# Patient Record
Sex: FEMALE | Race: BLACK OR AFRICAN AMERICAN | NOT HISPANIC OR LATINO | Employment: FULL TIME | URBAN - METROPOLITAN AREA
[De-identification: names, ages, dates, MRNs, and addresses within clinical notes are randomized per-mention and may not be internally consistent; named-entity substitution may affect disease eponyms.]

---

## 2019-07-11 ENCOUNTER — OFFICE VISIT (OUTPATIENT)
Dept: FAMILY MEDICINE | Facility: CLINIC | Age: 34
End: 2019-07-11
Payer: COMMERCIAL

## 2019-07-11 VITALS
DIASTOLIC BLOOD PRESSURE: 82 MMHG | HEIGHT: 64 IN | TEMPERATURE: 99 F | BODY MASS INDEX: 50.02 KG/M2 | WEIGHT: 293 LBS | HEART RATE: 84 BPM | SYSTOLIC BLOOD PRESSURE: 119 MMHG

## 2019-07-11 DIAGNOSIS — E55.9 VITAMIN D DEFICIENCY: ICD-10-CM

## 2019-07-11 DIAGNOSIS — G56.03 BILATERAL CARPAL TUNNEL SYNDROME: Primary | ICD-10-CM

## 2019-07-11 DIAGNOSIS — H65.03 BILATERAL ACUTE SEROUS OTITIS MEDIA, RECURRENCE NOT SPECIFIED: ICD-10-CM

## 2019-07-11 DIAGNOSIS — R68.82 DECREASED LIBIDO: ICD-10-CM

## 2019-07-11 DIAGNOSIS — Z79.899 ENCOUNTER FOR LONG-TERM (CURRENT) USE OF MEDICATIONS: ICD-10-CM

## 2019-07-11 DIAGNOSIS — R53.83 FATIGUE, UNSPECIFIED TYPE: ICD-10-CM

## 2019-07-11 DIAGNOSIS — R73.03 PREDIABETES: ICD-10-CM

## 2019-07-11 DIAGNOSIS — E28.2 PCOS (POLYCYSTIC OVARIAN SYNDROME): ICD-10-CM

## 2019-07-11 PROBLEM — K21.00 GASTRO-ESOPHAGEAL REFLUX DISEASE WITH ESOPHAGITIS: Status: ACTIVE | Noted: 2018-03-27

## 2019-07-11 PROBLEM — Z3A.39 39 WEEKS GESTATION OF PREGNANCY: Status: ACTIVE | Noted: 2017-06-12

## 2019-07-11 PROBLEM — E66.01 MORBID OBESITY: Status: ACTIVE | Noted: 2017-06-14

## 2019-07-11 PROBLEM — Z98.891 S/P CESAREAN SECTION: Status: ACTIVE | Noted: 2017-06-13

## 2019-07-11 PROCEDURE — 3008F BODY MASS INDEX DOCD: CPT | Mod: CPTII,S$GLB,, | Performed by: FAMILY MEDICINE

## 2019-07-11 PROCEDURE — 99999 PR PBB SHADOW E&M-NEW PATIENT-LVL III: ICD-10-PCS | Mod: PBBFAC,,, | Performed by: FAMILY MEDICINE

## 2019-07-11 PROCEDURE — 99204 PR OFFICE/OUTPT VISIT, NEW, LEVL IV, 45-59 MIN: ICD-10-PCS | Mod: S$GLB,,, | Performed by: FAMILY MEDICINE

## 2019-07-11 PROCEDURE — 99204 OFFICE O/P NEW MOD 45 MIN: CPT | Mod: S$GLB,,, | Performed by: FAMILY MEDICINE

## 2019-07-11 PROCEDURE — 99999 PR PBB SHADOW E&M-NEW PATIENT-LVL III: CPT | Mod: PBBFAC,,, | Performed by: FAMILY MEDICINE

## 2019-07-11 PROCEDURE — 3008F PR BODY MASS INDEX (BMI) DOCUMENTED: ICD-10-PCS | Mod: CPTII,S$GLB,, | Performed by: FAMILY MEDICINE

## 2019-07-11 RX ORDER — FLUTICASONE PROPIONATE 50 MCG
1 SPRAY, SUSPENSION (ML) NASAL
COMMUNITY
Start: 2014-12-19 | End: 2019-10-15 | Stop reason: SDUPTHER

## 2019-07-11 RX ORDER — EPINEPHRINE 0.3 MG/.3ML
INJECTION SUBCUTANEOUS
COMMUNITY
Start: 2016-06-21 | End: 2019-08-15 | Stop reason: SDUPTHER

## 2019-07-11 RX ORDER — IBUPROFEN 800 MG/1
TABLET ORAL
Refills: 0 | COMMUNITY
Start: 2019-06-19 | End: 2020-09-14

## 2019-07-11 RX ORDER — ALBUTEROL SULFATE 90 UG/1
2 AEROSOL, METERED RESPIRATORY (INHALATION)
COMMUNITY
Start: 2013-08-28 | End: 2019-08-15 | Stop reason: SDUPTHER

## 2019-07-11 RX ORDER — NORETHINDRONE ACETATE AND ETHINYL ESTRADIOL .02; 1 MG/1; MG/1
1 TABLET ORAL
COMMUNITY
Start: 2018-08-09 | End: 2019-10-22 | Stop reason: SDUPTHER

## 2019-07-12 PROBLEM — R68.82 DECREASED LIBIDO: Status: ACTIVE | Noted: 2019-07-12

## 2019-07-12 PROBLEM — R73.03 PREDIABETES: Status: ACTIVE | Noted: 2019-07-12

## 2019-07-12 PROBLEM — R53.83 FATIGUE: Status: ACTIVE | Noted: 2019-07-12

## 2019-07-12 PROBLEM — Z79.899 ENCOUNTER FOR LONG-TERM (CURRENT) USE OF MEDICATIONS: Status: ACTIVE | Noted: 2019-07-12

## 2019-07-12 PROBLEM — E55.9 VITAMIN D DEFICIENCY: Status: ACTIVE | Noted: 2019-07-12

## 2019-07-12 RX ORDER — CETIRIZINE HYDROCHLORIDE 10 MG/1
10 TABLET ORAL DAILY
Qty: 90 TABLET | Refills: 3 | Status: SHIPPED | OUTPATIENT
Start: 2019-07-12 | End: 2019-08-15 | Stop reason: SDUPTHER

## 2019-07-12 RX ORDER — METFORMIN HYDROCHLORIDE 500 MG/1
500 TABLET ORAL 2 TIMES DAILY WITH MEALS
Qty: 180 TABLET | Refills: 3 | Status: SHIPPED | OUTPATIENT
Start: 2019-07-12 | End: 2019-12-27 | Stop reason: SINTOL

## 2019-07-12 RX ORDER — MONTELUKAST SODIUM 10 MG/1
10 TABLET ORAL NIGHTLY
Qty: 90 TABLET | Refills: 3 | Status: SHIPPED | OUTPATIENT
Start: 2019-07-12 | End: 2020-07-11

## 2019-07-12 NOTE — PATIENT INSTRUCTIONS
Healthy Heart Diet  GENERAL INFORMATION:  What is a heart healthy diet? The goal of a heart healthy diet is to decrease your risk for heart disease. There are several health conditions that can increase your risk for heart disease. Some of these conditions include unhealthy blood cholesterol levels, high blood pressure, and obesity (weighing more than your caregiver recommends). If you have any of these conditions, you should make diet and lifestyle changes as part of your treatment plan. People who have had a heart attack or stroke also should follow the heart healthy diet. The heart healthy diet may help to decrease the risk of having another heart attack or stroke.  What should I know about the different types of fat in my diet?   Unhealthy fats: A diet that is high in cholesterol, saturated fat, and trans fat may cause unhealthy cholesterol levels.    Cholesterol: Limit intake of cholesterol to less than 200 mg per day. Cholesterol is found in meat, eggs, and dairy (milk, cheese).    Saturated fat: Limit saturated fat to less than seven percent of total daily calories. Ask your caregiver how many calories you need each day. Saturated fats are found in meat and dairy.    Trans fat: Limit trans fat to less than one percent of total calories. Ask your caregiver how many calories you need each day. Foods that say trans fat free on the label may still have up to 0.5 grams of fat per serving. Trans fats are used in fried and baked foods.  Healthy fats: Unsaturated fats can be healthy for you and can help to improve your cholesterol levels. Increase your intake of healthy fats by replacing saturated and trans fats in your diet with unsaturated fat.     Monounsaturated fats: Monounsaturated fats are found in nuts and vegetable oils, such as olive, canola, safflower, and sunflower.    Polyunsaturated fats: Unsaturated fat can be found in vegetable oils, such as soybean or corn. Omega-3 fats are a type of polyunsaturated  fat that can help to decrease the risk of heart disease. Omega-3 fats are found in fish, such as salmon, herring, trout, and tuna. Omega-3 fats can also be found in plant foods, such as walnuts, flaxseed, soybeans, and canola oil.  What are other diet guidelines I should follow?   Maintain a healthy weight: Your risk of heart disease is higher if you are overweight. Your caregiver may suggest that you lose weight if you are overweight. The following are some diet changes you can make to lose weight.     Eat fewer calories: A healthy way of decreasing calories is to eat fewer foods that have added sugars and fats. Foods that are have added sugars are also high in calories, which can cause you to gain weight. Some foods that have added sugars are sweet drinks (soda and fruit drinks), candy, cakes, cookies, and pies.    Eat smaller portions: You can also decrease calories in your diet by eating smaller portions at each meal and eating fewer snacks. Ask your caregiver for more information about how to lose weight.  Decrease sodium in your diet to less than 2300 mg each day: Sodium is found in table salt and foods that have added salt. A diet that is lower in sodium may decrease blood pressure or prevent high blood pressure. Keep your blood pressure within a normal range to decrease your risk of stroke, heart disease, and heart failure.    Include omega-3 fats in your diet: Eat two servings of fish per week. One serving is about four ounces. Fish is a good source of healthy omega-3 fats. Most fish contain some mercury, but many fish contain levels that are not harmful to most people. Higher amounts of mercury can be harmful to pregnant women and children. Children and pregnant women should avoid eating fish high in mercury, such as shark or swordfish. Fish that have lower amounts of mercury include salmon, canned light tuna, and catfish.    Include high fiber foods in your diet each day: You can decrease your risk of  heart disease by following a diet that is high in fiber. Include fruit and vegetables, legumes (beans), and whole-grain foods in your diet each day to get enough fiber.    Limit alcohol: Limit the amount of alcohol you drink. Drinking too much can damage your brain, heart, and liver. The risk of getting high blood pressure and certain types of cancer are greater for people who drink too much alcohol. Drinking too much alcohol also increases the risk of having a stroke. Women should limit alcohol to one drink a day. Men should limit alcohol to two drinks a day. A drink of alcohol is 12 ounces of beer, or five ounces of wine. One and one-half ounces of liquor, such as whiskey, is one drink of alcohol. If you drink alcohol, talk to your caregiver.   What should I avoid eating and drinking while on a heart healthy diet? Learn to read labels on packaged foods before buying them. Ask your caregiver for more information about how to read food labels. The following foods are high in fat, saturated fat, cholesterol, and sodium.   Bread and other carbohydrates:     High-fat baked goods, such as doughnuts, pastries, cookies, and biscuits.    Chips, snack mixes, regular crackers, and flavored popcorn.    Pretzels, salted nuts (high in sodium).  Fruit and vegetables:     Regular, canned vegetables (high in sodium).    Fried vegetables or vegetables in butter or high-fat sauces.    Fried fruit, or fruit served with cream.  Dairy:     Whole milk, two percent milk, half-and-half creamer.    Cheese, cream cheese, sour cream.  Meats and meat substitutes:     High-fat cuts of meat (T-bone steak, regular hamburger, ribs, figueredo, sausage).    Cold cuts and hot dogs.    Whole eggs and egg yolks (limit to three servings or less per week).  Fats:     Butter, hard margarine, shortening, partially hydrogenated or tropical (coconut, palm) oils.    Other:     Salt or seasonings made with salt (high in sodium).    Soy sauce, miso soup, canned or  dried soups (high in sodium).    Ketchup, barbecue sauce, and other high-sodium sauces.    High-fat gravy and sauces, such as Saleem or cheese sauces.  What can I eat and drink while on a heart healthy diet? Ask your dietitian or caregiver how many servings to eat each day from each of the following groups of foods. The amount of servings you should eat from each food group will depend on your daily calorie needs.   Breads and other carbohydrates:     Whole grain breads, cereals (oatmeal), and pasta.    Brown rice.    Low fat, low-sodium crackers and pretzels.  Fruits and vegetables:     Fresh, frozen, or canned vegetables (no salt or low-sodium).    Fresh, frozen, dried, or canned fruit (canned in light syrup or fruit juice).  Dairy:     Nonfat (skim), one-half percent, or one percent milk.    Nonfat or low fat yogurt or cottage cheese.    Fat free or low fat cheese.  Meats and meat products:     Fish and poultry (chicken, turkey) with no skin.    Lean beef and pork (loin, round, extra lean hamburger).    Dried beans and peas, unsalted nuts, soy products.    Egg whites and substitutes.  Fats:     Unsaturated oils (olive, soy, peanut, canola, safflower, sunflower).    Vegetable oil spreads or soft margarine.    Avocado.  Other:     Herbs and spices in place of salt.    Low fat snacks (unsalted pretzels, plain popcorn).  What are some other lifestyle changes I should make?   Do not smoke: Smoking causes lung cancer and other long-term lung diseases. It increases your risk of many cancer types. Smoking also increases your risk of blood vessel disease, heart attack, and vision disorders. Not smoking may help prevent such symptoms as headaches and dizziness for yourself and those around you. Smokers have shorter lifespans than nonsmokers.     Exercise regularly: Regular exercise can help improve your cholesterol levels and decrease your risk for coronary artery disease. Regular exercise can also help you reach or  maintain a healthy weight. Get 30 minutes or more of moderate exercise or 20 minutes of intense exercise on most days of the week. To lose weight, get at least 60 minutes of exercise on most days of the week. Children should exercise for at least 60 minutes each day. Talk to your caregiver about the best exercise program for you.  What are the risks of not following a heart healthy diet? You may develop heart disease if you do not follow a heart healthy diet. High blood cholesterol puts you at a higher risk for heart disease. Untreated high blood pressure may lead to a stroke. It can also lead to a heart attack or heart or kidney failure. Obesity is linked to medical problems, such as heart disease, high blood pressure, stroke, and diabetes. You may need to follow this diet if you already had a heart attack or stroke. You may be more likely to have another stroke or heart attack if you do not follow this diet.  When should I call my caregiver? You have questions or concerns about your illness, medicine, or this diet.  CARE AGREEMENT:  You have the right to help plan your care. Discuss treatment options with your caregivers to decide what care you want to receive. You always have the right to refuse treatment.

## 2019-07-12 NOTE — ASSESSMENT & PLAN NOTE
No antibiotics indicated at this time.  Patient not taking any antihistamine.  She does take Flonase.  Taking 1 spray daily.  Increase to 2 sprays daily

## 2019-07-12 NOTE — ASSESSMENT & PLAN NOTE
Monitor hemoglobin A1c.  Discussed diabetic diet and exercise protocol.  Continue medications.  Monitor for side effects.  Discussed checking blood glucose.  Discussed symptoms to monitor for and to notify me immediately if persistent or worsening.  Follow up with Ophthalmology/Optometry and Podiatry.

## 2019-07-12 NOTE — PROGRESS NOTES
Subjective:      Patient ID: Lucía Coreas is a 33 y.o. female.    Chief Complaint: Establish Care      Problem List Items Addressed This Visit     PCOS (polycystic ovarian syndrome)    Overview     Chronic.  Patient was on metformin previously.  And got pregnant.  She would like to start back on his medication.  Onset was years ago per         Current Assessment & Plan     Follow-up with OBGYN.  Starting metformin.         Bilateral carpal tunnel syndrome - Primary    Overview     Acute.  New problem.  Worsening over the last few days to weeks.  Has not had any evaluation or treatment for this.  Complains of numbness and tingling in the 1st 3 digits of each hand.  Patient works at the computer all day.         Current Assessment & Plan     Night splint.  Anti-inflammatories.  Referral hand surgeon.         Bilateral acute serous otitis media    Overview     Acute.    New problem.  Worsening.  Feels like her ears are under water.  Denies any fever chills sore throat.  Patient is not taking any treatment for this.         Current Assessment & Plan     No antibiotics indicated at this time.  Patient not taking any antihistamine.  She does take Flonase.  Taking 1 spray daily.  Increase to 2 sprays daily         Encounter for long-term (current) use of medications    Overview     07/12/2019   Patient is on CHRONIC long-term drug therapy for managed conditions. See medication list. Reports compliance.  No side effects reported.  Routine lab work is being monitored.  Patient does not need refills today.     Lab Results   Component Value Date    WBC 8.31 01/22/2009    HGB 12.4 01/22/2009    HCT 38.5 01/22/2009    MCV 74.6 (L) 01/22/2009     01/22/2009      CMP  Sodium   Date Value Ref Range Status   01/22/2009 136 136 - 145 mMol/l Final     Potassium   Date Value Ref Range Status   01/22/2009 3.8 3.5 - 5.1 mMol/l Final     Chloride   Date Value Ref Range Status   01/22/2009 102 95 - 110 mMol/l Final     CO2    Date Value Ref Range Status   01/22/2009 22 (L) 23.0 - 29.0 mEq/L Final     Glucose   Date Value Ref Range Status   01/22/2009 86 70 - 110 mg/dl Final     BUN, Bld   Date Value Ref Range Status   01/22/2009 8 6 - 20 mg/dl Final     Creatinine   Date Value Ref Range Status   01/22/2009 0.7 0.5 - 1.4 mg/dl Final     Calcium   Date Value Ref Range Status   01/22/2009 9.5 8.7 - 10.5 mg/dl Final     Total Protein   Date Value Ref Range Status   01/22/2009 7.7 6.0 - 8.4 gm/dl Final     Albumin   Date Value Ref Range Status   01/22/2009 4.7 3.5 - 5.2 g/dl Final     Total Bilirubin   Date Value Ref Range Status   01/22/2009 0.2 0.1 - 1.0 mg/dl Final     Comment:     For infants and newborns, interpretation of results should be based  on gestational age, weight and in agreement with clinical  observations.  .  Premature Infant recommended reference ranges:  Up to 24 hours.............<8.0 mg/dl  Up to 48 hours............<12.0 mg/dl  3-5 days..................<15.0 mg/dl  6-29 days.................<15.0 mg/dl       Alkaline Phosphatase   Date Value Ref Range Status   01/22/2009 65 55 - 135 U/L Final     AST   Date Value Ref Range Status   01/22/2009 9 (L) 10 - 40 U/L Final     ALT   Date Value Ref Range Status   01/22/2009 8 (L) 10 - 44 U/L Final     No results found for: TSH            Current Assessment & Plan     Complete history and physical was completed today.  Complete and thorough medication reconciliation was performed.  Discussed risks and benefits of medications.  Advised patient on orders and health maintenance.  We discussed old records and old labs if available.  Will request any records not available through epic.  Continue current medications listed on your summary sheet.           Decreased libido    Overview     Patient complaining of decreased sexual drive.  Patient reports this was better when she was on metformin for her PCOS.  She was recently put on birth control by her OBGYN.  No improvement.          Current Assessment & Plan     Trial of metformin.  Follow up with OBGYN.         Fatigue    Overview     Chronic.  No improvement.  Patient concerned about her weight and energy level.         Vitamin D deficiency    Overview       07/12/2019  Vit d deficiency.  Not currently taking vitamin d supplement. No SE reported. Fatigue is notimproved.              Current Assessment & Plan     Checking vitamin-D.  Will recommend supplementation based on the level.           Prediabetes    Overview     No results found for: LABA1C, HGBA1C  No results found in epic however review her blood work from previous provider shows last A1c was 5.8.  Patient previously on metformin.  Currently having fatigue and decreased libido.         Current Assessment & Plan     Monitor hemoglobin A1c.  Discussed diabetic diet and exercise protocol.  Continue medications.  Monitor for side effects.  Discussed checking blood glucose.  Discussed symptoms to monitor for and to notify me immediately if persistent or worsening.  Follow up with Ophthalmology/Optometry and Podiatry.                Past Medical History:  Past Medical History:   Diagnosis Date    Allergy     Amenorrhea     Back pain     GERD (gastroesophageal reflux disease)     Infertility associated with anovulation     Iron deficiency anemia     Knee pain     Metabolic syndrome     PCO (polycystic ovaries)     Plantar fasciitis     Recurrent boils     Reflux esophagitis     Sinusitis     Vaginitis      History reviewed. No pertinent surgical history.  Review of patient's allergies indicates:   Allergen Reactions    Sulfa (sulfonamide antibiotics) Anaphylaxis and Swelling     Swelling (eyes)^, Swelling (throat)^  Swelling (eyes)^, Swelling (throat)^      Diclofenac      Gastritis      Current Outpatient Medications on File Prior to Visit   Medication Sig Dispense Refill    albuterol (PROVENTIL/VENTOLIN HFA) 90 mcg/actuation inhaler Inhale 2 puffs into the lungs.       EPINEPHrine (EPIPEN 2-MARTIN) 0.3 mg/0.3 mL AtIn       fluticasone propionate (FLONASE) 50 mcg/actuation nasal spray 1 spray by Nasal route.      ibuprofen (ADVIL,MOTRIN) 800 MG tablet TK 1 T PO Q 8 H PRF PAIN  0    norethindrone-ethinyl estradiol (JUNEL 1/20, 21,) 1-20 mg-mcg per tablet Take 1 tablet by mouth.       No current facility-administered medications on file prior to visit.      Social History     Socioeconomic History    Marital status:      Spouse name: Not on file    Number of children: Not on file    Years of education: Not on file    Highest education level: Not on file   Occupational History    Not on file   Social Needs    Financial resource strain: Not on file    Food insecurity:     Worry: Not on file     Inability: Not on file    Transportation needs:     Medical: Not on file     Non-medical: Not on file   Tobacco Use    Smoking status: Never Smoker   Substance and Sexual Activity    Alcohol use: Not on file    Drug use: Not on file    Sexual activity: Not on file   Lifestyle    Physical activity:     Days per week: Not on file     Minutes per session: Not on file    Stress: Not on file   Relationships    Social connections:     Talks on phone: Not on file     Gets together: Not on file     Attends Islam service: Not on file     Active member of club or organization: Not on file     Attends meetings of clubs or organizations: Not on file     Relationship status: Not on file   Other Topics Concern    Not on file   Social History Narrative    Not on file     Family History   Problem Relation Age of Onset    Diabetes Mother     Lupus Mother     Diabetes Father     Heart disease Father     Hypertension Father     Cancer Father         prostate    Cancer Sister         ovarian       I have reviewed the complete PMH, social history, surgical history, allergies and medications.  As well as family history.    Review of Systems   Constitutional: Positive for  "fatigue. Negative for chills, fever and unexpected weight change.   HENT: Positive for ear pain. Negative for ear discharge and sore throat.    Eyes: Negative for redness and visual disturbance.   Respiratory: Negative for cough and shortness of breath.    Cardiovascular: Negative for chest pain and palpitations.   Gastrointestinal: Negative for nausea and vomiting.   Genitourinary: Positive for menstrual problem and pelvic pain.        Decreased libido   Musculoskeletal: Negative for arthralgias and myalgias.   Skin: Negative for rash and wound.   Neurological: Negative for weakness and headaches.       Objective:     /82   Pulse 84   Temp 99.2 °F (37.3 °C)   Ht 5' 4" (1.626 m)   Wt (!) 146.5 kg (323 lb)   BMI 55.44 kg/m²     Physical Exam   Constitutional: She is oriented to person, place, and time. She appears well-developed and well-nourished. No distress.   HENT:   Head: Normocephalic and atraumatic.   Right Ear: Hearing, external ear and ear canal normal. No drainage or tenderness. Tympanic membrane is bulging. Tympanic membrane is not injected and not erythematous. A middle ear effusion is present. No hemotympanum. No decreased hearing is noted.   Left Ear: Hearing, external ear and ear canal normal. No drainage or tenderness. Tympanic membrane is not injected, not erythematous and not bulging. A middle ear effusion is present. No hemotympanum. No decreased hearing is noted.   Nose: Mucosal edema and rhinorrhea present.   Eyes: Pupils are equal, round, and reactive to light. EOM and lids are normal.   Neck: Normal range of motion. Neck supple.   Cardiovascular: Normal rate, regular rhythm, normal heart sounds and intact distal pulses.   No murmur heard.  Pulmonary/Chest: Effort normal and breath sounds normal. No respiratory distress. She has no wheezes.   Abdominal: Soft. Bowel sounds are normal. She exhibits no distension.   Obese abdomen   Musculoskeletal: Normal range of motion. She exhibits no " edema.   Neurological: She is alert and oriented to person, place, and time. No cranial nerve deficit.   Skin: Skin is warm and dry. Capillary refill takes less than 2 seconds.   Psychiatric: She has a normal mood and affect. Her behavior is normal.   Nursing note and vitals reviewed.      Assessment:     1. Bilateral carpal tunnel syndrome    2. Bilateral acute serous otitis media, recurrence not specified    3. Encounter for long-term (current) use of medications    4. PCOS (polycystic ovarian syndrome)    5. Decreased libido    6. Fatigue, unspecified type    7. Vitamin D deficiency    8. Prediabetes        Plan:     I have Reviewed and summarized old records.  I have performed thorough medication reconciliation today and discussed risk and benefits of each medication.  I have reviewed labs and discussed with patient.  All questions were answered.  I am requesting old records and will review them once they are available.    Problem List Items Addressed This Visit     PCOS (polycystic ovarian syndrome)     Follow-up with OBGYN.  Starting metformin.         Relevant Orders    Hemoglobin A1c    Bilateral carpal tunnel syndrome - Primary     Night splint.  Anti-inflammatories.  Referral hand surgeon.         Relevant Orders    Ambulatory referral to Hand Surgery    Bilateral acute serous otitis media     No antibiotics indicated at this time.  Patient not taking any antihistamine.  She does take Flonase.  Taking 1 spray daily.  Increase to 2 sprays daily         Encounter for long-term (current) use of medications     Complete history and physical was completed today.  Complete and thorough medication reconciliation was performed.  Discussed risks and benefits of medications.  Advised patient on orders and health maintenance.  We discussed old records and old labs if available.  Will request any records not available through epic.  Continue current medications listed on your summary sheet.           Relevant  Medications    albuterol (PROVENTIL/VENTOLIN HFA) 90 mcg/actuation inhaler    norethindrone-ethinyl estradiol (JUNEL 1/20, 21,) 1-20 mg-mcg per tablet    EPINEPHrine (EPIPEN 2-MARTIN) 0.3 mg/0.3 mL AtIn    fluticasone propionate (FLONASE) 50 mcg/actuation nasal spray    ibuprofen (ADVIL,MOTRIN) 800 MG tablet    Other Relevant Orders    Ambulatory referral to Hand Surgery    CBC auto differential    Comprehensive metabolic panel    Hemoglobin A1c    Lipid panel    TSH    T3, free    T4    Vitamin D    Decreased libido     Trial of metformin.  Follow up with OBGYN.         Fatigue    Relevant Orders    CBC auto differential    Comprehensive metabolic panel    Hemoglobin A1c    TSH    T3, free    T4    Vitamin D    Vitamin D deficiency     Checking vitamin-D.  Will recommend supplementation based on the level.           Relevant Orders    Vitamin D    Prediabetes     Monitor hemoglobin A1c.  Discussed diabetic diet and exercise protocol.  Continue medications.  Monitor for side effects.  Discussed checking blood glucose.  Discussed symptoms to monitor for and to notify me immediately if persistent or worsening.  Follow up with Ophthalmology/Optometry and Podiatry.         Relevant Orders    Hemoglobin A1c          Follow up in about 1 month (around 8/8/2019) for Review lab.    DISCLAIMER: This note was compiled by using a speech recognition dictation system and therefore please be aware that typographical / speech recognition errors can and do occur.  Please contact me if you see any errors specifically.    Dante Negro MD  We Offer Telehealth & Same Day Appointments!   Book your Telehealth appointment with me through my nurse or   Clinic appointments on [employer]!    Office: [phone]          Check out my Facebook Page and Follow Me at: CLICK HERE    Check out my website at [facility] by clicking on: CLICK HERE    To Schedule appointments online, go to [employer]: CLICK HERE     Location:  [email or web address]    [address] [facility]  [place] [address]    FAX: [phone]

## 2019-07-12 NOTE — TELEPHONE ENCOUNTER
On the patient summary from yesterday, you stated that you wanted patient to start Metformin, but I don't see this medicine on her list.  I have pended the order to you.

## 2019-07-16 ENCOUNTER — LAB VISIT (OUTPATIENT)
Dept: LAB | Facility: HOSPITAL | Age: 34
End: 2019-07-16
Attending: FAMILY MEDICINE
Payer: COMMERCIAL

## 2019-07-16 DIAGNOSIS — R73.03 PREDIABETES: ICD-10-CM

## 2019-07-16 DIAGNOSIS — E55.9 VITAMIN D DEFICIENCY: ICD-10-CM

## 2019-07-16 DIAGNOSIS — E28.2 PCOS (POLYCYSTIC OVARIAN SYNDROME): ICD-10-CM

## 2019-07-16 DIAGNOSIS — Z79.899 ENCOUNTER FOR LONG-TERM (CURRENT) USE OF MEDICATIONS: ICD-10-CM

## 2019-07-16 DIAGNOSIS — R53.83 FATIGUE, UNSPECIFIED TYPE: ICD-10-CM

## 2019-07-16 LAB
25(OH)D3+25(OH)D2 SERPL-MCNC: 16 NG/ML (ref 30–96)
ALBUMIN SERPL BCP-MCNC: 3.3 G/DL (ref 3.5–5.2)
ALP SERPL-CCNC: 62 U/L (ref 55–135)
ALT SERPL W/O P-5'-P-CCNC: 9 U/L (ref 10–44)
ANION GAP SERPL CALC-SCNC: 10 MMOL/L (ref 8–16)
AST SERPL-CCNC: 10 U/L (ref 10–40)
BASOPHILS # BLD AUTO: 0.04 K/UL (ref 0–0.2)
BASOPHILS NFR BLD: 0.4 % (ref 0–1.9)
BILIRUB SERPL-MCNC: 0.3 MG/DL (ref 0.1–1)
BUN SERPL-MCNC: 7 MG/DL (ref 6–20)
CALCIUM SERPL-MCNC: 9.1 MG/DL (ref 8.7–10.5)
CHLORIDE SERPL-SCNC: 107 MMOL/L (ref 95–110)
CHOLEST SERPL-MCNC: 153 MG/DL (ref 120–199)
CHOLEST/HDLC SERPL: 3.1 {RATIO} (ref 2–5)
CO2 SERPL-SCNC: 25 MMOL/L (ref 23–29)
CREAT SERPL-MCNC: 0.7 MG/DL (ref 0.5–1.4)
DIFFERENTIAL METHOD: ABNORMAL
EOSINOPHIL # BLD AUTO: 0.1 K/UL (ref 0–0.5)
EOSINOPHIL NFR BLD: 1.1 % (ref 0–8)
ERYTHROCYTE [DISTWIDTH] IN BLOOD BY AUTOMATED COUNT: 15.1 % (ref 11.5–14.5)
EST. GFR  (AFRICAN AMERICAN): >60 ML/MIN/1.73 M^2
EST. GFR  (NON AFRICAN AMERICAN): >60 ML/MIN/1.73 M^2
ESTIMATED AVG GLUCOSE: 126 MG/DL (ref 68–131)
GLUCOSE SERPL-MCNC: 93 MG/DL (ref 70–110)
HBA1C MFR BLD HPLC: 6 % (ref 4–5.6)
HCT VFR BLD AUTO: 41 % (ref 37–48.5)
HDLC SERPL-MCNC: 50 MG/DL (ref 40–75)
HDLC SERPL: 32.7 % (ref 20–50)
HGB BLD-MCNC: 12.4 G/DL (ref 12–16)
IMM GRANULOCYTES # BLD AUTO: 0.02 K/UL (ref 0–0.04)
IMM GRANULOCYTES NFR BLD AUTO: 0.2 % (ref 0–0.5)
LDLC SERPL CALC-MCNC: 87 MG/DL (ref 63–159)
LYMPHOCYTES # BLD AUTO: 3.6 K/UL (ref 1–4.8)
LYMPHOCYTES NFR BLD: 40.1 % (ref 18–48)
MCH RBC QN AUTO: 23.3 PG (ref 27–31)
MCHC RBC AUTO-ENTMCNC: 30.2 G/DL (ref 32–36)
MCV RBC AUTO: 77 FL (ref 82–98)
MONOCYTES # BLD AUTO: 0.5 K/UL (ref 0.3–1)
MONOCYTES NFR BLD: 5.4 % (ref 4–15)
NEUTROPHILS # BLD AUTO: 4.7 K/UL (ref 1.8–7.7)
NEUTROPHILS NFR BLD: 52.8 % (ref 38–73)
NONHDLC SERPL-MCNC: 103 MG/DL
NRBC BLD-RTO: 0 /100 WBC
PLATELET # BLD AUTO: 332 K/UL (ref 150–350)
PMV BLD AUTO: 10.6 FL (ref 9.2–12.9)
POTASSIUM SERPL-SCNC: 3.8 MMOL/L (ref 3.5–5.1)
PROT SERPL-MCNC: 7.3 G/DL (ref 6–8.4)
RBC # BLD AUTO: 5.33 M/UL (ref 4–5.4)
SODIUM SERPL-SCNC: 142 MMOL/L (ref 136–145)
T3FREE SERPL-MCNC: 2.9 PG/ML (ref 2.3–4.2)
T4 SERPL-MCNC: 9.4 UG/DL (ref 4.5–11.5)
TRIGL SERPL-MCNC: 80 MG/DL (ref 30–150)
TSH SERPL DL<=0.005 MIU/L-ACNC: 1.54 UIU/ML (ref 0.4–4)
WBC # BLD AUTO: 9 K/UL (ref 3.9–12.7)

## 2019-07-16 PROCEDURE — 84443 ASSAY THYROID STIM HORMONE: CPT

## 2019-07-16 PROCEDURE — 85025 COMPLETE CBC W/AUTO DIFF WBC: CPT

## 2019-07-16 PROCEDURE — 80053 COMPREHEN METABOLIC PANEL: CPT

## 2019-07-16 PROCEDURE — 84436 ASSAY OF TOTAL THYROXINE: CPT

## 2019-07-16 PROCEDURE — 84481 FREE ASSAY (FT-3): CPT

## 2019-07-16 PROCEDURE — 36415 COLL VENOUS BLD VENIPUNCTURE: CPT | Mod: PO

## 2019-07-16 PROCEDURE — 80061 LIPID PANEL: CPT

## 2019-07-16 PROCEDURE — 82306 VITAMIN D 25 HYDROXY: CPT

## 2019-07-16 PROCEDURE — 83036 HEMOGLOBIN GLYCOSYLATED A1C: CPT

## 2019-07-17 NOTE — PROGRESS NOTES
Start vitamin-D protocol    Patient's results were released through my chart and was notified.  Please follow up to make sure that they received the results.  Released results through my chart.    I have reviewed your recent blood work.    Your complete blood count is stable, shows normal hemoglobin normal white blood cells and red blood cells and platelets.  The size of your hemoglobin is smaller which sometimes can be seen with iron deficiency anemia.  Start iron supplement over-the-counter.    Your metabolic panel which shows a glucose kidney function electrolytes and liver function is stable within normal limits.   Thyroid studies are normal.   Your cholesterol is normal.    Your vitamin-D is low and needs replacement.  We will send vitamin-D supplementation to your pharmacy.  Take 55026 units once a week for 3 months and and we will repeat this level.   .  Your hemoglobin A1c is prediabetic range at 6.0.  Continue metformin.   We will discuss these results in detail at your next office visit.  Please let me know if you have any questions concerning these laboratory results.

## 2019-08-01 ENCOUNTER — PATIENT OUTREACH (OUTPATIENT)
Dept: ADMINISTRATIVE | Facility: HOSPITAL | Age: 34
End: 2019-08-01

## 2019-08-01 NOTE — PROGRESS NOTES
Health Maintenance reviewed. PAP dated 6/2/2016 performed at [facility] in [facility] Everywhere sent to MA for media upload. Letter sent .

## 2019-08-08 ENCOUNTER — PATIENT OUTREACH (OUTPATIENT)
Dept: ADMINISTRATIVE | Facility: HOSPITAL | Age: 34
End: 2019-08-08

## 2019-08-15 ENCOUNTER — OFFICE VISIT (OUTPATIENT)
Dept: FAMILY MEDICINE | Facility: CLINIC | Age: 34
End: 2019-08-15
Payer: COMMERCIAL

## 2019-08-15 VITALS
SYSTOLIC BLOOD PRESSURE: 132 MMHG | HEART RATE: 90 BPM | BODY MASS INDEX: 50.02 KG/M2 | TEMPERATURE: 99 F | WEIGHT: 293 LBS | DIASTOLIC BLOOD PRESSURE: 88 MMHG | HEIGHT: 64 IN

## 2019-08-15 DIAGNOSIS — Z79.899 ENCOUNTER FOR LONG-TERM (CURRENT) USE OF MEDICATIONS: Primary | ICD-10-CM

## 2019-08-15 DIAGNOSIS — K21.00 GERD WITH ESOPHAGITIS: ICD-10-CM

## 2019-08-15 DIAGNOSIS — R73.03 PREDIABETES: ICD-10-CM

## 2019-08-15 DIAGNOSIS — H65.03 BILATERAL ACUTE SEROUS OTITIS MEDIA, RECURRENCE NOT SPECIFIED: ICD-10-CM

## 2019-08-15 DIAGNOSIS — E55.9 VITAMIN D DEFICIENCY: ICD-10-CM

## 2019-08-15 DIAGNOSIS — R11.2 NON-INTRACTABLE VOMITING WITH NAUSEA, UNSPECIFIED VOMITING TYPE: ICD-10-CM

## 2019-08-15 LAB
B-HCG UR QL: NEGATIVE
CTP QC/QA: YES

## 2019-08-15 PROCEDURE — 3008F PR BODY MASS INDEX (BMI) DOCUMENTED: ICD-10-PCS | Mod: CPTII,S$GLB,, | Performed by: FAMILY MEDICINE

## 2019-08-15 PROCEDURE — 99214 PR OFFICE/OUTPT VISIT, EST, LEVL IV, 30-39 MIN: ICD-10-PCS | Mod: 25,S$GLB,, | Performed by: FAMILY MEDICINE

## 2019-08-15 PROCEDURE — 99999 PR PBB SHADOW E&M-EST. PATIENT-LVL III: ICD-10-PCS | Mod: PBBFAC,,, | Performed by: FAMILY MEDICINE

## 2019-08-15 PROCEDURE — 3008F BODY MASS INDEX DOCD: CPT | Mod: CPTII,S$GLB,, | Performed by: FAMILY MEDICINE

## 2019-08-15 PROCEDURE — 99999 PR PBB SHADOW E&M-EST. PATIENT-LVL III: CPT | Mod: PBBFAC,,, | Performed by: FAMILY MEDICINE

## 2019-08-15 PROCEDURE — 81025 URINE PREGNANCY TEST: CPT | Mod: S$GLB,,, | Performed by: FAMILY MEDICINE

## 2019-08-15 PROCEDURE — 81025 POCT URINE PREGNANCY: ICD-10-PCS | Mod: S$GLB,,, | Performed by: FAMILY MEDICINE

## 2019-08-15 PROCEDURE — 99214 OFFICE O/P EST MOD 30 MIN: CPT | Mod: 25,S$GLB,, | Performed by: FAMILY MEDICINE

## 2019-08-15 RX ORDER — ALBUTEROL SULFATE 90 UG/1
2 AEROSOL, METERED RESPIRATORY (INHALATION) EVERY 4 HOURS PRN
Qty: 18 G | Refills: 3 | Status: SHIPPED | OUTPATIENT
Start: 2019-08-15 | End: 2020-09-23 | Stop reason: SDUPTHER

## 2019-08-15 RX ORDER — CETIRIZINE HYDROCHLORIDE 10 MG/1
10 TABLET ORAL DAILY
Qty: 90 TABLET | Refills: 3 | Status: SHIPPED | OUTPATIENT
Start: 2019-08-15 | End: 2020-01-10 | Stop reason: ALTCHOICE

## 2019-08-15 RX ORDER — PANTOPRAZOLE SODIUM 40 MG/1
40 TABLET, DELAYED RELEASE ORAL DAILY
Qty: 90 TABLET | Refills: 3 | Status: SHIPPED | OUTPATIENT
Start: 2019-08-15 | End: 2020-09-23 | Stop reason: SDUPTHER

## 2019-08-15 RX ORDER — ERGOCALCIFEROL 1.25 MG/1
50000 CAPSULE ORAL
Qty: 4 CAPSULE | Refills: 2 | Status: SHIPPED | OUTPATIENT
Start: 2019-08-15 | End: 2019-09-20 | Stop reason: SDUPTHER

## 2019-08-15 RX ORDER — AMOXICILLIN 500 MG/1
500 CAPSULE ORAL EVERY 12 HOURS
Qty: 20 CAPSULE | Refills: 0 | Status: SHIPPED | OUTPATIENT
Start: 2019-08-15 | End: 2019-08-25

## 2019-08-15 RX ORDER — ONDANSETRON HYDROCHLORIDE 8 MG/1
8 TABLET, FILM COATED ORAL EVERY 8 HOURS PRN
Qty: 10 TABLET | Refills: 0 | Status: SHIPPED | OUTPATIENT
Start: 2019-08-15 | End: 2020-04-02

## 2019-08-15 RX ORDER — PANTOPRAZOLE SODIUM 40 MG/1
40 TABLET, DELAYED RELEASE ORAL DAILY
COMMUNITY
End: 2019-08-15 | Stop reason: SDUPTHER

## 2019-08-15 RX ORDER — EPINEPHRINE 0.3 MG/.3ML
INJECTION SUBCUTANEOUS
Qty: 1 DEVICE | Refills: 1 | Status: SHIPPED | OUTPATIENT
Start: 2019-08-15 | End: 2020-02-17 | Stop reason: SDUPTHER

## 2019-08-15 NOTE — ASSESSMENT & PLAN NOTE
Patient with vitamin-D deficiency a and low MCV but normal hemoglobin.  Patient on iron supplementation.  Started vitamin-D supplementation.    Complete history and physical was completed today.  Complete and thorough medication reconciliation was performed.  Discussed risks and benefits of medications.  Advised patient on orders and health maintenance.  We discussed old records and old labs if available.  Will request any records not available through epic.  Continue current medications listed on your summary sheet.

## 2019-08-15 NOTE — PROGRESS NOTES
Subjective:      Patient ID: Lucía Coreas is a 33 y.o. female.    Chief Complaint: Follow-up (review labs); Emesis (vomiting); Abdominal Pain; and Vitamin D Deficiency      Problem List Items Addressed This Visit     Bilateral acute serous otitis media    Overview     Acute.  On chronic.  Recurring.  Associated with nausea and abdominal upset.  Patient did vomit once today.    Recurring problem.  Worsening.  Feels like her ears are under water.  Denies any fever chills sore throat.  Patient is taking Flonase as prescribed         Current Assessment & Plan     Treating with antibiotics.  Anti emetic for nausea.  Continue Flonase.  Start antihistamine.  Continue singular. if no improvement may need follow-up with ENT.         Encounter for long-term (current) use of medications - Primary    Overview     07/12/2019   Patient is on CHRONIC long-term drug therapy for managed conditions. See medication list. Reports compliance.  No side effects reported.  Routine lab work is being monitored.  Patient does not need refills today.     Lab Results   Component Value Date    WBC 8.31 01/22/2009    HGB 12.4 01/22/2009    HCT 38.5 01/22/2009    MCV 74.6 (L) 01/22/2009     01/22/2009      CMP  Sodium   Date Value Ref Range Status   01/22/2009 136 136 - 145 mMol/l Final     Potassium   Date Value Ref Range Status   01/22/2009 3.8 3.5 - 5.1 mMol/l Final     Chloride   Date Value Ref Range Status   01/22/2009 102 95 - 110 mMol/l Final     CO2   Date Value Ref Range Status   01/22/2009 22 (L) 23.0 - 29.0 mEq/L Final     Glucose   Date Value Ref Range Status   01/22/2009 86 70 - 110 mg/dl Final     BUN, Bld   Date Value Ref Range Status   01/22/2009 8 6 - 20 mg/dl Final     Creatinine   Date Value Ref Range Status   01/22/2009 0.7 0.5 - 1.4 mg/dl Final     Calcium   Date Value Ref Range Status   01/22/2009 9.5 8.7 - 10.5 mg/dl Final     Total Protein   Date Value Ref Range Status   01/22/2009 7.7 6.0 - 8.4 gm/dl Final      Albumin   Date Value Ref Range Status   01/22/2009 4.7 3.5 - 5.2 g/dl Final     Total Bilirubin   Date Value Ref Range Status   01/22/2009 0.2 0.1 - 1.0 mg/dl Final     Comment:     For infants and newborns, interpretation of results should be based  on gestational age, weight and in agreement with clinical  observations.  .  Premature Infant recommended reference ranges:  Up to 24 hours.............<8.0 mg/dl  Up to 48 hours............<12.0 mg/dl  3-5 days..................<15.0 mg/dl  6-29 days.................<15.0 mg/dl       Alkaline Phosphatase   Date Value Ref Range Status   01/22/2009 65 55 - 135 U/L Final     AST   Date Value Ref Range Status   01/22/2009 9 (L) 10 - 40 U/L Final     ALT   Date Value Ref Range Status   01/22/2009 8 (L) 10 - 44 U/L Final     No results found for: TSH   08/15/2019   Patient is on CHRONIC long-term drug therapy for managed conditions. See medication list. Reports compliance.  No side effects reported.  Routine lab work is being monitored.  Patient does  need refills today.     Lab Results   Component Value Date    WBC 9.00 07/16/2019    HGB 12.4 07/16/2019    HCT 41.0 07/16/2019    MCV 77 (L) 07/16/2019     07/16/2019      CMP  Sodium   Date Value Ref Range Status   07/16/2019 142 136 - 145 mmol/L Final     Potassium   Date Value Ref Range Status   07/16/2019 3.8 3.5 - 5.1 mmol/L Final     Chloride   Date Value Ref Range Status   07/16/2019 107 95 - 110 mmol/L Final     CO2   Date Value Ref Range Status   07/16/2019 25 23 - 29 mmol/L Final     Glucose   Date Value Ref Range Status   07/16/2019 93 70 - 110 mg/dL Final     BUN, Bld   Date Value Ref Range Status   07/16/2019 7 6 - 20 mg/dL Final     Creatinine   Date Value Ref Range Status   07/16/2019 0.7 0.5 - 1.4 mg/dL Final     Calcium   Date Value Ref Range Status   07/16/2019 9.1 8.7 - 10.5 mg/dL Final     Total Protein   Date Value Ref Range Status   07/16/2019 7.3 6.0 - 8.4 g/dL Final     Albumin   Date Value  Ref Range Status   07/16/2019 3.3 (L) 3.5 - 5.2 g/dL Final     Total Bilirubin   Date Value Ref Range Status   07/16/2019 0.3 0.1 - 1.0 mg/dL Final     Comment:     For infants and newborns, interpretation of results should be based  on gestational age, weight and in agreement with clinical  observations.  Premature Infant recommended reference ranges:  Up to 24 hours.............<8.0 mg/dL  Up to 48 hours............<12.0 mg/dL  3-5 days..................<15.0 mg/dL  6-29 days.................<15.0 mg/dL       Alkaline Phosphatase   Date Value Ref Range Status   07/16/2019 62 55 - 135 U/L Final     AST   Date Value Ref Range Status   07/16/2019 10 10 - 40 U/L Final     ALT   Date Value Ref Range Status   07/16/2019 9 (L) 10 - 44 U/L Final     Anion Gap   Date Value Ref Range Status   07/16/2019 10 8 - 16 mmol/L Final     eGFR if    Date Value Ref Range Status   07/16/2019 >60.0 >60 mL/min/1.73 m^2 Final     eGFR if non    Date Value Ref Range Status   07/16/2019 >60.0 >60 mL/min/1.73 m^2 Final     Comment:     Calculation used to obtain the estimated glomerular filtration  rate (eGFR) is the CKD-EPI equation.        Lab Results   Component Value Date    TSH 1.542 07/16/2019               Current Assessment & Plan     Patient with vitamin-D deficiency a and low MCV but normal hemoglobin.  Patient on iron supplementation.  Started vitamin-D supplementation.    Complete history and physical was completed today.  Complete and thorough medication reconciliation was performed.  Discussed risks and benefits of medications.  Advised patient on orders and health maintenance.  We discussed old records and old labs if available.  Will request any records not available through epic.  Continue current medications listed on your summary sheet.           Vitamin D deficiency    Overview       07/12/2019  Vit d deficiency.  Not currently taking vitamin d supplement. No SE reported. Fatigue is  notimproved.     08/15/2019  Chronic.  Uncontrolled.  Vitamin-D level is still low.  Continue supplementation.  Recheck in 3 months.         Current Assessment & Plan     Recheck vitamin-D level in 3 months.         Prediabetes    Overview     Lab Results   Component Value Date    HGBA1C 6.0 (H) 07/16/2019     No results found in epic however review her blood work from previous provider shows last A1c was 5.8.  Patient previously on metformin.  Currently having fatigue and decreased libido.  ========================    Patient restarted on metformin.  No issues no complications.         Current Assessment & Plan     Monitor hemoglobin A1c.  Discussed diabetic diet and exercise protocol.  Continue medications.  Monitor for side effects.  Discussed checking blood glucose.  Discussed symptoms to monitor for and to notify me immediately if persistent or worsening.  Follow up with Ophthalmology/Optometry and Podiatry.         GERD with esophagitis    Overview     Chronic.  Borderline control on Protonix.  Patient currently having exacerbation of reflux and abdominal pain associated with nausea and vomiting.         Non-intractable vomiting with nausea    Overview     Associated with abdominal pain, patient works as a  thinks that she may be of exposed to a virus.  Denies any fever chills night sweats constipation diarrhea.  Also having ear in and sinus pressure.    Patient reports that her last menstrual.  Was about a month ago.  She did start metformin and has PCOS.  UPT is negative.                Past Medical History:  Past Medical History:   Diagnosis Date    Allergy     Amenorrhea     Back pain     GERD (gastroesophageal reflux disease)     Infertility associated with anovulation     Iron deficiency anemia     Knee pain     Metabolic syndrome     PCO (polycystic ovaries)     Plantar fasciitis     Recurrent boils     Reflux esophagitis     Sinusitis     Vaginitis      History reviewed. No  pertinent surgical history.  Review of patient's allergies indicates:   Allergen Reactions    Sulfa (sulfonamide antibiotics) Anaphylaxis and Swelling     Swelling (eyes)^, Swelling (throat)^  Swelling (eyes)^, Swelling (throat)^      Diclofenac      Gastritis      Current Outpatient Medications on File Prior to Visit   Medication Sig Dispense Refill    arm brace Misc Where night splint nightly.  For carpal tunnel 2 each 0    fluticasone propionate (FLONASE) 50 mcg/actuation nasal spray 1 spray by Nasal route.      ibuprofen (ADVIL,MOTRIN) 800 MG tablet TK 1 T PO Q 8 H PRF PAIN  0    metFORMIN (GLUCOPHAGE) 500 MG tablet Take 1 tablet (500 mg total) by mouth 2 (two) times daily with meals. 180 tablet 3    montelukast (SINGULAIR) 10 mg tablet Take 1 tablet (10 mg total) by mouth every evening. 90 tablet 3    norethindrone-ethinyl estradiol (JUNEL 1/20, 21,) 1-20 mg-mcg per tablet Take 1 tablet by mouth.      [DISCONTINUED] albuterol (PROVENTIL/VENTOLIN HFA) 90 mcg/actuation inhaler Inhale 2 puffs into the lungs.      [DISCONTINUED] cetirizine (ZYRTEC) 10 MG tablet Take 1 tablet (10 mg total) by mouth once daily. 90 tablet 3    [DISCONTINUED] EPINEPHrine (EPIPEN 2-MARTIN) 0.3 mg/0.3 mL AtIn       [DISCONTINUED] pantoprazole (PROTONIX) 40 MG tablet Take 40 mg by mouth once daily.       No current facility-administered medications on file prior to visit.      Social History     Socioeconomic History    Marital status:      Spouse name: Not on file    Number of children: Not on file    Years of education: Not on file    Highest education level: Not on file   Occupational History    Not on file   Social Needs    Financial resource strain: Not on file    Food insecurity:     Worry: Not on file     Inability: Not on file    Transportation needs:     Medical: Not on file     Non-medical: Not on file   Tobacco Use    Smoking status: Never Smoker   Substance and Sexual Activity    Alcohol use: Not on  "file    Drug use: Not on file    Sexual activity: Not on file   Lifestyle    Physical activity:     Days per week: Not on file     Minutes per session: Not on file    Stress: Not on file   Relationships    Social connections:     Talks on phone: Not on file     Gets together: Not on file     Attends [employer] service: Not on file     Active member of club or organization: Not on file     Attends meetings of clubs or organizations: Not on file     Relationship status: Not on file   Other Topics Concern    Not on file   Social History Narrative    Not on file     Family History   Problem Relation Age of Onset    Diabetes Mother     Lupus Mother     Diabetes Father     Heart disease Father     Hypertension Father     Cancer Father         prostate    Cancer Sister         ovarian       I have reviewed the complete PMH, social history, surgical history, allergies and medications.  As well as family history.    Review of Systems   Constitutional: Negative for activity change and unexpected weight change.   HENT: Positive for sinus pressure and sinus pain. Negative for hearing loss, rhinorrhea and trouble swallowing.    Eyes: Negative for discharge and visual disturbance.   Respiratory: Negative for chest tightness and wheezing.    Cardiovascular: Negative for chest pain and palpitations.   Gastrointestinal: Positive for abdominal pain and vomiting. Negative for blood in stool, constipation and diarrhea.   Endocrine: Negative for polydipsia and polyuria.   Genitourinary: Negative for difficulty urinating, dysuria, hematuria and menstrual problem.   Musculoskeletal: Positive for arthralgias and joint swelling. Negative for neck pain.   Neurological: Negative for weakness and headaches.   Psychiatric/Behavioral: Negative for confusion and dysphoric mood.       Objective:     /88   Pulse 90   Temp 98.9 °F (37.2 °C) (Oral)   Ht 5' 4" (1.626 m)   Wt (!) 147 kg (324 lb)   LMP 07/17/2019   BMI 55.61 " kg/m²     Physical Exam   Constitutional: She is oriented to person, place, and time. She appears well-developed and well-nourished. No distress.   HENT:   Head: Normocephalic and atraumatic.   Eyes: Pupils are equal, round, and reactive to light. EOM are normal.   Neck: Normal range of motion. Neck supple.   Cardiovascular: Normal rate, regular rhythm, normal heart sounds and intact distal pulses.   No murmur heard.  Pulmonary/Chest: Effort normal and breath sounds normal. No respiratory distress. She has no wheezes.   Abdominal: Soft. Bowel sounds are normal. She exhibits no distension. There is no tenderness.   Musculoskeletal: Normal range of motion. She exhibits no edema.   Neurological: She is alert and oriented to person, place, and time. No cranial nerve deficit.   Skin: Skin is warm and dry. Capillary refill takes less than 2 seconds.   Psychiatric: She has a normal mood and affect. Her behavior is normal.   Nursing note and vitals reviewed.      Assessment:     1. Encounter for long-term (current) use of medications    2. Vitamin D deficiency    3. GERD with esophagitis    4. Bilateral acute serous otitis media, recurrence not specified    5. Non-intractable vomiting with nausea, unspecified vomiting type    6. Prediabetes        Plan:     I have Reviewed and summarized old records.  I have performed thorough medication reconciliation today and discussed risk and benefits of each medication.  I have reviewed labs and discussed with patient.  All questions were answered.  I am requesting old records and will review them once they are available.    Problem List Items Addressed This Visit     Bilateral acute serous otitis media     Treating with antibiotics.  Anti emetic for nausea.  Continue Flonase.  Start antihistamine.  Continue singular. if no improvement may need follow-up with ENT.         Relevant Medications    cetirizine (ZYRTEC) 10 MG tablet    amoxicillin (AMOXIL) 500 MG capsule    Encounter for  long-term (current) use of medications - Primary     Patient with vitamin-D deficiency a and low MCV but normal hemoglobin.  Patient on iron supplementation.  Started vitamin-D supplementation.    Complete history and physical was completed today.  Complete and thorough medication reconciliation was performed.  Discussed risks and benefits of medications.  Advised patient on orders and health maintenance.  We discussed old records and old labs if available.  Will request any records not available through epic.  Continue current medications listed on your summary sheet.           Relevant Medications    cetirizine (ZYRTEC) 10 MG tablet    EPINEPHrine (EPIPEN 2-MARTIN) 0.3 mg/0.3 mL AtIn    pantoprazole (PROTONIX) 40 MG tablet    albuterol (PROVENTIL/VENTOLIN HFA) 90 mcg/actuation inhaler    Vitamin D deficiency     Recheck vitamin-D level in 3 months.         Relevant Medications    ergocalciferol (ERGOCALCIFEROL) 50,000 unit Cap    Prediabetes     Monitor hemoglobin A1c.  Discussed diabetic diet and exercise protocol.  Continue medications.  Monitor for side effects.  Discussed checking blood glucose.  Discussed symptoms to monitor for and to notify me immediately if persistent or worsening.  Follow up with Ophthalmology/Optometry and Podiatry.         GERD with esophagitis    Relevant Medications    pantoprazole (PROTONIX) 40 MG tablet    Non-intractable vomiting with nausea    Relevant Medications    ondansetron (ZOFRAN) 8 MG tablet    Other Relevant Orders    POCT Urine Pregnancy (Completed)          Follow up in about 4 weeks (around 9/12/2019) for Med refills.    If no improvement in symptoms or symptoms worsen, advised to call/follow-up at clinic or go to ER. Patient voiced understanding and all questions/concerns were addressed.     DISCLAIMER: This note was compiled by using a speech recognition dictation system and therefore please be aware that typographical / speech recognition errors can and do occur.   Please contact me if you see any errors specifically.    Dante Negro MD  We Offer Telehealth & Same Day Appointments!   Book your Telehealth appointment with me through my nurse or   Clinic appointments on [facility]!    Office: [phone]          Check out my Facebook Page and Follow Me at: CLICK HERE    Check out my website at [facility] by clicking on: CLICK HERE    To Schedule appointments online, go to [facility]: CLICK HERE     Location: [email or web address]    [address] [place] [address]    FAX: [phone]

## 2019-08-15 NOTE — ASSESSMENT & PLAN NOTE
Treating with antibiotics.  Anti emetic for nausea.  Continue Flonase.  Start antihistamine.  Continue singular. if no improvement may need follow-up with ENT.

## 2019-08-15 NOTE — PATIENT INSTRUCTIONS
Ondansetron tablets  What is this medicine?  ONDANSETRON (on DAN se alyssia) is used to treat nausea and vomiting caused by chemotherapy. It is also used to prevent or treat nausea and vomiting after surgery.  How should I use this medicine?  Take this medicine by mouth with a glass of water. Follow the directions on your prescription label. Take your doses at regular intervals. Do not take your medicine more often than directed.  Talk to your pediatrician regarding the use of this medicine in children. Special care may be needed.  What side effects may I notice from receiving this medicine?  Side effects that you should report to your doctor or health care professional as soon as possible:  · allergic reactions like skin rash, itching or hives, swelling of the face, lips or tongue  · breathing problems  · confusion  · dizziness  · fast or irregular heartbeat  · feeling faint or lightheaded, falls  · fever and chills  · loss of balance or coordination  · seizures  · sweating  · swelling of the hands or feet  · tightness in the chest  · tremors  · unusually weak or tired  Side effects that usually do not require medical attention (report to your doctor or health care professional if they continue or are bothersome):  · constipation or diarrhea  · headache  What may interact with this medicine?  Do not take this medicine with any of the following medications:  · apomorphine  · certain medicines for fungal infections like fluconazole, itraconazole, ketoconazole, posaconazole, voriconazole  · cisapride  · dofetilide  · dronedarone  · pimozide  · thioridazine  · ziprasidone  This medicine may also interact with the following medications:  · carbamazepine  · certain medicines for depression, anxiety, or psychotic disturbances  · fentanyl  · linezolid  · MAOIs like Carbex, Eldepryl, Marplan, Nardil, and Parnate  · methylene blue (injected into a vein)  · other medicines that prolong the QT interval (cause an abnormal heart  rhythm)  · phenytoin  · rifampicin  · tramadol  What if I miss a dose?  If you miss a dose, take it as soon as you can. If it is almost time for your next dose, take only that dose. Do not take double or extra doses.  Where should I keep my medicine?  Keep out of the reach of children.  Store between 2 and 30 degrees C (36 and 86 degrees F). Throw away any unused medicine after the expiration date.  What should I tell my health care provider before I take this medicine?  They need to know if you have any of these conditions:  · heart disease  · history of irregular heartbeat  · liver disease  · low levels of magnesium or potassium in the blood  · an unusual or allergic reaction to ondansetron, granisetron, other medicines, foods, dyes, or preservatives  · pregnant or trying to get pregnant  · breast-feeding  What should I watch for while using this medicine?  Check with your doctor or health care professional right away if you have any sign of an allergic reaction.  NOTE:This sheet is a summary. It may not cover all possible information. If you have questions about this medicine, talk to your doctor, pharmacist, or health care provider. Copyright© 2017 [employer]

## 2019-09-20 ENCOUNTER — OFFICE VISIT (OUTPATIENT)
Dept: FAMILY MEDICINE | Facility: CLINIC | Age: 34
End: 2019-09-20
Payer: COMMERCIAL

## 2019-09-20 VITALS
SYSTOLIC BLOOD PRESSURE: 125 MMHG | HEIGHT: 64 IN | BODY MASS INDEX: 50.02 KG/M2 | HEART RATE: 84 BPM | DIASTOLIC BLOOD PRESSURE: 78 MMHG | WEIGHT: 293 LBS | TEMPERATURE: 99 F

## 2019-09-20 DIAGNOSIS — T14.8XXA MUSCLE STRAIN: ICD-10-CM

## 2019-09-20 DIAGNOSIS — R73.03 PREDIABETES: Primary | ICD-10-CM

## 2019-09-20 DIAGNOSIS — E66.2 CLASS 3 OBESITY WITH ALVEOLAR HYPOVENTILATION, SERIOUS COMORBIDITY, AND BODY MASS INDEX (BMI) OF 50.0 TO 59.9 IN ADULT: ICD-10-CM

## 2019-09-20 DIAGNOSIS — E55.9 VITAMIN D DEFICIENCY: ICD-10-CM

## 2019-09-20 DIAGNOSIS — Z23 NEED FOR INFLUENZA VACCINATION: ICD-10-CM

## 2019-09-20 DIAGNOSIS — R09.81 CONGESTION OF NASAL SINUS: ICD-10-CM

## 2019-09-20 DIAGNOSIS — Z79.899 ENCOUNTER FOR LONG-TERM (CURRENT) USE OF MEDICATIONS: ICD-10-CM

## 2019-09-20 PROBLEM — E66.813 CLASS 3 OBESITY WITH ALVEOLAR HYPOVENTILATION, SERIOUS COMORBIDITY, AND BODY MASS INDEX (BMI) OF 50.0 TO 59.9 IN ADULT: Status: ACTIVE | Noted: 2019-09-20

## 2019-09-20 LAB
ALBUMIN/CREAT UR: 4 UG/MG (ref 0–30)
CREAT UR-MCNC: 100 MG/DL (ref 15–325)
MICROALBUMIN UR DL<=1MG/L-MCNC: 4 UG/ML

## 2019-09-20 PROCEDURE — 3008F BODY MASS INDEX DOCD: CPT | Mod: CPTII,S$GLB,, | Performed by: FAMILY MEDICINE

## 2019-09-20 PROCEDURE — 3008F PR BODY MASS INDEX (BMI) DOCUMENTED: ICD-10-PCS | Mod: CPTII,S$GLB,, | Performed by: FAMILY MEDICINE

## 2019-09-20 PROCEDURE — 90471 IMMUNIZATION ADMIN: CPT | Mod: S$GLB,,, | Performed by: FAMILY MEDICINE

## 2019-09-20 PROCEDURE — 99214 PR OFFICE/OUTPT VISIT, EST, LEVL IV, 30-39 MIN: ICD-10-PCS | Mod: 25,S$GLB,, | Performed by: FAMILY MEDICINE

## 2019-09-20 PROCEDURE — 99214 OFFICE O/P EST MOD 30 MIN: CPT | Mod: 25,S$GLB,, | Performed by: FAMILY MEDICINE

## 2019-09-20 PROCEDURE — 99999 PR PBB SHADOW E&M-EST. PATIENT-LVL IV: CPT | Mod: PBBFAC,,, | Performed by: FAMILY MEDICINE

## 2019-09-20 PROCEDURE — 90686 IIV4 VACC NO PRSV 0.5 ML IM: CPT | Mod: S$GLB,,, | Performed by: FAMILY MEDICINE

## 2019-09-20 PROCEDURE — 90471 FLU VACCINE (QUAD) GREATER THAN OR EQUAL TO 3YO PRESERVATIVE FREE IM: ICD-10-PCS | Mod: S$GLB,,, | Performed by: FAMILY MEDICINE

## 2019-09-20 PROCEDURE — 99999 PR PBB SHADOW E&M-EST. PATIENT-LVL IV: ICD-10-PCS | Mod: PBBFAC,,, | Performed by: FAMILY MEDICINE

## 2019-09-20 PROCEDURE — 82043 UR ALBUMIN QUANTITATIVE: CPT

## 2019-09-20 PROCEDURE — 90686 FLU VACCINE (QUAD) GREATER THAN OR EQUAL TO 3YO PRESERVATIVE FREE IM: ICD-10-PCS | Mod: S$GLB,,, | Performed by: FAMILY MEDICINE

## 2019-09-20 RX ORDER — ERGOCALCIFEROL 1.25 MG/1
50000 CAPSULE ORAL
Qty: 4 CAPSULE | Refills: 2 | Status: SHIPPED | OUTPATIENT
Start: 2019-09-20 | End: 2019-12-13 | Stop reason: SDUPTHER

## 2019-09-20 RX ORDER — PREDNISONE 20 MG/1
20 TABLET ORAL DAILY
Qty: 5 TABLET | Refills: 0 | Status: SHIPPED | OUTPATIENT
Start: 2019-09-20 | End: 2019-09-25

## 2019-09-20 NOTE — ASSESSMENT & PLAN NOTE
Start OZEMPIC.  Patient is having side effects with to metformin.  Patient continues to gain weight.  Discussed diet exercise.  Checking microalbumin for protein in the urine today.  A1c is due 1 month.      Monitor hemoglobin A1c.  Discussed diabetic diet and exercise protocol.  Continue medications.  Monitor for side effects.  Discussed checking blood glucose.  Discussed symptoms to monitor for and to notify me immediately if persistent or worsening.  Follow up with Ophthalmology/Optometry and Podiatry.

## 2019-09-20 NOTE — ASSESSMENT & PLAN NOTE
Recommend anti-inflammatories.  Ibuprofen has needed.  Patient can also take Tylenol if pain is worsening.  Patient to let me know if pain is not improving over the next few days.  ER precautions were given.  Physical exam reveals point tenderness over the right abdominal wall muscles.

## 2019-09-20 NOTE — PROGRESS NOTES
Subjective:      Patient ID: Lucía Coreas is a 34 y.o. female.    Chief Complaint: Follow-up; Diabetes; and Sinus Problem      Problem List Items Addressed This Visit     Encounter for long-term (current) use of medications    Overview     07/12/2019   Patient is on CHRONIC long-term drug therapy for managed conditions. See medication list. Reports compliance.  No side effects reported.  Routine lab work is being monitored.  Patient does not need refills today.     Lab Results   Component Value Date    WBC 8.31 01/22/2009    HGB 12.4 01/22/2009    HCT 38.5 01/22/2009    MCV 74.6 (L) 01/22/2009     01/22/2009      CMP  Sodium   Date Value Ref Range Status   01/22/2009 136 136 - 145 mMol/l Final     Potassium   Date Value Ref Range Status   01/22/2009 3.8 3.5 - 5.1 mMol/l Final     Chloride   Date Value Ref Range Status   01/22/2009 102 95 - 110 mMol/l Final     CO2   Date Value Ref Range Status   01/22/2009 22 (L) 23.0 - 29.0 mEq/L Final     Glucose   Date Value Ref Range Status   01/22/2009 86 70 - 110 mg/dl Final     BUN, Bld   Date Value Ref Range Status   01/22/2009 8 6 - 20 mg/dl Final     Creatinine   Date Value Ref Range Status   01/22/2009 0.7 0.5 - 1.4 mg/dl Final     Calcium   Date Value Ref Range Status   01/22/2009 9.5 8.7 - 10.5 mg/dl Final     Total Protein   Date Value Ref Range Status   01/22/2009 7.7 6.0 - 8.4 gm/dl Final     Albumin   Date Value Ref Range Status   01/22/2009 4.7 3.5 - 5.2 g/dl Final     Total Bilirubin   Date Value Ref Range Status   01/22/2009 0.2 0.1 - 1.0 mg/dl Final     Comment:     For infants and newborns, interpretation of results should be based  on gestational age, weight and in agreement with clinical  observations.  .  Premature Infant recommended reference ranges:  Up to 24 hours.............<8.0 mg/dl  Up to 48 hours............<12.0 mg/dl  3-5 days..................<15.0 mg/dl  6-29 days.................<15.0 mg/dl       Alkaline Phosphatase   Date Value Ref  Range Status   01/22/2009 65 55 - 135 U/L Final     AST   Date Value Ref Range Status   01/22/2009 9 (L) 10 - 40 U/L Final     ALT   Date Value Ref Range Status   01/22/2009 8 (L) 10 - 44 U/L Final     No results found for: TSH   08/15/2019   Patient is on CHRONIC long-term drug therapy for managed conditions. See medication list. Reports compliance.  No side effects reported.  Routine lab work is being monitored.  Patient does  need refills today.     Lab Results   Component Value Date    WBC 9.00 07/16/2019    HGB 12.4 07/16/2019    HCT 41.0 07/16/2019    MCV 77 (L) 07/16/2019     07/16/2019      CMP  Sodium   Date Value Ref Range Status   07/16/2019 142 136 - 145 mmol/L Final     Potassium   Date Value Ref Range Status   07/16/2019 3.8 3.5 - 5.1 mmol/L Final     Chloride   Date Value Ref Range Status   07/16/2019 107 95 - 110 mmol/L Final     CO2   Date Value Ref Range Status   07/16/2019 25 23 - 29 mmol/L Final     Glucose   Date Value Ref Range Status   07/16/2019 93 70 - 110 mg/dL Final     BUN, Bld   Date Value Ref Range Status   07/16/2019 7 6 - 20 mg/dL Final     Creatinine   Date Value Ref Range Status   07/16/2019 0.7 0.5 - 1.4 mg/dL Final     Calcium   Date Value Ref Range Status   07/16/2019 9.1 8.7 - 10.5 mg/dL Final     Total Protein   Date Value Ref Range Status   07/16/2019 7.3 6.0 - 8.4 g/dL Final     Albumin   Date Value Ref Range Status   07/16/2019 3.3 (L) 3.5 - 5.2 g/dL Final     Total Bilirubin   Date Value Ref Range Status   07/16/2019 0.3 0.1 - 1.0 mg/dL Final     Comment:     For infants and newborns, interpretation of results should be based  on gestational age, weight and in agreement with clinical  observations.  Premature Infant recommended reference ranges:  Up to 24 hours.............<8.0 mg/dL  Up to 48 hours............<12.0 mg/dL  3-5 days..................<15.0 mg/dL  6-29 days.................<15.0 mg/dL       Alkaline Phosphatase   Date Value Ref Range Status   07/16/2019  62 55 - 135 U/L Final     AST   Date Value Ref Range Status   07/16/2019 10 10 - 40 U/L Final     ALT   Date Value Ref Range Status   07/16/2019 9 (L) 10 - 44 U/L Final     Anion Gap   Date Value Ref Range Status   07/16/2019 10 8 - 16 mmol/L Final     eGFR if    Date Value Ref Range Status   07/16/2019 >60.0 >60 mL/min/1.73 m^2 Final     eGFR if non    Date Value Ref Range Status   07/16/2019 >60.0 >60 mL/min/1.73 m^2 Final     Comment:     Calculation used to obtain the estimated glomerular filtration  rate (eGFR) is the CKD-EPI equation.        Lab Results   Component Value Date    TSH 1.542 07/16/2019    =========================================    09/20/2019  Reviewed labs. Patient is compliant with meds. She needs refills.   Gaining weight on Metformin.            Current Assessment & Plan     Reviewed labs.  Patient with vitamin-D deficiency.  To recheck vitamin-D.  Patient is on iron supplementation for her low MCV but with normal hemoglobin.  Rechecking diabetes labs next month.  Patient is due for microalbumin.    Complete history and physical was completed today.  Complete and thorough medication reconciliation was performed.  Discussed risks and benefits of medications.  Advised patient on orders and health maintenance.  We discussed old records and old labs if available.  Will request any records not available through epic.  Continue current medications listed on your summary sheet.           Vitamin D deficiency    Overview       07/12/2019  Vit d deficiency.  Not currently taking vitamin d supplement. No SE reported. Fatigue is notimproved.     08/15/2019  Chronic.  Uncontrolled.  Vitamin-D level is still low.  Continue supplementation.  Recheck in 3 months.    09/20/2019  Patient still taking vitamin-D.  Needs refill.  No side effects reported.         Current Assessment & Plan     Patient is due for vitamin-D level checked next month with her A1c.  Continue vitamin-D  supplementation 55786 units weekly.           Prediabetes - Primary    Overview     Lab Results   Component Value Date    HGBA1C 6.0 (H) 07/16/2019     No results found in epic however review her blood work from previous provider shows last A1c was 5.8.  Patient previously on metformin.  Currently having fatigue and decreased libido.  ========================    Patient restarted on metformin.  No issues no complications.  ======================    09/20/2019  Patient due for hemoglobin A1c next month.  Will place order for patient.  She is having GI symptoms with metformin.   She reports BG is elevated at home.   She continues to gain weight.          Current Assessment & Plan     Start OZEMPIC.  Patient is having side effects with to metformin.  Patient continues to gain weight.  Discussed diet exercise.  Checking microalbumin for protein in the urine today.  A1c is due 1 month.      Monitor hemoglobin A1c.  Discussed diabetic diet and exercise protocol.  Continue medications.  Monitor for side effects.  Discussed checking blood glucose.  Discussed symptoms to monitor for and to notify me immediately if persistent or worsening.  Follow up with Ophthalmology/Optometry and Podiatry.         Need for influenza vaccination    Overview     Patient is due for influenza vaccination.         Class 3 obesity with alveolar hypoventilation, serious comorbidity, and body mass index (BMI) of 50.0 to 59.9 in adult    Overview     Vitals - 1 value per visit 7/11/2019 8/15/2019 9/20/2019   Weight (lb) 323 324 325.8     With prediabetes.     On metformin with GI effects.          Current Assessment & Plan     Discussed diet and exercise. Starting ozempic since having issues with metformin.          Congestion of nasal sinus    Overview     Acute on chronic. Taking the zyrtec, singulair, and flonase.   No relief.   A/w headache, sore throat.   Denies fever, chills, night sweats.     Works at the [employer] in [employer] and has been around  sick contacts.          Current Assessment & Plan     No antibiotics indicated at this time.  Short burst of prednisone.  Patient to notify me if having any fever or worsening symptoms.  Avoid sick contacts.  Continue Zyrtec Singulair and Flonase.  Patient may need referral to ENT if continues to have sinus issues.  Physical exam is improved from previous with resolution of otitis media and decrease nasal edema. Patient does have a sore on the right nares that she is applying Bactroban to.         Muscle strain    Overview     Patient reports that she picked up a 2-year-old child and thinks she strained a muscle in her right side.  Patient reports that it hurts over her ribs when she turns to the left.  She has not used any anti-inflammatories yet.         Current Assessment & Plan     Recommend anti-inflammatories.  Ibuprofen has needed.  Patient can also take Tylenol if pain is worsening.  Patient to let me know if pain is not improving over the next few days.  ER precautions were given.  Physical exam reveals point tenderness over the right abdominal wall muscles.                Past Medical History:  Past Medical History:   Diagnosis Date    Allergy     Amenorrhea     Back pain     GERD (gastroesophageal reflux disease)     Infertility associated with anovulation     Iron deficiency anemia     Knee pain     Metabolic syndrome     PCO (polycystic ovaries)     Plantar fasciitis     Recurrent boils     Reflux esophagitis     Sinusitis     Vaginitis      Past Surgical History:   Procedure Laterality Date    CARPAL TUNNEL RELEASE Left 07/2019     Review of patient's allergies indicates:   Allergen Reactions    Sulfa (sulfonamide antibiotics) Anaphylaxis and Swelling     Swelling (eyes)^, Swelling (throat)^  Swelling (eyes)^, Swelling (throat)^      Diclofenac      Gastritis      Medication List with Changes/Refills   New Medications    PREDNISONE (DELTASONE) 20 MG TABLET    Take 1 tablet (20 mg  total) by mouth once daily. for 5 days    SEMAGLUTIDE (OZEMPIC) 0.25 MG OR 0.5 MG(2 MG/1.5 ML) PNIJ    Inject 0.25 mg into the skin once a week for 30 days, THEN 0.5 mg once a week.   Current Medications    ALBUTEROL (PROVENTIL/VENTOLIN HFA) 90 MCG/ACTUATION INHALER    Inhale 2 puffs into the lungs every 4 (four) hours as needed for Wheezing.    CETIRIZINE (ZYRTEC) 10 MG TABLET    Take 1 tablet (10 mg total) by mouth once daily.    EPINEPHRINE (EPIPEN 2-MARTIN) 0.3 MG/0.3 ML ATIN    As directed.    FLUTICASONE PROPIONATE (FLONASE) 50 MCG/ACTUATION NASAL SPRAY    1 spray by Nasal route.    IBUPROFEN (ADVIL,MOTRIN) 800 MG TABLET    TK 1 T PO Q 8 H PRF PAIN    METFORMIN (GLUCOPHAGE) 500 MG TABLET    Take 1 tablet (500 mg total) by mouth 2 (two) times daily with meals.    MONTELUKAST (SINGULAIR) 10 MG TABLET    Take 1 tablet (10 mg total) by mouth every evening.    NORETHINDRONE-ETHINYL ESTRADIOL (JUNEL 1/20, 21,) 1-20 MG-MCG PER TABLET    Take 1 tablet by mouth.    ONDANSETRON (ZOFRAN) 8 MG TABLET    Take 1 tablet (8 mg total) by mouth every 8 (eight) hours as needed for Nausea.    PANTOPRAZOLE (PROTONIX) 40 MG TABLET    Take 1 tablet (40 mg total) by mouth once daily.   Changed and/or Refilled Medications    Modified Medication Previous Medication    ERGOCALCIFEROL (ERGOCALCIFEROL) 50,000 UNIT CAP ergocalciferol (ERGOCALCIFEROL) 50,000 unit Cap       Take 1 capsule (50,000 Units total) by mouth every 7 days.    Take 1 capsule (50,000 Units total) by mouth every 7 days.   Discontinued Medications    ARM BRACE MISC    Where night splint nightly.  For carpal tunnel      Social History     Tobacco Use    Smoking status: Never Smoker   Substance Use Topics    Alcohol use: Not on file      Family History   Problem Relation Age of Onset    Diabetes Mother     Lupus Mother     Diabetes Father     Heart disease Father     Hypertension Father     Cancer Father         prostate    Cancer Sister         ovarian       I have  "reviewed the complete PMH, social history, surgical history, allergies and medications.  As well as family history.    Review of Systems   Constitutional: Negative for chills, fatigue, fever and unexpected weight change.   HENT: Positive for postnasal drip, sinus pressure and sinus pain. Negative for ear discharge, ear pain and sore throat.    Eyes: Negative for redness and visual disturbance.   Respiratory: Negative for cough and shortness of breath.    Cardiovascular: Negative for chest pain and palpitations.   Gastrointestinal: Positive for diarrhea (on metformin) and nausea. Negative for vomiting.   Genitourinary: Negative for difficulty urinating and hematuria.   Musculoskeletal: Negative for arthralgias and myalgias.   Skin: Negative for rash and wound.   Neurological: Negative for weakness and headaches.   Psychiatric/Behavioral: Negative for sleep disturbance. The patient is not nervous/anxious.        Objective:     /78   Pulse 84   Temp 98.9 °F (37.2 °C)   Ht 5' 4" (1.626 m)   Wt (!) 147.8 kg (325 lb 12.8 oz)   LMP 09/16/2019 (Exact Date)   BMI 55.92 kg/m²     Physical Exam   Constitutional: She is oriented to person, place, and time. She appears well-developed and well-nourished. No distress.   Obese abdomen   HENT:   Head: Normocephalic and atraumatic.   Right Ear: Hearing normal. Tympanic membrane is not injected, not perforated, not erythematous, not retracted and not bulging. No middle ear effusion.   Left Ear: Hearing normal. Tympanic membrane is not injected, not perforated, not erythematous, not retracted and not bulging.  No middle ear effusion.   Nose: No mucosal edema or sinus tenderness.   Mouth/Throat: Uvula is midline, oropharynx is clear and moist and mucous membranes are normal.   Small nasal lesion on the left; not bleeding   Eyes: Pupils are equal, round, and reactive to light. EOM are normal.   Neck: Normal range of motion. Neck supple.   Cardiovascular: Normal rate, regular " rhythm, normal heart sounds and intact distal pulses.   No murmur heard.  Pulmonary/Chest: Effort normal and breath sounds normal. No respiratory distress. She has no wheezes.   Abdominal: Soft.   Musculoskeletal: Normal range of motion. She exhibits no edema.        Right foot: There is normal range of motion and no deformity.        Left foot: There is normal range of motion and no deformity.   Feet:   Right Foot:   Skin Integrity: Positive for dry skin. Negative for ulcer, blister, skin breakdown, erythema, warmth or callus.   Left Foot:   Skin Integrity: Positive for dry skin. Negative for ulcer, blister, skin breakdown, erythema, warmth or callus.   Neurological: She is alert and oriented to person, place, and time. No cranial nerve deficit.   Skin: Skin is warm and dry. Capillary refill takes less than 2 seconds.   Psychiatric: She has a normal mood and affect. Her behavior is normal.   Nursing note and vitals reviewed.      Assessment:     1. Prediabetes    2. Need for influenza vaccination    3. Vitamin D deficiency    4. Encounter for long-term (current) use of medications    5. Class 3 obesity with alveolar hypoventilation, serious comorbidity, and body mass index (BMI) of 50.0 to 59.9 in adult    6. Congestion of nasal sinus    7. Muscle strain        Plan:     I have Reviewed and summarized old records.  I have performed thorough medication reconciliation today and discussed risk and benefits of each medication.  I have reviewed labs and discussed with patient.  All questions were answered.  I am requesting old records and will review them once they are available.    Problem List Items Addressed This Visit     Encounter for long-term (current) use of medications     Reviewed labs.  Patient with vitamin-D deficiency.  To recheck vitamin-D.  Patient is on iron supplementation for her low MCV but with normal hemoglobin.  Rechecking diabetes labs next month.  Patient is due for microalbumin.    Complete  history and physical was completed today.  Complete and thorough medication reconciliation was performed.  Discussed risks and benefits of medications.  Advised patient on orders and health maintenance.  We discussed old records and old labs if available.  Will request any records not available through epic.  Continue current medications listed on your summary sheet.           Vitamin D deficiency     Patient is due for vitamin-D level checked next month with her A1c.  Continue vitamin-D supplementation 85444 units weekly.           Relevant Medications    ergocalciferol (ERGOCALCIFEROL) 50,000 unit Cap    Other Relevant Orders    Vitamin D    Prediabetes - Primary     Start OZEMPIC.  Patient is having side effects with to metformin.  Patient continues to gain weight.  Discussed diet exercise.  Checking microalbumin for protein in the urine today.  A1c is due 1 month.      Monitor hemoglobin A1c.  Discussed diabetic diet and exercise protocol.  Continue medications.  Monitor for side effects.  Discussed checking blood glucose.  Discussed symptoms to monitor for and to notify me immediately if persistent or worsening.  Follow up with Ophthalmology/Optometry and Podiatry.         Relevant Medications    semaglutide (OZEMPIC) 0.25 mg or 0.5 mg(2 mg/1.5 mL) PnIj    Other Relevant Orders    Hemoglobin A1c    MICROALBUMIN / CREATININE RATIO URINE    Need for influenza vaccination    Relevant Orders    Influenza - Quadrivalent (6 months+) (PF) (Completed)    Class 3 obesity with alveolar hypoventilation, serious comorbidity, and body mass index (BMI) of 50.0 to 59.9 in adult     Discussed diet and exercise. Starting ozempic since having issues with metformin.          Relevant Medications    semaglutide (OZEMPIC) 0.25 mg or 0.5 mg(2 mg/1.5 mL) PnIj    Congestion of nasal sinus     No antibiotics indicated at this time.  Short burst of prednisone.  Patient to notify me if having any fever or worsening symptoms.  Avoid sick  contacts.  Continue Zyrtec Singulair and Flonase.  Patient may need referral to ENT if continues to have sinus issues.  Physical exam is improved from previous with resolution of otitis media and decrease nasal edema. Patient does have a sore on the right nares that she is applying Bactroban to.         Relevant Medications    predniSONE (DELTASONE) 20 MG tablet    Muscle strain     Recommend anti-inflammatories.  Ibuprofen has needed.  Patient can also take Tylenol if pain is worsening.  Patient to let me know if pain is not improving over the next few days.  ER precautions were given.  Physical exam reveals point tenderness over the right abdominal wall muscles.               Follow up in about 3 months (around 12/20/2019), or if symptoms worsen or fail to improve, for LAB RESULTS.    If no improvement in symptoms or symptoms worsen, advised to call/follow-up at clinic or go to ER. Patient voiced understanding and all questions/concerns were addressed.     DISCLAIMER: This note was compiled by using a speech recognition dictation system and therefore please be aware that typographical / speech recognition errors can and do occur.  Please contact me if you see any errors specifically.    Dante Negro MD  We Offer Telehealth & Same Day Appointments!   Book your Telehealth appointment with me through my nurse or   Clinic appointments on [employer]!    Office: [phone]          Check out my Facebook Page and Follow Me at: CLICK HERE    Check out my website at [employer] by clicking on: CLICK HERE    To Schedule appointments online, go to [employer]: CLICK HERE     Location: [email or web address]    [address] [place] [address]    FAX: [phone]

## 2019-09-20 NOTE — ASSESSMENT & PLAN NOTE
Reviewed labs.  Patient with vitamin-D deficiency.  To recheck vitamin-D.  Patient is on iron supplementation for her low MCV but with normal hemoglobin.  Rechecking diabetes labs next month.  Patient is due for microalbumin.    Complete history and physical was completed today.  Complete and thorough medication reconciliation was performed.  Discussed risks and benefits of medications.  Advised patient on orders and health maintenance.  We discussed old records and old labs if available.  Will request any records not available through epic.  Continue current medications listed on your summary sheet.

## 2019-09-20 NOTE — ASSESSMENT & PLAN NOTE
Patient is due for vitamin-D level checked next month with her A1c.  Continue vitamin-D supplementation 90095 units weekly.

## 2019-09-20 NOTE — PATIENT INSTRUCTIONS
Vitamin D is a nutrient found in some foods that is needed for health and to  maintain strong bones. It does so by helping the body absorb calcium (one of bones  main building blocks) from food and supplements. People who get too little vitamin  D may develop soft, thin, and brittle bones, a condition known as rickets in children  and osteomalacia in adults.    Vitamin D is important to the body in many other ways as well. Muscles need it  to move, for example, nerves need it to carry messages between the brain and every  body part, and the immune system needs vitamin D to fight off invading bacteria  and viruses. Together with calcium, vitamin D also helps protect older adults from  osteoporosis. Vitamin D is found in cells throughout the body.    How much vitamin D do I need?  The amount of vitamin D you need each day depends on your age.     The body makes vitamin D when skin is directly exposed to the sun, and most  people meet at least some of their vitamin D needs this way. Skin exposed to sunshine  indoors through a window will not produce vitamin D. Cloudy days, shade, and  having dark-colored skin also cut down on the amount of vitamin D the skin makes.  However, despite the importance of the sun to vitamin D  synthesis, it is prudent to limit exposure of skin to sunlight in  order to lower the risk for skin cancer. When out in the sun for  more than a few minutes, wear protective clothing and apply  sunscreen with an SPF (sun protection factor) of 8 or more.  Tanning beds also cause the skin to make vitamin D, but pose  similar risks for skin cancer.  People who avoid the sun or who cover their bodies with  sunscreen or clothing should include good sources of vitamin  D in their diets or take a supplement. Recommended intakes  of vitamin D are set on the assumption of little sun exposure.    People can become deficient in vitamin D because they dont  consume enough or absorb enough from food, their  exposure  to sunlight is limited, or their kidneys cannot convert vitamin  D to its active form in the body. In children, vitamin D  deficiency causes rickets, where the bones become soft and  bend. Its a rare disease but still occurs, especially among   American infants and children. In adults, vitamin D deficiency  leads to osteomalacia, causing bone pain and muscle weakness.    Vitamin D is being studied for its possible connections to  several diseases and medical problems, including diabetes,  hypertension, and autoimmune conditions such as multiple  sclerosis. Two of them discussed below are bone disorders and  some types of cancer.  Bone disorders  As they get older, millions of people (mostly women, but  men too) develop, or are at risk of, osteoporosis, where bones  become fragile and may fracture if one falls. It is one consequence of not getting enough calcium and vitamin D over the  long term. Supplements of both vitamin D3   (at 700-800 IU/  day) and calcium (500-1,200 mg/day) have been shown to  reduce the risk of bone loss and fractures in elderly people aged  62-85 years. Men and women should talk with their health  care providers about their needs for vitamin D (and calcium)  as part of an overall plan to prevent or treat osteoporosis.  Cancer  Some studies suggest that vitamin D may protect against colon  cancer and perhaps even cancers of the prostate and breast. But  higher levels of vitamin D in the blood have also been linked  to higher rates of pancreatic cancer. At this time, its too  early to say whether low vitamin D status increases cancer  risk and whether higher levels protect or even increase risk in  some people.    Can vitamin D be harmful?  Yes, when amounts in the blood become too high. Signs of  toxicity include nausea, vomiting, poor appetite, constipation,  weakness, and weight loss. And by raising blood levels of calcium,  2  VITAMIN D FACT SHEET FOR CONSUMERS  too  much vitamin D can cause confusion, disorientation, and  problems with heart rhythm. Excess vitamin D can also damage  the kidneys.  The upper limit for vitamin D is 1,000 to 1,500 IU/day for  infants, 2,500 to 3,000 IU/day for children 1-8 years, and  4,000 IU/day for children 9 years and older, adults, and  pregnant and breastfeeding teens and women. Vitamin D  toxicity almost always occurs from overuse of supplements.  Excessive sun exposure doesnt cause vitamin D poisoning  because the body limits the amount of this vitamin it  Produces.    Are there any interactions with vitamin D  that I should know about?  Like most dietary supplements, vitamin D may interact or  interfere with other medicines or supplements you might be  taking. Here are several examples:   Prednisone and other corticosteroid medicines to reduce  inflammation impair how the body handles vitamin D, which  leads to lower calcium absorption and loss of bone over time.   Both the weight-loss drug orlistat (brand names Xenical®  and Luis®) and the cholesterol-lowering drug cholestyramine  (brand names Questran®, LoCholest®, and Prevalite®) can  reduce the absorption of vitamin D and other fat-soluble  vitamins (A, E, and K).   Both phenobarbital and phenytoin (brand name Dilantin®),  used to prevent and control epileptic seizures, increase the  breakdown of vitamin D and reduce calcium absorption.  Tell your doctor, pharmacist, and other health care providers  about any dietary supplements and medicines you take. They  can tell you if those dietary supplements might interact or  interfere with your prescription or over-the-counter medicines,  or if the medicines might interfere with how your body absorbs,  uses, or breaks down nutrients.    Vitamin D and healthful eating  People should get most of their nutrients from food, advises  the federal governments Dietary Guidelines for Americans.  Foods contain vitamins, minerals, dietary fiber and  other  substances that benefit health. In some cases, fortified foods  and dietary supplements may provide nutrients that otherwise  may be consumed in less-than-recommended amounts. For  more information about building a healthy diet, refer to the  Dietary Guidelines for Americans and the U.S. Department of  Agricultures MyPlate.  Where can I find out more about vitamin D?  For general information on vitamin D:   Office of Dietary Supplements Health Professional Fact  Sheet on Vitamin D   Vitamin D, [employer]®  For more information on food sources of vitamin D:   U.S. Department of Agricultures (USDAs) National  Nutrient Database   Nutrient list for vitamin D (listed by food or vitamin D  content), USDA  For more advice on buying dietary supplements:   Office of Dietary Supplements Frequently Asked Questions:  Which brand(s) of dietary supplements should I purchase?  For information about building a healthy diet:   MyPlate   Dietary Guidelines for Americans

## 2019-09-20 NOTE — ASSESSMENT & PLAN NOTE
No antibiotics indicated at this time.  Short burst of prednisone.  Patient to notify me if having any fever or worsening symptoms.  Avoid sick contacts.  Continue Zyrtec Singulair and Flonase.  Patient may need referral to ENT if continues to have sinus issues.  Physical exam is improved from previous with resolution of otitis media and decrease nasal edema. Patient does have a sore on the right nares that she is applying Bactroban to.

## 2019-09-24 ENCOUNTER — PATIENT MESSAGE (OUTPATIENT)
Dept: FAMILY MEDICINE | Facility: CLINIC | Age: 34
End: 2019-09-24

## 2019-09-24 NOTE — TELEPHONE ENCOUNTER
Received a message from patient stating that her insurance will not cover Ozempic and is requesting a different medication be sent to her pharmacy.  Please advise.

## 2019-09-26 NOTE — TELEPHONE ENCOUNTER
----- Message from Sherlyn Fong sent at 9/26/2019 11:28 AM CDT -----  Contact: Pt  Pt asked that nurse return her call regarding a medication prescription. ([phone])

## 2019-10-07 DIAGNOSIS — M79.642 PAIN OF LEFT HAND: Primary | ICD-10-CM

## 2019-10-11 ENCOUNTER — OFFICE VISIT (OUTPATIENT)
Dept: ORTHOPEDICS | Facility: CLINIC | Age: 34
End: 2019-10-11
Payer: COMMERCIAL

## 2019-10-11 ENCOUNTER — HOSPITAL ENCOUNTER (OUTPATIENT)
Dept: RADIOLOGY | Facility: HOSPITAL | Age: 34
Discharge: HOME | End: 2019-10-11
Attending: PHYSICIAN ASSISTANT
Payer: COMMERCIAL

## 2019-10-11 VITALS
BODY MASS INDEX: 50.02 KG/M2 | WEIGHT: 293 LBS | SYSTOLIC BLOOD PRESSURE: 129 MMHG | HEART RATE: 93 BPM | DIASTOLIC BLOOD PRESSURE: 83 MMHG | HEIGHT: 64 IN

## 2019-10-11 DIAGNOSIS — M79.642 PAIN OF LEFT HAND: ICD-10-CM

## 2019-10-11 DIAGNOSIS — S63.637A SPRAIN OF INTERPHALANGEAL JOINT OF LEFT LITTLE FINGER, INITIAL ENCOUNTER: Primary | ICD-10-CM

## 2019-10-11 PROCEDURE — 99202 PR OFFICE/OUTPT VISIT, NEW, LEVL II, 15-29 MIN: ICD-10-PCS | Mod: S$GLB,,, | Performed by: PHYSICIAN ASSISTANT

## 2019-10-11 PROCEDURE — 73130 X-RAY EXAM OF HAND: CPT | Mod: TC,LT

## 2019-10-11 PROCEDURE — 73130 XR HAND COMPLETE 3 VIEW LEFT: ICD-10-PCS | Mod: 26,LT,, | Performed by: RADIOLOGY

## 2019-10-11 PROCEDURE — 3008F PR BODY MASS INDEX (BMI) DOCUMENTED: ICD-10-PCS | Mod: CPTII,S$GLB,, | Performed by: PHYSICIAN ASSISTANT

## 2019-10-11 PROCEDURE — 99999 PR PBB SHADOW E&M-EST. PATIENT-LVL IV: ICD-10-PCS | Mod: PBBFAC,,, | Performed by: PHYSICIAN ASSISTANT

## 2019-10-11 PROCEDURE — 99202 OFFICE O/P NEW SF 15 MIN: CPT | Mod: S$GLB,,, | Performed by: PHYSICIAN ASSISTANT

## 2019-10-11 PROCEDURE — 3008F BODY MASS INDEX DOCD: CPT | Mod: CPTII,S$GLB,, | Performed by: PHYSICIAN ASSISTANT

## 2019-10-11 PROCEDURE — 99999 PR PBB SHADOW E&M-EST. PATIENT-LVL IV: CPT | Mod: PBBFAC,,, | Performed by: PHYSICIAN ASSISTANT

## 2019-10-11 PROCEDURE — 73130 X-RAY EXAM OF HAND: CPT | Mod: 26,LT,, | Performed by: RADIOLOGY

## 2019-10-11 RX ORDER — CELECOXIB 200 MG/1
200 CAPSULE ORAL 2 TIMES DAILY PRN
Refills: 1 | COMMUNITY
Start: 2019-10-07 | End: 2020-04-02

## 2019-10-11 RX ORDER — HYDROCODONE BITARTRATE AND ACETAMINOPHEN 5; 325 MG/1; MG/1
1 TABLET ORAL 3 TIMES DAILY PRN
Refills: 0 | COMMUNITY
Start: 2019-10-07 | End: 2020-04-02

## 2019-10-11 NOTE — LETTER
October 11, 2019      Dante Negro MD  [address]  [place] [address]           [facility]  [address] [place] [id number]  Phone: [phone]  Fax: [phone]          Patient: Lucía Coreas   MR Number: [id number]   YOB: 1985   Date of Visit: 10/11/2019       Dear Dr. Dante Negro:    Thank you for referring Lucía Coreas to me for evaluation. Attached you will find relevant portions of my assessment and plan of care.    If you have questions, please do not hesitate to call me. I look forward to following Lucía Coreas along with you.    Sincerely,    LEANNA Phillip  CC:  No Recipients    If you would like to receive this communication electronically, please contact [email or web address] or [phone] to request more information on [employer] Link access.    For providers and/or their staff who would like to refer a patient to [facility], please contact us through our one-stop-shop provider referral line, [facility], at [phone].    If you feel you have received this communication in error or would no longer like to receive these types of communications, please e-mail [email or web address]

## 2019-10-11 NOTE — PROGRESS NOTES
Subjective:      Patient ID: Lucía Coreas is a 34 y.o. female.    Chief Complaint: Pain of the Left Hand      HPI: Lucía Coreas  is a 34 y.o. female who c/o Pain of the Left Hand   for duration of 5 days.  She jammed her left small finger while opening a car door on 10/06/2019.  She was evaluated at an urgent care and diagnosed with a nondisplaced fracture of the left small finger proximal phalanx.  She comes in today for routine orthopedic evaluation. Pain level is 2/10.  Quality is throbbing intermittent.  It is worsened with the splint she was given at urgent care. It is improved with movement and Celebrex.  She complains of associated numbness in the 4th and 5th digits. She recently underwent a carpal tunnel release in the left hand by Dr. Issa viveros in [place].  He has also treating her for cubital tunnel in the left upper extremity.    Past Medical History:   Diagnosis Date    Allergy     Amenorrhea     Back pain     GERD (gastroesophageal reflux disease)     Infertility associated with anovulation     Iron deficiency anemia     Knee pain     Metabolic syndrome     PCO (polycystic ovaries)     Plantar fasciitis     Recurrent boils     Reflux esophagitis     Sinusitis     Vaginitis      Past Surgical History:   Procedure Laterality Date    CARPAL TUNNEL RELEASE Left 07/2019     Family History   Problem Relation Age of Onset    Diabetes Mother     Lupus Mother     Diabetes Father     Heart disease Father     Hypertension Father     Cancer Father         prostate    Cancer Sister         ovarian     Social History     Socioeconomic History    Marital status:      Spouse name: Not on file    Number of children: Not on file    Years of education: Not on file    Highest education level: Not on file   Occupational History    Not on file   Social Needs    Financial resource strain: Not on file    Food insecurity:     Worry: Not on file     Inability: Not on file     Transportation needs:     Medical: Not on file     Non-medical: Not on file   Tobacco Use    Smoking status: Never Smoker    Smokeless tobacco: Never Used   Substance and Sexual Activity    Alcohol use: Never     Frequency: Never    Drug use: Never    Sexual activity: Not on file   Lifestyle    Physical activity:     Days per week: Not on file     Minutes per session: Not on file    Stress: Not on file   Relationships    Social connections:     Talks on phone: Not on file     Gets together: Not on file     Attends Religion service: Not on file     Active member of club or organization: Not on file     Attends meetings of clubs or organizations: Not on file     Relationship status: Not on file   Other Topics Concern    Not on file   Social History Narrative    Not on file     Medication List with Changes/Refills   Current Medications    ALBUTEROL (PROVENTIL/VENTOLIN HFA) 90 MCG/ACTUATION INHALER    Inhale 2 puffs into the lungs every 4 (four) hours as needed for Wheezing.    CELECOXIB (CELEBREX) 200 MG CAPSULE    Take 200 mg by mouth 2 (two) times daily as needed.    CETIRIZINE (ZYRTEC) 10 MG TABLET    Take 1 tablet (10 mg total) by mouth once daily.    EPINEPHRINE (EPIPEN 2-MARTIN) 0.3 MG/0.3 ML ATIN    As directed.    ERGOCALCIFEROL (ERGOCALCIFEROL) 50,000 UNIT CAP    Take 1 capsule (50,000 Units total) by mouth every 7 days.    FLUTICASONE PROPIONATE (FLONASE) 50 MCG/ACTUATION NASAL SPRAY    1 spray by Nasal route.    HYDROCODONE-ACETAMINOPHEN (NORCO) 5-325 MG PER TABLET    Take 1 tablet by mouth 3 (three) times daily as needed.    IBUPROFEN (ADVIL,MOTRIN) 800 MG TABLET    TK 1 T PO Q 8 H PRF PAIN    LIRAGLUTIDE 0.6 MG/0.1 ML, 18 MG/3 ML, SUBQ PNIJ (VICTOZA 2-MARTIN) 0.6 MG/0.1 ML (18 MG/3 ML) PNIJ    Inject 0.6 mg into the skin once daily.    METFORMIN (GLUCOPHAGE) 500 MG TABLET    Take 1 tablet (500 mg total) by mouth 2 (two) times daily with meals.    MONTELUKAST (SINGULAIR) 10 MG TABLET    Take 1 tablet  (10 mg total) by mouth every evening.    NORETHINDRONE-ETHINYL ESTRADIOL (JUNEL 1/20, 21,) 1-20 MG-MCG PER TABLET    Take 1 tablet by mouth.    ONDANSETRON (ZOFRAN) 8 MG TABLET    Take 1 tablet (8 mg total) by mouth every 8 (eight) hours as needed for Nausea.    PANTOPRAZOLE (PROTONIX) 40 MG TABLET    Take 1 tablet (40 mg total) by mouth once daily.     Review of patient's allergies indicates:   Allergen Reactions    Sulfa (sulfonamide antibiotics) Anaphylaxis and Swelling     Swelling (eyes)^, Swelling (throat)^  Swelling (eyes)^, Swelling (throat)^      Diclofenac      Gastritis        Review of Systems   Constitution: Negative for fever.   Cardiovascular: Negative for chest pain.   Respiratory: Negative for cough and shortness of breath.    Skin: Negative for rash.   Musculoskeletal: Positive for joint pain, joint swelling and stiffness.   Gastrointestinal: Negative for heartburn.   Neurological: Negative for headaches and numbness.         Objective:        General    Nursing note and vitals reviewed.  Constitutional: She is oriented to person, place, and time. She appears well-developed and well-nourished.   HENT:   Head: Normocephalic and atraumatic.   Eyes: EOM are normal.   Cardiovascular: Normal rate and regular rhythm.    Pulmonary/Chest: Effort normal.   Abdominal: Soft.   Neurological: She is alert and oriented to person, place, and time.   Psychiatric: She has a normal mood and affect. Her behavior is normal.         Left Hand/Wrist Exam     Tests     Atrophy  Thenar:  Negative  Hypothenar:  negative  Intrinsic: negative  1st Dorsal Interosseous:  negative    Other     Sensory Exam  Ulnar Distribution: normal    Comments:  Ext/Flex intact 5th MCP, PIP, DIP  Minimal stiffness  Mild TTP 5th PIP  Healed scar CTR - No SOI  Comp soft  2+rad pulse  Min swelling 5th finger                Vascular Exam       Capillary Refill  Left Hand: normal capillary refill              Xray images and report were reviewed  today.  I agree with the radiologist's interpretation.    X-Ray Hand 3 view Left  Narrative: EXAMINATION:  XR HAND COMPLETE 3 VIEW LEFT    CLINICAL HISTORY:  . Pain in left hand    TECHNIQUE:  PA, lateral, and oblique views of the left hand were performed.    COMPARISON:  None    FINDINGS:  No acute fracture or dislocation.  The joint spaces appear to be well maintained.  Impression: As above    Electronically signed by: Jeramy Patel DO  Date:    10/11/2019  Time:    10:10        Assessment:       Encounter Diagnosis   Name Primary?    Sprain of interphalangeal joint of left little finger, initial encounter Yes          Plan:       Lucía was seen today for pain.    Diagnoses and all orders for this visit:    Sprain of interphalangeal joint of left little finger, initial encounter        Lucía Coreas is a new pt who comes in today for the above problems.  I do not appreciate an obvious fracture deformity.  I have looked at her x-rays from urgent care as well. I do not see any fractures on her prior x-rays, either.  I recommend ezio taping for comfort.  She may discontinue the metal splint.  I will be happy to see her back in the office in 1 month if she is continuing to have problems.  As far as the numbness and tingling, I would agree she has cubital tunnel syndrome.  She can certainly put off having surgery. However, I have also advised that it will eventually start affecting her strength in that portion of her hand.  I would encourage her to address it as the numbness and tingling becomes more constant.  She will continue to follow-up with Dr Paras viveros for that.  She can see him for the finger if needed, as well. She verbalizes understanding and agrees.    Follow up if symptoms worsen or fail to improve.          The patient understands, chooses and consents to this plan and accepts all   the risks which include but are not limited to the risks mentioned above.     Disclaimer: This note was prepared  using a voice recognition system and is likely to have sound alike errors within the text.

## 2019-10-15 ENCOUNTER — PATIENT MESSAGE (OUTPATIENT)
Dept: FAMILY MEDICINE | Facility: CLINIC | Age: 34
End: 2019-10-15

## 2019-10-15 DIAGNOSIS — Z79.899 ENCOUNTER FOR LONG-TERM (CURRENT) USE OF MEDICATIONS: ICD-10-CM

## 2019-10-15 RX ORDER — FLUTICASONE PROPIONATE 50 MCG
1 SPRAY, SUSPENSION (ML) NASAL 2 TIMES DAILY
Qty: 1 BOTTLE | Refills: 11 | Status: SHIPPED | OUTPATIENT
Start: 2019-10-15 | End: 2021-02-19

## 2019-10-21 ENCOUNTER — LAB VISIT (OUTPATIENT)
Dept: LAB | Facility: HOSPITAL | Age: 34
End: 2019-10-21
Attending: FAMILY MEDICINE
Payer: COMMERCIAL

## 2019-10-21 DIAGNOSIS — E55.9 VITAMIN D DEFICIENCY: ICD-10-CM

## 2019-10-21 DIAGNOSIS — R73.03 PREDIABETES: ICD-10-CM

## 2019-10-21 LAB
25(OH)D3+25(OH)D2 SERPL-MCNC: 23 NG/ML (ref 30–96)
ESTIMATED AVG GLUCOSE: 123 MG/DL (ref 68–131)
HBA1C MFR BLD HPLC: 5.9 % (ref 4–5.6)

## 2019-10-21 PROCEDURE — 36415 COLL VENOUS BLD VENIPUNCTURE: CPT | Mod: PO

## 2019-10-21 PROCEDURE — 82306 VITAMIN D 25 HYDROXY: CPT

## 2019-10-21 PROCEDURE — 83036 HEMOGLOBIN GLYCOSYLATED A1C: CPT

## 2019-10-22 RX ORDER — NORETHINDRONE ACETATE AND ETHINYL ESTRADIOL AND FERROUS FUMARATE 1MG-20(21)
1 KIT ORAL DAILY
Refills: 11 | COMMUNITY
Start: 2019-10-16 | End: 2020-04-02

## 2019-10-22 NOTE — PROGRESS NOTES
CALL THE PATIENT to discuss results and document verification.    Place orders:  See my interpretation below    I have sent a message to them with the interpretation (see below).  See if they have any questions and make a follow-up appointment if not already schedule and if needed.    Also please address any outstanding health maintenance that may be due: Pap Smear with HPV Cotest due on 06/02/2019  ====================================    My interpretation that was sent to them through [employer]:    I have reviewed your recent blood work.     Your vitamin-D is improved from previous three months ago.  Still below 30.  Need to increase supplementation of vitamin-D .  Are you taking the vitamin-D every week 55136 units?  Your hemoglobin A1c is slightly improved from previous three months ago, but still elevated.  I recommend increasing metformin to 1000 mg twice a day.  This test is gold standard screening test for diabetes.  It is a measures 3 months of your average blood sugar.    Recheck vitamin-D and A1c in six months.  Increase metformin 1000 mg twice a day.  Increase vitamin-D supplementation, if taking 21438 units weekly may need to supplement with over-the-counter daily.    Please reply so that we know that you have received this message.

## 2019-12-13 DIAGNOSIS — E55.9 VITAMIN D DEFICIENCY: ICD-10-CM

## 2019-12-13 RX ORDER — ERGOCALCIFEROL 1.25 MG/1
50000 CAPSULE ORAL
Qty: 4 CAPSULE | Refills: 2 | Status: SHIPPED | OUTPATIENT
Start: 2019-12-13 | End: 2019-12-27 | Stop reason: SDUPTHER

## 2019-12-20 ENCOUNTER — PATIENT OUTREACH (OUTPATIENT)
Dept: ADMINISTRATIVE | Facility: HOSPITAL | Age: 34
End: 2019-12-20

## 2019-12-24 ENCOUNTER — PATIENT OUTREACH (OUTPATIENT)
Dept: ADMINISTRATIVE | Facility: HOSPITAL | Age: 34
End: 2019-12-24

## 2019-12-26 ENCOUNTER — OFFICE VISIT (OUTPATIENT)
Dept: OBSTETRICS AND GYNECOLOGY | Facility: CLINIC | Age: 34
End: 2019-12-26
Payer: COMMERCIAL

## 2019-12-26 ENCOUNTER — LAB VISIT (OUTPATIENT)
Dept: LAB | Facility: HOSPITAL | Age: 34
End: 2019-12-26
Attending: FAMILY MEDICINE
Payer: COMMERCIAL

## 2019-12-26 VITALS
WEIGHT: 293 LBS | HEART RATE: 92 BPM | BODY MASS INDEX: 50.02 KG/M2 | OXYGEN SATURATION: 99 % | DIASTOLIC BLOOD PRESSURE: 72 MMHG | HEIGHT: 64 IN | SYSTOLIC BLOOD PRESSURE: 118 MMHG

## 2019-12-26 DIAGNOSIS — R63.5 WEIGHT GAIN: ICD-10-CM

## 2019-12-26 DIAGNOSIS — Z00.00 PREVENTATIVE HEALTH CARE: Primary | ICD-10-CM

## 2019-12-26 DIAGNOSIS — Z01.419 PAP SMEAR, AS PART OF ROUTINE GYNECOLOGICAL EXAMINATION: ICD-10-CM

## 2019-12-26 PROBLEM — R09.81 CONGESTION OF NASAL SINUS: Status: RESOLVED | Noted: 2019-09-20 | Resolved: 2019-12-26

## 2019-12-26 PROBLEM — G56.03 BILATERAL CARPAL TUNNEL SYNDROME: Status: RESOLVED | Noted: 2019-07-11 | Resolved: 2019-12-26

## 2019-12-26 PROBLEM — Z23 NEED FOR INFLUENZA VACCINATION: Status: RESOLVED | Noted: 2019-09-20 | Resolved: 2019-12-26

## 2019-12-26 PROBLEM — H65.03 BILATERAL ACUTE SEROUS OTITIS MEDIA: Status: RESOLVED | Noted: 2019-07-11 | Resolved: 2019-12-26

## 2019-12-26 PROBLEM — Z3A.39 39 WEEKS GESTATION OF PREGNANCY: Status: RESOLVED | Noted: 2017-06-12 | Resolved: 2019-12-26

## 2019-12-26 PROBLEM — Z98.891 S/P CESAREAN SECTION: Status: RESOLVED | Noted: 2017-06-13 | Resolved: 2019-12-26

## 2019-12-26 PROBLEM — R53.83 FATIGUE: Status: RESOLVED | Noted: 2019-07-12 | Resolved: 2019-12-26

## 2019-12-26 PROBLEM — R68.82 DECREASED LIBIDO: Status: RESOLVED | Noted: 2019-07-12 | Resolved: 2019-12-26

## 2019-12-26 PROBLEM — K21.00 GASTRO-ESOPHAGEAL REFLUX DISEASE WITH ESOPHAGITIS: Status: RESOLVED | Noted: 2018-03-27 | Resolved: 2019-12-26

## 2019-12-26 PROBLEM — T14.8XXA MUSCLE STRAIN: Status: RESOLVED | Noted: 2019-09-20 | Resolved: 2019-12-26

## 2019-12-26 LAB
ALBUMIN SERPL BCP-MCNC: 3.6 G/DL (ref 3.5–5.2)
ALP SERPL-CCNC: 67 U/L (ref 55–135)
ALT SERPL W/O P-5'-P-CCNC: 18 U/L (ref 10–44)
ANION GAP SERPL CALC-SCNC: 11 MMOL/L (ref 8–16)
AST SERPL-CCNC: 15 U/L (ref 10–40)
BASOPHILS # BLD AUTO: 0.03 K/UL (ref 0–0.2)
BASOPHILS NFR BLD: 0.3 % (ref 0–1.9)
BILIRUB SERPL-MCNC: 0.2 MG/DL (ref 0.1–1)
BUN SERPL-MCNC: 8 MG/DL (ref 6–20)
CALCIUM SERPL-MCNC: 9 MG/DL (ref 8.7–10.5)
CHLORIDE SERPL-SCNC: 105 MMOL/L (ref 95–110)
CO2 SERPL-SCNC: 24 MMOL/L (ref 23–29)
CREAT SERPL-MCNC: 0.7 MG/DL (ref 0.5–1.4)
DIFFERENTIAL METHOD: ABNORMAL
EOSINOPHIL # BLD AUTO: 0.1 K/UL (ref 0–0.5)
EOSINOPHIL NFR BLD: 1.1 % (ref 0–8)
ERYTHROCYTE [DISTWIDTH] IN BLOOD BY AUTOMATED COUNT: 14.1 % (ref 11.5–14.5)
EST. GFR  (AFRICAN AMERICAN): >60 ML/MIN/1.73 M^2
EST. GFR  (NON AFRICAN AMERICAN): >60 ML/MIN/1.73 M^2
GLUCOSE SERPL-MCNC: 103 MG/DL (ref 70–110)
HCT VFR BLD AUTO: 39.3 % (ref 37–48.5)
HGB BLD-MCNC: 12.2 G/DL (ref 12–16)
IMM GRANULOCYTES # BLD AUTO: 0.01 K/UL (ref 0–0.04)
IMM GRANULOCYTES NFR BLD AUTO: 0.1 % (ref 0–0.5)
LYMPHOCYTES # BLD AUTO: 4.3 K/UL (ref 1–4.8)
LYMPHOCYTES NFR BLD: 46.1 % (ref 18–48)
MCH RBC QN AUTO: 23.4 PG (ref 27–31)
MCHC RBC AUTO-ENTMCNC: 31 G/DL (ref 32–36)
MCV RBC AUTO: 75 FL (ref 82–98)
MONOCYTES # BLD AUTO: 0.5 K/UL (ref 0.3–1)
MONOCYTES NFR BLD: 5.5 % (ref 4–15)
NEUTROPHILS # BLD AUTO: 4.4 K/UL (ref 1.8–7.7)
NEUTROPHILS NFR BLD: 46.9 % (ref 38–73)
NRBC BLD-RTO: 0 /100 WBC
PLATELET # BLD AUTO: 314 K/UL (ref 150–350)
PMV BLD AUTO: 9.8 FL (ref 9.2–12.9)
POTASSIUM SERPL-SCNC: 3.4 MMOL/L (ref 3.5–5.1)
PROT SERPL-MCNC: 7.3 G/DL (ref 6–8.4)
RBC # BLD AUTO: 5.21 M/UL (ref 4–5.4)
SODIUM SERPL-SCNC: 140 MMOL/L (ref 136–145)
TSH SERPL DL<=0.005 MIU/L-ACNC: 1.05 UIU/ML (ref 0.4–4)
WBC # BLD AUTO: 9.41 K/UL (ref 3.9–12.7)

## 2019-12-26 PROCEDURE — 99385 PR PREVENTIVE VISIT,NEW,18-39: ICD-10-PCS | Mod: S$GLB,,, | Performed by: NURSE PRACTITIONER

## 2019-12-26 PROCEDURE — 80053 COMPREHEN METABOLIC PANEL: CPT

## 2019-12-26 PROCEDURE — 99385 PREV VISIT NEW AGE 18-39: CPT | Mod: S$GLB,,, | Performed by: NURSE PRACTITIONER

## 2019-12-26 PROCEDURE — 99999 PR PBB SHADOW E&M-EST. PATIENT-LVL IV: ICD-10-PCS | Mod: PBBFAC,,, | Performed by: NURSE PRACTITIONER

## 2019-12-26 PROCEDURE — 87624 HPV HI-RISK TYP POOLED RSLT: CPT

## 2019-12-26 PROCEDURE — 84443 ASSAY THYROID STIM HORMONE: CPT

## 2019-12-26 PROCEDURE — 36415 COLL VENOUS BLD VENIPUNCTURE: CPT

## 2019-12-26 PROCEDURE — 99999 PR PBB SHADOW E&M-EST. PATIENT-LVL IV: CPT | Mod: PBBFAC,,, | Performed by: NURSE PRACTITIONER

## 2019-12-26 PROCEDURE — 85025 COMPLETE CBC W/AUTO DIFF WBC: CPT

## 2019-12-26 PROCEDURE — 88175 CYTOPATH C/V AUTO FLUID REDO: CPT

## 2019-12-26 NOTE — LETTER
December 26, 2019      Dante Negro MD  [address]  [place] [address]           [facility]  [address] [address]  Phone: [phone]  Fax: [phone]          Patient: Lucía Coreas   MR Number: [id number]   YOB: 1985   Date of Visit: 12/26/2019       Dear Dr. Dante Negro:    Thank you for referring Lucía Coreas to me for evaluation. Attached you will find relevant portions of my assessment and plan of care.    If you have questions, please do not hesitate to call me. I look forward to following Lucía Coreas along with you.    Sincerely,    Yolis Lyons NP    Enclosure  CC:  No Recipients    If you would like to receive this communication electronically, please contact [email or web address] or [phone] to request more information on [employer] Link access.    For providers and/or their staff who would like to refer a patient to [facility], please contact us through our one-stop-shop provider referral line, [facility] , at [phone].    If you feel you have received this communication in error or would no longer like to receive these types of communications, please e-mail [email or web address]

## 2019-12-26 NOTE — PROGRESS NOTES
PLAN:      Problem List Items Addressed This Visit     Seasonal allergic rhinitis (Chronic)     Patient still having symptoms of allergic rhinitis with otitis media and inflammation of the sinuses.  Referral to ENT for further evaluation treatment.         Relevant Medications    predniSONE (DELTASONE) 20 MG tablet    Other Relevant Orders    Ambulatory referral to ENT    Encounter for long-term (current) use of medications - Primary (Chronic)     Complete history and physical was completed today.  Complete and thorough medication reconciliation was performed.  Discussed risks and benefits of medications.  Advised patient on orders and health maintenance.  We discussed old records and old labs if available.  Will request any records not available through epic.  Continue current medications listed on your summary sheet.           Vitamin D deficiency (Chronic)    Relevant Medications    ergocalciferol (ERGOCALCIFEROL) 50,000 unit Cap    Type 2 diabetes mellitus with hyperglycemia, without long-term current use of insulin (Chronic)     Stop metformin.  Start Victoza.  Patient has GI intolerance to metformin.  Monitor hemoglobin A1c.  Discussed diabetic diet and exercise protocol.  Continue medications.  Monitor for side effects.  Discussed checking blood glucose.  Discussed symptoms to monitor for and to notify me immediately if persistent or worsening.  Follow up with Ophthalmology/Optometry and Podiatry.           Relevant Medications    liraglutide 0.6 mg/0.1 mL, 18 mg/3 mL, subq PNIJ (VICTOZA 2-MARTIN) 0.6 mg/0.1 mL (18 mg/3 mL) PnIj    Other Relevant Orders    Hemoglobin A1c    Diarrhea due to drug        Future Appointments     Date Provider Specialty Appt Notes    1/10/2020 Giselle Blunt PA-C Otolaryngology Seasonal allergic rhinitis, unspecified trigger    3/27/2020  Lab     4/2/2020 Dante Negro MD Family Medicine 3 month fu//lab results           Medication List with Changes/Refills   New  Medications    PREDNISONE (DELTASONE) 20 MG TABLET    Take 1 tablet (20 mg total) by mouth once daily. for 5 days   Current Medications    ALBUTEROL (PROVENTIL/VENTOLIN HFA) 90 MCG/ACTUATION INHALER    Inhale 2 puffs into the lungs every 4 (four) hours as needed for Wheezing.    CELECOXIB (CELEBREX) 200 MG CAPSULE    Take 200 mg by mouth 2 (two) times daily as needed.    CETIRIZINE (ZYRTEC) 10 MG TABLET    Take 1 tablet (10 mg total) by mouth once daily.    EPINEPHRINE (EPIPEN 2-MARTIN) 0.3 MG/0.3 ML ATIN    As directed.    FLUTICASONE PROPIONATE (FLONASE) 50 MCG/ACTUATION NASAL SPRAY    1 spray (50 mcg total) by Each Nostril route 2 (two) times daily.    HYDROCODONE-ACETAMINOPHEN (NORCO) 5-325 MG PER TABLET    Take 1 tablet by mouth 3 (three) times daily as needed.    IBUPROFEN (ADVIL,MOTRIN) 800 MG TABLET    TK 1 T PO Q 8 H PRF PAIN    IRON,CARB/VIT C/VIT B12/FOLIC (IRON 100 PLUS ORAL)    Take by mouth once daily.    JUNEL FE 1/20, 28, 1 MG-20 MCG (21)/75 MG (7) PER TABLET    Take 1 tablet by mouth once daily.    MONTELUKAST (SINGULAIR) 10 MG TABLET    Take 1 tablet (10 mg total) by mouth every evening.    ONDANSETRON (ZOFRAN) 8 MG TABLET    Take 1 tablet (8 mg total) by mouth every 8 (eight) hours as needed for Nausea.    PANTOPRAZOLE (PROTONIX) 40 MG TABLET    Take 1 tablet (40 mg total) by mouth once daily.   Changed and/or Refilled Medications    Modified Medication Previous Medication    ERGOCALCIFEROL (ERGOCALCIFEROL) 50,000 UNIT CAP ergocalciferol (ERGOCALCIFEROL) 50,000 unit Cap       Take 1 capsule (50,000 Units total) by mouth every 7 days.    Take 1 capsule (50,000 Units total) by mouth every 7 days.    LIRAGLUTIDE 0.6 MG/0.1 ML, 18 MG/3 ML, SUBQ PNIJ (VICTOZA 2-MARTIN) 0.6 MG/0.1 ML (18 MG/3 ML) PNIJ liraglutide 0.6 mg/0.1 mL, 18 mg/3 mL, subq PNIJ (VICTOZA 2-MARTIN) 0.6 mg/0.1 mL (18 mg/3 mL) PnIj       Inject 0.6 mg into the skin once daily.    Inject 0.6 mg into the skin once daily.   Discontinued  Medications    METFORMIN (GLUCOPHAGE) 500 MG TABLET    Take 1 tablet (500 mg total) by mouth 2 (two) times daily with meals.       Dante Negro M.D.     ==========================================================================  Subjective:      Patient ID: Lucía Coreas is a 34 y.o. female.  has a past medical history of Allergy, Amenorrhea, Back pain, GERD (gastroesophageal reflux disease), Infertility associated with anovulation, Iron deficiency anemia, Knee pain, Metabolic syndrome, PCO (polycystic ovaries), Plantar fasciitis, Recurrent boils, Reflux esophagitis, Sinusitis, and Vaginitis.     Chief Complaint: Follow-up; Results; Diabetes; Vitamin D Deficiency; and Otalgia  patient coming in for DM f/u. No health maintenance outstanding at this time.  Problem List Items Addressed This Visit     Seasonal allergic rhinitis (Chronic)    Overview     Chronic.  Uncontrolled.  Patient on medical therapy with Flonase, Singulair, Zyrtec.  Reports compliance.  No side effects reported.         Current Assessment & Plan     Patient still having symptoms of allergic rhinitis with otitis media and inflammation of the sinuses.  Referral to ENT for further evaluation treatment.         Encounter for long-term (current) use of medications - Primary (Chronic)    Overview     07/12/2019 Patient is on CHRONIC long-term drug therapy for managed conditions. See medication list. Reports compliance.  No side effects reported.  Routine lab work is being monitored.  Patient does not need refills today.    =========================================  09/20/2019Reviewed labs. Patient is compliant with meds. She needs refills.   Gaining weight on Metformin.   =======================================================  DECEMBER 2019:    CHRONIC. Stable. Compliant with medications for managed conditions. See medication list. No SE reported.   Routine lab analysis is being monitored. Refills were addressed.  Lab Results   Component Value  Date    WBC 9.41 12/26/2019    HGB 12.2 12/26/2019    HCT 39.3 12/26/2019    MCV 75 (L) 12/26/2019     12/26/2019         Chemistry        Component Value Date/Time     12/26/2019 0959    K 3.4 (L) 12/26/2019 0959     12/26/2019 0959    CO2 24 12/26/2019 0959    BUN 8 12/26/2019 0959    CREATININE 0.7 12/26/2019 0959     12/26/2019 0959        Component Value Date/Time    CALCIUM 9.0 12/26/2019 0959    ALKPHOS 67 12/26/2019 0959    AST 15 12/26/2019 0959    ALT 18 12/26/2019 0959    BILITOT 0.2 12/26/2019 0959    ESTGFRAFRICA >60 12/26/2019 0959    EGFRNONAA >60 12/26/2019 0959          Lab Results   Component Value Date    TSH 1.053 12/26/2019    U0HJMNE 9.4 07/16/2019    T3FREE 2.9 07/16/2019       ======================================================         Current Assessment & Plan     Complete history and physical was completed today.  Complete and thorough medication reconciliation was performed.  Discussed risks and benefits of medications.  Advised patient on orders and health maintenance.  We discussed old records and old labs if available.  Will request any records not available through epic.  Continue current medications listed on your summary sheet.           Vitamin D deficiency (Chronic)    Overview     07/12/2019 Vit d deficiency.  Not currently taking vitamin d supplement. No SE reported. Fatigue is notimproved.   08/15/2019 Chronic.  Uncontrolled.  Vitamin-D level is still low.  Continue supplementation.  Recheck in 3 months.  09/20/2019Patient still taking vitamin-D.  Needs refill.  No side effects reported.  =======================================================  DECEMBER 2019:  Patient doing well on vitamin-D.  Needs refill.  Checking level.  ======================================================         Type 2 diabetes mellitus with hyperglycemia, without long-term current use of insulin (Chronic)    Overview     Lab Results   Component Value Date    HGBA1C 6.0 (H)  07/16/2019   No results found in epic however review her blood work from previous provider shows last A1c was 5.8.  Patient previously on metformin.  Currently having fatigue and decreased libido.  ========================  Patient restarted on metformin.  No issues no complications.  ======================  09/20/2019Patient due for hemoglobin A1c next month.  Will place order for patient.  She is having GI symptoms with metformin.   She reports BG is elevated at home.   She continues to gain weight.   =======================================================  DECEMBER 2019:  Patient reports to having diarrhea with metformin.  Diabetes Management Status    Statin: Not taking  ACE/ARB: Not taking    Screening or Prevention Patient's value Goal Complete/Controlled?   HgA1C Testing and Control   Lab Results   Component Value Date    HGBA1C 5.9 (H) 10/21/2019      Annually/Less than 8% Yes   Lipid profile : 07/16/2019 Annually Yes   LDL control Lab Results   Component Value Date    LDLCALC 87.0 07/16/2019    Annually/Less than 100 mg/dl  Yes   Nephropathy screening Lab Results   Component Value Date    LABMICR 4.0 09/20/2019     Lab Results   Component Value Date    PROTEINUA Negative 01/22/2009    Annually Yes   Blood pressure BP Readings from Last 1 Encounters:   12/27/19 108/76    Less than 140/90 Yes   Dilated retinal exam Most Recent Eye Exam Date: Not Found Annually Yes   Foot exam   Most Recent Foot Exam Date: Not Found Annually Yes       ======================================================         Current Assessment & Plan     Stop metformin.  Start Victoza.  Patient has GI intolerance to metformin.  Monitor hemoglobin A1c.  Discussed diabetic diet and exercise protocol.  Continue medications.  Monitor for side effects.  Discussed checking blood glucose.  Discussed symptoms to monitor for and to notify me immediately if persistent or worsening.  Follow up with Ophthalmology/Optometry and Podiatry.            Diarrhea due to drug    Overview     Patient reports still having moderate to severe diarrhea on metformin.  Requesting alternative medication.                Past Medical History:  Past Medical History:   Diagnosis Date    Allergy     Amenorrhea     Back pain     GERD (gastroesophageal reflux disease)     Infertility associated with anovulation     Iron deficiency anemia     Knee pain     Metabolic syndrome     PCO (polycystic ovaries)     Plantar fasciitis     Recurrent boils     Reflux esophagitis     Sinusitis     Vaginitis      Past Surgical History:   Procedure Laterality Date    CARPAL TUNNEL RELEASE Left 2019     SECTION       Review of patient's allergies indicates:   Allergen Reactions    Metformin Diarrhea    Sulfa (sulfonamide antibiotics) Anaphylaxis and Swelling     Swelling (eyes)^, Swelling (throat)^  Swelling (eyes)^, Swelling (throat)^      Diclofenac      Gastritis      Medication List with Changes/Refills   New Medications    PREDNISONE (DELTASONE) 20 MG TABLET    Take 1 tablet (20 mg total) by mouth once daily. for 5 days   Current Medications    ALBUTEROL (PROVENTIL/VENTOLIN HFA) 90 MCG/ACTUATION INHALER    Inhale 2 puffs into the lungs every 4 (four) hours as needed for Wheezing.    CELECOXIB (CELEBREX) 200 MG CAPSULE    Take 200 mg by mouth 2 (two) times daily as needed.    CETIRIZINE (ZYRTEC) 10 MG TABLET    Take 1 tablet (10 mg total) by mouth once daily.    EPINEPHRINE (EPIPEN 2-MARTIN) 0.3 MG/0.3 ML ATIN    As directed.    FLUTICASONE PROPIONATE (FLONASE) 50 MCG/ACTUATION NASAL SPRAY    1 spray (50 mcg total) by Each Nostril route 2 (two) times daily.    HYDROCODONE-ACETAMINOPHEN (NORCO) 5-325 MG PER TABLET    Take 1 tablet by mouth 3 (three) times daily as needed.    IBUPROFEN (ADVIL,MOTRIN) 800 MG TABLET    TK 1 T PO Q 8 H PRF PAIN    IRON,CARB/VIT C/VIT B12/FOLIC (IRON 100 PLUS ORAL)    Take by mouth once daily.    JUNEL FE , 28, 1 MG-20 MCG (21)/75 MG  (7) PER TABLET    Take 1 tablet by mouth once daily.    MONTELUKAST (SINGULAIR) 10 MG TABLET    Take 1 tablet (10 mg total) by mouth every evening.    ONDANSETRON (ZOFRAN) 8 MG TABLET    Take 1 tablet (8 mg total) by mouth every 8 (eight) hours as needed for Nausea.    PANTOPRAZOLE (PROTONIX) 40 MG TABLET    Take 1 tablet (40 mg total) by mouth once daily.   Changed and/or Refilled Medications    Modified Medication Previous Medication    ERGOCALCIFEROL (ERGOCALCIFEROL) 50,000 UNIT CAP ergocalciferol (ERGOCALCIFEROL) 50,000 unit Cap       Take 1 capsule (50,000 Units total) by mouth every 7 days.    Take 1 capsule (50,000 Units total) by mouth every 7 days.    LIRAGLUTIDE 0.6 MG/0.1 ML, 18 MG/3 ML, SUBQ PNIJ (VICTOZA 2-MARTIN) 0.6 MG/0.1 ML (18 MG/3 ML) PNIJ liraglutide 0.6 mg/0.1 mL, 18 mg/3 mL, subq PNIJ (VICTOZA 2-MARTIN) 0.6 mg/0.1 mL (18 mg/3 mL) PnIj       Inject 0.6 mg into the skin once daily.    Inject 0.6 mg into the skin once daily.   Discontinued Medications    METFORMIN (GLUCOPHAGE) 500 MG TABLET    Take 1 tablet (500 mg total) by mouth 2 (two) times daily with meals.      Social History     Tobacco Use    Smoking status: Never Smoker    Smokeless tobacco: Never Used   Substance Use Topics    Alcohol use: Never     Frequency: Never      Family History   Problem Relation Age of Onset    Diabetes Mother     Lupus Mother     Diabetes Father     Heart disease Father     Hypertension Father     Cancer Father         prostate    Cancer Sister         ovarian       I have reviewed the complete PMH, social history, surgical history, allergies and medications.  As well as family history.    Review of Systems   Constitutional: Positive for fatigue. Negative for chills, fever and unexpected weight change.   HENT: Negative for ear pain and sore throat.    Eyes: Negative for redness and visual disturbance.   Respiratory: Negative for cough and shortness of breath.    Cardiovascular: Negative for chest pain and  "palpitations.   Gastrointestinal: Positive for diarrhea (Worsening on metformin.). Negative for nausea and vomiting.   Genitourinary: Negative for difficulty urinating and hematuria.   Musculoskeletal: Negative for arthralgias and myalgias.   Skin: Negative for rash and wound.   Neurological: Negative for weakness and headaches.   Psychiatric/Behavioral: Negative for sleep disturbance. The patient is not nervous/anxious.      Objective:   /76   Pulse 75   Temp 98.8 °F (37.1 °C)   Ht 5' 4" (1.626 m)   Wt (!) 147.9 kg (326 lb)   LMP 12/08/2019 (Exact Date)   BMI 55.96 kg/m²   Physical Exam   Constitutional: She is oriented to person, place, and time. She appears well-developed and well-nourished. No distress.   HENT:   Head: Normocephalic and atraumatic.   Right Ear: Tympanic membrane is not injected, not scarred, not perforated, not erythematous, not retracted and not bulging. A middle ear effusion is present.   Left Ear: Tympanic membrane is not injected, not scarred, not perforated, not erythematous, not retracted and not bulging. A middle ear effusion is present.   Nose: Mucosal edema and rhinorrhea present. No nose lacerations, sinus tenderness or nasal deformity.   Mouth/Throat: Uvula is midline, oropharynx is clear and moist and mucous membranes are normal. No posterior oropharyngeal edema. Tonsils are 2+ on the right. Tonsils are 2+ on the left.   Eyes: Pupils are equal, round, and reactive to light. EOM are normal.   Neck: Normal range of motion. Neck supple.   Cardiovascular: Normal rate, regular rhythm, normal heart sounds and intact distal pulses.   No murmur heard.  Pulmonary/Chest: Effort normal and breath sounds normal. No respiratory distress. She has no wheezes.   Abdominal: Soft. Bowel sounds are normal.   Musculoskeletal: Normal range of motion. She exhibits no edema.   Neurological: She is alert and oriented to person, place, and time. No cranial nerve deficit.   Skin: Skin is warm and " dry. Capillary refill takes less than 2 seconds.   Psychiatric: She has a normal mood and affect. Her behavior is normal.   Nursing note and vitals reviewed.      Assessment:     1. Encounter for long-term (current) use of medications    2. Vitamin D deficiency    3. Type 2 diabetes mellitus with hyperglycemia, without long-term current use of insulin    4. Seasonal allergic rhinitis, unspecified trigger    5. Diarrhea due to drug      MDM:   Patient has moderate complexity.  I have Reviewed and summarized old records.  I have performed thorough medication reconciliation today and discussed risk and benefits of each medication.  I have reviewed labs and discussed with patient.  All questions were answered.  I am requesting old records and will review them once they are available.    I have signed for the following orders AND/OR meds.  Orders Placed This Encounter   Procedures    Hemoglobin A1c     Standing Status:   Future     Standing Expiration Date:   2/24/2021    Ambulatory referral to ENT     Referral Priority:   Routine     Referral Type:   Consultation     Referral Reason:   Specialty Services Required     Requested Specialty:   Otolaryngology     Number of Visits Requested:   1     Medications Ordered This Encounter   Medications    ergocalciferol (ERGOCALCIFEROL) 50,000 unit Cap     Sig: Take 1 capsule (50,000 Units total) by mouth every 7 days.     Dispense:  4 capsule     Refill:  2    liraglutide 0.6 mg/0.1 mL, 18 mg/3 mL, subq PNIJ (VICTOZA 2-MARTIN) 0.6 mg/0.1 mL (18 mg/3 mL) PnIj     Sig: Inject 0.6 mg into the skin once daily.     Dispense:  3 mL     Refill:  11    predniSONE (DELTASONE) 20 MG tablet     Sig: Take 1 tablet (20 mg total) by mouth once daily. for 5 days     Dispense:  5 tablet     Refill:  0        Follow up in about 4 weeks (around 1/24/2020), or if symptoms worsen or fail to improve, for Med refills, LAB RESULTS.    If no improvement in symptoms or symptoms worsen, advised to  call/follow-up at clinic or go to ER. Patient voiced understanding and all questions/concerns were addressed.     DISCLAIMER: This note was compiled by using a speech recognition dictation system and therefore please be aware that typographical / speech recognition errors can and do occur.  Please contact me if you see any errors specifically.    Dante Negro M.D.       Office: [phone] [address] [place] [id number]  FAX: [phone]

## 2019-12-26 NOTE — PROGRESS NOTES
"CC: Well woman exam    Lucía Coreas is a 34 y.o. female  presents for well woman exam.  LMP: Patient's last menstrual period was 2019 (exact date)..  No birth control, stopped OCP,was worried it was causing weight gain. H/O PCOS. Last pap exam was normal. Patient reports" low sex drive and hot flashes". No exercise program, BMI is 57.22.Is sexually active. No pelvic pain.    Past Medical History:   Diagnosis Date    Allergy     Amenorrhea     Back pain     GERD (gastroesophageal reflux disease)     Infertility associated with anovulation     Iron deficiency anemia     Knee pain     Metabolic syndrome     PCO (polycystic ovaries)     Plantar fasciitis     Recurrent boils     Reflux esophagitis     Sinusitis     Vaginitis      Past Surgical History:   Procedure Laterality Date    CARPAL TUNNEL RELEASE Left 2019     SECTION       Social History     Socioeconomic History    Marital status:      Spouse name: Not on file    Number of children: Not on file    Years of education: Not on file    Highest education level: Not on file   Occupational History    Not on file   Social Needs    Financial resource strain: Not on file    Food insecurity:     Worry: Not on file     Inability: Not on file    Transportation needs:     Medical: Not on file     Non-medical: Not on file   Tobacco Use    Smoking status: Never Smoker    Smokeless tobacco: Never Used   Substance and Sexual Activity    Alcohol use: Never     Frequency: Never    Drug use: Never    Sexual activity: Not on file   Lifestyle    Physical activity:     Days per week: Not on file     Minutes per session: Not on file    Stress: Not on file   Relationships    Social connections:     Talks on phone: Not on file     Gets together: Not on file     Attends [employer] service: Not on file     Active member of club or organization: Not on file     Attends meetings of clubs or organizations: Not on file     " "Relationship status: Not on file   Other Topics Concern    Not on file   Social History Narrative    Not on file     Family History   Problem Relation Age of Onset    Diabetes Mother     Lupus Mother     Diabetes Father     Heart disease Father     Hypertension Father     Cancer Father         prostate    Cancer Sister         ovarian     OB History        2    Para   1    Term   1            AB   1    Living           SAB   1    TAB        Ectopic        Multiple        Live Births                     /72 (BP Location: Left arm, Patient Position: Sitting, BP Method: Medium (Manual))   Pulse 92   Ht 5' 4" (1.626 m)   Wt (!) 151.2 kg (333 lb 5.4 oz)   LMP 2019 (Exact Date)   SpO2 99%   BMI 57.22 kg/m²       ROS:  GENERAL: +weight gain.  SKIN: Denies rash or lesions.   HEAD: Denies head injury or headache.   NODES: Denies enlarged lymph nodes.   CHEST: Denies chest pain or shortness of breath.   CARDIOVASCULAR: Denies palpitations or left sided chest pain.   ABDOMEN: No abdominal pain, constipation, diarrhea, nausea, vomiting or rectal bleeding.   URINARY: No frequency, dysuria, hematuria, or burning on urination.  REPRODUCTIVE: See HPI.   BREASTS: The patient performs breast self-examination and denies pain, lumps, or nipple discharge.   HEMATOLOGIC: No easy bruisability or excessive bleeding.   MUSCULOSKELETAL: Denies joint pain or swelling.   NEUROLOGIC: Denies syncope or weakness.   PSYCHIATRIC: Denies depression, anxiety or mood swings.    PHYSICAL EXAM:  APPEARANCE: Obese female, in no acute distress.  AFFECT: WNL, alert and oriented x 3  SKIN: No acne or hirsutism  NECK: Neck symmetric without masses or thyromegaly  NODES: No inguinal, cervical, axillary, or femoral lymph node enlargement  CHEST: Good respiratory effect  ABDOMEN: Soft.  No tenderness or masses.  No hepatosplenomegaly.  No hernias.  BREASTS: Symmetrical, no skin changes or visible lesions.  No palpable " "masses, nipple discharge bilaterally.  PELVIC: Normal external genitalia without lesions.  Normal hair distribution.  Adequate perineal body, normal urethral meatus.  Vagina moist and well rugated without lesions or discharge.  Cervix pink, without lesions, discharge or tenderness.  No significant cystocele or rectocele.  Bimanual exam shows uterus to be normal size, regular, mobile and nontender.  Adnexa without masses or tenderness.    EXTREMITIES: No edema.    1. Preventative health care  Liquid-Based Pap Smear, Screening    HPV High Risk Genotypes, PCR   2. Pap smear, as part of routine gynecological examination  Liquid-Based Pap Smear, Screening    HPV High Risk Genotypes, PCR   3. Weight gain  CBC auto differential    Comprehensive metabolic panel    TSH    PLAN:  Pap exam  Labs ( weight gain and " hot flashes")  Patient was counseled today on A.C.S. Pap guidelines and recommendations for yearly pelvic exams, mammograms and monthly self breast exams; to see her PCP for other health maintenance.                   "

## 2019-12-27 ENCOUNTER — TELEPHONE (OUTPATIENT)
Dept: FAMILY MEDICINE | Facility: CLINIC | Age: 34
End: 2019-12-27

## 2019-12-27 ENCOUNTER — OFFICE VISIT (OUTPATIENT)
Dept: FAMILY MEDICINE | Facility: CLINIC | Age: 34
End: 2019-12-27
Payer: COMMERCIAL

## 2019-12-27 VITALS
TEMPERATURE: 99 F | WEIGHT: 293 LBS | SYSTOLIC BLOOD PRESSURE: 108 MMHG | BODY MASS INDEX: 50.02 KG/M2 | HEART RATE: 75 BPM | DIASTOLIC BLOOD PRESSURE: 76 MMHG | HEIGHT: 64 IN

## 2019-12-27 DIAGNOSIS — E11.65 TYPE 2 DIABETES MELLITUS WITH HYPERGLYCEMIA, WITHOUT LONG-TERM CURRENT USE OF INSULIN: ICD-10-CM

## 2019-12-27 DIAGNOSIS — K52.1 DIARRHEA DUE TO DRUG: ICD-10-CM

## 2019-12-27 DIAGNOSIS — E55.9 VITAMIN D DEFICIENCY: Chronic | ICD-10-CM

## 2019-12-27 DIAGNOSIS — J30.2 SEASONAL ALLERGIC RHINITIS, UNSPECIFIED TRIGGER: ICD-10-CM

## 2019-12-27 DIAGNOSIS — Z79.899 ENCOUNTER FOR LONG-TERM (CURRENT) USE OF MEDICATIONS: Primary | Chronic | ICD-10-CM

## 2019-12-27 PROBLEM — R73.03 PREDIABETES: Chronic | Status: ACTIVE | Noted: 2019-07-12

## 2019-12-27 PROCEDURE — 99999 PR PBB SHADOW E&M-EST. PATIENT-LVL III: ICD-10-PCS | Mod: PBBFAC,,, | Performed by: FAMILY MEDICINE

## 2019-12-27 PROCEDURE — 3008F PR BODY MASS INDEX (BMI) DOCUMENTED: ICD-10-PCS | Mod: CPTII,S$GLB,, | Performed by: FAMILY MEDICINE

## 2019-12-27 PROCEDURE — 99214 PR OFFICE/OUTPT VISIT, EST, LEVL IV, 30-39 MIN: ICD-10-PCS | Mod: S$GLB,,, | Performed by: FAMILY MEDICINE

## 2019-12-27 PROCEDURE — 99214 OFFICE O/P EST MOD 30 MIN: CPT | Mod: S$GLB,,, | Performed by: FAMILY MEDICINE

## 2019-12-27 PROCEDURE — 99999 PR PBB SHADOW E&M-EST. PATIENT-LVL III: CPT | Mod: PBBFAC,,, | Performed by: FAMILY MEDICINE

## 2019-12-27 PROCEDURE — 3044F HG A1C LEVEL LT 7.0%: CPT | Mod: CPTII,S$GLB,, | Performed by: FAMILY MEDICINE

## 2019-12-27 PROCEDURE — 3044F PR MOST RECENT HEMOGLOBIN A1C LEVEL <7.0%: ICD-10-PCS | Mod: CPTII,S$GLB,, | Performed by: FAMILY MEDICINE

## 2019-12-27 PROCEDURE — 3008F BODY MASS INDEX DOCD: CPT | Mod: CPTII,S$GLB,, | Performed by: FAMILY MEDICINE

## 2019-12-27 RX ORDER — PREDNISONE 20 MG/1
20 TABLET ORAL DAILY
Qty: 5 TABLET | Refills: 0 | Status: SHIPPED | OUTPATIENT
Start: 2019-12-27 | End: 2020-01-01

## 2019-12-27 RX ORDER — ERGOCALCIFEROL 1.25 MG/1
50000 CAPSULE ORAL
Qty: 4 CAPSULE | Refills: 2 | Status: SHIPPED | OUTPATIENT
Start: 2019-12-27 | End: 2020-03-09 | Stop reason: SDUPTHER

## 2019-12-27 NOTE — PATIENT INSTRUCTIONS
Follow up in about 3 months (around 3/27/2020), or if symptoms worsen or fail to improve, for Med refills, LAB RESULTS.     If no improvement in symptoms or symptoms worsen, please be advised to call MD, follow-up at clinic and/or go to ER if becomes severe.    Dante Negro M.D.         We Offer TELEHEATLH & Same Day Appointments!   Book your Telehealth appointment with me through my nurse or   Clinic appointments on [employer]!    [address] [place] [address]    Office: [phone]     FAX: [phone]    Check out my Facebook Page and Follow Me at: CLICK HERE    Check out my website at [employer] by clicking on: CLICK HERE    To Schedule appointments online, go to [employer]: CLICK HERE     Location: [email or web address]

## 2019-12-27 NOTE — ASSESSMENT & PLAN NOTE
Patient still having symptoms of allergic rhinitis with otitis media and inflammation of the sinuses.  Referral to ENT for further evaluation treatment.

## 2019-12-27 NOTE — ASSESSMENT & PLAN NOTE
Stop metformin.  Start Victoza.  Patient has GI intolerance to metformin.  Monitor hemoglobin A1c.  Discussed diabetic diet and exercise protocol.  Continue medications.  Monitor for side effects.  Discussed checking blood glucose.  Discussed symptoms to monitor for and to notify me immediately if persistent or worsening.  Follow up with Ophthalmology/Optometry and Podiatry.

## 2019-12-27 NOTE — TELEPHONE ENCOUNTER
----- Message from Roseanne Mclain sent at 12/27/2019  4:41 PM CST -----  Contact: pt  Pt is requesting a call back from the nurse in regards to pt insulin. Please call back at [phone] (ekoe)

## 2019-12-30 ENCOUNTER — PATIENT MESSAGE (OUTPATIENT)
Dept: FAMILY MEDICINE | Facility: CLINIC | Age: 34
End: 2019-12-30

## 2019-12-31 LAB
HPV HR 12 DNA SPEC QL NAA+PROBE: NEGATIVE
HPV16 AG SPEC QL: NEGATIVE
HPV18 DNA SPEC QL NAA+PROBE: NEGATIVE

## 2020-01-03 ENCOUNTER — TELEPHONE (OUTPATIENT)
Dept: FAMILY MEDICINE | Facility: CLINIC | Age: 35
End: 2020-01-03

## 2020-01-03 NOTE — TELEPHONE ENCOUNTER
Any fever chills body aches or vomiting?  Patient may need to be swabbed for the flu.  We will be in clinic tomorrow morning

## 2020-01-03 NOTE — TELEPHONE ENCOUNTER
Called and spoke with patient who complains of cough and stomachache and sore throat, requesting an antibiotic be sent to [facility] in [place].

## 2020-01-03 NOTE — TELEPHONE ENCOUNTER
----- Message from Georgi Coello sent at 1/3/2020  2:42 PM CST -----  Contact: Pt   Pt called in regards to speaking with the staff in regards to symptoms progressing.Pt stated that she also is having a cough. Pt wanted to know if she could get antibiotics called in. Pt can be reached at [phone].      [facility] [place] [facility] [place] - [address]  [address] [facility]  [place] [address]  Phone: [phone] Fax: [phone]

## 2020-01-03 NOTE — TELEPHONE ENCOUNTER
----- Message from Sanjuanita Ibanez sent at 1/3/2020  3:03 PM CST -----  ..Type:  Patient Returning Call    Who Called:pt  Who Left Message for Patient:  Does the patient know what this is regarding?: unknown   Would the patient rather a call back or a response via [facility]? Call back   Best Call Back Number: [phone]  Additional Information: Pt is returning a call to nurse

## 2020-01-03 NOTE — TELEPHONE ENCOUNTER
Called and spoke with the patient, patient denies fever states she did have chills yesterday but has not had fever but stomach pains with some diarrhea, no diarrhea today but 4 episodes since the weekend.  Patient states that she has to work Saturday and can't come to the clinic.  An appointment was scheduled for the patient for 10 am on 01/06/2020.

## 2020-01-06 NOTE — PROGRESS NOTES
Referring Provider:    Dante Negro Md  [address] [facility] [place] [address]  Subjective:   Patient: Lucía Coreas [id number], :1985   Visit date:1/10/2020 10:22 AM    Chief Complaint:  Allergies; Sore Throat (constant); and Ear Fullness    HPI:  Lucía is a 34 y.o. female who I was asked to see in consultation for evaluation of the following issue(s):    Patient presented to clinic on 01/10/20 for an evaluation of allergy and sinus ssx:    The patient reports the following allergy/sinus symptoms (negative unless checked off):   [x] Sneezing   [x] Nasal congestion  [] Itchy nose  [x]  Rhinorrhea  [] Watery eyes  [] Itchy eyes  [x] Cough - productive/ discolored mucus  [x] Post-nasal drip  [x] Ear pressure  [x] Ear popping   [] Otorrhea  [x] Facial pressure ++  [x] Asthma  [] Eczema  [] Other    Medications:  []Anti-histamines:   [] Allegra   [] Benadryl   [] Claritin   [] Xyzal   [x] Zyrtec   [] other  [x] Singulair  [] Nasal Sprays:   [] Astelin   [] Atrovent   [x] Flonase   [] Nasacort   [] Nasonex   [] Other  [] SCIT  [] SLIT   [] Oral steroid  [x] IM steroid  [] Antibiotics:   [] Amoxicillin   [x] Augmentin - started  per , no relief   [] Azithromycin   [] Bactrim   [] Cefdinir   [] Clindamycin   [] Doxycycline   [] Levaquin   [] Other  [] Other    Severity: moderate    She has had 3 or 4 sinus infections in the past 12 months.     Allergy testing for this patient was done years ago and results were significant, pt was to start SCIT but due to insurance did not.    Current smoker:  No    There have been no recent imaging study of the sinuses (none).  No results found for this or any previous visit.       Review of Systems:  Negative unless checked off.  Gen:  []fever   [x]fatigue  HENT:  []nosebleeds  []dental problem   Eyes:  []photophobia  []visual disturbance  Resp:  []chest tightness []wheezing  Card:  []chest pain  []leg swelling  GI:  []abdominal pain []blood in stool  :   []dysuria  []hematuria  Musc:  []joint swelling  []gait problem  Skin:  []color change  []pallor  Neuro:  []seizures  []numbness  Hem:  []bruise/bleed easily  Psych:  []hallucinations  []behavioral problems  Allergy/Imm: is allergic to metformin; sulfa (sulfonamide antibiotics); and diclofenac.    Her meds, allergies, medical, surgical, social & family histories were reviewed & updated:  -     She has a current medication list which includes the following prescription(s): celecoxib, epinephrine, ergocalciferol, fluticasone propionate, hydrocodone-acetaminophen, ibuprofen, iron,carb/vit c/vit b12/folic, junel fe 1/20 (28), liraglutide 0.6 mg/0.1 ml (18 mg/3 ml) subq pnij, montelukast, ondansetron, pantoprazole, albuterol, levocetirizine, levofloxacin, and prednisone.  -     She  has a past medical history of Allergy, Amenorrhea, Back pain, GERD (gastroesophageal reflux disease), Infertility associated with anovulation, Iron deficiency anemia, Knee pain, Metabolic syndrome, PCO (polycystic ovaries), Plantar fasciitis, Recurrent boils, Reflux esophagitis, Sinusitis, and Vaginitis.   -     She does not have any pertinent problems on file.   -     She  has a past surgical history that includes Carpal tunnel release (Left, 2019) and  section.  -     She  reports that she has never smoked. She has never used smokeless tobacco. She reports that she does not drink alcohol or use drugs.  -     Her family history includes Cancer in her father and sister; Diabetes in her father and mother; Heart disease in her father; Hypertension in her father; Lupus in her mother.  -     She is allergic to metformin; sulfa (sulfonamide antibiotics); and diclofenac.    Objective:     Physical Exam:  Vitals:  /80   Pulse 85   Wt (!) 147.4 kg (325 lb)   BMI 55.79 kg/m²   General appearance:  Well developed, well nourished    Eyes:  Extraocular motions intact, PERRL    Communication:  no hoarseness, no dysphonia    Ears:   "Bilat TM's with severe bulge, clear, intact, +bubbles. EAC"s WNL  Nose:  Bilat IT's are with severe hypertrophy and bright erythema. Clear mucus. No mass/lesions.   Mouth:  No mass/lesion of lips, teeth, gums, hard/soft palate, tongue, tonsils, or oropharynx.    Cardiovascular:  No pedal edema; Radial Pulses +2     Neck & Lymphatics:  No cervical lymphadenopathy, no neck mass/crepitus/ asymmetry, trachea is midline, no thyroid enlargement/tenderness/mass.    Psych: Oriented x3,  Alert with normal mood and affect.     Respiration/Chest:  Symmetric expansion during respiration, normal respiratory effort.    Skin:  Warm and intact. No ulcerations of face, scalp, neck.    Assessment & Plan:   Lucía was seen today for allergies, sore throat and ear fullness.    Diagnoses and all orders for this visit:    Chronic sinusitis, unspecified location    Non-seasonal allergic rhinitis, unspecified trigger    Other orders  -     levoFLOXacin (LEVAQUIN) 500 MG tablet; Take 1 tablet (500 mg total) by mouth once daily. for 10 days  -     predniSONE (DELTASONE) 20 MG tablet; Take 3 tab in am x 3 days, then 2 tab in am x 3 days, then 1 tab in am x 3 days, then 1/2 tab in am x 3 days  -     levocetirizine (XYZAL) 5 MG tablet; Take 1 tablet (5 mg total) by mouth every evening.      -SINUSITIS/RHINITIS  Lucía presents today for initial evaluation.  They have multiple sinonasal complaints and determination of the underlying etiology is a problem of moderate to high complexity.  Patients may present with sinonasal symptoms such as nasal obstruction as a primary anatomic disorder.  Patients may also present with recurrent or chronic inflammatory sinonasal symptoms.  Generally, patients can be stratified into one of several groups.      The first group represents patients who have frequent or recurrent upper respiratory infections, most frequently viral.  These patients most frequently have normally functioning immune system's but have high " levels of exposure.  This is commonly seen in nurses and schoolteachers as well as other groups who spend large amounts of time around sick patients and children.      Other patients may have isolated rhinitis without evidence of sinusitis.  The majority of these patients have ALLERGIC rhinitis however other groups may have more rare forms of rhinitis such as nonallergic rhinitis with eosinophilia syndrome.  As a screening evaluation, if the patient has had a normal endoscopy, from time to time a simple x-ray of the sinuses may be performed in combination with antigen specific ALLERGY testing.    The third group of patients present with significant evidence of chronic sinusitis.  Nasal endoscopy may reveal polyps or purulent drainage.  CT scan is an important aspect to determining the extent of disease.  Some patients with chronic sinusitis have an underlying etiology of ALLERGY leading to obstruction of the ostiomeatal units with furthering of the infection.  Others may have an acute infection that leads to stenosis of the sinonasal openings followed by a long, progressive course of infection.  Patients with unilateral disease who do not have inverting papilloma typically fall into this latter group.    CT scan of the sinuses has not been performed.      Antigen specific allergy testing  has been performed.    Allergy treatment with topical steroids and/or antihistamines has been used with good compliance with symptoms unchanged.      By review of all data, history and exam, there does seem to be a clear distinction between allergic rhinitis versus rhinitis with a component of chronic sinusitis.   My impression is that Lucía presents with chronic sinusitis and chronic AR.      My recommendation is:    D/c Augmentin, start Levaquin x 10 days  Prednisone taper  Continue Singulair QAM, trial with Xyzal QHS  Nasal saline rinses, [facility] handout given, follow with Flonase BID  Recheck in 3 weeks. If no improvement, will  obtain CT.     We discussed her medical conditions, treatments and plan.  Lucía should return to clinic if any issues arise (symptoms worsen or persist), otherwise we will see her back in the clinic In 3 weeks.    Over 25 minutes were spent in face to face contact with the patient with greater than 50% spent discussing their diagnosis and coordination of care.     Thank you for allowing me to participate in the care of Lucía.      Giselle Blunt PA-C  [facility]   [facility]  [id number] [place] [address].  [place] [id number]  P: [phone]  F: [phone]

## 2020-01-10 ENCOUNTER — OFFICE VISIT (OUTPATIENT)
Dept: OTOLARYNGOLOGY | Facility: CLINIC | Age: 35
End: 2020-01-10
Payer: COMMERCIAL

## 2020-01-10 VITALS
HEART RATE: 85 BPM | SYSTOLIC BLOOD PRESSURE: 129 MMHG | WEIGHT: 293 LBS | BODY MASS INDEX: 55.79 KG/M2 | DIASTOLIC BLOOD PRESSURE: 80 MMHG

## 2020-01-10 DIAGNOSIS — J30.89 NON-SEASONAL ALLERGIC RHINITIS, UNSPECIFIED TRIGGER: ICD-10-CM

## 2020-01-10 DIAGNOSIS — J32.9 CHRONIC SINUSITIS, UNSPECIFIED LOCATION: Primary | ICD-10-CM

## 2020-01-10 PROCEDURE — 99999 PR PBB SHADOW E&M-EST. PATIENT-LVL III: ICD-10-PCS | Mod: PBBFAC,,, | Performed by: PHYSICIAN ASSISTANT

## 2020-01-10 PROCEDURE — 99244 PR OFFICE CONSULTATION,LEVEL IV: ICD-10-PCS | Mod: S$GLB,,, | Performed by: PHYSICIAN ASSISTANT

## 2020-01-10 PROCEDURE — 99244 OFF/OP CNSLTJ NEW/EST MOD 40: CPT | Mod: S$GLB,,, | Performed by: PHYSICIAN ASSISTANT

## 2020-01-10 PROCEDURE — 99999 PR PBB SHADOW E&M-EST. PATIENT-LVL III: CPT | Mod: PBBFAC,,, | Performed by: PHYSICIAN ASSISTANT

## 2020-01-10 RX ORDER — LEVOCETIRIZINE DIHYDROCHLORIDE 5 MG/1
5 TABLET, FILM COATED ORAL NIGHTLY
Qty: 30 TABLET | Refills: 11 | Status: SHIPPED | OUTPATIENT
Start: 2020-01-10 | End: 2020-11-16

## 2020-01-10 RX ORDER — LEVOFLOXACIN 500 MG/1
500 TABLET, FILM COATED ORAL DAILY
Qty: 10 TABLET | Refills: 0 | Status: SHIPPED | OUTPATIENT
Start: 2020-01-10 | End: 2020-01-20

## 2020-01-10 RX ORDER — PREDNISONE 20 MG/1
TABLET ORAL
Qty: 21 TABLET | Refills: 0 | Status: SHIPPED | OUTPATIENT
Start: 2020-01-10 | End: 2020-02-07 | Stop reason: SDUPTHER

## 2020-01-10 NOTE — LETTER
January 10, 2020      Dante Negro MD  [address]  [place] [address]           [place] [facility]  [address]  [place] [address]  Phone: [phone]  Fax: [phone]          Patient: Lucía Coreas   MR Number: [id number]   YOB: 1985   Date of Visit: 1/10/2020       Dear Dr. Dante Negro:    Thank you for referring Lucía Coreas to me for evaluation. Attached you will find relevant portions of my assessment and plan of care.    If you have questions, please do not hesitate to call me. I look forward to following Lucía Coreas along with you.    Sincerely,    Giselle Blunt PA-C    Enclosure  CC:  No Recipients    If you would like to receive this communication electronically, please contact [email or web address] or [phone] to request more information on [facility] access.    For providers and/or their staff who would like to refer a patient to [facility], please contact us through our one-stop-shop provider referral line, [facility], at [phone].    If you feel you have received this communication in error or would no longer like to receive these types of communications, please e-mail [email or web address]

## 2020-01-10 NOTE — PATIENT INSTRUCTIONS
PLEASE PERFORM SINUS RINSES 3-4 TIMES DAILY UNTIL YOUR NEXT VISIT.          DIRECTIONS FOR SINUS SALINE RINSE To see demonstration: Enter http://www.youtube.com/watch?v=[email or web address] into the browser address box, or go to You tube, and under the search box, enter sinus rinse. Click on [facility] Sinus Rinse Video    Step 1    Step 1 Please wash your hands. Fill the clean bottle with the designated volume of warm distilled water, filtered water or previously boiled water. You may warm the water in a microwave but we recommend that you warm it in increments of five seconds. This is to avoid excessive heating of the water and damage to the device or scalding your nasal passage.    Step 2    Step 2 Cut the SINUS RINSE mixture packet at the corner and pour its contents into the bottle. Tighten the cap & tube on the bottle securely, place one finger over the tip of the cap and shake the bottle gently to dissolve the mixture.      Step 3    Step 3 Standing in front of a sink, bend forward to your comfort level and tilt your head down. Keeping your mouth open without holding your breath, place cap snugly against your nasal passage and SQUEEZE BOTTLE GENTLY until the solution starts draining from the OPPOSITE nasal passage or from your mouth. Keep squeezing the bottle GENTLY until at least 1/4 to 1/2 (60 to 120 mL) of the bottle is used for a proper rinse. Do not swallow the solution.    Step 4    Blow your nose gently, without pinching your nose completely because this will apply pressure on the    eardrums. If tolerable, sniff in any residual solution remaining in the nasal passage once or twice prior to    blowing your nose as this may clean out the posterior nasopharyngeal area (the area at the back of your    nasal passage). Some solution will reach the back of the throat, so please spit it out. To help improve    drainage of any residual solution, blow your nose gently while tilting your head to the opposite side  of    the nasal passage that you just rinsed.    Step 5    Now repeat steps 3 & 4 for your other nasal passage.    Step 6 Air dry the Sinus Rinse bottle, cap, and tube on a clean paper towel, a glass plate to store the bottle cap and tube. If there is any solution leftover, please discard it. We recommend you make a fresh solution each time you rinse. Rinse 5 times each day, OR as directed by your physician. Warnings: DO NOT RINSE IF NASAL PASSAGE IS COMPLETELY BLOCKED OR IF YOU HAVE AN EAR INFECTION OR BLOCKED EARS. If you have had ear surgery, please contact your physician prior to irrigation. If you experience any pressure in your ears, stop the rinse and get further directions from your physician or contact our office during regular business hours. To avoid any ear discomfort: Heat the solution to lukewarm, do not use hot, boiling or cold water. Keep your mouth open. Do not hold your breath and if possible make the sound CHELSIE....CHELSIE... Make sure to take the position as shown. Gently squeeze 1/4 of the bottle at a time (60mL / 2 ounces). Stop the rinse if you feel any solution sensation near the ears. You may rinse with a partially blocked nasal passage. Please do not use for any other purposes. Please rinse at least ONE HOUR PRIOR to bedtime, in order to avoid any residual solution dripping in the throat.    >> Before using the SINUS RINSE kit, please inspect the cap, tube and bottle carefully for wear and    tear. If any of the components appear discolored or cracked, please contact [facility] to obtain a    replacement. You must follow the cleaning instructions provided in this brochure or cleaning instruction    card prior to each use.    >> The SINUS RINSE bottle and mixture are to be used only for nasalirrigation. Do not use for any other    purposes.    >> We recommend that you use the rinse ONE HOUR PRIOR to bedtime in order to avoid any residual    solution dripping in the throat.    Tips to avoid ear  discomfort while rinsing    If you have had ear surgery, please contact your physician prior to irrigation. Do not use if you have an    ear infection or blocked ears. Rinse with lukewarm water. Keep your mouth open. Do not hold your    breath while rinsing. While rinsing, make sure to tilt your. Gently squeeze the bottle while rinsing; do    not squeeze the bottle very forcefully. Stop the rinse if you feel a sensation of fluid near your ears.    Tips to avoid unexpected drainage after rinsing    In rare situations, especially if you have had sinus surgery, the saline solution can pool in the sinus    cavities and nasal passages and then drip from your nostrils hours after rinsing. To avoid this harmless    but annoying inconvenience, take one extra step after rinsing: lean forward, tilt your head sideways and    gently blow your nose. Then, tilt your head to the other side and blow again. You may need to repeat    this several times. This will help rid your nasal passages of any excess mucus and remaining saline    solution. If you find yourself experiencing delayed drainage often, do not rinse right before leaving your    house or going to bed.

## 2020-01-13 ENCOUNTER — PATIENT MESSAGE (OUTPATIENT)
Dept: FAMILY MEDICINE | Facility: CLINIC | Age: 35
End: 2020-01-13

## 2020-01-13 LAB
FINAL PATHOLOGIC DIAGNOSIS: NORMAL
Lab: NORMAL

## 2020-01-22 NOTE — PROGRESS NOTES
Subjective:   Patient: Lucía Coreas [id number], :1985   Visit date:2020 9:23 AM    Chief Complaint:  Follow-up (no improvement; blood in ear ) and Ear Drainage (right ear)    HPI:  Lucía is a 34 y.o. female who is here for follow-up. She was last here on 01/10/20 for sinusitis tx with Levaquin x 10 days, Prednisone taper, xyzal, and flonase. She rtc on 20 and reported relief in her sinuses/head pressure and congestion. However, she continues to have R ear popping, itching, constant PND (clear). She has a long hx of allergies, was tested in the past and recommended for SCIT but at the time did not start. 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 At times causes rashes on her arms. Currently taking Singulair, Xyzal, Flonase and Astelin for symptoms. No cold ssx. No fevers. No other complaints.         Review of Systems:  -     Allergic/Immunologic: is allergic to metformin; sulfa (sulfonamide antibiotics); and diclofenac..  -     Constitutional: Current temp:      Her meds, allergies, medical, surgical, social & family histories were reviewed & updated:  -     She has a current medication list which includes the following prescription(s): celecoxib, epinephrine, ergocalciferol, fluticasone propionate, hydrocodone-acetaminophen, ibuprofen, iron,carb/vit c/vit b12/folic, junel fe 1/20 (28), levocetirizine, liraglutide 0.6 mg/0.1 ml (18 mg/3 ml) subq pnij, montelukast, ondansetron, pantoprazole, albuterol, and prednisone.  -     She  has a past medical history of Allergy, Amenorrhea, Back pain, GERD (gastroesophageal reflux disease), Infertility associated with anovulation, Iron deficiency anemia, Knee pain, Metabolic syndrome, PCO (polycystic ovaries), Plantar fasciitis, Recurrent boils, Reflux esophagitis, Sinusitis, and Vaginitis.   -     She does not have any pertinent problems on file.   -     She  has a past surgical history that includes Carpal tunnel release (Left, 2019) and   section.  -     She  reports that she has never smoked. She has never used smokeless tobacco. She reports that she does not drink alcohol or use drugs.  -     Her family history includes Cancer in her father and sister; Diabetes in her father and mother; Heart disease in her father; Hypertension in her father; Lupus in her mother.  -     She is allergic to metformin; sulfa (sulfonamide antibiotics); and diclofenac.    Objective:     Physical Exam:  Vitals:  BP (!) 144/88   Pulse 94   Wt (!) 147.4 kg (325 lb)   BMI 55.79 kg/m²   Appearance:  Well-developed, well-nourished.  Communication:  Able to communicate, no hoarseness.  Head & Face:  Normocephalic, atraumatic, no sinus tenderness, normal facial strength.  Eyes:  Extraocular motions intact.  Ears:  Otoscopy of external auditory canals and tympanic membranes was normal, clinical speech reception thresholds grossly intact, no mass/lesion of auricle.  Nose:  No masses/lesions of external nose, nasal mucosa, septum, and turbinates were within normal limits.  Mouth:  No mass/lesion of lips, teeth, gums, hard/soft palate, tongue, tonsils, or oropharynx.  Neck & Lymphatics:  No cervical lymphadenopathy, no neck mass/crepitus/ asymmetry, trachea is midline, no thyroid enlargement/tenderness/mass.  Neuro/Psych: Alert with normal mood and affect.   Abdominal: Normal appearance.   Respiration/Chest:  Symmetric expansion during respiration, normal respiratory effort.  Skin:  Warm and intact  Cardiovascular:  No peripheral vascular edema or varicosities.    [place]: AS = AD  [place]: AC > BC AU    Assessment & Plan:   Lucía was seen today for follow-up and ear drainage.    Diagnoses and all orders for this visit:    Non-seasonal allergic rhinitis, unspecified trigger  -     Ambulatory referral to Allergy    No infection, resolved.   Referral to allergist, pt agreed to meet with Dr. Kramer in [place].   Continue allergy regimen        Tiffany Vincent, PA-C [facility]   [facility]  [address] [place] [address]  P: [phone]  F: [phone]

## 2020-01-24 ENCOUNTER — OFFICE VISIT (OUTPATIENT)
Dept: OTOLARYNGOLOGY | Facility: CLINIC | Age: 35
End: 2020-01-24
Payer: COMMERCIAL

## 2020-01-24 VITALS
HEART RATE: 94 BPM | SYSTOLIC BLOOD PRESSURE: 144 MMHG | BODY MASS INDEX: 55.79 KG/M2 | WEIGHT: 293 LBS | DIASTOLIC BLOOD PRESSURE: 88 MMHG

## 2020-01-24 DIAGNOSIS — J30.89 NON-SEASONAL ALLERGIC RHINITIS, UNSPECIFIED TRIGGER: Primary | ICD-10-CM

## 2020-01-24 PROCEDURE — 99213 OFFICE O/P EST LOW 20 MIN: CPT | Mod: S$GLB,,, | Performed by: PHYSICIAN ASSISTANT

## 2020-01-24 PROCEDURE — 99999 PR PBB SHADOW E&M-EST. PATIENT-LVL III: ICD-10-PCS | Mod: PBBFAC,,, | Performed by: PHYSICIAN ASSISTANT

## 2020-01-24 PROCEDURE — 3008F BODY MASS INDEX DOCD: CPT | Mod: CPTII,S$GLB,, | Performed by: PHYSICIAN ASSISTANT

## 2020-01-24 PROCEDURE — 99999 PR PBB SHADOW E&M-EST. PATIENT-LVL III: CPT | Mod: PBBFAC,,, | Performed by: PHYSICIAN ASSISTANT

## 2020-01-24 PROCEDURE — 3008F PR BODY MASS INDEX (BMI) DOCUMENTED: ICD-10-PCS | Mod: CPTII,S$GLB,, | Performed by: PHYSICIAN ASSISTANT

## 2020-01-24 PROCEDURE — 99213 PR OFFICE/OUTPT VISIT, EST, LEVL III, 20-29 MIN: ICD-10-PCS | Mod: S$GLB,,, | Performed by: PHYSICIAN ASSISTANT

## 2020-02-03 ENCOUNTER — PATIENT MESSAGE (OUTPATIENT)
Dept: FAMILY MEDICINE | Facility: CLINIC | Age: 35
End: 2020-02-03

## 2020-02-04 ENCOUNTER — PATIENT MESSAGE (OUTPATIENT)
Dept: FAMILY MEDICINE | Facility: CLINIC | Age: 35
End: 2020-02-04

## 2020-02-07 ENCOUNTER — OFFICE VISIT (OUTPATIENT)
Dept: ALLERGY | Facility: CLINIC | Age: 35
End: 2020-02-07
Payer: COMMERCIAL

## 2020-02-07 VITALS
TEMPERATURE: 98 F | WEIGHT: 293 LBS | BODY MASS INDEX: 47.09 KG/M2 | HEIGHT: 66 IN | DIASTOLIC BLOOD PRESSURE: 88 MMHG | SYSTOLIC BLOOD PRESSURE: 138 MMHG | HEART RATE: 97 BPM

## 2020-02-07 DIAGNOSIS — Z91.013 SHELLFISH ALLERGY: ICD-10-CM

## 2020-02-07 DIAGNOSIS — J30.89 ALLERGIC RHINITIS DUE TO DERMATOPHAGOIDES PTERONYSSINUS: ICD-10-CM

## 2020-02-07 DIAGNOSIS — J30.89 ALLERGIC RHINITIS DUE TO DERMATOPHAGOIDES FARINAE: Primary | ICD-10-CM

## 2020-02-07 DIAGNOSIS — J30.89 ALLERGIC RHINITIS DUE TO OTHER ALLERGIC TRIGGER, UNSPECIFIED SEASONALITY: ICD-10-CM

## 2020-02-07 DIAGNOSIS — J30.1 SEASONAL ALLERGIC RHINITIS DUE TO POLLEN: ICD-10-CM

## 2020-02-07 PROCEDURE — 99204 OFFICE O/P NEW MOD 45 MIN: CPT | Mod: 25,S$GLB,, | Performed by: ALLERGY & IMMUNOLOGY

## 2020-02-07 PROCEDURE — 3008F BODY MASS INDEX DOCD: CPT | Mod: CPTII,S$GLB,, | Performed by: ALLERGY & IMMUNOLOGY

## 2020-02-07 PROCEDURE — 95004 PERQ TESTS W/ALRGNC XTRCS: CPT | Mod: S$GLB,,, | Performed by: ALLERGY & IMMUNOLOGY

## 2020-02-07 PROCEDURE — 99999 PR PBB SHADOW E&M-EST. PATIENT-LVL III: CPT | Mod: PBBFAC,,, | Performed by: ALLERGY & IMMUNOLOGY

## 2020-02-07 PROCEDURE — 99999 PR PBB SHADOW E&M-EST. PATIENT-LVL III: ICD-10-PCS | Mod: PBBFAC,,, | Performed by: ALLERGY & IMMUNOLOGY

## 2020-02-07 PROCEDURE — 95004 PR ALLERGY SKIN TESTS,ALLERGENS: ICD-10-PCS | Mod: S$GLB,,, | Performed by: ALLERGY & IMMUNOLOGY

## 2020-02-07 PROCEDURE — 3008F PR BODY MASS INDEX (BMI) DOCUMENTED: ICD-10-PCS | Mod: CPTII,S$GLB,, | Performed by: ALLERGY & IMMUNOLOGY

## 2020-02-07 PROCEDURE — 99204 PR OFFICE/OUTPT VISIT, NEW, LEVL IV, 45-59 MIN: ICD-10-PCS | Mod: 25,S$GLB,, | Performed by: ALLERGY & IMMUNOLOGY

## 2020-02-07 RX ORDER — PREDNISONE 20 MG/1
20 TABLET ORAL 2 TIMES DAILY
Qty: 10 TABLET | Refills: 0 | Status: SHIPPED | OUTPATIENT
Start: 2020-02-07 | End: 2020-02-13 | Stop reason: SDUPTHER

## 2020-02-07 RX ORDER — AZELASTINE 1 MG/ML
1 SPRAY, METERED NASAL 2 TIMES DAILY
Qty: 20 ML | Refills: 5 | Status: ON HOLD | OUTPATIENT
Start: 2020-02-07 | End: 2020-12-21 | Stop reason: SDUPTHER

## 2020-02-07 RX ORDER — CETIRIZINE HYDROCHLORIDE 10 MG/1
10 TABLET ORAL
Status: COMPLETED | OUTPATIENT
Start: 2020-02-07 | End: 2020-02-07

## 2020-02-07 RX ADMIN — CETIRIZINE HYDROCHLORIDE 10 MG: 10 TABLET ORAL at 09:02

## 2020-02-07 NOTE — LETTER
February 7, 2020      Giselle Blunt PA-C  [address]  [place] [address]           [facility]  [address]  [place] [address]  Phone: [phone]  Fax: [phone]          Patient: Lucía Coreas   MR Number: [id number]   YOB: 1985   Date of Visit: 2/7/2020       Dear Giselle Blunt:    Thank you for referring Lucía Coreas to me for evaluation. Attached you will find relevant portions of my assessment and plan of care.    If you have questions, please do not hesitate to call me. I look forward to following Lucía Coreas along with you.    Sincerely,    Rossy Kramer MD    Enclosure  CC:  No Recipients    If you would like to receive this communication electronically, please contact [email or web address] or [phone] to request more information on [employer] Link access.    For providers and/or their staff who would like to refer a patient to [facility], please contact us through our one-stop-shop provider referral line, [facility], at [phone].    If you feel you have received this communication in error or would no longer like to receive these types of communications, please e-mail [email or web address]

## 2020-02-07 NOTE — PROGRESS NOTES
Subjective:       Patient ID: Lucía Coreas is a 34 y.o. female.    Chief Complaint:  Allergies      HPI:  34 year old female complaining of runny nose, nasal congestion, ear pressure, watery eyes, itchy eyes and change in voice. She was referred by ENT. She was skin tested several years ago outside of [facility] and consider SCIT, but she did not start due to her schedule. She has had 4-5 courses of antibiotics for sinus infections over the last six months. She takes Flonase, Xyzal, and Singulair. She had asthma as a child. She last used an albuterol inhaler several years. She denies a cough or wheeze. She denies eczema.     Shrimp- throat closing  Crayfish-throat closure and lip swelling  She has an Epipen.   She has been avoiding for several years.  She eats fish without a reaction.        Past Medical History:   Diagnosis Date    Allergy     Amenorrhea     Back pain     GERD (gastroesophageal reflux disease)     Infertility associated with anovulation     Iron deficiency anemia     Knee pain     Metabolic syndrome     PCO (polycystic ovaries)     Plantar fasciitis     Recurrent boils     Reflux esophagitis     Sinusitis     Vaginitis      Family History   Problem Relation Age of Onset    Diabetes Mother     Lupus Mother     Diabetes Father     Heart disease Father     Hypertension Father     Cancer Father         prostate    Cancer Sister         ovarian     Current Outpatient Medications on File Prior to Visit   Medication Sig Dispense Refill    albuterol (PROVENTIL/VENTOLIN HFA) 90 mcg/actuation inhaler Inhale 2 puffs into the lungs every 4 (four) hours as needed for Wheezing. 18 g 3    EPINEPHrine (EPIPEN 2-MARTIN) 0.3 mg/0.3 mL AtIn As directed. 1 Device 1    ergocalciferol (ERGOCALCIFEROL) 50,000 unit Cap Take 1 capsule (50,000 Units total) by mouth every 7 days. 4 capsule 2    fluticasone propionate (FLONASE) 50 mcg/actuation nasal spray 1 spray (50 mcg total) by Each Nostril route 2  (two) times daily. 1 Bottle 11    iron-vit c-vit b12-folic acid (IRON-C PLUS) tablet Take 1 tablet by mouth once daily. 90 tablet 3    levocetirizine (XYZAL) 5 MG tablet Take 1 tablet (5 mg total) by mouth every evening. 30 tablet 11    liraglutide 0.6 mg/0.1 mL, 18 mg/3 mL, subq PNIJ (VICTOZA 2-MARTIN) 0.6 mg/0.1 mL (18 mg/3 mL) PnIj Inject 0.6 mg into the skin once daily. 3 mL 11    montelukast (SINGULAIR) 10 mg tablet Take 1 tablet (10 mg total) by mouth every evening. 90 tablet 3    pantoprazole (PROTONIX) 40 MG tablet Take 1 tablet (40 mg total) by mouth once daily. 90 tablet 3    celecoxib (CELEBREX) 200 MG capsule Take 200 mg by mouth 2 (two) times daily as needed.  1    HYDROcodone-acetaminophen (NORCO) 5-325 mg per tablet Take 1 tablet by mouth 3 (three) times daily as needed.  0    ibuprofen (ADVIL,MOTRIN) 800 MG tablet TK 1 T PO Q 8 H PRF PAIN  0    JUNEL FE 1/20, 28, 1 mg-20 mcg (21)/75 mg (7) per tablet Take 1 tablet by mouth once daily.  11    ondansetron (ZOFRAN) 8 MG tablet Take 1 tablet (8 mg total) by mouth every 8 (eight) hours as needed for Nausea. (Patient not taking: Reported on 2/7/2020) 10 tablet 0    [DISCONTINUED] predniSONE (DELTASONE) 20 MG tablet Take 3 tab in am x 3 days, then 2 tab in am x 3 days, then 1 tab in am x 3 days, then 1/2 tab in am x 3 days (Patient not taking: Reported on 1/24/2020) 21 tablet 0     No current facility-administered medications on file prior to visit.        Review of patient's allergies indicates:   Allergen Reactions    Metformin Diarrhea    Sulfa (sulfonamide antibiotics) Anaphylaxis and Swelling     Swelling (eyes)^, Swelling (throat)^  Swelling (eyes)^, Swelling (throat)^      Diclofenac      Gastritis     Shellfish containing products      anaphylaxis     Environmental History: No pets. Not a smoker  Review of Systems   Constitutional: Negative for appetite change, chills and fever.   HENT: Positive for congestion, ear pain, rhinorrhea,  sinus pressure and sinus pain. Negative for sneezing.    Eyes: Positive for discharge and itching.   Respiratory: Negative for chest tightness, shortness of breath and wheezing.    Cardiovascular: Negative for chest pain and leg swelling.   Gastrointestinal: Negative for constipation, diarrhea and vomiting.   Endocrine: Negative for polydipsia and polyuria.   Genitourinary: Negative for dysuria and frequency.   Musculoskeletal: Negative for gait problem and joint swelling.   Skin: Positive for rash. Negative for wound.   Allergic/Immunologic: Positive for environmental allergies and food allergies.   Neurological: Negative for dizziness and light-headedness.   Hematological: Negative for adenopathy. Does not bruise/bleed easily.   Psychiatric/Behavioral: Negative for agitation and confusion.        Objective:    Physical Exam   Constitutional: She is oriented to person, place, and time. She appears well-developed and well-nourished. No distress.   HENT:   Head: Normocephalic and atraumatic.   Right Ear: External ear normal.   Left Ear: External ear normal.   Nose: Nose normal.   Mouth/Throat: Oropharynx is clear and moist. No oropharyngeal exudate.   Eyes: Pupils are equal, round, and reactive to light. Right eye exhibits no discharge. Left eye exhibits no discharge. No scleral icterus.   Neck: Normal range of motion. Neck supple. No tracheal deviation present. No thyromegaly present.   Cardiovascular: Normal rate, regular rhythm and normal heart sounds.   No murmur heard.  Pulmonary/Chest: Effort normal and breath sounds normal. No stridor. No respiratory distress. She has no wheezes. She has no rales.   Abdominal: Soft. Bowel sounds are normal. She exhibits no distension and no mass. There is no tenderness. There is no rebound and no guarding.   Musculoskeletal: Normal range of motion. She exhibits no edema.   Lymphadenopathy:     She has no cervical adenopathy.   Neurological: She is alert and oriented to person,  place, and time.   Skin: Skin is warm. She is not diaphoretic. No erythema. No pallor.   Psychiatric: She has a normal mood and affect. Her behavior is normal. Judgment and thought content normal.   Vitals reviewed.      Allergy skin prick testing:  Histamine- 7X6, 15 X 11  Saline  3 X3  Dust mite  (F)- 8 X4, 15 X 9  Dust mite (Pt)- 7 X 7, 20 X 18  Cockroach 6 X 5, 7 X 7  Oak- 4 X4 6 X 7  Cat, Dog,  Alternaria, Aspergillus, Helminthosporium, [place]  [place], Elm, Hackberry, Ligustrum, Pecan, Red Cedar, [place], Bahia, Bermuda, Zane, Red Top/Agrostis Alba, Rye/Lolium, Hay[place], English Plantain, Marsh Elder, Pigweed, Ragweed, Russian Thistle, Curvularia/Drechsiera Spicifera, Epicoccum, Fusarium, Hormodendrum/Cladosporium, Penicillium, Monilia/candida, Phoma, Stemphylium  She had an appropriate response to positive and negative controls. She had a positive response to dust mites, cockroach and oak tree pollen.    Assessment:       1. Allergic rhinitis due to Dermatophagoides farinae    2. Allergic rhinitis due to Dermatophagoides pteronyssinus    3. Allergic rhinitis due to other allergic trigger, unspecified seasonality    4. Seasonal allergic rhinitis due to pollen    5. Shellfish allergy         Plan:       Allergy Avoidance measures given.  Continue Xyzal, Flonase and Singulair. Added Astelin. Educated on appropriate nasal spray technique.  Prednisone 20 mg BID for five days. Risk and benefits explained, including increasing her blood sugar.  Discussed Allergy Immunotherapy, risk and benefits explained. She plans to think about it.   Strict avoidance of shellfish and shellfish products. Discussed Epipen use, care and administration. Advised to use Epipen at start of anaphylaxis and then call 911.  RTC 3 months or sooner, if needed.

## 2020-02-07 NOTE — PATIENT INSTRUCTIONS
Dust mites are microscopic insect-like pests that live in house dust. They feed on dead skin or dander that are shed by people and pets.They can worsen asthma and allergies. Dust mites live in mattresses, bedding, upholstered furniture, carpets, and curtains in your home. No matter how clean a home is, dust mites cannot be completely eliminated. A number of things can be done to reduce the number of dust mites:  -Use a dehumidifier or air conditioner to maintain humidity levels at, or below, 50 percent.  -Encase mattress and pillows in dust mite- proof or allergen impermeable covers.  -Wash all bedding and blankets once a week in hot water, 130 to 140 degrees Fahrenheit, to kill dust mites. Non- washable bedding can be frozen overnight.  -Replace wool or feathered bedding products with synthetic materials, and traditional stuffed animals with washable ones.  -In bedrooms, replace wall to wall carpeting with bare floors, and remove fabric curtains and upholstered furniture, whenever possible.  -Use a damp mop or rag to remove dust. Never use a dry cloth, as it stirs up allergens.  -Use a double-layered microfilter bag or a HEPA filter in your vacuum .  -Wear a mask while vacuuming, and stay out of the vacuuming area for 20 minutes after vacuuming, to allow dust and allergens to settle.

## 2020-02-13 ENCOUNTER — PATIENT MESSAGE (OUTPATIENT)
Dept: ALLERGY | Facility: CLINIC | Age: 35
End: 2020-02-13

## 2020-02-13 DIAGNOSIS — J30.89 ALLERGIC RHINITIS DUE TO OTHER ALLERGIC TRIGGER, UNSPECIFIED SEASONALITY: ICD-10-CM

## 2020-02-13 DIAGNOSIS — J32.9 SINUSITIS, UNSPECIFIED CHRONICITY, UNSPECIFIED LOCATION: Primary | ICD-10-CM

## 2020-02-13 DIAGNOSIS — J30.89 ALLERGIC RHINITIS DUE TO DERMATOPHAGOIDES FARINAE: ICD-10-CM

## 2020-02-13 DIAGNOSIS — J30.89 ALLERGIC RHINITIS DUE TO DERMATOPHAGOIDES PTERONYSSINUS: ICD-10-CM

## 2020-02-13 DIAGNOSIS — J30.1 SEASONAL ALLERGIC RHINITIS DUE TO POLLEN: ICD-10-CM

## 2020-02-13 RX ORDER — PREDNISONE 20 MG/1
20 TABLET ORAL 2 TIMES DAILY
Qty: 10 TABLET | Refills: 0 | Status: SHIPPED | OUTPATIENT
Start: 2020-02-13 | End: 2020-04-02

## 2020-02-13 RX ORDER — AMOXICILLIN AND CLAVULANATE POTASSIUM 875; 125 MG/1; MG/1
1 TABLET, FILM COATED ORAL EVERY 12 HOURS
Qty: 20 TABLET | Refills: 0 | Status: SHIPPED | OUTPATIENT
Start: 2020-02-13 | End: 2020-04-02

## 2020-02-17 ENCOUNTER — OFFICE VISIT (OUTPATIENT)
Dept: ALLERGY | Facility: CLINIC | Age: 35
End: 2020-02-17
Payer: COMMERCIAL

## 2020-02-17 ENCOUNTER — LAB VISIT (OUTPATIENT)
Dept: LAB | Facility: HOSPITAL | Age: 35
End: 2020-02-17
Attending: ALLERGY & IMMUNOLOGY
Payer: COMMERCIAL

## 2020-02-17 VITALS
DIASTOLIC BLOOD PRESSURE: 78 MMHG | TEMPERATURE: 98 F | WEIGHT: 293 LBS | BODY MASS INDEX: 52.35 KG/M2 | SYSTOLIC BLOOD PRESSURE: 112 MMHG

## 2020-02-17 DIAGNOSIS — J30.1 SEASONAL ALLERGIC RHINITIS DUE TO POLLEN: ICD-10-CM

## 2020-02-17 DIAGNOSIS — J30.89 NON-SEASONAL ALLERGIC RHINITIS DUE TO OTHER ALLERGIC TRIGGER: ICD-10-CM

## 2020-02-17 DIAGNOSIS — Z91.013 SHELLFISH ALLERGY: ICD-10-CM

## 2020-02-17 DIAGNOSIS — J30.89 ALLERGIC RHINITIS DUE TO DERMATOPHAGOIDES FARINAE: Primary | ICD-10-CM

## 2020-02-17 DIAGNOSIS — Z79.899 ENCOUNTER FOR LONG-TERM (CURRENT) USE OF MEDICATIONS: ICD-10-CM

## 2020-02-17 DIAGNOSIS — J30.89 ALLERGIC RHINITIS DUE TO DERMATOPHAGOIDES PTERONYSSINUS: ICD-10-CM

## 2020-02-17 PROCEDURE — 3008F PR BODY MASS INDEX (BMI) DOCUMENTED: ICD-10-PCS | Mod: CPTII,S$GLB,, | Performed by: ALLERGY & IMMUNOLOGY

## 2020-02-17 PROCEDURE — 99213 PR OFFICE/OUTPT VISIT, EST, LEVL III, 20-29 MIN: ICD-10-PCS | Mod: S$GLB,,, | Performed by: ALLERGY & IMMUNOLOGY

## 2020-02-17 PROCEDURE — 99999 PR PBB SHADOW E&M-EST. PATIENT-LVL IV: CPT | Mod: PBBFAC,,, | Performed by: ALLERGY & IMMUNOLOGY

## 2020-02-17 PROCEDURE — 86003 ALLG SPEC IGE CRUDE XTRC EA: CPT

## 2020-02-17 PROCEDURE — 3008F BODY MASS INDEX DOCD: CPT | Mod: CPTII,S$GLB,, | Performed by: ALLERGY & IMMUNOLOGY

## 2020-02-17 PROCEDURE — 36415 COLL VENOUS BLD VENIPUNCTURE: CPT

## 2020-02-17 PROCEDURE — 99999 PR PBB SHADOW E&M-EST. PATIENT-LVL IV: ICD-10-PCS | Mod: PBBFAC,,, | Performed by: ALLERGY & IMMUNOLOGY

## 2020-02-17 PROCEDURE — 99213 OFFICE O/P EST LOW 20 MIN: CPT | Mod: S$GLB,,, | Performed by: ALLERGY & IMMUNOLOGY

## 2020-02-17 PROCEDURE — 86003 ALLG SPEC IGE CRUDE XTRC EA: CPT | Mod: 59

## 2020-02-17 RX ORDER — EPINEPHRINE 0.3 MG/.3ML
INJECTION SUBCUTANEOUS
Qty: 2 DEVICE | Refills: 4 | Status: SHIPPED | OUTPATIENT
Start: 2020-02-17 | End: 2020-12-03

## 2020-02-17 NOTE — PATIENT INSTRUCTIONS
Lab today  Recommend strict avoidance of the food/foods that cause an allergic reaction after ingestion. I recommend avoiding foods that may contain the aforementioned food/foods or maybe contaminated with the aforementioned food. I recommend having an Epinephrine Auto-injector with you at all times. If needed, do not hesitate to use it. Place mid-lateral thigh and hold for 10 seconds. Call 911. You must go to the hospital via ambulance for a higher level of care and monitoring.  Do not stick your finger with the Epinephrine Auto-injector.  Do not leave the Epinephrine Auto-injector in a car and/or  hot/cold environment, as it can denature.    Strict avoidance of shellfish and shellfish product

## 2020-02-17 NOTE — PROGRESS NOTES
Subjective:       Patient ID: Lucía Coreas is a 34 y.o. female.    Chief Complaint:  Follow-up      HPI 2/7/2020:  34 year old female complaining of runny nose, nasal congestion, ear pressure, watery eyes, itchy eyes and change in voice. She was referred by ENT. She was skin tested several years ago outside of [facility] and consider SCIT, but she did not start due to her schedule. She has had 4-5 courses of antibiotics for sinus infections over the last six months. She takes Flonase, Xyzal, and Singulair. She had asthma as a child. She last used an albuterol inhaler several years. She denies a cough or wheeze. She denies eczema.     Shrimp- throat closing  Crayfish-throat closure and lip swelling  She has an Epipen.   She has been avoiding for several years.  She eats fish without a reaction.    HPI today: 34 year old female requesting to start allergy immunotherapy. She has improved with medications, but she would like to start allergy immunotherapy.  She reports a history of shellfish allergy. Last exposure to shellfish was five years ago. She had anaphylaxis a that time. She has avoided shellfish and shellfish products, since her last exposure 5 years ago.        Past Medical History:   Diagnosis Date    Allergy     Amenorrhea     Back pain     GERD (gastroesophageal reflux disease)     Infertility associated with anovulation     Iron deficiency anemia     Knee pain     Metabolic syndrome     PCO (polycystic ovaries)     Plantar fasciitis     Recurrent boils     Reflux esophagitis     Sinusitis     Vaginitis      Family History   Problem Relation Age of Onset    Diabetes Mother     Lupus Mother     Diabetes Father     Heart disease Father     Hypertension Father     Cancer Father         prostate    Cancer Sister         ovarian     Current Outpatient Medications on File Prior to Visit   Medication Sig Dispense Refill    amoxicillin-clavulanate 875-125mg (AUGMENTIN) 875-125 mg per tablet  Take 1 tablet by mouth every 12 (twelve) hours. 20 tablet 0    azelastine (ASTELIN) 137 mcg (0.1 %) nasal spray 1 spray (137 mcg total) by Nasal route 2 (two) times daily. 20 mL 5    EPINEPHrine (EPIPEN 2-MARTIN) 0.3 mg/0.3 mL AtIn As directed. 1 Device 1    ergocalciferol (ERGOCALCIFEROL) 50,000 unit Cap Take 1 capsule (50,000 Units total) by mouth every 7 days. 4 capsule 2    fluticasone propionate (FLONASE) 50 mcg/actuation nasal spray 1 spray (50 mcg total) by Each Nostril route 2 (two) times daily. 1 Bottle 11    iron-vit c-vit b12-folic acid (IRON-C PLUS) tablet Take 1 tablet by mouth once daily. 90 tablet 3    levocetirizine (XYZAL) 5 MG tablet Take 1 tablet (5 mg total) by mouth every evening. 30 tablet 11    liraglutide 0.6 mg/0.1 mL, 18 mg/3 mL, subq PNIJ (VICTOZA 2-MARTIN) 0.6 mg/0.1 mL (18 mg/3 mL) PnIj Inject 0.6 mg into the skin once daily. 3 mL 11    montelukast (SINGULAIR) 10 mg tablet Take 1 tablet (10 mg total) by mouth every evening. 90 tablet 3    ondansetron (ZOFRAN) 8 MG tablet Take 1 tablet (8 mg total) by mouth every 8 (eight) hours as needed for Nausea. 10 tablet 0    pantoprazole (PROTONIX) 40 MG tablet Take 1 tablet (40 mg total) by mouth once daily. 90 tablet 3    predniSONE (DELTASONE) 20 MG tablet Take 1 tablet (20 mg total) by mouth 2 (two) times daily. 10 tablet 0    albuterol (PROVENTIL/VENTOLIN HFA) 90 mcg/actuation inhaler Inhale 2 puffs into the lungs every 4 (four) hours as needed for Wheezing. 18 g 3    celecoxib (CELEBREX) 200 MG capsule Take 200 mg by mouth 2 (two) times daily as needed.  1    HYDROcodone-acetaminophen (NORCO) 5-325 mg per tablet Take 1 tablet by mouth 3 (three) times daily as needed.  0    ibuprofen (ADVIL,MOTRIN) 800 MG tablet TK 1 T PO Q 8 H PRF PAIN  0    JUNEL FE 1/20, 28, 1 mg-20 mcg (21)/75 mg (7) per tablet Take 1 tablet by mouth once daily.  11     No current facility-administered medications on file prior to visit.        Review of  patient's allergies indicates:   Allergen Reactions    Metformin Diarrhea    Sulfa (sulfonamide antibiotics) Anaphylaxis and Swelling     Swelling (eyes)^, Swelling (throat)^  Swelling (eyes)^, Swelling (throat)^      Diclofenac      Gastritis     Shellfish containing products      anaphylaxis     Environmental History: No pets. Not a smoker  Review of Systems   Constitutional: Negative for appetite change, chills and fever.   HENT: Positive for congestion, ear pain, rhinorrhea, sinus pressure and sinus pain. Negative for sneezing.    Eyes: Positive for discharge and itching.   Respiratory: Negative for chest tightness, shortness of breath and wheezing.    Cardiovascular: Negative for chest pain and leg swelling.   Gastrointestinal: Negative for constipation, diarrhea and vomiting.   Endocrine: Negative for polydipsia and polyuria.   Genitourinary: Negative for dysuria and frequency.   Musculoskeletal: Negative for gait problem and joint swelling.   Skin: Positive for rash. Negative for wound.   Allergic/Immunologic: Positive for environmental allergies and food allergies.   Neurological: Negative for dizziness and light-headedness.   Hematological: Negative for adenopathy. Does not bruise/bleed easily.   Psychiatric/Behavioral: Negative for agitation and confusion.        Objective:    Physical Exam   Constitutional: She is oriented to person, place, and time. She appears well-developed and well-nourished. No distress.   HENT:   Head: Normocephalic and atraumatic.   Right Ear: External ear normal.   Left Ear: External ear normal.   Nose: Nose normal.   Mouth/Throat: Oropharynx is clear and moist. No oropharyngeal exudate.   Eyes: Pupils are equal, round, and reactive to light. Right eye exhibits no discharge. Left eye exhibits no discharge. No scleral icterus.   Neck: Normal range of motion. Neck supple. No tracheal deviation present. No thyromegaly present.   Cardiovascular: Normal rate, regular rhythm and  normal heart sounds.   No murmur heard.  Pulmonary/Chest: Effort normal and breath sounds normal. No stridor. No respiratory distress. She has no wheezes. She has no rales.   Abdominal: Soft. Bowel sounds are normal. She exhibits no distension and no mass. There is no tenderness. There is no rebound and no guarding.   Musculoskeletal: Normal range of motion. She exhibits no edema.   Lymphadenopathy:     She has no cervical adenopathy.   Neurological: She is alert and oriented to person, place, and time.   Skin: Skin is warm. She is not diaphoretic. No erythema. No pallor.   Psychiatric: She has a normal mood and affect. Her behavior is normal. Judgment and thought content normal.   Vitals reviewed.      Allergy skin prick testing from 2/7/2020:  Histamine- 7X6, 15 X 11  Saline  3 X3  Dust mite  (F)- 8 X4, 15 X 9  Dust mite (Pt)- 7 X 7, 20 X 18  Cockroach 6 X 5, 7 X 7  Oak- 4 X4 6 X 7  Cat, Dog,  Alternaria, Aspergillus, Helminthosporium, Bald [place]  Bivalve, Elm, Hackberry, Ligustrum, Pecan, Red Cedar, [place], Bahia, Bermuda, Zane, Red Top/Agrostis Alba, Rye/Lolium, Hay[place], English Plantain, Marsh Elder, Pigweed, Ragweed, Russian Thistle, Curvularia/Drechsiera Spicifera, Epicoccum, Fusarium, Hormodendrum/Cladosporium, Penicillium, Monilia/candida, Phoma, Stemphylium  She had an appropriate response to positive and negative controls. She had a positive response to dust mites, cockroach and oak tree pollen.    Assessment:       1. Allergic rhinitis due to Dermatophagoides farinae    2. Allergic rhinitis due to Dermatophagoides pteronyssinus    3. Seasonal allergic rhinitis due to pollen    4. Non-seasonal allergic rhinitis due to other allergic trigger    5. Shellfish allergy         Plan:         Continue current care.  Allergy and Immunotherapy discussed. Risk and benefits explained, including anaphylaxis and death. Questions were answered. She voiced understanding and signed consent.  Epipen 2  pack  Reviewed Epipen use, care and administration. Advised to have two with her at all times.  Strict avoidance of shellfish and shellfish products.  Will contact her when extracts are ready.  RTC 6 months or sooner, if needed.

## 2020-02-19 ENCOUNTER — PATIENT MESSAGE (OUTPATIENT)
Dept: ALLERGY | Facility: CLINIC | Age: 35
End: 2020-02-19

## 2020-02-19 LAB
CLAM IGE QN: <0.1 KU/L
CRAB IGE QN: 0.86 KU/L
CRAWFISH IGE QN: 1.38 KU/L
DEPRECATED CLAM IGE RAST QL: NORMAL
DEPRECATED CRAB IGE RAST QL: ABNORMAL
DEPRECATED CRAWFISH IGE RAST QL: ABNORMAL
DEPRECATED LOBSTER IGE RAST QL: ABNORMAL
DEPRECATED SHRIMP IGE RAST QL: ABNORMAL
LOBSTER IGE QN: 0.98 KU/L
SHRIMP IGE QN: 1.26 KU/L

## 2020-02-24 ENCOUNTER — TELEPHONE (OUTPATIENT)
Dept: ALLERGY | Facility: CLINIC | Age: 35
End: 2020-02-24

## 2020-02-24 NOTE — TELEPHONE ENCOUNTER
Spoke with [facility] for an update on Dupixent. They were unable to find script.     Please place order for Dupixent to be processed.

## 2020-02-24 NOTE — TELEPHONE ENCOUNTER
ERROR: The previous message was an error. The IT treatment plan was faxed to the [place] pharmacy on 2/17/20. Spoke with the pharmacy and was advised the IT vials are being shipped to our office for tomorrow.

## 2020-02-27 ENCOUNTER — PATIENT MESSAGE (OUTPATIENT)
Dept: ALLERGY | Facility: CLINIC | Age: 35
End: 2020-02-27

## 2020-03-09 ENCOUNTER — TELEPHONE (OUTPATIENT)
Dept: ALLERGY | Facility: CLINIC | Age: 35
End: 2020-03-09

## 2020-03-09 DIAGNOSIS — E55.9 VITAMIN D DEFICIENCY: Chronic | ICD-10-CM

## 2020-03-09 RX ORDER — ERGOCALCIFEROL 1.25 MG/1
50000 CAPSULE ORAL
Qty: 12 CAPSULE | Refills: 1 | Status: SHIPPED | OUTPATIENT
Start: 2020-03-09 | End: 2020-04-03 | Stop reason: SDUPTHER

## 2020-03-09 NOTE — TELEPHONE ENCOUNTER
Pt scheduled injection appt for 3/13/2020. Pt was advised to bring epi-pen. Pt stated understanding.

## 2020-03-09 NOTE — TELEPHONE ENCOUNTER
----- Message from Emma Cortes sent at 3/9/2020  8:58 AM CDT -----  Contact: pt  Please call pt @ [phone] regarding scheduling an allergy injection.

## 2020-03-13 ENCOUNTER — CLINICAL SUPPORT (OUTPATIENT)
Dept: ALLERGY | Facility: CLINIC | Age: 35
End: 2020-03-13
Payer: COMMERCIAL

## 2020-03-13 DIAGNOSIS — J30.89 ALLERGIC RHINITIS DUE TO DERMATOPHAGOIDES FARINAE: ICD-10-CM

## 2020-03-13 DIAGNOSIS — J30.1 SEASONAL ALLERGIC RHINITIS DUE TO POLLEN: Chronic | ICD-10-CM

## 2020-03-13 PROCEDURE — 95117 PR IMMU2THERAPY, 2+ INJECTIONS: ICD-10-PCS | Mod: S$GLB,,, | Performed by: ALLERGY & IMMUNOLOGY

## 2020-03-13 PROCEDURE — 99999 PR PBB SHADOW E&M-EST. PATIENT-LVL III: CPT | Mod: PBBFAC,,,

## 2020-03-13 PROCEDURE — 95117 IMMUNOTHERAPY INJECTIONS: CPT | Mod: S$GLB,,, | Performed by: ALLERGY & IMMUNOLOGY

## 2020-03-13 PROCEDURE — 99999 PR PBB SHADOW E&M-EST. PATIENT-LVL III: ICD-10-PCS | Mod: PBBFAC,,,

## 2020-03-27 ENCOUNTER — PATIENT MESSAGE (OUTPATIENT)
Dept: FAMILY MEDICINE | Facility: CLINIC | Age: 35
End: 2020-03-27

## 2020-03-27 ENCOUNTER — LAB VISIT (OUTPATIENT)
Dept: LAB | Facility: HOSPITAL | Age: 35
End: 2020-03-27
Attending: FAMILY MEDICINE
Payer: COMMERCIAL

## 2020-03-27 DIAGNOSIS — E11.65 TYPE 2 DIABETES MELLITUS WITH HYPERGLYCEMIA, WITHOUT LONG-TERM CURRENT USE OF INSULIN: ICD-10-CM

## 2020-03-27 LAB
ESTIMATED AVG GLUCOSE: 111 MG/DL (ref 68–131)
HBA1C MFR BLD HPLC: 5.5 % (ref 4–5.6)

## 2020-03-27 PROCEDURE — 83036 HEMOGLOBIN GLYCOSYLATED A1C: CPT

## 2020-03-27 PROCEDURE — 36415 COLL VENOUS BLD VENIPUNCTURE: CPT | Mod: PO

## 2020-03-30 ENCOUNTER — TELEPHONE (OUTPATIENT)
Dept: FAMILY MEDICINE | Facility: CLINIC | Age: 35
End: 2020-03-30

## 2020-03-30 ENCOUNTER — PATIENT MESSAGE (OUTPATIENT)
Dept: FAMILY MEDICINE | Facility: CLINIC | Age: 35
End: 2020-03-30

## 2020-03-30 NOTE — TELEPHONE ENCOUNTER
Called and notified patient of results, patient verbalized understanding of results.  Established visit changed to a video visit per Dr. Negro's recommendations.  Information sent to patient through My Chart on how to perform visit.

## 2020-03-30 NOTE — TELEPHONE ENCOUNTER
----- Message from Dante Negro MD sent at 3/29/2020 10:02 PM CDT -----  Please CALL patient with results and Document verification.   [phone]    A1c improved from previous.  We will Change your visit to tele health video visit in a few days to see how you are doing with the medications.

## 2020-03-30 NOTE — PROGRESS NOTES
Please CALL patient with results and Document verification.   [phone]    A1c improved from previous.  We will Change your visit to tele health video visit in a few days to see how you are doing with the medications.

## 2020-04-02 ENCOUNTER — OFFICE VISIT (OUTPATIENT)
Dept: FAMILY MEDICINE | Facility: CLINIC | Age: 35
End: 2020-04-02
Payer: COMMERCIAL

## 2020-04-02 ENCOUNTER — PATIENT MESSAGE (OUTPATIENT)
Dept: FAMILY MEDICINE | Facility: CLINIC | Age: 35
End: 2020-04-02

## 2020-04-02 DIAGNOSIS — Z79.899 ENCOUNTER FOR LONG-TERM (CURRENT) USE OF MEDICATIONS: Primary | ICD-10-CM

## 2020-04-02 DIAGNOSIS — B95.8 STAPH INFECTION: ICD-10-CM

## 2020-04-02 DIAGNOSIS — E11.65 TYPE 2 DIABETES MELLITUS WITH HYPERGLYCEMIA, WITHOUT LONG-TERM CURRENT USE OF INSULIN: ICD-10-CM

## 2020-04-02 PROCEDURE — 3044F PR MOST RECENT HEMOGLOBIN A1C LEVEL <7.0%: ICD-10-PCS | Mod: CPTII,,, | Performed by: FAMILY MEDICINE

## 2020-04-02 PROCEDURE — 99213 PR OFFICE/OUTPT VISIT, EST, LEVL III, 20-29 MIN: ICD-10-PCS | Mod: 95,,, | Performed by: FAMILY MEDICINE

## 2020-04-02 PROCEDURE — 3044F HG A1C LEVEL LT 7.0%: CPT | Mod: CPTII,,, | Performed by: FAMILY MEDICINE

## 2020-04-02 PROCEDURE — 99213 OFFICE O/P EST LOW 20 MIN: CPT | Mod: 95,,, | Performed by: FAMILY MEDICINE

## 2020-04-02 RX ORDER — PEN NEEDLE, DIABETIC 32 GX 1/6"
NEEDLE, DISPOSABLE MISCELLANEOUS
COMMUNITY
Start: 2020-03-13 | End: 2020-11-06

## 2020-04-02 RX ORDER — DOXYCYCLINE 100 MG/1
100 CAPSULE ORAL EVERY 12 HOURS
Qty: 20 CAPSULE | Refills: 0 | Status: SHIPPED | OUTPATIENT
Start: 2020-04-02 | End: 2020-04-29 | Stop reason: SDUPTHER

## 2020-04-02 NOTE — ASSESSMENT & PLAN NOTE
Patient reports that the abscesses are open and draining at point.  She elects not to come to the office but was informed if any worsening or no improvement on antibiotics to come in incision and drainage.  Start doxycycline.  Patient allergic to sulfa drugs.    Bleach baths recommended.    Discussed condition course and signs and symptoms to expect.  Patient advised take anti-inflammatories and or Tylenol for pain or fever.  ER precautions.  Call MD or follow-up to clinic if not improving or worsening symptoms.

## 2020-04-02 NOTE — ASSESSMENT & PLAN NOTE
Continue Victoza but decreased by a few clicks to see if nausea improves.  May need to switch to alternative medication if no improvement.  Patient reports that she is not losing a lot of weight however she has not weighed herself recently.  MyChart vitals reporting was sent to the patient.    Monitor hemoglobin A1c.  Discussed diabetic diet and exercise protocol.  Continue medications.  Monitor for side effects.  Discussed checking blood glucose.  Discussed symptoms to monitor for and to notify me immediately if persistent or worsening.  Follow up with Ophthalmology/Optometry and Podiatry.

## 2020-04-02 NOTE — PATIENT INSTRUCTIONS
Follow up in about 6 months (around 10/2/2020), or if symptoms worsen or fail to improve, for Annual Wellness Exam.     If no improvement in symptoms or symptoms worsen, please be advised to call MD, follow-up at clinic and/or go to ER if becomes severe.    Dante Negro M.D.        We Offer TELEHEALTH & Same Day Appointments!   Book your Telehealth appointment with me through my nurse or   Clinic appointments on [employer]!    [address] [place] [address]    Office: [phone]   FAX: [phone]    Check out my Facebook Page and Follow Me at: [email or web address]    Check out my website at [employer] by clicking on: [email or web address]    To Schedule appointments online, go to [email or web address]: [email or web address]

## 2020-04-02 NOTE — PROGRESS NOTES
Primary Care Telemedicine Note    The patient location is:  Patient Home- [place]  The chief complaint leading to consultation is:  Diabetes follow-up  Total time spent with patient: 11 min    Visit type: Virtual visit with synchronous audio only and video  Each patient to whom he or she provides medical services by telemedicine is:  (1) informed of the relationship between the physician and patient and the respective role of any other health care provider with respect to management of the patient; and (2) notified that he or she may decline to receive medical services by telemedicine and may withdraw from such care at any time.    ===================================================================================  PLAN:      Problem List Items Addressed This Visit     Encounter for long-term (current) use of medications - Primary (Chronic)     Reviewed labs with the patient.  Labs will be due in six months around October 2020.         Relevant Orders    MyChart Patient Entered Blood Pressure    MyChart Patient Entered Pulse    MyChart Patient Entered Weight    Type 2 diabetes mellitus with hyperglycemia, without long-term current use of insulin (Chronic)     Continue Victoza but decreased by a few clicks to see if nausea improves.  May need to switch to alternative medication if no improvement.  Patient reports that she is not losing a lot of weight however she has not weighed herself recently.  [employer] vitals reporting was sent to the patient.    Monitor hemoglobin A1c.  Discussed diabetic diet and exercise protocol.  Continue medications.  Monitor for side effects.  Discussed checking blood glucose.  Discussed symptoms to monitor for and to notify me immediately if persistent or worsening.  Follow up with Ophthalmology/Optometry and Podiatry.           Relevant Medications    liraglutide 0.6 mg/0.1 mL, 18 mg/3 mL, subq PNIJ (VICTOZA 2-MARTIN) 0.6 mg/0.1 mL (18 mg/3 mL) PnIj    Staph infection     Patient reports  "that the abscesses are open and draining at point.  She elects not to come to the office but was informed if any worsening or no improvement on antibiotics to come in incision and drainage.  Start doxycycline.  Patient allergic to sulfa drugs.    Bleach baths recommended.    Discussed condition course and signs and symptoms to expect.  Patient advised take anti-inflammatories and or Tylenol for pain or fever.  ER precautions.  Call MD or follow-up to clinic if not improving or worsening symptoms.           Relevant Medications    doxycycline (VIBRAMYCIN) 100 MG Cap        Future Appointments     Date Provider Specialty Appt Notes    5/1/2020  Allergy shot/nd    10/2/2020 Dante Negro MD Family Medicine 6 mo f/u           Medication List with Changes/Refills   New Medications    DOXYCYCLINE (VIBRAMYCIN) 100 MG CAP    Take 1 capsule (100 mg total) by mouth every 12 (twelve) hours.   Current Medications    ALBUTEROL (PROVENTIL/VENTOLIN HFA) 90 MCG/ACTUATION INHALER    Inhale 2 puffs into the lungs every 4 (four) hours as needed for Wheezing.    AZELASTINE (ASTELIN) 137 MCG (0.1 %) NASAL SPRAY    1 spray (137 mcg total) by Nasal route 2 (two) times daily.    EPINEPHRINE (EPIPEN 2-MARTIN) 0.3 MG/0.3 ML ATIN    As directed.    ERGOCALCIFEROL (ERGOCALCIFEROL) 50,000 UNIT CAP    Take 1 capsule (50,000 Units total) by mouth every 7 days.    FLUTICASONE PROPIONATE (FLONASE) 50 MCG/ACTUATION NASAL SPRAY    1 spray (50 mcg total) by Each Nostril route 2 (two) times daily.    IBUPROFEN (ADVIL,MOTRIN) 800 MG TABLET    TK 1 T PO Q 8 H PRF PAIN    IRON-VIT C-VIT B12-FOLIC ACID (IRON-C PLUS) TABLET    Take 1 tablet by mouth once daily.    LEVOCETIRIZINE (XYZAL) 5 MG TABLET    Take 1 tablet (5 mg total) by mouth every evening.    MONTELUKAST (SINGULAIR) 10 MG TABLET    Take 1 tablet (10 mg total) by mouth every evening.    NOVOFINE PLUS 32 GAUGE X 1/6" NDLE        PANTOPRAZOLE (PROTONIX) 40 MG TABLET    Take 1 tablet (40 mg total) " by mouth once daily.   Changed and/or Refilled Medications    Modified Medication Previous Medication    LIRAGLUTIDE 0.6 MG/0.1 ML, 18 MG/3 ML, SUBQ PNIJ (VICTOZA 2-MARTIN) 0.6 MG/0.1 ML (18 MG/3 ML) PNIJ liraglutide 0.6 mg/0.1 mL, 18 mg/3 mL, subq PNIJ (VICTOZA 2-MARTIN) 0.6 mg/0.1 mL (18 mg/3 mL) PnIj       Inject 1.2 mg into the skin once daily.    Inject 0.6 mg into the skin once daily.   Discontinued Medications    AMOXICILLIN-CLAVULANATE 875-125MG (AUGMENTIN) 875-125 MG PER TABLET    Take 1 tablet by mouth every 12 (twelve) hours.    CELECOXIB (CELEBREX) 200 MG CAPSULE    Take 200 mg by mouth 2 (two) times daily as needed.    HYDROCODONE-ACETAMINOPHEN (NORCO) 5-325 MG PER TABLET    Take 1 tablet by mouth 3 (three) times daily as needed.    JUNEL FE 1/20, 28, 1 MG-20 MCG (21)/75 MG (7) PER TABLET    Take 1 tablet by mouth once daily.    ONDANSETRON (ZOFRAN) 8 MG TABLET    Take 1 tablet (8 mg total) by mouth every 8 (eight) hours as needed for Nausea.    PREDNISONE (DELTASONE) 20 MG TABLET    Take 1 tablet (20 mg total) by mouth 2 (two) times daily.       Dante Negro M.D.     ==========================================================================  Subjective:      Patient ID: Lucía Coreas is a 34 y.o. female.  has a past medical history of Allergy, Amenorrhea, Back pain, GERD (gastroesophageal reflux disease), Infertility associated with anovulation, Iron deficiency anemia, Knee pain, Metabolic syndrome, PCO (polycystic ovaries), Plantar fasciitis, Recurrent boils, Reflux esophagitis, Sinusitis, and Vaginitis.     Chief Complaint: Diabetes and Abscess      Problem List Items Addressed This Visit     Encounter for long-term (current) use of medications - Primary (Chronic)    Overview     07/12/2019 Patient is on CHRONIC long-term drug therapy for managed conditions. See medication list. Reports compliance.  No side effects reported.  Routine lab work is being monitored.  Patient does not need refills  today.   =========================================  09/20/2019Reviewed labs. Patient is compliant with meds. She needs refills. Gaining weight on Metformin.   =======================================================  DECEMBER 2019:  CHRONIC. Stable. Compliant with medications for managed conditions. See medication list. No SE reported.   Routine lab analysis is being monitored. Refills were addressed.  =======================================================  APRIL 2020:  CHRONIC. Stable. Compliant with medications for managed conditions. See medication list. No SE reported.   Routine lab analysis is being monitored. Refills were addressed.  Lab Results   Component Value Date    WBC 9.41 12/26/2019    HGB 12.2 12/26/2019    HCT 39.3 12/26/2019    MCV 75 (L) 12/26/2019     12/26/2019       Chemistry        Component Value Date/Time     12/26/2019 0959    K 3.4 (L) 12/26/2019 0959     12/26/2019 0959    CO2 24 12/26/2019 0959    BUN 8 12/26/2019 0959    CREATININE 0.7 12/26/2019 0959     12/26/2019 0959        Component Value Date/Time    CALCIUM 9.0 12/26/2019 0959    ALKPHOS 67 12/26/2019 0959    AST 15 12/26/2019 0959    ALT 18 12/26/2019 0959    BILITOT 0.2 12/26/2019 0959    ESTGFRAFRICA >60 12/26/2019 0959    EGFRNONAA >60 12/26/2019 0959        Lab Results   Component Value Date    TSH 1.053 12/26/2019    V3PBYQH 9.4 07/16/2019    T3FREE 2.9 07/16/2019     ======================================================         Current Assessment & Plan     Reviewed labs with the patient.  Labs will be due in six months around October 2020.         Type 2 diabetes mellitus with hyperglycemia, without long-term current use of insulin (Chronic)    Overview     Lab Results   Component Value Date    HGBA1C 6.0 (H) 07/16/2019   No results found in epic however review her blood work from previous provider shows last A1c was 5.8.  Patient previously on metformin.  Currently having fatigue and decreased  libido.  ========================  Patient restarted on metformin.  No issues no complications.  ======================  09/20/2019Patient due for hemoglobin A1c next month.  Will place order for patient.She is having GI symptoms with metformin. She reports BG is elevated at home. She continues to gain weight.   =======================================================  DECEMBER 2019:  Patient reports to having diarrhea with metformin.  Reviewed Diabetes Management Status  Statin: Not takingACE/ARB: Not taking  =======================================================  APRIL 2020:  Patient has started Victoza and is off of metformin.  Patient reports that she is getting slightly nauseated on the increased dose of 1.2 mg.  Symptoms resolved after taking Pepto-Bismol after few hours of injection.    Reviewed Diabetes Management Status  Statin: Not takingACE/ARB: Not taking  Screening or Prevention Patient's value Goal Complete/Controlled?   HgA1C Testing and Control   Lab Results   Component Value Date    HGBA1C 5.5 03/27/2020      Annually/Less than 8% Yes   Lipid profile : 07/16/2019 Annually Yes   LDL control Lab Results   Component Value Date    LDLCALC 87.0 07/16/2019    Annually/Less than 100 mg/dl  Yes   Nephropathy screening Lab Results   Component Value Date    LABMICR 4.0 09/20/2019     Lab Results   Component Value Date    PROTEINUA Negative 01/22/2009    Annually Yes   Blood pressure BP Readings from Last 1 Encounters:   02/17/20 112/78    Less than 140/90 Yes   Dilated retinal exam Most Recent Eye Exam Date: Not Found Annually No   Foot exam   Most Recent Foot Exam Date: Not Found Annually No       ======================================================         Current Assessment & Plan     Continue Victoza but decreased by a few clicks to see if nausea improves.  May need to switch to alternative medication if no improvement.  Patient reports that she is not losing a lot of weight however she has not weighed  herself recently.  [employer] vitals reporting was sent to the patient.    Monitor hemoglobin A1c.  Discussed diabetic diet and exercise protocol.  Continue medications.  Monitor for side effects.  Discussed checking blood glucose.  Discussed symptoms to monitor for and to notify me immediately if persistent or worsening.  Follow up with Ophthalmology/Optometry and Podiatry.           Staph infection    Overview     New problem.  Recurrent.  Acute.  Patient with self-reported boils on her buttocks and groin area.  Patient reports that the one on her buttock has opened and is draining patient has been applying Bactroban ointment without relief.  She denies fever chills.          Current Assessment & Plan     Patient reports that the abscesses are open and draining at point.  She elects not to come to the office but was informed if any worsening or no improvement on antibiotics to come in incision and drainage.  Start doxycycline.  Patient allergic to sulfa drugs.    Bleach baths recommended.    Discussed condition course and signs and symptoms to expect.  Patient advised take anti-inflammatories and or Tylenol for pain or fever.  ER precautions.  Call MD or follow-up to clinic if not improving or worsening symptoms.                  Past Medical History:  Past Medical History:   Diagnosis Date    Allergy     Amenorrhea     Back pain     GERD (gastroesophageal reflux disease)     Infertility associated with anovulation     Iron deficiency anemia     Knee pain     Metabolic syndrome     PCO (polycystic ovaries)     Plantar fasciitis     Recurrent boils     Reflux esophagitis     Sinusitis     Vaginitis      Past Surgical History:   Procedure Laterality Date    CARPAL TUNNEL RELEASE Left 2019     SECTION       Review of patient's allergies indicates:   Allergen Reactions    Metformin Diarrhea    Sulfa (sulfonamide antibiotics) Anaphylaxis and Swelling     Swelling (eyes)^, Swelling  "(throat)^  Swelling (eyes)^, Swelling (throat)^      Diclofenac      Gastritis     Shellfish containing products      anaphylaxis     Medication List with Changes/Refills   New Medications    DOXYCYCLINE (VIBRAMYCIN) 100 MG CAP    Take 1 capsule (100 mg total) by mouth every 12 (twelve) hours.   Current Medications    ALBUTEROL (PROVENTIL/VENTOLIN HFA) 90 MCG/ACTUATION INHALER    Inhale 2 puffs into the lungs every 4 (four) hours as needed for Wheezing.    AZELASTINE (ASTELIN) 137 MCG (0.1 %) NASAL SPRAY    1 spray (137 mcg total) by Nasal route 2 (two) times daily.    EPINEPHRINE (EPIPEN 2-MARTIN) 0.3 MG/0.3 ML ATIN    As directed.    ERGOCALCIFEROL (ERGOCALCIFEROL) 50,000 UNIT CAP    Take 1 capsule (50,000 Units total) by mouth every 7 days.    FLUTICASONE PROPIONATE (FLONASE) 50 MCG/ACTUATION NASAL SPRAY    1 spray (50 mcg total) by Each Nostril route 2 (two) times daily.    IBUPROFEN (ADVIL,MOTRIN) 800 MG TABLET    TK 1 T PO Q 8 H PRF PAIN    IRON-VIT C-VIT B12-FOLIC ACID (IRON-C PLUS) TABLET    Take 1 tablet by mouth once daily.    LEVOCETIRIZINE (XYZAL) 5 MG TABLET    Take 1 tablet (5 mg total) by mouth every evening.    MONTELUKAST (SINGULAIR) 10 MG TABLET    Take 1 tablet (10 mg total) by mouth every evening.    NOVOFINE PLUS 32 GAUGE X 1/6" NDLE        PANTOPRAZOLE (PROTONIX) 40 MG TABLET    Take 1 tablet (40 mg total) by mouth once daily.   Changed and/or Refilled Medications    Modified Medication Previous Medication    LIRAGLUTIDE 0.6 MG/0.1 ML, 18 MG/3 ML, SUBQ PNIJ (VICTOZA 2-MARTIN) 0.6 MG/0.1 ML (18 MG/3 ML) PNIJ liraglutide 0.6 mg/0.1 mL, 18 mg/3 mL, subq PNIJ (VICTOZA 2-MARTIN) 0.6 mg/0.1 mL (18 mg/3 mL) PnIj       Inject 1.2 mg into the skin once daily.    Inject 0.6 mg into the skin once daily.   Discontinued Medications    AMOXICILLIN-CLAVULANATE 875-125MG (AUGMENTIN) 875-125 MG PER TABLET    Take 1 tablet by mouth every 12 (twelve) hours.    CELECOXIB (CELEBREX) 200 MG CAPSULE    Take 200 mg by mouth " 2 (two) times daily as needed.    HYDROCODONE-ACETAMINOPHEN (NORCO) 5-325 MG PER TABLET    Take 1 tablet by mouth 3 (three) times daily as needed.    JUNEL FE 1/20, 28, 1 MG-20 MCG (21)/75 MG (7) PER TABLET    Take 1 tablet by mouth once daily.    ONDANSETRON (ZOFRAN) 8 MG TABLET    Take 1 tablet (8 mg total) by mouth every 8 (eight) hours as needed for Nausea.    PREDNISONE (DELTASONE) 20 MG TABLET    Take 1 tablet (20 mg total) by mouth 2 (two) times daily.      Social History     Tobacco Use    Smoking status: Never Smoker    Smokeless tobacco: Never Used   Substance Use Topics    Alcohol use: Never     Frequency: Never      Family History   Problem Relation Age of Onset    Diabetes Mother     Lupus Mother     Diabetes Father     Heart disease Father     Hypertension Father     Cancer Father         prostate    Cancer Sister         ovarian       I have reviewed the complete PMH, social history, surgical history, allergies and medications.  As well as family history.    Review of Systems   Constitutional: Positive for unexpected weight change. Negative for activity change.   HENT: Negative for hearing loss, rhinorrhea and trouble swallowing.    Eyes: Negative for discharge and visual disturbance.   Respiratory: Negative for chest tightness and wheezing.    Cardiovascular: Negative for chest pain and palpitations.   Gastrointestinal: Negative for blood in stool, constipation, diarrhea and vomiting.   Endocrine: Negative for polydipsia and polyuria.   Genitourinary: Negative for difficulty urinating, dysuria, hematuria and menstrual problem.   Musculoskeletal: Negative for arthralgias, joint swelling and neck pain.   Neurological: Negative for weakness and headaches.   Psychiatric/Behavioral: Negative for confusion and dysphoric mood.      See HPI otherwise negative  Objective:   There were no vitals taken for this visit.  The patient logged into [employer] and entered the blood pressure below into the luanne  to transmit to the chart for documentation purposes.  No flowsheet data found.   Physical Exam   Constitutional:  Patient is oriented to person, place, and time.  Patient appears well-developed and well-nourished. No distress.   HENT: Normocephalic and atraumatic.   Eyes: Pupils are equal, round, and reactive to light. EOM are normal.   Oropharynx clear and moist.  No pharyngeal exudates by visualization  No cervical adenopathy palpated on patient self-exam  No tenderness to palpation of the frontal sinuses on patient self-exam  No tenderness to palpation of the maxillary sinuses on patient self-exam  Neck: Normal range of motion.   Pulmonary/Chest: Effort normal.   Musculoskeletal: Normal range of motion.   Neurological:  Patient is alert and oriented to person, place, and time.   Skin:  Self-reported abscess to buttocks and groin area.  Patient deferred visualization through video.  Self reports tender to touch and draining.    Psychiatric:  Patient has a normal mood and affect.  Patient behavior is normal. Judgment and thought content normal.  Patient mood appears not anxious.  Patient affect is not angry, not blunt, not labile and not inappropriate.  The patients speech is not rapid and/or pressured, not delayed, not tangential and not slurred.  The patient is not agitated, not aggressive, not hyperactive, not slowed, not withdrawn, not actively hallucinating and not combative. Thought content is not paranoid and not delusional. Cognition and memory are normal. Cognition and memory are not impaired.  The patient does not express impulsivity or inappropriate judgment.  The patient does not exhibit a depressed mood.  The patient expresses no homicidal and no suicidal ideation.  The patient expresses no suicidal plans and no homicidal plans.  The patient is communicative.  The patient is attentive.     Assessment:     1. Encounter for long-term (current) use of medications    2. Type 2 diabetes mellitus with  hyperglycemia, without long-term current use of insulin    3. Staph infection      MDM:   Patient was seen and evaluated via virtual visit due to COVID-19 outbreak.  Patient does not meet criteria for testing and is not having any symptoms of COVID-19.  Patient was seen for chronic conditions and acute problem of staph infection.  I have performed thorough medication reconciliation today and discussed risk and benefits of each medication.  I have reviewed labs and summarized old records.  I have signed for the following orders AND/OR meds.  Orders Placed This Encounter   Procedures    Rejit Patient Entered Blood Pressure     Order Specific Question:   After how many days would you like to receive a notification of this patient's flowsheet entries?     Answer:   30    MyChart Patient Entered Pulse     Order Specific Question:   After how many days would you like to receive a notification of this patient's flowsheet entries?     Answer:   30    MyChart Patient Entered Weight     Please track your weight day so that I can review the daily control that you have.  This will be uploaded to your EPIC chart at our office so that we can care for you better.  Dr. Chip Vick     Order Specific Question:   After how many days would you like to receive a notification of this patient's flowsheet entries?     Answer:   30     Medications Ordered This Encounter   Medications    doxycycline (VIBRAMYCIN) 100 MG Cap     Sig: Take 1 capsule (100 mg total) by mouth every 12 (twelve) hours.     Dispense:  20 capsule     Refill:  0    liraglutide 0.6 mg/0.1 mL, 18 mg/3 mL, subq PNIJ (VICTOZA 2-MARTIN) 0.6 mg/0.1 mL (18 mg/3 mL) PnIj     Sig: Inject 1.2 mg into the skin once daily.     Dispense:  6 mL     Refill:  11        Follow up in about 6 months (around 10/2/2020), or if symptoms worsen or fail to improve, for Annual Wellness Exam.    If no improvement in symptoms or symptoms worsen, advised to call/follow-up at clinic or go to  ER. Patient voiced understanding and all questions/concerns were addressed.   DISCLAIMER: This note was compiled by using a speech recognition dictation system and therefore please be aware that typographical / speech recognition errors can and do occur.  Please contact me if you see any errors specifically.    Dante Negro M.D.       Office: [phone] [address] [place] [address]  FAX: [phone]

## 2020-04-03 DIAGNOSIS — E55.9 VITAMIN D DEFICIENCY: Chronic | ICD-10-CM

## 2020-04-03 RX ORDER — ERGOCALCIFEROL 1.25 MG/1
50000 CAPSULE ORAL
Qty: 12 CAPSULE | Refills: 1 | Status: SHIPPED | OUTPATIENT
Start: 2020-04-03 | End: 2020-07-25 | Stop reason: SDUPTHER

## 2020-04-27 ENCOUNTER — PATIENT MESSAGE (OUTPATIENT)
Dept: FAMILY MEDICINE | Facility: CLINIC | Age: 35
End: 2020-04-27

## 2020-04-29 ENCOUNTER — OFFICE VISIT (OUTPATIENT)
Dept: FAMILY MEDICINE | Facility: CLINIC | Age: 35
End: 2020-04-29
Payer: COMMERCIAL

## 2020-04-29 VITALS — BODY MASS INDEX: 51.79 KG/M2 | WEIGHT: 293 LBS

## 2020-04-29 DIAGNOSIS — L02.92 BOIL: Primary | ICD-10-CM

## 2020-04-29 DIAGNOSIS — B95.8 STAPH INFECTION: ICD-10-CM

## 2020-04-29 DIAGNOSIS — F41.9 ANXIETY: ICD-10-CM

## 2020-04-29 PROCEDURE — 99213 OFFICE O/P EST LOW 20 MIN: CPT | Mod: 95,,, | Performed by: FAMILY MEDICINE

## 2020-04-29 PROCEDURE — 99213 PR OFFICE/OUTPT VISIT, EST, LEVL III, 20-29 MIN: ICD-10-PCS | Mod: 95,,, | Performed by: FAMILY MEDICINE

## 2020-04-29 PROCEDURE — 3008F PR BODY MASS INDEX (BMI) DOCUMENTED: ICD-10-PCS | Mod: CPTII,,, | Performed by: FAMILY MEDICINE

## 2020-04-29 PROCEDURE — 3008F BODY MASS INDEX DOCD: CPT | Mod: CPTII,,, | Performed by: FAMILY MEDICINE

## 2020-04-29 RX ORDER — HYDROXYZINE HYDROCHLORIDE 25 MG/1
25 TABLET, FILM COATED ORAL 3 TIMES DAILY PRN
Qty: 30 TABLET | Refills: 0 | Status: SHIPPED | OUTPATIENT
Start: 2020-04-29 | End: 2020-05-28 | Stop reason: SDUPTHER

## 2020-04-29 RX ORDER — DOXYCYCLINE 100 MG/1
100 CAPSULE ORAL EVERY 12 HOURS
Qty: 28 CAPSULE | Refills: 0 | Status: SHIPPED | OUTPATIENT
Start: 2020-04-29 | End: 2020-05-22 | Stop reason: SDUPTHER

## 2020-04-29 RX ORDER — MUPIROCIN 20 MG/G
OINTMENT TOPICAL 3 TIMES DAILY
Qty: 1 TUBE | Refills: 1 | Status: ON HOLD | OUTPATIENT
Start: 2020-04-29 | End: 2020-12-29 | Stop reason: SDUPTHER

## 2020-04-29 NOTE — PATIENT INSTRUCTIONS
Follow up in about 4 weeks (around 5/27/2020), or if symptoms worsen or fail to improve, for Anxiety.     If no improvement in symptoms or symptoms worsen, please be advised to call MD, follow-up at clinic and/or go to ER if becomes severe.    Dante Negro M.D.        We Offer TELEHEALTH & Same Day Appointments!   Book your Telehealth appointment with me through my nurse or   Clinic appointments on [employer]!    [address] [place] [address]    Office: [phone]   FAX: [phone]    Check out my Facebook Page and Follow Me at: [email or web address]    Check out my website at [employer] by clicking on: [email or web address]    To Schedule appointments online, go to [employer]: [email or web address]

## 2020-04-30 ENCOUNTER — TELEPHONE (OUTPATIENT)
Dept: ALLERGY | Facility: CLINIC | Age: 35
End: 2020-04-30

## 2020-04-30 NOTE — ASSESSMENT & PLAN NOTE
Patient did improve previously with doxycycline but areas have return.    Previous plan:  Patient reports that the abscesses are open and draining at point.  She elects not to come to the office but was informed if any worsening or no improvement on antibiotics to come in incision and drainage.  Start doxycycline.  Patient allergic to sulfa drugs.    Bleach baths recommended.    Discussed condition course and signs and symptoms to expect.  Patient advised take anti-inflammatories and or Tylenol for pain or fever.  ER precautions.  Call MD or follow-up to clinic if not improving or worsening symptoms.

## 2020-04-30 NOTE — TELEPHONE ENCOUNTER
----- Message from Nessa Hedrick sent at 4/30/2020  9:26 AM CDT -----  Contact: self/[phone]  Pt will like to reschedule appointment for a later time on 5/1/2020. Please give a call back at [phone]. Thanks/ar

## 2020-04-30 NOTE — ASSESSMENT & PLAN NOTE
Start with hydroxyzine as needed for sleep and anxiety with panic attacks.  Patient does not want daily medication at this time.Discussed anxiety condition course.  Discussed SSRI as first-line treatment for this condition.  Discussed risk of discontinuing this medication without tapering.  Patient was educated, advised of side effects, and all questions were answered.  Patient voiced understanding.  Patient will follow up routinely and notify us if having any side effects or worsening or persistent symptoms.  ER precautions were given.     [Follow-Up - Clinic] : a clinic follow-up of [Abnormal ECG] : an abnormal ECG [Atrial Fibrillation] : atrial fibrillation [Hypertension] : hypertension

## 2020-04-30 NOTE — ASSESSMENT & PLAN NOTE
Start antibiotic as soon as possible.  Bactroban ointment.  Patient advised that it these lesions are not improving or opening on their own she will need to come in for incision and drainage.  Patient deferred office visit due to COVID-19 concerns.Discussed condition course and signs and symptoms to expect.  Patient advised take anti-inflammatories and or Tylenol for pain or fever.  ER precautions.  Call MD or follow-up to clinic if not improving or worsening symptoms.  Continue bleach baths

## 2020-04-30 NOTE — PROGRESS NOTES
Primary Care Telemedicine Note    The patient location is:  Patient Home - [place]  The chief complaint leading to consultation is:   Chief Complaint   Patient presents with    Recurrent Skin Infections    Anxiety      Total time spent with patient:  11 min    Visit type: Virtual visit with synchronous audio only and video  Each patient to whom he or she provides medical services by telemedicine is:  (1) informed of the relationship between the physician and patient and the respective role of any other health care provider with respect to management of the patient; and (2) notified that he or she may decline to receive medical services by telemedicine and may withdraw from such care at any time.  =================================================================  PLAN:      Problem List Items Addressed This Visit     Staph infection     Patient did improve previously with doxycycline but areas have return.    Previous plan:  Patient reports that the abscesses are open and draining at point.  She elects not to come to the office but was informed if any worsening or no improvement on antibiotics to come in incision and drainage.  Start doxycycline.  Patient allergic to sulfa drugs.    Bleach baths recommended.    Discussed condition course and signs and symptoms to expect.  Patient advised take anti-inflammatories and or Tylenol for pain or fever.  ER precautions.  Call MD or follow-up to clinic if not improving or worsening symptoms.           Relevant Medications    doxycycline (VIBRAMYCIN) 100 MG Cap    mupirocin (BACTROBAN) 2 % ointment    Boil - Primary     Start antibiotic as soon as possible.  Bactroban ointment.  Patient advised that it these lesions are not improving or opening on their own she will need to come in for incision and drainage.  Patient deferred office visit due to COVID-19 concerns.Discussed condition course and signs and symptoms to expect.  Patient advised take anti-inflammatories and or  Tylenol for pain or fever.  ER precautions.  Call MD or follow-up to clinic if not improving or worsening symptoms.  Continue bleach baths         Relevant Medications    mupirocin (BACTROBAN) 2 % ointment    Anxiety     Start with hydroxyzine as needed for sleep and anxiety with panic attacks.  Patient does not want daily medication at this time.Discussed anxiety condition course.  Discussed SSRI as first-line treatment for this condition.  Discussed risk of discontinuing this medication without tapering.  Patient was educated, advised of side effects, and all questions were answered.  Patient voiced understanding.  Patient will follow up routinely and notify us if having any side effects or worsening or persistent symptoms.  ER precautions were given.           Relevant Medications    hydroxyzine HCL (ATARAX) 25 MG tablet        Future Appointments     Date Provider Specialty Appt Notes    5/1/2020  Allergy shot/nd    5/28/2020 Dante Negro MD Family Medicine 4 week f/u Anxiety    10/2/2020 Dante Negro MD Family Medicine 6 mo f/u           Medication List with Changes/Refills   New Medications    HYDROXYZINE HCL (ATARAX) 25 MG TABLET    Take 1 tablet (25 mg total) by mouth 3 (three) times daily as needed for Anxiety.    MUPIROCIN (BACTROBAN) 2 % OINTMENT    Apply topically 3 (three) times daily.   Current Medications    ALBUTEROL (PROVENTIL/VENTOLIN HFA) 90 MCG/ACTUATION INHALER    Inhale 2 puffs into the lungs every 4 (four) hours as needed for Wheezing.    AZELASTINE (ASTELIN) 137 MCG (0.1 %) NASAL SPRAY    1 spray (137 mcg total) by Nasal route 2 (two) times daily.    EPINEPHRINE (EPIPEN 2-MARTIN) 0.3 MG/0.3 ML ATIN    As directed.    ERGOCALCIFEROL (ERGOCALCIFEROL) 50,000 UNIT CAP    Take 1 capsule (50,000 Units total) by mouth every 7 days.    FLUTICASONE PROPIONATE (FLONASE) 50 MCG/ACTUATION NASAL SPRAY    1 spray (50 mcg total) by Each Nostril route 2 (two) times daily.    IBUPROFEN (ADVIL,MOTRIN)  "800 MG TABLET    TK 1 T PO Q 8 H PRF PAIN    IRON-VIT C-VIT B12-FOLIC ACID (IRON-C PLUS) TABLET    Take 1 tablet by mouth once daily.    LEVOCETIRIZINE (XYZAL) 5 MG TABLET    Take 1 tablet (5 mg total) by mouth every evening.    LIRAGLUTIDE 0.6 MG/0.1 ML, 18 MG/3 ML, SUBQ PNIJ (VICTOZA 2-MARTIN) 0.6 MG/0.1 ML (18 MG/3 ML) PNIJ    Inject 1.2 mg into the skin once daily.    MONTELUKAST (SINGULAIR) 10 MG TABLET    Take 1 tablet (10 mg total) by mouth every evening.    NOVOFINE PLUS 32 GAUGE X 1/6" NDLE        PANTOPRAZOLE (PROTONIX) 40 MG TABLET    Take 1 tablet (40 mg total) by mouth once daily.   Changed and/or Refilled Medications    Modified Medication Previous Medication    DOXYCYCLINE (VIBRAMYCIN) 100 MG CAP doxycycline (VIBRAMYCIN) 100 MG Cap       Take 1 capsule (100 mg total) by mouth every 12 (twelve) hours.    Take 1 capsule (100 mg total) by mouth every 12 (twelve) hours.       Dante Negro M.D.     ==========================================================================  Subjective:      Patient ID: Lucía Coreas is a 34 y.o. female.  has a past medical history of Allergy, Amenorrhea, Back pain, GERD (gastroesophageal reflux disease), Infertility associated with anovulation, Iron deficiency anemia, Knee pain, Metabolic syndrome, PCO (polycystic ovaries), Plantar fasciitis, Recurrent boils, Reflux esophagitis, Sinusitis, and Vaginitis.     Chief Complaint: Recurrent Skin Infections and Anxiety      Problem List Items Addressed This Visit     Staph infection    Overview     Initial HPI:  New problem.  Recurrent.  Acute.  Patient with self-reported boils on her buttocks and groin area.  Patient reports that the one on her buttock has opened and is draining patient has been applying Bactroban ointment without relief.  She denies fever chills.   =======================================================  APRIL 2020:  Boils are recurring.  They did improve with treatment of doxycycline initially.  But now " she is having more them recur.  ======================================================         Current Assessment & Plan     Patient did improve previously with doxycycline but areas have return.    Previous plan:  Patient reports that the abscesses are open and draining at point.  She elects not to come to the office but was informed if any worsening or no improvement on antibiotics to come in incision and drainage.  Start doxycycline.  Patient allergic to sulfa drugs.    Bleach baths recommended.    Discussed condition course and signs and symptoms to expect.  Patient advised take anti-inflammatories and or Tylenol for pain or fever.  ER precautions.  Call MD or follow-up to clinic if not improving or worsening symptoms.           Boil - Primary    Overview     Recurrent.  Patient is having boils despite bleach baths.  Multiple area is in the groin.  None them are draining or open.  Patient reports pain is mild-to-moderate.         Current Assessment & Plan     Start antibiotic as soon as possible.  Bactroban ointment.  Patient advised that it these lesions are not improving or opening on their own she will need to come in for incision and drainage.  Patient deferred office visit due to COVID-19 concerns.Discussed condition course and signs and symptoms to expect.  Patient advised take anti-inflammatories and or Tylenol for pain or fever.  ER precautions.  Call MD or follow-up to clinic if not improving or worsening symptoms.  Continue bleach baths         Anxiety    Overview     New problem.  Subacute.  Has been getting worse over the last few weeks during COVID-19 outbreak.  Patient reports that she is biting her nails and unable to sleep at night.  Patient has not been on any treatment or medications for this recently.  Denies any SI HI or hallucinations.         Current Assessment & Plan     Start with hydroxyzine as needed for sleep and anxiety with panic attacks.  Patient does not want daily medication at  this time.Discussed anxiety condition course.  Discussed SSRI as first-line treatment for this condition.  Discussed risk of discontinuing this medication without tapering.  Patient was educated, advised of side effects, and all questions were answered.  Patient voiced understanding.  Patient will follow up routinely and notify us if having any side effects or worsening or persistent symptoms.  ER precautions were given.                  Past Medical History:  Past Medical History:   Diagnosis Date    Allergy     Amenorrhea     Back pain     GERD (gastroesophageal reflux disease)     Infertility associated with anovulation     Iron deficiency anemia     Knee pain     Metabolic syndrome     PCO (polycystic ovaries)     Plantar fasciitis     Recurrent boils     Reflux esophagitis     Sinusitis     Vaginitis      Past Surgical History:   Procedure Laterality Date    CARPAL TUNNEL RELEASE Left 2019     SECTION       Review of patient's allergies indicates:   Allergen Reactions    Metformin Diarrhea    Sulfa (sulfonamide antibiotics) Anaphylaxis and Swelling     Swelling (eyes)^, Swelling (throat)^  Swelling (eyes)^, Swelling (throat)^      Diclofenac      Gastritis     Shellfish containing products      anaphylaxis     Medication List with Changes/Refills   New Medications    HYDROXYZINE HCL (ATARAX) 25 MG TABLET    Take 1 tablet (25 mg total) by mouth 3 (three) times daily as needed for Anxiety.    MUPIROCIN (BACTROBAN) 2 % OINTMENT    Apply topically 3 (three) times daily.   Current Medications    ALBUTEROL (PROVENTIL/VENTOLIN HFA) 90 MCG/ACTUATION INHALER    Inhale 2 puffs into the lungs every 4 (four) hours as needed for Wheezing.    AZELASTINE (ASTELIN) 137 MCG (0.1 %) NASAL SPRAY    1 spray (137 mcg total) by Nasal route 2 (two) times daily.    EPINEPHRINE (EPIPEN 2-MARTIN) 0.3 MG/0.3 ML ATIN    As directed.    ERGOCALCIFEROL (ERGOCALCIFEROL) 50,000 UNIT CAP    Take 1 capsule (50,000  "Units total) by mouth every 7 days.    FLUTICASONE PROPIONATE (FLONASE) 50 MCG/ACTUATION NASAL SPRAY    1 spray (50 mcg total) by Each Nostril route 2 (two) times daily.    IBUPROFEN (ADVIL,MOTRIN) 800 MG TABLET    TK 1 T PO Q 8 H PRF PAIN    IRON-VIT C-VIT B12-FOLIC ACID (IRON-C PLUS) TABLET    Take 1 tablet by mouth once daily.    LEVOCETIRIZINE (XYZAL) 5 MG TABLET    Take 1 tablet (5 mg total) by mouth every evening.    LIRAGLUTIDE 0.6 MG/0.1 ML, 18 MG/3 ML, SUBQ PNIJ (VICTOZA 2-MARTIN) 0.6 MG/0.1 ML (18 MG/3 ML) PNIJ    Inject 1.2 mg into the skin once daily.    MONTELUKAST (SINGULAIR) 10 MG TABLET    Take 1 tablet (10 mg total) by mouth every evening.    NOVOFINE PLUS 32 GAUGE X 1/6" NDLE        PANTOPRAZOLE (PROTONIX) 40 MG TABLET    Take 1 tablet (40 mg total) by mouth once daily.   Changed and/or Refilled Medications    Modified Medication Previous Medication    DOXYCYCLINE (VIBRAMYCIN) 100 MG CAP doxycycline (VIBRAMYCIN) 100 MG Cap       Take 1 capsule (100 mg total) by mouth every 12 (twelve) hours.    Take 1 capsule (100 mg total) by mouth every 12 (twelve) hours.      Social History     Tobacco Use    Smoking status: Never Smoker    Smokeless tobacco: Never Used   Substance Use Topics    Alcohol use: Never     Frequency: Never      Family History   Problem Relation Age of Onset    Diabetes Mother     Lupus Mother     Diabetes Father     Heart disease Father     Hypertension Father     Cancer Father         prostate    Cancer Sister         ovarian       I have reviewed the complete PMH, social history, surgical history, allergies and medications.  As well as family history.    Review of Systems see HPI otherwise negative  Objective:   Wt (!) 143.3 kg (316 lb)   BMI 51.79 kg/m²   Physical Exam   Constitutional: She appears well-developed and well-nourished. She is cooperative.  Non-toxic appearance. She does not have a sickly appearance. She does not appear ill. No distress.   HENT:   Head: " Normocephalic and atraumatic.   Right Ear: Hearing and external ear normal.   Left Ear: Hearing and external ear normal.   Eyes: Conjunctivae, EOM and lids are normal.   Neck: Normal range of motion.   Pulmonary/Chest: Effort normal. No respiratory distress.   Musculoskeletal: Normal range of motion.   Neurological: She is alert. She is not disoriented.   Skin: She is not diaphoretic. No pallor.   Psychiatric: She has a normal mood and affect. Her behavior is normal. Judgment and thought content normal. Her mood appears not anxious. Her speech is not rapid and/or pressured and not slurred. She is not agitated, not aggressive and not hyperactive. Thought content is not paranoid and not delusional. Cognition and memory are normal. Cognition and memory are not impaired. She does not exhibit a depressed mood. She expresses no homicidal and no suicidal ideation. She expresses no suicidal plans and no homicidal plans. She is attentive.       Assessment:     1. Boil    2. Staph infection    3. Anxiety      MDM:   Moderate complexity.  Moderate risk.  Patient was evaluated by telemedicine during COVID-19 outbreak.  Patient not having symptoms of COVID-19.  I have Reviewed and summarized old records.  I have performed thorough medication reconciliation today and discussed risk and benefits of each medication.  I have signed for the following orders AND/OR meds.  No orders of the defined types were placed in this encounter.    Medications Ordered This Encounter   Medications    doxycycline (VIBRAMYCIN) 100 MG Cap     Sig: Take 1 capsule (100 mg total) by mouth every 12 (twelve) hours.     Dispense:  28 capsule     Refill:  0    hydroxyzine HCL (ATARAX) 25 MG tablet     Sig: Take 1 tablet (25 mg total) by mouth 3 (three) times daily as needed for Anxiety.     Dispense:  30 tablet     Refill:  0    mupirocin (BACTROBAN) 2 % ointment     Sig: Apply topically 3 (three) times daily.     Dispense:  1 Tube     Refill:  1         Follow up in about 4 weeks (around 5/27/2020), or if symptoms worsen or fail to improve, for Anxiety virtual visit.    If no improvement in symptoms or symptoms worsen, advised to call/follow-up at clinic or go to ER. Patient voiced understanding and all questions/concerns were addressed.     DISCLAIMER: This note was compiled by using a speech recognition dictation system and therefore please be aware that typographical / speech recognition errors can and do occur.  Please contact me if you see any errors specifically.    Dante Negro M.D.       Office: [phone] [address] [place] [address]  FAX: [phone]

## 2020-05-04 ENCOUNTER — CLINICAL SUPPORT (OUTPATIENT)
Dept: ALLERGY | Facility: CLINIC | Age: 35
End: 2020-05-04
Payer: COMMERCIAL

## 2020-05-04 DIAGNOSIS — J30.89 ALLERGIC RHINITIS DUE TO DERMATOPHAGOIDES PTERONYSSINUS: ICD-10-CM

## 2020-05-04 DIAGNOSIS — J30.1 SEASONAL ALLERGIC RHINITIS DUE TO POLLEN: Chronic | ICD-10-CM

## 2020-05-04 DIAGNOSIS — J30.89 ALLERGIC RHINITIS DUE TO DERMATOPHAGOIDES FARINAE: ICD-10-CM

## 2020-05-04 PROCEDURE — 95117 PR IMMU2THERAPY, 2+ INJECTIONS: ICD-10-PCS | Mod: S$GLB,,, | Performed by: ALLERGY & IMMUNOLOGY

## 2020-05-04 PROCEDURE — 95117 IMMUNOTHERAPY INJECTIONS: CPT | Mod: S$GLB,,, | Performed by: ALLERGY & IMMUNOLOGY

## 2020-05-04 PROCEDURE — 99999 PR PBB SHADOW E&M-EST. PATIENT-LVL II: CPT | Mod: PBBFAC,,,

## 2020-05-04 PROCEDURE — 99999 PR PBB SHADOW E&M-EST. PATIENT-LVL II: ICD-10-PCS | Mod: PBBFAC,,,

## 2020-05-08 ENCOUNTER — CLINICAL SUPPORT (OUTPATIENT)
Dept: ALLERGY | Facility: CLINIC | Age: 35
End: 2020-05-08
Payer: COMMERCIAL

## 2020-05-08 DIAGNOSIS — J30.89 ALLERGIC RHINITIS DUE TO DERMATOPHAGOIDES PTERONYSSINUS: ICD-10-CM

## 2020-05-08 DIAGNOSIS — J30.89 ALLERGIC RHINITIS DUE TO DERMATOPHAGOIDES FARINAE: ICD-10-CM

## 2020-05-08 DIAGNOSIS — J30.1 CHRONIC ALLERGIC RHINITIS DUE TO POLLEN: ICD-10-CM

## 2020-05-08 PROCEDURE — 99999 PR PBB SHADOW E&M-EST. PATIENT-LVL II: CPT | Mod: PBBFAC,,,

## 2020-05-08 PROCEDURE — 99999 PR PBB SHADOW E&M-EST. PATIENT-LVL II: ICD-10-PCS | Mod: PBBFAC,,,

## 2020-05-08 PROCEDURE — 95117 IMMUNOTHERAPY INJECTIONS: CPT | Mod: S$GLB,,, | Performed by: ALLERGY & IMMUNOLOGY

## 2020-05-08 PROCEDURE — 95117 PR IMMU2THERAPY, 2+ INJECTIONS: ICD-10-PCS | Mod: S$GLB,,, | Performed by: ALLERGY & IMMUNOLOGY

## 2020-05-11 ENCOUNTER — CLINICAL SUPPORT (OUTPATIENT)
Dept: ALLERGY | Facility: CLINIC | Age: 35
End: 2020-05-11
Payer: COMMERCIAL

## 2020-05-11 DIAGNOSIS — J45.40 MODERATE PERSISTENT ASTHMA WITHOUT COMPLICATION: ICD-10-CM

## 2020-05-11 DIAGNOSIS — J30.1 SEASONAL ALLERGIC RHINITIS DUE TO POLLEN: Chronic | ICD-10-CM

## 2020-05-11 PROCEDURE — 95165 PR PROFES SVC,IMMUNOTHER,SINGLE/MULT AGS: ICD-10-PCS | Mod: S$GLB,,, | Performed by: ALLERGY & IMMUNOLOGY

## 2020-05-11 PROCEDURE — 95117 PR IMMU2THERAPY, 2+ INJECTIONS: ICD-10-PCS | Mod: S$GLB,,, | Performed by: ALLERGY & IMMUNOLOGY

## 2020-05-11 PROCEDURE — 99499 UNLISTED E&M SERVICE: CPT | Mod: S$GLB,,, | Performed by: ALLERGY & IMMUNOLOGY

## 2020-05-11 PROCEDURE — 95117 IMMUNOTHERAPY INJECTIONS: CPT | Mod: S$GLB,,, | Performed by: ALLERGY & IMMUNOLOGY

## 2020-05-11 PROCEDURE — 99499 NO LOS: ICD-10-PCS | Mod: S$GLB,,, | Performed by: ALLERGY & IMMUNOLOGY

## 2020-05-11 PROCEDURE — 95165 ANTIGEN THERAPY SERVICES: CPT | Mod: S$GLB,,, | Performed by: ALLERGY & IMMUNOLOGY

## 2020-05-11 PROCEDURE — 99999 PR PBB SHADOW E&M-EST. PATIENT-LVL I: ICD-10-PCS | Mod: PBBFAC,,,

## 2020-05-11 PROCEDURE — 99999 PR PBB SHADOW E&M-EST. PATIENT-LVL I: CPT | Mod: PBBFAC,,,

## 2020-05-15 ENCOUNTER — CLINICAL SUPPORT (OUTPATIENT)
Dept: ALLERGY | Facility: CLINIC | Age: 35
End: 2020-05-15
Payer: COMMERCIAL

## 2020-05-15 ENCOUNTER — PATIENT MESSAGE (OUTPATIENT)
Dept: ALLERGY | Facility: CLINIC | Age: 35
End: 2020-05-15

## 2020-05-15 DIAGNOSIS — J30.1 SEASONAL ALLERGIC RHINITIS DUE TO POLLEN: Chronic | ICD-10-CM

## 2020-05-15 PROCEDURE — 99499 UNLISTED E&M SERVICE: CPT | Mod: S$GLB,,, | Performed by: ALLERGY & IMMUNOLOGY

## 2020-05-15 PROCEDURE — 95125 PR IMMUNO RX,W EXTRACT,2+ INJECTNS: ICD-10-PCS | Mod: S$GLB,,, | Performed by: ALLERGY & IMMUNOLOGY

## 2020-05-15 PROCEDURE — 99499 NO LOS: ICD-10-PCS | Mod: S$GLB,,, | Performed by: ALLERGY & IMMUNOLOGY

## 2020-05-15 PROCEDURE — 95125 IMMUNOTHERAPY 2/> INJECTIONS: CPT | Mod: S$GLB,,, | Performed by: ALLERGY & IMMUNOLOGY

## 2020-05-19 ENCOUNTER — CLINICAL SUPPORT (OUTPATIENT)
Dept: ALLERGY | Facility: CLINIC | Age: 35
End: 2020-05-19
Payer: COMMERCIAL

## 2020-05-19 DIAGNOSIS — J30.89 ALLERGIC RHINITIS DUE TO DERMATOPHAGOIDES FARINAE: ICD-10-CM

## 2020-05-19 DIAGNOSIS — J30.89 ALLERGIC RHINITIS DUE TO DERMATOPHAGOIDES PTERONYSSINUS: ICD-10-CM

## 2020-05-19 DIAGNOSIS — J30.1 SEASONAL ALLERGIC RHINITIS DUE TO POLLEN: Chronic | ICD-10-CM

## 2020-05-19 PROCEDURE — 99999 PR PBB SHADOW E&M-EST. PATIENT-LVL II: CPT | Mod: PBBFAC,,,

## 2020-05-19 PROCEDURE — 95117 PR IMMU2THERAPY, 2+ INJECTIONS: ICD-10-PCS | Mod: S$GLB,,, | Performed by: ALLERGY & IMMUNOLOGY

## 2020-05-19 PROCEDURE — 95117 IMMUNOTHERAPY INJECTIONS: CPT | Mod: S$GLB,,, | Performed by: ALLERGY & IMMUNOLOGY

## 2020-05-19 PROCEDURE — 99999 PR PBB SHADOW E&M-EST. PATIENT-LVL II: ICD-10-PCS | Mod: PBBFAC,,,

## 2020-05-21 ENCOUNTER — PATIENT MESSAGE (OUTPATIENT)
Dept: FAMILY MEDICINE | Facility: CLINIC | Age: 35
End: 2020-05-21

## 2020-05-21 DIAGNOSIS — B95.8 STAPH INFECTION: ICD-10-CM

## 2020-05-22 ENCOUNTER — CLINICAL SUPPORT (OUTPATIENT)
Dept: ALLERGY | Facility: CLINIC | Age: 35
End: 2020-05-22
Payer: COMMERCIAL

## 2020-05-22 DIAGNOSIS — J30.89 ALLERGIC RHINITIS DUE TO DERMATOPHAGOIDES FARINAE: ICD-10-CM

## 2020-05-22 DIAGNOSIS — J30.89 ALLERGIC RHINITIS DUE TO DERMATOPHAGOIDES PTERONYSSINUS: ICD-10-CM

## 2020-05-22 DIAGNOSIS — J30.1 SEASONAL ALLERGIC RHINITIS DUE TO POLLEN: Chronic | ICD-10-CM

## 2020-05-22 PROCEDURE — 99999 PR PBB SHADOW E&M-EST. PATIENT-LVL II: ICD-10-PCS | Mod: PBBFAC,,,

## 2020-05-22 PROCEDURE — 95117 PR IMMU2THERAPY, 2+ INJECTIONS: ICD-10-PCS | Mod: S$GLB,,, | Performed by: INTERNAL MEDICINE

## 2020-05-22 PROCEDURE — 95117 IMMUNOTHERAPY INJECTIONS: CPT | Mod: S$GLB,,, | Performed by: INTERNAL MEDICINE

## 2020-05-22 PROCEDURE — 99999 PR PBB SHADOW E&M-EST. PATIENT-LVL II: CPT | Mod: PBBFAC,,,

## 2020-05-22 RX ORDER — DOXYCYCLINE 100 MG/1
100 CAPSULE ORAL EVERY 12 HOURS
Qty: 20 CAPSULE | Refills: 0 | Status: SHIPPED | OUTPATIENT
Start: 2020-05-22 | End: 2020-06-01

## 2020-05-22 NOTE — TELEPHONE ENCOUNTER
New Rx for doxycycline sent to patient. If boils do not improve and do not drain on their own, she needs to come in for incision and drainage.    She can also use mupirocin three times daily, apply to boils. Please let me know if she needs a new Rx for this.    To prevent recurrence, she can also start applying mupirocin to inside of nostrils with a q tip twice daily for 2 weeks, in addition to bleach baths.    I have signed for the following orders AND/OR meds.  Please call the patient and ask the patient to schedule the testing AND/OR inform about any medications that were sent. Medications have been sent to pharmacy listed below      No orders of the defined types were placed in this encounter.      Medications Ordered This Encounter   Medications    doxycycline (VIBRAMYCIN) 100 MG Cap     Sig: Take 1 capsule (100 mg total) by mouth every 12 (twelve) hours. for 10 days     Dispense:  20 capsule     Refill:  0         [facility] - [address]  [address] [facility]  [place] [address]  Phone: [phone] Fax: [phone]

## 2020-05-25 ENCOUNTER — CLINICAL SUPPORT (OUTPATIENT)
Dept: ALLERGY | Facility: CLINIC | Age: 35
End: 2020-05-25
Payer: COMMERCIAL

## 2020-05-25 DIAGNOSIS — J30.89 ALLERGIC RHINITIS DUE TO DERMATOPHAGOIDES FARINAE: ICD-10-CM

## 2020-05-25 DIAGNOSIS — J30.89 ALLERGIC RHINITIS DUE TO DERMATOPHAGOIDES PTERONYSSINUS: ICD-10-CM

## 2020-05-25 DIAGNOSIS — J30.1 SEASONAL ALLERGIC RHINITIS DUE TO POLLEN: Chronic | ICD-10-CM

## 2020-05-25 PROCEDURE — 95117 IMMUNOTHERAPY INJECTIONS: CPT | Mod: S$GLB,,, | Performed by: ALLERGY & IMMUNOLOGY

## 2020-05-25 PROCEDURE — 99999 PR PBB SHADOW E&M-EST. PATIENT-LVL II: CPT | Mod: PBBFAC,,,

## 2020-05-25 PROCEDURE — 95117 PR IMMU2THERAPY, 2+ INJECTIONS: ICD-10-PCS | Mod: S$GLB,,, | Performed by: ALLERGY & IMMUNOLOGY

## 2020-05-25 PROCEDURE — 99999 PR PBB SHADOW E&M-EST. PATIENT-LVL II: ICD-10-PCS | Mod: PBBFAC,,,

## 2020-05-28 ENCOUNTER — OFFICE VISIT (OUTPATIENT)
Dept: FAMILY MEDICINE | Facility: CLINIC | Age: 35
End: 2020-05-28
Payer: COMMERCIAL

## 2020-05-28 DIAGNOSIS — F41.9 ANXIETY: Primary | ICD-10-CM

## 2020-05-28 DIAGNOSIS — L02.92 RECURRENT BOILS: ICD-10-CM

## 2020-05-28 PROCEDURE — 99213 PR OFFICE/OUTPT VISIT, EST, LEVL III, 20-29 MIN: ICD-10-PCS | Mod: 95,,, | Performed by: FAMILY MEDICINE

## 2020-05-28 PROCEDURE — 99213 OFFICE O/P EST LOW 20 MIN: CPT | Mod: 95,,, | Performed by: FAMILY MEDICINE

## 2020-05-28 RX ORDER — HYDROXYZINE HYDROCHLORIDE 25 MG/1
25 TABLET, FILM COATED ORAL 4 TIMES DAILY PRN
Qty: 45 TABLET | Refills: 0 | Status: SHIPPED | OUTPATIENT
Start: 2020-05-28 | End: 2020-09-23 | Stop reason: SDUPTHER

## 2020-05-28 RX ORDER — ESCITALOPRAM OXALATE 10 MG/1
10 TABLET ORAL DAILY
Qty: 30 TABLET | Refills: 1 | Status: SHIPPED | OUTPATIENT
Start: 2020-05-28 | End: 2020-08-28 | Stop reason: SDUPTHER

## 2020-05-28 RX ORDER — CHLORHEXIDINE GLUCONATE 40 MG/ML
SOLUTION TOPICAL DAILY
Qty: 473 ML | Refills: 1 | Status: SHIPPED | OUTPATIENT
Start: 2020-05-28 | End: 2020-06-11

## 2020-05-28 NOTE — ASSESSMENT & PLAN NOTE
Patient was recently put on doxycycline for a recurrent episode of boils.  She reports that they are open and draining.  Patient advised to come in to the office if area is not draining and enlarging are not improving.  Referral to dermatology is pending.Discussed condition course and signs and symptoms to expect.  Patient advised take anti-inflammatories and or Tylenol for pain or fever.  ER precautions.  Call MD or follow-up to clinic if not improving or worsening symptoms.

## 2020-05-28 NOTE — PROGRESS NOTES
Primary Care Telemedicine Note    The patient location is:  Patient Home - [place]  The chief complaint leading to consultation is:   Chief Complaint   Patient presents with    Follow-up      Total time spent with patient:  11 min    Visit type: Virtual visit with synchronous audio only and video  Each patient to whom he or she provides medical services by telemedicine is:  (1) informed of the relationship between the physician and patient and the respective role of any other health care provider with respect to management of the patient; and (2) notified that he or she may decline to receive medical services by telemedicine and may withdraw from such care at any time.  =================================================================  ==========================================================================  Subjective/Assessment/ Plan:      Patient ID: Lucía Coreas is a 34 y.o. female.  has a past medical history of Allergy, Amenorrhea, Back pain, GERD (gastroesophageal reflux disease), Infertility associated with anovulation, Iron deficiency anemia, Knee pain, Metabolic syndrome, PCO (polycystic ovaries), Plantar fasciitis, Recurrent boils, Reflux esophagitis, Sinusitis, and Vaginitis.   Chief Complaint: Follow-up    Problem List Items Addressed This Visit     Recurrent boils    Overview     Chronic.  Recurrent.  Patient has had several episodes in the last few months.  Responds well to doxycycline and Bactroban.  Patient has been applying Hibiclens once per week and having bleach baths diluted several times each week with no improvement.  Patient is allergic to sulfa drugs.  Patient has not seen Dermatology yet.         Current Assessment & Plan     Patient was recently put on doxycycline for a recurrent episode of boils.  She reports that they are open and draining.  Patient advised to come in to the office if area is not draining and enlarging are not improving.  Referral to dermatology is  pending.Discussed condition course and signs and symptoms to expect.  Patient advised take anti-inflammatories and or Tylenol for pain or fever.  ER precautions.  Call MD or follow-up to clinic if not improving or worsening symptoms.           Anxiety - Primary    Overview     New problem.  Subacute.  Has been getting worse over the last few weeks during COVID-19 outbreak.  Patient reports that she is biting her nails and unable to sleep at night.  Patient has not been on any treatment or medications for this recently.  Denies any SI HI or hallucinations.  =======================================================  MAY 2020:  Patient reports hydroxyzine helps with anxiety but is still biting her nails.  Patient is ready to start daily medication for anxiety.  Patient reports that she did start back at work which she thought would help but it is not helping.  Denies SI HI or hallucinations.  ======================================================         Current Assessment & Plan     Continue hydroxyzine as needed.  Starting daily medication Lexapro 10 mg with plans to titrate up if no improvement.  Patient will message me and let me know how she is doing in a couple of weeks.Discussed anxiety condition course.  Discussed SSRI as first-line treatment for this condition.  Discussed risk of discontinuing this medication without tapering.  Patient was educated, advised of side effects, and all questions were answered.  Patient voiced understanding.  Patient will follow up routinely and notify us if having any side effects or worsening or persistent symptoms.  ER precautions were given.                I have reviewed the complete PMH, Family History, social history, surgical history, allergies and medications per Epic record at the time of this visit.  Review of Systems see HPI otherwise negative  Objective   There were no vitals taken for this visit.  Physical Exam   Constitutional: She appears well-developed and  well-nourished. She is cooperative.  Non-toxic appearance. She does not have a sickly appearance. She does not appear ill. No distress.   HENT:   Head: Normocephalic and atraumatic.   Right Ear: Hearing and external ear normal.   Left Ear: Hearing and external ear normal.   Eyes: Conjunctivae, EOM and lids are normal.   Neck: Normal range of motion.   Pulmonary/Chest: Effort normal. No respiratory distress.   Musculoskeletal: Normal range of motion.   Neurological: She is alert. She is not disoriented.   Skin: She is not diaphoretic. No pallor.   Self-reported abscesses under the armpit and genital regions   Psychiatric: She has a normal mood and affect. Her behavior is normal. Judgment and thought content normal. Her mood appears not anxious. Her speech is not rapid and/or pressured and not slurred. She is not agitated, not aggressive and not hyperactive. Thought content is not paranoid and not delusional. Cognition and memory are normal. Cognition and memory are not impaired. She does not exhibit a depressed mood. She expresses no homicidal and no suicidal ideation. She expresses no suicidal plans and no homicidal plans. She is attentive.     Assessment:     1. Anxiety    2. Recurrent boils      I have signed for the following orders AND/OR meds.  Orders Placed This Encounter   Procedures    Ambulatory referral/consult to Dermatology     Standing Status:   Future     Standing Expiration Date:   6/28/2021     Referral Priority:   Urgent     Referral Type:   Consultation     Referral Reason:   Specialty Services Required     Requested Specialty:   Dermatology     Number of Visits Requested:   1     Medications Ordered This Encounter   Medications    chlorhexidine (HIBICLENS) 4 % external liquid     Sig: Apply topically once daily. Wash with daily. Do not get in eyes. for 14 days     Dispense:  473 mL     Refill:  1    escitalopram oxalate (LEXAPRO) 10 MG tablet     Sig: Take 1 tablet (10 mg total) by mouth once  "daily.     Dispense:  30 tablet     Refill:  1    hydrOXYzine HCL (ATARAX) 25 MG tablet     Sig: Take 1 tablet (25 mg total) by mouth 4 (four) times daily as needed for Anxiety.     Dispense:  45 tablet     Refill:  0      Medication List with Changes/Refills   New Medications    CHLORHEXIDINE (HIBICLENS) 4 % EXTERNAL LIQUID    Apply topically once daily. Wash with daily. Do not get in eyes. for 14 days    ESCITALOPRAM OXALATE (LEXAPRO) 10 MG TABLET    Take 1 tablet (10 mg total) by mouth once daily.   Current Medications    ALBUTEROL (PROVENTIL/VENTOLIN HFA) 90 MCG/ACTUATION INHALER    Inhale 2 puffs into the lungs every 4 (four) hours as needed for Wheezing.    AZELASTINE (ASTELIN) 137 MCG (0.1 %) NASAL SPRAY    1 spray (137 mcg total) by Nasal route 2 (two) times daily.    DOXYCYCLINE (VIBRAMYCIN) 100 MG CAP    Take 1 capsule (100 mg total) by mouth every 12 (twelve) hours. for 10 days    EPINEPHRINE (EPIPEN 2-MARTIN) 0.3 MG/0.3 ML ATIN    As directed.    ERGOCALCIFEROL (ERGOCALCIFEROL) 50,000 UNIT CAP    Take 1 capsule (50,000 Units total) by mouth every 7 days.    FLUTICASONE PROPIONATE (FLONASE) 50 MCG/ACTUATION NASAL SPRAY    1 spray (50 mcg total) by Each Nostril route 2 (two) times daily.    IBUPROFEN (ADVIL,MOTRIN) 800 MG TABLET    TK 1 T PO Q 8 H PRF PAIN    IRON-VIT C-VIT B12-FOLIC ACID (IRON-C PLUS) TABLET    Take 1 tablet by mouth once daily.    LEVOCETIRIZINE (XYZAL) 5 MG TABLET    Take 1 tablet (5 mg total) by mouth every evening.    LIRAGLUTIDE 0.6 MG/0.1 ML, 18 MG/3 ML, SUBQ PNIJ (VICTOZA 2-MARTIN) 0.6 MG/0.1 ML (18 MG/3 ML) PNIJ    Inject 1.2 mg into the skin once daily.    MONTELUKAST (SINGULAIR) 10 MG TABLET    Take 1 tablet (10 mg total) by mouth every evening.    MUPIROCIN (BACTROBAN) 2 % OINTMENT    Apply topically 3 (three) times daily.    NOVOFINE PLUS 32 GAUGE X 1/6" NDLE        PANTOPRAZOLE (PROTONIX) 40 MG TABLET    Take 1 tablet (40 mg total) by mouth once daily.   Changed and/or Refilled " Medications    Modified Medication Previous Medication    HYDROXYZINE HCL (ATARAX) 25 MG TABLET hydroxyzine HCL (ATARAX) 25 MG tablet       Take 1 tablet (25 mg total) by mouth 4 (four) times daily as needed for Anxiety.    Take 1 tablet (25 mg total) by mouth 3 (three) times daily as needed for Anxiety.     Follow up in about 3 months (around 8/28/2020), or if symptoms worsen or fail to improve, for Med refills, LAB RESULTS.  Future Appointments     Date Provider Specialty Appt Notes    5/29/2020  Allergy shot/nd    7/6/2020 Arielle Marquez MD Dermatology Specialty Services Required    8/28/2020 Dante Negro MD Family Medicine 3 mo f/u    10/2/2020 Dante Negro MD Family Medicine 6 mo f/u          If no improvement in symptoms or symptoms worsen, advised to call/follow-up at clinic or go to ER. Patient voiced understanding and all questions/concerns were addressed.   DISCLAIMER: This note was compiled by using a speech recognition dictation system and therefore please be aware that typographical / speech recognition errors can and do occur.  Please contact me if you see any errors specifically.  Dante Negro M.D.

## 2020-05-28 NOTE — PATIENT INSTRUCTIONS
Follow up in about 3 months (around 8/28/2020), or if symptoms worsen or fail to improve, for Med refills, LAB RESULTS.     If no improvement in symptoms or symptoms worsen, please be advised to call MD, follow-up at clinic and/or go to ER if becomes severe.    Dante Negro M.D.        We Offer TELEHEALTH & Same Day Appointments!   Book your Telehealth appointment with me through my nurse or   Clinic appointments on [employer]!    [address] [place] [address]    Office: [phone]   FAX: [phone]    Check out my Facebook Page and Follow Me at: [email or web address]    Check out my website at [employer] by clicking on: [email or web address]    To Schedule appointments online, go to [employer]: [email or web address]

## 2020-05-28 NOTE — ASSESSMENT & PLAN NOTE
Continue hydroxyzine as needed.  Starting daily medication Lexapro 10 mg with plans to titrate up if no improvement.  Patient will message me and let me know how she is doing in a couple of weeks.Discussed anxiety condition course.  Discussed SSRI as first-line treatment for this condition.  Discussed risk of discontinuing this medication without tapering.  Patient was educated, advised of side effects, and all questions were answered.  Patient voiced understanding.  Patient will follow up routinely and notify us if having any side effects or worsening or persistent symptoms.  ER precautions were given.

## 2020-05-29 ENCOUNTER — CLINICAL SUPPORT (OUTPATIENT)
Dept: ALLERGY | Facility: CLINIC | Age: 35
End: 2020-05-29
Payer: COMMERCIAL

## 2020-05-29 DIAGNOSIS — J30.1 SEASONAL ALLERGIC RHINITIS DUE TO POLLEN: Chronic | ICD-10-CM

## 2020-05-29 DIAGNOSIS — J30.89 ALLERGIC RHINITIS DUE TO DERMATOPHAGOIDES PTERONYSSINUS: ICD-10-CM

## 2020-05-29 DIAGNOSIS — J30.89 ALLERGIC RHINITIS DUE TO DERMATOPHAGOIDES FARINAE: ICD-10-CM

## 2020-05-29 PROCEDURE — 95117 PR IMMU2THERAPY, 2+ INJECTIONS: ICD-10-PCS | Mod: S$GLB,,, | Performed by: ALLERGY & IMMUNOLOGY

## 2020-05-29 PROCEDURE — 95117 IMMUNOTHERAPY INJECTIONS: CPT | Mod: S$GLB,,, | Performed by: ALLERGY & IMMUNOLOGY

## 2020-05-29 PROCEDURE — 99999 PR PBB SHADOW E&M-EST. PATIENT-LVL III: ICD-10-PCS | Mod: PBBFAC,,,

## 2020-05-29 PROCEDURE — 99999 PR PBB SHADOW E&M-EST. PATIENT-LVL III: CPT | Mod: PBBFAC,,,

## 2020-06-01 ENCOUNTER — CLINICAL SUPPORT (OUTPATIENT)
Dept: ALLERGY | Facility: CLINIC | Age: 35
End: 2020-06-01
Payer: COMMERCIAL

## 2020-06-01 DIAGNOSIS — J30.89 ALLERGIC RHINITIS DUE TO DERMATOPHAGOIDES FARINAE: ICD-10-CM

## 2020-06-01 DIAGNOSIS — J30.89 ALLERGIC RHINITIS DUE TO DERMATOPHAGOIDES PTERONYSSINUS: ICD-10-CM

## 2020-06-01 DIAGNOSIS — J30.1 SEASONAL ALLERGIC RHINITIS DUE TO POLLEN: Chronic | ICD-10-CM

## 2020-06-01 PROCEDURE — 99999 PR PBB SHADOW E&M-EST. PATIENT-LVL III: CPT | Mod: PBBFAC,,,

## 2020-06-01 PROCEDURE — 95117 PR IMMU2THERAPY, 2+ INJECTIONS: ICD-10-PCS | Mod: S$GLB,,, | Performed by: ALLERGY & IMMUNOLOGY

## 2020-06-01 PROCEDURE — 95117 IMMUNOTHERAPY INJECTIONS: CPT | Mod: S$GLB,,, | Performed by: ALLERGY & IMMUNOLOGY

## 2020-06-01 PROCEDURE — 99999 PR PBB SHADOW E&M-EST. PATIENT-LVL III: ICD-10-PCS | Mod: PBBFAC,,,

## 2020-06-05 ENCOUNTER — CLINICAL SUPPORT (OUTPATIENT)
Dept: ALLERGY | Facility: CLINIC | Age: 35
End: 2020-06-05
Payer: COMMERCIAL

## 2020-06-05 DIAGNOSIS — J30.89 ALLERGIC RHINITIS DUE TO DERMATOPHAGOIDES PTERONYSSINUS: ICD-10-CM

## 2020-06-05 DIAGNOSIS — J30.89 ALLERGIC RHINITIS DUE TO DERMATOPHAGOIDES FARINAE: ICD-10-CM

## 2020-06-05 DIAGNOSIS — J30.1 SEASONAL ALLERGIC RHINITIS DUE TO POLLEN: Chronic | ICD-10-CM

## 2020-06-05 PROCEDURE — 95117 PR IMMU2THERAPY, 2+ INJECTIONS: ICD-10-PCS | Mod: S$GLB,,, | Performed by: ALLERGY & IMMUNOLOGY

## 2020-06-05 PROCEDURE — 99999 PR PBB SHADOW E&M-EST. PATIENT-LVL III: CPT | Mod: PBBFAC,,,

## 2020-06-05 PROCEDURE — 99999 PR PBB SHADOW E&M-EST. PATIENT-LVL III: ICD-10-PCS | Mod: PBBFAC,,,

## 2020-06-05 PROCEDURE — 95117 IMMUNOTHERAPY INJECTIONS: CPT | Mod: S$GLB,,, | Performed by: ALLERGY & IMMUNOLOGY

## 2020-06-09 ENCOUNTER — CLINICAL SUPPORT (OUTPATIENT)
Dept: ALLERGY | Facility: CLINIC | Age: 35
End: 2020-06-09
Payer: COMMERCIAL

## 2020-06-09 DIAGNOSIS — J30.89 ALLERGIC RHINITIS DUE TO DERMATOPHAGOIDES FARINAE: ICD-10-CM

## 2020-06-09 DIAGNOSIS — J30.1 SEASONAL ALLERGIC RHINITIS DUE TO POLLEN: Chronic | ICD-10-CM

## 2020-06-09 DIAGNOSIS — J30.89 ALLERGIC RHINITIS DUE TO DERMATOPHAGOIDES PTERONYSSINUS: ICD-10-CM

## 2020-06-09 PROCEDURE — 95117 PR IMMU2THERAPY, 2+ INJECTIONS: ICD-10-PCS | Mod: S$GLB,,, | Performed by: ALLERGY & IMMUNOLOGY

## 2020-06-09 PROCEDURE — 95117 IMMUNOTHERAPY INJECTIONS: CPT | Mod: S$GLB,,, | Performed by: ALLERGY & IMMUNOLOGY

## 2020-06-09 PROCEDURE — 99999 PR PBB SHADOW E&M-EST. PATIENT-LVL II: ICD-10-PCS | Mod: PBBFAC,,,

## 2020-06-09 PROCEDURE — 99999 PR PBB SHADOW E&M-EST. PATIENT-LVL II: CPT | Mod: PBBFAC,,,

## 2020-06-12 ENCOUNTER — CLINICAL SUPPORT (OUTPATIENT)
Dept: ALLERGY | Facility: CLINIC | Age: 35
End: 2020-06-12
Payer: COMMERCIAL

## 2020-06-12 DIAGNOSIS — J30.89 ALLERGIC RHINITIS DUE TO DERMATOPHAGOIDES FARINAE: ICD-10-CM

## 2020-06-12 DIAGNOSIS — J30.1 SEASONAL ALLERGIC RHINITIS DUE TO POLLEN: Chronic | ICD-10-CM

## 2020-06-12 DIAGNOSIS — J30.89 ALLERGIC RHINITIS DUE TO DERMATOPHAGOIDES PTERONYSSINUS: ICD-10-CM

## 2020-06-12 PROCEDURE — 99999 PR PBB SHADOW E&M-EST. PATIENT-LVL III: CPT | Mod: PBBFAC,,,

## 2020-06-12 PROCEDURE — 99999 PR PBB SHADOW E&M-EST. PATIENT-LVL III: ICD-10-PCS | Mod: PBBFAC,,,

## 2020-06-12 PROCEDURE — 95117 PR IMMU2THERAPY, 2+ INJECTIONS: ICD-10-PCS | Mod: S$GLB,,, | Performed by: ALLERGY & IMMUNOLOGY

## 2020-06-12 PROCEDURE — 95117 IMMUNOTHERAPY INJECTIONS: CPT | Mod: S$GLB,,, | Performed by: ALLERGY & IMMUNOLOGY

## 2020-06-17 ENCOUNTER — CLINICAL SUPPORT (OUTPATIENT)
Dept: ALLERGY | Facility: CLINIC | Age: 35
End: 2020-06-17
Payer: COMMERCIAL

## 2020-06-17 DIAGNOSIS — J30.89 ALLERGIC RHINITIS DUE TO DERMATOPHAGOIDES PTERONYSSINUS: ICD-10-CM

## 2020-06-17 DIAGNOSIS — J30.89 ALLERGIC RHINITIS DUE TO DERMATOPHAGOIDES FARINAE: ICD-10-CM

## 2020-06-17 DIAGNOSIS — J30.1 SEASONAL ALLERGIC RHINITIS DUE TO POLLEN: Chronic | ICD-10-CM

## 2020-06-17 PROCEDURE — 99999 PR PBB SHADOW E&M-EST. PATIENT-LVL II: CPT | Mod: PBBFAC,,,

## 2020-06-17 PROCEDURE — 95117 PR IMMU2THERAPY, 2+ INJECTIONS: ICD-10-PCS | Mod: S$GLB,,, | Performed by: PHYSICIAN ASSISTANT

## 2020-06-17 PROCEDURE — 99999 PR PBB SHADOW E&M-EST. PATIENT-LVL II: ICD-10-PCS | Mod: PBBFAC,,,

## 2020-06-17 PROCEDURE — 95117 IMMUNOTHERAPY INJECTIONS: CPT | Mod: S$GLB,,, | Performed by: PHYSICIAN ASSISTANT

## 2020-06-24 ENCOUNTER — CLINICAL SUPPORT (OUTPATIENT)
Dept: ALLERGY | Facility: CLINIC | Age: 35
End: 2020-06-24
Payer: COMMERCIAL

## 2020-06-24 DIAGNOSIS — J30.1 SEASONAL ALLERGIC RHINITIS DUE TO POLLEN: Chronic | ICD-10-CM

## 2020-06-24 PROCEDURE — 99499 NO LOS: ICD-10-PCS | Mod: S$GLB,,, | Performed by: ALLERGY & IMMUNOLOGY

## 2020-06-24 PROCEDURE — 95117 IMMUNOTHERAPY INJECTIONS: CPT | Mod: S$GLB,,, | Performed by: ALLERGY & IMMUNOLOGY

## 2020-06-24 PROCEDURE — 95117 PR IMMU2THERAPY, 2+ INJECTIONS: ICD-10-PCS | Mod: S$GLB,,, | Performed by: ALLERGY & IMMUNOLOGY

## 2020-06-24 PROCEDURE — 99499 UNLISTED E&M SERVICE: CPT | Mod: S$GLB,,, | Performed by: ALLERGY & IMMUNOLOGY

## 2020-06-24 NOTE — PROGRESS NOTES
See Flowsheet for immunotherapy administration. Patient waited in clinic 30 min for observation.

## 2020-06-26 ENCOUNTER — CLINICAL SUPPORT (OUTPATIENT)
Dept: ALLERGY | Facility: CLINIC | Age: 35
End: 2020-06-26
Payer: COMMERCIAL

## 2020-06-26 DIAGNOSIS — J30.89 ALLERGIC RHINITIS DUE TO DERMATOPHAGOIDES PTERONYSSINUS: ICD-10-CM

## 2020-06-26 DIAGNOSIS — J30.89 ALLERGIC RHINITIS DUE TO DERMATOPHAGOIDES FARINAE: ICD-10-CM

## 2020-06-26 DIAGNOSIS — J30.1 ACUTE ALLERGIC RHINITIS DUE TO POLLEN: ICD-10-CM

## 2020-06-26 PROCEDURE — 99999 PR PBB SHADOW E&M-EST. PATIENT-LVL II: ICD-10-PCS | Mod: PBBFAC,,,

## 2020-06-26 PROCEDURE — 95117 IMMUNOTHERAPY INJECTIONS: CPT | Mod: S$GLB,,, | Performed by: ALLERGY & IMMUNOLOGY

## 2020-06-26 PROCEDURE — 99999 PR PBB SHADOW E&M-EST. PATIENT-LVL II: CPT | Mod: PBBFAC,,,

## 2020-06-26 PROCEDURE — 95117 PR IMMU2THERAPY, 2+ INJECTIONS: ICD-10-PCS | Mod: S$GLB,,, | Performed by: ALLERGY & IMMUNOLOGY

## 2020-07-01 ENCOUNTER — CLINICAL SUPPORT (OUTPATIENT)
Dept: ALLERGY | Facility: CLINIC | Age: 35
End: 2020-07-01
Payer: COMMERCIAL

## 2020-07-01 DIAGNOSIS — J30.1 CHRONIC SEASONAL ALLERGIC RHINITIS DUE TO POLLEN: ICD-10-CM

## 2020-07-01 PROCEDURE — 95117 PR IMMU2THERAPY, 2+ INJECTIONS: ICD-10-PCS | Mod: S$GLB,,, | Performed by: ALLERGY & IMMUNOLOGY

## 2020-07-01 PROCEDURE — 99499 UNLISTED E&M SERVICE: CPT | Mod: S$GLB,,, | Performed by: PHYSICIAN ASSISTANT

## 2020-07-01 PROCEDURE — 99499 NO LOS: ICD-10-PCS | Mod: S$GLB,,, | Performed by: PHYSICIAN ASSISTANT

## 2020-07-01 PROCEDURE — 95117 IMMUNOTHERAPY INJECTIONS: CPT | Mod: S$GLB,,, | Performed by: ALLERGY & IMMUNOLOGY

## 2020-07-02 ENCOUNTER — PATIENT OUTREACH (OUTPATIENT)
Dept: ADMINISTRATIVE | Facility: OTHER | Age: 35
End: 2020-07-02

## 2020-07-02 NOTE — PROGRESS NOTES
Requested updates within Care Everywhere.  Patient's chart was reviewed for overdue TYE topics.  Immunizations reconciled.

## 2020-07-06 ENCOUNTER — OFFICE VISIT (OUTPATIENT)
Dept: DERMATOLOGY | Facility: CLINIC | Age: 35
End: 2020-07-06
Payer: COMMERCIAL

## 2020-07-06 DIAGNOSIS — L73.9 FOLLICULITIS: Primary | ICD-10-CM

## 2020-07-06 DIAGNOSIS — L73.2 HIDRADENITIS SUPPURATIVA: ICD-10-CM

## 2020-07-06 DIAGNOSIS — L70.0 ACNE VULGARIS: ICD-10-CM

## 2020-07-06 PROCEDURE — 11900 INJECT SKIN LESIONS </W 7: CPT | Mod: S$GLB,,, | Performed by: DERMATOLOGY

## 2020-07-06 PROCEDURE — 87070 CULTURE OTHR SPECIMN AEROBIC: CPT

## 2020-07-06 PROCEDURE — 99203 PR OFFICE/OUTPT VISIT, NEW, LEVL III, 30-44 MIN: ICD-10-PCS | Mod: 25,S$GLB,, | Performed by: DERMATOLOGY

## 2020-07-06 PROCEDURE — 11900 PR INJECTION INTO SKIN LESIONS, UP TO 7: ICD-10-PCS | Mod: S$GLB,,, | Performed by: DERMATOLOGY

## 2020-07-06 PROCEDURE — 99999 PR PBB SHADOW E&M-EST. PATIENT-LVL III: ICD-10-PCS | Mod: PBBFAC,,, | Performed by: DERMATOLOGY

## 2020-07-06 PROCEDURE — 99203 OFFICE O/P NEW LOW 30 MIN: CPT | Mod: 25,S$GLB,, | Performed by: DERMATOLOGY

## 2020-07-06 PROCEDURE — 99999 PR PBB SHADOW E&M-EST. PATIENT-LVL III: CPT | Mod: PBBFAC,,, | Performed by: DERMATOLOGY

## 2020-07-06 RX ORDER — RIFAMPIN 300 MG/1
300 CAPSULE ORAL EVERY 12 HOURS
Qty: 60 CAPSULE | Refills: 1 | Status: SHIPPED | OUTPATIENT
Start: 2020-07-06 | End: 2020-08-05

## 2020-07-06 RX ORDER — CLINDAMYCIN HYDROCHLORIDE 300 MG/1
300 CAPSULE ORAL 2 TIMES DAILY
Qty: 60 CAPSULE | Refills: 1 | Status: SHIPPED | OUTPATIENT
Start: 2020-07-06 | End: 2020-08-05

## 2020-07-06 RX ORDER — FLUCONAZOLE 150 MG/1
150 TABLET ORAL DAILY
Qty: 2 TABLET | Refills: 0 | Status: SHIPPED | OUTPATIENT
Start: 2020-07-06 | End: 2020-07-07

## 2020-07-06 RX ORDER — CLINDAMYCIN PHOSPHATE 10 UG/ML
LOTION TOPICAL 2 TIMES DAILY
Qty: 60 ML | Refills: 1 | Status: SHIPPED | OUTPATIENT
Start: 2020-07-06 | End: 2021-02-19

## 2020-07-06 NOTE — PROGRESS NOTES
Subjective:       Patient ID:  Lucía Coreas is a 34 y.o. female who presents for   Chief Complaint   Patient presents with    Lesion     abdomen, right axilla and acne     33 yo F presents for initial visit for recurrent boils in R axilla, L side, abdomen, and chest.  It has been occurring x 10 years, worsening x 2 months  Current Tx: Bleach baths and hibiclens  No exacerbating factors    She denies smoking        Past Medical History:   Diagnosis Date    Allergy     Amenorrhea     Back pain     GERD (gastroesophageal reflux disease)     Infertility associated with anovulation     Iron deficiency anemia     Knee pain     Metabolic syndrome     PCO (polycystic ovaries)     Plantar fasciitis     Recurrent boils     Reflux esophagitis     Sinusitis     Vaginitis      Review of Systems   Constitutional: Negative for fever and chills.   Respiratory: Negative for cough and shortness of breath.    Skin: Positive for sensitivity to antibiotic ointment.   Hematologic/Lymphatic: Does not bruise/bleed easily.        Objective:    Physical Exam   Constitutional: She appears well-developed and well-nourished. She is obese.  No distress.   HENT:   Mouth/Throat: Lips normal.    Eyes: Lids are normal.    Neurological: She is alert and oriented to person, place, and time. She is not disoriented.   Psychiatric: She has a normal mood and affect.   Skin:   Areas Examined (abnormalities noted in diagram):   Head / Face Inspection Performed  Neck Inspection Performed  Chest / Axilla Inspection Performed  Back Inspection Performed  RUE Inspected  LUE Inspection Performed              Diagram Legend     Erythematous scaling macule/papule c/w actinic keratosis       Vascular papule c/w angioma      Pigmented verrucoid papule/plaque c/w seborrheic keratosis      Yellow umbilicated papule c/w sebaceous hyperplasia      Irregularly shaped tan macule c/w lentigo     1-2 mm smooth white papules consistent with Milia       Movable subcutaneous cyst with punctum c/w epidermal inclusion cyst      Subcutaneous movable cyst c/w pilar cyst      Firm pink to brown papule c/w dermatofibroma      Pedunculated fleshy papule(s) c/w skin tag(s)      Evenly pigmented macule c/w junctional nevus     Mildly variegated pigmented, slightly irregular-bordered macule c/w mildly atypical nevus      Flesh colored to evenly pigmented papule c/w intradermal nevus       Pink pearly papule/plaque c/w basal cell carcinoma      Erythematous hyperkeratotic cursted plaque c/w SCC      Surgical scar with no sign of skin cancer recurrence      Open and closed comedones      Inflammatory papules and pustules      Verrucoid papule consistent consistent with wart     Erythematous eczematous patches and plaques     Dystrophic onycholytic nail with subungual debris c/w onychomycosis     Umbilicated papule    Erythematous-base heme-crusted tan verrucoid plaque consistent with inflamed seborrheic keratosis     Erythematous Silvery Scaling Plaque c/w Psoriasis     See annotation      Assessment / Plan:        Folliculitis (nasal Cx)  -     Aerobic culture    Hidradenitis suppurativa  Ok to continue bleach baths and hibiclens  -     rifAMpin (RIFADIN) 300 MG capsule; Take 1 capsule (300 mg total) by mouth every 12 (twelve) hours.  Dispense: 60 capsule; Refill: 1  -     clindamycin (CLEOCIN) 300 MG capsule; Take 1 capsule (300 mg total) by mouth 2 (two) times a day.  Dispense: 60 capsule; Refill: 1  Intralesional Kenalog 10 mg/cc (0.2 cc total) injected into 1 lesion on the abdomen today after obtaining verbal consent including risk of surrounding hypopigmentation. Patient tolerated procedure well.    Units: 1  NDC for Kenalog 10mg/cc:  9927-6008-49    Acne vulgaris  -     clindamycin (CLEOCIN T) 1 % lotion; Apply topically 2 (two) times daily. face  Dispense: 60 mL; Refill: 1    Other orders-prone to candidal vaginitis with prolonged ABx  -     fluconazole (DIFLUCAN) 150  MG Tab; Take 1 tablet (150 mg total) by mouth once daily. Repeat if necessary for 1 day  Dispense: 2 tablet; Refill: 0             Follow up in about 4 weeks (around 8/3/2020).

## 2020-07-06 NOTE — LETTER
July 6, 2020      Dante Negro MD  [address]  [place] [address]           [facility] [address] [place] [address]  Phone: [phone]  Fax: [phone]          Patient: Lucía Coreas   MR Number: [id number]   YOB: 1985   Date of Visit: 7/6/2020       Dear Dr. Dante Negro:    Thank you for referring Lucía Coreas to me for evaluation. Attached you will find relevant portions of my assessment and plan of care.    If you have questions, please do not hesitate to call me. I look forward to following Lucía Coreas along with you.    Sincerely,    Arielle Marquez MD    Enclosure  CC:  No Recipients    If you would like to receive this communication electronically, please contact [email or web address] or [phone] to request more information on [employer] Link access.    For providers and/or their staff who would like to refer a patient to [facility], please contact us through our one-stop-shop provider referral line, [facility], at [phone].    If you feel you have received this communication in error or would no longer like to receive these types of communications, please e-mail [email or web address]

## 2020-07-07 ENCOUNTER — CLINICAL SUPPORT (OUTPATIENT)
Dept: ALLERGY | Facility: CLINIC | Age: 35
End: 2020-07-07
Payer: COMMERCIAL

## 2020-07-07 DIAGNOSIS — J30.89 ALLERGIC RHINITIS DUE TO DERMATOPHAGOIDES PTERONYSSINUS: ICD-10-CM

## 2020-07-07 DIAGNOSIS — J30.89 ALLERGIC RHINITIS DUE TO DERMATOPHAGOIDES FARINAE: ICD-10-CM

## 2020-07-07 DIAGNOSIS — J30.1 SEASONAL ALLERGIC RHINITIS DUE TO POLLEN: Chronic | ICD-10-CM

## 2020-07-07 PROCEDURE — 99999 PR PBB SHADOW E&M-EST. PATIENT-LVL III: CPT | Mod: PBBFAC,,,

## 2020-07-07 PROCEDURE — 99499 NO LOS: ICD-10-PCS | Mod: S$GLB,,, | Performed by: ALLERGY & IMMUNOLOGY

## 2020-07-07 PROCEDURE — 95117 PR IMMU2THERAPY, 2+ INJECTIONS: ICD-10-PCS | Mod: S$GLB,,, | Performed by: ALLERGY & IMMUNOLOGY

## 2020-07-07 PROCEDURE — 99499 UNLISTED E&M SERVICE: CPT | Mod: S$GLB,,, | Performed by: ALLERGY & IMMUNOLOGY

## 2020-07-07 PROCEDURE — 95117 IMMUNOTHERAPY INJECTIONS: CPT | Mod: S$GLB,,, | Performed by: ALLERGY & IMMUNOLOGY

## 2020-07-07 PROCEDURE — 99999 PR PBB SHADOW E&M-EST. PATIENT-LVL III: ICD-10-PCS | Mod: PBBFAC,,,

## 2020-07-09 LAB — BACTERIA SPEC AEROBE CULT: NORMAL

## 2020-07-14 ENCOUNTER — CLINICAL SUPPORT (OUTPATIENT)
Dept: ALLERGY | Facility: CLINIC | Age: 35
End: 2020-07-14
Payer: COMMERCIAL

## 2020-07-14 DIAGNOSIS — J30.1 SEASONAL ALLERGIC RHINITIS DUE TO POLLEN: Chronic | ICD-10-CM

## 2020-07-14 DIAGNOSIS — J30.89 ALLERGIC RHINITIS DUE TO DERMATOPHAGOIDES PTERONYSSINUS: ICD-10-CM

## 2020-07-14 DIAGNOSIS — J30.89 ALLERGIC RHINITIS DUE TO DERMATOPHAGOIDES FARINAE: ICD-10-CM

## 2020-07-14 PROCEDURE — 95117 IMMUNOTHERAPY INJECTIONS: CPT | Mod: S$GLB,,, | Performed by: ALLERGY & IMMUNOLOGY

## 2020-07-14 PROCEDURE — 99999 PR PBB SHADOW E&M-EST. PATIENT-LVL II: CPT | Mod: PBBFAC,,,

## 2020-07-14 PROCEDURE — 95117 PR IMMU2THERAPY, 2+ INJECTIONS: ICD-10-PCS | Mod: S$GLB,,, | Performed by: ALLERGY & IMMUNOLOGY

## 2020-07-14 PROCEDURE — 99499 NO LOS: ICD-10-PCS | Mod: S$GLB,,, | Performed by: ALLERGY & IMMUNOLOGY

## 2020-07-14 PROCEDURE — 99999 PR PBB SHADOW E&M-EST. PATIENT-LVL II: ICD-10-PCS | Mod: PBBFAC,,,

## 2020-07-14 PROCEDURE — 99499 UNLISTED E&M SERVICE: CPT | Mod: S$GLB,,, | Performed by: ALLERGY & IMMUNOLOGY

## 2020-07-17 ENCOUNTER — CLINICAL SUPPORT (OUTPATIENT)
Dept: ALLERGY | Facility: CLINIC | Age: 35
End: 2020-07-17
Payer: COMMERCIAL

## 2020-07-17 DIAGNOSIS — E11.9 TYPE 2 DIABETES MELLITUS WITHOUT COMPLICATION: ICD-10-CM

## 2020-07-17 DIAGNOSIS — J30.1 SEASONAL ALLERGIC RHINITIS DUE TO POLLEN: Chronic | ICD-10-CM

## 2020-07-17 DIAGNOSIS — J30.89 ALLERGIC RHINITIS DUE TO DERMATOPHAGOIDES PTERONYSSINUS: ICD-10-CM

## 2020-07-17 DIAGNOSIS — J30.89 ALLERGIC RHINITIS DUE TO DERMATOPHAGOIDES FARINAE: ICD-10-CM

## 2020-07-17 PROCEDURE — 95117 PR IMMU2THERAPY, 2+ INJECTIONS: ICD-10-PCS | Mod: S$GLB,,, | Performed by: ALLERGY & IMMUNOLOGY

## 2020-07-17 PROCEDURE — 99999 PR PBB SHADOW E&M-EST. PATIENT-LVL III: ICD-10-PCS | Mod: PBBFAC,,,

## 2020-07-17 PROCEDURE — 95117 IMMUNOTHERAPY INJECTIONS: CPT | Mod: S$GLB,,, | Performed by: ALLERGY & IMMUNOLOGY

## 2020-07-17 PROCEDURE — 99999 PR PBB SHADOW E&M-EST. PATIENT-LVL III: CPT | Mod: PBBFAC,,,

## 2020-07-17 PROCEDURE — 99499 NO LOS: ICD-10-PCS | Mod: S$GLB,,, | Performed by: ALLERGY & IMMUNOLOGY

## 2020-07-17 PROCEDURE — 99499 UNLISTED E&M SERVICE: CPT | Mod: S$GLB,,, | Performed by: ALLERGY & IMMUNOLOGY

## 2020-07-20 ENCOUNTER — CLINICAL SUPPORT (OUTPATIENT)
Dept: ALLERGY | Facility: CLINIC | Age: 35
End: 2020-07-20
Payer: COMMERCIAL

## 2020-07-20 ENCOUNTER — TELEPHONE (OUTPATIENT)
Dept: ALLERGY | Facility: CLINIC | Age: 35
End: 2020-07-20

## 2020-07-20 DIAGNOSIS — J30.89 ALLERGIC RHINITIS DUE TO DERMATOPHAGOIDES PTERONYSSINUS: ICD-10-CM

## 2020-07-20 DIAGNOSIS — J30.1 SEASONAL ALLERGIC RHINITIS DUE TO POLLEN: Chronic | ICD-10-CM

## 2020-07-20 DIAGNOSIS — J30.89 ALLERGIC RHINITIS DUE TO DERMATOPHAGOIDES FARINAE: ICD-10-CM

## 2020-07-20 PROCEDURE — 99499 NO LOS: ICD-10-PCS | Mod: S$GLB,,, | Performed by: ALLERGY & IMMUNOLOGY

## 2020-07-20 PROCEDURE — 99999 PR PBB SHADOW E&M-EST. PATIENT-LVL I: ICD-10-PCS | Mod: PBBFAC,,,

## 2020-07-20 PROCEDURE — 95117 IMMUNOTHERAPY INJECTIONS: CPT | Mod: S$GLB,,, | Performed by: ALLERGY & IMMUNOLOGY

## 2020-07-20 PROCEDURE — 99499 UNLISTED E&M SERVICE: CPT | Mod: S$GLB,,, | Performed by: ALLERGY & IMMUNOLOGY

## 2020-07-20 PROCEDURE — 99999 PR PBB SHADOW E&M-EST. PATIENT-LVL I: CPT | Mod: PBBFAC,,,

## 2020-07-20 PROCEDURE — 95117 PR IMMU2THERAPY, 2+ INJECTIONS: ICD-10-PCS | Mod: S$GLB,,, | Performed by: ALLERGY & IMMUNOLOGY

## 2020-07-20 NOTE — TELEPHONE ENCOUNTER
If it does not maker her sleepy, she should take a Cetirizine 10 mg 45 minutes before her injection. If this does not help, we can do oral steroids on the day of your injection

## 2020-07-20 NOTE — TELEPHONE ENCOUNTER
, Patient has itchy eyes and uses drops for this. Today she stated that the day after she gets her allergy shot she notices more itching of her eyes.Please advise.

## 2020-07-21 ENCOUNTER — PATIENT OUTREACH (OUTPATIENT)
Dept: ADMINISTRATIVE | Facility: HOSPITAL | Age: 35
End: 2020-07-21

## 2020-07-21 NOTE — PROGRESS NOTES
I have contacted patient to schedule over due diabetic eye exam.  Appointment scheduled 9-10-20 with Dr Rui Villeda

## 2020-07-24 ENCOUNTER — CLINICAL SUPPORT (OUTPATIENT)
Dept: ALLERGY | Facility: CLINIC | Age: 35
End: 2020-07-24
Payer: COMMERCIAL

## 2020-07-24 DIAGNOSIS — J30.1 SEASONAL ALLERGIC RHINITIS DUE TO POLLEN: Chronic | ICD-10-CM

## 2020-07-24 DIAGNOSIS — J30.89 ALLERGIC RHINITIS DUE TO DERMATOPHAGOIDES PTERONYSSINUS: ICD-10-CM

## 2020-07-24 DIAGNOSIS — J30.89 ALLERGIC RHINITIS DUE TO DERMATOPHAGOIDES FARINAE: ICD-10-CM

## 2020-07-24 PROCEDURE — 99499 UNLISTED E&M SERVICE: CPT | Mod: S$GLB,,, | Performed by: ALLERGY & IMMUNOLOGY

## 2020-07-24 PROCEDURE — 95117 PR IMMU2THERAPY, 2+ INJECTIONS: ICD-10-PCS | Mod: S$GLB,,, | Performed by: ALLERGY & IMMUNOLOGY

## 2020-07-24 PROCEDURE — 99499 NO LOS: ICD-10-PCS | Mod: S$GLB,,, | Performed by: ALLERGY & IMMUNOLOGY

## 2020-07-24 PROCEDURE — 95117 IMMUNOTHERAPY INJECTIONS: CPT | Mod: S$GLB,,, | Performed by: ALLERGY & IMMUNOLOGY

## 2020-07-25 DIAGNOSIS — E55.9 VITAMIN D DEFICIENCY: Chronic | ICD-10-CM

## 2020-07-25 RX ORDER — ERGOCALCIFEROL 1.25 MG/1
50000 CAPSULE ORAL
Qty: 12 CAPSULE | Refills: 1 | Status: SHIPPED | OUTPATIENT
Start: 2020-07-25 | End: 2020-09-23 | Stop reason: SDUPTHER

## 2020-07-28 ENCOUNTER — CLINICAL SUPPORT (OUTPATIENT)
Dept: ALLERGY | Facility: CLINIC | Age: 35
End: 2020-07-28
Payer: COMMERCIAL

## 2020-07-28 DIAGNOSIS — J30.89 ALLERGIC RHINITIS DUE TO DERMATOPHAGOIDES PTERONYSSINUS: ICD-10-CM

## 2020-07-28 DIAGNOSIS — J30.1 SEASONAL ALLERGIC RHINITIS DUE TO POLLEN: Chronic | ICD-10-CM

## 2020-07-28 DIAGNOSIS — J30.89 ALLERGIC RHINITIS DUE TO DERMATOPHAGOIDES FARINAE: ICD-10-CM

## 2020-07-28 PROCEDURE — 95117 PR IMMU2THERAPY, 2+ INJECTIONS: ICD-10-PCS | Mod: S$GLB,,, | Performed by: ALLERGY & IMMUNOLOGY

## 2020-07-28 PROCEDURE — 99999 PR PBB SHADOW E&M-EST. PATIENT-LVL III: CPT | Mod: PBBFAC,,,

## 2020-07-28 PROCEDURE — 99999 PR PBB SHADOW E&M-EST. PATIENT-LVL III: ICD-10-PCS | Mod: PBBFAC,,,

## 2020-07-28 PROCEDURE — 95117 IMMUNOTHERAPY INJECTIONS: CPT | Mod: S$GLB,,, | Performed by: ALLERGY & IMMUNOLOGY

## 2020-07-31 ENCOUNTER — CLINICAL SUPPORT (OUTPATIENT)
Dept: ALLERGY | Facility: CLINIC | Age: 35
End: 2020-07-31
Payer: COMMERCIAL

## 2020-07-31 DIAGNOSIS — J30.1 SEASONAL ALLERGIC RHINITIS DUE TO POLLEN: Chronic | ICD-10-CM

## 2020-07-31 DIAGNOSIS — J30.89 ALLERGIC RHINITIS DUE TO DERMATOPHAGOIDES FARINAE: ICD-10-CM

## 2020-07-31 DIAGNOSIS — J30.89 ALLERGIC RHINITIS DUE TO DERMATOPHAGOIDES PTERONYSSINUS: ICD-10-CM

## 2020-07-31 PROCEDURE — 99999 PR PBB SHADOW E&M-EST. PATIENT-LVL III: ICD-10-PCS | Mod: PBBFAC,,,

## 2020-07-31 PROCEDURE — 99999 PR PBB SHADOW E&M-EST. PATIENT-LVL III: CPT | Mod: PBBFAC,,,

## 2020-07-31 PROCEDURE — 95117 IMMUNOTHERAPY INJECTIONS: CPT | Mod: S$GLB,,, | Performed by: ALLERGY & IMMUNOLOGY

## 2020-07-31 PROCEDURE — 99499 UNLISTED E&M SERVICE: CPT | Mod: S$GLB,,, | Performed by: ALLERGY & IMMUNOLOGY

## 2020-07-31 PROCEDURE — 95117 PR IMMU2THERAPY, 2+ INJECTIONS: ICD-10-PCS | Mod: S$GLB,,, | Performed by: ALLERGY & IMMUNOLOGY

## 2020-07-31 PROCEDURE — 99499 NO LOS: ICD-10-PCS | Mod: S$GLB,,, | Performed by: ALLERGY & IMMUNOLOGY

## 2020-08-04 ENCOUNTER — CLINICAL SUPPORT (OUTPATIENT)
Dept: ALLERGY | Facility: CLINIC | Age: 35
End: 2020-08-04
Payer: COMMERCIAL

## 2020-08-04 DIAGNOSIS — J30.1 SEASONAL ALLERGIC RHINITIS DUE TO POLLEN: Chronic | ICD-10-CM

## 2020-08-04 DIAGNOSIS — J30.89 ALLERGIC RHINITIS DUE TO DERMATOPHAGOIDES FARINAE: ICD-10-CM

## 2020-08-04 DIAGNOSIS — J30.89 ALLERGIC RHINITIS DUE TO DERMATOPHAGOIDES PTERONYSSINUS: ICD-10-CM

## 2020-08-04 PROCEDURE — 99999 PR PBB SHADOW E&M-EST. PATIENT-LVL I: ICD-10-PCS | Mod: PBBFAC,,,

## 2020-08-04 PROCEDURE — 95115 PR IMMUNOTHERAPY, ONE INJECTION: ICD-10-PCS | Mod: S$GLB,,, | Performed by: ALLERGY & IMMUNOLOGY

## 2020-08-04 PROCEDURE — 99999 PR PBB SHADOW E&M-EST. PATIENT-LVL I: CPT | Mod: PBBFAC,,,

## 2020-08-04 PROCEDURE — 95115 IMMUNOTHERAPY ONE INJECTION: CPT | Mod: S$GLB,,, | Performed by: ALLERGY & IMMUNOLOGY

## 2020-08-04 PROCEDURE — 99499 UNLISTED E&M SERVICE: CPT | Mod: S$GLB,,, | Performed by: ALLERGY & IMMUNOLOGY

## 2020-08-04 PROCEDURE — 99499 NO LOS: ICD-10-PCS | Mod: S$GLB,,, | Performed by: ALLERGY & IMMUNOLOGY

## 2020-08-07 ENCOUNTER — CLINICAL SUPPORT (OUTPATIENT)
Dept: ALLERGY | Facility: CLINIC | Age: 35
End: 2020-08-07
Payer: COMMERCIAL

## 2020-08-07 DIAGNOSIS — J30.89 ALLERGIC RHINITIS DUE TO DERMATOPHAGOIDES PTERONYSSINUS: ICD-10-CM

## 2020-08-07 DIAGNOSIS — J30.1 SEASONAL ALLERGIC RHINITIS DUE TO POLLEN: Chronic | ICD-10-CM

## 2020-08-07 DIAGNOSIS — J30.89 ALLERGIC RHINITIS DUE TO DERMATOPHAGOIDES FARINAE: ICD-10-CM

## 2020-08-07 PROCEDURE — 95117 PR IMMU2THERAPY, 2+ INJECTIONS: ICD-10-PCS | Mod: S$GLB,,, | Performed by: ALLERGY & IMMUNOLOGY

## 2020-08-07 PROCEDURE — 95117 IMMUNOTHERAPY INJECTIONS: CPT | Mod: S$GLB,,, | Performed by: ALLERGY & IMMUNOLOGY

## 2020-08-07 PROCEDURE — 99499 UNLISTED E&M SERVICE: CPT | Mod: S$GLB,,, | Performed by: ALLERGY & IMMUNOLOGY

## 2020-08-07 PROCEDURE — 99499 NO LOS: ICD-10-PCS | Mod: S$GLB,,, | Performed by: ALLERGY & IMMUNOLOGY

## 2020-08-10 ENCOUNTER — CLINICAL SUPPORT (OUTPATIENT)
Dept: ALLERGY | Facility: CLINIC | Age: 35
End: 2020-08-10
Payer: COMMERCIAL

## 2020-08-10 DIAGNOSIS — J30.1 SEASONAL ALLERGIC RHINITIS DUE TO POLLEN: Chronic | ICD-10-CM

## 2020-08-10 PROCEDURE — 95117 IMMUNOTHERAPY INJECTIONS: CPT | Mod: S$GLB,,, | Performed by: ALLERGY & IMMUNOLOGY

## 2020-08-10 PROCEDURE — 95117 PR IMMU2THERAPY, 2+ INJECTIONS: ICD-10-PCS | Mod: S$GLB,,, | Performed by: ALLERGY & IMMUNOLOGY

## 2020-08-10 PROCEDURE — 99499 NO LOS: ICD-10-PCS | Mod: S$GLB,,, | Performed by: ALLERGY & IMMUNOLOGY

## 2020-08-10 PROCEDURE — 99499 UNLISTED E&M SERVICE: CPT | Mod: S$GLB,,, | Performed by: ALLERGY & IMMUNOLOGY

## 2020-08-10 NOTE — PROGRESS NOTES
See Flowsheet for immunotherapy administration. Patient waited in clinic 30 min for observation.

## 2020-08-11 ENCOUNTER — TELEPHONE (OUTPATIENT)
Dept: ALLERGY | Facility: CLINIC | Age: 35
End: 2020-08-11

## 2020-08-11 RX ORDER — PREDNISONE 5 MG/1
5 TABLET ORAL
Qty: 30 TABLET | Refills: 0 | Status: SHIPPED | OUTPATIENT
Start: 2020-08-11 | End: 2020-11-16

## 2020-08-11 NOTE — TELEPHONE ENCOUNTER
----- Message from Ellen Durán sent at 8/11/2020  9:29 AM CDT -----  Contact: pt  The pt wants to be schedule on 08/14/2020 for a allergy shot, no additional info given and can be reached at [phone]///[email or web address]

## 2020-08-14 ENCOUNTER — CLINICAL SUPPORT (OUTPATIENT)
Dept: ALLERGY | Facility: CLINIC | Age: 35
End: 2020-08-14
Payer: COMMERCIAL

## 2020-08-14 DIAGNOSIS — J45.40 MODERATE PERSISTENT ASTHMA WITHOUT COMPLICATION: ICD-10-CM

## 2020-08-14 DIAGNOSIS — J30.9 CHRONIC ALLERGIC RHINITIS: ICD-10-CM

## 2020-08-14 DIAGNOSIS — J30.2 CHRONIC SEASONAL ALLERGIC RHINITIS: ICD-10-CM

## 2020-08-14 PROCEDURE — 95117 PR IMMU2THERAPY, 2+ INJECTIONS: ICD-10-PCS | Mod: S$GLB,,, | Performed by: ALLERGY & IMMUNOLOGY

## 2020-08-14 PROCEDURE — 99499 UNLISTED E&M SERVICE: CPT | Mod: S$GLB,,, | Performed by: ALLERGY & IMMUNOLOGY

## 2020-08-14 PROCEDURE — 95117 IMMUNOTHERAPY INJECTIONS: CPT | Mod: S$GLB,,, | Performed by: ALLERGY & IMMUNOLOGY

## 2020-08-14 PROCEDURE — 99499 NO LOS: ICD-10-PCS | Mod: S$GLB,,, | Performed by: ALLERGY & IMMUNOLOGY

## 2020-08-17 ENCOUNTER — CLINICAL SUPPORT (OUTPATIENT)
Dept: ALLERGY | Facility: CLINIC | Age: 35
End: 2020-08-17
Payer: COMMERCIAL

## 2020-08-17 DIAGNOSIS — J30.2 CHRONIC SEASONAL ALLERGIC RHINITIS: ICD-10-CM

## 2020-08-17 DIAGNOSIS — J45.40 MODERATE PERSISTENT ASTHMA WITHOUT COMPLICATION: ICD-10-CM

## 2020-08-17 PROCEDURE — 99499 NO LOS: ICD-10-PCS | Mod: S$GLB,,, | Performed by: ALLERGY & IMMUNOLOGY

## 2020-08-17 PROCEDURE — 95117 IMMUNOTHERAPY INJECTIONS: CPT | Mod: S$GLB,,, | Performed by: ALLERGY & IMMUNOLOGY

## 2020-08-17 PROCEDURE — 95117 PR IMMU2THERAPY, 2+ INJECTIONS: ICD-10-PCS | Mod: S$GLB,,, | Performed by: ALLERGY & IMMUNOLOGY

## 2020-08-17 PROCEDURE — 99499 UNLISTED E&M SERVICE: CPT | Mod: S$GLB,,, | Performed by: ALLERGY & IMMUNOLOGY

## 2020-08-17 NOTE — PROGRESS NOTES
See Flowsheet for immunotherapy administration. Patient waited in clinic 30 min for observation. Pt had epi pen on hand.

## 2020-08-21 ENCOUNTER — CLINICAL SUPPORT (OUTPATIENT)
Dept: ALLERGY | Facility: CLINIC | Age: 35
End: 2020-08-21
Payer: COMMERCIAL

## 2020-08-21 DIAGNOSIS — J30.89 ALLERGIC RHINITIS DUE TO DERMATOPHAGOIDES PTERONYSSINUS: ICD-10-CM

## 2020-08-21 DIAGNOSIS — J30.89 ALLERGIC RHINITIS DUE TO DERMATOPHAGOIDES FARINAE: ICD-10-CM

## 2020-08-21 DIAGNOSIS — J30.1 SEASONAL ALLERGIC RHINITIS DUE TO POLLEN: Chronic | ICD-10-CM

## 2020-08-21 PROCEDURE — 99999 PR PBB SHADOW E&M-EST. PATIENT-LVL II: ICD-10-PCS | Mod: PBBFAC,,,

## 2020-08-21 PROCEDURE — 99999 PR PBB SHADOW E&M-EST. PATIENT-LVL II: CPT | Mod: PBBFAC,,,

## 2020-08-21 PROCEDURE — 95117 PR IMMU2THERAPY, 2+ INJECTIONS: ICD-10-PCS | Mod: S$GLB,,, | Performed by: ALLERGY & IMMUNOLOGY

## 2020-08-21 PROCEDURE — 99499 UNLISTED E&M SERVICE: CPT | Mod: S$GLB,,, | Performed by: ALLERGY & IMMUNOLOGY

## 2020-08-21 PROCEDURE — 95117 IMMUNOTHERAPY INJECTIONS: CPT | Mod: S$GLB,,, | Performed by: ALLERGY & IMMUNOLOGY

## 2020-08-21 PROCEDURE — 99499 NO LOS: ICD-10-PCS | Mod: S$GLB,,, | Performed by: ALLERGY & IMMUNOLOGY

## 2020-08-28 ENCOUNTER — TELEPHONE (OUTPATIENT)
Dept: ALLERGY | Facility: CLINIC | Age: 35
End: 2020-08-28

## 2020-08-28 ENCOUNTER — OFFICE VISIT (OUTPATIENT)
Dept: FAMILY MEDICINE | Facility: CLINIC | Age: 35
End: 2020-08-28
Payer: COMMERCIAL

## 2020-08-28 VITALS — WEIGHT: 293 LBS | BODY MASS INDEX: 50.8 KG/M2

## 2020-08-28 DIAGNOSIS — Z13.6 ENCOUNTER FOR LIPID SCREENING FOR CARDIOVASCULAR DISEASE: ICD-10-CM

## 2020-08-28 DIAGNOSIS — E11.65 TYPE 2 DIABETES MELLITUS WITH HYPERGLYCEMIA, WITHOUT LONG-TERM CURRENT USE OF INSULIN: ICD-10-CM

## 2020-08-28 DIAGNOSIS — E66.2 CLASS 3 OBESITY WITH ALVEOLAR HYPOVENTILATION, SERIOUS COMORBIDITY, AND BODY MASS INDEX (BMI) OF 50.0 TO 59.9 IN ADULT: ICD-10-CM

## 2020-08-28 DIAGNOSIS — Z11.59 NEED FOR HEPATITIS C SCREENING TEST: ICD-10-CM

## 2020-08-28 DIAGNOSIS — Z13.220 ENCOUNTER FOR LIPID SCREENING FOR CARDIOVASCULAR DISEASE: ICD-10-CM

## 2020-08-28 DIAGNOSIS — L98.9 SKIN LESION: ICD-10-CM

## 2020-08-28 DIAGNOSIS — Z79.899 ENCOUNTER FOR LONG-TERM (CURRENT) USE OF MEDICATIONS: ICD-10-CM

## 2020-08-28 DIAGNOSIS — F98.8 NAIL BITING: ICD-10-CM

## 2020-08-28 DIAGNOSIS — Z11.4 ENCOUNTER FOR SCREENING FOR HIV: ICD-10-CM

## 2020-08-28 DIAGNOSIS — Z00.00 WELL ADULT EXAM: Primary | ICD-10-CM

## 2020-08-28 DIAGNOSIS — K59.00 CONSTIPATION, UNSPECIFIED CONSTIPATION TYPE: ICD-10-CM

## 2020-08-28 DIAGNOSIS — F41.9 ANXIETY: ICD-10-CM

## 2020-08-28 PROBLEM — E66.813 CLASS 3 OBESITY WITH ALVEOLAR HYPOVENTILATION, SERIOUS COMORBIDITY, AND BODY MASS INDEX (BMI) OF 50.0 TO 59.9 IN ADULT: Chronic | Status: ACTIVE | Noted: 2019-09-20

## 2020-08-28 PROCEDURE — 3044F HG A1C LEVEL LT 7.0%: CPT | Mod: CPTII,,, | Performed by: FAMILY MEDICINE

## 2020-08-28 PROCEDURE — 99395 PR PREVENTIVE VISIT,EST,18-39: ICD-10-PCS | Mod: 95,,, | Performed by: FAMILY MEDICINE

## 2020-08-28 PROCEDURE — 3044F PR MOST RECENT HEMOGLOBIN A1C LEVEL <7.0%: ICD-10-PCS | Mod: CPTII,,, | Performed by: FAMILY MEDICINE

## 2020-08-28 PROCEDURE — 99395 PREV VISIT EST AGE 18-39: CPT | Mod: 95,,, | Performed by: FAMILY MEDICINE

## 2020-08-28 RX ORDER — BISACODYL 5 MG
5 TABLET, DELAYED RELEASE (ENTERIC COATED) ORAL DAILY PRN
Qty: 30 TABLET | Refills: 0 | Status: SHIPPED | OUTPATIENT
Start: 2020-08-28 | End: 2020-09-27

## 2020-08-28 RX ORDER — PHENTERMINE HYDROCHLORIDE 37.5 MG/1
37.5 TABLET ORAL
Qty: 30 TABLET | Refills: 0 | Status: SHIPPED | OUTPATIENT
Start: 2020-08-28 | End: 2020-09-23 | Stop reason: SDUPTHER

## 2020-08-28 RX ORDER — LIRAGLUTIDE 6 MG/ML
1.8 INJECTION SUBCUTANEOUS DAILY
Qty: 27 ML | Refills: 3 | Status: SHIPPED | OUTPATIENT
Start: 2020-08-28 | End: 2020-10-02 | Stop reason: SINTOL

## 2020-08-28 RX ORDER — CETIRIZINE HYDROCHLORIDE 10 MG/1
10 TABLET ORAL DAILY PRN
COMMUNITY
Start: 2020-07-23 | End: 2020-11-16

## 2020-08-28 RX ORDER — DOCUSATE SODIUM 100 MG/1
100 CAPSULE, LIQUID FILLED ORAL 2 TIMES DAILY PRN
Qty: 60 CAPSULE | Refills: 0 | Status: SHIPPED | OUTPATIENT
Start: 2020-08-28 | End: 2020-09-07

## 2020-08-28 RX ORDER — ESCITALOPRAM OXALATE 20 MG/1
20 TABLET ORAL DAILY
Qty: 90 TABLET | Refills: 3 | Status: SHIPPED | OUTPATIENT
Start: 2020-08-28 | End: 2020-11-16

## 2020-08-28 RX ORDER — SPIRONOLACTONE 50 MG/1
50 TABLET, FILM COATED ORAL DAILY
Qty: 30 TABLET | Refills: 11 | Status: SHIPPED | OUTPATIENT
Start: 2020-08-28 | End: 2020-10-19 | Stop reason: SDUPTHER

## 2020-08-28 NOTE — PATIENT INSTRUCTIONS
Follow up in about 2 months (around 10/28/2020), or if symptoms worsen or fail to improve, for As scheduled.     If no improvement in symptoms or symptoms worsen, please be advised to call MD, follow-up at clinic and/or go to ER if becomes severe.    Dante Negro M.D.        We Offer TELEHEALTH & Same Day Appointments!   Book your Telehealth appointment with me through my nurse or   Clinic appointments on [employer]!    [address] [place] [address]    Office: [phone]   FAX: [phone]    Check out my Facebook Page and Follow Me at: [email or web address]    Check out my website at [employer] by clicking on: [email or web address]    To Schedule appointments online, go to [email or web address] [email or web address]

## 2020-08-28 NOTE — PROGRESS NOTES
Primary Care Telemedicine Note    The patient location is:  Patient Home - [place]  The chief complaint leading to consultation is:   Chief Complaint   Patient presents with    Annual Exam      Total time spent with patient:  11 min    Visit type: Virtual visit with synchronous audio only and video  Each patient to whom he or she provides medical services by telemedicine is:  (1) informed of the relationship between the physician and patient and the respective role of any other health care provider with respect to management of the patient; and (2) notified that he or she may decline to receive medical services by telemedicine and may withdraw from such care at any time.  =================================================================  This note is specifically for wellness visit performed today.     WELLNESS EXAM      Patient ID: Lucía Coreas is a 34 y.o. female with  has a past medical history of Allergy, Amenorrhea, Back pain, GERD (gastroesophageal reflux disease), Infertility associated with anovulation, Iron deficiency anemia, Knee pain, Metabolic syndrome, PCO (polycystic ovaries), Plantar fasciitis, Recurrent boils, Reflux esophagitis, Sinusitis, and Vaginitis.     Chief Complaint:  Encounter for wellness exam    Well Adult Physical: Patient here for a comprehensive physical exam.The patient reports CHRONIC problems.  SEE OTHER NOTE FOR DETAILED CHRONIC CONDITIONS  Do you take any herbs or supplements that were not prescribed by a doctor? no   Are you taking calcium supplements? no   Are you taking aspirin daily? no       History:  PCOS with super morbid obesity and diabetes.  Patient failed metformin due to side effects.  OBGYN: [facility]    MMG:  Not indicated  PAP:  Up-to-date  Colonoscopy:  Not indicated    Health Maintenance Topics with due status: Not Due       Topic Last Completion Date    TETANUS VACCINE 05/09/2017    Influenza Vaccine 09/20/2019    Cervical Cancer Screening  2019    Hemoglobin A1c 2020        ==============================================  History reviewed.   Health Maintenance Due   Topic Date Due    Hepatitis C Screening  1985    Foot Exam  09/10/1995    Eye Exam  09/10/1995    HIV Screening  09/10/2000    Pneumococcal Vaccine (Medium Risk) (1 of 1 - PPSV23) 09/10/2004    Lipid Panel  2020    Urine Microalbumin  2020       Past Medical History:  Past Medical History:   Diagnosis Date    Allergy     Amenorrhea     Back pain     GERD (gastroesophageal reflux disease)     Infertility associated with anovulation     Iron deficiency anemia     Knee pain     Metabolic syndrome     PCO (polycystic ovaries)     Plantar fasciitis     Recurrent boils     Reflux esophagitis     Sinusitis     Vaginitis      Past Surgical History:   Procedure Laterality Date    CARPAL TUNNEL RELEASE Left 2019     SECTION       Review of patient's allergies indicates:   Allergen Reactions    Metformin Diarrhea    Sulfa (sulfonamide antibiotics) Anaphylaxis and Swelling     Swelling (eyes)^, Swelling (throat)^  Swelling (eyes)^, Swelling (throat)^      Diclofenac      Gastritis     Shellfish containing products      anaphylaxis     Current Outpatient Medications on File Prior to Visit   Medication Sig Dispense Refill    albuterol (PROVENTIL/VENTOLIN HFA) 90 mcg/actuation inhaler Inhale 2 puffs into the lungs every 4 (four) hours as needed for Wheezing. 18 g 3    azelastine (ASTELIN) 137 mcg (0.1 %) nasal spray 1 spray (137 mcg total) by Nasal route 2 (two) times daily. 20 mL 5    cetirizine (ZYRTEC) 10 MG tablet Take 10 mg by mouth once daily.      clindamycin (CLEOCIN T) 1 % lotion Apply topically 2 (two) times daily. face 60 mL 1    EPINEPHrine (EPIPEN 2-MARTIN) 0.3 mg/0.3 mL AtIn As directed. 2 Device 4    ergocalciferol (ERGOCALCIFEROL) 50,000 unit Cap Take 1 capsule (50,000 Units total) by mouth every 7 days. 12 capsule 1  "   fluticasone propionate (FLONASE) 50 mcg/actuation nasal spray 1 spray (50 mcg total) by Each Nostril route 2 (two) times daily. 1 Bottle 11    hydrOXYzine HCL (ATARAX) 25 MG tablet Take 1 tablet (25 mg total) by mouth 4 (four) times daily as needed for Anxiety. 45 tablet 0    ibuprofen (ADVIL,MOTRIN) 800 MG tablet TK 1 T PO Q 8 H PRF PAIN  0    iron-vit c-vit b12-folic acid (IRON-C PLUS) tablet Take 1 tablet by mouth once daily. 90 tablet 3    levocetirizine (XYZAL) 5 MG tablet Take 1 tablet (5 mg total) by mouth every evening. 30 tablet 11    mupirocin (BACTROBAN) 2 % ointment Apply topically 3 (three) times daily. 1 Tube 1    NOVOFINE PLUS 32 gauge x 1/6" Ndle       pantoprazole (PROTONIX) 40 MG tablet Take 1 tablet (40 mg total) by mouth once daily. 90 tablet 3    predniSONE (DELTASONE) 5 MG tablet Take 1 tablet (5 mg total) by mouth as needed. Take tow tablets the night before your allergy immunotherapy injection 30 tablet 0     No current facility-administered medications on file prior to visit.      Social History     Socioeconomic History    Marital status:      Spouse name: Not on file    Number of children: Not on file    Years of education: Not on file    Highest education level: Not on file   Occupational History    Not on file   Social Needs    Financial resource strain: Not on file    Food insecurity     Worry: Not on file     Inability: Not on file    Transportation needs     Medical: Not on file     Non-medical: Not on file   Tobacco Use    Smoking status: Never Smoker    Smokeless tobacco: Never Used   Substance and Sexual Activity    Alcohol use: Never     Frequency: Never    Drug use: Never    Sexual activity: Yes     Partners: Female     Birth control/protection: None   Lifestyle    Physical activity     Days per week: Not on file     Minutes per session: Not on file    Stress: Not on file   Relationships    Social connections     Talks on phone: Not on file     " Gets together: Not on file     Attends Jainism service: Not on file     Active member of club or organization: Not on file     Attends meetings of clubs or organizations: Not on file     Relationship status: Not on file   Other Topics Concern    Not on file   Social History Narrative    Not on file     Family History   Problem Relation Age of Onset    Diabetes Mother     Lupus Mother     Diabetes Father     Heart disease Father     Hypertension Father     Cancer Father         prostate    Cancer Sister         ovarian       SEE OTHER NOTE FOR FOR REVIEW OF SYSTEMS.  OTHERWISE NEGATIVE.    Objective:    Nursing note and vitals reviewed.  There were no vitals filed for this visit.  Body mass index is 50.8 kg/m².     Physical Exam  Constitutional:       General: She is not in acute distress.     Appearance: She is well-developed. She is not ill-appearing, toxic-appearing or diaphoretic.   HENT:      Head: Normocephalic and atraumatic.      Right Ear: Hearing and external ear normal.      Left Ear: Hearing and external ear normal.   Eyes:      General: Lids are normal.      Conjunctiva/sclera: Conjunctivae normal.   Neck:      Musculoskeletal: Normal range of motion.   Pulmonary:      Effort: Pulmonary effort is normal. No respiratory distress.   Musculoskeletal: Normal range of motion.   Skin:     Coloration: Skin is not pale.      Findings: Lesion present.      Comments: Patient reports multiple skin lesions in axilla and groin   Neurological:      Mental Status: She is alert. She is not disoriented.   Psychiatric:         Attention and Perception: She is attentive.         Mood and Affect: Mood is anxious and depressed.         Speech: Speech is not rapid and pressured or slurred.         Behavior: Behavior normal. Behavior is not agitated, aggressive or hyperactive. Behavior is cooperative.         Thought Content: Thought content normal. Thought content is not paranoid or delusional. Thought content does  not include homicidal or suicidal ideation. Thought content does not include homicidal or suicidal plan.         Cognition and Memory: Memory is not impaired.         Judgment: Judgment normal.       Hemoglobin A1C   Date Value Ref Range Status   03/27/2020 5.5 4.0 - 5.6 % Final     Comment:     ADA Screening Guidelines:  5.7-6.4%  Consistent with prediabetes  >or=6.5%  Consistent with diabetes  High levels of fetal hemoglobin interfere with the HbA1C  assay. Heterozygous hemoglobin variants (HbS, HgC, etc)do  not significantly interfere with this assay.   However, presence of multiple variants may affect accuracy.       Lab Results   Component Value Date    WBC 9.41 12/26/2019    HGB 12.2 12/26/2019    HCT 39.3 12/26/2019    MCV 75 (L) 12/26/2019     12/26/2019         Chemistry        Component Value Date/Time     12/26/2019 0959    K 3.4 (L) 12/26/2019 0959     12/26/2019 0959    CO2 24 12/26/2019 0959    BUN 8 12/26/2019 0959    CREATININE 0.7 12/26/2019 0959     12/26/2019 0959        Component Value Date/Time    CALCIUM 9.0 12/26/2019 0959    ALKPHOS 67 12/26/2019 0959    AST 15 12/26/2019 0959    ALT 18 12/26/2019 0959    BILITOT 0.2 12/26/2019 0959    ESTGFRAFRICA >60 12/26/2019 0959    EGFRNONAA >60 12/26/2019 0959          Lab Results   Component Value Date    TSH 1.053 12/26/2019    F7PPOVQ 9.4 07/16/2019    T3FREE 2.9 07/16/2019     Lab Results   Component Value Date    CHOL 153 07/16/2019     Lab Results   Component Value Date    HDL 50 07/16/2019     Lab Results   Component Value Date    LDLCALC 87.0 07/16/2019     Lab Results   Component Value Date    TRIG 80 07/16/2019     Lab Results   Component Value Date    CHOLHDL 32.7 07/16/2019     CULTURE OF ABSCESS REVIEWED.  NO GROWTH.    Assessment / Plan:      1.  ANNUAL WELLNESS EXAM -patient here for annual wellness exam.  Labs ordered.  Health maintenance was reviewed and ordered.  Medications were reviewed and  reconciled.    FOLLOW-UP WITH SPECIALIST CONCERNING NONHEALING SKIN LESIONS.  I SUSPECT SHE MAY HAVE HS.  START SPIRONOLACTONE FOR HORMONE CONTROL.    SEE OTHER NOTE FOR OTHER CHRONIC CONDITIONS.    Complete history and physical was completed today.  Complete and thorough medication reconciliation was performed.  Discussed risks and benefits of medications.  Advised patient on orders and health maintenance.  We discussed old records and old labs if available.  Will request any records not available through epic.  Continue current medications listed on your summary sheet.    All questions were answered. Patient had no further concerns. Advised of Wellness plan. Follow up in 1 year for ANNUAL WELLNESS EXAM    Orders Placed This Encounter   Procedures    CBC Without Differential     Standing Status:   Standing     Number of Occurrences:   99     Standing Expiration Date:   10/27/2021    TSH     Standing Status:   Standing     Number of Occurrences:   99     Standing Expiration Date:   10/27/2021    Comprehensive metabolic panel     Standing Status:   Standing     Number of Occurrences:   99     Standing Expiration Date:   8/23/2040    Hemoglobin A1C     Standing Status:   Standing     Number of Occurrences:   99     Standing Expiration Date:   8/23/2040    Lipid Panel     Standing Status:   Standing     Number of Occurrences:   99     Standing Expiration Date:   8/23/2040    Hepatitis C Antibody     Standing Status:   Future     Standing Expiration Date:   10/28/2021    HIV 1/2 Ag/Ab (4th Gen)     Standing Status:   Future     Standing Expiration Date:   10/28/2021    Microalbumin/creatinine urine ratio     Standing Status:   Future     Standing Expiration Date:   10/28/2021     Order Specific Question:   Specimen Source     Answer:   Urine       Medications Ordered This Encounter   Medications    bisacodyL (DULCOLAX) 5 mg EC tablet     Sig: Take 1 tablet (5 mg total) by mouth daily as needed for Constipation.      Dispense:  30 tablet     Refill:  0    docusate sodium (COLACE) 100 MG capsule     Sig: Take 1 capsule (100 mg total) by mouth 2 (two) times daily as needed for Constipation.     Dispense:  60 capsule     Refill:  0    escitalopram oxalate (LEXAPRO) 20 MG tablet     Sig: Take 1 tablet (20 mg total) by mouth once daily.     Dispense:  90 tablet     Refill:  3    liraglutide 0.6 mg/0.1 mL, 18 mg/3 mL, subq PNIJ (VICTOZA 2-MARTIN) 0.6 mg/0.1 mL (18 mg/3 mL) PnIj pen     Sig: Inject 1.8 mg into the skin once daily.     Dispense:  27 mL     Refill:  3    phentermine (ADIPEX-P) 37.5 mg tablet     Sig: Take 1 tablet (37.5 mg total) by mouth before breakfast.     Dispense:  30 tablet     Refill:  0    spironolactone (ALDACTONE) 50 MG tablet     Sig: Take 1 tablet (50 mg total) by mouth once daily.     Dispense:  30 tablet     Refill:  11     .          Dante Negro MD

## 2020-08-28 NOTE — Clinical Note
Good afternoon!  I saw Lucía today and she states that her skin lesions are not improving on the antibiotics.  Do think this could be HS?  Are you opposed to a trial of spironolactone?

## 2020-08-28 NOTE — Clinical Note
CHECK WITH PATIENT TO SEE IF SHE IS SEEING DR. BOWEN FOR HER EYES.  I SEE THAT SHE HAS AN APPOINTMENT WITH AN OPTOMETRIST WITH [facility].  CANCEL THAT APPOINTMENT IF SHE DOES NOT NEEDED.  ALSO CHECK TO SEE IF SHE GOT HER INCREASE DOSAGE OF VICTOZA AND NOTIFY ME IF ANY PROBLEMS.    HAVE PATIENT FOLLOW-UP WITH DERMATOLOGY

## 2020-08-28 NOTE — TELEPHONE ENCOUNTER
----- Message from Jasper Shelton sent at 8/28/2020 11:40 AM CDT -----  Regarding: Injection  Contact: Pt  Pt is calling the staff regarding an allergy injection for Monday August 31, 2020 for an early appt    Pt call back to be advised [phone]    thanks

## 2020-08-29 PROBLEM — L98.9 SKIN LESION: Status: ACTIVE | Noted: 2020-08-29

## 2020-08-29 PROBLEM — F98.8 NAIL BITING: Status: ACTIVE | Noted: 2020-08-29

## 2020-08-29 NOTE — ASSESSMENT & PLAN NOTE
ADD STOOL SOFTENER TO HER DULCOLAX.  INCREASE WATER INTAKE.  NO IMPROVEMENT WILL CONSIDER ADDING LINZESS.  Discussed constipation condition course.  Advised increase daily water intake to an acceptable level.  Increase fiber.  Recommend stool softener and laxative if needed.  Goal of 1 bowel movement daily.  Follow-up to clinic or notify me if no improvement or symptoms are persistent or worsening.  ER precautions were given.  Recommend daily exercise as tolerated, adequate water intake (six 8-oz glasses of water daily), and high fiber diet. OTC fiber supplements are recommended if diet does not reach daily fiber goal (20-30 grams daily), such as Metamucil, Citrucel, or FiberCon (take as directed, separate from other oral medications by >2 hours).  - CONTINUE OTC stool softener such as Colace as directed to avoid hard stools and straining with bowel movements PRN  -If still no improvement with these measures, call/follow-up

## 2020-08-29 NOTE — ASSESSMENT & PLAN NOTE
AUGUST 2020:  INCREASE VICTOZA TO 1.8 MG DAILY.  UPDATE LABS.  PATIENT DENIES ANY ISSUES WITH HER EYES OR FEET.    PREVIOUS PLAN:  Continue Victoza but decreased by a few clicks to see if nausea improves.  May need to switch to alternative medication if no improvement.  Patient reports that she is not losing a lot of weight however she has not weighed herself recently.  [email or web address] vitals reporting was sent to the patient.    Monitor hemoglobin A1c.  Discussed diabetic diet and exercise protocol.  Continue medications.  Monitor for side effects.  Discussed checking blood glucose.  Discussed symptoms to monitor for and to notify me immediately if persistent or worsening.  Follow up with Ophthalmology/Optometry and Podiatry.

## 2020-08-29 NOTE — ASSESSMENT & PLAN NOTE
REVIEWED LABS WITH THE PATIENT.  MEDICATIONS ADJUSTED PER MED RECONCILIATION.  LAB ORDERS PLACED FOR ANNUAL WELLNESS.

## 2020-08-29 NOTE — ASSESSMENT & PLAN NOTE
INCREASE LEXAPRO TO 20 MG.  CONTINUE HYDROXYZINE AS NEEDED FOR PANIC ATTACKS.  DISCUSSED SLEEP HYGIENE.    Discussed anxiety condition course.  Discussed SSRI as first-line treatment for this condition.  Discussed risk of discontinuing this medication without tapering.  Patient was educated, advised of side effects, and all questions were answered.  Patient voiced understanding.  Patient will follow up routinely and notify us if having any side effects or worsening or persistent symptoms.  ER precautions were given. Antidepressant/Antianxiety Medication Initiation:  Patient informed of risks, benefits, and potential side effects of medication and accepts informed consent.  Common side effects include nausea, fatigue, headache, insomnia., Specifically discussed the possibility of new or worsening suicidal thoughts/depression.  Patient instructed to stop the medication immediately and seek urgent treatment if this occurs., Patient instructed not to abruptly discontinue medication without physician guidance except in cases of sudden onset or worsening of SI.

## 2020-08-29 NOTE — ASSESSMENT & PLAN NOTE
I SUSPECT THIS MAY BE HIDRADENITIS SUPPURITIVA  STARTING SPIRONOLACTONE.    PATIENT WILL FOLLOW-UP WITH DERMATOLOGY TO DISCUSS FURTHER.

## 2020-08-29 NOTE — ASSESSMENT & PLAN NOTE
INCREASE VICTOZA.  START PHENTERMINE AS ADJUNCT FOR WEIGHT LOSS.  WILL PLAN TO CONTINUE FOR SIX MONTHS.    PATIENT IS ACTIVELY ENGAGING IN LIFESTYLE MODIFICATION WITH DIET AND EXERCISE.

## 2020-08-29 NOTE — PROGRESS NOTES
Primary Care Telemedicine Note    The patient location is:  Patient Home - [place]  The chief complaint leading to consultation is:   Chief Complaint   Patient presents with    Annual Exam      Total time spent with patient:  11 min    Visit type: Virtual visit with synchronous audio only and video  Each patient to whom he or she provides medical services by telemedicine is:  (1) informed of the relationship between the physician and patient and the respective role of any other health care provider with respect to management of the patient; and (2) notified that he or she may decline to receive medical services by telemedicine and may withdraw from such care at any time.  =================================================================  PLAN:      Problem List Items Addressed This Visit     Encounter for long-term (current) use of medications (Chronic)     REVIEWED LABS WITH THE PATIENT.  MEDICATIONS ADJUSTED PER MED RECONCILIATION.  LAB ORDERS PLACED FOR ANNUAL WELLNESS.         Relevant Orders    CBC Without Differential    TSH    Comprehensive metabolic panel    Hemoglobin A1C    Lipid Panel    Type 2 diabetes mellitus with hyperglycemia, without long-term current use of insulin (Chronic)     AUGUST 2020:  INCREASE VICTOZA TO 1.8 MG DAILY.  UPDATE LABS.  PATIENT DENIES ANY ISSUES WITH HER EYES OR FEET.    PREVIOUS PLAN:  Continue Victoza but decreased by a few clicks to see if nausea improves.  May need to switch to alternative medication if no improvement.  Patient reports that she is not losing a lot of weight however she has not weighed herself recently.  Dianxin vitals reporting was sent to the patient.    Monitor hemoglobin A1c.  Discussed diabetic diet and exercise protocol.  Continue medications.  Monitor for side effects.  Discussed checking blood glucose.  Discussed symptoms to monitor for and to notify me immediately if persistent or worsening.  Follow up with Ophthalmology/Optometry and  Podiatry.           Relevant Medications    liraglutide 0.6 mg/0.1 mL, 18 mg/3 mL, subq PNIJ (VICTOZA 2-MARTIN) 0.6 mg/0.1 mL (18 mg/3 mL) PnIj pen    Other Relevant Orders    Microalbumin/creatinine urine ratio    Class 3 obesity with alveolar hypoventilation, serious comorbidity, and body mass index (BMI) of 50.0 to 59.9 in adult (Chronic)     INCREASE VICTOZA.  START PHENTERMINE AS ADJUNCT FOR WEIGHT LOSS.  WILL PLAN TO CONTINUE FOR SIX MONTHS.    PATIENT IS ACTIVELY ENGAGING IN LIFESTYLE MODIFICATION WITH DIET AND EXERCISE.             Relevant Medications    phentermine (ADIPEX-P) 37.5 mg tablet    Anxiety (Chronic)     INCREASE LEXAPRO TO 20 MG.  CONTINUE HYDROXYZINE AS NEEDED FOR PANIC ATTACKS.  DISCUSSED SLEEP HYGIENE.    Discussed anxiety condition course.  Discussed SSRI as first-line treatment for this condition.  Discussed risk of discontinuing this medication without tapering.  Patient was educated, advised of side effects, and all questions were answered.  Patient voiced understanding.  Patient will follow up routinely and notify us if having any side effects or worsening or persistent symptoms.  ER precautions were given. Antidepressant/Antianxiety Medication Initiation:  Patient informed of risks, benefits, and potential side effects of medication and accepts informed consent.  Common side effects include nausea, fatigue, headache, insomnia., Specifically discussed the possibility of new or worsening suicidal thoughts/depression.  Patient instructed to stop the medication immediately and seek urgent treatment if this occurs., Patient instructed not to abruptly discontinue medication without physician guidance except in cases of sudden onset or worsening of SI.            Relevant Medications    escitalopram oxalate (LEXAPRO) 20 MG tablet    Constipation (Chronic)     ADD STOOL SOFTENER TO HER DULCOLAX.  INCREASE WATER INTAKE.  NO IMPROVEMENT WILL CONSIDER ADDING LINZESS.  Discussed constipation  condition course.  Advised increase daily water intake to an acceptable level.  Increase fiber.  Recommend stool softener and laxative if needed.  Goal of 1 bowel movement daily.  Follow-up to clinic or notify me if no improvement or symptoms are persistent or worsening.  ER precautions were given.  Recommend daily exercise as tolerated, adequate water intake (six 8-oz glasses of water daily), and high fiber diet. OTC fiber supplements are recommended if diet does not reach daily fiber goal (20-30 grams daily), such as Metamucil, Citrucel, or FiberCon (take as directed, separate from other oral medications by >2 hours).  - CONTINUE OTC stool softener such as Colace as directed to avoid hard stools and straining with bowel movements PRN  -If still no improvement with these measures, call/follow-up           Relevant Medications    docusate sodium (COLACE) 100 MG capsule    bisacodyL (DULCOLAX) 5 mg EC tablet    Skin lesion     I SUSPECT THIS MAY BE HIDRADENITIS SUPPURITIVA  STARTING SPIRONOLACTONE.    PATIENT WILL FOLLOW-UP WITH DERMATOLOGY TO DISCUSS FURTHER.         Relevant Medications    spironolactone (ALDACTONE) 50 MG tablet    Nail biting     INCREASE SSRI.  DISCUSSED TECHNIQUES TO DECREASE THIS BEHAVIOR.           Other Visit Diagnoses     Well adult exam    -  Primary    Relevant Orders    CBC Without Differential    TSH    Comprehensive metabolic panel    Hemoglobin A1C    Lipid Panel    Encounter for lipid screening for cardiovascular disease        Relevant Orders    Lipid Panel    Need for hepatitis C screening test        Relevant Orders    Hepatitis C Antibody    Encounter for screening for HIV        Relevant Orders    HIV 1/2 Ag/Ab (4th Gen)        Future Appointments     Date Provider Specialty Appt Notes    8/31/2020  Allergy shot/sd    9/10/2020 MILLA Villeda, RUTH Optometry dm eye exam     10/2/2020 Dante Negro MD Family Medicine 6 mo f/u           Medication List with Changes/Refills  "  New Medications    BISACODYL (DULCOLAX) 5 MG EC TABLET    Take 1 tablet (5 mg total) by mouth daily as needed for Constipation.    DOCUSATE SODIUM (COLACE) 100 MG CAPSULE    Take 1 capsule (100 mg total) by mouth 2 (two) times daily as needed for Constipation.    PHENTERMINE (ADIPEX-P) 37.5 MG TABLET    Take 1 tablet (37.5 mg total) by mouth before breakfast.    SPIRONOLACTONE (ALDACTONE) 50 MG TABLET    Take 1 tablet (50 mg total) by mouth once daily.   Current Medications    ALBUTEROL (PROVENTIL/VENTOLIN HFA) 90 MCG/ACTUATION INHALER    Inhale 2 puffs into the lungs every 4 (four) hours as needed for Wheezing.    AZELASTINE (ASTELIN) 137 MCG (0.1 %) NASAL SPRAY    1 spray (137 mcg total) by Nasal route 2 (two) times daily.    CETIRIZINE (ZYRTEC) 10 MG TABLET    Take 10 mg by mouth once daily.    CLINDAMYCIN (CLEOCIN T) 1 % LOTION    Apply topically 2 (two) times daily. face    EPINEPHRINE (EPIPEN 2-MARTIN) 0.3 MG/0.3 ML ATIN    As directed.    ERGOCALCIFEROL (ERGOCALCIFEROL) 50,000 UNIT CAP    Take 1 capsule (50,000 Units total) by mouth every 7 days.    FLUTICASONE PROPIONATE (FLONASE) 50 MCG/ACTUATION NASAL SPRAY    1 spray (50 mcg total) by Each Nostril route 2 (two) times daily.    HYDROXYZINE HCL (ATARAX) 25 MG TABLET    Take 1 tablet (25 mg total) by mouth 4 (four) times daily as needed for Anxiety.    IBUPROFEN (ADVIL,MOTRIN) 800 MG TABLET    TK 1 T PO Q 8 H PRF PAIN    IRON-VIT C-VIT B12-FOLIC ACID (IRON-C PLUS) TABLET    Take 1 tablet by mouth once daily.    LEVOCETIRIZINE (XYZAL) 5 MG TABLET    Take 1 tablet (5 mg total) by mouth every evening.    MUPIROCIN (BACTROBAN) 2 % OINTMENT    Apply topically 3 (three) times daily.    NOVOFINE PLUS 32 GAUGE X 1/6" NDLE        PANTOPRAZOLE (PROTONIX) 40 MG TABLET    Take 1 tablet (40 mg total) by mouth once daily.    PREDNISONE (DELTASONE) 5 MG TABLET    Take 1 tablet (5 mg total) by mouth as needed. Take tow tablets the night before your allergy immunotherapy " injection   Changed and/or Refilled Medications    Modified Medication Previous Medication    ESCITALOPRAM OXALATE (LEXAPRO) 20 MG TABLET escitalopram oxalate (LEXAPRO) 10 MG tablet       Take 1 tablet (20 mg total) by mouth once daily.    Take 1 tablet (10 mg total) by mouth once daily.    LIRAGLUTIDE 0.6 MG/0.1 ML, 18 MG/3 ML, SUBQ PNIJ (VICTOZA 2-MARTIN) 0.6 MG/0.1 ML (18 MG/3 ML) PNIJ PEN liraglutide 0.6 mg/0.1 mL, 18 mg/3 mL, subq PNIJ (VICTOZA 2-MARTIN) 0.6 mg/0.1 mL (18 mg/3 mL) PnIj       Inject 1.8 mg into the skin once daily.    Inject 1.2 mg into the skin once daily.       Dante Negro M.D.     ==========================================================================  Subjective:      Patient ID: Lucía Coreas is a 34 y.o. female.  has a past medical history of Allergy, Amenorrhea, Back pain, GERD (gastroesophageal reflux disease), Infertility associated with anovulation, Iron deficiency anemia, Knee pain, Metabolic syndrome, PCO (polycystic ovaries), Plantar fasciitis, Recurrent boils, Reflux esophagitis, Sinusitis, and Vaginitis.     Chief Complaint: Annual Exam      Problem List Items Addressed This Visit     Encounter for long-term (current) use of medications (Chronic)    Overview     07/12/2019 CHRONIC long-term drug therapy for managed conditions. See medication list. Reports compliance.  No side effects reported.  Routine lab work is being monitored.  Patient does not need refills today.   =========================================  09/20/2019Reviewed labs. Patient is compliant with meds. She needs refills. Gaining weight on Metformin.   =========================================  DECEMBER 2019:  CHRONIC. Stable. Compliant with medications for managed conditions. See medication list. No SE reported.   Routine lab analysis is being monitored. Refills were addressed.   =========================================  APRIL 2020:  CHRONIC. Stable. Compliant with medications for managed conditions. See  medication list. No SE reported. Routine lab analysis is being monitored. Refills were addressed.    ==================================================  AUGUST 2020:  REVIEWED LABS.  CHRONIC. Stable. Compliant with medications for managed conditions. See medication list. No SE reported.   Routine lab analysis is being monitored. Refills were addressed.  Lab Results   Component Value Date    WBC 9.41 12/26/2019    HGB 12.2 12/26/2019    HCT 39.3 12/26/2019    MCV 75 (L) 12/26/2019     12/26/2019         Chemistry        Component Value Date/Time     12/26/2019 0959    K 3.4 (L) 12/26/2019 0959     12/26/2019 0959    CO2 24 12/26/2019 0959    BUN 8 12/26/2019 0959    CREATININE 0.7 12/26/2019 0959     12/26/2019 0959        Component Value Date/Time    CALCIUM 9.0 12/26/2019 0959    ALKPHOS 67 12/26/2019 0959    AST 15 12/26/2019 0959    ALT 18 12/26/2019 0959    BILITOT 0.2 12/26/2019 0959    ESTGFRAFRICA >60 12/26/2019 0959    EGFRNONAA >60 12/26/2019 0959          Lab Results   Component Value Date    TSH 1.053 12/26/2019    R1JCWHA 9.4 07/16/2019    T3FREE 2.9 07/16/2019     =================================================           Current Assessment & Plan     REVIEWED LABS WITH THE PATIENT.  MEDICATIONS ADJUSTED PER MED RECONCILIATION.  LAB ORDERS PLACED FOR ANNUAL WELLNESS.         Type 2 diabetes mellitus with hyperglycemia, without long-term current use of insulin (Chronic)    Overview     INITIAL HPI:  No results found in epic however review her blood work from previous provider shows last A1c was 5.8.  Patient previously on metformin.  Currently having fatigue and decreased libido.  ======================  09/20/2019Patient due for hemoglobin A1c next month.  Will place order for patient.She is having GI symptoms with metformin. She reports BG is elevated at home. She continues to gain weight.   =======================================================  DECEMBER 2019:  Patient  reports to having diarrhea with metformin.  Reviewed Diabetes Management Status  Statin: Not takingACE/ARB: Not taking  =======================================================  APRIL 2020:  Patient has started Victoza and is off of metformin.  Patient reports that she is getting slightly nauseated on the increased dose of 1.2 mg.  Symptoms resolved after taking Pepto-Bismol after few hours of injection.Reviewed Diabetes Management StatusStatin: Not takingACE/ARB: Not taking  ==================================================  AUGUST 2020:  PATIENT REPORTS THAT SHE IS TOLERATING VICTOZA 1.2 MG.  PATIENT CONTINUES TO HAVE ISSUES WITH HER WEIGHT.  REVIEWED Diabetes Management Status  Statin: Not taking  ACE/ARB: Not taking  Screening or Prevention Patient's value Goal Complete/Controlled?   HgA1C Testing and Control   Lab Results   Component Value Date    HGBA1C 5.5 03/27/2020      Annually/Less than 8% Yes   Lipid profile : 07/16/2019 Annually No   LDL control Lab Results   Component Value Date    LDLCALC 87.0 07/16/2019    Annually/Less than 100 mg/dl  No   Nephropathy screening Lab Results   Component Value Date    LABMICR 4.0 09/20/2019     Lab Results   Component Value Date    PROTEINUA Negative 01/22/2009    Annually Yes   Blood pressure BP Readings from Last 1 Encounters:   02/17/20 112/78    Less than 140/90 Yes   Dilated retinal exam Most Recent Eye Exam Date: Not Found Annually No   Foot exam   Most Recent Foot Exam Date: Not Found Annually No     =================================================         Current Assessment & Plan     AUGUST 2020:  INCREASE VICTOZA TO 1.8 MG DAILY.  UPDATE LABS.  PATIENT DENIES ANY ISSUES WITH HER EYES OR FEET.    PREVIOUS PLAN:  Continue Victoza but decreased by a few clicks to see if nausea improves.  May need to switch to alternative medication if no improvement.  Patient reports that she is not losing a lot of weight however she has not weighed herself recently.  Collin  vitals reporting was sent to the patient.    Monitor hemoglobin A1c.  Discussed diabetic diet and exercise protocol.  Continue medications.  Monitor for side effects.  Discussed checking blood glucose.  Discussed symptoms to monitor for and to notify me immediately if persistent or worsening.  Follow up with Ophthalmology/Optometry and Podiatry.           Class 3 obesity with alveolar hypoventilation, serious comorbidity, and body mass index (BMI) of 50.0 to 59.9 in adult (Chronic)    Overview     CHRONIC.  UNCONTROLLED.  PATIENT HAS LOST ABOUT 15 LB SINCE 2019.  SHE REPORTS THAT SHE HAS HIT A PLATEAU AND IS NOT LOSING ANY MORE WEIGHT.  PATIENT IS STARTING TO GET DEPRESSED OVER HER HEALTH AND SITUATION.  DENIES ANY SI HI OR HALLUCINATIONS.  On metformin with GI effects.   Wt Readings from Last 4 Encounters:   08/28/20 (!) 140.6 kg (310 lb)   04/29/20 (!) 143.3 kg (316 lb)   02/17/20 (!) 144.9 kg (319 lb 7.1 oz)   02/07/20 (!) 146.1 kg (322 lb 1.5 oz)            Current Assessment & Plan     INCREASE VICTOZA.  START PHENTERMINE AS ADJUNCT FOR WEIGHT LOSS.  WILL PLAN TO CONTINUE FOR SIX MONTHS.    PATIENT IS ACTIVELY ENGAGING IN LIFESTYLE MODIFICATION WITH DIET AND EXERCISE.             Anxiety (Chronic)    Overview     ==================================================  AUGUST 2020:  CHRONIC.  INTERMITTENT CONTROL.  PATIENT REPORTS THAT SHE IS NOT HAVING ANY IMPROVEMENT ON LEXAPRO 10 MG.  DENIES ANY SI HI OR HALLUCINATIONS.  SHE IS STILL TAKING HYDROXYZINE SEVERAL TIMES A DAY FOR AND PANIC ATTACKS.  PATIENT DOES STILL HAVE NAIL BITING TENDENCIES.  =================================================  New problem.  Subacute.  Has been getting worse over the last few weeks during COVID-19 outbreak.  Patient reports that she is biting her nails and unable to sleep at night.  Patient has not been on any treatment or medications for this recently.  Denies any SI HI or  hallucinations.  ====================================================  MAY 2020:  Patient reports hydroxyzine helps with anxiety but is still biting her nails.  Patient is ready to start daily medication for anxiety.  Patient reports that she did start back at work which she thought would help but it is not helping.  Denies SI HI or hallucinations.  ====================================================         Current Assessment & Plan     INCREASE LEXAPRO TO 20 MG.  CONTINUE HYDROXYZINE AS NEEDED FOR PANIC ATTACKS.  DISCUSSED SLEEP HYGIENE.    Discussed anxiety condition course.  Discussed SSRI as first-line treatment for this condition.  Discussed risk of discontinuing this medication without tapering.  Patient was educated, advised of side effects, and all questions were answered.  Patient voiced understanding.  Patient will follow up routinely and notify us if having any side effects or worsening or persistent symptoms.  ER precautions were given. Antidepressant/Antianxiety Medication Initiation:  Patient informed of risks, benefits, and potential side effects of medication and accepts informed consent.  Common side effects include nausea, fatigue, headache, insomnia., Specifically discussed the possibility of new or worsening suicidal thoughts/depression.  Patient instructed to stop the medication immediately and seek urgent treatment if this occurs., Patient instructed not to abruptly discontinue medication without physician guidance except in cases of sudden onset or worsening of SI.            Constipation (Chronic)    Overview     CHRONIC.  UNCONTROLLED.  PATIENT REPORTS THAT SHE HAS TRIED SOME OVER-THE-COUNTER MEDICATIONS LIKE DULCOLAX BUT IT HAS CAUSED PAIN IN HER RECTUM WITH BOWEL MOVEMENTS.  PATIENT DENIES ANY BLOOD OR BLEEDING.         Current Assessment & Plan     ADD STOOL SOFTENER TO HER DULCOLAX.  INCREASE WATER INTAKE.  NO IMPROVEMENT WILL CONSIDER ADDING LINZESS.  Discussed constipation condition  course.  Advised increase daily water intake to an acceptable level.  Increase fiber.  Recommend stool softener and laxative if needed.  Goal of 1 bowel movement daily.  Follow-up to clinic or notify me if no improvement or symptoms are persistent or worsening.  ER precautions were given.  Recommend daily exercise as tolerated, adequate water intake (six 8-oz glasses of water daily), and high fiber diet. OTC fiber supplements are recommended if diet does not reach daily fiber goal (20-30 grams daily), such as Metamucil, Citrucel, or FiberCon (take as directed, separate from other oral medications by >2 hours).  - CONTINUE OTC stool softener such as Colace as directed to avoid hard stools and straining with bowel movements PRN  -If still no improvement with these measures, call/follow-up           Skin lesion    Overview     CHRONIC.  UNCONTROLLED.  PATIENT HAS BEEN ON SEVERAL ANTIBIOTICS FOR ABSCESS.  PATIENT RECENTLY SAW DERMATOLOGY HAD A CULTURE.  PATIENT REPORTS THAT SHE IS DOING HIBICLENS AND BLEACH BATHS.  NO IMPROVEMENT.         Current Assessment & Plan     I SUSPECT THIS MAY BE HIDRADENITIS SUPPURITIVA  STARTING SPIRONOLACTONE.    PATIENT WILL FOLLOW-UP WITH DERMATOLOGY TO DISCUSS FURTHER.         Nail biting    Overview     CHRONIC.  WORSE WITH ANXIETY.         Current Assessment & Plan     INCREASE SSRI.  DISCUSSED TECHNIQUES TO DECREASE THIS BEHAVIOR.           Other Visit Diagnoses     Well adult exam    -  Primary    Encounter for lipid screening for cardiovascular disease        Need for hepatitis C screening test        Encounter for screening for HIV               Past Medical History:  Past Medical History:   Diagnosis Date    Allergy     Amenorrhea     Back pain     GERD (gastroesophageal reflux disease)     Infertility associated with anovulation     Iron deficiency anemia     Knee pain     Metabolic syndrome     PCO (polycystic ovaries)     Plantar fasciitis     Recurrent boils      Reflux esophagitis     Sinusitis     Vaginitis      Past Surgical History:   Procedure Laterality Date    CARPAL TUNNEL RELEASE Left 2019     SECTION       Review of patient's allergies indicates:   Allergen Reactions    Metformin Diarrhea    Sulfa (sulfonamide antibiotics) Anaphylaxis and Swelling     Swelling (eyes)^, Swelling (throat)^  Swelling (eyes)^, Swelling (throat)^      Diclofenac      Gastritis     Shellfish containing products      anaphylaxis     Medication List with Changes/Refills   New Medications    BISACODYL (DULCOLAX) 5 MG EC TABLET    Take 1 tablet (5 mg total) by mouth daily as needed for Constipation.    DOCUSATE SODIUM (COLACE) 100 MG CAPSULE    Take 1 capsule (100 mg total) by mouth 2 (two) times daily as needed for Constipation.    PHENTERMINE (ADIPEX-P) 37.5 MG TABLET    Take 1 tablet (37.5 mg total) by mouth before breakfast.    SPIRONOLACTONE (ALDACTONE) 50 MG TABLET    Take 1 tablet (50 mg total) by mouth once daily.   Current Medications    ALBUTEROL (PROVENTIL/VENTOLIN HFA) 90 MCG/ACTUATION INHALER    Inhale 2 puffs into the lungs every 4 (four) hours as needed for Wheezing.    AZELASTINE (ASTELIN) 137 MCG (0.1 %) NASAL SPRAY    1 spray (137 mcg total) by Nasal route 2 (two) times daily.    CETIRIZINE (ZYRTEC) 10 MG TABLET    Take 10 mg by mouth once daily.    CLINDAMYCIN (CLEOCIN T) 1 % LOTION    Apply topically 2 (two) times daily. face    EPINEPHRINE (EPIPEN 2-MARTIN) 0.3 MG/0.3 ML ATIN    As directed.    ERGOCALCIFEROL (ERGOCALCIFEROL) 50,000 UNIT CAP    Take 1 capsule (50,000 Units total) by mouth every 7 days.    FLUTICASONE PROPIONATE (FLONASE) 50 MCG/ACTUATION NASAL SPRAY    1 spray (50 mcg total) by Each Nostril route 2 (two) times daily.    HYDROXYZINE HCL (ATARAX) 25 MG TABLET    Take 1 tablet (25 mg total) by mouth 4 (four) times daily as needed for Anxiety.    IBUPROFEN (ADVIL,MOTRIN) 800 MG TABLET    TK 1 T PO Q 8 H PRF PAIN    IRON-VIT C-VIT B12-FOLIC  "ACID (IRON-C PLUS) TABLET    Take 1 tablet by mouth once daily.    LEVOCETIRIZINE (XYZAL) 5 MG TABLET    Take 1 tablet (5 mg total) by mouth every evening.    MUPIROCIN (BACTROBAN) 2 % OINTMENT    Apply topically 3 (three) times daily.    NOVOFINE PLUS 32 GAUGE X 1/6" NDLE        PANTOPRAZOLE (PROTONIX) 40 MG TABLET    Take 1 tablet (40 mg total) by mouth once daily.    PREDNISONE (DELTASONE) 5 MG TABLET    Take 1 tablet (5 mg total) by mouth as needed. Take tow tablets the night before your allergy immunotherapy injection   Changed and/or Refilled Medications    Modified Medication Previous Medication    ESCITALOPRAM OXALATE (LEXAPRO) 20 MG TABLET escitalopram oxalate (LEXAPRO) 10 MG tablet       Take 1 tablet (20 mg total) by mouth once daily.    Take 1 tablet (10 mg total) by mouth once daily.    LIRAGLUTIDE 0.6 MG/0.1 ML, 18 MG/3 ML, SUBQ PNIJ (VICTOZA 2-MARTIN) 0.6 MG/0.1 ML (18 MG/3 ML) PNIJ PEN liraglutide 0.6 mg/0.1 mL, 18 mg/3 mL, subq PNIJ (VICTOZA 2-MARTIN) 0.6 mg/0.1 mL (18 mg/3 mL) PnIj       Inject 1.8 mg into the skin once daily.    Inject 1.2 mg into the skin once daily.      Social History     Tobacco Use    Smoking status: Never Smoker    Smokeless tobacco: Never Used   Substance Use Topics    Alcohol use: Never     Frequency: Never      Family History   Problem Relation Age of Onset    Diabetes Mother     Lupus Mother     Diabetes Father     Heart disease Father     Hypertension Father     Cancer Father         prostate    Cancer Sister         ovarian       I have reviewed the complete PMH, social history, surgical history, allergies and medications.  As well as family history.    Review of Systems   Constitutional: Negative for activity change and unexpected weight change.   HENT: Negative for hearing loss, rhinorrhea and trouble swallowing.    Eyes: Negative for discharge and visual disturbance.   Respiratory: Negative for chest tightness and wheezing.    Cardiovascular: Negative for chest " pain and palpitations.   Gastrointestinal: Positive for constipation. Negative for blood in stool, diarrhea and vomiting.   Endocrine: Negative for polydipsia and polyuria.   Genitourinary: Negative for difficulty urinating, dysuria, hematuria and menstrual problem.   Musculoskeletal: Negative for arthralgias, joint swelling and neck pain.   Neurological: Negative for weakness and headaches.   Psychiatric/Behavioral: Negative for confusion and dysphoric mood.     Objective:   Wt (!) 140.6 kg (310 lb)   BMI 50.80 kg/m²   Physical Exam  Constitutional:       General: She is not in acute distress.     Appearance: She is well-developed. She is not ill-appearing, toxic-appearing or diaphoretic.   HENT:      Head: Normocephalic and atraumatic.      Right Ear: Hearing and external ear normal.      Left Ear: Hearing and external ear normal.   Eyes:      General: Lids are normal.      Conjunctiva/sclera: Conjunctivae normal.   Neck:      Musculoskeletal: Normal range of motion.   Pulmonary:      Effort: Pulmonary effort is normal. No respiratory distress.   Musculoskeletal: Normal range of motion.   Skin:     Coloration: Skin is not pale.      Comments: Self-reported abscesses under the armpit and genital regions   Neurological:      Mental Status: She is alert. She is not disoriented.   Psychiatric:         Attention and Perception: She is attentive.         Mood and Affect: Mood is anxious and depressed.         Speech: Speech is not rapid and pressured or slurred.         Behavior: Behavior normal. Behavior is not agitated, aggressive or hyperactive. Behavior is cooperative.         Thought Content: Thought content normal. Thought content is not paranoid or delusional. Thought content does not include homicidal or suicidal ideation. Thought content does not include homicidal or suicidal plan.         Cognition and Memory: Memory is not impaired.         Judgment: Judgment normal.       Specimen Information: Nose; Skin         Component 1mo ago   Aerobic Bacterial Culture No significant isolate    Resulting Agency [facility]         Specimen Collected: 07/06/20 10:30 Last Resulted: 07/09/20 15:11             Assessment:     1. Well adult exam    2. Encounter for long-term (current) use of medications    3. Encounter for lipid screening for cardiovascular disease    4. Need for hepatitis C screening test    5. Encounter for screening for HIV    6. Constipation, unspecified constipation type    7. Type 2 diabetes mellitus with hyperglycemia, without long-term current use of insulin    8. Skin lesion    9. Class 3 obesity with alveolar hypoventilation, serious comorbidity, and body mass index (BMI) of 50.0 to 59.9 in adult    10. Anxiety    11. Nail biting      MDM:   PATIENT IS HERE FOR ANNUAL WELLNESS VISIT.  THIS NOTED SPECIFICALLY FOR CHRONIC CONDITIONS AND DOCUMENTATION FOR MEDICATION REFILLS.  I have Reviewed and summarized old records.  I have performed thorough medication reconciliation today and discussed risk and benefits of each medication.  I have reviewed CULTURE, labs and discussed with patient.  All questions were answered.  I am requesting old records and will review them once they are available.    I have signed for the following orders AND/OR meds.  Orders Placed This Encounter   Procedures    CBC Without Differential     Standing Status:   Standing     Number of Occurrences:   99     Standing Expiration Date:   10/27/2021    TSH     Standing Status:   Standing     Number of Occurrences:   99     Standing Expiration Date:   10/27/2021    Comprehensive metabolic panel     Standing Status:   Standing     Number of Occurrences:   99     Standing Expiration Date:   8/23/2040    Hemoglobin A1C     Standing Status:   Standing     Number of Occurrences:   99     Standing Expiration Date:   8/23/2040    Lipid Panel     Standing Status:   Standing     Number of Occurrences:   99     Standing Expiration Date:   8/23/2040     Hepatitis C Antibody     Standing Status:   Future     Standing Expiration Date:   10/28/2021    HIV 1/2 Ag/Ab (4th Gen)     Standing Status:   Future     Standing Expiration Date:   10/28/2021    Microalbumin/creatinine urine ratio     Standing Status:   Future     Standing Expiration Date:   10/28/2021     Order Specific Question:   Specimen Source     Answer:   Urine     Medications Ordered This Encounter   Medications    bisacodyL (DULCOLAX) 5 mg EC tablet     Sig: Take 1 tablet (5 mg total) by mouth daily as needed for Constipation.     Dispense:  30 tablet     Refill:  0    docusate sodium (COLACE) 100 MG capsule     Sig: Take 1 capsule (100 mg total) by mouth 2 (two) times daily as needed for Constipation.     Dispense:  60 capsule     Refill:  0    escitalopram oxalate (LEXAPRO) 20 MG tablet     Sig: Take 1 tablet (20 mg total) by mouth once daily.     Dispense:  90 tablet     Refill:  3    liraglutide 0.6 mg/0.1 mL, 18 mg/3 mL, subq PNIJ (VICTOZA 2-MARTIN) 0.6 mg/0.1 mL (18 mg/3 mL) PnIj pen     Sig: Inject 1.8 mg into the skin once daily.     Dispense:  27 mL     Refill:  3    phentermine (ADIPEX-P) 37.5 mg tablet     Sig: Take 1 tablet (37.5 mg total) by mouth before breakfast.     Dispense:  30 tablet     Refill:  0    spironolactone (ALDACTONE) 50 MG tablet     Sig: Take 1 tablet (50 mg total) by mouth once daily.     Dispense:  30 tablet     Refill:  11     .        Follow up in about 2 months (around 10/28/2020), or if symptoms worsen or fail to improve, for As scheduled.    If no improvement in symptoms or symptoms worsen, advised to call/follow-up at clinic or go to ER. Patient voiced understanding and all questions/concerns were addressed.     DISCLAIMER: This note was compiled by using a speech recognition dictation system and therefore please be aware that typographical / speech recognition errors can and do occur.  Please contact me if you see any errors specifically.    Dante Negro,  M.D.       Office: [phone] [address] [place] [address]  FAX: [phone]

## 2020-08-31 ENCOUNTER — CLINICAL SUPPORT (OUTPATIENT)
Dept: ALLERGY | Facility: CLINIC | Age: 35
End: 2020-08-31
Payer: COMMERCIAL

## 2020-08-31 DIAGNOSIS — J30.89 ALLERGIC RHINITIS DUE TO DERMATOPHAGOIDES FARINAE: ICD-10-CM

## 2020-08-31 DIAGNOSIS — J30.1 SEASONAL ALLERGIC RHINITIS DUE TO POLLEN: Chronic | ICD-10-CM

## 2020-08-31 DIAGNOSIS — J30.89 ALLERGIC RHINITIS DUE TO DERMATOPHAGOIDES PTERONYSSINUS: ICD-10-CM

## 2020-08-31 PROCEDURE — 99499 NO LOS: ICD-10-PCS | Mod: S$GLB,,, | Performed by: ALLERGY & IMMUNOLOGY

## 2020-08-31 PROCEDURE — 99999 PR PBB SHADOW E&M-EST. PATIENT-LVL III: ICD-10-PCS | Mod: PBBFAC,,,

## 2020-08-31 PROCEDURE — 99499 UNLISTED E&M SERVICE: CPT | Mod: S$GLB,,, | Performed by: ALLERGY & IMMUNOLOGY

## 2020-08-31 PROCEDURE — 95117 IMMUNOTHERAPY INJECTIONS: CPT | Mod: S$GLB,,, | Performed by: ALLERGY & IMMUNOLOGY

## 2020-08-31 PROCEDURE — 95117 PR IMMU2THERAPY, 2+ INJECTIONS: ICD-10-PCS | Mod: S$GLB,,, | Performed by: ALLERGY & IMMUNOLOGY

## 2020-08-31 PROCEDURE — 99999 PR PBB SHADOW E&M-EST. PATIENT-LVL III: CPT | Mod: PBBFAC,,,

## 2020-09-01 ENCOUNTER — TELEPHONE (OUTPATIENT)
Dept: FAMILY MEDICINE | Facility: CLINIC | Age: 35
End: 2020-09-01

## 2020-09-01 NOTE — TELEPHONE ENCOUNTER
Attempted to call patient to ask about medications and other referrals, patient did not answer and her mailbox was full so I could not leave a message.

## 2020-09-01 NOTE — TELEPHONE ENCOUNTER
----- Message from Dante Negro MD sent at 8/29/2020  8:48 AM CDT -----  CHECK WITH PATIENT TO SEE IF SHE IS SEEING DR. BOWEN FOR HER EYES.  I SEE THAT SHE HAS AN APPOINTMENT WITH AN OPTOMETRIST WITH [facility].  CANCEL THAT APPOINTMENT IF SHE DOES NOT NEEDED.  ALSO CHECK TO SEE IF SHE GOT HER INCREASE DOSAGE OF VICTOZA AND NOTIFY ME IF ANY PROBLEMS.HAVE PATIENT FOLLOW-UP WITH DERMATOLOGY

## 2020-09-08 ENCOUNTER — CLINICAL SUPPORT (OUTPATIENT)
Dept: ALLERGY | Facility: CLINIC | Age: 35
End: 2020-09-08
Payer: COMMERCIAL

## 2020-09-08 DIAGNOSIS — J30.89 ALLERGIC RHINITIS DUE TO DERMATOPHAGOIDES PTERONYSSINUS: ICD-10-CM

## 2020-09-08 DIAGNOSIS — J30.89 ALLERGIC RHINITIS DUE TO DERMATOPHAGOIDES FARINAE: ICD-10-CM

## 2020-09-08 DIAGNOSIS — J30.1 SEASONAL ALLERGIC RHINITIS DUE TO POLLEN: Chronic | ICD-10-CM

## 2020-09-08 PROCEDURE — 99999 PR PBB SHADOW E&M-EST. PATIENT-LVL I: CPT | Mod: PBBFAC,,,

## 2020-09-08 PROCEDURE — 99499 NO LOS: ICD-10-PCS | Mod: S$GLB,,, | Performed by: ALLERGY & IMMUNOLOGY

## 2020-09-08 PROCEDURE — 99999 PR PBB SHADOW E&M-EST. PATIENT-LVL I: ICD-10-PCS | Mod: PBBFAC,,,

## 2020-09-08 PROCEDURE — 95117 IMMUNOTHERAPY INJECTIONS: CPT | Mod: S$GLB,,, | Performed by: ALLERGY & IMMUNOLOGY

## 2020-09-08 PROCEDURE — 95117 PR IMMU2THERAPY, 2+ INJECTIONS: ICD-10-PCS | Mod: S$GLB,,, | Performed by: ALLERGY & IMMUNOLOGY

## 2020-09-08 PROCEDURE — 99499 UNLISTED E&M SERVICE: CPT | Mod: S$GLB,,, | Performed by: ALLERGY & IMMUNOLOGY

## 2020-09-08 RX ORDER — KETOCONAZOLE 20 MG/ML
SHAMPOO, SUSPENSION TOPICAL
Qty: 500 ML | Refills: 3 | Status: SHIPPED | OUTPATIENT
Start: 2020-09-10 | End: 2021-02-19

## 2020-09-09 ENCOUNTER — PATIENT OUTREACH (OUTPATIENT)
Dept: ADMINISTRATIVE | Facility: OTHER | Age: 35
End: 2020-09-09

## 2020-09-09 NOTE — PROGRESS NOTES
LINKS immunization registry updated  Care Everywhere updated  Health Maintenance updated  Chart reviewed for overdue Proactive [facility] Encounters (TYE) health maintenance testing (CRS, Breast Ca, Diabetic Eye Exam)   Orders entered:N/A

## 2020-09-10 ENCOUNTER — OFFICE VISIT (OUTPATIENT)
Dept: OPTOMETRY | Facility: CLINIC | Age: 35
End: 2020-09-10
Payer: COMMERCIAL

## 2020-09-10 DIAGNOSIS — E11.9 DIABETES MELLITUS TYPE 2 WITHOUT RETINOPATHY: Primary | ICD-10-CM

## 2020-09-10 DIAGNOSIS — H40.013 OAG (OPEN ANGLE GLAUCOMA) SUSPECT, LOW RISK, BILATERAL: ICD-10-CM

## 2020-09-10 DIAGNOSIS — H43.393 VITREOUS FLOATERS, BILATERAL: ICD-10-CM

## 2020-09-10 DIAGNOSIS — H52.13 MYOPIA WITH ASTIGMATISM, BILATERAL: ICD-10-CM

## 2020-09-10 DIAGNOSIS — H52.203 MYOPIA WITH ASTIGMATISM, BILATERAL: ICD-10-CM

## 2020-09-10 PROCEDURE — 99999 PR PBB SHADOW E&M-EST. PATIENT-LVL IV: ICD-10-PCS | Mod: PBBFAC,,, | Performed by: OPTOMETRIST

## 2020-09-10 PROCEDURE — 92015 PR REFRACTION: ICD-10-PCS | Mod: S$GLB,,, | Performed by: OPTOMETRIST

## 2020-09-10 PROCEDURE — 99999 PR PBB SHADOW E&M-EST. PATIENT-LVL IV: CPT | Mod: PBBFAC,,, | Performed by: OPTOMETRIST

## 2020-09-10 PROCEDURE — 92004 COMPRE OPH EXAM NEW PT 1/>: CPT | Mod: S$GLB,,, | Performed by: OPTOMETRIST

## 2020-09-10 PROCEDURE — 92004 PR EYE EXAM, NEW PATIENT,COMPREHESV: ICD-10-PCS | Mod: S$GLB,,, | Performed by: OPTOMETRIST

## 2020-09-10 PROCEDURE — 92015 DETERMINE REFRACTIVE STATE: CPT | Mod: S$GLB,,, | Performed by: OPTOMETRIST

## 2020-09-10 NOTE — PROGRESS NOTES
"HPI     Routine DM-dle-1 year    Pt complains of blurred vision at near. Needing updated glasses rx. Denies   any eye pain. Complains of eyes itching lately. BLS controlled-doesn't   check. Has had high iop before. Mom and dad with glaucoma.     Hemoglobin A1C       Date                     Value               Ref Range             Status                03/27/2020               5.5                 4.0 - 5.6 %           Final              Comment:    ADA Screening Guidelines:  5.7-6.4%  Consistent with   prediabetes  >or=6.5%  Consistent with diabetes  High levels of fetal   hemoglobin interfere with the HbA1C  assay. Heterozygous hemoglobin   variants (HbS, HgC, etc)do  not significantly interfere with this assay.     However, presence of multiple variants may affect accuracy.         10/21/2019               5.9 (H)             4.0 - 5.6 %           Final              Comment:    ADA Screening Guidelines:  5.7-6.4%  Consistent with   prediabetes  >or=6.5%  Consistent with diabetes  High levels of fetal   hemoglobin interfere with the HbA1C  assay. Heterozygous hemoglobin   variants (HbS, HgC, etc)do  not significantly interfere with this assay.     However, presence of multiple variants may affect accuracy.         07/16/2019               6.0 (H)             4.0 - 5.6 %           Final              Comment:    ADA Screening Guidelines:  5.7-6.4%  Consistent with   prediabetes  >or=6.5%  Consistent with diabetes  High levels of fetal   hemoglobin interfere with the HbA1C  assay. Heterozygous hemoglobin   variants (HbS, HgC, etc)do  not significantly interfere with this assay.     However, presence of multiple variants may affect accuracy.    ----------      H/o glauc suspect w/ borderline IOP  + fhx glaucoma in parents  Prev fields, but "been a while"      Last edited by MILLA Villeda, OD on 9/10/2020  8:27 AM. (History)        ROS     Positive for: Eyes    Negative for: Constitutional, Gastrointestinal, " Neurological, Skin,   Genitourinary, Musculoskeletal, HENT, Endocrine, Cardiovascular,   Respiratory, Psychiatric, Allergic/Imm, Heme/Lymph    Last edited by MILLA Villeda, OD on 9/10/2020  8:27 AM. (History)        Assessment /Plan     For exam results, see Encounter Report.    Diabetes mellitus type 2 without retinopathy    Vitreous floaters, bilateral    OAG (open angle glaucoma) suspect, low risk, bilateral  -     King Visual Field - OU - Extended - Both Eyes; Future  -     OCT, Optic Nerve - OU - Both Eyes; Future    Myopia with astigmatism, bilateral      1. No justo/ retinopathy, no csme, gave Diabetic Retinopathy info, control glucose and BP  Advise annual DFE  2. Mild OU, high myopia, RD precautions given and reviewed. Patient knows to call/ message if any further changes in symptoms occur.  3. Large c/d suspect  Angles open VH  + fhx parents  IOP ~ 20  /568 today    Schedule baseline fields / OCT, and rtc pending those results    4. Updated specs rx, gave copy, fill prn    Headache / pressure around OS probable sinus/ allergy, message if worsening pain / vision   Discussed and educated patient on current findings /plan.  RTC glaucoma w/u, then 1 year, prn if any changes / issues    Addend 10/28/20 [place] Right VF with nasal defect, os normal, oct normal ou, Recommend RTC with [facility] in 3-4 mos recheck VF OD.

## 2020-09-10 NOTE — PATIENT INSTRUCTIONS
"DRY EYES -- BURNING OR BRIAN SYMPTOMS:  Use Over The Counter artificial tears as needed for dry eye symptoms.   Some common brands include:  Systane, Optive, Refresh, and Thera-Tears.  These drops can be used as frequently as desired, but may be most helpful use during long periods of concentrated work.  For example, reading / working at the computer. Start with 3-4x per day.     Nighttime Ophthalmic gel or ointments are available: Refresh PM, Genteal, and Lacrilube.    Avoid drops that "get redness out" (Visine, Murine, Clear Eyes), as these may contain medication that could further irritate the eyes, especially with chronic use.    ALLERGY EYES -- ITCHING SYMPTOMS:  Over the counter medications include--Pataday, Zaditor, and Alaway.  Use as directed 1-2 drops daily for symptoms of itching / watering eyes.  These drops will not help for dry eye or exposure symptoms.    REDNESS RELIEF:  Lumify---is a good redness reliever that will not cause irritation if used chronically.         DIABETES AND THE EYE / DIABETIC RETINOPATHY    Diabetic retinopathy is a condition occurring in persons with diabetes, which causes progressive damage to the retina, the light sensitive lining at the back of the eye. It is a serious sight-threatening complication of diabetes.    Diabetic retinopathy is the result of damage to the tiny blood vessels that nourish the retina. They leak blood and other fluids that cause swelling of retinal tissue and clouding of vision. The condition usually affects both eyes. The longer a person has diabetes, the more likely they will develop diabetic retinopathy. If left untreated, diabetic retinopathy can cause blindness.  There are two basic types of diabetic retinopathy:    Background or nonproliferative diabetic retinopathy (NPDR)  Nonproliferative diabetic retinopathy (NPDR) is the earliest stage of diabetic retinopathy. With this condition, damaged blood vessels in the retina begin to leak extra fluid " and small amounts of blood into the eye. Sometimes, deposits of cholesterol or other fats from the blood may leak into the retina. Many people with diabetes have mild NPDR, which usually does not affect their vision. However, if their vision is affected, it is the result of macular edema and macular ischemia.    If vision is affected due to macular changes, a consult with a Retina Specialist may be advised.  This is an ophthalmologist that treats retina conditions, including diabetic retinopathy.     Proliferative diabetic retinopathy (PDR)  Proliferative diabetic retinopathy (PDR) mainly occurs when many of the blood vessels in the retina close, preventing enough blood flow. In an attempt to supply blood to the area where the original vessels closed, the retina responds by growing new blood vessels. This is called neovascularization. However, these new blood vessels are abnormal and do not supply the retina with proper blood flow. The new vessels are also often accompanied by scar tissue that may cause the retina to wrinkle or detach. PDR may cause more severe vision loss than NPDR because it can affect both central and peripheral vision.     A patient diagnosed with proliferative diabetic eye disease will be referred to a retinal specialist for consultation.    Often there are no visual symptoms in the early stages of diabetic retinopathy. That is why our eye care professionals recommend that everyone with diabetes have a comprehensive dilated eye examination once a year. Early detection and treatment can limit the potential for significant vision loss from diabetic retinopathy.        GLAUCOMA SUSPECT    Glaucoma is a condition in which damage occurs to the optic nerve inside the eye.  The optic nerve is the wire that transmits vision signals to the brain.   It is typically, but not always, associated with a painless increase in eye pressure over time. In some cases if untreated, Glaucoma can cause  blindness.    A Glaucoma Suspect could be defined as a patient that has one or more of the following signs:    Elevated eye pressure.  Enlarged optic nerve head cupping.  Narrow anterior chamber angle.      Tests that may be performed to rule out glaucoma as a diagnosis include:    Visual fields- This test measures the health of the optic nerve and the extent of the peripheral vision as compared to normal.  Visual field loss can be consistent with advancing glaucoma.  HRT / OCT imaging- These computerized tests can obtain 3D scanning images of the optic nerve  / optic nerve cupping to compare past and future results.    Corneal thickness / pachymetry- This test measures the thickness of the clear outer surface of the eye, the cornea.  This physical thickness can have a bearing on the measurement of the eye pressure.  Gonioscopy- This measures or grades the anterior drainage angle.  This structure depth is the distance between the cornea (the clear surface layer) and iris (the colored portion) inside the eye.  If this angle is very slim, or narrow, it can impede normal fluid outflow from the eye, and sometimes cause the eye pressure to rise, and damage the optic nerve.  Optic nerve photography- baseline pictures of the optic nerve may be taken for future comparison.      If a diagnosis of Glaucoma is made based on test results, you and your doctor will discuss treatment options. Treatment may include:    Rx Eye Drops- Many Glaucoma patients have their disease controlled with 1or 2 topical (eye drop) medications.    Laser Treatment of the Iris or Anterior Drainage Angle-  Out patient / in office laser treatments may be used as an alternative / additional therapy to prescription eye drops.  Advanced eye surgeries-  A small percentage of difficult cases may need more involved surgery with a Glaucoma specialist.  This is an eye surgeon that specializes in the treatment of Glaucoma.

## 2020-09-10 NOTE — LETTER
September 10, 2020      Dante Negro MD  [address]  [place] [address]           [facility]  [address] [address] [place] [address]  Phone: [phone]  Fax: [phone]          Patient: Lucía Coreas   MR Number: [id number]   YOB: 1985   Date of Visit: 9/10/2020       Dear Dr. Dante Negro:    Thank you for referring Lucía Coreas to me for evaluation. Attached you will find relevant portions of my assessment and plan of care.    If you have questions, please do not hesitate to call me. I look forward to following Lucía Coreas along with you.    Sincerely,    MILLA Villeda, OD    Enclosure  CC:  No Recipients    If you would like to receive this communication electronically, please contact [email or web address] or [phone] to request more information on [employer] Link access.    For providers and/or their staff who would like to refer a patient to [facility], please contact us through our one-stop-shop provider referral line, [facility], at [phone].    If you feel you have received this communication in error or would no longer like to receive these types of communications, please e-mail [email or web address]

## 2020-09-14 ENCOUNTER — HOSPITAL ENCOUNTER (OUTPATIENT)
Dept: RADIOLOGY | Facility: HOSPITAL | Age: 35
Discharge: HOME | End: 2020-09-14
Attending: PHYSICIAN ASSISTANT
Payer: COMMERCIAL

## 2020-09-14 ENCOUNTER — OFFICE VISIT (OUTPATIENT)
Dept: ORTHOPEDICS | Facility: CLINIC | Age: 35
End: 2020-09-14
Payer: COMMERCIAL

## 2020-09-14 ENCOUNTER — CLINICAL SUPPORT (OUTPATIENT)
Dept: ALLERGY | Facility: CLINIC | Age: 35
End: 2020-09-14
Payer: COMMERCIAL

## 2020-09-14 VITALS
WEIGHT: 293 LBS | BODY MASS INDEX: 48.82 KG/M2 | HEART RATE: 121 BPM | SYSTOLIC BLOOD PRESSURE: 147 MMHG | HEIGHT: 65 IN | DIASTOLIC BLOOD PRESSURE: 98 MMHG

## 2020-09-14 DIAGNOSIS — M25.532 PAIN IN BOTH WRISTS: ICD-10-CM

## 2020-09-14 DIAGNOSIS — M77.12 LEFT LATERAL EPICONDYLITIS: ICD-10-CM

## 2020-09-14 DIAGNOSIS — R20.2 NUMBNESS AND TINGLING IN RIGHT HAND: ICD-10-CM

## 2020-09-14 DIAGNOSIS — M25.522 LEFT ELBOW PAIN: ICD-10-CM

## 2020-09-14 DIAGNOSIS — M25.522 LEFT ELBOW PAIN: Primary | ICD-10-CM

## 2020-09-14 DIAGNOSIS — M25.531 PAIN IN BOTH WRISTS: ICD-10-CM

## 2020-09-14 DIAGNOSIS — S86.911A MUSCLE STRAIN OF RIGHT LOWER LEG, INITIAL ENCOUNTER: ICD-10-CM

## 2020-09-14 DIAGNOSIS — J30.89 ALLERGIC RHINITIS DUE TO DERMATOPHAGOIDES PTERONYSSINUS: ICD-10-CM

## 2020-09-14 DIAGNOSIS — M79.642 HAND PAIN, LEFT: ICD-10-CM

## 2020-09-14 DIAGNOSIS — M25.562 PAIN IN BOTH KNEES, UNSPECIFIED CHRONICITY: ICD-10-CM

## 2020-09-14 DIAGNOSIS — M79.642 LEFT HAND PAIN: ICD-10-CM

## 2020-09-14 DIAGNOSIS — R20.0 NUMBNESS AND TINGLING IN RIGHT HAND: ICD-10-CM

## 2020-09-14 DIAGNOSIS — J30.1 SEASONAL ALLERGIC RHINITIS DUE TO POLLEN: Chronic | ICD-10-CM

## 2020-09-14 DIAGNOSIS — M25.531 RIGHT WRIST PAIN: ICD-10-CM

## 2020-09-14 DIAGNOSIS — R20.0 NUMBNESS AND TINGLING IN LEFT HAND: Primary | ICD-10-CM

## 2020-09-14 DIAGNOSIS — M25.561 PAIN IN BOTH KNEES, UNSPECIFIED CHRONICITY: ICD-10-CM

## 2020-09-14 DIAGNOSIS — M54.2 NECK PAIN: Primary | ICD-10-CM

## 2020-09-14 DIAGNOSIS — R20.2 NUMBNESS AND TINGLING IN LEFT HAND: Primary | ICD-10-CM

## 2020-09-14 DIAGNOSIS — J30.89 ALLERGIC RHINITIS DUE TO DERMATOPHAGOIDES FARINAE: ICD-10-CM

## 2020-09-14 PROCEDURE — 73564 X-RAY EXAM KNEE 4 OR MORE: CPT | Mod: TC,50

## 2020-09-14 PROCEDURE — 3008F BODY MASS INDEX DOCD: CPT | Mod: CPTII,S$GLB,, | Performed by: PHYSICIAN ASSISTANT

## 2020-09-14 PROCEDURE — 99499 UNLISTED E&M SERVICE: CPT | Mod: S$GLB,,, | Performed by: ALLERGY & IMMUNOLOGY

## 2020-09-14 PROCEDURE — 99999 PR PBB SHADOW E&M-EST. PATIENT-LVL III: CPT | Mod: PBBFAC,,,

## 2020-09-14 PROCEDURE — 73130 X-RAY EXAM OF HAND: CPT | Mod: 26,LT,, | Performed by: RADIOLOGY

## 2020-09-14 PROCEDURE — 73110 XR WRIST COMPLETE 3 VIEWS BILATERAL: ICD-10-PCS | Mod: 26,,, | Performed by: RADIOLOGY

## 2020-09-14 PROCEDURE — 99999 PR PBB SHADOW E&M-EST. PATIENT-LVL V: CPT | Mod: PBBFAC,,, | Performed by: PHYSICIAN ASSISTANT

## 2020-09-14 PROCEDURE — 73070 XR ELBOW 2 VIEWS LEFT: ICD-10-PCS | Mod: 26,LT,, | Performed by: RADIOLOGY

## 2020-09-14 PROCEDURE — 99214 OFFICE O/P EST MOD 30 MIN: CPT | Mod: S$GLB,,, | Performed by: PHYSICIAN ASSISTANT

## 2020-09-14 PROCEDURE — 3008F PR BODY MASS INDEX (BMI) DOCUMENTED: ICD-10-PCS | Mod: CPTII,S$GLB,, | Performed by: PHYSICIAN ASSISTANT

## 2020-09-14 PROCEDURE — 73130 X-RAY EXAM OF HAND: CPT | Mod: TC,LT

## 2020-09-14 PROCEDURE — 73070 X-RAY EXAM OF ELBOW: CPT | Mod: TC,LT

## 2020-09-14 PROCEDURE — 99214 PR OFFICE/OUTPT VISIT, EST, LEVL IV, 30-39 MIN: ICD-10-PCS | Mod: S$GLB,,, | Performed by: PHYSICIAN ASSISTANT

## 2020-09-14 PROCEDURE — 73564 X-RAY EXAM KNEE 4 OR MORE: CPT | Mod: 26,,, | Performed by: RADIOLOGY

## 2020-09-14 PROCEDURE — 99999 PR PBB SHADOW E&M-EST. PATIENT-LVL V: ICD-10-PCS | Mod: PBBFAC,,, | Performed by: PHYSICIAN ASSISTANT

## 2020-09-14 PROCEDURE — 95117 PR IMMU2THERAPY, 2+ INJECTIONS: ICD-10-PCS | Mod: S$GLB,,, | Performed by: ALLERGY & IMMUNOLOGY

## 2020-09-14 PROCEDURE — 73070 X-RAY EXAM OF ELBOW: CPT | Mod: 26,LT,, | Performed by: RADIOLOGY

## 2020-09-14 PROCEDURE — 73110 X-RAY EXAM OF WRIST: CPT | Mod: 26,,, | Performed by: RADIOLOGY

## 2020-09-14 PROCEDURE — 95117 IMMUNOTHERAPY INJECTIONS: CPT | Mod: S$GLB,,, | Performed by: ALLERGY & IMMUNOLOGY

## 2020-09-14 PROCEDURE — 73564 XR KNEE ORTHO BILAT WITH FLEXION: ICD-10-PCS | Mod: 26,,, | Performed by: RADIOLOGY

## 2020-09-14 PROCEDURE — 99499 NO LOS: ICD-10-PCS | Mod: S$GLB,,, | Performed by: ALLERGY & IMMUNOLOGY

## 2020-09-14 PROCEDURE — 99999 PR PBB SHADOW E&M-EST. PATIENT-LVL III: ICD-10-PCS | Mod: PBBFAC,,,

## 2020-09-14 PROCEDURE — 73110 X-RAY EXAM OF WRIST: CPT | Mod: TC,50

## 2020-09-14 PROCEDURE — 73130 XR HAND COMPLETE 3 VIEW LEFT: ICD-10-PCS | Mod: 26,LT,, | Performed by: RADIOLOGY

## 2020-09-14 RX ORDER — MELOXICAM 15 MG/1
15 TABLET ORAL DAILY
Qty: 30 TABLET | Refills: 1 | Status: SHIPPED | OUTPATIENT
Start: 2020-09-14 | End: 2020-11-16

## 2020-09-14 NOTE — LETTER
September 14, 2020      Dante Negro MD  [address]  [place] [address]           [facility]  [address] [place] [address]  Phone: [phone]  Fax: [phone]          Patient: Lucía Coreas   MR Number: [id number]   YOB: 1985   Date of Visit: 9/14/2020       Dear Dr. Dante Negro:    Thank you for referring Lucía Coreas to me for evaluation. Attached you will find relevant portions of my assessment and plan of care.    If you have questions, please do not hesitate to call me. I look forward to following Lucía Coreas along with you.    Sincerely,    LEANNA Philliposure  CC:  No Recipients    If you would like to receive this communication electronically, please contact [email or web address] or [phone] to request more information on [employer] Link access.    For providers and/or their staff who would like to refer a patient to [facility], please contact us through our one-stop-shop provider referral line, [facility] , at [phone].    If you feel you have received this communication in error or would no longer like to receive these types of communications, please e-mail [email or web address]

## 2020-09-14 NOTE — PROGRESS NOTES
Subjective:      Patient ID: Lucía Coreas is a 35 y.o. female.    Chief Complaint: Pain of the Left Hand and Pain of the Left Elbow      HPI: Lucía Coreas  is a 35 y.o. female who c/o Pain of the Left Hand and Pain of the Left Elbow   for duration of 3 weeks.  She has history of a left carpal tunnel release done in July 2019 in [place].  She says she improved significantly after the surgery with the exception of she still complains of  strength weakness.  She did do some physical therapy for that.  She has numbness and tingling in both hands now.  She is concerned carpal tunnel may have returned.  She is scared to do anything for it surgically because she still has not regained strength on the left.  On the right side the small finger and ring finger are numb.  She has an elbow brace to keep the straight at nighttime.  On the left side the numbness and tingling in the in all digits.  She denies a recent injury.  Severity is 5/10.  Quality is aching and intermittent.  Worse over the last month.  Improved with heat.  Worsened nighttime.  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    She is also complaining of left-sided elbow pain.  It is worsened mainly with typing and at the end of the day.  She points laterally is where the majority of the pain is located.  She says it is more in the forearm area than at the elbow itself.  Also she has right-sided knee pain laterally.  She says it is worsened when she kneels to get into the bathtub.  Otherwise the knee does relatively well and she has no functional limitations.    Past Medical History:   Diagnosis Date    Allergy     Amenorrhea     Back pain     GERD (gastroesophageal reflux disease)     Infertility associated with anovulation     Iron deficiency anemia     Knee pain     Metabolic syndrome     PCO (polycystic ovaries)     Plantar fasciitis     Recurrent boils     Reflux esophagitis     Sinusitis     Vaginitis      Past Surgical  History:   Procedure Laterality Date    CARPAL TUNNEL RELEASE Left 2019     SECTION       Family History   Problem Relation Age of Onset    Diabetes Mother     Lupus Mother     Diabetes Father     Heart disease Father     Hypertension Father     Cancer Father         prostate    Cancer Sister         ovarian     Social History     Socioeconomic History    Marital status:      Spouse name: Not on file    Number of children: Not on file    Years of education: Not on file    Highest education level: Not on file   Occupational History    Not on file   Social Needs    Financial resource strain: Not on file    Food insecurity     Worry: Not on file     Inability: Not on file    Transportation needs     Medical: Not on file     Non-medical: Not on file   Tobacco Use    Smoking status: Never Smoker    Smokeless tobacco: Never Used   Substance and Sexual Activity    Alcohol use: Never     Frequency: Never    Drug use: Never    Sexual activity: Yes     Partners: Female     Birth control/protection: None   Lifestyle    Physical activity     Days per week: Not on file     Minutes per session: Not on file    Stress: Not on file   Relationships    Social connections     Talks on phone: Not on file     Gets together: Not on file     Attends Sikhism service: Not on file     Active member of club or organization: Not on file     Attends meetings of clubs or organizations: Not on file     Relationship status: Not on file   Other Topics Concern    Not on file   Social History Narrative    Not on file     Medication List with Changes/Refills   New Medications    MELOXICAM (MOBIC) 15 MG TABLET    Take 1 tablet (15 mg total) by mouth once daily. Take with food.  Discontinue if you develop GI side effects.   Current Medications    ALBUTEROL (PROVENTIL/VENTOLIN HFA) 90 MCG/ACTUATION INHALER    Inhale 2 puffs into the lungs every 4 (four) hours as needed for Wheezing.    AZELASTINE (ASTELIN)  "137 MCG (0.1 %) NASAL SPRAY    1 spray (137 mcg total) by Nasal route 2 (two) times daily.    BISACODYL (DULCOLAX) 5 MG EC TABLET    Take 1 tablet (5 mg total) by mouth daily as needed for Constipation.    CETIRIZINE (ZYRTEC) 10 MG TABLET    Take 10 mg by mouth once daily.    CLINDAMYCIN (CLEOCIN T) 1 % LOTION    Apply topically 2 (two) times daily. face    EPINEPHRINE (EPIPEN 2-MARTIN) 0.3 MG/0.3 ML ATIN    As directed.    ERGOCALCIFEROL (ERGOCALCIFEROL) 50,000 UNIT CAP    Take 1 capsule (50,000 Units total) by mouth every 7 days.    ESCITALOPRAM OXALATE (LEXAPRO) 20 MG TABLET    Take 1 tablet (20 mg total) by mouth once daily.    FLUTICASONE PROPIONATE (FLONASE) 50 MCG/ACTUATION NASAL SPRAY    1 spray (50 mcg total) by Each Nostril route 2 (two) times daily.    HYDROXYZINE HCL (ATARAX) 25 MG TABLET    Take 1 tablet (25 mg total) by mouth 4 (four) times daily as needed for Anxiety.    IRON-VIT C-VIT B12-FOLIC ACID (IRON-C PLUS) TABLET    Take 1 tablet by mouth once daily.    KETOCONAZOLE (NIZORAL) 2 % SHAMPOO    Apply topically twice a week.    LEVOCETIRIZINE (XYZAL) 5 MG TABLET    Take 1 tablet (5 mg total) by mouth every evening.    LIRAGLUTIDE 0.6 MG/0.1 ML, 18 MG/3 ML, SUBQ PNIJ (VICTOZA 2-MARTIN) 0.6 MG/0.1 ML (18 MG/3 ML) PNIJ PEN    Inject 1.8 mg into the skin once daily.    MUPIROCIN (BACTROBAN) 2 % OINTMENT    Apply topically 3 (three) times daily.    NOVOFINE PLUS 32 GAUGE X 1/6" NDLE        PANTOPRAZOLE (PROTONIX) 40 MG TABLET    Take 1 tablet (40 mg total) by mouth once daily.    PHENTERMINE (ADIPEX-P) 37.5 MG TABLET    Take 1 tablet (37.5 mg total) by mouth before breakfast.    PREDNISONE (DELTASONE) 5 MG TABLET    Take 1 tablet (5 mg total) by mouth as needed. Take tow tablets the night before your allergy immunotherapy injection    SPIRONOLACTONE (ALDACTONE) 50 MG TABLET    Take 1 tablet (50 mg total) by mouth once daily.   Discontinued Medications    IBUPROFEN (ADVIL,MOTRIN) 800 MG TABLET    TK 1 T PO Q 8 " H PRF PAIN     Review of patient's allergies indicates:   Allergen Reactions    Metformin Diarrhea    Sulfa (sulfonamide antibiotics) Anaphylaxis and Swelling     Swelling (eyes)^, Swelling (throat)^  Swelling (eyes)^, Swelling (throat)^      Diclofenac      Gastritis     Shellfish containing products      anaphylaxis       Review of Systems   Constitution: Negative for fever.   Cardiovascular: Negative for chest pain.   Respiratory: Negative for cough and shortness of breath.    Skin: Negative for rash.   Musculoskeletal: Positive for joint pain, muscle weakness and stiffness. Negative for joint swelling.   Gastrointestinal: Negative for heartburn.   Neurological: Positive for numbness. Negative for headaches.         Objective:        General    Nursing note and vitals reviewed.  Constitutional: She is oriented to person, place, and time. She appears well-developed and well-nourished.   HENT:   Head: Normocephalic and atraumatic.   Eyes: EOM are normal.   Cardiovascular: Normal rate and regular rhythm.    Pulmonary/Chest: Effort normal.   Abdominal: Soft.   Neurological: She is alert and oriented to person, place, and time.   Psychiatric: She has a normal mood and affect. Her behavior is normal.     General Musculoskeletal Exam   Gait: normal       Right Knee Exam   Right knee exam is normal.    Inspection   Erythema: absent  Swelling: absent  Effusion: absent  Deformity: absent  Bruising: absent    Range of Motion   Extension: normal   Flexion: normal     Tests   Meniscus   Rose:  Medial - negative Lateral - negative  Ligament Examination Lachman: normal (-1 to 2mm) PCL-Posterior Drawer: normal (0 to 2mm)     MCL - Valgus: normal (0 to 2mm)  LCL - Varus: normal  Patella   Patellar apprehension: negative  Patellar Tracking: normal  Patellar Grind: negative    Other   Meniscal Cyst: absent  Popliteal (Baker's) Cyst: absent  Sensation: normal    Comments:  Comp soft, cap refill < 2 sec.  Mild TTP distal to  lateral jt over musculature.  No TTP MJL, LJL, MFC, LFC, Pes bursa, hamstrings    Left Knee Exam     Range of Motion   Extension: normal   Flexion: normal     Tests   Stability Lachman: normal (-1 to 2mm) PCL-Posterior Drawer: normal (0 to 2mm)  MCL - Valgus: normal (0 to 2mm)  LCL - Varus: normal (0 to 2mm)    Other   Sensation: normal    Right Hand/Wrist Exam     Inspection   Scars: Wrist - absent Hand -  absent  Effusion: Wrist - absent Hand -  absent  Bruising: Wrist - absent Hand -  absent  Deformity: Wrist - deformity Hand -  deformity    Range of Motion     Wrist   Extension: normal   Flexion: normal   Pronation: normal   Supination: normal     Tests   Phalens sign: positive  Tinel's sign (median nerve): positive  Finkelstein's test: negative    Atrophy   Thenar:  negative  Hypothenar:  negative  Intrinsic:  negative  1st Dorsal Interosseous: negative    Other     Neuorologic Exam    Median Distribution: normal  Ulnar Distribution: normal  Radial Distribution: normal    Comments:  2+ radial pulse      Left Hand/Wrist Exam     Inspection   Scars: Wrist - absent Hand -  absent  Effusion: Wrist - absent Hand -  absent  Bruising: Wrist - absent Hand -  absent  Deformity: Wrist - absent Hand -  absent    Range of Motion     Wrist   Extension: normal   Flexion: normal   Pronation: normal   Supination: normal     Tests   Phalens sign: positive  Tinel's sign (median nerve): positive  Finkelstein's test: negative    Atrophy  Thenar:  Negative  Hypothenar:  negative  Intrinsic: negative  1st Dorsal Interosseous:  negative    Other     Sensory Exam  Median Distribution: normal  Ulnar Distribution: normal  Radial Distribution: normal    Comments:  2+ radial pulse      Right Elbow Exam     Tests   Tinel's sign (cubital tunnel): positive      Left Elbow Exam     Inspection   Scars: absent  Effusion: absent  Bruising: absent  Deformity: absent  Atrophy: absent    Tenderness   The patient is tender to palpation of the  lateral epicondyle.     Range of Motion   Extension: normal   Flexion: normal   Pronation: normal   Supination: normal     Tests   Tinel's sign (cubital tunnel): negative  Tennis Elbow: moderate  Golfer's Elbow: negative  Radial Capitellar Grind: negative    Other   Sensation: normal    Comments:  TTP extensor compartment forearm          Muscle Strength   Right Upper Extremity   Wrist extension: 4/5   Wrist flexion: 5/5   : 5/5   Left Upper Extremity  Wrist extension: 5/5   Wrist flexion: 5/5   :  4/5   Elbow Pronation:  5/5   Elbow Supination:  5/5   Elbow Extension: 5/5  Elbow Flexion: 5/5  Right Lower Extremity   Quadriceps:  5/5   Hamstrin/5   Left Lower Extremity   Quadriceps:  5/5   Hamstrin/5     Vascular Exam       Left Pulses      Radial:                    2+      Capillary Refill  Left Hand: normal capillary refill    Edema  Right Lower Leg: absent  Left Forearm: absent  Left Lower Leg: absent              Xray images and report were reviewed today.  I agree with the radiologist's interpretation.    X-Ray Wrist Complete 3 views Bilateral  Narrative: EXAMINATION:  XR WRIST COMPLETE 3 VIEWS BILATERAL    CLINICAL HISTORY:  Pain in right wrist    TECHNIQUE:  PA, lateral, and oblique views of both wrists were performed.    COMPARISON:  None    FINDINGS:  No acute fracture or dislocation.  Mild degenerative change noted at the 1st CMC joints bilaterally.  No focal erosion or periosteal reaction.  No significant soft tissue calcification or foreign body.  Impression: As above.  Follow-up and or further evaluation as warranted.    Electronically signed by: Jac Osborne MD  Date:    2020  Time:    11:28  X-ray Knee Ortho Bilateral with Flexion  Narrative: EXAMINATION:  XR KNEE ORTHO BILAT WITH FLEXION    CLINICAL HISTORY:  Pain in right knee    TECHNIQUE:  AP standing of both knees, PA flexion standing views of both knees, and Merchant views of both knees were performed.  Lateral views of  both knees were also performed.    COMPARISON:  None    FINDINGS:  Body habitus limits the study.  Generalized osteopenia with juxta-articular prominence.  Bilateral tricompartment degenerative changes without grossly evident fracture, dislocation or effusion.  Prominent degenerative change bilateral patellofemoral joints with slight lateral patellar tilt bilaterally.  Mild increased narrowing of the medial compartments bilaterally.  Impression: As above.  Follow-up and or further evaluation as warranted.    Electronically signed by: Jac Osborne MD  Date:    09/14/2020  Time:    11:23  X-Ray Hand 3 view Left  Narrative: EXAMINATION:  XR HAND COMPLETE 3 VIEW LEFT    CLINICAL HISTORY:  . Pain in left hand    TECHNIQUE:  PA, lateral, and oblique views of the left hand were performed.    COMPARISON:  October 11, 2019    FINDINGS:  Minimal early degenerative change 1st MTP joint.  No acute or healing fracture or dislocation.  Posterior positioning of the distal ulna at the wrist on the lateral view raises possibility of the slight subluxation at the radioulnar joint.  Correlate with the clinical exam.  No significant soft tissue swelling, calcification or foreign body.  Impression: Equivocal findings at the DRUJ.  See above.    Minimal early degenerative change without acute fracture or dislocation noted.  As above.  Follow-up and or further evaluation as warranted.    Electronically signed by: Jac Osborne MD  Date:    09/14/2020  Time:    11:21  X-Ray Elbow 2 Views Left  Narrative: EXAMINATION:  XR ELBOW 2 VIEWS LEFT    CLINICAL HISTORY:  Pain in left elbow    COMPARISON:  None    FINDINGS:  No acute fracture or dislocation.  Smoothly marginated anterior fat pad noted.  No posterior fat pad or significant effusion identified.  No radiopaque foreign body.  No significant soft tissue swelling or calcification.  Impression: As above.  Follow-up and or further evaluation as warranted.    Electronically signed by: Jac  MD [place]  Date:    09/14/2020  Time:    11:19        Assessment:       Encounter Diagnoses   Name Primary?    Numbness and tingling in left hand Yes    Left elbow pain     Left lateral epicondylitis     Left hand pain     Right wrist pain     Numbness and tingling in right hand     Muscle strain of right lower leg, initial encounter           Plan:       Lucía was seen today for pain and pain.    Diagnoses and all orders for this visit:    Numbness and tingling in left hand  -     meloxicam (MOBIC) 15 MG tablet; Take 1 tablet (15 mg total) by mouth once daily. Take with food.  Discontinue if you develop GI side effects.  -     Nerve conduction test; Future    Left elbow pain  -     meloxicam (MOBIC) 15 MG tablet; Take 1 tablet (15 mg total) by mouth once daily. Take with food.  Discontinue if you develop GI side effects.  -     Nerve conduction test; Future    Left lateral epicondylitis  -     Ambulatory referral/consult to Physical/Occupational Therapy; Future  -     meloxicam (MOBIC) 15 MG tablet; Take 1 tablet (15 mg total) by mouth once daily. Take with food.  Discontinue if you develop GI side effects.  -     Nerve conduction test; Future    Left hand pain  -     meloxicam (MOBIC) 15 MG tablet; Take 1 tablet (15 mg total) by mouth once daily. Take with food.  Discontinue if you develop GI side effects.  -     Nerve conduction test; Future    Right wrist pain  -     meloxicam (MOBIC) 15 MG tablet; Take 1 tablet (15 mg total) by mouth once daily. Take with food.  Discontinue if you develop GI side effects.  -     Nerve conduction test; Future    Numbness and tingling in right hand  -     meloxicam (MOBIC) 15 MG tablet; Take 1 tablet (15 mg total) by mouth once daily. Take with food.  Discontinue if you develop GI side effects.  -     Nerve conduction test; Future    Muscle strain of right lower leg, initial encounter  -     Ambulatory referral/consult to Physical/Occupational Therapy; Future  -      meloxicam (MOBIC) 15 MG tablet; Take 1 tablet (15 mg total) by mouth once daily. Take with food.  Discontinue if you develop GI side effects.        Lucía Coreas is a new pt who comes in today for the above problems.  She has a nerve conduction study to further evaluate numbness and tingling in bilateral upper extremities.  I have put her on a prescription of meloxicam as above.  She has stated allergy of diclofenac which cause gastritis.  If she has any difficulty or symptoms from taking Mobic she should discontinue it immediately and notify the office.  I have given her a referral for physical therapy for both the right knee as well as the left elbow.  I would like them to work on some muscular modalities such as soft tissue mobilization and dry needling.  As far as the knee is concerned, I have asked her to avoid kneeling on it as that is the only thing that really bothers her.  Will see if it gets better with the medication the therapy and avoidance of triggering activities.  I would like her to see Dr. Laughlin after she has had the EMG nerve conduction study done to determine whether not any further surgical intervention is warranted.  She verbalizes understanding and agrees.    Follow up for eval with Dr. Leonardo after the EMG.          The patient understands, chooses and consents to this plan and accepts all   the risks which include but are not limited to the risks mentioned above.     Disclaimer: This note was prepared using a voice recognition system and is likely to have sound alike errors within the text.

## 2020-09-17 ENCOUNTER — TELEPHONE (OUTPATIENT)
Dept: ORTHOPEDICS | Facility: CLINIC | Age: 35
End: 2020-09-17

## 2020-09-17 ENCOUNTER — PATIENT MESSAGE (OUTPATIENT)
Dept: ORTHOPEDICS | Facility: CLINIC | Age: 35
End: 2020-09-17

## 2020-09-17 NOTE — TELEPHONE ENCOUNTER
----- Message from Mima Mccray sent at 9/17/2020 11:51 AM CDT -----  Regarding: work excuse  Good morning,      Pt needs a work excuse for Mondays visit and cna be reached at [phone]    Thanks,  Mima Mccray

## 2020-09-21 ENCOUNTER — CLINICAL SUPPORT (OUTPATIENT)
Dept: ALLERGY | Facility: CLINIC | Age: 35
End: 2020-09-21
Payer: COMMERCIAL

## 2020-09-21 DIAGNOSIS — J30.2 CHRONIC SEASONAL ALLERGIC RHINITIS: ICD-10-CM

## 2020-09-21 PROCEDURE — 95117 IMMUNOTHERAPY INJECTIONS: CPT | Mod: S$GLB,,, | Performed by: ALLERGY & IMMUNOLOGY

## 2020-09-21 PROCEDURE — 95117 PR IMMU2THERAPY, 2+ INJECTIONS: ICD-10-PCS | Mod: S$GLB,,, | Performed by: ALLERGY & IMMUNOLOGY

## 2020-09-21 PROCEDURE — 99499 NO LOS: ICD-10-PCS | Mod: S$GLB,,, | Performed by: ALLERGY & IMMUNOLOGY

## 2020-09-21 PROCEDURE — 99499 UNLISTED E&M SERVICE: CPT | Mod: S$GLB,,, | Performed by: ALLERGY & IMMUNOLOGY

## 2020-09-28 ENCOUNTER — PATIENT MESSAGE (OUTPATIENT)
Dept: FAMILY MEDICINE | Facility: CLINIC | Age: 35
End: 2020-09-28

## 2020-09-29 ENCOUNTER — LAB VISIT (OUTPATIENT)
Dept: LAB | Facility: HOSPITAL | Age: 35
End: 2020-09-29
Attending: FAMILY MEDICINE
Payer: COMMERCIAL

## 2020-09-29 DIAGNOSIS — Z79.899 ENCOUNTER FOR LONG-TERM (CURRENT) USE OF MEDICATIONS: ICD-10-CM

## 2020-09-29 DIAGNOSIS — Z00.00 WELL ADULT EXAM: ICD-10-CM

## 2020-09-29 DIAGNOSIS — Z13.6 ENCOUNTER FOR LIPID SCREENING FOR CARDIOVASCULAR DISEASE: ICD-10-CM

## 2020-09-29 DIAGNOSIS — Z13.220 ENCOUNTER FOR LIPID SCREENING FOR CARDIOVASCULAR DISEASE: ICD-10-CM

## 2020-09-29 DIAGNOSIS — Z11.59 NEED FOR HEPATITIS C SCREENING TEST: ICD-10-CM

## 2020-09-29 DIAGNOSIS — Z11.4 ENCOUNTER FOR SCREENING FOR HIV: ICD-10-CM

## 2020-09-29 LAB
ALBUMIN SERPL BCP-MCNC: 3.9 G/DL (ref 3.5–5.2)
ALP SERPL-CCNC: 62 U/L (ref 55–135)
ALT SERPL W/O P-5'-P-CCNC: 37 U/L (ref 10–44)
ANION GAP SERPL CALC-SCNC: 9 MMOL/L (ref 8–16)
AST SERPL-CCNC: 28 U/L (ref 10–40)
BILIRUB SERPL-MCNC: 0.3 MG/DL (ref 0.1–1)
BUN SERPL-MCNC: 6 MG/DL (ref 6–20)
CALCIUM SERPL-MCNC: 9.1 MG/DL (ref 8.7–10.5)
CHLORIDE SERPL-SCNC: 104 MMOL/L (ref 95–110)
CHOLEST SERPL-MCNC: 175 MG/DL (ref 120–199)
CHOLEST/HDLC SERPL: 3.7 {RATIO} (ref 2–5)
CO2 SERPL-SCNC: 27 MMOL/L (ref 23–29)
CREAT SERPL-MCNC: 0.7 MG/DL (ref 0.5–1.4)
ERYTHROCYTE [DISTWIDTH] IN BLOOD BY AUTOMATED COUNT: 14.8 % (ref 11.5–14.5)
EST. GFR  (AFRICAN AMERICAN): >60 ML/MIN/1.73 M^2
EST. GFR  (NON AFRICAN AMERICAN): >60 ML/MIN/1.73 M^2
ESTIMATED AVG GLUCOSE: 108 MG/DL (ref 68–131)
GLUCOSE SERPL-MCNC: 78 MG/DL (ref 70–110)
HBA1C MFR BLD HPLC: 5.4 % (ref 4–5.6)
HCT VFR BLD AUTO: 41.2 % (ref 37–48.5)
HDLC SERPL-MCNC: 47 MG/DL (ref 40–75)
HDLC SERPL: 26.9 % (ref 20–50)
HGB BLD-MCNC: 12.6 G/DL (ref 12–16)
LDLC SERPL CALC-MCNC: 115.6 MG/DL (ref 63–159)
MCH RBC QN AUTO: 23.8 PG (ref 27–31)
MCHC RBC AUTO-ENTMCNC: 30.6 G/DL (ref 32–36)
MCV RBC AUTO: 78 FL (ref 82–98)
NONHDLC SERPL-MCNC: 128 MG/DL
PLATELET # BLD AUTO: 308 K/UL (ref 150–350)
PMV BLD AUTO: 11.3 FL (ref 9.2–12.9)
POTASSIUM SERPL-SCNC: 3.9 MMOL/L (ref 3.5–5.1)
PROT SERPL-MCNC: 7.5 G/DL (ref 6–8.4)
RBC # BLD AUTO: 5.29 M/UL (ref 4–5.4)
SODIUM SERPL-SCNC: 140 MMOL/L (ref 136–145)
TRIGL SERPL-MCNC: 62 MG/DL (ref 30–150)
TSH SERPL DL<=0.005 MIU/L-ACNC: 1.93 UIU/ML (ref 0.4–4)
WBC # BLD AUTO: 8.04 K/UL (ref 3.9–12.7)

## 2020-09-29 PROCEDURE — 84443 ASSAY THYROID STIM HORMONE: CPT

## 2020-09-29 PROCEDURE — 80053 COMPREHEN METABOLIC PANEL: CPT

## 2020-09-29 PROCEDURE — 86803 HEPATITIS C AB TEST: CPT

## 2020-09-29 PROCEDURE — 36415 COLL VENOUS BLD VENIPUNCTURE: CPT | Mod: PO

## 2020-09-29 PROCEDURE — 83036 HEMOGLOBIN GLYCOSYLATED A1C: CPT

## 2020-09-29 PROCEDURE — 85027 COMPLETE CBC AUTOMATED: CPT

## 2020-09-29 PROCEDURE — 80061 LIPID PANEL: CPT

## 2020-09-29 PROCEDURE — 86703 HIV-1/HIV-2 1 RESULT ANTBDY: CPT

## 2020-09-30 LAB
HCV AB SERPL QL IA: NEGATIVE
HIV 1+2 AB+HIV1 P24 AG SERPL QL IA: NEGATIVE

## 2020-10-01 NOTE — PROGRESS NOTES
PLAN:      Problem List Items Addressed This Visit     Encounter for long-term (current) use of medications (Chronic)     Complete history and physical was completed today.  Complete and thorough medication reconciliation was performed.  Discussed risks and benefits of medications.  Advised patient on orders and health maintenance.  We discussed old records and old labs if available.  Will request any records not available through epic.  Continue current medications listed on your summary sheet.           Relevant Medications    aspirin (ECOTRIN) 81 MG EC tablet    Type 2 diabetes mellitus with hyperglycemia, without long-term current use of insulin (Chronic)     Need better LDL control.  A1c is stable at this time.  Patient having side effects to Victoza.  Switch to Ozempic.  Monitor hemoglobin A1c.  Discussed diabetic diet and exercise protocol.  Continue medications.  Monitor for side effects.  Discussed checking blood glucose.  Discussed symptoms to monitor for and to notify me immediately if persistent or worsening.  Follow up with Ophthalmology/Optometry and Podiatry.           Relevant Medications    semaglutide (OZEMPIC) 1 mg/dose (2 mg/1.5 mL) PnIj    Anxiety (Chronic)     Increase hydroxyzine to 50 mg at bedtime.  Patient is not sleeping well.  Continue Lexapro 20.Discussed anxiety condition course.  Discussed SSRI as first-line treatment for this condition.  Discussed risk of discontinuing this medication without tapering.  Patient was educated, advised of side effects, and all questions were answered.  Patient voiced understanding.  Patient will follow up routinely and notify us if having any side effects or worsening or persistent symptoms.  ER precautions were given. Antidepressant/Antianxiety Medication Initiation:  Patient informed of risks, benefits, and potential side effects of medication and accepts informed consent.  Common side effects include nausea, fatigue, headache, insomnia., Specifically  discussed the possibility of new or worsening suicidal thoughts/depression.  Patient instructed to stop the medication immediately and seek urgent treatment if this occurs., Patient instructed not to abruptly discontinue medication without physician guidance except in cases of sudden onset or worsening of SI.            Relevant Medications    hydrOXYzine HCL (ATARAX) 25 MG tablet    Insomnia - Primary (Chronic)     Increasing hydroxyzine 50 mg at bedtime.  Patient should follow-up if no improvement.Discussed insomnia condition course.  Advised of first-line medications for this condition.  Also discussed sleep hygiene.  Information was given below.  Good sleep habits (sometimes referred to as sleep hygiene) can help you get a good nights sleep.    Some habits that can improve your sleep health:  -Be consistent. Go to bed at the same time each night and get up at the same time each morning, including on the weekends  -Make sure your bedroom is quiet, dark, relaxing, and at a comfortable temperature  -Remove electronic devices, such as TVs, computers, and smart phones, from the bedroom  -Avoid large meals, caffeine, and alcohol before bedtime  -Get some exercise. Being physically active during the day can help you fall asleep more easily at night.           Acute pain of right knee     Intramuscular steroid injection given today.  Referral to pain management.  Refill [place] for severe pain.  Fall precautions.         Relevant Medications    HYDROcodone-acetaminophen (NORCO) 5-325 mg per tablet    betamethasone acetate-betamethasone sodium phosphate injection 9 mg (Completed)    Other Relevant Orders    X-ray Knee Ortho Right with Flexion (Completed)    Fall     Fall precautions.  See knee pain problem.         Dysfunction of eustachian tube     Start Singulair.  Increase Flonase to twice daily.  Avoid triggers         Relevant Medications    montelukast (SINGULAIR) 10 mg tablet    betamethasone  acetate-betamethasone sodium phosphate injection 9 mg (Completed)    Abnormal MRI, cervical spine    Relevant Medications    HYDROcodone-acetaminophen (NORCO) 5-325 mg per tablet    Other Relevant Orders    Ambulatory referral/consult to Pain Clinic    Multiple joint pain     Checking labs.         Relevant Orders    VANESSA Screen w/Reflex    C-reactive protein (Completed)    Sedimentation rate (Completed)    Cyclic Citrullinated Peptide Antibody, IgG      Other Visit Diagnoses     Flu vaccine need        Relevant Orders    Influenza - Quadrivalent (PF) (Completed)        Future Appointments     Date Provider Specialty Appt Notes    10/5/2020  Allergy shot s/d    10/7/2020 Diane Thornton MD Physical Medicine and Rehabilitation [facility] Per Naida Gruber    10/7/2020 Adeel Laughlin MD Orthopedics Review [facility] 10/7/20 [facility]    10/9/2020  Ophthalmology HVF 24-2sf with OCT N and oct p pole//send message to [facility]     10/23/2020 Zeke Clement MD Pain Medicine Abnormal MRI, cervical spine [R93.7]           Medication List with Changes/Refills   New Medications    ASPIRIN (ECOTRIN) 81 MG EC TABLET    Take 1 tablet (81 mg total) by mouth once daily.    HYDROCODONE-ACETAMINOPHEN (NORCO) 5-325 MG PER TABLET    Take 1 tablet by mouth every 6 (six) hours as needed for Pain.    MONTELUKAST (SINGULAIR) 10 MG TABLET    Take 1 tablet (10 mg total) by mouth every evening.    SEMAGLUTIDE (OZEMPIC) 1 MG/DOSE (2 MG/1.5 ML) PNIJ    Inject 0.75 mLs into the skin every 7 days.   Current Medications    ALBUTEROL (PROVENTIL/VENTOLIN HFA) 90 MCG/ACTUATION INHALER    Inhale 2 puffs into the lungs every 4 (four) hours as needed for Wheezing.    AZELASTINE (ASTELIN) 137 MCG (0.1 %) NASAL SPRAY    1 spray (137 mcg total) by Nasal route 2 (two) times daily.    CETIRIZINE (ZYRTEC) 10 MG TABLET    Take 10 mg by mouth once daily.    CLINDAMYCIN (CLEOCIN T) 1 % LOTION    Apply topically 2 (two) times daily. face    EPINEPHRINE (EPIPEN  "2-MARTIN) 0.3 MG/0.3 ML ATIN    As directed.    ERGOCALCIFEROL (ERGOCALCIFEROL) 50,000 UNIT CAP    Take 1 capsule (50,000 Units total) by mouth every 7 days.    ESCITALOPRAM OXALATE (LEXAPRO) 20 MG TABLET    Take 1 tablet (20 mg total) by mouth once daily.    FLUTICASONE PROPIONATE (FLONASE) 50 MCG/ACTUATION NASAL SPRAY    1 spray (50 mcg total) by Each Nostril route 2 (two) times daily.    KETOCONAZOLE (NIZORAL) 2 % SHAMPOO    Apply topically twice a week.    LEVOCETIRIZINE (XYZAL) 5 MG TABLET    Take 1 tablet (5 mg total) by mouth every evening.    MELOXICAM (MOBIC) 15 MG TABLET    Take 1 tablet (15 mg total) by mouth once daily. Take with food.  Discontinue if you develop GI side effects.    MUPIROCIN (BACTROBAN) 2 % OINTMENT    Apply topically 3 (three) times daily.    NOVOFINE PLUS 32 GAUGE X 1/6" NDLE        PANTOPRAZOLE (PROTONIX) 40 MG TABLET    Take 1 tablet (40 mg total) by mouth once daily.    PHENTERMINE (ADIPEX-P) 37.5 MG TABLET    Take 1 tablet (37.5 mg total) by mouth before breakfast.    PREDNISONE (DELTASONE) 5 MG TABLET    Take 1 tablet (5 mg total) by mouth as needed. Take tow tablets the night before your allergy immunotherapy injection    SPIRONOLACTONE (ALDACTONE) 50 MG TABLET    Take 1 tablet (50 mg total) by mouth once daily.   Changed and/or Refilled Medications    Modified Medication Previous Medication    HYDROXYZINE HCL (ATARAX) 25 MG TABLET hydrOXYzine HCL (ATARAX) 25 MG tablet       Take 2 tablets (50 mg total) by mouth every evening.    Take 1 tablet (25 mg total) by mouth 4 (four) times daily as needed for Anxiety.    IRON-VIT C-B12-FOLIC ACID (IRON 100 PLUS) TAB iron-vit c-b12-folic acid (IRON 100 PLUS) Tab       Take 1 tablet by mouth once daily.    Take 1 tablet by mouth once daily.   Discontinued Medications    LIRAGLUTIDE 0.6 MG/0.1 ML, 18 MG/3 ML, SUBQ PNIJ (VICTOZA 2-MARTIN) 0.6 MG/0.1 ML (18 MG/3 ML) PNIJ PEN    Inject 1.8 mg into the skin once daily.       Dante Negro M.D.   "   ==========================================================================  Subjective:      Patient ID: Lucía Coreas is a 35 y.o. female.  has a past medical history of Allergy, Amenorrhea, Back pain, GERD (gastroesophageal reflux disease), Infertility associated with anovulation, Iron deficiency anemia, Knee pain, Metabolic syndrome, PCO (polycystic ovaries), Plantar fasciitis, Recurrent boils, Reflux esophagitis, Sinusitis, and Vaginitis.     Chief Complaint: Diabetes (follow up)      Problem List Items Addressed This Visit     Encounter for long-term (current) use of medications (Chronic)    Overview     07/12/2019 CHRONIC long-term drug therapy for managed conditions. See medication list. Reports compliance.  No side effects reported.  Routine lab work is being monitored.  Patient does not need refills today.   =========================================  09/20/2019Reviewed labs. Patient is compliant with meds. She needs refills. Gaining weight on Metformin.   =========================================  DECEMBER 2019:  CHRONIC. Stable. Compliant with medications for managed conditions. See medication list. No SE reported.   Routine lab analysis is being monitored. Refills were addressed.   =========================================  APRIL 2020:  CHRONIC. Stable. Compliant with medications for managed conditions. See medication list. No SE reported. Routine lab analysis is being monitored. Refills were addressed.    ==================================================  AUGUST 2020:  REVIEWED LABS.  CHRONIC. Stable. Compliant with medications for managed conditions. See medication list. No SE reported.   Routine lab analysis is being monitored. Refills were addressed.  October 2020:  CHRONIC. Stable. Compliant with medications for managed conditions. See medication list. No SE reported.   Routine lab analysis is being monitored. Refills were addressed.  Lab Results   Component Value Date    WBC 8.04 09/29/2020     HGB 12.6 09/29/2020    HCT 41.2 09/29/2020    MCV 78 (L) 09/29/2020     09/29/2020         Chemistry        Component Value Date/Time     09/29/2020 0802    K 3.9 09/29/2020 0802     09/29/2020 0802    CO2 27 09/29/2020 0802    BUN 6 09/29/2020 0802    CREATININE 0.7 09/29/2020 0802    GLU 78 09/29/2020 0802        Component Value Date/Time    CALCIUM 9.1 09/29/2020 0802    ALKPHOS 62 09/29/2020 0802    AST 28 09/29/2020 0802    ALT 37 09/29/2020 0802    BILITOT 0.3 09/29/2020 0802    ESTGFRAFRICA >60.0 09/29/2020 0802    EGFRNONAA >60.0 09/29/2020 0802          Lab Results   Component Value Date    TSH 1.928 09/29/2020    Y3KXCTU 9.4 07/16/2019    T3FREE 2.9 07/16/2019              Current Assessment & Plan     Complete history and physical was completed today.  Complete and thorough medication reconciliation was performed.  Discussed risks and benefits of medications.  Advised patient on orders and health maintenance.  We discussed old records and old labs if available.  Will request any records not available through epic.  Continue current medications listed on your summary sheet.           Type 2 diabetes mellitus with hyperglycemia, without long-term current use of insulin (Chronic)    Overview     INITIAL HPI:  No results found in epic however review her blood work from previous provider shows last A1c was 5.8.  Patient previously on metformin.  Currently having fatigue and decreased libido.  ======================  09/20/2019Patient due for hemoglobin A1c next month.  Will place order for patient.She is having GI symptoms with metformin. She reports BG is elevated at home. She continues to gain weight.   =======================================================  DECEMBER 2019:  Patient reports to having diarrhea with metformin.  Reviewed Diabetes Management Status  Statin: Not takingACE/ARB: Not taking  =======================================================  APRIL 2020:  Patient has started  Victoza and is off of metformin.  Patient reports that she is getting slightly nauseated on the increased dose of 1.2 mg.  Symptoms resolved after taking Pepto-Bismol after few hours of injection.Reviewed Diabetes Management StatusStatin: Not takingACE/ARB: Not taking  ==================================================  AUGUST 2020:  PATIENT REPORTS THAT SHE IS TOLERATING VICTOZA 1.2 MG.  PATIENT CONTINUES TO HAVE ISSUES WITH HER WEIGHT.  REVIEWED Diabetes Management Status  Statin: Not takingACE/ARB: Not taking  ==================================================  OCTOBER 2020:  Patient is having side effects with Victoza.  Reviewed Diabetes Management Status.  Statin: Not takingACE/ARB: Not taking    Screening or Prevention Patient's value Goal Complete/Controlled?   HgA1C Testing and Control   Lab Results   Component Value Date    HGBA1C 5.4 09/29/2020      Annually/Less than 8% Yes   Lipid profile : 09/29/2020 Annually Yes   LDL control Lab Results   Component Value Date    LDLCALC 115.6 09/29/2020    Annually/Less than 100 mg/dl  No   Nephropathy screening Lab Results   Component Value Date    LABMICR <2.5 09/29/2020     Lab Results   Component Value Date    PROTEINUA Negative 01/22/2009    Annually Yes   Blood pressure BP Readings from Last 1 Encounters:   10/02/20 134/84    Less than 140/90 Yes   Dilated retinal exam : 09/10/2020 Annually Yes   Foot exam   Most Recent Foot Exam Date: Not Found Annually No       =================================================         Current Assessment & Plan     Need better LDL control.  A1c is stable at this time.  Patient having side effects to Victoza.  Switch to Ozempic.  Monitor hemoglobin A1c.  Discussed diabetic diet and exercise protocol.  Continue medications.  Monitor for side effects.  Discussed checking blood glucose.  Discussed symptoms to monitor for and to notify me immediately if persistent or worsening.  Follow up with Ophthalmology/Optometry and  Podiatry.           Anxiety (Chronic)    Overview     October 2020:  Patient reports doing well on Lexapro 20 mg daily.  Patient takes hydroxyzine very rarely.     ==================================================  AUGUST 2020:  CHRONIC.  INTERMITTENT CONTROL.  PATIENT REPORTS THAT SHE IS NOT HAVING ANY IMPROVEMENT ON LEXAPRO 10 MG.  DENIES ANY SI HI OR HALLUCINATIONS.  SHE IS STILL TAKING HYDROXYZINE SEVERAL TIMES A DAY FOR AND PANIC ATTACKS.  PATIENT DOES STILL HAVE NAIL BITING TENDENCIES.  =================================================  New problem.  Subacute.  Has been getting worse over the last few weeks during COVID-19 outbreak.  Patient reports that she is biting her nails and unable to sleep at night.  Patient has not been on any treatment or medications for this recently.  Denies any SI HI or hallucinations.  ====================================================  MAY 2020:  Patient reports hydroxyzine helps with anxiety but is still biting her nails.  Patient is ready to start daily medication for anxiety.  Patient reports that she did start back at work which she thought would help but it is not helping.  Denies SI HI or hallucinations.  ====================================================         Current Assessment & Plan     Increase hydroxyzine to 50 mg at bedtime.  Patient is not sleeping well.  Continue Lexapro 20.Discussed anxiety condition course.  Discussed SSRI as first-line treatment for this condition.  Discussed risk of discontinuing this medication without tapering.  Patient was educated, advised of side effects, and all questions were answered.  Patient voiced understanding.  Patient will follow up routinely and notify us if having any side effects or worsening or persistent symptoms.  ER precautions were given. Antidepressant/Antianxiety Medication Initiation:  Patient informed of risks, benefits, and potential side effects of medication and accepts informed consent.  Common side effects  include nausea, fatigue, headache, insomnia., Specifically discussed the possibility of new or worsening suicidal thoughts/depression.  Patient instructed to stop the medication immediately and seek urgent treatment if this occurs., Patient instructed not to abruptly discontinue medication without physician guidance except in cases of sudden onset or worsening of SI.            Insomnia - Primary (Chronic)    Overview     New problem.  Subacute.  Patient reports that she cannot fall asleep.  Denies any recent job change, unhealthy habits.         Current Assessment & Plan     Increasing hydroxyzine 50 mg at bedtime.  Patient should follow-up if no improvement.Discussed insomnia condition course.  Advised of first-line medications for this condition.  Also discussed sleep hygiene.  Information was given below.  Good sleep habits (sometimes referred to as sleep hygiene) can help you get a good nights sleep.    Some habits that can improve your sleep health:  -Be consistent. Go to bed at the same time each night and get up at the same time each morning, including on the weekends  -Make sure your bedroom is quiet, dark, relaxing, and at a comfortable temperature  -Remove electronic devices, such as TVs, computers, and smart phones, from the bedroom  -Avoid large meals, caffeine, and alcohol before bedtime  -Get some exercise. Being physically active during the day can help you fall asleep more easily at night.           Acute pain of right knee    Overview     Occurred as a result of a fall.  Patient has had pain in this right knee chronically as well.  Patient currently attending physical therapy for deep [place].  Patient reports that she fell while trying to get in the tub her foot slipped.  Patient did not have any head trauma.         Current Assessment & Plan     Intramuscular steroid injection given today.  Referral to pain management.  Refill [place] for severe pain.  Fall precautions.         Fall    Overview      Patient failed getting into the tub.  Her foot slipped and she fell twisting her right knee.         Current Assessment & Plan     Fall precautions.  See knee pain problem.         Dysfunction of eustachian tube    Overview     Acute on chronic.  Recurrent.  Occurs during season and weather changes.  Patient is taking over-the-counter medication without relief.         Current Assessment & Plan     Start Singulair.  Increase Flonase to twice daily.  Avoid triggers         Abnormal MRI, cervical spine    Multiple joint pain    Overview     Chronic.  Worsening.  Patient is 35 years old and has significant arthritis x-ray.  Checking labs today for autoimmune.  Patient denies a history of autoimmune disease.         Current Assessment & Plan     Checking labs.           Other Visit Diagnoses     Flu vaccine need               Past Medical History:  Past Medical History:   Diagnosis Date    Allergy     Amenorrhea     Back pain     GERD (gastroesophageal reflux disease)     Infertility associated with anovulation     Iron deficiency anemia     Knee pain     Metabolic syndrome     PCO (polycystic ovaries)     Plantar fasciitis     Recurrent boils     Reflux esophagitis     Sinusitis     Vaginitis      Past Surgical History:   Procedure Laterality Date    CARPAL TUNNEL RELEASE Left 2019     SECTION       Review of patient's allergies indicates:   Allergen Reactions    Metformin Diarrhea    Sulfa (sulfonamide antibiotics) Anaphylaxis and Swelling     Swelling (eyes)^, Swelling (throat)^  Swelling (eyes)^, Swelling (throat)^      Diclofenac      Gastritis     Shellfish containing products      anaphylaxis     Medication List with Changes/Refills   New Medications    ASPIRIN (ECOTRIN) 81 MG EC TABLET    Take 1 tablet (81 mg total) by mouth once daily.    HYDROCODONE-ACETAMINOPHEN (NORCO) 5-325 MG PER TABLET    Take 1 tablet by mouth every 6 (six) hours as needed for Pain.    MONTELUKAST  "(SINGULAIR) 10 MG TABLET    Take 1 tablet (10 mg total) by mouth every evening.    SEMAGLUTIDE (OZEMPIC) 1 MG/DOSE (2 MG/1.5 ML) PNIJ    Inject 0.75 mLs into the skin every 7 days.   Current Medications    ALBUTEROL (PROVENTIL/VENTOLIN HFA) 90 MCG/ACTUATION INHALER    Inhale 2 puffs into the lungs every 4 (four) hours as needed for Wheezing.    AZELASTINE (ASTELIN) 137 MCG (0.1 %) NASAL SPRAY    1 spray (137 mcg total) by Nasal route 2 (two) times daily.    CETIRIZINE (ZYRTEC) 10 MG TABLET    Take 10 mg by mouth once daily.    CLINDAMYCIN (CLEOCIN T) 1 % LOTION    Apply topically 2 (two) times daily. face    EPINEPHRINE (EPIPEN 2-MARTIN) 0.3 MG/0.3 ML ATIN    As directed.    ERGOCALCIFEROL (ERGOCALCIFEROL) 50,000 UNIT CAP    Take 1 capsule (50,000 Units total) by mouth every 7 days.    ESCITALOPRAM OXALATE (LEXAPRO) 20 MG TABLET    Take 1 tablet (20 mg total) by mouth once daily.    FLUTICASONE PROPIONATE (FLONASE) 50 MCG/ACTUATION NASAL SPRAY    1 spray (50 mcg total) by Each Nostril route 2 (two) times daily.    KETOCONAZOLE (NIZORAL) 2 % SHAMPOO    Apply topically twice a week.    LEVOCETIRIZINE (XYZAL) 5 MG TABLET    Take 1 tablet (5 mg total) by mouth every evening.    MELOXICAM (MOBIC) 15 MG TABLET    Take 1 tablet (15 mg total) by mouth once daily. Take with food.  Discontinue if you develop GI side effects.    MUPIROCIN (BACTROBAN) 2 % OINTMENT    Apply topically 3 (three) times daily.    NOVOFINE PLUS 32 GAUGE X 1/6" NDLE        PANTOPRAZOLE (PROTONIX) 40 MG TABLET    Take 1 tablet (40 mg total) by mouth once daily.    PHENTERMINE (ADIPEX-P) 37.5 MG TABLET    Take 1 tablet (37.5 mg total) by mouth before breakfast.    PREDNISONE (DELTASONE) 5 MG TABLET    Take 1 tablet (5 mg total) by mouth as needed. Take tow tablets the night before your allergy immunotherapy injection    SPIRONOLACTONE (ALDACTONE) 50 MG TABLET    Take 1 tablet (50 mg total) by mouth once daily.   Changed and/or Refilled Medications    " Modified Medication Previous Medication    HYDROXYZINE HCL (ATARAX) 25 MG TABLET hydrOXYzine HCL (ATARAX) 25 MG tablet       Take 2 tablets (50 mg total) by mouth every evening.    Take 1 tablet (25 mg total) by mouth 4 (four) times daily as needed for Anxiety.    IRON-VIT C-B12-FOLIC ACID (IRON 100 PLUS) TAB iron-vit c-b12-folic acid (IRON 100 PLUS) Tab       Take 1 tablet by mouth once daily.    Take 1 tablet by mouth once daily.   Discontinued Medications    LIRAGLUTIDE 0.6 MG/0.1 ML, 18 MG/3 ML, SUBQ PNIJ (VICTOZA 2-MARTIN) 0.6 MG/0.1 ML (18 MG/3 ML) PNIJ PEN    Inject 1.8 mg into the skin once daily.      Social History     Tobacco Use    Smoking status: Never Smoker    Smokeless tobacco: Never Used   Substance Use Topics    Alcohol use: Never     Frequency: Never      Family History   Problem Relation Age of Onset    Diabetes Mother     Lupus Mother     Diabetes Father     Heart disease Father     Hypertension Father     Cancer Father         prostate    Cancer Sister         ovarian       I have reviewed the complete PMH, social history, surgical history, allergies and medications.  As well as family history.    Review of Systems   Constitutional: Positive for activity change. Negative for unexpected weight change.   HENT: Negative for hearing loss, rhinorrhea and trouble swallowing.    Eyes: Negative for discharge and visual disturbance.   Respiratory: Negative for chest tightness and wheezing.    Cardiovascular: Negative for chest pain and palpitations.   Gastrointestinal: Negative for blood in stool, constipation, diarrhea and vomiting.   Endocrine: Negative for polydipsia and polyuria.   Genitourinary: Negative for difficulty urinating, dysuria, hematuria and menstrual problem.   Musculoskeletal: Positive for arthralgias. Negative for joint swelling and neck pain.   Neurological: Negative for weakness and headaches.   Psychiatric/Behavioral: Negative for confusion and dysphoric mood.     Objective:  "  /84   Pulse 95   Temp 97.9 °F (36.6 °C) (Temporal)   Ht 5' 4" (1.626 m)   Wt (!) 139.3 kg (307 lb 3.2 oz)   LMP 09/08/2020   BMI 52.73 kg/m²   Physical Exam  Vitals signs and nursing note reviewed.   Constitutional:       General: She is not in acute distress.     Appearance: She is well-developed.   HENT:      Head: Normocephalic and atraumatic.      Right Ear: A middle ear effusion is present. Tympanic membrane is not injected, scarred, perforated, erythematous, retracted or bulging.      Left Ear: A middle ear effusion is present. Tympanic membrane is not injected, scarred, perforated, erythematous, retracted or bulging.      Nose: Mucosal edema and rhinorrhea present. No nasal deformity or laceration.      Mouth/Throat:      Pharynx: Uvula midline.      Tonsils: 2+ on the right. 2+ on the left.   Eyes:      Pupils: Pupils are equal, round, and reactive to light.   Neck:      Musculoskeletal: Normal range of motion and neck supple.   Cardiovascular:      Rate and Rhythm: Normal rate and regular rhythm.      Heart sounds: Normal heart sounds. No murmur.   Pulmonary:      Effort: Pulmonary effort is normal. No respiratory distress.      Breath sounds: Normal breath sounds. No wheezing.   Abdominal:      General: Bowel sounds are normal.      Palpations: Abdomen is soft.   Musculoskeletal: Normal range of motion.         General: Tenderness present.   Skin:     General: Skin is warm and dry.      Capillary Refill: Capillary refill takes less than 2 seconds.   Neurological:      Mental Status: She is alert and oriented to person, place, and time.      Cranial Nerves: No cranial nerve deficit.   Psychiatric:         Behavior: Behavior normal.         Assessment:     1. Insomnia, unspecified type    2. Anxiety    3. Flu vaccine need    4. Type 2 diabetes mellitus with hyperglycemia, without long-term current use of insulin    5. Acute pain of right knee    6. Multiple joint pain    7. Abnormal MRI, " cervical spine    8. Encounter for long-term (current) use of medications    9. Fall, initial encounter    10. Dysfunction of Eustachian tube, unspecified laterality      MDM:   Moderate to high complexity.  Moderate to high risk.  I have Reviewed and summarized old records.  I have performed thorough medication reconciliation today and discussed risk and benefits of each medication.  I have reviewed labs and discussed with patient.  All questions were answered.  I am requesting old records and will review them once they are available.    I have signed for the following orders AND/OR meds.  Orders Placed This Encounter   Procedures    X-ray Knee Ortho Right with Flexion     Standing Status:   Future     Number of Occurrences:   1     Standing Expiration Date:   10/3/2021     Order Specific Question:   Is the patient pregnant?     Answer:   No    Influenza - Quadrivalent (PF)    VANESSA Screen w/Reflex     Standing Status:   Future     Number of Occurrences:   1     Standing Expiration Date:   12/1/2021    C-reactive protein     Standing Status:   Future     Number of Occurrences:   1     Standing Expiration Date:   12/1/2021    Sedimentation rate     Standing Status:   Future     Number of Occurrences:   1     Standing Expiration Date:   12/1/2021    Cyclic Citrullinated Peptide Antibody, IgG     Standing Status:   Future     Number of Occurrences:   1     Standing Expiration Date:   12/1/2021    Ambulatory referral/consult to Pain Clinic     Standing Status:   Future     Standing Expiration Date:   11/2/2021     Referral Priority:   Routine     Referral Type:   Consultation     Referral Reason:   Specialty Services Required     Referred to Provider:   Zeke Clement MD     Requested Specialty:   Pain Medicine     Number of Visits Requested:   1     Medications Ordered This Encounter   Medications    aspirin (ECOTRIN) 81 MG EC tablet     Sig: Take 1 tablet (81 mg total) by mouth once daily.     Dispense:  90  tablet     Refill:  3    betamethasone acetate-betamethasone sodium phosphate injection 9 mg    HYDROcodone-acetaminophen (NORCO) 5-325 mg per tablet     Sig: Take 1 tablet by mouth every 6 (six) hours as needed for Pain.     Dispense:  28 tablet     Refill:  0     Quantity prescribed more than 7 day supply? No    hydrOXYzine HCL (ATARAX) 25 MG tablet     Sig: Take 2 tablets (50 mg total) by mouth every evening.     Dispense:  60 tablet     Refill:  1    montelukast (SINGULAIR) 10 mg tablet     Sig: Take 1 tablet (10 mg total) by mouth every evening.     Dispense:  30 tablet     Refill:  11    semaglutide (OZEMPIC) 1 mg/dose (2 mg/1.5 mL) PnIj     Sig: Inject 0.75 mLs into the skin every 7 days.     Dispense:  2 Syringe     Refill:  11        Follow up in about 4 weeks (around 10/30/2020), or if symptoms worsen or fail to improve, for 4week virtual visit .    If no improvement in symptoms or symptoms worsen, advised to call/follow-up at clinic or go to ER. Patient voiced understanding and all questions/concerns were addressed.     DISCLAIMER: This note was compiled by using a speech recognition dictation system and therefore please be aware that typographical / speech recognition errors can and do occur.  Please contact me if you see any errors specifically.    Dante Negro M.D.       Office: [phone] [address] [place] [address]  FAX: [phone]

## 2020-10-02 ENCOUNTER — OFFICE VISIT (OUTPATIENT)
Dept: FAMILY MEDICINE | Facility: CLINIC | Age: 35
End: 2020-10-02
Payer: COMMERCIAL

## 2020-10-02 ENCOUNTER — HOSPITAL ENCOUNTER (OUTPATIENT)
Dept: RADIOLOGY | Facility: HOSPITAL | Age: 35
Discharge: HOME | End: 2020-10-02
Attending: FAMILY MEDICINE
Payer: COMMERCIAL

## 2020-10-02 VITALS
HEIGHT: 64 IN | BODY MASS INDEX: 50.02 KG/M2 | TEMPERATURE: 98 F | HEART RATE: 95 BPM | WEIGHT: 293 LBS | DIASTOLIC BLOOD PRESSURE: 84 MMHG | SYSTOLIC BLOOD PRESSURE: 134 MMHG

## 2020-10-02 DIAGNOSIS — E11.65 TYPE 2 DIABETES MELLITUS WITH HYPERGLYCEMIA, WITHOUT LONG-TERM CURRENT USE OF INSULIN: Chronic | ICD-10-CM

## 2020-10-02 DIAGNOSIS — R93.7 ABNORMAL MRI, CERVICAL SPINE: ICD-10-CM

## 2020-10-02 DIAGNOSIS — E55.9 VITAMIN D DEFICIENCY: Primary | ICD-10-CM

## 2020-10-02 DIAGNOSIS — H69.90 DYSFUNCTION OF EUSTACHIAN TUBE, UNSPECIFIED LATERALITY: ICD-10-CM

## 2020-10-02 DIAGNOSIS — G47.00 INSOMNIA, UNSPECIFIED TYPE: Primary | ICD-10-CM

## 2020-10-02 DIAGNOSIS — M25.561 ACUTE PAIN OF RIGHT KNEE: ICD-10-CM

## 2020-10-02 DIAGNOSIS — Z23 FLU VACCINE NEED: ICD-10-CM

## 2020-10-02 DIAGNOSIS — M25.50 MULTIPLE JOINT PAIN: ICD-10-CM

## 2020-10-02 DIAGNOSIS — Z79.899 ENCOUNTER FOR LONG-TERM (CURRENT) USE OF MEDICATIONS: Chronic | ICD-10-CM
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
## 2020-10-02 DIAGNOSIS — F41.9 ANXIETY: ICD-10-CM

## 2020-10-02 PROCEDURE — 90471 IMMUNIZATION ADMIN: CPT | Mod: S$GLB,,, | Performed by: FAMILY MEDICINE

## 2020-10-02 PROCEDURE — 3044F PR MOST RECENT HEMOGLOBIN A1C LEVEL <7.0%: ICD-10-PCS | Mod: CPTII,S$GLB,, | Performed by: FAMILY MEDICINE

## 2020-10-02 PROCEDURE — 3044F HG A1C LEVEL LT 7.0%: CPT | Mod: CPTII,S$GLB,, | Performed by: FAMILY MEDICINE

## 2020-10-02 PROCEDURE — 3008F BODY MASS INDEX DOCD: CPT | Mod: CPTII,S$GLB,, | Performed by: FAMILY MEDICINE

## 2020-10-02 PROCEDURE — 99999 PR PBB SHADOW E&M-EST. PATIENT-LVL V: CPT | Mod: PBBFAC,,, | Performed by: FAMILY MEDICINE

## 2020-10-02 PROCEDURE — 90686 IIV4 VACC NO PRSV 0.5 ML IM: CPT | Mod: S$GLB,,, | Performed by: FAMILY MEDICINE

## 2020-10-02 PROCEDURE — 73562 XR KNEE ORTHO RIGHT WITH FLEXION: ICD-10-PCS | Mod: 26,LT,, | Performed by: RADIOLOGY

## 2020-10-02 PROCEDURE — 3008F PR BODY MASS INDEX (BMI) DOCUMENTED: ICD-10-PCS | Mod: CPTII,S$GLB,, | Performed by: FAMILY MEDICINE

## 2020-10-02 PROCEDURE — 90686 FLU VACCINE (QUAD) GREATER THAN OR EQUAL TO 3YO PRESERVATIVE FREE IM: ICD-10-PCS | Mod: S$GLB,,, | Performed by: FAMILY MEDICINE

## 2020-10-02 PROCEDURE — 73562 X-RAY EXAM OF KNEE 3: CPT | Mod: TC,PO,LT

## 2020-10-02 PROCEDURE — 96372 THER/PROPH/DIAG INJ SC/IM: CPT | Mod: 59,S$GLB,, | Performed by: FAMILY MEDICINE

## 2020-10-02 PROCEDURE — 90471 FLU VACCINE (QUAD) GREATER THAN OR EQUAL TO 3YO PRESERVATIVE FREE IM: ICD-10-PCS | Mod: S$GLB,,, | Performed by: FAMILY MEDICINE

## 2020-10-02 PROCEDURE — 73564 X-RAY EXAM KNEE 4 OR MORE: CPT | Mod: 26,RT,, | Performed by: RADIOLOGY

## 2020-10-02 PROCEDURE — 99999 PR PBB SHADOW E&M-EST. PATIENT-LVL V: ICD-10-PCS | Mod: PBBFAC,,, | Performed by: FAMILY MEDICINE

## 2020-10-02 PROCEDURE — 96372 PR INJECTION,THERAP/PROPH/DIAG2ST, IM OR SUBCUT: ICD-10-PCS | Mod: 59,S$GLB,, | Performed by: FAMILY MEDICINE

## 2020-10-02 PROCEDURE — 73562 X-RAY EXAM OF KNEE 3: CPT | Mod: 26,LT,, | Performed by: RADIOLOGY

## 2020-10-02 PROCEDURE — 73564 XR KNEE ORTHO RIGHT WITH FLEXION: ICD-10-PCS | Mod: 26,RT,, | Performed by: RADIOLOGY

## 2020-10-02 PROCEDURE — 99215 OFFICE O/P EST HI 40 MIN: CPT | Mod: 25,S$GLB,, | Performed by: FAMILY MEDICINE

## 2020-10-02 PROCEDURE — 99215 PR OFFICE/OUTPT VISIT, EST, LEVL V, 40-54 MIN: ICD-10-PCS | Mod: 25,S$GLB,, | Performed by: FAMILY MEDICINE

## 2020-10-02 RX ORDER — HYDROCODONE BITARTRATE AND ACETAMINOPHEN 5; 325 MG/1; MG/1
1 TABLET ORAL EVERY 6 HOURS PRN
Qty: 28 TABLET | Refills: 0 | Status: SHIPPED | OUTPATIENT
Start: 2020-10-02 | End: 2020-10-09

## 2020-10-02 RX ORDER — MONTELUKAST SODIUM 10 MG/1
10 TABLET ORAL NIGHTLY
Qty: 30 TABLET | Refills: 11 | Status: SHIPPED | OUTPATIENT
Start: 2020-10-02 | End: 2020-11-01

## 2020-10-02 RX ORDER — BETAMETHASONE SODIUM PHOSPHATE AND BETAMETHASONE ACETATE 3; 3 MG/ML; MG/ML
9 INJECTION, SUSPENSION INTRA-ARTICULAR; INTRALESIONAL; INTRAMUSCULAR; SOFT TISSUE
Status: COMPLETED | OUTPATIENT
Start: 2020-10-02 | End: 2020-10-02

## 2020-10-02 RX ORDER — ASPIRIN 81 MG/1
81 TABLET ORAL DAILY
Qty: 90 TABLET | Refills: 3 | Status: SHIPPED | OUTPATIENT
Start: 2020-10-02 | End: 2021-02-19

## 2020-10-02 RX ORDER — IRON,CARB/VIT C/VIT B12/FOLIC 100-250-1
1 TABLET ORAL DAILY
Qty: 90 TABLET | Refills: 3 | Status: SHIPPED | OUTPATIENT
Start: 2020-10-02 | End: 2022-05-30 | Stop reason: SDUPTHER

## 2020-10-02 RX ORDER — HYDROXYZINE HYDROCHLORIDE 25 MG/1
50 TABLET, FILM COATED ORAL NIGHTLY
Qty: 60 TABLET | Refills: 1 | Status: SHIPPED | OUTPATIENT
Start: 2020-10-02 | End: 2020-11-16

## 2020-10-02 RX ORDER — SEMAGLUTIDE 1.34 MG/ML
0.75 INJECTION, SOLUTION SUBCUTANEOUS
Qty: 2 SYRINGE | Refills: 11 | Status: SHIPPED | OUTPATIENT
Start: 2020-10-02 | End: 2020-11-16

## 2020-10-02 RX ADMIN — BETAMETHASONE SODIUM PHOSPHATE AND BETAMETHASONE ACETATE 9 MG: 3; 3 INJECTION, SUSPENSION INTRA-ARTICULAR; INTRALESIONAL; INTRAMUSCULAR; SOFT TISSUE at 08:10

## 2020-10-02 NOTE — PATIENT INSTRUCTIONS
Follow up in about 4 weeks (around 10/30/2020), or if symptoms worsen or fail to improve, for 4week virtual visit .     If no improvement in symptoms or symptoms worsen, please be advised to call MD, follow-up at clinic and/or go to ER if becomes severe.    Dante Negro M.D.        We Offer TELEHEALTH & Same Day Appointments!   Book your Telehealth appointment with me through my nurse or   Clinic appointments on [employer]!    [address] [place] [address]    Office: [phone]   FAX: [phone]    Check out my Facebook Page and Follow Me at: [email or web address]    Check out my website at [employer] by clicking on: [email or web address]    To Schedule appointments online, go to [employer]: [email or web address]

## 2020-10-02 NOTE — PROGRESS NOTES
Please CALL patient with results and Document verification.   [phone]    There is no fluid in the knee which is a good thing.  There is persistent degenerative change of the right knee that is abnormal.    We will follow-up after blood work.

## 2020-10-03 ENCOUNTER — TELEPHONE (OUTPATIENT)
Dept: FAMILY MEDICINE | Facility: CLINIC | Age: 35
End: 2020-10-03

## 2020-10-03 NOTE — ASSESSMENT & PLAN NOTE
Need better LDL control.  A1c is stable at this time.  Patient having side effects to Victoza.  Switch to Ozempic.  Monitor hemoglobin A1c.  Discussed diabetic diet and exercise protocol.  Continue medications.  Monitor for side effects.  Discussed checking blood glucose.  Discussed symptoms to monitor for and to notify me immediately if persistent or worsening.  Follow up with Ophthalmology/Optometry and Podiatry.

## 2020-10-03 NOTE — ASSESSMENT & PLAN NOTE
Intramuscular steroid injection given today.  Referral to pain management.  Refill [employer] for severe pain.  Fall precautions.

## 2020-10-03 NOTE — ASSESSMENT & PLAN NOTE
Increase hydroxyzine to 50 mg at bedtime.  Patient is not sleeping well.  Continue Lexapro 20.Discussed anxiety condition course.  Discussed SSRI as first-line treatment for this condition.  Discussed risk of discontinuing this medication without tapering.  Patient was educated, advised of side effects, and all questions were answered.  Patient voiced understanding.  Patient will follow up routinely and notify us if having any side effects or worsening or persistent symptoms.  ER precautions were given. Antidepressant/Antianxiety Medication Initiation:  Patient informed of risks, benefits, and potential side effects of medication and accepts informed consent.  Common side effects include nausea, fatigue, headache, insomnia., Specifically discussed the possibility of new or worsening suicidal thoughts/depression.  Patient instructed to stop the medication immediately and seek urgent treatment if this occurs., Patient instructed not to abruptly discontinue medication without physician guidance except in cases of sudden onset or worsening of SI.

## 2020-10-03 NOTE — ASSESSMENT & PLAN NOTE
Increasing hydroxyzine 50 mg at bedtime.  Patient should follow-up if no improvement.Discussed insomnia condition course.  Advised of first-line medications for this condition.  Also discussed sleep hygiene.  Information was given below.  Good sleep habits (sometimes referred to as sleep hygiene) can help you get a good nights sleep.    Some habits that can improve your sleep health:  -Be consistent. Go to bed at the same time each night and get up at the same time each morning, including on the weekends  -Make sure your bedroom is quiet, dark, relaxing, and at a comfortable temperature  -Remove electronic devices, such as TVs, computers, and smart phones, from the bedroom  -Avoid large meals, caffeine, and alcohol before bedtime  -Get some exercise. Being physically active during the day can help you fall asleep more easily at night.

## 2020-10-03 NOTE — TELEPHONE ENCOUNTER
----- Message from Dante Negro MD sent at 10/2/2020  8:19 PM CDT -----  Order signed.  Set up visit in 4 to 6 weeks for resultsAnd follow-up

## 2020-10-05 ENCOUNTER — TELEPHONE (OUTPATIENT)
Dept: OPTOMETRY | Facility: CLINIC | Age: 35
End: 2020-10-05

## 2020-10-05 ENCOUNTER — CLINICAL SUPPORT (OUTPATIENT)
Dept: ALLERGY | Facility: CLINIC | Age: 35
End: 2020-10-05
Payer: COMMERCIAL

## 2020-10-05 DIAGNOSIS — J30.89 CHRONIC ALLERGIC RHINITIS DUE TO FUNGAL SPORES: ICD-10-CM

## 2020-10-05 DIAGNOSIS — J30.89 CHRONIC NON-SEASONAL ALLERGIC RHINITIS: ICD-10-CM

## 2020-10-05 PROCEDURE — 99499 UNLISTED E&M SERVICE: CPT | Mod: S$GLB,,, | Performed by: ALLERGY & IMMUNOLOGY

## 2020-10-05 PROCEDURE — 95117 IMMUNOTHERAPY INJECTIONS: CPT | Mod: S$GLB,,, | Performed by: ALLERGY & IMMUNOLOGY

## 2020-10-05 PROCEDURE — 95117 PR IMMU2THERAPY, 2+ INJECTIONS: ICD-10-PCS | Mod: S$GLB,,, | Performed by: ALLERGY & IMMUNOLOGY

## 2020-10-05 PROCEDURE — 99499 NO LOS: ICD-10-PCS | Mod: S$GLB,,, | Performed by: ALLERGY & IMMUNOLOGY

## 2020-10-05 NOTE — TELEPHONE ENCOUNTER
----- Message from Aleyda Wong sent at 10/5/2020  8:28 AM CDT -----  Type: Needs Medical Advice  Who Called:  patient   Best Call Back Number:   Additional Information: Per patient needs to reschedule appointment for Friday 10/9-please advise-thank you

## 2020-10-06 DIAGNOSIS — R76.8 POSITIVE ANA (ANTINUCLEAR ANTIBODY): Primary | ICD-10-CM

## 2020-10-07 ENCOUNTER — TELEPHONE (OUTPATIENT)
Dept: ORTHOPEDICS | Facility: CLINIC | Age: 35
End: 2020-10-07

## 2020-10-07 ENCOUNTER — PATIENT MESSAGE (OUTPATIENT)
Dept: SURGERY | Facility: HOSPITAL | Age: 35
End: 2020-10-07

## 2020-10-07 ENCOUNTER — OFFICE VISIT (OUTPATIENT)
Dept: ORTHOPEDICS | Facility: CLINIC | Age: 35
End: 2020-10-07
Payer: COMMERCIAL

## 2020-10-07 ENCOUNTER — OFFICE VISIT (OUTPATIENT)
Dept: PHYSICAL MEDICINE AND REHAB | Facility: CLINIC | Age: 35
End: 2020-10-07
Payer: COMMERCIAL

## 2020-10-07 VITALS
SYSTOLIC BLOOD PRESSURE: 153 MMHG | DIASTOLIC BLOOD PRESSURE: 97 MMHG | BODY MASS INDEX: 50.02 KG/M2 | HEART RATE: 98 BPM | HEIGHT: 64 IN | WEIGHT: 293 LBS

## 2020-10-07 VITALS
SYSTOLIC BLOOD PRESSURE: 159 MMHG | DIASTOLIC BLOOD PRESSURE: 102 MMHG | BODY MASS INDEX: 50.02 KG/M2 | RESPIRATION RATE: 14 BRPM | WEIGHT: 293 LBS | HEART RATE: 110 BPM | HEIGHT: 64 IN

## 2020-10-07 DIAGNOSIS — G56.03 BILATERAL CARPAL TUNNEL SYNDROME: ICD-10-CM

## 2020-10-07 DIAGNOSIS — G56.03 CARPAL TUNNEL SYNDROME ON BOTH SIDES: Primary | ICD-10-CM

## 2020-10-07 PROCEDURE — 95912 PR NERVE CONDUCTION STUDY; 11 -12 STUDIES: ICD-10-PCS | Mod: S$GLB,,, | Performed by: PHYSICAL MEDICINE & REHABILITATION

## 2020-10-07 PROCEDURE — 3008F PR BODY MASS INDEX (BMI) DOCUMENTED: ICD-10-PCS | Mod: CPTII,S$GLB,, | Performed by: ORTHOPAEDIC SURGERY

## 2020-10-07 PROCEDURE — 95912 NRV CNDJ TEST 11-12 STUDIES: CPT | Mod: S$GLB,,, | Performed by: PHYSICAL MEDICINE & REHABILITATION

## 2020-10-07 PROCEDURE — 99213 PR OFFICE/OUTPT VISIT, EST, LEVL III, 20-29 MIN: ICD-10-PCS | Mod: S$GLB,,, | Performed by: ORTHOPAEDIC SURGERY

## 2020-10-07 PROCEDURE — 99999 PR PBB SHADOW E&M-EST. PATIENT-LVL IV: CPT | Mod: PBBFAC,,, | Performed by: ORTHOPAEDIC SURGERY

## 2020-10-07 PROCEDURE — 99204 PR OFFICE/OUTPT VISIT, NEW, LEVL IV, 45-59 MIN: ICD-10-PCS | Mod: 25,S$GLB,, | Performed by: PHYSICAL MEDICINE & REHABILITATION

## 2020-10-07 PROCEDURE — 99999 PR PBB SHADOW E&M-EST. PATIENT-LVL III: CPT | Mod: PBBFAC,,, | Performed by: PHYSICAL MEDICINE & REHABILITATION

## 2020-10-07 PROCEDURE — 99999 PR PBB SHADOW E&M-EST. PATIENT-LVL III: ICD-10-PCS | Mod: PBBFAC,,, | Performed by: PHYSICAL MEDICINE & REHABILITATION

## 2020-10-07 PROCEDURE — 99999 PR PBB SHADOW E&M-EST. PATIENT-LVL IV: ICD-10-PCS | Mod: PBBFAC,,, | Performed by: ORTHOPAEDIC SURGERY

## 2020-10-07 PROCEDURE — 99213 OFFICE O/P EST LOW 20 MIN: CPT | Mod: S$GLB,,, | Performed by: ORTHOPAEDIC SURGERY

## 2020-10-07 PROCEDURE — 3008F PR BODY MASS INDEX (BMI) DOCUMENTED: ICD-10-PCS | Mod: CPTII,S$GLB,, | Performed by: PHYSICAL MEDICINE & REHABILITATION

## 2020-10-07 PROCEDURE — 3008F BODY MASS INDEX DOCD: CPT | Mod: CPTII,S$GLB,, | Performed by: PHYSICAL MEDICINE & REHABILITATION

## 2020-10-07 PROCEDURE — 99204 OFFICE O/P NEW MOD 45 MIN: CPT | Mod: 25,S$GLB,, | Performed by: PHYSICAL MEDICINE & REHABILITATION

## 2020-10-07 PROCEDURE — 3008F BODY MASS INDEX DOCD: CPT | Mod: CPTII,S$GLB,, | Performed by: ORTHOPAEDIC SURGERY

## 2020-10-07 NOTE — TELEPHONE ENCOUNTER
Patient also sent a message through her portal an I answered her through there as well as typed a letter for her a faxed it over to her employer.     ----- Message from Adamaris Krause sent at 10/7/2020  2:39 PM CDT -----  Contact: [phone]  Would like to consult with the nurse,  patient needs a Dr excuse, please fax to [phone], patient would like to get a letter for work, concerning her hands, patient would like to speak with the nurse concerning this, please call back thanks sj

## 2020-10-07 NOTE — LETTER
October 7, 2020      Vandana Gruber PA-C  [address] [facility] [address]           [facility]  [address] [address] [facility] [place] [address]  Phone: [phone]  Fax: [phone]          Patient: Lucía Coreas   MR Number: [id number]   YOB: 1985   Date of Visit: 10/7/2020       Dear Vandana Gruber:    Thank you for referring Lucía Coreas to me for evaluation. Attached you will find relevant portions of my assessment and plan of care.    If you have questions, please do not hesitate to call me. I look forward to following Lucía Coreas along with you.    Sincerely,    Diane Thornton MD    Enclosure  CC:  No Recipients    If you would like to receive this communication electronically, please contact [email or web address] or [phone] to request more information on [employer] access.    For providers and/or their staff who would like to refer a patient to [facility], please contact us through our one-stop-shop provider referral line, [facility], at [phone].    If you feel you have received this communication in error or would no longer like to receive these types of communications, please e-mail [email or web address]

## 2020-10-07 NOTE — PROGRESS NOTES
[facility]   [address]  [place] [address]  Phone: [phone]        Full Name: juanpablo barajas YOB: 1985  Patient ID: [id number]      Visit Date: 10/7/2020 10:54  Age: 35 Years 0 Months Old  Examining Physician: Diane Thornton M.D.  Referring Physician: torey  Reason for Referral: ue pain        Chief Complaint   Patient presents with    Hand Pain     left worse than right        HPI: This is a 35 y.o.  female being seen in clinic today for evaluation of chronic hand numbness/tingling in her hands-left worse than right.  She has pain radiating up her arm at times and she has difficulty with gripping items.  Rest and bracing provides some relief. She had CTR surgery last year.     History obtained from patient    Past family, medical, social, and surgical history reviewed in chart    Review of Systems:     General- denies lethargy, weight change, fever, chills  Head/neck- denies swallowing difficulties  ENT- denies hearing changes  Cardiovascular-denies chest pain  Pulmonary- denies shortness of breath  GI- denies constipation or bowel incontinence  - denies bladder incontinence  Skin- denies wounds or rashes  Musculoskeletal- +weakness, +pain  Neurologic- +numbness and tingling  Psychiatric- denies depressive or psychotic features, +anxiety  Lymphatic-denies swelling  Endocrine- denies hypoglycemic symptoms/DM history  All other pertinent systems negative     Physical Examination:  General: Well developed, well nourished female, NAD  HEENT:NCAT EOMI bilaterally   Pulmonary:Normal respirations    Spinal Examination: CERVICAL  Active ROM is within normal limits.  Inspection: No deformity of spinal alignment.  Palpation: No vertebral tenderness to percussion.      Musculoskeletal Tests:  Phalen:+on right  Elbow compression (ulnar): neg  Tinels at wrist: + on left    Bilateral Upper and Lower Extremities:  Pulses are 2+ at radial bilaterally.  Shoulder/Elbow/Wrist/Hand ROM  wnl  Hip/Knee/Ankle ROM   Bilateral Extremities show normal capillary refill.  No signs of cyanosis, rubor, edema, skin changes, or dysvascular changes of appendages.  Nails appear intact.    Neurological Exam:  Cranial Nerves:  II-XII grossly intact    Manual Muscle Testing: (Motor 5=normal)  5/5 strength bilateral upper extremities    No focal atrophy is noted of either upper extremity.    Bilateral Reflexes: 1+bic tric br  Fournier's response is absent bilaterally.    Sensation: tested to light touch  - intact in arms    Gait: Narrow base and good arm swing.      Entire procedure explained to patient prior to proceeding.  Verbal consent obtained    SNC      Nerve / Sites Rec. Site Onset Lat Peak Lat Amp Segments Distance Velocity     ms ms µV  mm m/s   L Median - Digit II (Antidromic)      Wrist Dig II 2.8 3.4 21.4 Wrist - Dig  50   R Median - Digit II (Antidromic)      Wrist Dig II 3.3 4.2 27.9 Wrist - Dig  42   L Ulnar - Digit V (Antidromic)      Wrist Dig V 2.4 3.2 33.6 Wrist - Dig V 140 58   R Ulnar - Digit V (Antidromic)      Wrist Dig V 2.3 3.0 27.5 Wrist - Dig V 140 60   L Radial - Anatomical snuff box (Forearm)      Forearm Wrist 1.7 2.0 15.1 Forearm - Wrist 100 60   R Radial - Anatomical snuff box (Forearm)      Forearm Wrist 1.3 1.7 16.5 Forearm - Wrist 100 80       CSI      Nerve / Sites Rec. Site Peak Lat NP Amp Segments Peak Diff     ms µV  ms   L Median - CSI      Median Thumb 2.4 26.4 Median - Radial 0.3      Radial Thumb 2.1 12.3 Median - Ulnar 0.3      Median Ring 3.3 23.3 Median palm - Ulnar palm 0.4      Ulnar Ring 3.0 19.3        Median palm Wrist 2.1 46.7        Ulnar palm Wrist 1.7 22.8        CSI    CSI 1.0       MNC      Nerve / Sites Muscle Latency Amplitude Duration Rel Amp Segments Distance Lat Diff Velocity     ms mV ms %  mm ms m/s   L Median - APB      Wrist APB 2.9 10.7 6.7 100 Wrist - APB 80        Elbow APB 6.9 10.5 6.6 98.7 Elbow - Wrist 230 4.0 57   R Median - APB       Wrist APB 4.1 11.0 6.5 100 Wrist - APB 80        Elbow APB 7.7 10.3 7.0 93.2 Elbow - Wrist 200 3.6 55   L Ulnar - ADM      Wrist ADM 2.6 8.1 6.0 100 Wrist - ADM 80        B.Elbow ADM 6.1 8.1 6.3 100 B.Elbow - Wrist 230 3.6 64      A.Elbow ADM 8.1 8.0 6.4 98.6 A.Elbow - B.Elbow 130 2.0 66         A.Elbow - Wrist  5.6    R Ulnar - ADM      Wrist ADM 2.4 8.5 5.3 100 Wrist - ADM 80        B.Elbow ADM 5.9 8.2 5.8 96.9 B.Elbow - Wrist 230 3.5 66      A.Elbow ADM 7.7 8.1 6.0 97.6 A.Elbow - B.Elbow 120 1.8 66         A.Elbow - Wrist  5.3                                                 INTERPRETATION  -Bilateral median motor nerve conduction study showed normal latency, amplitude, and conduction velocity  -Bilateral median sensory nerve conduction study showed normal peak latency and amplitude  -Bilateral ulnar motor nerve conduction study showed normal latency, amplitude, and conduction velocity  -Bilateral ulnar sensory nerve conduction study showed normal peak latency and amplitude  -Bilateral radial sensory nerve conduction study showed normal peak latency and amplitude  -Left combined sensory index was significant at 1.0 msec    IMPRESSION  1. ABNORMAL study  2. 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 Follow up with referring provider: Vandana Gruber  2. Handouts on CTS provided. Cont bracing and PT for neck-muscles issues. Consider CTS injection, bodymechanics/OT  3. This study is good for one year. If symptoms worsen or do not improve, please re-consult.    Diane Thornton M.D.  Physical Medicine and Rehab

## 2020-10-07 NOTE — PATIENT INSTRUCTIONS
Carpal Tunnel Syndrome    Carpal tunnel syndrome is a painful condition of the wrist and arm. It is caused by pressure on the median nerve.  The median nerve is one of the nerves that give feeling and movement to the hand. It passes through a tunnel in the wrist called the carpal tunnel. This tunnel is made up of bones and ligaments. Narrowing of this tunnel or swelling of the tissues inside the tunnel puts pressure on the median nerve. This causes numbness, pins and needles, or electric shooting pains in your hand and forearm. Often the pain is worse at night and may wake you when you are asleep.  Carpal tunnel syndrome may occur during pregnancy and with use of birth control pills. It is more common in workers who must often bend their wrists. It is also common in people who work with power tools that cause strong vibrations.  Home care  · Rest the painful wrist. Avoid repeated bending of the wrist back and forth. This puts pressure on the median nerve. Avoid using power tools with strong vibrations.  · If you were given a splint, wear it at night while you sleep. You may also wear it during the day for comfort.  · Move your fingers and wrists often to avoid stiffness.  · Elevate your arms on pillows when you lie down.  · Try using the unaffected hand more.  · Try not to hold your wrists in a bent, downward position.  · Sometimes changes in the work place may ease symptoms. If you type most of the day, it may help to change the position of your keyboard or add a wrist support. Your wrist should be in a neutral position and not bent back when typing.  · You may use over-the-counter pain medicine to treat pain and inflammation, unless another medicine was prescribed. Anti-inflammatory pain medicines, such as ibuprofen or naproxen may be more effective than acetaminophen, which treats pain, but not inflammation. If you have chronic liver or kidney disease or ever had a stomach ulcer or GI bleeding, talk with your  doctor before using these medicines.  · Opioid pain medicine will only give temporary relief and does not treat the problem. If pain continues, you may need a shot of a steroid drug into your wrist.  · If the above methods fail, you may need surgery. This will open the carpal tunnel and release the pressure on the trapped nerve.  Follow-up care  Follow up with your healthcare provider, or as advised, if the pain doesnt begin to improve within the next week.  If X-rays were taken, you will be notified of any new findings that may affect your care.  When to seek medical advice  Call your healthcare provider right away if any of these occur:  · Pain not improving with the above treatment  · Fingers or hand become cold, blue, numb, or tingly  · Your whole arm becomes swollen or weak  Date Last Reviewed: 11/23/2015  © 4596-4817 [employer]. [address] [place] [address]. All rights reserved. This information is not intended as a substitute for professional medical care. Always follow your healthcare professional's instructions.        Carpal Tunnel Syndrome Prevention Tips  Some repetitive hand activities put you at higher risk for carpal tunnel syndrome (CTS). But you can reduce your risk. Learn how to change the way you use your hands. Below are tips for at home and on the job. Be sure to also follow the hand and wrist safety policies at your workplace.      Keep your wrist in a neutral (straight) position when exercising.      Keep your wrist in neutral  Keep a neutral (straight) wrist position as often as you can. Dont use your wrist in a bent (flexed) position for long periods of time. This includes extended or twisted positions.  Watch your   Dont just use your thumb and index finger to grasp or lift. This can put stress on your wrist. When you can, use your whole hand and all its fingers to grasp an object.  Minimize repetition  Dont move your arms or hands or hold an object in  the same way for long periods of time. Even simple, light tasks can cause injury this way. Instead, alternate tasks or switch hands.  Rest your hands  Give your hands a break from time to time with a rest. Even a few minutes once an hour can help.  Reduce speed and force  Slow down the speed in which you do a forceful, repetitive motion. This gives your wrist time to recover from the effort. Use power tools to help reduce the force.  Strengthen the muscles  Weak muscles may lead to a poor wrist or arm position. Exercises will make your hand and arm muscles stronger. This can help you keep a better position.  Date Last Reviewed: 9/11/2015 © 2000-2017 [employer]. [address] [place] [address], [place] [address]. All rights reserved. This information is not intended as a substitute for professional medical care. Always follow your healthcare professional's instructions.        Understanding Carpal Tunnel Syndrome    The carpal tunnel is a narrow space inside the wrist. It is ringed by bone and a band of tough tissue called the transverse carpal ligament. A major nerve called the median nerve runs from the forearm into the hand through the carpal tunnel. Tendons also run through the carpal tunnel.  With carpal tunnel syndrome, the tendons or nearby tissues within the carpal tunnel may swell or thicken. Or the transverse carpal ligament may harden and shorten. This narrows the space in the carpal tunnel and puts pressure on the median nerve. This pressure leads to tingling and numbness of the hand and wrist. In time, the condition can make even simple tasks hard to do.  What causes carpal tunnel syndrome?  Doctors arent entirely clear why the condition occurs. Certain things may make a person more likely to have it. These include:  · Being female  · Being pregnant  · Being overweight  · Having diabetes or rheumatoid arthritis  Symptoms of carpal tunnel syndrome  Symptoms often come and go. At first, symptoms  may occur mainly at night. Later, they may be noticed during the day as well. They may get worse with activities such as driving, reading, typing, or holding a phone. Symptoms can include:  · Tingling and numbness in the hand or wrist  · Sharp pain that shoots up the arm or down to the fingers  · Hand stiffness or cramping, especially in the morning  · Trouble making a fist  · Hand weakness and clumsiness  Treatment for carpal tunnel syndrome  Certain treatments help reduce the pressure on the median nerve and relieve symptoms. Choices for treatment may include one or more of the following:  · Wrist splint. This involves wearing a special brace on the wrist and hand. The splint holds the wrist straight, in a neutral position. This helps keep the carpal tunnel as open as possible.  · Cortisone shots. Cortisone is a medicine that helps reduce swelling. It is injected directly into the wrist. It helps shrink tissues inside the carpal tunnel. This relieves symptoms for a time.  · Pain medicines. You may take over-the-counter or prescription medicines to help reduce swelling and relieve symptoms.  · Surgery. If the condition doesnt respond to other treatments and doesnt go away on its own, you may need surgery. During surgery, the surgeon cuts the transverse carpal ligament to relieve pressure on the median nerve.     When to call your healthcare provider  Call your healthcare provider right away if you have any of these:  · Fever of 100.4°F (38°C) or higher, or as directed  · Symptoms that dont get better, or get worse  · New symptoms   Date Last Reviewed: 3/10/2016  © 4063-1075 [employer]. [address] [place] [address], [place] [address]. All rights reserved. This information is not intended as a substitute for professional medical care. Always follow your healthcare professional's instructions.

## 2020-10-07 NOTE — PROGRESS NOTES
Subjective:     Patient ID: Lucía Coreas is a 35 y.o. female.    Chief Complaint: Pain of the Left Wrist and Pain of the Right Wrist    The patient is a 35-year-old female with bilateral carpal tunnel syndrome.  This is documented by nerve conduction studies.  She is status post left carpal tunnel release without nerve studies last year on the [facility] by Dr. Kumar.  She has tried splinting without improvement.  She was no better after her surgery.      Past Medical History:   Diagnosis Date    Allergy     Amenorrhea     Back pain     GERD (gastroesophageal reflux disease)     Infertility associated with anovulation     Iron deficiency anemia     Knee pain     Metabolic syndrome     PCO (polycystic ovaries)     Plantar fasciitis     Recurrent boils     Reflux esophagitis     Sinusitis     Vaginitis      Past Surgical History:   Procedure Laterality Date    CARPAL TUNNEL RELEASE Left 2019     SECTION       Family History   Problem Relation Age of Onset    Diabetes Mother     Lupus Mother     Diabetes Father     Heart disease Father     Hypertension Father     Cancer Father         prostate    Cancer Sister         ovarian     Social History     Socioeconomic History    Marital status:      Spouse name: Not on file    Number of children: Not on file    Years of education: Not on file    Highest education level: Not on file   Occupational History    Not on file   Social Needs    Financial resource strain: Not on file    Food insecurity     Worry: Not on file     Inability: Not on file    Transportation needs     Medical: Not on file     Non-medical: Not on file   Tobacco Use    Smoking status: Never Smoker    Smokeless tobacco: Never Used   Substance and Sexual Activity    Alcohol use: Never     Frequency: Never    Drug use: Never    Sexual activity: Yes     Partners: Female     Birth control/protection: None   Lifestyle    Physical activity     Days per  week: Not on file     Minutes per session: Not on file    Stress: Not on file   Relationships    Social connections     Talks on phone: Not on file     Gets together: Not on file     Attends Christian service: Not on file     Active member of club or organization: Not on file     Attends meetings of clubs or organizations: Not on file     Relationship status: Not on file   Other Topics Concern    Not on file   Social History Narrative    Not on file     Medication List with Changes/Refills   Current Medications    ALBUTEROL (PROVENTIL/VENTOLIN HFA) 90 MCG/ACTUATION INHALER    Inhale 2 puffs into the lungs every 4 (four) hours as needed for Wheezing.    ASPIRIN (ECOTRIN) 81 MG EC TABLET    Take 1 tablet (81 mg total) by mouth once daily.    AZELASTINE (ASTELIN) 137 MCG (0.1 %) NASAL SPRAY    1 spray (137 mcg total) by Nasal route 2 (two) times daily.    CETIRIZINE (ZYRTEC) 10 MG TABLET    Take 10 mg by mouth once daily.    CLINDAMYCIN (CLEOCIN T) 1 % LOTION    Apply topically 2 (two) times daily. face    EPINEPHRINE (EPIPEN 2-MARTIN) 0.3 MG/0.3 ML ATIN    As directed.    ERGOCALCIFEROL (ERGOCALCIFEROL) 50,000 UNIT CAP    Take 1 capsule (50,000 Units total) by mouth every 7 days.    ESCITALOPRAM OXALATE (LEXAPRO) 20 MG TABLET    Take 1 tablet (20 mg total) by mouth once daily.    FLUTICASONE PROPIONATE (FLONASE) 50 MCG/ACTUATION NASAL SPRAY    1 spray (50 mcg total) by Each Nostril route 2 (two) times daily.    HYDROCODONE-ACETAMINOPHEN (NORCO) 5-325 MG PER TABLET    Take 1 tablet by mouth every 6 (six) hours as needed for Pain.    HYDROXYZINE HCL (ATARAX) 25 MG TABLET    Take 2 tablets (50 mg total) by mouth every evening.    IRON-VIT C-B12-FOLIC ACID (IRON 100 PLUS) TAB    Take 1 tablet by mouth once daily.    KETOCONAZOLE (NIZORAL) 2 % SHAMPOO    Apply topically twice a week.    LEVOCETIRIZINE (XYZAL) 5 MG TABLET    Take 1 tablet (5 mg total) by mouth every evening.    MELOXICAM (MOBIC) 15 MG TABLET    Take 1  "tablet (15 mg total) by mouth once daily. Take with food.  Discontinue if you develop GI side effects.    MONTELUKAST (SINGULAIR) 10 MG TABLET    Take 1 tablet (10 mg total) by mouth every evening.    MUPIROCIN (BACTROBAN) 2 % OINTMENT    Apply topically 3 (three) times daily.    NOVOFINE PLUS 32 GAUGE X 1/6" NDLE        PANTOPRAZOLE (PROTONIX) 40 MG TABLET    Take 1 tablet (40 mg total) by mouth once daily.    PHENTERMINE (ADIPEX-P) 37.5 MG TABLET    Take 1 tablet (37.5 mg total) by mouth before breakfast.    PREDNISONE (DELTASONE) 5 MG TABLET    Take 1 tablet (5 mg total) by mouth as needed. Take tow tablets the night before your allergy immunotherapy injection    SEMAGLUTIDE (OZEMPIC) 1 MG/DOSE (2 MG/1.5 ML) PNIJ    Inject 0.75 mLs into the skin every 7 days.    SPIRONOLACTONE (ALDACTONE) 50 MG TABLET    Take 1 tablet (50 mg total) by mouth once daily.     Review of patient's allergies indicates:   Allergen Reactions    Metformin Diarrhea    Sulfa (sulfonamide antibiotics) Anaphylaxis and Swelling     Swelling (eyes)^, Swelling (throat)^  Swelling (eyes)^, Swelling (throat)^      Diclofenac      Gastritis     Shellfish containing products      anaphylaxis     Review of Systems   Constitution: Negative for malaise/fatigue.   HENT: Negative for hearing loss.    Eyes: Negative for double vision and visual disturbance.   Cardiovascular: Negative for chest pain.   Respiratory: Negative for shortness of breath.    Endocrine: Negative for cold intolerance.   Hematologic/Lymphatic: Does not bruise/bleed easily.   Skin: Negative for poor wound healing and suspicious lesions.   Musculoskeletal: Positive for falls and neck pain. Negative for gout, joint pain and joint swelling.   Gastrointestinal: Positive for constipation, diarrhea, heartburn, nausea and vomiting.   Genitourinary: Negative for dysuria.   Neurological: Negative for numbness, paresthesias and sensory change.   Psychiatric/Behavioral: Negative for " depression, memory loss and substance abuse. The patient has insomnia and is nervous/anxious.    Allergic/Immunologic: Negative for persistent infections.       Objective:   Body mass index is 52.7 kg/m².  Vitals:    10/07/20 1347   BP: (!) 153/97   Pulse: 98                General    Constitutional: She is oriented to person, place, and time. She appears well-developed and well-nourished. No distress.   HENT:   Head: Normocephalic.   Mouth/Throat: Oropharynx is clear and moist.   Eyes: EOM are normal.   Neck: Normal range of motion.   Cardiovascular: Normal rate.    Pulmonary/Chest: Effort normal.   Abdominal: Soft.   Neurological: She is alert and oriented to person, place, and time. No cranial nerve deficit.   Psychiatric: She has a normal mood and affect.             Right Hand/Wrist Exam     Inspection   Scars: Wrist - absent Hand -  absent  Effusion: Wrist - absent Hand -  absent    Pain   Wrist - The patient exhibits pain of the flexor/pronator group.    Tests   Phalens sign: positive  Tinel's sign (median nerve): positive  Carpal Tunnel Compression Test: positive    Atrophy   Thenar:  negative  Intrinsic:  negative    Other     Neuorologic Exam    Median Distribution: abnormal  Ulnar Distribution: normal  Radial Distribution: normal    Comments:  The patient has a positive Tinel and positive Phalen sign.  There is no thenar atrophy noted.      Left Hand/Wrist Exam     Inspection   Scars: Wrist - present Hand -  absent  Effusion: Wrist - absent Hand -  absent    Pain   Wrist - The patient exhibits pain of the flexor/pronator group.    Tests   Phalens sign: positive  Tinel's sign (median nerve): positive  Carpal Tunnel Compression Test: positive    Atrophy  Thenar:  Negative  Intrinsic: negative    Other     Sensory Exam  Median Distribution: abnormal  Ulnar Distribution: normal  Radial Distribution: normal    Comments:  The patient has a positive Tinel and positive Phalen sign.  There is no thenar atrophy  noted.  She has a well-healed transverse scar just proximal to the flexor crease of the wrist.          Vascular Exam       Capillary Refill  Right Hand: normal capillary refill  Left Hand: normal capillary refill      Relevant imaging results reviewed and interpreted by me, discussed with the patient and / or family today radiographs were not obtained today but radiographs of both wrist showed only early degenerative change in the basal joints  Assessment:     Encounter Diagnosis   Name Primary?    Carpal tunnel syndrome on both sides Yes    status post left carpal tunnel release    Plan:     The patient wishes to have right carpal tunnel release.  Risk complications and alternatives were discussed including the risk of infection, anesthetic risk, injury to nerves and vessels, loss of motion, and possible need for additional surgeries were discussed.  She seems to understand and agree that surgery.  All questions were answered.                Disclaimer: This note was prepared using a voice recognition system and is likely to have sound alike errors within the text.

## 2020-10-13 ENCOUNTER — TELEPHONE (OUTPATIENT)
Dept: RHEUMATOLOGY | Facility: CLINIC | Age: 35
End: 2020-10-13

## 2020-10-13 ENCOUNTER — PATIENT MESSAGE (OUTPATIENT)
Dept: RHEUMATOLOGY | Facility: CLINIC | Age: 35
End: 2020-10-13

## 2020-10-13 NOTE — TELEPHONE ENCOUNTER
Left message with mom to confirmed 10-14 appointment with Dr. Hernandez. Message also sent through portal.

## 2020-10-14 ENCOUNTER — PATIENT MESSAGE (OUTPATIENT)
Dept: ALLERGY | Facility: CLINIC | Age: 35
End: 2020-10-14

## 2020-10-14 ENCOUNTER — PATIENT MESSAGE (OUTPATIENT)
Dept: RHEUMATOLOGY | Facility: CLINIC | Age: 35
End: 2020-10-14

## 2020-10-14 ENCOUNTER — OFFICE VISIT (OUTPATIENT)
Dept: RHEUMATOLOGY | Facility: CLINIC | Age: 35
End: 2020-10-14
Payer: COMMERCIAL

## 2020-10-14 VITALS
HEIGHT: 64 IN | WEIGHT: 293 LBS | BODY MASS INDEX: 50.02 KG/M2 | DIASTOLIC BLOOD PRESSURE: 93 MMHG | HEART RATE: 99 BPM | SYSTOLIC BLOOD PRESSURE: 145 MMHG

## 2020-10-14 DIAGNOSIS — R76.8 POSITIVE ANA (ANTINUCLEAR ANTIBODY): Primary | ICD-10-CM

## 2020-10-14 DIAGNOSIS — R21 RASH AND NONSPECIFIC SKIN ERUPTION: ICD-10-CM

## 2020-10-14 DIAGNOSIS — G89.4 CHRONIC PAIN SYNDROME: ICD-10-CM

## 2020-10-14 DIAGNOSIS — Z71.89 COUNSELING ON HEALTH PROMOTION AND DISEASE PREVENTION: ICD-10-CM

## 2020-10-14 PROCEDURE — 99999 PR PBB SHADOW E&M-EST. PATIENT-LVL V: ICD-10-PCS | Mod: PBBFAC,,, | Performed by: INTERNAL MEDICINE

## 2020-10-14 PROCEDURE — 99999 PR PBB SHADOW E&M-EST. PATIENT-LVL V: CPT | Mod: PBBFAC,,, | Performed by: INTERNAL MEDICINE

## 2020-10-14 PROCEDURE — 99244 OFF/OP CNSLTJ NEW/EST MOD 40: CPT | Mod: S$GLB,,, | Performed by: INTERNAL MEDICINE

## 2020-10-14 PROCEDURE — 99244 PR OFFICE CONSULTATION,LEVEL IV: ICD-10-PCS | Mod: S$GLB,,, | Performed by: INTERNAL MEDICINE

## 2020-10-14 RX ORDER — GABAPENTIN 300 MG/1
300 CAPSULE ORAL NIGHTLY
Qty: 30 CAPSULE | Refills: 3 | Status: SHIPPED | OUTPATIENT
Start: 2020-10-14 | End: 2020-11-16

## 2020-10-14 NOTE — PATIENT INSTRUCTIONS
Gabapentin capsules or tablets  What is this medicine?  GABAPENTIN (GA ba pen tin) is used to control partial seizures in adults with epilepsy. It is also used to treat certain types of nerve pain.  How should I use this medicine?  Take this medicine by mouth with a glass of water. Follow the directions on the prescription label. You can take it with or without food. If it upsets your stomach, take it with food.Take your medicine at regular intervals. Do not take it more often than directed. Do not stop taking except on your doctor's advice.  If you are directed to break the 600 or 800 mg tablets in half as part of your dose, the extra half tablet should be used for the next dose. If you have not used the extra half tablet within 28 days, it should be thrown away.  A special MedGuide will be given to you by the pharmacist with each prescription and refill. Be sure to read this information carefully each time.  Talk to your pediatrician regarding the use of this medicine in children. Special care may be needed.  What side effects may I notice from receiving this medicine?  Side effects that you should report to your doctor or health care professional as soon as possible:  · allergic reactions like skin rash, itching or hives, swelling of the face, lips, or tongue  · worsening of mood, thoughts or actions of suicide or dying  Side effects that usually do not require medical attention (report to your doctor or health care professional if they continue or are bothersome):  · constipation  · difficulty walking or controlling muscle movements  · dizziness  · nausea  · slurred speech  · tiredness  · tremors  · weight gain  What may interact with this medicine?  Do not take this medicine with any of the following medications:  · other gabapentin products  This medicine may also interact with the following medications:  · alcohol  · antacids  · antihistamines for allergy, cough and cold  · certain medicines for anxiety or  sleep  · certain medicines for depression or psychotic disturbances  · homatropine; hydrocodone  · naproxen  · narcotic medicines (opiates) for pain  · phenothiazines like chlorpromazine, mesoridazine, prochlorperazine, thioridazine  What if I miss a dose?  If you miss a dose, take it as soon as you can. If it is almost time for your next dose, take only that dose. Do not take double or extra doses.  Where should I keep my medicine?  Keep out of reach of children.  This medicine may cause accidental overdose and death if it taken by other adults, children, or pets. Mix any unused medicine with a substance like cat litter or coffee grounds. Then throw the medicine away in a sealed container like a sealed bag or a coffee can with a lid. Do not use the medicine after the expiration date.  Store at room temperature between 15 and 30 degrees C (59 and 86 degrees F).  What should I tell my health care provider before I take this medicine?  They need to know if you have any of these conditions:  · kidney disease  · suicidal thoughts, plans, or attempt; a previous suicide attempt by you or a family member  · an unusual or allergic reaction to gabapentin, other medicines, foods, dyes, or preservatives  · pregnant or trying to get pregnant  · breast-feeding  What should I watch for while using this medicine?  Visit your doctor or health care professional for regular checks on your progress. You may want to keep a record at home of how you feel your condition is responding to treatment. You may want to share this information with your doctor or health care professional at each visit. You should contact your doctor or health care professional if your seizures get worse or if you have any new types of seizures. Do not stop taking this medicine or any of your seizure medicines unless instructed by your doctor or health care professional. Stopping your medicine suddenly can increase your seizures or their severity.  Wear a medical  identification bracelet or chain if you are taking this medicine for seizures, and carry a card that lists all your medications.  You may get drowsy, dizzy, or have blurred vision. Do not drive, use machinery, or do anything that needs mental alertness until you know how this medicine affects you. To reduce dizzy or fainting spells, do not sit or stand up quickly, especially if you are an older patient. Alcohol can increase drowsiness and dizziness. Avoid alcoholic drinks.  Your mouth may get dry. Chewing sugarless gum or sucking hard candy, and drinking plenty of water will help.  The use of this medicine may increase the chance of suicidal thoughts or actions. Pay special attention to how you are responding while on this medicine. Any worsening of mood, or thoughts of suicide or dying should be reported to your health care professional right away.  Women who become pregnant while using this medicine may enroll in the [place] [employer] Antiepileptic Drug Pregnancy Registry by calling 1-508.992.6854. This registry collects information about the safety of antiepileptic drug use during pregnancy.  NOTE:This sheet is a summary. It may not cover all possible information. If you have questions about this medicine, talk to your doctor, pharmacist, or health care provider. Copyright© 2017 [employer]

## 2020-10-14 NOTE — LETTER
October 14, 2020      Dante Negro MD  [id number] [facility] [place] [address]           [facility]  [address] [place] [address] DR NIMISHA TRONCOSO [address]  Phone: [phone]  Fax: [phone]          Patient: Lucía Coreas   MR Number: [id number]   YOB: 1985   Date of Visit: 10/14/2020       Dear Dr. Dante Negro:    Thank you for referring Lucía Coreas to me for evaluation. Attached you will find relevant portions of my assessment and plan of care.    If you have questions, please do not hesitate to call me. I look forward to following Lucía Coreas along with you.    Sincerely,    Michael Hernandez MD    Enclosure  CC:  No Recipients    If you would like to receive this communication electronically, please contact [email or web address] or [phone] to request more information on [employer] access.    For providers and/or their staff who would like to refer a patient to [facility], please contact us through our one-stop-shop provider referral line, [facility], at [phone].    If you feel you have received this communication in error or would no longer like to receive these types of communications, please e-mail [email or web address]

## 2020-10-14 NOTE — PROGRESS NOTES
RHEUMATOLOGY OUTPATIENT CLINIC NOTE    10/14/2020    Attending Rheumatologist: Michael Hernandez  Primary Care Provider: Dante Negro MD   Physician Requesting Consultation: Dante Negro MD  [address] [place] [address]  Chief Complaint/Reason For Consultation:  Positive VANESSA    Subjective:       HPI  Lucía Coreas is a 35 y.o. Black or  female referred for abnormal lab results (VANESSA).  Labs done as part of work-up for chronic pain.  Main complaint is chronic lower back pain.  Worst in the evening, aggravated by range of motion/weight bearing, relieved somewhat by rest and OTC pain medication.  Denies association with prolonged morning stiffness, fever, or paresthesias/other focal neurological symptoms.  Other chronic arthralgias similar features.  History of hidradenitis suppurativa, suboptimal response to oral antibiotics per outside dermatologist.  Denies any other active rash or photosensitivity.  Does not have tri-color discoloration of fingertips upon cold exposure or ulcers.  Denies joint swelling, persisent paresthesias, fever, or hematuria.    Review of Systems   Constitutional: Positive for malaise/fatigue. Negative for chills and fever.   Eyes: Negative for pain and redness.        No history uveitis.   Respiratory: Negative for cough, hemoptysis and shortness of breath.    Cardiovascular: Negative for chest pain and leg swelling.   Gastrointestinal: Negative for abdominal pain, blood in stool and melena.        No history of IBD.   Genitourinary: Negative for dysuria and hematuria.   Musculoskeletal: Positive for back pain (Chronic, no alarm signs or symptoms.), joint pain (Generalized, worse on knees. Mixed pattern, prominent DJD neuropathic features) and myalgias. Negative for falls.   Skin: Positive for rash.        History of hidradenitis suppurative.  Denies personal or family history of psoriasis.  No photosensitivity, Raynaud's phenomena, or ulcers.    Neurological: Negative for tingling, focal weakness and weakness.   Endo/Heme/Allergies:        No history of pregnancy loss over 10 weeks gestation.  No history of DVT or PE.  Denies history of preeclampsia/eclampsia.   Psychiatric/Behavioral: Negative for memory loss. The patient has insomnia.      Chronic comorbid conditions affecting medical decision making today:  Past Medical History:   Diagnosis Date    Allergy     Amenorrhea     Back pain     GERD (gastroesophageal reflux disease)     Infertility associated with anovulation     Iron deficiency anemia     Knee pain     Metabolic syndrome     PCO (polycystic ovaries)     Plantar fasciitis     Recurrent boils     Reflux esophagitis     Sinusitis     Vaginitis      Past Surgical History:   Procedure Laterality Date    CARPAL TUNNEL RELEASE Left 2019     SECTION       Family History   Problem Relation Age of Onset    Diabetes Mother     Lupus Mother     Diabetes Father     Heart disease Father     Hypertension Father     Cancer Father         prostate    Cancer Sister         ovarian     Social History     Substance and Sexual Activity   Alcohol Use Never    Frequency: Never     Social History     Tobacco Use   Smoking Status Never Smoker   Smokeless Tobacco Never Used     Social History     Substance and Sexual Activity   Drug Use Never       Current Outpatient Medications:     albuterol (PROVENTIL/VENTOLIN HFA) 90 mcg/actuation inhaler, Inhale 2 puffs into the lungs every 4 (four) hours as needed for Wheezing., Disp: 18 g, Rfl: 3    aspirin (ECOTRIN) 81 MG EC tablet, Take 1 tablet (81 mg total) by mouth once daily., Disp: 90 tablet, Rfl: 3    azelastine (ASTELIN) 137 mcg (0.1 %) nasal spray, 1 spray (137 mcg total) by Nasal route 2 (two) times daily., Disp: 20 mL, Rfl: 5    cetirizine (ZYRTEC) 10 MG tablet, Take 10 mg by mouth once daily., Disp: , Rfl:     clindamycin (CLEOCIN T) 1 % lotion, Apply topically 2 (two)  "times daily. face, Disp: 60 mL, Rfl: 1    EPINEPHrine (EPIPEN 2-MARTIN) 0.3 mg/0.3 mL AtIn, As directed., Disp: 2 Device, Rfl: 4    ergocalciferol (ERGOCALCIFEROL) 50,000 unit Cap, Take 1 capsule (50,000 Units total) by mouth every 7 days., Disp: 12 capsule, Rfl: 1    escitalopram oxalate (LEXAPRO) 20 MG tablet, Take 1 tablet (20 mg total) by mouth once daily., Disp: 90 tablet, Rfl: 3    fluticasone propionate (FLONASE) 50 mcg/actuation nasal spray, 1 spray (50 mcg total) by Each Nostril route 2 (two) times daily., Disp: 1 Bottle, Rfl: 11    hydrOXYzine HCL (ATARAX) 25 MG tablet, Take 2 tablets (50 mg total) by mouth every evening., Disp: 60 tablet, Rfl: 1    iron-vit c-b12-folic acid (IRON 100 PLUS) Tab, Take 1 tablet by mouth once daily., Disp: 90 tablet, Rfl: 3    ketoconazole (NIZORAL) 2 % shampoo, Apply topically twice a week., Disp: 500 mL, Rfl: 3    levocetirizine (XYZAL) 5 MG tablet, Take 1 tablet (5 mg total) by mouth every evening., Disp: 30 tablet, Rfl: 11    meloxicam (MOBIC) 15 MG tablet, Take 1 tablet (15 mg total) by mouth once daily. Take with food.  Discontinue if you develop GI side effects., Disp: 30 tablet, Rfl: 1    montelukast (SINGULAIR) 10 mg tablet, Take 1 tablet (10 mg total) by mouth every evening., Disp: 30 tablet, Rfl: 11    mupirocin (BACTROBAN) 2 % ointment, Apply topically 3 (three) times daily., Disp: 1 Tube, Rfl: 1    NOVOFINE PLUS 32 gauge x 1/6" Ndle, , Disp: , Rfl:     pantoprazole (PROTONIX) 40 MG tablet, Take 1 tablet (40 mg total) by mouth once daily., Disp: 90 tablet, Rfl: 3    phentermine (ADIPEX-P) 37.5 mg tablet, Take 1 tablet (37.5 mg total) by mouth before breakfast., Disp: 30 tablet, Rfl: 0    predniSONE (DELTASONE) 5 MG tablet, Take 1 tablet (5 mg total) by mouth as needed. Take tow tablets the night before your allergy immunotherapy injection, Disp: 30 tablet, Rfl: 0    semaglutide (OZEMPIC) 1 mg/dose (2 mg/1.5 mL) Priya, Inject 0.75 mLs into the skin " "every 7 days., Disp: 2 Syringe, Rfl: 11    spironolactone (ALDACTONE) 50 MG tablet, Take 1 tablet (50 mg total) by mouth once daily., Disp: 30 tablet, Rfl: 11    gabapentin (NEURONTIN) 300 MG capsule, Take 1 capsule (300 mg total) by mouth every evening., Disp: 30 capsule, Rfl: 3     Objective:         Vitals:    10/14/20 0719   BP: (!) 145/93   Pulse: 99     Physical Exam   Constitutional: No distress.   Estimated body mass index is 52.22 kg/m² as calculated from the following:    Height as of this encounter: 5' 4" (1.626 m).    Weight as of this encounter: 138 kg (304 lb 3.8 oz).    Wt Readings from Last 1 Encounters:  10/14/20 0719 : (!) 138 kg (304 lb 3.8 oz)     HENT:   Head: Normocephalic and atraumatic.   Eyes: Conjunctivae are normal. Pupils are equal, round, and reactive to light.   Neck: Normal range of motion.   Cardiovascular: Normal rate and intact distal pulses.    Pulmonary/Chest: Effort normal. No respiratory distress.   Abdominal: Soft. She exhibits no distension.   Neurological: She is alert. Gait normal.   Skin: No rash noted. No erythema.     Musculoskeletal: Normal range of motion. Tenderness (Slight allodynia.  Knee joint lines.) present.      Comments: : strong  No synovitis, enthesitis, or significant squeeze tenderness    AROM: intact  PROM: intact    Devices used by patient: none       Reviewed old and all outside pertinent medical records available.    All lab results personally reviewed and interpreted by me.  Lab Results   Component Value Date    WBC 8.04 09/29/2020    HGB 12.6 09/29/2020    HCT 41.2 09/29/2020    MCV 78 (L) 09/29/2020    MCH 23.8 (L) 09/29/2020    MCHC 30.6 (L) 09/29/2020    RDW 14.8 (H) 09/29/2020     09/29/2020    MPV 11.3 09/29/2020    PLTEST Normal 01/22/2009       Lab Results   Component Value Date     09/29/2020    K 3.9 09/29/2020     09/29/2020    CO2 27 09/29/2020    GLU 78 09/29/2020    BUN 6 09/29/2020    CALCIUM 9.1 09/29/2020    " PROT 7.5 09/29/2020    ALBUMIN 3.9 09/29/2020    BILITOT 0.3 09/29/2020    AST 28 09/29/2020    ALKPHOS 62 09/29/2020    ALT 37 09/29/2020       Lab Results   Component Value Date    COLORU Yellow 01/22/2009    APPEARANCEUA Clear 01/22/2009    SPECGRAV 1.015 01/22/2009    PHUR 6.5 01/22/2009    PROTEINUA Negative 01/22/2009    KETONESU Negative 01/22/2009    LEUKOCYTESUR Negative 01/22/2009    NITRITE Negative 01/22/2009       Lab Results   Component Value Date    CRP 4.4 10/02/2020       Lab Results   Component Value Date    SEDRATE 13 10/02/2020       Lab Results   Component Value Date    SEDRATE 13 10/02/2020       No components found for: 25OHVITDTOT, 17CQQYNF4, 28MABEPQ3, METHODNOTE    No results found for: URICACID    No components found for: TSPOTTB    Rheum Labs:   VANESSA 1 in 160 homogeneous/speckled   ANNA negative    CCP negative.    Infectious Labs:   HIV nonreactive September 2020   HCV nonreactive September 2020     Imaging:  All imaging reviewed and independently interpreted by me.    X-ray lumbar spine April 2014  The vertebrae appear intact and normally aligned. Disc height is maintained. Paraspinous soft tissues appear grossly normal.    X-ray foot September 2014  The bones are intact and normally aligned.  There is a small plantar calcaneal osteophyte.  Joint space maintained.     X-ray hip February 2018  Bones are intact and normally aligned. Joint spaces are maintained. No erosions. Small calcification evident lateral margin of the right acetabulum. Soft tissues appear unremarkable.    CT cervical spine June 2019  There is preservation of the vertebral body heights and disc spaces. There is no fracture or subluxation. Facet joints are in alignment. Prevertebral soft tissues are normal.    X-ray hands September 2020  Minimal early degenerative change 1st MTP joint.  No acute or healing fracture or dislocation.  Posterior positioning of the distal ulna at the wrist on the lateral view raises  possibility of the slight subluxation at the radioulnar joint.     X-ray wrist September 2020  No acute fracture or dislocation.  Mild degenerative change noted at the 1st CMC joints bilaterally.  No focal erosion or periosteal reaction.  No significant soft tissue calcification or foreign body.    X-ray knee October 2020  There is minimal medial and lateral compartment joint space narrowing involving either knee.  More mild-to-moderate degenerative change noted at the right patellofemoral compartment.  No joint effusion.  No acute fracture or dislocation     ASSESSMENT / PLAN:     Lucía Coreas is a 35 y.o. Black or  female with:    1. Positive VANESSA (antinuclear antibody)  - chronic arthralgias with mixed pattern and prominent mechanical and neuropathic features  - history of hidradenitis suppurative.  Family history of SLE  - currently without generalized synovitis, serositis, suspicious rash, fever, significant cytopenias, or dsDNA binding  - labs prior next visit.  Currently without rheumatic indication for immunosuppression.  - recommend complete physical therapy for mechanical back and bilateral knee pain   - trial of gabapentin as tolerated.  Clinical significant side effects of therapy discussed in detail  - Discussed and recommended exercise (nando non wt-bearing/swimming)  - resting affected joint for brief periods (<12 h),   - would benefit from decreasing at least 10% of body weight.  - PT/OT, joint braces/splints  - stretching, massage, heat, paraffin wax  - Acetaminophen prn -> standing -> NSAIDs short course (if persistent pain)  - Topicals therapy: Capsaicin / NSAIDs   - consider for corticosteroid injection knees if refractory pain  - C3 Complement; Standing  - C4 Complement; Standing  - Anti-DNA Ab, Double-Stranded; Standing  - Urinalysis; Standing  - Protein/Creatinine Ratio, Urine; Standing  - CBC auto differential; Standing  - Comprehensive Metabolic Panel; Standing  - Ambulatory  referral/consult to Dermatology; Future  - gabapentin (NEURONTIN) 300 MG capsule; Take 1 capsule (300 mg total) by mouth every evening.  Dispense: 30 capsule; Refill: 3    3. Rash and nonspecific skin eruption  - over 10 minutes spent regarding below topics:  - dermatology referral for history of hidradenitis suppurativa  - Immunization counseling done.  - Weight loss counseling done.  - Nutrition and exercise counseling.  - Limitation of alcohol consumption.  - Regular exercise:  Aerobic and resistance.  - Medication counseling provided.    Follow up in about 4 months (around 2/14/2021).    Method of contact with patient concerns: [facility]    Disclaimer:  This note is prepared using voice recognition software and as such is likely to have errors and has not been proof read. Please contact me for questions.     Time spent: 60 minutes in face to face discussion concerning diagnosis, prognosis, review of lab and test results, benefits of treatment as well as management of disease, counseling of patient and coordination of care between various health care providers.  Greater than half the time spent was used for coordination of care and counseling of patient.    Michael Hernandez M.D.  Rheumatology Department   [facility]

## 2020-10-19 ENCOUNTER — CLINICAL SUPPORT (OUTPATIENT)
Dept: ALLERGY | Facility: CLINIC | Age: 35
End: 2020-10-19
Payer: COMMERCIAL

## 2020-10-19 ENCOUNTER — OFFICE VISIT (OUTPATIENT)
Dept: ALLERGY | Facility: CLINIC | Age: 35
End: 2020-10-19
Payer: COMMERCIAL

## 2020-10-19 ENCOUNTER — OFFICE VISIT (OUTPATIENT)
Dept: DERMATOLOGY | Facility: CLINIC | Age: 35
End: 2020-10-19
Payer: COMMERCIAL

## 2020-10-19 VITALS
HEIGHT: 64 IN | TEMPERATURE: 98 F | SYSTOLIC BLOOD PRESSURE: 108 MMHG | BODY MASS INDEX: 50.02 KG/M2 | WEIGHT: 293 LBS | DIASTOLIC BLOOD PRESSURE: 73 MMHG | HEART RATE: 95 BPM

## 2020-10-19 DIAGNOSIS — J30.89 ALLERGIC RHINITIS DUE TO DERMATOPHAGOIDES FARINAE: ICD-10-CM

## 2020-10-19 DIAGNOSIS — J30.1 SEASONAL ALLERGIC RHINITIS DUE TO POLLEN: Primary | ICD-10-CM

## 2020-10-19 DIAGNOSIS — Z91.018 FOOD ALLERGY: ICD-10-CM

## 2020-10-19 DIAGNOSIS — L73.2 HIDRADENITIS SUPPURATIVA: Primary | ICD-10-CM

## 2020-10-19 DIAGNOSIS — J30.89 ALLERGIC RHINITIS DUE TO DERMATOPHAGOIDES PTERONYSSINUS: ICD-10-CM

## 2020-10-19 DIAGNOSIS — B00.1 RECURRENT COLD SORES: ICD-10-CM

## 2020-10-19 DIAGNOSIS — R76.8 POSITIVE ANA (ANTINUCLEAR ANTIBODY): ICD-10-CM

## 2020-10-19 DIAGNOSIS — L98.9 SKIN LESION: ICD-10-CM

## 2020-10-19 DIAGNOSIS — J30.1 SEASONAL ALLERGIC RHINITIS DUE TO POLLEN: Chronic | ICD-10-CM

## 2020-10-19 PROCEDURE — 11900 INJECT SKIN LESIONS </W 7: CPT | Mod: S$GLB,,, | Performed by: DERMATOLOGY

## 2020-10-19 PROCEDURE — 95117 IMMUNOTHERAPY INJECTIONS: CPT | Mod: S$GLB,,, | Performed by: ALLERGY & IMMUNOLOGY

## 2020-10-19 PROCEDURE — 99999 PR PBB SHADOW E&M-EST. PATIENT-LVL IV: ICD-10-PCS | Mod: PBBFAC,,, | Performed by: ALLERGY & IMMUNOLOGY

## 2020-10-19 PROCEDURE — 99214 PR OFFICE/OUTPT VISIT, EST, LEVL IV, 30-39 MIN: ICD-10-PCS | Mod: 25,S$GLB,, | Performed by: ALLERGY & IMMUNOLOGY

## 2020-10-19 PROCEDURE — 99999 PR PBB SHADOW E&M-EST. PATIENT-LVL III: ICD-10-PCS | Mod: PBBFAC,,,

## 2020-10-19 PROCEDURE — 99499 NO LOS: ICD-10-PCS | Mod: S$GLB,,, | Performed by: ALLERGY & IMMUNOLOGY

## 2020-10-19 PROCEDURE — 99213 PR OFFICE/OUTPT VISIT, EST, LEVL III, 20-29 MIN: ICD-10-PCS | Mod: 25,S$GLB,, | Performed by: DERMATOLOGY

## 2020-10-19 PROCEDURE — 99499 UNLISTED E&M SERVICE: CPT | Mod: S$GLB,,, | Performed by: ALLERGY & IMMUNOLOGY

## 2020-10-19 PROCEDURE — 99999 PR PBB SHADOW E&M-EST. PATIENT-LVL IV: CPT | Mod: PBBFAC,,, | Performed by: DERMATOLOGY

## 2020-10-19 PROCEDURE — 3008F PR BODY MASS INDEX (BMI) DOCUMENTED: ICD-10-PCS | Mod: CPTII,S$GLB,, | Performed by: ALLERGY & IMMUNOLOGY

## 2020-10-19 PROCEDURE — 99213 OFFICE O/P EST LOW 20 MIN: CPT | Mod: 25,S$GLB,, | Performed by: DERMATOLOGY

## 2020-10-19 PROCEDURE — 95117 PR IMMU2THERAPY, 2+ INJECTIONS: ICD-10-PCS | Mod: S$GLB,,, | Performed by: ALLERGY & IMMUNOLOGY

## 2020-10-19 PROCEDURE — 99999 PR PBB SHADOW E&M-EST. PATIENT-LVL IV: ICD-10-PCS | Mod: PBBFAC,,, | Performed by: DERMATOLOGY

## 2020-10-19 PROCEDURE — 99999 PR PBB SHADOW E&M-EST. PATIENT-LVL IV: CPT | Mod: PBBFAC,,, | Performed by: ALLERGY & IMMUNOLOGY

## 2020-10-19 PROCEDURE — 99999 PR PBB SHADOW E&M-EST. PATIENT-LVL III: CPT | Mod: PBBFAC,,,

## 2020-10-19 PROCEDURE — 11900 PR INJECTION INTO SKIN LESIONS, UP TO 7: ICD-10-PCS | Mod: S$GLB,,, | Performed by: DERMATOLOGY

## 2020-10-19 PROCEDURE — 99214 OFFICE O/P EST MOD 30 MIN: CPT | Mod: 25,S$GLB,, | Performed by: ALLERGY & IMMUNOLOGY

## 2020-10-19 PROCEDURE — 3008F BODY MASS INDEX DOCD: CPT | Mod: CPTII,S$GLB,, | Performed by: ALLERGY & IMMUNOLOGY

## 2020-10-19 RX ORDER — SPIRONOLACTONE 50 MG/1
100 TABLET, FILM COATED ORAL DAILY
Qty: 60 TABLET | Refills: 2 | Status: SHIPPED | OUTPATIENT
Start: 2020-10-19 | End: 2020-11-16

## 2020-10-19 RX ORDER — CLINDAMYCIN PHOSPHATE 11.9 MG/ML
SOLUTION TOPICAL 2 TIMES DAILY
Qty: 60 ML | Refills: 2 | Status: SHIPPED | OUTPATIENT
Start: 2020-10-19 | End: 2021-02-19

## 2020-10-19 RX ORDER — VALACYCLOVIR HYDROCHLORIDE 500 MG/1
500 TABLET, FILM COATED ORAL 2 TIMES DAILY
Qty: 60 TABLET | Refills: 11 | Status: SHIPPED | OUTPATIENT
Start: 2020-10-19 | End: 2020-11-16

## 2020-10-19 NOTE — LETTER
October 19, 2020      Michael Hernandez MD  [address] [place] [address] [address] [place] [address]           [facility]  [address]  [place] [address]  Phone: [phone]  Fax: [phone]          Patient: Lucía Coreas   MR Number: [id number]   YOB: 1985   Date of Visit: 10/19/2020       Dear Dr. Michael Hernandez:    Thank you for referring Lucía Coreas to me for evaluation. Attached you will find relevant portions of my assessment and plan of care.    If you have questions, please do not hesitate to call me. I look forward to following Lucía Coreas along with you.    Sincerely,    Naida Farrell MD    Enclosure  CC:  No Recipients    If you would like to receive this communication electronically, please contact [email or web address] or [phone] to request more information on [employer] access.    For providers and/or their staff who would like to refer a patient to [facility], please contact us through our one-stop-shop provider referral line, [facility], at [phone].    If you feel you have received this communication in error or would no longer like to receive these types of communications, please e-mail [email or web address]

## 2020-10-19 NOTE — PROGRESS NOTES
Subjective:       Patient ID:  Lucía Coreas is a 35 y.o. female who presents for   Chief Complaint   Patient presents with    Recurrent Skin Infections     x weeks , tx anitbiotics      History of Present Illness: The patient presents for follow up of HS.    The patient was last seen on: 7/6/2020 by Dr. Marquez for initial eval of HS,  rx'd Clinda and rifampin x 2 months, continued bleach baths and hibiclens, and got ILK 10 to lesion on abdomen. Also had bacterial swab of nasal mucosa- negative.  Reports no improvement in HS lesions. Has had for many years but thinks it is worsening over the past 6 months.    PCP started spironolactone 50mg daily on 8/28/2020. Tolerating well.    Other skin complaints: sores in nose, reports blisters and bleeding from sores deep within nose, had appt with ENT last week but had to cancel    Referred from rheum for HS (eval'd by rheum for + VANESSA and chronic pain).            Review of Systems     Objective:    Physical Exam   Constitutional: She appears well-developed and well-nourished. She is obese.  No distress.   Neurological: She is alert and oriented to person, place, and time. She is not disoriented.   Psychiatric: She has a normal mood and affect.   Skin:   Areas Examined (abnormalities noted in diagram):   Head / Face Inspection Performed  Neck Inspection Performed  Chest / Axilla Inspection Performed  Abdomen Inspection Performed  Genitals / Buttocks / Groin Inspection Performed  Back Inspection Performed  RUE Inspected  LUE Inspection Performed              Diagram Legend     Erythematous scaling macule/papule c/w actinic keratosis       Vascular papule c/w angioma      Pigmented verrucoid papule/plaque c/w seborrheic keratosis      Yellow umbilicated papule c/w sebaceous hyperplasia      Irregularly shaped tan macule c/w lentigo     1-2 mm smooth white papules consistent with Milia      Movable subcutaneous cyst with punctum c/w epidermal inclusion cyst       Subcutaneous movable cyst c/w pilar cyst      Firm pink to brown papule c/w dermatofibroma      Pedunculated fleshy papule(s) c/w skin tag(s)      Evenly pigmented macule c/w junctional nevus     Mildly variegated pigmented, slightly irregular-bordered macule c/w mildly atypical nevus      Flesh colored to evenly pigmented papule c/w intradermal nevus       Pink pearly papule/plaque c/w basal cell carcinoma      Erythematous hyperkeratotic cursted plaque c/w SCC      Surgical scar with no sign of skin cancer recurrence      Open and closed comedones      Inflammatory papules and pustules      Verrucoid papule consistent consistent with wart     Erythematous eczematous patches and plaques     Dystrophic onycholytic nail with subungual debris c/w onychomycosis     Umbilicated papule    Erythematous-base heme-crusted tan verrucoid plaque consistent with inflamed seborrheic keratosis     Erythematous Silvery Scaling Plaque c/w Psoriasis     See annotation      Assessment / Plan:        Hidradenitis suppurativa  - HS stage 1  - refractory to topical clinda, hibiclens, bleach baths, clinda + rifampin x 2 months, spironolactone 50 mg daily  - increase spironolactone to 100 mg daily. Add topical clinda solution.  Rec'd OTC CLN cleanser to continue alternating with hibiclens  - discussed addition of metformin (previous diarrhea SE but reports she may be open to trying it again in future)  - ILK as below  - discussed dapsone and Humira. She wants to wait until after upcoming surgery. Printed patient info for her to read and discuss at next visit.  - consider OCP  - encouraged continued weight loss. Consider starting OTC turmeric or zinc.  -     triamcinolone acetonide injection 10 mg  -     spironolactone (ALDACTONE) 50 MG tablet; Take 2 tablets (100 mg total) by mouth once daily.  Dispense: 60 tablet; Refill: 2  -     clindamycin (CLEOCIN T) 1 % external solution; Apply topically 2 (two) times daily.  Dispense: 60 mL;  Refill: 2      Intralesional Kenalog 10mg/cc (0.2 cc total) injected into 1 lesions on the R superior medial thigh today after obtaining verbal consent including risk of surrounding hypopigmentation. Patient tolerated procedure well.    Units: 1  NDC for Kenalog 10mg/cc:  3856-5425-20    Discussed benefits and risks of therapy including but not limited to breakthrough bleeding, breast tenderness, and elevated potassium levels which may give symptoms of fatigue, palpitations, and nausea. Patient should limit potassium intake - avoid potassium supplements or salt substitutes, limit bananas and citrus fruits. Pregnancy must be avoided while taking spironolactone.  Discussed theoretical increased risk of hormone related cancers such as breast, ovarian and uterine cancer.  Patient acknowledged understanding of these risks.        Nasal mucosal ulcerations  - difficult to visualize but appear as small heme crusted ulcers to nasal suptum mucosa. Bacterial culture previously was negative and no improvement with intranasal mupirocin. Recommended that she reschedule her appt with ENT that she cancelled last week for further evaluation of this.           No follow-ups on file.

## 2020-10-19 NOTE — PATIENT INSTRUCTIONS
- Increase spironolactone to 100 mg daily  - add CLN cleanser  - add topical clindamycin to affected areas

## 2020-10-19 NOTE — LETTER
October 19, 2020      [facility]  [address]  [place] [address]  Phone: [phone]  Fax: [phone]       Patient: Lucía Coreas   YOB: 1985  Date of Visit: 10/19/2020    To Whom It May Concern:    Bhupinder Coreas  was at [facility] on 10/19/2020. She may return to work/school on 10/20/2020 with no restrictions. If you have any questions or concerns, or if I can be of further assistance, please do not hesitate to contact me.    Sincerely,    Rossy Kramer MD

## 2020-10-19 NOTE — PROGRESS NOTES
"Subjective:       Patient ID: Lucía Coreas is a 35 y.o. female.    The patient's last visit with me was on 2/17/2020.    Chief Complaint:  Other (cold sores in the nose and fluid in the ear)      HPI: 35 year old female with a history of allergic rhinitis on allergy immunotherapy complaining of "cold sores in her nose" and fluid in her ear.  She reports that the sores in her nose burn and occur intermittently. She reports that they feel like "fever blisters". She is placing Bactroban and saline, but nothing is alleviating her symptoms. She has also tried nate vera gel and saline gel, but it is not helping.  She reports fluid in her ear. She is taking levocetirizine and Singulair, but it does not help.  She is not doing nasal sprays because of nasal dryness.  Epistaxis intermittently.    Allergy immunotherapy- DM, T, Cr- 1-1 dilution,  improved symptoms.    Still avoiding shellfish, no accidental ingestions  Epipen    Past Medical History:   Diagnosis Date    Allergy     Amenorrhea     Back pain     GERD (gastroesophageal reflux disease)     Infertility associated with anovulation     Iron deficiency anemia     Knee pain     Metabolic syndrome     PCO (polycystic ovaries)     Plantar fasciitis     Recurrent boils     Reflux esophagitis     Sinusitis     Vaginitis      Family History   Problem Relation Age of Onset    Diabetes Mother     Lupus Mother     Diabetes Father     Heart disease Father     Hypertension Father     Cancer Father         prostate    Cancer Sister         ovarian     Current Outpatient Medications on File Prior to Visit   Medication Sig Dispense Refill    albuterol (PROVENTIL/VENTOLIN HFA) 90 mcg/actuation inhaler Inhale 2 puffs into the lungs every 4 (four) hours as needed for Wheezing. 18 g 3    aspirin (ECOTRIN) 81 MG EC tablet Take 1 tablet (81 mg total) by mouth once daily. 90 tablet 3    azelastine (ASTELIN) 137 mcg (0.1 %) nasal spray 1 spray (137 mcg total) by " "Nasal route 2 (two) times daily. 20 mL 5    cetirizine (ZYRTEC) 10 MG tablet Take 10 mg by mouth once daily.      clindamycin (CLEOCIN T) 1 % external solution Apply topically 2 (two) times daily. 60 mL 2    clindamycin (CLEOCIN T) 1 % lotion Apply topically 2 (two) times daily. face 60 mL 1    EPINEPHrine (EPIPEN 2-MARTIN) 0.3 mg/0.3 mL AtIn As directed. 2 Device 4    ergocalciferol (ERGOCALCIFEROL) 50,000 unit Cap Take 1 capsule (50,000 Units total) by mouth every 7 days. 12 capsule 1    escitalopram oxalate (LEXAPRO) 20 MG tablet Take 1 tablet (20 mg total) by mouth once daily. 90 tablet 3    fluticasone propionate (FLONASE) 50 mcg/actuation nasal spray 1 spray (50 mcg total) by Each Nostril route 2 (two) times daily. 1 Bottle 11    gabapentin (NEURONTIN) 300 MG capsule Take 1 capsule (300 mg total) by mouth every evening. 30 capsule 3    hydrOXYzine HCL (ATARAX) 25 MG tablet Take 2 tablets (50 mg total) by mouth every evening. 60 tablet 1    iron-vit c-b12-folic acid (IRON 100 PLUS) Tab Take 1 tablet by mouth once daily. 90 tablet 3    ketoconazole (NIZORAL) 2 % shampoo Apply topically twice a week. 500 mL 3    levocetirizine (XYZAL) 5 MG tablet Take 1 tablet (5 mg total) by mouth every evening. 30 tablet 11    meloxicam (MOBIC) 15 MG tablet Take 1 tablet (15 mg total) by mouth once daily. Take with food.  Discontinue if you develop GI side effects. 30 tablet 1    montelukast (SINGULAIR) 10 mg tablet Take 1 tablet (10 mg total) by mouth every evening. 30 tablet 11    mupirocin (BACTROBAN) 2 % ointment Apply topically 3 (three) times daily. 1 Tube 1    NOVOFINE PLUS 32 gauge x 1/6" Ndle       pantoprazole (PROTONIX) 40 MG tablet Take 1 tablet (40 mg total) by mouth once daily. 90 tablet 3    phentermine (ADIPEX-P) 37.5 mg tablet Take 1 tablet (37.5 mg total) by mouth before breakfast. 30 tablet 0    predniSONE (DELTASONE) 5 MG tablet Take 1 tablet (5 mg total) by mouth as needed. Take tow tablets " the night before your allergy immunotherapy injection 30 tablet 0    semaglutide (OZEMPIC) 1 mg/dose (2 mg/1.5 mL) PnIj Inject 0.75 mLs into the skin every 7 days. 2 Syringe 11    spironolactone (ALDACTONE) 50 MG tablet Take 2 tablets (100 mg total) by mouth once daily. 60 tablet 2    [DISCONTINUED] spironolactone (ALDACTONE) 50 MG tablet Take 1 tablet (50 mg total) by mouth once daily. 30 tablet 11     Current Facility-Administered Medications on File Prior to Visit   Medication Dose Route Frequency Provider Last Rate Last Dose    triamcinolone acetonide injection 10 mg  10 mg Intradermal 1 time in Clinic/HOD Naida Farrell MD         Review of patient's allergies indicates:   Allergen Reactions    Metformin Diarrhea    Sulfa (sulfonamide antibiotics) Anaphylaxis and Swelling     Swelling (eyes)^, Swelling (throat)^  Swelling (eyes)^, Swelling (throat)^      Diclofenac      Gastritis     Shellfish containing products      anaphylaxis       Environmental History: No pets. Not a smoker.  Review of Systems   Constitutional: Negative for chills and fever.   HENT: Positive for ear pain. Negative for congestion, rhinorrhea, sinus pressure and sinus pain.    Eyes: Positive for itching. Negative for discharge.   Respiratory: Negative for cough, shortness of breath and wheezing.    Cardiovascular: Negative for chest pain and leg swelling.   Gastrointestinal: Negative for nausea and vomiting.   Musculoskeletal: Negative for gait problem and joint swelling.   Skin: Positive for rash. Negative for wound.   Allergic/Immunologic: Positive for environmental allergies and food allergies.   Neurological: Negative for facial asymmetry and speech difficulty.   Hematological: Negative for adenopathy. Does not bruise/bleed easily.   Psychiatric/Behavioral: Negative for agitation and behavioral problems.        Objective:    Physical Exam  Vitals signs reviewed.   Constitutional:       General: She is not in acute distress.      Appearance: Normal appearance. She is well-developed. She is obese. She is not ill-appearing, toxic-appearing or diaphoretic.   HENT:      Head: Normocephalic and atraumatic.      Right Ear: Tympanic membrane, ear canal and external ear normal. There is no impacted cerumen.      Left Ear: Tympanic membrane, ear canal and external ear normal. There is no impacted cerumen.      Nose: No congestion or rhinorrhea.      Comments: Yellow crusting, dry blood, lesions in the nose     Mouth/Throat:      Pharynx: No oropharyngeal exudate or posterior oropharyngeal erythema.   Eyes:      General: No scleral icterus.        Right eye: No discharge.         Left eye: No discharge.      Pupils: Pupils are equal, round, and reactive to light.   Neck:      Musculoskeletal: Normal range of motion and neck supple. No neck rigidity or muscular tenderness.      Thyroid: No thyromegaly.   Cardiovascular:      Rate and Rhythm: Normal rate and regular rhythm.      Heart sounds: Normal heart sounds. No murmur. No friction rub. No gallop.    Pulmonary:      Effort: Pulmonary effort is normal. No respiratory distress.      Breath sounds: Normal breath sounds. No stridor. No wheezing, rhonchi or rales.   Chest:      Chest wall: No tenderness.   Abdominal:      General: Bowel sounds are normal. There is no distension.      Palpations: Abdomen is soft. There is no mass.      Tenderness: There is no abdominal tenderness. There is no guarding.      Hernia: No hernia is present.   Musculoskeletal: Normal range of motion.         General: No swelling, tenderness, deformity or signs of injury.      Right lower leg: No edema.      Left lower leg: No edema.   Lymphadenopathy:      Cervical: No cervical adenopathy.   Skin:     General: Skin is warm.      Coloration: Skin is not jaundiced or pale.      Findings: No bruising, erythema, lesion or rash.   Neurological:      Mental Status: She is alert and oriented to person, place, and time.      Motor:  No weakness.      Gait: Gait normal.   Psychiatric:         Mood and Affect: Mood normal.         Behavior: Behavior normal.         Thought Content: Thought content normal.         Judgment: Judgment normal.           Assessment:       1. Seasonal allergic rhinitis due to pollen    2. Recurrent cold sores    3. Food allergy    4. Allergic rhinitis due to Dermatophagoides farinae    5. 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    MD,YANI

## 2020-10-23 ENCOUNTER — OFFICE VISIT (OUTPATIENT)
Dept: PAIN MEDICINE | Facility: CLINIC | Age: 35
End: 2020-10-23
Payer: COMMERCIAL

## 2020-10-23 VITALS
HEART RATE: 92 BPM | WEIGHT: 293 LBS | SYSTOLIC BLOOD PRESSURE: 120 MMHG | HEIGHT: 64 IN | DIASTOLIC BLOOD PRESSURE: 82 MMHG | BODY MASS INDEX: 50.02 KG/M2

## 2020-10-23 DIAGNOSIS — G89.29 CHRONIC PAIN OF BOTH KNEES: ICD-10-CM

## 2020-10-23 DIAGNOSIS — M25.562 CHRONIC PAIN OF BOTH KNEES: ICD-10-CM

## 2020-10-23 DIAGNOSIS — M25.561 CHRONIC PAIN OF BOTH KNEES: ICD-10-CM

## 2020-10-23 DIAGNOSIS — M79.12 MYALGIA OF AUXILIARY MUSCLES, HEAD AND NECK: Primary | ICD-10-CM

## 2020-10-23 DIAGNOSIS — E66.01 MORBID OBESITY WITH BMI OF 50.0-59.9, ADULT: ICD-10-CM

## 2020-10-23 DIAGNOSIS — R93.7 ABNORMAL MRI, CERVICAL SPINE: ICD-10-CM

## 2020-10-23 PROCEDURE — 99244 PR OFFICE CONSULTATION,LEVEL IV: ICD-10-PCS | Mod: S$GLB,,, | Performed by: PHYSICAL MEDICINE & REHABILITATION

## 2020-10-23 PROCEDURE — 99244 OFF/OP CNSLTJ NEW/EST MOD 40: CPT | Mod: S$GLB,,, | Performed by: PHYSICAL MEDICINE & REHABILITATION

## 2020-10-23 PROCEDURE — 99999 PR PBB SHADOW E&M-EST. PATIENT-LVL V: ICD-10-PCS | Mod: PBBFAC,,, | Performed by: PHYSICAL MEDICINE & REHABILITATION

## 2020-10-23 PROCEDURE — 99999 PR PBB SHADOW E&M-EST. PATIENT-LVL V: CPT | Mod: PBBFAC,,, | Performed by: PHYSICAL MEDICINE & REHABILITATION

## 2020-10-23 RX ORDER — TIZANIDINE 4 MG/1
4-8 TABLET ORAL NIGHTLY PRN
Qty: 60 TABLET | Refills: 2 | Status: SHIPPED | OUTPATIENT
Start: 2020-10-23 | End: 2020-11-16
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
## 2020-10-27 ENCOUNTER — CLINICAL SUPPORT (OUTPATIENT)
Dept: OPHTHALMOLOGY | Facility: CLINIC | Age: 35
End: 2020-10-27
Attending: OPTOMETRIST
Payer: COMMERCIAL

## 2020-10-27 DIAGNOSIS — H40.013 OAG (OPEN ANGLE GLAUCOMA) SUSPECT, LOW RISK, BILATERAL: ICD-10-CM

## 2020-10-28 ENCOUNTER — TELEPHONE (OUTPATIENT)
Dept: OPTOMETRY | Facility: CLINIC | Age: 35
End: 2020-10-28

## 2020-10-28 NOTE — TELEPHONE ENCOUNTER
----- Message from Elliot Willis, OD sent at 10/28/2020  8:34 AM CDT -----  Call pt with test results. OCT normal, VF shows a couple missed spots right eye. Recommend repeat right eye visual field and see Ronal in 3-4 months.

## 2020-11-03 NOTE — PROGRESS NOTES
SUBJECTIVE:     Yovany Flores is a 7 year old male, here for a routine health maintenance visit.    Patient was roomed by: Agustin Lopez MA    Well Child    Social History  Forms to complete? No  Child lives with::  Mother, father and brother  Who takes care of your child?:  School, father, maternal grandmother and mother  Languages spoken in the home:  Bulgarian, English and OTHER*  Recent family changes/ special stressors?:  None noted    Safety / Health Risk  Is your child around anyone who smokes?  No    TB Exposure:     No TB exposure    Car seat or booster in back seat?  Yes  Helmet worn for bicycle/roller blades/skateboard?  Yes    Home Safety Survey:      Firearms in the home?: No       Child ever home alone?  No    Daily Activities    Diet and Exercise     Child gets at least 4 servings fruit or vegetables daily: Yes    Consumes beverages other than lowfat white milk or water: No    Dairy/calcium sources: whole milk, yogurt and cheese    Calcium servings per day: 2    Child gets at least 60 minutes per day of active play: Yes    TV in child's room: No    Sleep       Sleep concerns: other     Bedtime: 20:30     Sleep duration (hours): 10    Elimination  Normal urination and normal bowel movements    Media     Types of media used: iPad and computer    Daily use of media (hours): 6    Activities    Activities: age appropriate activities, rides bike (helmet advised) and scooter/ skateboard/ rollerblades (helmet advised)    Organized/ Team sports: none    School    Name of school: [employer]    Grade level: 2nd    School performance: doing well in school    Grades: A    Schooling concerns? No    Days missed current/ last year: 0    Academic problems: no problems in reading, no problems in mathematics, no problems in writing and no learning disabilities     Behavior concerns: no current behavioral concerns with adults or other children    Dental    Water source:  City water and filtered water     See Flowsheet for immunotherapy administration. Patient waited in clinic 30 min for observation. Pt had epi pen on hand.     Dental provider: patient has a dental home    Dental exam in last 6 months: Yes     No dental risks    Vision   -patient had eye exam within the las 12 months     HEARING FREQUENCY    Right Ear:      1000 Hz RESPONSE- on Level: 40 db (Conditioning sound)   1000 Hz: RESPONSE- on Level:   20 db    2000 Hz: RESPONSE- on Level:   20 db    4000 Hz: RESPONSE- on Level:   20 db     Left Ear:      4000 Hz: RESPONSE- on Level:   20 db    2000 Hz: RESPONSE- on Level:   20 db    1000 Hz: RESPONSE- on Level:   20 db     500 Hz: RESPONSE- on Level: 25 db    Right Ear:    500 Hz: RESPONSE- on Level: 25 db    Hearing Acuity: Pass    Hearing Assessment: normal        Dental visit recommended: Dental home established, continue care every 6 months      Cardiac risk assessment:     Family history (males <55, females <65) of angina (chest pain), heart attack, heart surgery for clogged arteries, or stroke: no    Biological parent(s) with a total cholesterol over 240:  YES, father is on medicine for high cholesterol      Dyslipidemia risk:    Positive family history of dyslipidemia        MENTAL HEALTH  Social-Emotional screening:  Pediatric Symptom Checklist PASS (<28 pass), no followup necessary  No concerns    PROBLEM LIST  Patient Active Problem List   Diagnosis     Chronic constipation     MEDICATIONS  No current outpatient medications on file.      ALLERGY  No Known Allergies    IMMUNIZATIONS  Immunization History   Administered Date(s) Administered     DTAP (<7y) 03/13/2015     DTAP-IPV, <7Y 10/26/2017     DTAP-IPV/HIB (PENTACEL) 2013, 2013     DTaP / Hep B / IPV 03/07/2014     HEPA 08/20/2014, 03/13/2015     Hep B, Peds or Adolescent 2013, 2013, 2013     HepA-ped 2 Dose 08/20/2014, 03/13/2015     HepB 2013, 2013, 2013     Hib (PRP-T) 03/07/2014, 11/06/2014     Influenza Intranasal Vaccine 4 valent 10/17/2016     Influenza Vaccine IM > 6 months Valent IIV4 11/06/2014, 12/05/2014,  "10/17/2016, 10/26/2017, 10/26/2018, 10/29/2019, 09/15/2020     Influenza Vaccine IM Ages 6-35 Months 4 Valent (PF) 10/16/2015     Influenza vaccine ages 6-35 months 11/06/2014, 12/05/2014     MMR 08/20/2014, 05/17/2017     Pneumo Conj 13-V (2010&after) 2013, 2013, 03/07/2014, 11/06/2014     Rotavirus, pentavalent 2013, 2013, 03/07/2014     Typhoid IM 07/20/2017     Varicella 08/20/2014, 10/26/2017       HEALTH HISTORY SINCE LAST VISIT  No surgery, major illness or injury since last physical exam    ROS  Constitutional, eye, ENT, skin, respiratory, cardiac, and GI are normal except as otherwise noted.    OBJECTIVE:   EXAM  /63 (BP Location: Left arm, Patient Position: Chair, Cuff Size: Adult Small)   Pulse 77   Resp 16   Ht 1.295 m (4' 3\")   Wt 26.3 kg (58 lb)   SpO2 98%   BMI 15.68 kg/m    87 %ile (Z= 1.12) based on CDC (Boys, 2-20 Years) Stature-for-age data based on Stature recorded on 11/3/2020.  74 %ile (Z= 0.66) based on CDC (Boys, 2-20 Years) weight-for-age data using vitals from 11/3/2020.  53 %ile (Z= 0.08) based on CDC (Boys, 2-20 Years) BMI-for-age based on BMI available as of 11/3/2020.  Blood pressure percentiles are 71 % systolic and 67 % diastolic based on the 2017 AAP Clinical Practice Guideline. This reading is in the normal blood pressure range.  GENERAL: Active, alert, in no acute distress.  SKIN: Clear. No significant rash, abnormal pigmentation or lesions  HEAD: Normocephalic.  EYES:  Symmetric light reflex and no eye movement on cover/uncover test. Normal conjunctivae.  EARS: Normal canals. Tympanic membranes are normal; gray and translucent.  NOSE: Normal without discharge.  MOUTH/THROAT: Clear. No oral lesions. Teeth without obvious abnormalities.  NECK: Supple, no masses.  No thyromegaly.  LYMPH NODES: No adenopathy  LUNGS: Clear. No rales, rhonchi, wheezing or retractions  HEART: Regular rhythm. Normal S1/S2. No murmurs. Normal pulses.  ABDOMEN: Soft, " non-tender, not distended, no masses or hepatosplenomegaly. Bowel sounds normal.   GENITALIA: Normal male external genitalia. Masoud stage I,  both testes descended, no hernia or hydrocele.    EXTREMITIES: Full range of motion, no deformities  NEUROLOGIC: No focal findings. Cranial nerves grossly intact: DTR's normal. Normal gait, strength and tone    ASSESSMENT/PLAN:   1. Encounter for routine child health examination w/o abnormal findings        Anticipatory Guidance  The following topics were discussed:  SOCIAL/ FAMILY:    Praise for positive activities    Social media    Limit / supervise TV/ media    Friends    Bullying  NUTRITION:    Healthy snacks    Family meals    Calcium and iron sources    Balanced diet  HEALTH/ SAFETY:    Physical activity    Regular dental care    Booster seat/ Seat belts    Bike/sport helmets    Preventive Care Plan  Immunizations    Reviewed, up to date  Referrals/Ongoing Specialty care: No   See other orders in [facility].  BMI at 53 %ile (Z= 0.08) based on CDC (Boys, 2-20 Years) BMI-for-age based on BMI available as of 11/3/2020.  No weight concerns.    FOLLOW-UP:    If not improving or if worsening    See patient instructions    in 1 year for a Preventive Care visit    Resources  Goal Tracker: Be More Active  Goal Tracker: Less Screen Time  Goal Tracker: Drink More Water  Goal Tracker: Eat More Fruits and Veggies  [place] Child and Teen Checkups (C&TC) Schedule of Age-Related Screening Standards    Marnie Schrader MD  [facility]

## 2020-11-06 ENCOUNTER — CLINICAL SUPPORT (OUTPATIENT)
Dept: ALLERGY | Facility: CLINIC | Age: 35
End: 2020-11-06
Payer: COMMERCIAL

## 2020-11-06 ENCOUNTER — HOSPITAL ENCOUNTER (OUTPATIENT)
Dept: PREADMISSION TESTING | Facility: HOSPITAL | Age: 35
Discharge: HOME | End: 2020-11-06
Attending: ORTHOPAEDIC SURGERY
Payer: COMMERCIAL

## 2020-11-06 ENCOUNTER — TELEPHONE (OUTPATIENT)
Dept: PREADMISSION TESTING | Facility: HOSPITAL | Age: 35
End: 2020-11-06

## 2020-11-06 VITALS
HEART RATE: 88 BPM | RESPIRATION RATE: 16 BRPM | TEMPERATURE: 98 F | OXYGEN SATURATION: 100 % | DIASTOLIC BLOOD PRESSURE: 60 MMHG | SYSTOLIC BLOOD PRESSURE: 123 MMHG

## 2020-11-06 DIAGNOSIS — M54.2 CHRONIC NECK PAIN: ICD-10-CM

## 2020-11-06 DIAGNOSIS — J30.1 SEASONAL ALLERGIC RHINITIS DUE TO POLLEN: Chronic | ICD-10-CM

## 2020-11-06 DIAGNOSIS — L02.92 RECURRENT BOILS: ICD-10-CM

## 2020-11-06 DIAGNOSIS — J45.20 MILD INTERMITTENT ASTHMA WITHOUT COMPLICATION: ICD-10-CM

## 2020-11-06 DIAGNOSIS — E11.65 TYPE 2 DIABETES MELLITUS WITH HYPERGLYCEMIA, WITHOUT LONG-TERM CURRENT USE OF INSULIN: Chronic | ICD-10-CM

## 2020-11-06 DIAGNOSIS — J30.89 ALLERGIC RHINITIS DUE TO DERMATOPHAGOIDES FARINAE: ICD-10-CM

## 2020-11-06 DIAGNOSIS — Z01.818 PREOP TESTING: Primary | ICD-10-CM

## 2020-11-06 DIAGNOSIS — G89.29 CHRONIC NECK PAIN: ICD-10-CM

## 2020-11-06 DIAGNOSIS — J30.89 ALLERGIC RHINITIS DUE TO DERMATOPHAGOIDES PTERONYSSINUS: ICD-10-CM

## 2020-11-06 DIAGNOSIS — G56.01 CARPAL TUNNEL SYNDROME OF RIGHT WRIST: ICD-10-CM

## 2020-11-06 DIAGNOSIS — E66.01 MORBID OBESITY: ICD-10-CM

## 2020-11-06 DIAGNOSIS — F41.9 ANXIETY: Chronic | ICD-10-CM
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
## 2020-11-06 PROBLEM — R11.2 NON-INTRACTABLE VOMITING WITH NAUSEA: Status: RESOLVED | Noted: 2019-08-15 | Resolved: 2020-11-06

## 2020-11-06 PROBLEM — G56.02 CARPAL TUNNEL SYNDROME OF LEFT WRIST: Status: ACTIVE | Noted: 2020-11-06

## 2020-11-06 LAB — SARS-COV-2 RNA RESP QL NAA+PROBE: NOT DETECTED

## 2020-11-06 PROCEDURE — U0003 INFECTIOUS AGENT DETECTION BY NUCLEIC ACID (DNA OR RNA); SEVERE ACUTE RESPIRATORY SYNDROME CORONAVIRUS 2 (SARS-COV-2) (CORONAVIRUS DISEASE [COVID-19]), AMPLIFIED PROBE TECHNIQUE, MAKING USE OF HIGH THROUGHPUT TECHNOLOGIES AS DESCRIBED BY CMS-2020-01-R: HCPCS

## 2020-11-06 PROCEDURE — 93010 ELECTROCARDIOGRAM REPORT: CPT | Mod: ,,, | Performed by: INTERNAL MEDICINE

## 2020-11-06 PROCEDURE — 95117 IMMUNOTHERAPY INJECTIONS: CPT | Mod: S$GLB,,, | Performed by: ALLERGY & IMMUNOLOGY

## 2020-11-06 PROCEDURE — 93010 EKG 12-LEAD: ICD-10-PCS | Mod: ,,, | Performed by: INTERNAL MEDICINE

## 2020-11-06 PROCEDURE — 99499 NO LOS: ICD-10-PCS | Mod: S$GLB,,, | Performed by: ALLERGY & IMMUNOLOGY

## 2020-11-06 PROCEDURE — 99999 PR PBB SHADOW E&M-EST. PATIENT-LVL III: CPT | Mod: PBBFAC,,,

## 2020-11-06 PROCEDURE — 93005 ELECTROCARDIOGRAM TRACING: CPT

## 2020-11-06 PROCEDURE — 99499 UNLISTED E&M SERVICE: CPT | Mod: S$GLB,,, | Performed by: ALLERGY & IMMUNOLOGY

## 2020-11-06 PROCEDURE — 99999 PR PBB SHADOW E&M-EST. PATIENT-LVL III: ICD-10-PCS | Mod: PBBFAC,,,

## 2020-11-06 PROCEDURE — 95117 PR IMMU2THERAPY, 2+ INJECTIONS: ICD-10-PCS | Mod: S$GLB,,, | Performed by: ALLERGY & IMMUNOLOGY

## 2020-11-06 RX ORDER — DIAZEPAM 10 MG/1
10 TABLET ORAL ONCE AS NEEDED
Status: CANCELLED | OUTPATIENT
Start: 2020-11-06 | End: 2032-04-04

## 2020-11-06 NOTE — ASSESSMENT & PLAN NOTE
Pt has recurrent cutaneous staph infections  Followed by PCP and Dermatology  Cont Spironolactone daily, Daily nasal Mupirocin, Clindamycin wash daily   Counseled on Hibiclens shower night before and am of surgery and Nozin   Hold Spironolactone am of surgery

## 2020-11-06 NOTE — H&P
Preoperative History and Physical                                                             Hospital Medicine      Chief Complaint: Preoperative evaluation     History of Present Illness:      Lucía Coreas is a 35 y.o. female who presents to the office today for a preoperative consultation at the request of Dr. Laughlin who plans on performing Right carpal tunnel release on 20  Functional Status:      The patient is able to climb a flight of stairs. The patient is able to ambulate 2-3 blocks without difficulty. The patient's functional status is affected by the surgical problem. The patient's functional status is not affected by shortness of breath, chest pain, dyspnea on exertion and fatigue.    MET score greater than 4    Past Medical History:      Past Medical History:   Diagnosis Date    Allergy     Amenorrhea     Asthma     Diabetes     GERD (gastroesophageal reflux disease)     Infertility associated with anovulation     Iron deficiency anemia     Knee pain     Metabolic syndrome     PCO (polycystic ovaries)     Recurrent boils         Past Surgical History:      Past Surgical History:   Procedure Laterality Date    CARPAL TUNNEL RELEASE Left 2019     SECTION          Social History:      Social History     Socioeconomic History    Marital status:      Spouse name: Not on file    Number of children: Not on file    Years of education: Not on file    Highest education level: Not on file   Occupational History    Not on file   Social Needs    Financial resource strain: Not on file    Food insecurity     Worry: Not on file     Inability: Not on file    Transportation needs     Medical: Not on file     Non-medical: Not on file   Tobacco Use    Smoking status: Never Smoker    Smokeless tobacco: Never Used   Substance and Sexual Activity    Alcohol use: Never     Frequency: Never    Drug use: Never    Sexual activity:  Yes     Partners: Female     Birth control/protection: None   Lifestyle    Physical activity     Days per week: Not on file     Minutes per session: Not on file    Stress: Not on file   Relationships    Social connections     Talks on phone: Not on file     Gets together: Not on file     Attends Restoration service: Not on file     Active member of club or organization: Not on file     Attends meetings of clubs or organizations: Not on file     Relationship status: Not on file   Other Topics Concern    Not on file   Social History Narrative    Not on file        Family History:      Family History   Problem Relation Age of Onset    Diabetes Mother     Lupus Mother     Diabetes Father     Heart disease Father 69    Hypertension Father     Prostate cancer Father     Ovarian cancer Sister     AMY disease Brother        Allergies:      Review of patient's allergies indicates:   Allergen Reactions    Metformin Diarrhea    Sulfa (sulfonamide antibiotics) Anaphylaxis and Swelling     Swelling (eyes)^, Swelling (throat)^  Swelling (eyes)^, Swelling (throat)^      Diclofenac      Gastritis     Shellfish containing products      anaphylaxis       Medications:      Current Outpatient Medications   Medication Sig    albuterol (PROVENTIL/VENTOLIN HFA) 90 mcg/actuation inhaler Inhale 2 puffs into the lungs every 4 (four) hours as needed for Wheezing.    azelastine (ASTELIN) 137 mcg (0.1 %) nasal spray 1 spray (137 mcg total) by Nasal route 2 (two) times daily.    cetirizine (ZYRTEC) 10 MG tablet Take 10 mg by mouth daily as needed.     clindamycin (CLEOCIN T) 1 % external solution Apply topically 2 (two) times daily.    clindamycin (CLEOCIN T) 1 % lotion Apply topically 2 (two) times daily. face    ergocalciferol (ERGOCALCIFEROL) 50,000 unit Cap Take 1 capsule (50,000 Units total) by mouth every 7 days.    escitalopram oxalate (LEXAPRO) 20 MG tablet Take 1 tablet (20 mg total) by mouth once daily.     fluticasone propionate (FLONASE) 50 mcg/actuation nasal spray 1 spray (50 mcg total) by Each Nostril route 2 (two) times daily.    gabapentin (NEURONTIN) 300 MG capsule Take 1 capsule (300 mg total) by mouth every evening.    hydrOXYzine HCL (ATARAX) 25 MG tablet Take 2 tablets (50 mg total) by mouth every evening.    iron-vit c-b12-folic acid (IRON 100 PLUS) Tab Take 1 tablet by mouth once daily.    ketoconazole (NIZORAL) 2 % shampoo Apply topically twice a week.    mupirocin (BACTROBAN) 2 % ointment Apply topically 3 (three) times daily. (Patient taking differently: Apply topically once daily. )    pantoprazole (PROTONIX) 40 MG tablet Take 1 tablet (40 mg total) by mouth once daily.    predniSONE (DELTASONE) 5 MG tablet Take 1 tablet (5 mg total) by mouth as needed. Take tow tablets the night before your allergy immunotherapy injection    semaglutide (OZEMPIC) 1 mg/dose (2 mg/1.5 mL) PnIj Inject 0.75 mLs into the skin every 7 days.    spironolactone (ALDACTONE) 50 MG tablet Take 2 tablets (100 mg total) by mouth once daily.    tiZANidine (ZANAFLEX) 4 MG tablet Take 1-2 tablets (4-8 mg total) by mouth nightly as needed. Take 1/2 to 1 tab BID PRN muscle spasms. May cause drowsiness.    valACYclovir (VALTREX) 500 MG tablet Take 1 tablet (500 mg total) by mouth 2 (two) times daily.    aspirin (ECOTRIN) 81 MG EC tablet Take 1 tablet (81 mg total) by mouth once daily. (Patient not taking: Reported on 11/6/2020)    EPINEPHrine (EPIPEN 2-MARTIN) 0.3 mg/0.3 mL AtIn As directed. (Patient not taking: Reported on 11/6/2020)    levocetirizine (XYZAL) 5 MG tablet Take 1 tablet (5 mg total) by mouth every evening. (Patient not taking: Reported on 11/6/2020)    meloxicam (MOBIC) 15 MG tablet Take 1 tablet (15 mg total) by mouth once daily. Take with food.  Discontinue if you develop GI side effects. (Patient not taking: Reported on 11/6/2020)     No current facility-administered medications for this encounter.         Vitals:      Vitals:    11/06/20 0752   BP: 123/60   Pulse: 88   Resp: 16   Temp: 97.6 °F (36.4 °C)       Review of Systems:        Constitutional: Negative for fever, chills, weight loss, malaise/fatigue and diaphoresis.   HENT: Negative for hearing loss, ear pain, nosebleeds, congestion, sore throat, neck pain, tinnitus and ear discharge.    Eyes: Negative for blurred vision, double vision, photophobia, pain, discharge and redness.   Respiratory: Negative for cough, hemoptysis, sputum production, shortness of breath, wheezing and stridor.    Cardiovascular: Negative for chest pain, palpitations, orthopnea, claudication, leg swelling and PND.   Gastrointestinal: Negative for heartburn, nausea, vomiting, abdominal pain, diarrhea, constipation, blood in stool and melena.   Genitourinary: Negative for dysuria, urgency, frequency, hematuria and flank pain.   Musculoskeletal: +chronic neck and shoulder pain +pain to right wrist/hand +chronic knee pain Negative for myalgias, back pain, joint pain and falls.   Skin: Negative for itching and rash.   Neurological: Negative for dizziness, tingling, tremors, sensory change, speech change, focal weakness, seizures, loss of consciousness, weakness and headaches.   Endo/Heme/Allergies: Negative for environmental allergies and polydipsia. Does not bruise/bleed easily.   Psychiatric/Behavioral: +anxiety, panic attacks, nail biting-all improved on current medication regimen  Negative for depression, suicidal ideas, hallucinations, memory loss and substance abuse. The patient is not nervous/anxious and does not have insomnia.    All 14 systems reviewed and negative except as noted above.    Physical Exam:      Constitutional: Appears well-developed, well-nourished and in no acute distress.  Patient is oriented to person, place, and time.   Head: Normocephalic and atraumatic. Mucous membranes moist.  Neck: Neck supple no mass.   Cardiovascular: Normal rate and regular rhythm.   S1 S2 appreciated by ascultation.  Pulmonary/Chest: Effort normal and clear to auscultation bilaterally. No respiratory distress.   Abdomen: Soft. Non-tender and non-distended. Bowel sounds are normal.   Neurological: Patient is alert and oriented to person, place and time. Moves all extremities.  Skin: Warm and dry. No lesions.  Extremities: No clubbing, cyanosis or edema. Disruption in skin integrity to finger tips    Laboratory data:      Reviewed and noted in plan where applicable. Please see chart for full laboratory data.    No results for input(s): CPK, CPKMB, TROPONINI, MB in the last 24 hours. No results for input(s): POCTGLUCOSE in the last 24 hours.     No results found for: INR, PROTIME    Lab Results   Component Value Date    WBC 8.04 09/29/2020    HGB 12.6 09/29/2020    HCT 41.2 09/29/2020    MCV 78 (L) 09/29/2020     09/29/2020       No results for input(s): GLU, NA, K, CL, CO2, BUN, CREATININE, CALCIUM, MG in the last 24 hours.    Predictors of intubation difficulty:       Morbid obesity? yes - BMI 52   Anatomically abnormal facies? no   Prominent incisors? no   Receding mandible? no   Short, thick neck? yes -    Neck range of motion: normal    Cardiographics:      ECG: normal sinus rhythm, no blocks or conduction defects, no ischemic changes 11/6/20  Imaging:    Not required    Assessment and Plan:      Carpal tunnel syndrome of right wrist  The patient is scheduled for a Right carpal tunnel release on 11/9/20 by Dr. Laughlin    Known risk factors for perioperative complications:    Frequent cutaneous Staph infections   Morbid obesity -BMI 52    Difficulty with intubation is not anticipated.    Cardiac Risk Estimation: low intraoperative cardiac risk     1.) Preoperative workup as follows: ECG.  2.) Change in medication regimen before surgery: discontinue ASA 6 days before surgery, discontinue NSAIDs (Mobic ) 4 days before surgery,  discontinue antihypertensives (Spironolactone) to avoid  hypotension from anesthesia   3.) Prophylaxis for cardiac events with perioperative beta-blockers: not indicated.  4.) Invasive hemodynamic monitoring perioperatively: not indicated.  5.) Deep vein thrombosis prophylaxis postoperatively: regimen to be chosen by surgical team.  6.) Surveillance for postoperative MI with ECG immediately postoperatively and on postoperati ve days 1 and 2 AND troponin levels 24 hours postoperatively and on day 4 or hospital discharge (whichever comes first): not indicated.  7.) Current medications which may produce withdrawal symptoms if withheld perioperatively: none   8.) Other measures: None     Type 2 diabetes mellitus with hyperglycemia, without long-term current use of insulin  Last Hgb A1c was 5.4 9/29/20  Cont Ozempic     Morbid obesity  BMI 52  Pt counseled on healthy weight, diet, and exercise     Recurrent boils  Pt has recurrent cutaneous staph infections  Followed by PCP and Dermatology  Cont Spironolactone daily, Daily nasal Mupirocin, Clindamycin wash daily   Counseled on Hibiclens shower night before and am of surgery and Nozin   Hold Spironolactone am of surgery    GERD with esophagitis  Controlled  Cont Protonix     Anxiety  Panic attacks- improved with Hydroxyzine   Cont Lexapro daily and Hydroxyzine nightly     Chronic neck pain  Good cervical ROM on exam w/o radicular symptoms   Hold Mobic until after surgery  Cont Gabapentin nightly and Zanaflex prn     Seasonal allergic rhinitis  Followed by immunology for allergy injections   Cont Xyzal, Flonase, Astelin     Mild intermittent Asthma  Pt was told she has Asthma since she was a child  No prior PFTs, she is not followed by a Pulmonologist, No recent wheezing/SOB/Cough  Breath sounds clear on exam

## 2020-11-06 NOTE — DISCHARGE INSTRUCTIONS
To confirm, Your doctor has instructed you that surgery is scheduled for 11/9/20 *.       Please report to [facility] .  Pre admit office will call afternoon prior to surgery with final arrival time    INSTRUCTIONS IMPORTANT!!!   Do not eat, drink, or smoke after 12 midnight-including water. OK to brush teeth, no gum, candy or mints!    ¨ Take only these medicines with a small swallow of water-morning of surgery.    Protonix    Pre operative instructions:  Please review the Pre-Operative Instruction booklet that you were given.        Bathing Instructions--See page 6 in the Pre-operative booklet.      Prevention of surgical site infections:     -Keep incisions clean and dry.   -Do not soak/submerge incisions in water until completely healed.   -Do not apply lotions, powders, creams, or deodorants to site.   -Always make sure hands are cleaned with antibacterial soap/ alcohol-based  prior to touching the surgical site.  (This includes doctors, nurses, staff, and yourself.)    Signs and symptoms:   -Redness and pain around the area where you had surgery   -Drainage of cloudy fluid from your surgical wound   -Fever over 100.4       I have read or had read and explained to me, and understand the above information.  Additional comments or instructions:  Received a copy of Pre-operative instructions booklet, FAQ surgical site infection sheet, and packets of hibiclens (if indicated).

## 2020-11-06 NOTE — ASSESSMENT & PLAN NOTE
Good cervical ROM on exam w/o radicular symptoms   Hold Mobic until after surgery  Cont Gabapentin nightly and Zanaflex prn

## 2020-11-06 NOTE — ASSESSMENT & PLAN NOTE
The patient is scheduled for a Right carpal tunnel release on 11/9/20 by Dr. Laughlin    Known risk factors for perioperative complications:    Frequent cutaneous Staph infections   Morbid obesity     Difficulty with intubation is not anticipated.    Cardiac Risk Estimation: low intraoperative cardiac risk     1.) Preoperative workup as follows: ECG.  2.) Change in medication regimen before surgery: discontinue ASA 6 days before surgery, discontinue NSAIDs (Mobic ) 4 days before surgery,  discontinue antihypertensives (Spironolactone) to avoid hypotension from anesthesia   3.) Prophylaxis for cardiac events with perioperative beta-blockers: not indicated.  4.) Invasive hemodynamic monitoring perioperatively: not indicated.  5.) Deep vein thrombosis prophylaxis postoperatively: regimen to be chosen by surgical team.  6.) Surveillance for postoperative MI with ECG immediately postoperatively and on postoperati ve days 1 and 2 AND troponin levels 24 hours postoperatively and on day 4 or hospital discharge (whichever comes first): not indicated.  7.) Current medications which may produce withdrawal symptoms if withheld perioperatively: none   8.) Other measures: None

## 2020-11-06 NOTE — TELEPHONE ENCOUNTER
Arrival time 0615 @ [facility]. NPO status reinforced. Medications reviewed. Visitor policy discussed.

## 2020-11-09 ENCOUNTER — OFFICE VISIT (OUTPATIENT)
Dept: OBSTETRICS AND GYNECOLOGY | Facility: CLINIC | Age: 35
End: 2020-11-09
Payer: COMMERCIAL

## 2020-11-09 ENCOUNTER — LAB VISIT (OUTPATIENT)
Dept: LAB | Facility: HOSPITAL | Age: 35
End: 2020-11-09
Attending: OBSTETRICS & GYNECOLOGY
Payer: COMMERCIAL

## 2020-11-09 ENCOUNTER — PATIENT OUTREACH (OUTPATIENT)
Dept: ADMINISTRATIVE | Facility: OTHER | Age: 35
End: 2020-11-09

## 2020-11-09 ENCOUNTER — HOSPITAL ENCOUNTER (OUTPATIENT)
Facility: HOSPITAL | Age: 35
Discharge: HOME | End: 2020-11-09
Attending: ORTHOPAEDIC SURGERY | Admitting: ORTHOPAEDIC SURGERY
Payer: COMMERCIAL

## 2020-11-09 VITALS
HEIGHT: 64 IN | RESPIRATION RATE: 18 BRPM | OXYGEN SATURATION: 100 % | BODY MASS INDEX: 50.02 KG/M2 | WEIGHT: 293 LBS | SYSTOLIC BLOOD PRESSURE: 133 MMHG | TEMPERATURE: 98 F | DIASTOLIC BLOOD PRESSURE: 75 MMHG | HEART RATE: 90 BPM

## 2020-11-09 VITALS
DIASTOLIC BLOOD PRESSURE: 86 MMHG | HEIGHT: 64 IN | SYSTOLIC BLOOD PRESSURE: 124 MMHG | BODY MASS INDEX: 50.02 KG/M2 | WEIGHT: 293 LBS

## 2020-11-09 DIAGNOSIS — G56.01 CARPAL TUNNEL SYNDROME OF RIGHT WRIST: ICD-10-CM

## 2020-11-09 DIAGNOSIS — O36.80X0 PREGNANCY OF UNKNOWN ANATOMIC LOCATION: ICD-10-CM

## 2020-11-09 DIAGNOSIS — Z32.00 POSSIBLE PREGNANCY: Primary | ICD-10-CM

## 2020-11-09 DIAGNOSIS — Z32.00 POSSIBLE PREGNANCY: ICD-10-CM

## 2020-11-09 DIAGNOSIS — G56.01 CARPAL TUNNEL SYNDROME ON RIGHT: Primary | ICD-10-CM

## 2020-11-09 LAB
B-HCG UR QL: POSITIVE
B-HCG UR QL: POSITIVE
BASOPHILS # BLD AUTO: 0.04 K/UL (ref 0–0.2)
BASOPHILS NFR BLD: 0.4 % (ref 0–1.9)
CTP QC/QA: YES
CTP QC/QA: YES
DIFFERENTIAL METHOD: ABNORMAL
EOSINOPHIL # BLD AUTO: 0.1 K/UL (ref 0–0.5)
EOSINOPHIL NFR BLD: 1.3 % (ref 0–8)
ERYTHROCYTE [DISTWIDTH] IN BLOOD BY AUTOMATED COUNT: 15.3 % (ref 11.5–14.5)
HCT VFR BLD AUTO: 40.8 % (ref 37–48.5)
HGB BLD-MCNC: 12.3 G/DL (ref 12–16)
IMM GRANULOCYTES # BLD AUTO: 0.02 K/UL (ref 0–0.04)
IMM GRANULOCYTES NFR BLD AUTO: 0.2 % (ref 0–0.5)
LYMPHOCYTES # BLD AUTO: 4.3 K/UL (ref 1–4.8)
LYMPHOCYTES NFR BLD: 43.4 % (ref 18–48)
MCH RBC QN AUTO: 23.7 PG (ref 27–31)
MCHC RBC AUTO-ENTMCNC: 30.1 G/DL (ref 32–36)
MCV RBC AUTO: 79 FL (ref 82–98)
MONOCYTES # BLD AUTO: 0.6 K/UL (ref 0.3–1)
MONOCYTES NFR BLD: 5.6 % (ref 4–15)
NEUTROPHILS # BLD AUTO: 4.8 K/UL (ref 1.8–7.7)
NEUTROPHILS NFR BLD: 49.1 % (ref 38–73)
NRBC BLD-RTO: 0 /100 WBC
PLATELET # BLD AUTO: 334 K/UL (ref 150–350)
PMV BLD AUTO: 11 FL (ref 9.2–12.9)
RBC # BLD AUTO: 5.18 M/UL (ref 4–5.4)
TSH SERPL DL<=0.005 MIU/L-ACNC: 1.25 UIU/ML (ref 0.4–4)
WBC # BLD AUTO: 9.85 K/UL (ref 3.9–12.7)

## 2020-11-09 PROCEDURE — 99999 PR PBB SHADOW E&M-EST. PATIENT-LVL IV: CPT | Mod: PBBFAC,,, | Performed by: OBSTETRICS & GYNECOLOGY

## 2020-11-09 PROCEDURE — 36415 COLL VENOUS BLD VENIPUNCTURE: CPT | Mod: PO

## 2020-11-09 PROCEDURE — 99213 OFFICE O/P EST LOW 20 MIN: CPT | Mod: 25,S$GLB,, | Performed by: OBSTETRICS & GYNECOLOGY

## 2020-11-09 PROCEDURE — 86762 RUBELLA ANTIBODY: CPT

## 2020-11-09 PROCEDURE — 86592 SYPHILIS TEST NON-TREP QUAL: CPT

## 2020-11-09 PROCEDURE — 99213 PR OFFICE/OUTPT VISIT, EST, LEVL III, 20-29 MIN: ICD-10-PCS | Mod: 25,S$GLB,, | Performed by: OBSTETRICS & GYNECOLOGY

## 2020-11-09 PROCEDURE — 99999 PR PBB SHADOW E&M-EST. PATIENT-LVL IV: ICD-10-PCS | Mod: PBBFAC,,, | Performed by: OBSTETRICS & GYNECOLOGY

## 2020-11-09 PROCEDURE — 86901 BLOOD TYPING SEROLOGIC RH(D): CPT

## 2020-11-09 PROCEDURE — 3008F BODY MASS INDEX DOCD: CPT | Mod: CPTII,S$GLB,, | Performed by: OBSTETRICS & GYNECOLOGY

## 2020-11-09 PROCEDURE — 76815 OB US LIMITED FETUS(S): CPT | Mod: 26,S$GLB,, | Performed by: OBSTETRICS & GYNECOLOGY

## 2020-11-09 PROCEDURE — 85025 COMPLETE CBC W/AUTO DIFF WBC: CPT

## 2020-11-09 PROCEDURE — 81220 CFTR GENE COM VARIANTS: CPT

## 2020-11-09 PROCEDURE — 86703 HIV-1/HIV-2 1 RESULT ANTBDY: CPT

## 2020-11-09 PROCEDURE — 76815 PR  US,PREGNANT UTERUS,LIMITED, 1/> FETUSES: ICD-10-PCS | Mod: 26,S$GLB,, | Performed by: OBSTETRICS & GYNECOLOGY

## 2020-11-09 PROCEDURE — 80307 DRUG TEST PRSMV CHEM ANLYZR: CPT

## 2020-11-09 PROCEDURE — 84443 ASSAY THYROID STIM HORMONE: CPT

## 2020-11-09 PROCEDURE — 81025 URINE PREGNANCY TEST: CPT | Performed by: ORTHOPAEDIC SURGERY

## 2020-11-09 PROCEDURE — 87086 URINE CULTURE/COLONY COUNT: CPT

## 2020-11-09 PROCEDURE — 87491 CHLMYD TRACH DNA AMP PROBE: CPT

## 2020-11-09 PROCEDURE — 87340 HEPATITIS B SURFACE AG IA: CPT

## 2020-11-09 PROCEDURE — 3008F PR BODY MASS INDEX (BMI) DOCUMENTED: ICD-10-PCS | Mod: CPTII,S$GLB,, | Performed by: OBSTETRICS & GYNECOLOGY

## 2020-11-09 RX ORDER — SODIUM CHLORIDE, SODIUM LACTATE, POTASSIUM CHLORIDE, CALCIUM CHLORIDE 600; 310; 30; 20 MG/100ML; MG/100ML; MG/100ML; MG/100ML
INJECTION, SOLUTION INTRAVENOUS
Status: DISCONTINUED | OUTPATIENT
Start: 2020-11-09 | End: 2020-11-16

## 2020-11-09 RX ORDER — CEFAZOLIN SODIUM 2 G/50ML
2 SOLUTION INTRAVENOUS
Status: DISCONTINUED | OUTPATIENT
Start: 2020-11-09 | End: 2020-11-16

## 2020-11-09 RX ORDER — DIAZEPAM 10 MG/1
10 TABLET ORAL ONCE AS NEEDED
Status: DISCONTINUED | OUTPATIENT
Start: 2020-11-09 | End: 2020-11-09 | Stop reason: HOSPADM

## 2020-11-09 RX ORDER — SODIUM CHLORIDE, SODIUM LACTATE, POTASSIUM CHLORIDE, CALCIUM CHLORIDE 600; 310; 30; 20 MG/100ML; MG/100ML; MG/100ML; MG/100ML
INJECTION, SOLUTION INTRAVENOUS CONTINUOUS
Status: DISCONTINUED | OUTPATIENT
Start: 2020-11-09 | End: 2020-11-16

## 2020-11-09 NOTE — H&P
Patient was noted to have a positive UPT during preop workup performed this morning   Discussed results and well as plan to cancel surgery s/t to above given risk factors  Will reschedule once cleared by OB and preop testing needs   Patient understands and is in agreement to all above     Dr. Laughlin is aware of the patient & current presentation.   He agrees with the current plan above.

## 2020-11-09 NOTE — PROGRESS NOTES
Health Maintenance Due   Topic Date Due    Pneumococcal Vaccine (Medium Risk) (1 of 1 - PPSV23) 09/10/2004     Updates were requested from care everywhere.  Chart was reviewed for overdue Proactive [facility] Encounters (TYE) topics (CRS, Breast Cancer Screening, Eye exam)  Health Maintenance has been updated.  LINKS immunization registry triggered.  Immunizations were reconciled.

## 2020-11-10 ENCOUNTER — HOSPITAL ENCOUNTER (OUTPATIENT)
Dept: RADIOLOGY | Facility: HOSPITAL | Age: 35
Discharge: HOME | End: 2020-11-10
Attending: OBSTETRICS & GYNECOLOGY
Payer: COMMERCIAL

## 2020-11-10 DIAGNOSIS — O36.80X0 PREGNANCY OF UNKNOWN ANATOMIC LOCATION: ICD-10-CM

## 2020-11-10 LAB
ABO + RH BLD: NORMAL
AMPHET+METHAMPHET UR QL: NEGATIVE
BARBITURATES UR QL SCN>200 NG/ML: NEGATIVE
BENZODIAZ UR QL SCN>200 NG/ML: NEGATIVE
BLD GP AB SCN CELLS X3 SERPL QL: NORMAL
BZE UR QL SCN: NEGATIVE
CANNABINOIDS UR QL SCN: NEGATIVE
CREAT UR-MCNC: 212 MG/DL (ref 15–325)
ETHANOL UR-MCNC: <10 MG/DL
HBV SURFACE AG SERPL QL IA: NEGATIVE
HIV 1+2 AB+HIV1 P24 AG SERPL QL IA: NEGATIVE
METHADONE UR QL SCN>300 NG/ML: NEGATIVE
OPIATES UR QL SCN: NEGATIVE
PCP UR QL SCN>25 NG/ML: NEGATIVE
RPR SER QL: NORMAL
RUBV IGG SER-ACNC: 15.8 IU/ML
RUBV IGG SER-IMP: REACTIVE
TOXICOLOGY INFORMATION: NORMAL

## 2020-11-10 PROCEDURE — 76801 OB US < 14 WKS SINGLE FETUS: CPT | Mod: TC,PO

## 2020-11-10 PROCEDURE — 76801 OB US < 14 WKS SINGLE FETUS: CPT | Mod: 26,,, | Performed by: RADIOLOGY

## 2020-11-10 PROCEDURE — 76801 US OB <14 WEEKS, TRANSABDOM & TRANSVAG, SINGLE GESTATION (XPD): ICD-10-PCS | Mod: 26,,, | Performed by: RADIOLOGY

## 2020-11-10 PROCEDURE — 76817 TRANSVAGINAL US OBSTETRIC: CPT | Mod: 26,,, | Performed by: RADIOLOGY

## 2020-11-10 PROCEDURE — 76817 US OB <14 WEEKS, TRANSABDOM & TRANSVAG, SINGLE GESTATION (XPD): ICD-10-PCS | Mod: 26,,, | Performed by: RADIOLOGY

## 2020-11-10 NOTE — PROGRESS NOTES
"Subjective:      Lucía Coreas is being seen today for her first obstetrical visit.    This is not a planned pregnancy. She was scheduled for carpule tunnel surgery this morning but the procedure was cancelled due to a positive pregnancy test.   She is at Unknown gestation. Her obstetrical history is significant for advanced maternal age, obesity and Asthma, Anxiety. Prior CD x1.     Menstrual History:  OB History        2    Para   1    Term   1            AB   1    Living           SAB   1    TAB        Ectopic        Multiple        Live Births                    Patient's last menstrual period was 2020.     The following portions of the patient's history were reviewed and updated as appropriate: allergies, current medications, past family history, past medical history, past social history, past surgical history and problem list.    Review of Systems  Constitutional: negative for fatigue and fevers  Gastrointestinal: negative for abdominal pain, constipation, diarrhea, dyspepsia and vomiting  Genitourinary:negative      Objective:      /86   Ht 5' 4" (1.626 m)   Wt (!) 139.5 kg (307 lb 8.7 oz)   LMP 2020   BMI 52.79 kg/m²   General appearance: alert, appears stated age and cooperative  Abdomen: soft, non-tender; bowel sounds normal; no masses,  no organomegaly  Pelvic: deferred      ULTRASOUND:   Bedside Ultrasound Findings    EXAMINATION:  US PELVIS COMP WITH TRANSVAG OB    CLINICAL HISTORY:  UPT positive    TECHNIQUE:  Transvaginal sonography    COMPARISON:  None    FINDINGS:  1. Uterus:    Appearance: Viable urrutia intrauterine pregnancy was not seen, yolk sac was not seen.   No Intrauterine pregnancy noted.         Impression      1. NO IUP note   2. Pregnancy of unknown location       Assessment:      Pregnancy of unknown location     Plan:      Serial HCGs  Repeat US at next visit   "

## 2020-11-10 NOTE — PROCEDURES
"Lucía Coreas is a 35 y.o. female patient.    BP: 124/86 (11/09/20 1101)  Weight: (!) 139.5 kg (307 lb 8.7 oz) (11/09/20 1101)  Height: 5' 4" (162.6 cm) (11/09/20 1101)       Procedures  ULTRASOUND:   Bedside Ultrasound Findings    EXAMINATION:  US PELVIS COMP WITH TRANSVAG OB    CLINICAL HISTORY:  Positive UPT    TECHNIQUE:  Transvaginal sonography    COMPARISON:  None    FINDINGS:  1. Uterus:    Appearance: Viable urrutia intrauterine pregnancy was not seen, yolk sac was not seen.   No Intrauterine pregnancy noted.         Impression      1. NO IUP note   2. Pregnancy of unknown location    Arielle Rangel  11/9/2020  "

## 2020-11-11 ENCOUNTER — LAB VISIT (OUTPATIENT)
Dept: LAB | Facility: HOSPITAL | Age: 35
End: 2020-11-11
Attending: OBSTETRICS & GYNECOLOGY
Payer: COMMERCIAL

## 2020-11-11 DIAGNOSIS — O36.80X0 PREGNANCY OF UNKNOWN ANATOMIC LOCATION: ICD-10-CM

## 2020-11-11 LAB
ALBUMIN SERPL BCP-MCNC: 3.5 G/DL (ref 3.5–5.2)
ALP SERPL-CCNC: 58 U/L (ref 55–135)
ALT SERPL W/O P-5'-P-CCNC: 10 U/L (ref 10–44)
ANION GAP SERPL CALC-SCNC: 10 MMOL/L (ref 8–16)
AST SERPL-CCNC: 13 U/L (ref 10–40)
BACTERIA UR CULT: NORMAL
BACTERIA UR CULT: NORMAL
BILIRUB SERPL-MCNC: 0.2 MG/DL (ref 0.1–1)
BUN SERPL-MCNC: 7 MG/DL (ref 6–20)
C TRACH DNA SPEC QL NAA+PROBE: NOT DETECTED
CALCIUM SERPL-MCNC: 9.2 MG/DL (ref 8.7–10.5)
CHLORIDE SERPL-SCNC: 107 MMOL/L (ref 95–110)
CO2 SERPL-SCNC: 22 MMOL/L (ref 23–29)
CREAT SERPL-MCNC: 0.7 MG/DL (ref 0.5–1.4)
EST. GFR  (AFRICAN AMERICAN): >60 ML/MIN/1.73 M^2
EST. GFR  (NON AFRICAN AMERICAN): >60 ML/MIN/1.73 M^2
GLUCOSE SERPL-MCNC: 86 MG/DL (ref 70–110)
HCG INTACT+B SERPL-ACNC: 1928 MIU/ML
HCG INTACT+B SERPL-ACNC: 1928 MIU/ML
N GONORRHOEA DNA SPEC QL NAA+PROBE: NOT DETECTED
POTASSIUM SERPL-SCNC: 3.9 MMOL/L (ref 3.5–5.1)
PROGEST SERPL-MCNC: 4.3 NG/ML
PROT SERPL-MCNC: 7.1 G/DL (ref 6–8.4)
SODIUM SERPL-SCNC: 139 MMOL/L (ref 136–145)

## 2020-11-11 PROCEDURE — 84702 CHORIONIC GONADOTROPIN TEST: CPT

## 2020-11-11 PROCEDURE — 36415 COLL VENOUS BLD VENIPUNCTURE: CPT | Mod: PO

## 2020-11-11 PROCEDURE — 80053 COMPREHEN METABOLIC PANEL: CPT

## 2020-11-11 PROCEDURE — 84144 ASSAY OF PROGESTERONE: CPT

## 2020-11-12 ENCOUNTER — INITIAL PRENATAL (OUTPATIENT)
Dept: OBSTETRICS AND GYNECOLOGY | Facility: CLINIC | Age: 35
End: 2020-11-12
Payer: COMMERCIAL

## 2020-11-12 VITALS — WEIGHT: 293 LBS | DIASTOLIC BLOOD PRESSURE: 80 MMHG | SYSTOLIC BLOOD PRESSURE: 110 MMHG | BODY MASS INDEX: 53.74 KG/M2

## 2020-11-12 DIAGNOSIS — O99.211 OBESITY AFFECTING PREGNANCY IN FIRST TRIMESTER: Primary | ICD-10-CM

## 2020-11-12 DIAGNOSIS — O34.219 HISTORY OF CESAREAN DELIVERY, CURRENTLY PREGNANT: ICD-10-CM

## 2020-11-12 LAB — CFTR MUT ANL BLD/T: NORMAL

## 2020-11-12 PROCEDURE — 0500F PR INITIAL PRENATAL CARE VISIT: ICD-10-PCS | Mod: S$GLB,,, | Performed by: OBSTETRICS & GYNECOLOGY

## 2020-11-12 PROCEDURE — 0500F INITIAL PRENATAL CARE VISIT: CPT | Mod: S$GLB,,, | Performed by: OBSTETRICS & GYNECOLOGY

## 2020-11-12 PROCEDURE — 99999 PR PBB SHADOW E&M-EST. PATIENT-LVL II: CPT | Mod: PBBFAC,,, | Performed by: OBSTETRICS & GYNECOLOGY

## 2020-11-12 PROCEDURE — 99999 PR PBB SHADOW E&M-EST. PATIENT-LVL II: ICD-10-PCS | Mod: PBBFAC,,, | Performed by: OBSTETRICS & GYNECOLOGY

## 2020-11-12 NOTE — PROGRESS NOTES
Pt is here for initial visit.  Pt was seen by OB in [place] but desires to transfer care.      HISTORY OF PRESENT ILLNESS  Lucía Coreas 35 y.o.    The patient was diagnosed with a surprise pregnancy on 20 when she had a positive UPT during work up for scheduled Carpal Tunnel surgery.  No nausea or vomiting. Pt has noted vaginal spotting and brown blood.  Pregnancy was not planned but is desired.  Partner is supportive of pregnancy.  Lives at home with , child, and her mother.  No pets at home.  Works as a .  Denies domestic abuse.  Denies chemical/pesticide/radiation exposure.  OB history:  1.  C/S at term with no complications    LMP: Patient's last menstrual period was 10/08/2020.  EDC: Estimated Date of Delivery: 21  EGA: 6w4d       Health Maintenance   Topic Date Due    Foot Exam  09/10/1995    Pneumococcal Vaccine (Medium Risk) (1 of 1 - PPSV23) 09/10/2004    Hemoglobin A1c  2021    Eye Exam  09/10/2021    Lipid Panel  2021    Urine Microalbumin  2021    TETANUS VACCINE  2027    Hepatitis C Screening  Completed       Past Medical History:   Diagnosis Date    Allergy     Amenorrhea     Asthma     Diabetes     GERD (gastroesophageal reflux disease)     Infertility associated with anovulation     Iron deficiency anemia     Knee pain     Metabolic syndrome     PCO (polycystic ovaries)     Recurrent boils        Past Surgical History:   Procedure Laterality Date    CARPAL TUNNEL RELEASE Left 2019     SECTION         Family History   Problem Relation Age of Onset    Diabetes Mother     Lupus Mother     Diabetes Father     Heart disease Father 69    Hypertension Father     Prostate cancer Father     Ovarian cancer Sister     AMY disease Brother        Social History     Socioeconomic History    Marital status:      Spouse name: Not on file    Number of children: Not on file    Years of education: Not on file     Highest education level: Not on file   Occupational History    Not on file   Social Needs    Financial resource strain: Not on file    Food insecurity     Worry: Not on file     Inability: Not on file    Transportation needs     Medical: Not on file     Non-medical: Not on file   Tobacco Use    Smoking status: Never Smoker    Smokeless tobacco: Never Used   Substance and Sexual Activity    Alcohol use: Never     Frequency: Never    Drug use: Never    Sexual activity: Yes     Partners: Female     Birth control/protection: None   Lifestyle    Physical activity     Days per week: Not on file     Minutes per session: Not on file    Stress: Not on file   Relationships    Social connections     Talks on phone: Not on file     Gets together: Not on file     Attends Rastafarian service: Not on file     Active member of club or organization: Not on file     Attends meetings of clubs or organizations: Not on file     Relationship status: Not on file   Other Topics Concern    Not on file   Social History Narrative    Not on file       Current Outpatient Medications   Medication Sig Dispense Refill    albuterol (PROVENTIL/VENTOLIN HFA) 90 mcg/actuation inhaler Inhale 2 puffs into the lungs every 4 (four) hours as needed for Wheezing. 18 g 3    aspirin (ECOTRIN) 81 MG EC tablet Take 1 tablet (81 mg total) by mouth once daily. (Patient not taking: Reported on 11/12/2020) 90 tablet 3    azelastine (ASTELIN) 137 mcg (0.1 %) nasal spray 1 spray (137 mcg total) by Nasal route 2 (two) times daily. (Patient not taking: Reported on 11/12/2020) 20 mL 5    cetirizine (ZYRTEC) 10 MG tablet Take 10 mg by mouth daily as needed.       clindamycin (CLEOCIN T) 1 % external solution Apply topically 2 (two) times daily. (Patient not taking: Reported on 11/12/2020) 60 mL 2    clindamycin (CLEOCIN T) 1 % lotion Apply topically 2 (two) times daily. face (Patient not taking: Reported on 11/12/2020) 60 mL 1    EPINEPHrine  (EPIPEN 2-MARTIN) 0.3 mg/0.3 mL AtIn As directed. (Patient not taking: Reported on 11/12/2020) 2 Device 4    ergocalciferol (ERGOCALCIFEROL) 50,000 unit Cap Take 1 capsule (50,000 Units total) by mouth every 7 days. 12 capsule 1    escitalopram oxalate (LEXAPRO) 20 MG tablet Take 1 tablet (20 mg total) by mouth once daily. (Patient not taking: Reported on 11/12/2020) 90 tablet 3    fluticasone propionate (FLONASE) 50 mcg/actuation nasal spray 1 spray (50 mcg total) by Each Nostril route 2 (two) times daily. (Patient not taking: Reported on 11/12/2020) 1 Bottle 11    gabapentin (NEURONTIN) 300 MG capsule Take 1 capsule (300 mg total) by mouth every evening. (Patient not taking: Reported on 11/9/2020) 30 capsule 3    hydrOXYzine HCL (ATARAX) 25 MG tablet Take 2 tablets (50 mg total) by mouth every evening. (Patient not taking: Reported on 11/12/2020) 60 tablet 1    iron-vit c-b12-folic acid (IRON 100 PLUS) Tab Take 1 tablet by mouth once daily. 90 tablet 3    ketoconazole (NIZORAL) 2 % shampoo Apply topically twice a week. (Patient not taking: Reported on 11/12/2020) 500 mL 3    levocetirizine (XYZAL) 5 MG tablet Take 1 tablet (5 mg total) by mouth every evening. (Patient not taking: Reported on 11/12/2020) 30 tablet 11    meloxicam (MOBIC) 15 MG tablet Take 1 tablet (15 mg total) by mouth once daily. Take with food.  Discontinue if you develop GI side effects. (Patient not taking: Reported on 11/12/2020) 30 tablet 1    mupirocin (BACTROBAN) 2 % ointment Apply topically 3 (three) times daily. (Patient not taking: Reported on 11/12/2020) 1 Tube 1    pantoprazole (PROTONIX) 40 MG tablet Take 1 tablet (40 mg total) by mouth once daily. (Patient not taking: Reported on 11/12/2020) 90 tablet 3    predniSONE (DELTASONE) 5 MG tablet Take 1 tablet (5 mg total) by mouth as needed. Take tow tablets the night before your allergy immunotherapy injection (Patient not taking: Reported on 11/12/2020) 30 tablet 0     semaglutide (OZEMPIC) 1 mg/dose (2 mg/1.5 mL) PnIj Inject 0.75 mLs into the skin every 7 days. (Patient not taking: Reported on 11/12/2020) 2 Syringe 11    spironolactone (ALDACTONE) 50 MG tablet Take 2 tablets (100 mg total) by mouth once daily. (Patient not taking: Reported on 11/12/2020) 60 tablet 2    tiZANidine (ZANAFLEX) 4 MG tablet Take 1-2 tablets (4-8 mg total) by mouth nightly as needed. Take 1/2 to 1 tab BID PRN muscle spasms. May cause drowsiness. (Patient not taking: Reported on 11/12/2020) 60 tablet 2    valACYclovir (VALTREX) 500 MG tablet Take 1 tablet (500 mg total) by mouth 2 (two) times daily. (Patient not taking: Reported on 11/12/2020) 60 tablet 11     No current facility-administered medications for this visit.      Facility-Administered Medications Ordered in Other Visits   Medication Dose Route Frequency Provider Last Rate Last Dose    cefazolin (ANCEF) 2 gram in dextrose 5% 50 mL IVPB (premix)  2 g Intravenous On Call Procedure LAWANDA Son        lactated ringers infusion   Intravenous On Call Procedure LAWANDA Son        lactated ringers infusion   Intravenous Continuous Tejal Fabian MD        nozaseptin (NOZIN) nasal    Each Nostril On Call Procedure LAWANDA Son           Review of patient's allergies indicates:   Allergen Reactions    Metformin Diarrhea    Sulfa (sulfonamide antibiotics) Anaphylaxis and Swelling     Swelling (eyes)^, Swelling (throat)^  Swelling (eyes)^, Swelling (throat)^      Diclofenac      Gastritis     Shellfish containing products      anaphylaxis         PHYSICAL EXAM   Vitals:    11/12/20 0908   BP: 110/80        PAIN SCALE: 0/10 None    PHYSICAL EXAM    ROS:  GENERAL: No fever, chills, fatigability or weight loss.  CV: Denies chest pain  PULM: Denies shortness of breath or wheezing.  ABDOMEN: Appetite fine. No weight loss. Denies diarrhea, abdominal pain, hematemesis or blood in stool.  URINARY: No flank pain,  dysuria or hematuria.  REPRODUCTIVE: No abnormal vaginal bleeding.       PE:   APPEARANCE: Well nourished, well developed, in no acute distress  CHEST: Clear to auscultation bilaterally  CV: Regular rate and rhythm  BREASTS: Symmetrical, no skin changes or visible lesions. No palpable masses, nipple discharge or adenopathy bilaterally.  ABDOMEN: Soft. No tenderness or masses. No hepatosplenomegaly. No hernias  PELVIC:   VULVA: No lesions. Normal female genitalia.  URETHRAL MEATUS: Normal size and location, no lesions, no prolapse.  URETHRA: No masses, tenderness, prolapse or scarring.  VAGINA: Moist and well rugated, no discharge, no significant cystocele or rectocele.  Moderate amount of brown blood in vault; no active bleeding.  CERVIX: No lesions, normal diameter, no stenosis, no cervical motion tenderness. Closed.  UTERUS: 8 wk size, regular shape, mobile, non-tender, normal position, good support.  ADNEXA: No masses, tenderness or CDS nodularity.  ANUS PERINEUM: No lesions, no relaxation, no external hemorrhoids.     US OB <14 Wks TransAbd & TransVag, Single Gestation (XPD)  Narrative: EXAMINATION:  US OB <14 WEEKS, TRANSABDOM & TRANSVAG, SINGLE GESTATION (XPD)    CLINICAL HISTORY:  Pregnancy with inconclusive fetal viability, not applicable or unspecified    TECHNIQUE:  Transabdominal sonography of the pelvis was performed, followed by transvaginal sonography to better evaluate the uterus and ovaries.  This is a technically challenging examination due to maternal body habitus and a prominent shadowing Caesarean section scar in the lower anterior uterine body.    COMPARISON:  None    FINDINGS:  The uterus measures 11.6 x 4.6 x 5.5 cm in dimension.  No uterine mass appreciated.  There is a small volume of hypoechoic fluid present within the endocervical canal.  There is a prominent shadowing  scar rising from the anterior wall of the lower uterine body.  This makes evaluation the endometrial canal in  the vicinity of the uterine body and lower aspect of the uterine fundus.  There is a possible intrauterine gestational sac with a mean gestational sac diameter of 12.2 mm and a crown rump length of 5.7 mm which correlates with an estimated gestational age of 6 weeks 2 days.  There is cardiac activity noted on the cine  images (series [id number]) with an estimated fetal heart rate of 132 beats per minute..  There is a double decidual sign present.  No large subchorionic bleed identified.  The cervix appears closed.    These the right ovary measures 2.5 x 2.1 x 2.3 cm in the left ovary measures 3.3 x 1.8 x 1.9 cm.  No ovarian masses.  No free fluid in the cul de sac.  No solid or cystic adnexal masses.  Impression: Technically challenging examination revealing a probable single live intrauterine pregnancy with an estimated gestational age of 6 weeks 2 days by measurement of crown-rump length.  Fetal cardiac activity was noted during the examination with an apparent fetal heart rate of 132 beats per minute.  Short-term sonographic follow-up is recommended as much of the uterus was obscured by a shadowing  scar in the anterior wall of the uterine body.    Electronically signed by: Sabas Hutchinson MD  Date:    11/10/2020  Time:    09:54    A/P:  Obesity affecting pregnancy in first trimester  -      Patient was counseled today on A.C.S. Pap guidelines and recommendations for yearly pelvic exams, mammograms and monthly self breast exams; to see her PCP for other health maintenance and pregnancy.   -      Patient's medications and medical history reviewed with patient and implications in pregnancy.   -      Pregnancy course discussed and 'AtoZ' book given. Patient was counseled on proper weight gain based on the [place] [facility]'s recommendations based on her pre-pregnancy weight. Discussed foods to avoid in pregnancy (i.e. sushi, fish that are high in mercury, deli meat, and unpasteurized cheeses). Discussed  prenatal vitamin options (i.e. stool softener, DHA). Discussed potential medical problems in pregnancy.  -     Discussed risk of Toxoplasmosis transmission from pets and reviewed risk reduction techniques.  -     Discused increased risks with AMA status.   -     Chromosomal abnormality risk discussed and available testing offered.   -     Pt was counseled on exercise in pregnancy and weight gain recommendations.  -     Pt was counseled on travel recommendations and on risks of Zika virus exposure.  Current [facility] [place] Zika advisories and prevention techniques were reviewed with pt.  Pt denies any recent international travel and does not plan travel during pregnancy.  Pt reports that partner does not plan travel either.  -     Oriented to practice including CNM collaboration.   -     Follow-up initial OB, with labs and u/s.     History of  delivery, currently pregnant  -     Pt counseled on risks.    Follow up in about 2 weeks (around 2020).

## 2020-11-16 ENCOUNTER — OFFICE VISIT (OUTPATIENT)
Dept: FAMILY MEDICINE | Facility: CLINIC | Age: 35
End: 2020-11-16
Payer: COMMERCIAL

## 2020-11-16 VITALS
DIASTOLIC BLOOD PRESSURE: 75 MMHG | HEART RATE: 90 BPM | BODY MASS INDEX: 50.02 KG/M2 | TEMPERATURE: 98 F | SYSTOLIC BLOOD PRESSURE: 124 MMHG | WEIGHT: 293 LBS | HEIGHT: 64 IN

## 2020-11-16 DIAGNOSIS — E11.65 TYPE 2 DIABETES MELLITUS WITH HYPERGLYCEMIA, WITHOUT LONG-TERM CURRENT USE OF INSULIN: Chronic | ICD-10-CM

## 2020-11-16 DIAGNOSIS — E66.2 CLASS 3 OBESITY WITH ALVEOLAR HYPOVENTILATION, SERIOUS COMORBIDITY, AND BODY MASS INDEX (BMI) OF 50.0 TO 59.9 IN ADULT: ICD-10-CM

## 2020-11-16 DIAGNOSIS — Z79.899 ENCOUNTER FOR LONG-TERM (CURRENT) USE OF MEDICATIONS: Chronic | ICD-10-CM

## 2020-11-16 DIAGNOSIS — Z3A.01 LESS THAN 8 WEEKS GESTATION OF PREGNANCY: ICD-10-CM

## 2020-11-16 DIAGNOSIS — E55.9 VITAMIN D DEFICIENCY: Primary | Chronic | ICD-10-CM

## 2020-11-16 PROBLEM — E66.813 CLASS 3 OBESITY WITH ALVEOLAR HYPOVENTILATION, SERIOUS COMORBIDITY, AND BODY MASS INDEX (BMI) OF 50.0 TO 59.9 IN ADULT: Status: ACTIVE | Noted: 2017-06-14

## 2020-11-16 PROCEDURE — 99999 PR PBB SHADOW E&M-EST. PATIENT-LVL IV: ICD-10-PCS | Mod: PBBFAC,,, | Performed by: FAMILY MEDICINE

## 2020-11-16 PROCEDURE — 1126F AMNT PAIN NOTED NONE PRSNT: CPT | Mod: S$GLB,,, | Performed by: FAMILY MEDICINE

## 2020-11-16 PROCEDURE — 99214 OFFICE O/P EST MOD 30 MIN: CPT | Mod: S$GLB,,, | Performed by: FAMILY MEDICINE

## 2020-11-16 PROCEDURE — 3044F PR MOST RECENT HEMOGLOBIN A1C LEVEL <7.0%: ICD-10-PCS | Mod: CPTII,S$GLB,, | Performed by: FAMILY MEDICINE

## 2020-11-16 PROCEDURE — 3008F PR BODY MASS INDEX (BMI) DOCUMENTED: ICD-10-PCS | Mod: CPTII,S$GLB,, | Performed by: FAMILY MEDICINE

## 2020-11-16 PROCEDURE — 3044F HG A1C LEVEL LT 7.0%: CPT | Mod: CPTII,S$GLB,, | Performed by: FAMILY MEDICINE

## 2020-11-16 PROCEDURE — 1126F PR PAIN SEVERITY QUANTIFIED, NO PAIN PRESENT: ICD-10-PCS | Mod: S$GLB,,, | Performed by: FAMILY MEDICINE

## 2020-11-16 PROCEDURE — 3008F BODY MASS INDEX DOCD: CPT | Mod: CPTII,S$GLB,, | Performed by: FAMILY MEDICINE

## 2020-11-16 PROCEDURE — 99999 PR PBB SHADOW E&M-EST. PATIENT-LVL IV: CPT | Mod: PBBFAC,,, | Performed by: FAMILY MEDICINE

## 2020-11-16 PROCEDURE — 99214 PR OFFICE/OUTPT VISIT, EST, LEVL IV, 30-39 MIN: ICD-10-PCS | Mod: S$GLB,,, | Performed by: FAMILY MEDICINE

## 2020-11-16 RX ORDER — VIT C/E/ZN/COPPR/LUTEIN/ZEAXAN 250MG-90MG
2000 CAPSULE ORAL DAILY
Qty: 90 CAPSULE | Refills: 3 | Status: SHIPPED | OUTPATIENT
Start: 2020-11-16 | End: 2022-08-31 | Stop reason: SDUPTHER

## 2020-11-16 RX ORDER — B-COMPLEX WITH VITAMIN C
1 TABLET ORAL DAILY
Qty: 90 TABLET | Refills: 3 | Status: SHIPPED | OUTPATIENT
Start: 2020-11-16 | End: 2021-11-16

## 2020-11-16 NOTE — ASSESSMENT & PLAN NOTE
Stop Ozempic.  Will check labs with OBGYN.  We will plan to monitor hemoglobin A1c at designated intervals 3 to 6 months.  I recommend ongoing Education for diabetic diet and exercise protocol.  We will continue to monitor for side effects.    Please be advised of symptoms to monitor for and to notify me immediately if persistent or worsening.  Follow up with Ophthalmology/Optometry and Podiatry at least annually.

## 2020-11-16 NOTE — PATIENT INSTRUCTIONS
Follow up in about 6 months (around 5/16/2021), or if symptoms worsen or fail to improve, for Med refills, LAB RESULTS.     If no improvement in symptoms or symptoms worsen, please be advised to call MD, follow-up at clinic and/or go to ER if becomes severe.    Dante Negro M.D.        We Offer TELEHEALTH & Same Day Appointments!   Book your Telehealth appointment with me through my nurse or   Clinic appointments on [employer]!    [address] [place] [address]    Office: [phone]   FAX: [phone]    Check out my Facebook Page and Follow Me at: [email or web address]    Check out my website at [facility] by clicking on: [email or web address]    To Schedule appointments online, go to [employer]: [email or web address]

## 2020-11-16 NOTE — ASSESSMENT & PLAN NOTE
Stop vitamin-D 75177 units.  Switch to daily 2000 units over-the-counter.  Recheck vitamin-D level in a few months.

## 2020-11-16 NOTE — ASSESSMENT & PLAN NOTE
Patient advised to establish care with her routine OBGYN.  Patient will need high risk care.  Medications have been discontinued since the 1st of November.  Patient is only taking topical creams and nasal spray at this time.  She reports that she is taking her iron supplement.  Patient will start prenatal supplement today.    Blood pressure is well controlled.  Sugar is well controlled.  Patient does report that she was starting to lose weight on Ozempic.  I have discontinued all of these medications until further evaluation by OBGYN and potentially MFM.

## 2020-11-16 NOTE — ASSESSMENT & PLAN NOTE
Update labs February 2021.Complete history and physical was completed today.  Complete and thorough medication reconciliation was performed.  Discussed risks and benefits of medications.  Advised patient on orders and health maintenance.  We discussed old records and old labs if available.  Will request any records not available through epic.  Continue current medications listed on your summary sheet.

## 2020-11-16 NOTE — PROGRESS NOTES
PLAN:      Problem List Items Addressed This Visit     Encounter for long-term (current) use of medications (Chronic)     Update labs February 2021.Complete history and physical was completed today.  Complete and thorough medication reconciliation was performed.  Discussed risks and benefits of medications.  Advised patient on orders and health maintenance.  We discussed old records and old labs if available.  Will request any records not available through epic.  Continue current medications listed on your summary sheet.           Vitamin D deficiency - Primary (Chronic)     Stop vitamin-D 11879 units.  Switch to daily 2000 units over-the-counter.  Recheck vitamin-D level in a few months.         Relevant Medications    cholecalciferol, vitamin D3, (VITAMIN D3) 25 mcg (1,000 unit) capsule    Other Relevant Orders    Vitamin D    Type 2 diabetes mellitus with hyperglycemia, without long-term current use of insulin (Chronic)     Stop Ozempic.  Will check labs with OBGYN.  We will plan to monitor hemoglobin A1c at designated intervals 3 to 6 months.  I recommend ongoing Education for diabetic diet and exercise protocol.  We will continue to monitor for side effects.    Please be advised of symptoms to monitor for and to notify me immediately if persistent or worsening.  Follow up with Ophthalmology/Optometry and Podiatry at least annually.           Class 3 obesity with alveolar hypoventilation, serious comorbidity, and body mass index (BMI) of 50.0 to 59.9 in adult     Referral to weight management.  Patient is high risk with current pregnancy.  Patient will see her previous OBGYN in [facility].  Recommend establishing care with MFM for high risk.         Relevant Orders    Ambulatory referral/consult to Weight Management Program    Vitamin D    Less than 8 weeks gestation of pregnancy     Patient advised to establish care with her routine OBGYN.  Patient will need high risk care.  Medications have been  discontinued since the 1st of November.  Patient is only taking topical creams and nasal spray at this time.  She reports that she is taking her iron supplement.  Patient will start prenatal supplement today.    Blood pressure is well controlled.  Sugar is well controlled.  Patient does report that she was starting to lose weight on Ozempic.  I have discontinued all of these medications until further evaluation by OBGYN and potentially MFM.         Relevant Medications    prenatal vit no.130-iron-folic (PRENATAL VITAMIN) 27 mg iron- 800 mcg Tab        Future Appointments     Date Provider Specialty Appt Notes    11/23/2020  Allergy injection    12/3/2020 Julio Cesar Hernandez MD Obstetrics and Gynecology routine ob    12/14/2020 Naida Farrell MD Dermatology follow up    1/6/2021 Zeke Clement MD Pain Medicine 10 w F/U    2/17/2021  Lab labs    2/24/2021 Michael Hernandez MD Rheumatology 4 month homer/bc    5/17/2021 Dante Negro MD Family Medicine 6 mo f/u           Medication List with Changes/Refills   New Medications    CHOLECALCIFEROL, VITAMIN D3, (VITAMIN D3) 25 MCG (1,000 UNIT) CAPSULE    Take 2 capsules (2,000 Units total) by mouth once daily.    PRENATAL VIT NO.130-IRON-FOLIC (PRENATAL VITAMIN) 27 MG IRON- 800 MCG TAB    Take 1 tablet by mouth once daily.   Current Medications    ALBUTEROL (PROVENTIL/VENTOLIN HFA) 90 MCG/ACTUATION INHALER    Inhale 2 puffs into the lungs every 4 (four) hours as needed for Wheezing.    ASPIRIN (ECOTRIN) 81 MG EC TABLET    Take 1 tablet (81 mg total) by mouth once daily.    AZELASTINE (ASTELIN) 137 MCG (0.1 %) NASAL SPRAY    1 spray (137 mcg total) by Nasal route 2 (two) times daily.    CLINDAMYCIN (CLEOCIN T) 1 % EXTERNAL SOLUTION    Apply topically 2 (two) times daily.    CLINDAMYCIN (CLEOCIN T) 1 % LOTION    Apply topically 2 (two) times daily. face    EPINEPHRINE (EPIPEN 2-MARTIN) 0.3 MG/0.3 ML ATIN    As directed.    FLUTICASONE PROPIONATE (FLONASE) 50 MCG/ACTUATION NASAL  SPRAY    1 spray (50 mcg total) by Each Nostril route 2 (two) times daily.    IRON-VIT C-B12-FOLIC ACID (IRON 100 PLUS) TAB    Take 1 tablet by mouth once daily.    KETOCONAZOLE (NIZORAL) 2 % SHAMPOO    Apply topically twice a week.    MUPIROCIN (BACTROBAN) 2 % OINTMENT    Apply topically 3 (three) times daily.   Discontinued Medications    CETIRIZINE (ZYRTEC) 10 MG TABLET    Take 10 mg by mouth daily as needed.     ERGOCALCIFEROL (ERGOCALCIFEROL) 50,000 UNIT CAP    Take 1 capsule (50,000 Units total) by mouth every 7 days.    ESCITALOPRAM OXALATE (LEXAPRO) 20 MG TABLET    Take 1 tablet (20 mg total) by mouth once daily.    GABAPENTIN (NEURONTIN) 300 MG CAPSULE    Take 1 capsule (300 mg total) by mouth every evening.    HYDROXYZINE HCL (ATARAX) 25 MG TABLET    Take 2 tablets (50 mg total) by mouth every evening.    LEVOCETIRIZINE (XYZAL) 5 MG TABLET    Take 1 tablet (5 mg total) by mouth every evening.    MELOXICAM (MOBIC) 15 MG TABLET    Take 1 tablet (15 mg total) by mouth once daily. Take with food.  Discontinue if you develop GI side effects.    PANTOPRAZOLE (PROTONIX) 40 MG TABLET    Take 1 tablet (40 mg total) by mouth once daily.    PREDNISONE (DELTASONE) 5 MG TABLET    Take 1 tablet (5 mg total) by mouth as needed. Take tow tablets the night before your allergy immunotherapy injection    SEMAGLUTIDE (OZEMPIC) 1 MG/DOSE (2 MG/1.5 ML) PNIJ    Inject 0.75 mLs into the skin every 7 days.    SPIRONOLACTONE (ALDACTONE) 50 MG TABLET    Take 2 tablets (100 mg total) by mouth once daily.    TIZANIDINE (ZANAFLEX) 4 MG TABLET    Take 1-2 tablets (4-8 mg total) by mouth nightly as needed. Take 1/2 to 1 tab BID PRN muscle spasms. May cause drowsiness.    VALACYCLOVIR (VALTREX) 500 MG TABLET    Take 1 tablet (500 mg total) by mouth 2 (two) times daily.       Dante Negro M.D.     ==========================================================================  Subjective:      Patient ID: Lucía Coreas is a 35 y.o.  female.  has a past medical history of Allergy, Amenorrhea, Asthma, Diabetes, GERD (gastroesophageal reflux disease), Infertility associated with anovulation, Iron deficiency anemia, Knee pain, Metabolic syndrome, PCO (polycystic ovaries), and Recurrent boils.     Chief Complaint: Follow-up      Problem List Items Addressed This Visit     Encounter for long-term (current) use of medications (Chronic)    Overview     07/12/2019 CHRONIC long-term drug therapy for managed conditions. See medication list. Reports compliance.  No side effects reported.  Routine lab work is being monitored.  Patient does not need refills today.   =========================================  09/20/2019Reviewed labs. Patient is compliant with meds. She needs refills. Gaining weight on Metformin.   =========================================  DECEMBER 2019:  CHRONIC. Stable. Compliant with medications for managed conditions. See medication list. No SE reported.   Routine lab analysis is being monitored. Refills were addressed.   =========================================  APRIL 2020:  CHRONIC. Stable. Compliant with medications for managed conditions. See medication list. No SE reported. Routine lab analysis is being monitored. Refills were addressed.    ==================================================  AUGUST 2020:  REVIEWED LABS.  CHRONIC. Stable. Compliant with medications for managed conditions. See medication list. No SE reported.   Routine lab analysis is being monitored. Refills were addressed.  October 2020:  CHRONIC. Stable. Compliant with medications for managed conditions. See medication list. No SE reported.   Routine lab analysis is being monitored. Refills were addressed.  November 2020:CHRONIC. Stable. Compliant with medications for managed conditions. See medication list. No SE reported.   Routine lab analysis is being monitored. Refills were addressed.  Lab Results   Component Value Date    WBC 9.85 11/09/2020    HGB 12.3  11/09/2020    HCT 40.8 11/09/2020    MCV 79 (L) 11/09/2020     11/09/2020         Chemistry        Component Value Date/Time     11/11/2020 1119    K 3.9 11/11/2020 1119     11/11/2020 1119    CO2 22 (L) 11/11/2020 1119    BUN 7 11/11/2020 1119    CREATININE 0.7 11/11/2020 1119    GLU 86 11/11/2020 1119        Component Value Date/Time    CALCIUM 9.2 11/11/2020 1119    ALKPHOS 58 11/11/2020 1119    AST 13 11/11/2020 1119    ALT 10 11/11/2020 1119    BILITOT 0.2 11/11/2020 1119    ESTGFRAFRICA >60.0 11/11/2020 1119    EGFRNONAA >60.0 11/11/2020 1119          Lab Results   Component Value Date    TSH 1.255 11/09/2020    I9BRKEL 9.4 07/16/2019    T3FREE 2.9 07/16/2019              Current Assessment & Plan     Update labs February 2021.Complete history and physical was completed today.  Complete and thorough medication reconciliation was performed.  Discussed risks and benefits of medications.  Advised patient on orders and health maintenance.  We discussed old records and old labs if available.  Will request any records not available through epic.  Continue current medications listed on your summary sheet.           Vitamin D deficiency - Primary (Chronic)    Overview     07/12/2019 Vit d deficiency.  Not currently taking vitamin d supplement. No SE reported. Fatigue is notimproved.   08/15/2019 Chronic.  Uncontrolled.  Vitamin-D level is still low.  Continue supplementation.  Recheck in 3 months.  09/20/2019Patient still taking vitamin-D.  Needs refill.  No side effects reported.  =======================================================  DECEMBER 2019:  Patient doing well on vitamin-D.  Needs refill.  Checking level.  ======================================================  November 2020:  Patient recently pregnant.Chronic. Vit d deficiency. Takes vitamin d supplement. No SE reported. Fatigue is slightly improved.   Lab Results   Component Value Date    FTVCHZFZ09PV 23 (L) 10/21/2019    [id number]  16 (L) 07/16/2019               Current Assessment & Plan     Stop vitamin-D 32577 units.  Switch to daily 2000 units over-the-counter.  Recheck vitamin-D level in a few months.         Type 2 diabetes mellitus with hyperglycemia, without long-term current use of insulin (Chronic)    Overview     INITIAL HPI:  No results found in epic however review her blood work from previous provider shows last A1c was 5.8.  Patient previously on metformin.  Currently having fatigue and decreased libido.  ======================  09/20/2019Patient due for hemoglobin A1c next month.  Will place order for patient.She is having GI symptoms with metformin. She reports BG is elevated at home. She continues to gain weight.   =======================================================  DECEMBER 2019:  Patient reports to having diarrhea with metformin.  Reviewed Diabetes Management StatusStatin: Not takingACE/ARB: Not taking  =======================================================  APRIL 2020:  Patient has started Victoza and is off of metformin.  Patient reports that she is getting slightly nauseated on the increased dose of 1.2 mg.  Symptoms resolved after taking Pepto-Bismol after few hours of injection.Reviewed Diabetes Management StatusStatin: Not takingACE/ARB: Not taking  ==================================================  AUGUST 2020:  PATIENT REPORTS THAT SHE IS TOLERATING VICTOZA 1.2 MG.  PATIENT CONTINUES TO HAVE ISSUES WITH HER WEIGHT.  REVIEWED Diabetes Management Status  Statin: Not takingACE/ARB: Not taking  ==================================================  OCTOBER 2020:  Patient is having side effects with Victoza.  Reviewed Diabetes Management Status.Statin: Not takingACE/ARB: Not taking  ==================================================  NOVEMBER 2020:  Patient reports that she was doing well on OZEMPIC.  A1c is been well controlled.  Patient was recently diagnosed with pregnancy.  Will monitor for worsening of diabetes.   Diabetes Management Status Statin: Not taking ACE/ARB: Not taking    Screening or Prevention Patient's value Goal Complete/Controlled?   HgA1C Testing and Control   Lab Results   Component Value Date    HGBA1C 5.4 09/29/2020      Annually/Less than 8% Yes   Lipid profile : 09/29/2020 Annually Yes   LDL control Lab Results   Component Value Date    LDLCALC 115.6 09/29/2020    Annually/Less than 100 mg/dl  No   Nephropathy screening Lab Results   Component Value Date    LABMICR <2.5 09/29/2020     Lab Results   Component Value Date    PROTEINUA Negative 01/22/2009    Annually Yes   Blood pressure BP Readings from Last 1 Encounters:   11/16/20 124/75    Less than 140/90 Yes   Dilated retinal exam : 09/10/2020 Annually Yes   Foot exam   Most Recent Foot Exam Date: Not Found Annually No       =================================================         Current Assessment & Plan     Stop Ozempic.  Will check labs with OBGYN.  We will plan to monitor hemoglobin A1c at designated intervals 3 to 6 months.  I recommend ongoing Education for diabetic diet and exercise protocol.  We will continue to monitor for side effects.    Please be advised of symptoms to monitor for and to notify me immediately if persistent or worsening.  Follow up with Ophthalmology/Optometry and Podiatry at least annually.           Class 3 obesity with alveolar hypoventilation, serious comorbidity, and body mass index (BMI) of 50.0 to 59.9 in adult    Overview     Chronic.  Uncontrolled.  Was improving on Ozempic.  BMI currently 53.         Current Assessment & Plan     Referral to weight management.  Patient is high risk with current pregnancy.  Patient will see her previous OBGYN in [facility].  Recommend establishing care with MFM for high risk.         Less than 8 weeks gestation of pregnancy    Overview     Patient was recently diagnosed with pregnancy and reports that she is currently seven weeks pregnant.  Patient was having carpal tunnel  surgery planned and tested positive for pregnancy.  Patient has seen OBGYN and had ultrasound performed.  Patient reports to me that she is concerned about the drive to [place] and [place].  Patient would like to return back to [place] for she had previous .  Patient will follow-up with her OBGYN in [facility].         Current Assessment & Plan     Patient advised to establish care with her routine OBGYN.  Patient will need high risk care.  Medications have been discontinued since the .  Patient is only taking topical creams and nasal spray at this time.  She reports that she is taking her iron supplement.  Patient will start prenatal supplement today.    Blood pressure is well controlled.  Sugar is well controlled.  Patient does report that she was starting to lose weight on Ozempic.  I have discontinued all of these medications until further evaluation by OBGYN and potentially MFM.                Past Medical History:  Past Medical History:   Diagnosis Date    Allergy     Amenorrhea     Asthma     Diabetes     GERD (gastroesophageal reflux disease)     Infertility associated with anovulation     Iron deficiency anemia     Knee pain     Metabolic syndrome     PCO (polycystic ovaries)     Recurrent boils      Past Surgical History:   Procedure Laterality Date    CARPAL TUNNEL RELEASE Left 2019     SECTION       Review of patient's allergies indicates:   Allergen Reactions    Metformin Diarrhea    Sulfa (sulfonamide antibiotics) Anaphylaxis and Swelling     Swelling (eyes)^, Swelling (throat)^  Swelling (eyes)^, Swelling (throat)^      Diclofenac      Gastritis     Shellfish containing products      anaphylaxis     Medication List with Changes/Refills   New Medications    CHOLECALCIFEROL, VITAMIN D3, (VITAMIN D3) 25 MCG (1,000 UNIT) CAPSULE    Take 2 capsules (2,000 Units total) by mouth once daily.    PRENATAL VIT NO.130-IRON-FOLIC (PRENATAL VITAMIN)  27 MG IRON- 800 MCG TAB    Take 1 tablet by mouth once daily.   Current Medications    ALBUTEROL (PROVENTIL/VENTOLIN HFA) 90 MCG/ACTUATION INHALER    Inhale 2 puffs into the lungs every 4 (four) hours as needed for Wheezing.    ASPIRIN (ECOTRIN) 81 MG EC TABLET    Take 1 tablet (81 mg total) by mouth once daily.    AZELASTINE (ASTELIN) 137 MCG (0.1 %) NASAL SPRAY    1 spray (137 mcg total) by Nasal route 2 (two) times daily.    CLINDAMYCIN (CLEOCIN T) 1 % EXTERNAL SOLUTION    Apply topically 2 (two) times daily.    CLINDAMYCIN (CLEOCIN T) 1 % LOTION    Apply topically 2 (two) times daily. face    EPINEPHRINE (EPIPEN 2-MARTIN) 0.3 MG/0.3 ML ATIN    As directed.    FLUTICASONE PROPIONATE (FLONASE) 50 MCG/ACTUATION NASAL SPRAY    1 spray (50 mcg total) by Each Nostril route 2 (two) times daily.    IRON-VIT C-B12-FOLIC ACID (IRON 100 PLUS) TAB    Take 1 tablet by mouth once daily.    KETOCONAZOLE (NIZORAL) 2 % SHAMPOO    Apply topically twice a week.    MUPIROCIN (BACTROBAN) 2 % OINTMENT    Apply topically 3 (three) times daily.   Discontinued Medications    CETIRIZINE (ZYRTEC) 10 MG TABLET    Take 10 mg by mouth daily as needed.     ERGOCALCIFEROL (ERGOCALCIFEROL) 50,000 UNIT CAP    Take 1 capsule (50,000 Units total) by mouth every 7 days.    ESCITALOPRAM OXALATE (LEXAPRO) 20 MG TABLET    Take 1 tablet (20 mg total) by mouth once daily.    GABAPENTIN (NEURONTIN) 300 MG CAPSULE    Take 1 capsule (300 mg total) by mouth every evening.    HYDROXYZINE HCL (ATARAX) 25 MG TABLET    Take 2 tablets (50 mg total) by mouth every evening.    LEVOCETIRIZINE (XYZAL) 5 MG TABLET    Take 1 tablet (5 mg total) by mouth every evening.    MELOXICAM (MOBIC) 15 MG TABLET    Take 1 tablet (15 mg total) by mouth once daily. Take with food.  Discontinue if you develop GI side effects.    PANTOPRAZOLE (PROTONIX) 40 MG TABLET    Take 1 tablet (40 mg total) by mouth once daily.    PREDNISONE (DELTASONE) 5 MG TABLET    Take 1 tablet (5 mg total)  "by mouth as needed. Take tow tablets the night before your allergy immunotherapy injection    SEMAGLUTIDE (OZEMPIC) 1 MG/DOSE (2 MG/1.5 ML) PNIJ    Inject 0.75 mLs into the skin every 7 days.    SPIRONOLACTONE (ALDACTONE) 50 MG TABLET    Take 2 tablets (100 mg total) by mouth once daily.    TIZANIDINE (ZANAFLEX) 4 MG TABLET    Take 1-2 tablets (4-8 mg total) by mouth nightly as needed. Take 1/2 to 1 tab BID PRN muscle spasms. May cause drowsiness.    VALACYCLOVIR (VALTREX) 500 MG TABLET    Take 1 tablet (500 mg total) by mouth 2 (two) times daily.      Social History     Tobacco Use    Smoking status: Never Smoker    Smokeless tobacco: Never Used   Substance Use Topics    Alcohol use: Never     Frequency: Never      Family History   Problem Relation Age of Onset    Diabetes Mother     Lupus Mother     Diabetes Father     Heart disease Father 69    Hypertension Father     Prostate cancer Father     Ovarian cancer Sister     AMY disease Brother        I have reviewed the complete PMH, social history, surgical history, allergies and medications.  As well as family history.    Review of Systems   Constitutional: Positive for activity change, fatigue and unexpected weight change. Negative for chills and fever.   HENT: Negative for ear pain and sore throat.    Eyes: Negative for redness and visual disturbance.   Respiratory: Negative for cough and shortness of breath.    Cardiovascular: Negative for chest pain and palpitations.   Gastrointestinal: Negative for nausea and vomiting.   Genitourinary: Negative for difficulty urinating and hematuria.   Musculoskeletal: Positive for arthralgias. Negative for myalgias.   Skin: Negative for rash and wound.   Neurological: Negative for weakness and headaches.   Psychiatric/Behavioral: Negative for sleep disturbance. The patient is not nervous/anxious.      Objective:   /75   Pulse 90   Temp 97.8 °F (36.6 °C)   Ht 5' 4" (1.626 m)   Wt (!) 142 kg (313 lb)   LMP " 10/08/2020   BMI 53.73 kg/m²   Physical Exam  Vitals signs and nursing note reviewed.   Constitutional:       General: She is not in acute distress.     Appearance: She is well-developed. She is obese.   HENT:      Head: Normocephalic and atraumatic.      Right Ear: External ear normal.      Left Ear: External ear normal.      Nose: Nose normal. No rhinorrhea.   Eyes:      Extraocular Movements: Extraocular movements intact.      Pupils: Pupils are equal, round, and reactive to light.   Neck:      Musculoskeletal: Normal range of motion and neck supple.   Cardiovascular:      Rate and Rhythm: Normal rate.      Pulses: Normal pulses.   Pulmonary:      Effort: Pulmonary effort is normal. No respiratory distress.      Breath sounds: Normal breath sounds.   Abdominal:      General: Bowel sounds are normal.      Palpations: Abdomen is soft.      Comments: Morbid abdomen   Musculoskeletal: Normal range of motion.         General: Tenderness present.   Skin:     General: Skin is warm and dry.      Capillary Refill: Capillary refill takes less than 2 seconds.   Neurological:      General: No focal deficit present.      Mental Status: She is alert and oriented to person, place, and time.   Psychiatric:         Mood and Affect: Mood normal.         Behavior: Behavior normal.     US OB <14 Wks TransAbd & TransVag, Single Gestation (XPD)  Narrative: EXAMINATION:  US OB <14 WEEKS, TRANSABDOM & TRANSVAG, SINGLE GESTATION (XPD)    CLINICAL HISTORY:  Pregnancy with inconclusive fetal viability, not applicable or unspecified    TECHNIQUE:  Transabdominal sonography of the pelvis was performed, followed by transvaginal sonography to better evaluate the uterus and ovaries.  This is a technically challenging examination due to maternal body habitus and a prominent shadowing Caesarean section scar in the lower anterior uterine body.    COMPARISON:  None    FINDINGS:  The uterus measures 11.6 x 4.6 x 5.5 cm in dimension.  No uterine  mass appreciated.  There is a small volume of hypoechoic fluid present within the endocervical canal.  There is a prominent shadowing  scar rising from the anterior wall of the lower uterine body.  This makes evaluation the endometrial canal in the vicinity of the uterine body and lower aspect of the uterine fundus.  There is a possible intrauterine gestational sac with a mean gestational sac diameter of 12.2 mm and a crown rump length of 5.7 mm which correlates with an estimated gestational age of 6 weeks 2 days.  There is cardiac activity noted on the cine  images (series [id number]) with an estimated fetal heart rate of 132 beats per minute..  There is a double decidual sign present.  No large subchorionic bleed identified.  The cervix appears closed.    These the right ovary measures 2.5 x 2.1 x 2.3 cm in the left ovary measures 3.3 x 1.8 x 1.9 cm.  No ovarian masses.  No free fluid in the cul de sac.  No solid or cystic adnexal masses.  Impression: Technically challenging examination revealing a probable single live intrauterine pregnancy with an estimated gestational age of 6 weeks 2 days by measurement of crown-rump length.  Fetal cardiac activity was noted during the examination with an apparent fetal heart rate of 132 beats per minute.  Short-term sonographic follow-up is recommended as much of the uterus was obscured by a shadowing  scar in the anterior wall of the uterine body.    Electronically signed by: Sabas Hutchinson MD  Date:    11/10/2020  Time:    09:54        Assessment:     1. Vitamin D deficiency    2. Type 2 diabetes mellitus with hyperglycemia, without long-term current use of insulin    3. Encounter for long-term (current) use of medications    4. Less than 8 weeks gestation of pregnancy    5. Class 3 obesity with alveolar hypoventilation, serious comorbidity, and body mass index (BMI) of 50.0 to 59.9 in adult      MDM:   Moderate complexity.  Moderate risk.  I have Reviewed and  summarized old records.  I have performed thorough medication reconciliation today and discussed risk and benefits of each medication.  I have reviewed ultrasound, labs and discussed with patient.  All questions were answered.  I am requesting old records and will review them once they are available.  OBGYN    I have signed for the following orders AND/OR meds.  Orders Placed This Encounter   Procedures    Vitamin D     Standing Status:   Future     Standing Expiration Date:   1/15/2022    Ambulatory referral/consult to Weight Management Program     Standing Status:   Future     Standing Expiration Date:   12/16/2021     Referral Priority:   Routine     Referral Type:   Consultation     Referral Reason:   Specialty Services Required     Requested Specialty:   General Surgery     Number of Visits Requested:   1     Medications Ordered This Encounter   Medications    cholecalciferol, vitamin D3, (VITAMIN D3) 25 mcg (1,000 unit) capsule     Sig: Take 2 capsules (2,000 Units total) by mouth once daily.     Dispense:  90 capsule     Refill:  3    prenatal vit no.130-iron-folic (PRENATAL VITAMIN) 27 mg iron- 800 mcg Tab     Sig: Take 1 tablet by mouth once daily.     Dispense:  90 tablet     Refill:  3        Follow up in about 6 months (around 5/16/2021), or if symptoms worsen or fail to improve, for Med refills, LAB RESULTS.    If no improvement in symptoms or symptoms worsen, advised to call/follow-up at clinic or go to ER. Patient voiced understanding and all questions/concerns were addressed.     DISCLAIMER: This note was compiled by using a speech recognition dictation system and therefore please be aware that typographical / speech recognition errors can and do occur.  Please contact me if you see any errors specifically.    Dante Negro M.D.       Office: [phone] [id number] [place] [address]  FAX: [phone]

## 2020-11-16 NOTE — ASSESSMENT & PLAN NOTE
Referral to weight management.  Patient is high risk with current pregnancy.  Patient will see her previous OBGYN in [facility].  Recommend establishing care with MFM for high risk.

## 2020-11-17 ENCOUNTER — PATIENT MESSAGE (OUTPATIENT)
Dept: OBSTETRICS AND GYNECOLOGY | Facility: CLINIC | Age: 35
End: 2020-11-17

## 2020-11-18 ENCOUNTER — LAB VISIT (OUTPATIENT)
Dept: LAB | Facility: HOSPITAL | Age: 35
End: 2020-11-18
Attending: NURSE PRACTITIONER
Payer: COMMERCIAL

## 2020-11-18 ENCOUNTER — TELEPHONE (OUTPATIENT)
Dept: OBSTETRICS AND GYNECOLOGY | Facility: CLINIC | Age: 35
End: 2020-11-18

## 2020-11-18 DIAGNOSIS — O20.0 THREATENED ABORTION: Primary | ICD-10-CM

## 2020-11-18 DIAGNOSIS — O20.0 THREATENED ABORTION: ICD-10-CM

## 2020-11-18 LAB
HCG INTACT+B SERPL-ACNC: 1425 MIU/ML
HCG INTACT+B SERPL-ACNC: 1425 MIU/ML

## 2020-11-18 PROCEDURE — 84702 CHORIONIC GONADOTROPIN TEST: CPT

## 2020-11-18 PROCEDURE — 36415 COLL VENOUS BLD VENIPUNCTURE: CPT | Mod: PO

## 2020-11-18 NOTE — TELEPHONE ENCOUNTER
Called patient to discuss plan of care. Verified patients name and . Patient reports still having bleeding, cramping, and passing of clots. Will get BHCG level today and repeat on Friday. Will reassess follow up after monitoring levels today and Friday.

## 2020-11-19 ENCOUNTER — TELEPHONE (OUTPATIENT)
Dept: OBSTETRICS AND GYNECOLOGY | Facility: CLINIC | Age: 35
End: 2020-11-19

## 2020-11-19 DIAGNOSIS — O20.0 THREATENED ABORTION: Primary | ICD-10-CM

## 2020-11-19 NOTE — TELEPHONE ENCOUNTER
Attempted to call patient to go over lab results. No answer. Voicemail left advising patient to call back to discuss.

## 2020-11-19 NOTE — TELEPHONE ENCOUNTER
Notified patient via telephone. Name and  verified. Patient still having bleeding and significant cramping.  Discussed BHCG levels decreasing and that the patient is experiencing a miscarriage. Discussed the need to continue to get BHCG levels weekly until levels are less than 5. Verbalized understanding. Will schedule BHCG level to be drawn at [facility] on 20. She will keep her follow up appointment with Dr. Hernandez on 12/3/20. ER precautions discussed. Condolences offered.

## 2020-11-19 NOTE — TELEPHONE ENCOUNTER
She does not need to come in. Can you cancel this appointment? Patient aware that she does not need to come in.

## 2020-11-20 ENCOUNTER — CLINICAL SUPPORT (OUTPATIENT)
Dept: ALLERGY | Facility: CLINIC | Age: 35
End: 2020-11-20
Payer: COMMERCIAL

## 2020-11-20 DIAGNOSIS — J30.1 SEASONAL ALLERGIC RHINITIS DUE TO POLLEN: Chronic | ICD-10-CM

## 2020-11-20 DIAGNOSIS — J30.89 ALLERGIC RHINITIS DUE TO DERMATOPHAGOIDES FARINAE: ICD-10-CM

## 2020-11-20 DIAGNOSIS — J30.89 ALLERGIC RHINITIS DUE TO DERMATOPHAGOIDES PTERONYSSINUS: ICD-10-CM

## 2020-11-20 PROCEDURE — 99999 PR PBB SHADOW E&M-EST. PATIENT-LVL II: ICD-10-PCS | Mod: PBBFAC,,,

## 2020-11-20 PROCEDURE — 95117 PR IMMU2THERAPY, 2+ INJECTIONS: ICD-10-PCS | Mod: S$GLB,,, | Performed by: ALLERGY & IMMUNOLOGY

## 2020-11-20 PROCEDURE — 99499 NO LOS: ICD-10-PCS | Mod: S$GLB,,, | Performed by: ALLERGY & IMMUNOLOGY

## 2020-11-20 PROCEDURE — 99499 UNLISTED E&M SERVICE: CPT | Mod: S$GLB,,, | Performed by: ALLERGY & IMMUNOLOGY

## 2020-11-20 PROCEDURE — 95117 IMMUNOTHERAPY INJECTIONS: CPT | Mod: S$GLB,,, | Performed by: ALLERGY & IMMUNOLOGY

## 2020-11-20 PROCEDURE — 99999 PR PBB SHADOW E&M-EST. PATIENT-LVL II: CPT | Mod: PBBFAC,,,

## 2020-11-27 ENCOUNTER — PATIENT MESSAGE (OUTPATIENT)
Dept: FAMILY MEDICINE | Facility: CLINIC | Age: 35
End: 2020-11-27

## 2020-11-27 ENCOUNTER — OFFICE VISIT (OUTPATIENT)
Dept: FAMILY MEDICINE | Facility: CLINIC | Age: 35
End: 2020-11-27
Payer: COMMERCIAL

## 2020-11-27 DIAGNOSIS — J32.9 SINUSITIS, UNSPECIFIED CHRONICITY, UNSPECIFIED LOCATION: Primary | ICD-10-CM

## 2020-11-27 DIAGNOSIS — R51.9 HEAD ACHE: ICD-10-CM

## 2020-11-27 PROCEDURE — 99213 PR OFFICE/OUTPT VISIT, EST, LEVL III, 20-29 MIN: ICD-10-PCS | Mod: 95,,, | Performed by: NURSE PRACTITIONER

## 2020-11-27 PROCEDURE — 99213 OFFICE O/P EST LOW 20 MIN: CPT | Mod: 95,,, | Performed by: NURSE PRACTITIONER

## 2020-11-27 PROCEDURE — U0003 INFECTIOUS AGENT DETECTION BY NUCLEIC ACID (DNA OR RNA); SEVERE ACUTE RESPIRATORY SYNDROME CORONAVIRUS 2 (SARS-COV-2) (CORONAVIRUS DISEASE [COVID-19]), AMPLIFIED PROBE TECHNIQUE, MAKING USE OF HIGH THROUGHPUT TECHNOLOGIES AS DESCRIBED BY CMS-2020-01-R: HCPCS

## 2020-11-27 RX ORDER — CEPHALEXIN 500 MG/1
500 CAPSULE ORAL EVERY 12 HOURS
Qty: 14 CAPSULE | Refills: 0 | Status: SHIPPED | OUTPATIENT
Start: 2020-11-27 | End: 2020-12-04

## 2020-11-27 NOTE — PATIENT INSTRUCTIONS
Hydrate well  Rest  Tylenol OTC as directed  Report to ER immediately if symptoms worsen    Instructions for Patients with Confirmed or Suspected COVID-19    If you are awaiting your test result, you will either be called or it will be released to the patient portal.  If you have any questions about your test, please visit [email or web address] or call our COVID-19 information line at [phone].      Instructions for non-hospitalized or discharged patients with confirmed or suspected COVID-19:       Stay home except to get medical care.    Separate yourself from other people and animals in your home.    Call ahead before visiting your doctor.    Wear a face mask.    Cover your coughs and sneezes.    Clean your hands often.    Avoid sharing personal household items.    Clean all high-touch surfaces every day.    Monitor your symptoms. Seek prompt medical attention if your illness is worsening (e.g., difficulty breathing). Before seeking care, call your healthcare provider.    If you have a medical emergency and must call 911, notify the dispatcher that you have or are being evaluated for COVID-19. If possible, put on a face mask before emergency medical services arrive.    Use the following symptom-based strategy to return to normal activity following a suspected or confirmed case of COVID-19. Continue isolation until:   o At least 3 days (72 hours) have passed since recovery defined as resolution of fever without the use of fever-reducing medications and improvement in respiratory symptoms (e.g. cough, shortness of breath), and   o At least 10 days have passed since the first positive test.       As one of the next steps, you will receive a call or text from the [place] Department of Health ([facility]) COVID-19 Tracing Team. See the contact information below so you know not to ignore the health departments call. It is important that you contact them back immediately so they can help.     Contact  Tracer Number:  [phone]  Caller ID for most carriers: [facility]    What is contact tracing?   Contact tracing is a process that helps identify everyone who has been in close contact with an infected person. Contact tracers let those people know they may have been exposed and guide them on next steps. Confidentiality is important for everyone; no one will be told who may have exposed them to the virus.   Your involvement is important. The more we know about where and how this virus is spreading, the better chance we have at stopping it from spreading further.  What does exposure mean?   Exposure means you have been within 6 feet for more than 15 minutes with a person who has or had COVID-19.  What kind of questions do the contact tracers ask?   A contact tracer will confirm your basic contact information including name, address, phone number, and next of kin, as well as asking about any symptoms you may have had. Theyll also ask you how you think you may have gotten sick, such as places where you may have been exposed to the virus, and people you were with. Those names will never be shared with anyone outside of that call, and will only be used to help trace and stop the spread of the virus.   I have privacy concerns. How will the state use my information?   Your privacy about your health is important. All calls are completed using call centers that use the appropriate health privacy protection measures (HIPAA compliance), meaning that your patient information is safe. No one will ever ask you any questions related to immigration status. Your health comes first.   Do I have to participate?   You do not have to participate, but we strongly encourage you to. Contact tracing can help us catch and control new outbreaks as theyre developing to keep your friends and family safe.   What if I dont hear from anyone?   If you dont receive a call within 24 hours, you can call the number above right away  to inquire about your status. That line is open from 8:00 am - 8:00 p.m., 7 days a week.  Contact tracing saves lives! Together, we have the power to beat this virus and keep our loved ones and neighbors safe.       Instructions for household members, intimate partners and caregivers in a non-healthcare setting of a patient with confirmed or suspected COVID-19:         Close contacts should monitor their health and call their healthcare provider right away if they develop symptoms suggestive of COVID-19 (e.g., fever, cough, shortness of breath).    Stay home except to get medical care. Separate yourself from other people and animals in the home.   Monitor the patients symptoms. If the patient is getting sicker, call his or her healthcare provider. If the patient has a medical emergency and you need to call 911, notify the dispatch personnel that the patient has or is being evaluated for COVID-19.    Wear a facemask when around other people such as sharing a room or vehicle and before entering a healthcare provider's office.   Cover coughs and sneezes with a tissue. Throw used tissues in a lined trash can immediately and wash hands.   Clean hands often with soap and water for at least 20 seconds or with an alcohol-based hand , rubbing hands together until they feel dry. Avoid touching your eyes, nose, and mouth with unwashed hands.   Clean all high-touch; surfaces every day, including counters, tabletops, doorknobs, bathroom fixtures, toilets, phones, keyboards, tablets, bedside tables, etc. Use a household cleaning spray or wipe according to label instructions.   Avoid sharing personal household items such as dishes, drinking glasses, cups, towels, bedding, etc. After these items are used, they should be washed thoroughly with soap and water.   Continue isolation until:   At least 3 days (72 hours) have passed since recovery defined as resolution of fever without the use of fever-reducing  medications and improvement in respiratory symptoms (e.g. cough, shortness of breath), and    At least 10 days have passed since the patients first positive test.    https://www.cdc.gov/coronavirus/2019-ncov/your-health/index.htm

## 2020-11-27 NOTE — TELEPHONE ENCOUNTER
Called and spoke with the patient, patient notified of Dr. Jansen recommendations regarding her medications, patient states that she will send a message on Tuesday once Dr. Negro is back in the office.

## 2020-11-27 NOTE — TELEPHONE ENCOUNTER
If she had a miscarriage then she can probably start back on her previous medications.  Recommend follow-up with Dr. Negro

## 2020-11-27 NOTE — PROGRESS NOTES
Subjective:       Patient ID: Lucía Coreas is a 35 y.o. female.    Chief Complaint: No chief complaint on file.  The patient location is: [place]  The chief complaint leading to consultation is: Headache, nasal congestion    Visit type: audiovisual    Face to Face time with patient: 15 min  minutes of total time spent on the encounter, which includes face to face time and non-face to face time preparing to see the patient (eg, review of tests), Obtaining and/or reviewing separately obtained history, Documenting clinical information in the electronic or other health record, Independently interpreting results (not separately reported) and communicating results to the patient/family/caregiver, or Care coordination (not separately reported).         Each patient to whom he or she provides medical services by telemedicine is:  (1) informed of the relationship between the physician and patient and the respective role of any other health care provider with respect to management of the patient; and (2) notified that he or she may decline to receive medical services by telemedicine and may withdraw from such care at any time.    Notes:     Sinus Problem  This is a new problem. The current episode started in the past 7 days. The problem is unchanged. There has been no fever. The pain is mild. Associated symptoms include congestion, headaches and sinus pressure. Pertinent negatives include no chills, coughing, diaphoresis, ear pain, hoarse voice, neck pain, shortness of breath, sneezing, sore throat or swollen glands. Past treatments include nothing. The treatment provided no relief.     Past Medical History:   Diagnosis Date    Allergy     Amenorrhea     Asthma     Diabetes     GERD (gastroesophageal reflux disease)     Infertility associated with anovulation     Iron deficiency anemia     Knee pain     Metabolic syndrome     PCO (polycystic ovaries)     Recurrent boils      Social History     Socioeconomic  History    Marital status:      Spouse name: Not on file    Number of children: Not on file    Years of education: Not on file    Highest education level: Not on file   Occupational History    Not on file   Social Needs    Financial resource strain: Not on file    Food insecurity     Worry: Not on file     Inability: Not on file    Transportation needs     Medical: Not on file     Non-medical: Not on file   Tobacco Use    Smoking status: Never Smoker    Smokeless tobacco: Never Used   Substance and Sexual Activity    Alcohol use: Never     Frequency: Never    Drug use: Never    Sexual activity: Yes     Partners: Female     Birth control/protection: None   Lifestyle    Physical activity     Days per week: Not on file     Minutes per session: Not on file    Stress: Not on file   Relationships    Social connections     Talks on phone: Not on file     Gets together: Not on file     Attends Gnosticism service: Not on file     Active member of club or organization: Not on file     Attends meetings of clubs or organizations: Not on file     Relationship status: Not on file   Other Topics Concern    Not on file   Social History Narrative    Not on file     Past Surgical History:   Procedure Laterality Date    CARPAL TUNNEL RELEASE Left 2019     SECTION     ]  Review of Systems   Constitutional: Negative.  Negative for chills and diaphoresis.   HENT: Positive for nasal congestion, postnasal drip, rhinorrhea and sinus pressure/congestion. Negative for ear pain, hoarse voice, sneezing and sore throat.    Eyes: Negative.    Respiratory: Negative.  Negative for cough and shortness of breath.    Cardiovascular: Negative.    Gastrointestinal: Negative.    Endocrine: Negative.    Genitourinary: Negative.    Musculoskeletal: Negative.  Negative for neck pain.   Integumentary:  Negative.   Allergic/Immunologic: Negative.    Neurological: Positive for headaches.   Psychiatric/Behavioral: Negative.           Objective:      Physical Exam  Constitutional:       Appearance: Normal appearance.   Neurological:      Mental Status: She is alert.         Assessment:       1. Sinusitis, unspecified chronicity, unspecified location    2. Head ache        Plan:           Diagnoses and all orders for this visit:    Sinusitis, unspecified chronicity, unspecified location    Head ache  -     COVID-19 Routine Screening  -     cephALEXin (KEFLEX) 500 MG capsule; Take 1 capsule (500 mg total) by mouth every 12 (twelve) hours. for 7 days  Hydrate well  Rest  Tylenol OTC as directed  COVID-19 home instructions given  Report to ER immediately if symptoms worsen

## 2020-11-27 NOTE — TELEPHONE ENCOUNTER
Please see patient's message to Dr. Negro regarding her medications that she stopped but has since had a miscarriage and is wanting to know if she restarts her medications.  Please advise if you want her to restart meds or speak with her OB regarding this.

## 2020-11-28 LAB — SARS-COV-2 RNA RESP QL NAA+PROBE: NOT DETECTED

## 2020-11-30 ENCOUNTER — LAB VISIT (OUTPATIENT)
Dept: LAB | Facility: HOSPITAL | Age: 35
End: 2020-11-30
Attending: NURSE PRACTITIONER
Payer: COMMERCIAL

## 2020-11-30 ENCOUNTER — CLINICAL SUPPORT (OUTPATIENT)
Dept: ALLERGY | Facility: CLINIC | Age: 35
End: 2020-11-30
Payer: COMMERCIAL

## 2020-11-30 ENCOUNTER — TELEPHONE (OUTPATIENT)
Dept: OBSTETRICS AND GYNECOLOGY | Facility: CLINIC | Age: 35
End: 2020-11-30

## 2020-11-30 DIAGNOSIS — O03.9 MISCARRIAGE: ICD-10-CM

## 2020-11-30 DIAGNOSIS — J30.1 SEASONAL ALLERGIC RHINITIS DUE TO POLLEN: Chronic | ICD-10-CM

## 2020-11-30 DIAGNOSIS — J30.89 ALLERGIC RHINITIS DUE TO DERMATOPHAGOIDES FARINAE: ICD-10-CM

## 2020-11-30 DIAGNOSIS — J30.89 ALLERGIC RHINITIS DUE TO DERMATOPHAGOIDES PTERONYSSINUS: ICD-10-CM

## 2020-11-30 DIAGNOSIS — O03.9 MISCARRIAGE: Primary | ICD-10-CM

## 2020-11-30 LAB — HCG INTACT+B SERPL-ACNC: 2.7 MIU/ML

## 2020-11-30 PROCEDURE — 95117 PR IMMU2THERAPY, 2+ INJECTIONS: ICD-10-PCS | Mod: S$GLB,,, | Performed by: ALLERGY & IMMUNOLOGY

## 2020-11-30 PROCEDURE — 99499 NO LOS: ICD-10-PCS | Mod: S$GLB,,, | Performed by: ALLERGY & IMMUNOLOGY

## 2020-11-30 PROCEDURE — 36415 COLL VENOUS BLD VENIPUNCTURE: CPT

## 2020-11-30 PROCEDURE — 99999 PR PBB SHADOW E&M-EST. PATIENT-LVL II: ICD-10-PCS | Mod: PBBFAC,,,

## 2020-11-30 PROCEDURE — 99999 PR PBB SHADOW E&M-EST. PATIENT-LVL II: CPT | Mod: PBBFAC,,,

## 2020-11-30 PROCEDURE — 95117 IMMUNOTHERAPY INJECTIONS: CPT | Mod: S$GLB,,, | Performed by: ALLERGY & IMMUNOLOGY

## 2020-11-30 PROCEDURE — 84702 CHORIONIC GONADOTROPIN TEST: CPT

## 2020-11-30 PROCEDURE — 99499 UNLISTED E&M SERVICE: CPT | Mod: S$GLB,,, | Performed by: ALLERGY & IMMUNOLOGY

## 2020-11-30 NOTE — TELEPHONE ENCOUNTER
----- Message from Rupa Saini sent at 11/30/2020  6:52 AM CST -----  Contact: pt  Pt is calling rg needing to have the orders for her HCG test placed back in the system to schedule/ [place] and [place] them done and can be reached at [phone]//thanks/dbw

## 2020-11-30 NOTE — TELEPHONE ENCOUNTER
Pt spoke with someone regarding her hcg labs. Assisted pt with scheduling her labs. Pt verbalized understanding.

## 2020-12-01 NOTE — PROGRESS NOTES
Subjective:       Patient ID:  Lucía Coreas is a 35 y.o. female who presents for   Chief Complaint   Patient presents with    Recurrent Skin Infections     breast, R side, left inner thigh, & vaginal area     History of Present Illness: The patient presents for follow up of HS.     The patient was last seen on: 10/19/2020 for HS (x many years, worsening over the past several months), and at that time she had ILK and spironolactone was increased to 100 mg daily (started by PCP 8/28 at 50 mg daily), continued bleach baths and hibiclens, discussed addition of CLN cleanser, and added topical clinda solution.  In the interim, patient reports a surprise pregnancy and subsequent early miscarriage.  She stopped spironolactone when she found out she was pregnant. She saw OB/GYN this morning for f/u and was started on OCP and reports she is actively preventing conception and does not desire pregnancy. She has rescheduled her ortho surgery to the end of December.    Reports flare, has boils to R flank, R breast, L superior thigh- all have spontaneously drained some except R breast.  Currently is on Keflex (started Saturday) for sinus infection.    We had discussed Humira at last visit which she read about at home but reports she is very hesitant to start any medication that affects her immune system currently.    Previous treatments:  Oral Clinda and rifampin x 2 months  bleach baths  hibiclens  ILK  topical clinda  spironolactone 50 mg daily             Review of Systems   Constitutional: Negative for fever.   Skin: Positive for abscesses.        Objective:    Physical Exam   Constitutional: She appears well-developed and well-nourished. No distress.   Neurological: She is alert and oriented to person, place, and time. She is not disoriented.   Psychiatric: She has a normal mood and affect.   Skin:   Areas Examined (abnormalities noted in diagram):   Chest / Axilla Inspection Performed  Genitals / Buttocks / Groin  Inspection Performed  Back Inspection Performed              Diagram Legend     Erythematous scaling macule/papule c/w actinic keratosis       Vascular papule c/w angioma      Pigmented verrucoid papule/plaque c/w seborrheic keratosis      Yellow umbilicated papule c/w sebaceous hyperplasia      Irregularly shaped tan macule c/w lentigo     1-2 mm smooth white papules consistent with Milia      Movable subcutaneous cyst with punctum c/w epidermal inclusion cyst      Subcutaneous movable cyst c/w pilar cyst      Firm pink to brown papule c/w dermatofibroma      Pedunculated fleshy papule(s) c/w skin tag(s)      Evenly pigmented macule c/w junctional nevus     Mildly variegated pigmented, slightly irregular-bordered macule c/w mildly atypical nevus      Flesh colored to evenly pigmented papule c/w intradermal nevus       Pink pearly papule/plaque c/w basal cell carcinoma      Erythematous hyperkeratotic cursted plaque c/w SCC      Surgical scar with no sign of skin cancer recurrence      Open and closed comedones      Inflammatory papules and pustules      Verrucoid papule consistent consistent with wart     Erythematous eczematous patches and plaques     Dystrophic onycholytic nail with subungual debris c/w onychomycosis     Umbilicated papule    Erythematous-base heme-crusted tan verrucoid plaque consistent with inflamed seborrheic keratosis     Erythematous Silvery Scaling Plaque c/w Psoriasis     See annotation      Assessment / Plan:        Hidradenitis suppurativa  - HS stage 1  - refractory to topical clinda, hibiclens, bleach baths, clinda + rifampin x 2 months, spironolactone 50 mg daily  - planned to increase spironolactone at last visit however patient had unplanned pregnancy and stopped medication. In depth discussion had today regarding contraception and risk of birth defects if taking spironolactone while pregnant, and she acknowledged understanding. Patient has no plans to conceive and is actively  preventing pregnancy, saw OB/GYN this morning and started OCP. Will restart spironolactone at 50 mg daily x 1 week, then increase to 100 mg daily as tolerated. Discussed that if she stops taking the OCP, she is to stop taking the spironolactone as well, and she understood.  - start OTC turmeric and zinc (written instructions given)  - continue clinda solution BID, hibiclens, CLN cleanser  - consider metformin (previous diarrhea SE but reports she may be open to trying it again in future)  - ILK as below  - again discussed dapsone and Humira. Patient does not want to start at this time.  Will reconsider in future  - encouraged continued weight loss    -     triamcinolone acetonide injection 10 mg    Incision and Drainage: R breast    After risks and benefits explained, verbal consent obtained, lesion incised with #11 blade and drained on today's date. Bacterial culture performed.  Instructions for removal provided to patient to remove at 48 hours. Patient instructed to perform warm compresses 2 - 3 times/daily.         Intralesional Kenalog 10mg/cc (0.8 cc total) injected into 4 lesions on the R flank, R breast, L thigh (2 lesions) today after obtaining verbal consent including risk of surrounding hypopigmentation. Patient tolerated procedure well.    Units: 1  NDC for Kenalog 10mg/cc:  9420-2453-09    Discussed benefits and risks of spironolactone therapy including but not limited to breakthrough bleeding, breast tenderness, and elevated potassium levels which may give symptoms of fatigue, palpitations, and nausea. Patient should limit potassium intake - avoid potassium supplements or salt substitutes, limit bananas and citrus fruits. Pregnancy must be avoided while taking spironolactone.  Discussed theoretical increased risk of hormone related cancers such as breast, ovarian and uterine cancer.  Patient acknowledged understanding of these risks.             Follow up in about 3 months (around 3/3/2021).

## 2020-12-03 ENCOUNTER — OFFICE VISIT (OUTPATIENT)
Dept: DERMATOLOGY | Facility: CLINIC | Age: 35
End: 2020-12-03
Payer: COMMERCIAL

## 2020-12-03 ENCOUNTER — OFFICE VISIT (OUTPATIENT)
Dept: FAMILY MEDICINE | Facility: CLINIC | Age: 35
End: 2020-12-03
Payer: COMMERCIAL

## 2020-12-03 ENCOUNTER — OFFICE VISIT (OUTPATIENT)
Dept: OBSTETRICS AND GYNECOLOGY | Facility: CLINIC | Age: 35
End: 2020-12-03
Payer: COMMERCIAL

## 2020-12-03 ENCOUNTER — TELEPHONE (OUTPATIENT)
Dept: FAMILY MEDICINE | Facility: CLINIC | Age: 35
End: 2020-12-03

## 2020-12-03 VITALS
DIASTOLIC BLOOD PRESSURE: 80 MMHG | BODY MASS INDEX: 50.02 KG/M2 | HEIGHT: 64 IN | SYSTOLIC BLOOD PRESSURE: 140 MMHG | WEIGHT: 293 LBS

## 2020-12-03 DIAGNOSIS — E28.2 PCOS (POLYCYSTIC OVARIAN SYNDROME): ICD-10-CM

## 2020-12-03 DIAGNOSIS — R09.81 SINUS CONGESTION: Primary | ICD-10-CM

## 2020-12-03 DIAGNOSIS — R06.83 SNORING: ICD-10-CM

## 2020-12-03 DIAGNOSIS — E11.65 TYPE 2 DIABETES MELLITUS WITH HYPERGLYCEMIA, WITHOUT LONG-TERM CURRENT USE OF INSULIN: Chronic | ICD-10-CM

## 2020-12-03 DIAGNOSIS — E55.9 VITAMIN D DEFICIENCY: Chronic | ICD-10-CM

## 2020-12-03 DIAGNOSIS — O03.9 MISCARRIAGE: Primary | ICD-10-CM

## 2020-12-03 DIAGNOSIS — K21.00 GASTROESOPHAGEAL REFLUX DISEASE WITH ESOPHAGITIS WITHOUT HEMORRHAGE: ICD-10-CM

## 2020-12-03 DIAGNOSIS — L73.2 HIDRADENITIS SUPPURATIVA: Primary | ICD-10-CM

## 2020-12-03 DIAGNOSIS — F41.9 ANXIETY: ICD-10-CM

## 2020-12-03 DIAGNOSIS — E66.2 CLASS 3 OBESITY WITH ALVEOLAR HYPOVENTILATION, SERIOUS COMORBIDITY, AND BODY MASS INDEX (BMI) OF 50.0 TO 59.9 IN ADULT: ICD-10-CM

## 2020-12-03 PROCEDURE — 1125F PR PAIN SEVERITY QUANTIFIED, PAIN PRESENT: ICD-10-PCS | Mod: S$GLB,,, | Performed by: DERMATOLOGY

## 2020-12-03 PROCEDURE — 99999 PR PBB SHADOW E&M-EST. PATIENT-LVL III: ICD-10-PCS | Mod: PBBFAC,,, | Performed by: DERMATOLOGY

## 2020-12-03 PROCEDURE — 10060 PR DRAIN SKIN ABSCESS SIMPLE: ICD-10-PCS | Mod: S$GLB,,, | Performed by: DERMATOLOGY

## 2020-12-03 PROCEDURE — 3044F PR MOST RECENT HEMOGLOBIN A1C LEVEL <7.0%: ICD-10-PCS | Mod: CPTII,,, | Performed by: FAMILY MEDICINE

## 2020-12-03 PROCEDURE — 99214 OFFICE O/P EST MOD 30 MIN: CPT | Mod: 95,,, | Performed by: FAMILY MEDICINE

## 2020-12-03 PROCEDURE — 1126F PR PAIN SEVERITY QUANTIFIED, NO PAIN PRESENT: ICD-10-PCS | Mod: S$GLB,,, | Performed by: OBSTETRICS & GYNECOLOGY

## 2020-12-03 PROCEDURE — 1125F AMNT PAIN NOTED PAIN PRSNT: CPT | Mod: S$GLB,,, | Performed by: DERMATOLOGY

## 2020-12-03 PROCEDURE — 11900 PR INJECTION INTO SKIN LESIONS, UP TO 7: ICD-10-PCS | Mod: 59,S$GLB,, | Performed by: DERMATOLOGY

## 2020-12-03 PROCEDURE — 99213 OFFICE O/P EST LOW 20 MIN: CPT | Mod: 25,S$GLB,, | Performed by: DERMATOLOGY

## 2020-12-03 PROCEDURE — 99999 PR PBB SHADOW E&M-EST. PATIENT-LVL II: ICD-10-PCS | Mod: PBBFAC,,, | Performed by: OBSTETRICS & GYNECOLOGY

## 2020-12-03 PROCEDURE — 99213 PR OFFICE/OUTPT VISIT, EST, LEVL III, 20-29 MIN: ICD-10-PCS | Mod: 25,S$GLB,, | Performed by: DERMATOLOGY

## 2020-12-03 PROCEDURE — 11900 INJECT SKIN LESIONS </W 7: CPT | Mod: 59,S$GLB,, | Performed by: DERMATOLOGY

## 2020-12-03 PROCEDURE — 99999 PR PBB SHADOW E&M-EST. PATIENT-LVL II: CPT | Mod: PBBFAC,,, | Performed by: OBSTETRICS & GYNECOLOGY

## 2020-12-03 PROCEDURE — 87070 CULTURE OTHR SPECIMN AEROBIC: CPT

## 2020-12-03 PROCEDURE — 3008F BODY MASS INDEX DOCD: CPT | Mod: CPTII,S$GLB,, | Performed by: OBSTETRICS & GYNECOLOGY

## 2020-12-03 PROCEDURE — 87077 CULTURE AEROBIC IDENTIFY: CPT

## 2020-12-03 PROCEDURE — 99212 OFFICE O/P EST SF 10 MIN: CPT | Mod: S$GLB,,, | Performed by: OBSTETRICS & GYNECOLOGY

## 2020-12-03 PROCEDURE — 99999 PR PBB SHADOW E&M-EST. PATIENT-LVL III: CPT | Mod: PBBFAC,,, | Performed by: DERMATOLOGY

## 2020-12-03 PROCEDURE — 99212 PR OFFICE/OUTPT VISIT, EST, LEVL II, 10-19 MIN: ICD-10-PCS | Mod: S$GLB,,, | Performed by: OBSTETRICS & GYNECOLOGY

## 2020-12-03 PROCEDURE — 3008F PR BODY MASS INDEX (BMI) DOCUMENTED: ICD-10-PCS | Mod: CPTII,S$GLB,, | Performed by: OBSTETRICS & GYNECOLOGY

## 2020-12-03 PROCEDURE — 3044F HG A1C LEVEL LT 7.0%: CPT | Mod: CPTII,,, | Performed by: FAMILY MEDICINE

## 2020-12-03 PROCEDURE — 10060 I&D ABSCESS SIMPLE/SINGLE: CPT | Mod: S$GLB,,, | Performed by: DERMATOLOGY

## 2020-12-03 PROCEDURE — 1126F AMNT PAIN NOTED NONE PRSNT: CPT | Mod: S$GLB,,, | Performed by: OBSTETRICS & GYNECOLOGY

## 2020-12-03 PROCEDURE — 99214 PR OFFICE/OUTPT VISIT, EST, LEVL IV, 30-39 MIN: ICD-10-PCS | Mod: 95,,, | Performed by: FAMILY MEDICINE

## 2020-12-03 PROCEDURE — 87186 SC STD MICRODIL/AGAR DIL: CPT

## 2020-12-03 RX ORDER — METHYLPREDNISOLONE 4 MG/1
TABLET ORAL
Qty: 21 TABLET | Refills: 0 | Status: SHIPPED | OUTPATIENT
Start: 2020-12-03 | End: 2021-02-19

## 2020-12-03 RX ORDER — ERGOCALCIFEROL 1.25 MG/1
50000 CAPSULE ORAL
Qty: 12 CAPSULE | Refills: 1 | Status: SHIPPED | OUTPATIENT
Start: 2020-12-03 | End: 2021-02-18

## 2020-12-03 RX ORDER — KETOROLAC TROMETHAMINE 10 MG/1
10 TABLET, FILM COATED ORAL EVERY 6 HOURS
Qty: 20 TABLET | Refills: 0 | Status: SHIPPED | OUTPATIENT
Start: 2020-12-03 | End: 2020-12-08

## 2020-12-03 RX ORDER — NORETHINDRONE ACETATE AND ETHINYL ESTRADIOL .03; 1.5 MG/1; MG/1
1 TABLET ORAL DAILY
Qty: 21 EACH | Refills: 11 | Status: SHIPPED | OUTPATIENT
Start: 2020-12-03 | End: 2021-02-19

## 2020-12-03 RX ORDER — OMEPRAZOLE 40 MG/1
40 CAPSULE, DELAYED RELEASE ORAL DAILY
Qty: 90 CAPSULE | Refills: 3 | Status: SHIPPED | OUTPATIENT
Start: 2020-12-03 | End: 2021-02-19

## 2020-12-03 RX ORDER — PHENTERMINE HYDROCHLORIDE 37.5 MG/1
37.5 TABLET ORAL
Qty: 30 TABLET | Refills: 0 | Status: SHIPPED | OUTPATIENT
Start: 2020-12-03 | End: 2021-01-02

## 2020-12-03 RX ORDER — ESCITALOPRAM OXALATE 20 MG/1
20 TABLET ORAL DAILY
Qty: 90 TABLET | Refills: 3 | Status: ON HOLD | OUTPATIENT
Start: 2020-12-03 | End: 2021-01-06 | Stop reason: ALTCHOICE

## 2020-12-03 RX ORDER — SEMAGLUTIDE 1.34 MG/ML
INJECTION, SOLUTION SUBCUTANEOUS
Qty: 1 SYRINGE | Refills: 1 | Status: ON HOLD | OUTPATIENT
Start: 2020-12-03 | End: 2020-12-23

## 2020-12-03 RX ORDER — HYDROXYZINE HYDROCHLORIDE 25 MG/1
50 TABLET, FILM COATED ORAL NIGHTLY
Qty: 60 TABLET | Refills: 1 | Status: SHIPPED | OUTPATIENT
Start: 2020-12-03 | End: 2021-02-19

## 2020-12-03 NOTE — TELEPHONE ENCOUNTER
Patient called because she needs to see you because she has been having headaches and feeling really tired.  Patient was pregnant, but had a miscarriage about 2 weeks ago, so she now needs to get back on her medications that she was taking prior to her pregnancy.  Patient had virtual visit with Yanely last week and did a COVID test, but it was negatvie.  Please advise.

## 2020-12-03 NOTE — TELEPHONE ENCOUNTER
Please call the patient and check to see if she can come to the clinic now.  You can put her at the 12:00 p.m. appointment

## 2020-12-03 NOTE — TELEPHONE ENCOUNTER
Called and set up a video visit per Dr. Negro's recommendations with the patient who states that she will log on now.

## 2020-12-03 NOTE — PATIENT INSTRUCTIONS
Recommend starting these over the counter anti-inflammatory supplements:    Turmeric - start with 500mg every day and increase to 1 g every day (may cause GI upset)- 100x more anti-inflammatory if mixed with black pepper or with fatty food    Zinc (Solaray 50mg on amazon)

## 2020-12-03 NOTE — PROGRESS NOTES
Subjective:       Patient ID: Lucía Coreas is a 35 y.o. female.    Chief Complaint:  Missed AB      History of Present Illness  HPI  Pt is here for follow up of miscarriage.  Reports that bleeding and cramping resolved completely.  Is coping well with loss.  Reports no complaints.    GYN & OB History  Patient's last menstrual period was 10/08/2020.   Date of Last Pap: 2020    OB History    Para Term  AB Living   3 1 1   1     SAB TAB Ectopic Multiple Live Births   1              # Outcome Date GA Lbr Edward/2nd Weight Sex Delivery Anes PTL Lv   3             2 SAB            1 Term      CS-Unspec          Review of Systems  Review of Systems   Constitutional: Negative for activity change, appetite change, chills, fatigue, fever and unexpected weight change.   Respiratory: Negative for shortness of breath.    Cardiovascular: Negative for chest pain, palpitations and leg swelling.   Gastrointestinal: Negative for abdominal pain, bloating, blood in stool, constipation, diarrhea, nausea and vomiting.   Genitourinary: Negative for dysmenorrhea, dyspareunia, dysuria, flank pain, frequency, genital sores, hematuria, menorrhagia, menstrual problem, pelvic pain, urgency, vaginal bleeding, vaginal discharge, vaginal pain, urinary incontinence, postcoital bleeding, vaginal dryness and vaginal odor.   Musculoskeletal: Negative for back pain.   Neurological: Negative for syncope and headaches.           Objective:    Physical Exam:   Constitutional: She is oriented to person, place, and time. She appears well-developed and well-nourished. No distress.                           Neurological: She is alert and oriented to person, place, and time.     Psychiatric: She has a normal mood and affect. Her behavior is normal. Thought content normal.          HCG  - 2.7     Assessment:        1. Miscarriage    2. PCOS (polycystic ovarian syndrome)             Plan:      Miscarriage  -     Completed.  Pt  coping well and stable.    PCOS (polycystic ovarian syndrome)  -     norethindrone ac-eth estradioL (JUNEL 1.5/30, 21,) 1.5-30 mg-mcg Tab; Take 1 tablet by mouth once daily.  Dispense: 21 each; Refill: 11  -     Pt was counseled on contraception options, including associated risks and benefits of each.  Pt voiced understanding and desires to proceed with OCP.  Medication dosing, side-effects, risks, benefits, and alternatives were discussed.  Medical history was reviewed and pt is a candidate for OCP use.    Follow up in about 6 months (around 6/3/2021) for Annual exam.

## 2020-12-04 PROBLEM — R06.83 SNORING: Status: ACTIVE | Noted: 2020-12-04

## 2020-12-04 NOTE — ASSESSMENT & PLAN NOTE
Short course of oral steroid.  Continue antibiotic until finished.  Toradol for headaches related to sinus headache.  Patient is no longer pregnant as she just had miscarriage.  This was confirmed by OBGYN.  She was started on OCPs today.    Discussed condition course and signs and symptoms to expect.  Patient advised take anti-inflammatories and or Tylenol for pain or fever.  ER precautions.  Call MD or follow-up to clinic if not improving or worsening symptoms.   - risk of corticosteroids reviewed (elevated BP/glucose, insomnia, psychosis, bone loss, etc) and patient expressed understanding

## 2020-12-04 NOTE — PROGRESS NOTES
Primary Care Telemedicine Note    The patient location is:  Patient Home - [place]  The chief complaint leading to consultation is:   Chief Complaint   Patient presents with    Medication Refill      Total time spent with patient:  11 min    Visit type: Virtual visit with synchronous audio only and video  Each patient to whom he or she provides medical services by telemedicine is:  (1) informed of the relationship between the physician and patient and the respective role of any other health care provider with respect to management of the patient; and (2) notified that he or she may decline to receive medical services by telemedicine and may withdraw from such care at any time.  =================================================================  PLAN:      Problem List Items Addressed This Visit     Vitamin D deficiency (Chronic)     Restart vitamin-D 18984 units weekly.  Recheck level.         Relevant Medications    ergocalciferol (ERGOCALCIFEROL) 50,000 unit Cap    Other Relevant Orders    Vitamin D    Type 2 diabetes mellitus with hyperglycemia, without long-term current use of insulin (Chronic)     Blood pressure slightly elevated.  Will have patient follow-up closely and check blood pressure at home.  Likely due to weight gain and stress from recent miscarriage.  Restart medications for diabetes.  Patient was on Ozempic previously but has been off for several weeks.  Will restart at lower dose and titrate up to 1 mg as tolerated.  We will plan to monitor hemoglobin A1c at designated intervals 3 to 6 months.  I recommend ongoing Education for diabetic diet and exercise protocol.  We will continue to monitor for side effects.    Please be advised of symptoms to monitor for and to notify me immediately if persistent or worsening.  Follow up with Ophthalmology/Optometry and Podiatry at least annually.           Relevant Medications    semaglutide (OZEMPIC) 0.25 mg or 0.5 mg(2 mg/1.5 mL) PnIj    Anxiety (Chronic)      Increase hydroxyzine to 50 mg at bedtime.  Patient is not sleeping well.  Restart Lexapro 20.Discussed anxiety condition course.  Discussed SSRI as first-line treatment for this condition.  Discussed risk of discontinuing this medication without tapering.  Patient was educated, advised of side effects, and all questions were answered.  Patient voiced understanding.  Patient will follow up routinely and notify us if having any side effects or worsening or persistent symptoms.  ER precautions were given. Antidepressant/Antianxiety Medication Initiation:  Patient informed of risks, benefits, and potential side effects of medication and accepts informed consent.  Common side effects include nausea, fatigue, headache, insomnia., Specifically discussed the possibility of new or worsening suicidal thoughts/depression.  Patient instructed to stop the medication immediately and seek urgent treatment if this occurs., Patient instructed not to abruptly discontinue medication without physician guidance except in cases of sudden onset or worsening of SI.            Relevant Medications    escitalopram oxalate (LEXAPRO) 20 MG tablet    hydrOXYzine HCL (ATARAX) 25 MG tablet    Class 3 obesity with alveolar hypoventilation, serious comorbidity, and body mass index (BMI) of 50.0 to 59.9 in adult     Patient advised that it may take several months for her hormones to adjust and even longer since starting OCP.  Will go ahead and restart medications for her chronic conditions.  Patient advised on lifestyle modification with diet and exercise.  Referral placed to weight management.         Relevant Medications    phentermine (ADIPEX-P) 37.5 mg tablet    semaglutide (OZEMPIC) 0.25 mg or 0.5 mg(2 mg/1.5 mL) PnIj    Other Relevant Orders    Ambulatory referral/consult to Weight Management Program    GERD with esophagitis     Restart PPI.  Change to omeprazole.  Patient did not do well on pantoprazole.  GERD RECOMMENDATIONS  Please be  advised of condition course.  - Take PPI in the morning 30-60 minutes before breakfast  - I recommend ongoing Education for lifestyle modifications to help control/reduce reflux/abdominal pain including: avoid large meals, avoid eating within 2-3 hours of bedtime (avoid late night eating & lying down soon after eating), elevate head of bed if nocturnal symptoms are present, smoking cessation (if current smoker), & weight loss (if overweight).   - please be advised to avoid known foods which trigger reflux symptoms & to minimize/avoid high-fat foods, chocolate, caffeine, citrus, alcohol, & tomato products.  - Advised to avoid/limit use of NSAID's, since they can cause GI upset, bleeding, and/or ulcers. If needed, take with food.            Relevant Medications    omeprazole (PRILOSEC) 40 MG capsule    Sinus congestion - Primary     Short course of oral steroid.  Continue antibiotic until finished.  Toradol for headaches related to sinus headache.  Patient is no longer pregnant as she just had miscarriage.  This was confirmed by OBGYN.  She was started on OCPs today.    Discussed condition course and signs and symptoms to expect.  Patient advised take anti-inflammatories and or Tylenol for pain or fever.  ER precautions.  Call MD or follow-up to clinic if not improving or worsening symptoms.   - risk of corticosteroids reviewed (elevated BP/glucose, insomnia, psychosis, bone loss, etc) and patient expressed understanding           Relevant Medications    methylPREDNISolone (MEDROL DOSEPACK) 4 mg tablet    ketorolac (TORADOL) 10 mg tablet    Snoring     High risk for sleep apnea.  Will request records from previous home sleep study.  Referral to Sleep Disorders for further evaluation treatment.         Relevant Orders    Ambulatory referral/consult to Sleep Disorders        Future Appointments     Date Provider Specialty Appt Notes    12/7/2020  Allergy shot/nd    12/24/2020 Vandana Gurber PA-C Orthopedics  Postop1 s/p R CTR(12/21/20)    1/4/2021 Vandana Gruber PA-C Orthopedics Postop2 s/p R CTR(12/21/20)    1/6/2021 Zeke Celment MD Pain Medicine 10 w F/U    2/17/2021  Lab labs    2/24/2021 Michael Hernandez MD Rheumatology 4 month homer/bc    3/4/2021 Naida Farrell MD Dermatology f/u     3/5/2021 Dante Negro MD Family Medicine Waking up with headaches and dry mouth. Feeling tired all the time. Sleepless nights. starting my medicines again. I had an miscarriage.    5/17/2021 Dante Negro MD Family Medicine 6 mo f/u           Medication List with Changes/Refills   New Medications    KETOROLAC (TORADOL) 10 MG TABLET    Take 1 tablet (10 mg total) by mouth every 6 (six) hours. for 5 days    METHYLPREDNISOLONE (MEDROL DOSEPACK) 4 MG TABLET    follow package directions    OMEPRAZOLE (PRILOSEC) 40 MG CAPSULE    Take 1 capsule (40 mg total) by mouth once daily.   Current Medications    ALBUTEROL (PROVENTIL/VENTOLIN HFA) 90 MCG/ACTUATION INHALER    Inhale 2 puffs into the lungs every 4 (four) hours as needed for Wheezing.    ASPIRIN (ECOTRIN) 81 MG EC TABLET    Take 1 tablet (81 mg total) by mouth once daily.    AZELASTINE (ASTELIN) 137 MCG (0.1 %) NASAL SPRAY    1 spray (137 mcg total) by Nasal route 2 (two) times daily.    CEPHALEXIN (KEFLEX) 500 MG CAPSULE    Take 1 capsule (500 mg total) by mouth every 12 (twelve) hours. for 7 days    CHOLECALCIFEROL, VITAMIN D3, (VITAMIN D3) 25 MCG (1,000 UNIT) CAPSULE    Take 2 capsules (2,000 Units total) by mouth once daily.    CLINDAMYCIN (CLEOCIN T) 1 % EXTERNAL SOLUTION    Apply topically 2 (two) times daily.    CLINDAMYCIN (CLEOCIN T) 1 % LOTION    Apply topically 2 (two) times daily. face    FLUTICASONE PROPIONATE (FLONASE) 50 MCG/ACTUATION NASAL SPRAY    1 spray (50 mcg total) by Each Nostril route 2 (two) times daily.    IRON-VIT C-B12-FOLIC ACID (IRON 100 PLUS) TAB    Take 1 tablet by mouth once daily.    KETOCONAZOLE (NIZORAL) 2 % SHAMPOO    Apply  topically twice a week.    MUPIROCIN (BACTROBAN) 2 % OINTMENT    Apply topically 3 (three) times daily.    NORETHINDRONE AC-ETH ESTRADIOL (JUNEL 1.5/30, 21,) 1.5-30 MG-MCG TAB    Take 1 tablet by mouth once daily.    PRENATAL VIT NO.130-IRON-FOLIC (PRENATAL VITAMIN) 27 MG IRON- 800 MCG TAB    Take 1 tablet by mouth once daily.   Changed and/or Refilled Medications    Modified Medication Previous Medication    ERGOCALCIFEROL (ERGOCALCIFEROL) 50,000 UNIT CAP ergocalciferol (ERGOCALCIFEROL) 50,000 unit Cap       Take 1 capsule (50,000 Units total) by mouth every 7 days.    Take 1 capsule (50,000 Units total) by mouth every 7 days.    ESCITALOPRAM OXALATE (LEXAPRO) 20 MG TABLET escitalopram oxalate (LEXAPRO) 20 MG tablet       Take 1 tablet (20 mg total) by mouth once daily.    Take 1 tablet (20 mg total) by mouth once daily.    HYDROXYZINE HCL (ATARAX) 25 MG TABLET hydrOXYzine HCL (ATARAX) 25 MG tablet       Take 2 tablets (50 mg total) by mouth every evening.    Take 2 tablets (50 mg total) by mouth every evening.    PHENTERMINE (ADIPEX-P) 37.5 MG TABLET phentermine (ADIPEX-P) 37.5 mg tablet       Take 1 tablet (37.5 mg total) by mouth before breakfast.    Take 1 tablet (37.5 mg total) by mouth before breakfast.    SEMAGLUTIDE (OZEMPIC) 0.25 MG OR 0.5 MG(2 MG/1.5 ML) PNIJ semaglutide (OZEMPIC) 0.25 mg or 0.5 mg(2 mg/1.5 mL) PnIj       Inject 0.1875 mLs (0.25 mg total) into the skin once a week for 30 days, THEN 0.375 mLs (0.5 mg total) once a week.    Inject 0.25 mg into the skin once a week for 30 days, THEN 0.5 mg once a week.       Dante Negro M.D.     ==========================================================================  Subjective:      Patient ID: Lucía Coreas is a 35 y.o. female.  has a past medical history of Allergy, Amenorrhea, Asthma, Diabetes, GERD (gastroesophageal reflux disease), Infertility associated with anovulation, Iron deficiency anemia, Knee pain, Metabolic syndrome, PCO  (polycystic ovaries), and Recurrent boils.     Chief Complaint: Medication Refill    Answers for HPI/ROS submitted by the patient on 12/3/2020   Hypertension  Chronicity: new  Onset: in the past 7 days  Progression since onset: unchanged  anxiety: No  blurred vision: Yes  malaise/fatigue: Yes  orthopnea: No  peripheral edema: No  PND: No  sweats: No  Agents associated with hypertension: no associated agents  CAD risks: family history, obesity  Compliance problems: no compliance problems  Past treatments: nothing    Problem List Items Addressed This Visit     Vitamin D deficiency (Chronic)    Overview     07/12/2019 Vit d deficiency.  Not currently taking vitamin d supplement. No SE reported. Fatigue is notimproved.   08/15/2019 Chronic.  Uncontrolled.  Vitamin-D level is still low.  Continue supplementation.  Recheck in 3 months.  09/20/2019Patient still taking vitamin-D.  Needs refill.  No side effects reported.  =======================================================  DECEMBER 2019:  Patient doing well on vitamin-D.  Needs refill.  Checking level.  ==============================================  November 2020:  Patient recently pregnant.Chronic. Vit d deficiency. Takes vitamin d supplement. No SE reported. Fatigue is slightly improved.   December 2020:  Restart vitamin-D 52368 units weekly  Lab Results   Component Value Date    [id number] 23 (L) 10/21/2019    [id number] 16 (L) 07/16/2019               Current Assessment & Plan     Restart vitamin-D 22049 units weekly.  Recheck level.         Type 2 diabetes mellitus with hyperglycemia, without long-term current use of insulin (Chronic)    Overview     INITIAL HPI:  No results found in epic however review her blood work from previous provider shows last A1c was 5.8.  Patient previously on metformin.  Currently having fatigue and decreased libido.  ======================  09/20/2019Patient due for hemoglobin A1c next month.  Will place order for patient.She is  having GI symptoms with metformin. She reports BG is elevated at home. She continues to gain weight.   ====================================================  DECEMBER 2019:  Patient reports to having diarrhea with metformin.  Reviewed Diabetes Management StatusStatin: Not takingACE/ARB: Not taking  =======================================================  APRIL 2020:  Patient has started Victoza and is off of metformin.  Patient reports that she is getting slightly nauseated on the increased dose of 1.2 mg.  Symptoms resolved after taking Pepto-Bismol after few hours of injection.Reviewed Diabetes Management StatusStatin: Not takingACE/ARB: Not taking  ==================================================  AUGUST 2020:  PATIENT REPORTS THAT SHE IS TOLERATING VICTOZA 1.2 MG.  PATIENT CONTINUES TO HAVE ISSUES WITH HER WEIGHT.  REVIEWED Diabetes Management Status  Statin: Not takingACE/ARB: Not taking  ==================================================  OCTOBER 2020:  Patient is having side effects with Victoza.  Reviewed Diabetes Management Status.Statin: Not takingACE/ARB: Not taking  ==================================================  NOVEMBER 2020:  Patient reports that she was doing well on OZEMPIC.  A1c is been well controlled.  Patient was recently diagnosed with pregnancy.  Will monitor for worsening of diabetes.  Diabetes Management Status Statin: Not taking ACE/ARB: Not taking  ==================================================  DECEMBER 2020:  Patient recently had miscarriage.  Patient wanting to get back on her diabetic medication.  Diabetes Management StatusStatin: Not takingACE/ARB: Not taking    Screening or Prevention Patient's value Goal Complete/Controlled?   HgA1C Testing and Control   Lab Results   Component Value Date    HGBA1C 5.4 09/29/2020      Annually/Less than 8% Yes   Lipid profile : 09/29/2020 Annually Yes   LDL control Lab Results   Component Value Date    LDLCALC 115.6 09/29/2020     Annually/Less than 100 mg/dl  No   Nephropathy screening Lab Results   Component Value Date    LABMICR <2.5 09/29/2020     Lab Results   Component Value Date    PROTEINUA Negative 01/22/2009    Annually Yes   Blood pressure BP Readings from Last 1 Encounters:   12/03/20 (!) 140/80    Less than 140/90 Yes   Dilated retinal exam : 09/10/2020 Annually Yes   Foot exam   Most Recent Foot Exam Date: Not Found Annually No       =================================================         Current Assessment & Plan     Blood pressure slightly elevated.  Will have patient follow-up closely and check blood pressure at home.  Likely due to weight gain and stress from recent miscarriage.  Restart medications for diabetes.  Patient was on Ozempic previously but has been off for several weeks.  Will restart at lower dose and titrate up to 1 mg as tolerated.  We will plan to monitor hemoglobin A1c at designated intervals 3 to 6 months.  I recommend ongoing Education for diabetic diet and exercise protocol.  We will continue to monitor for side effects.    Please be advised of symptoms to monitor for and to notify me immediately if persistent or worsening.  Follow up with Ophthalmology/Optometry and Podiatry at least annually.           Anxiety (Chronic)    Overview     ==================================================  DECEMBER 2020:  Currently uncontrolled.  Patient was taken off of her medication during pregnancy last month.  Patient reports that she recently had miscarriage and her stress is very high right now.  She wants to restart her medication.  Patient was also on hydroxyzine as needed for panic attacks and insomnia.  =================================================  October 2020:  Patient reports doing well on Lexapro 20 mg daily.  Patient takes hydroxyzine very rarely.  ==================================================  AUGUST 2020:  CHRONIC.  INTERMITTENT CONTROL.  PATIENT REPORTS THAT SHE IS NOT HAVING ANY IMPROVEMENT  ON LEXAPRO 10 MG.  DENIES ANY SI HI OR HALLUCINATIONS.  SHE IS STILL TAKING HYDROXYZINE SEVERAL TIMES A DAY FOR AND PANIC ATTACKS.  PATIENT DOES STILL HAVE NAIL BITING TENDENCIES.  =================================================  New problem.  Subacute.  Has been getting worse over the last few weeks during COVID-19 outbreak.  Patient reports that she is biting her nails and unable to sleep at night.  Patient has not been on any treatment or medications for this recently.  Denies any SI HI or hallucinations.  ====================================================  MAY 2020:  Patient reports hydroxyzine helps with anxiety but is still biting her nails.  Patient is ready to start daily medication for anxiety.  Patient reports that she did start back at work which she thought would help but it is not helping.  Denies SI HI or hallucinations.  ====================================================         Current Assessment & Plan     Increase hydroxyzine to 50 mg at bedtime.  Patient is not sleeping well.  Restart Lexapro 20.Discussed anxiety condition course.  Discussed SSRI as first-line treatment for this condition.  Discussed risk of discontinuing this medication without tapering.  Patient was educated, advised of side effects, and all questions were answered.  Patient voiced understanding.  Patient will follow up routinely and notify us if having any side effects or worsening or persistent symptoms.  ER precautions were given. Antidepressant/Antianxiety Medication Initiation:  Patient informed of risks, benefits, and potential side effects of medication and accepts informed consent.  Common side effects include nausea, fatigue, headache, insomnia., Specifically discussed the possibility of new or worsening suicidal thoughts/depression.  Patient instructed to stop the medication immediately and seek urgent treatment if this occurs., Patient instructed not to abruptly discontinue medication without physician guidance  except in cases of sudden onset or worsening of SI.            Class 3 obesity with alveolar hypoventilation, serious comorbidity, and body mass index (BMI) of 50.0 to 59.9 in adult    Overview     Chronic.  Uncontrolled.  Was improving on Ozempic.  BMI currently 53.  ==================================================  DECEMBER 2020:  PATIENT'S BMI IS UP TO 54.  PATIENT HAS RECENTLY HAD A MISCARRIAGE AND WAS TAKEN OFF OF HER MEDICATIONS.  PATIENT IS WANTING TO RESTART MEDICATIONS FOR HER CHRONIC CONDITIONS TODAY.    SHE HAS SEEN HER OBGYN AND WAS STARTED ON ORAL CONTRACEPTIVE PILLS.  =================================================         Current Assessment & Plan     Patient advised that it may take several months for her hormones to adjust and even longer since starting OCP.  Will go ahead and restart medications for her chronic conditions.  Patient advised on lifestyle modification with diet and exercise.  Referral placed to weight management.         GERD with esophagitis    Overview     Chronic.  Borderline control on Protonix.  Patient currently having exacerbation of reflux and abdominal pain associated with nausea and vomiting.  December 2020:  Patient reports that she has had increase in reflux since gaining weight during her recent pregnancy.         Current Assessment & Plan     Restart PPI.  Change to omeprazole.  Patient did not do well on pantoprazole.  GERD RECOMMENDATIONS  Please be advised of condition course.  - Take PPI in the morning 30-60 minutes before breakfast  - I recommend ongoing Education for lifestyle modifications to help control/reduce reflux/abdominal pain including: avoid large meals, avoid eating within 2-3 hours of bedtime (avoid late night eating & lying down soon after eating), elevate head of bed if nocturnal symptoms are present, smoking cessation (if current smoker), & weight loss (if overweight).   - please be advised to avoid known foods which trigger reflux symptoms & to  minimize/avoid high-fat foods, chocolate, caffeine, citrus, alcohol, & tomato products.  - Advised to avoid/limit use of NSAID's, since they can cause GI upset, bleeding, and/or ulcers. If needed, take with food.            Sinus congestion - Primary    Overview     ==================================================  DECEMBER 2020:  Patient still dealing with sinus congestion.  Recently was seen and placed on Keflex.  Patient reports no improvement.  Denies any fever chills.  No loss of taste or smell.  ================================================= Acute on chronic. Taking the zyrtec, singulair, and flonase.   No relief.   A/w headache, sore throat.   Denies fever, chills, night sweats.     Works at the [employer] in [place] and has been around sick contacts.          Current Assessment & Plan     Short course of oral steroid.  Continue antibiotic until finished.  Toradol for headaches related to sinus headache.  Patient is no longer pregnant as she just had miscarriage.  This was confirmed by OBGYN.  She was started on OCPs today.    Discussed condition course and signs and symptoms to expect.  Patient advised take anti-inflammatories and or Tylenol for pain or fever.  ER precautions.  Call MD or follow-up to clinic if not improving or worsening symptoms.   - risk of corticosteroids reviewed (elevated BP/glucose, insomnia, psychosis, bone loss, etc) and patient expressed understanding           Snoring    Overview     Chronic problem.  Patient reports that she had home sleep study a couple of years ago but never got the results.  Patient [facility].    Patient reports she is waking up with morning headaches.  Reports of snoring from family.  Starting to have weight gain and increased blood pressure.         Current Assessment & Plan     High risk for sleep apnea.  Will request records from previous home sleep study.  Referral to Sleep Disorders for further evaluation treatment.                Past  Medical History:  Past Medical History:   Diagnosis Date    Allergy     Amenorrhea     Asthma     Diabetes     GERD (gastroesophageal reflux disease)     Infertility associated with anovulation     Iron deficiency anemia     Knee pain     Metabolic syndrome     PCO (polycystic ovaries)     Recurrent boils      Past Surgical History:   Procedure Laterality Date    CARPAL TUNNEL RELEASE Left 2019     SECTION       Review of patient's allergies indicates:   Allergen Reactions    Metformin Diarrhea    Sulfa (sulfonamide antibiotics) Anaphylaxis and Swelling     Swelling (eyes)^, Swelling (throat)^  Swelling (eyes)^, Swelling (throat)^      Diclofenac      Gastritis     Shellfish containing products      anaphylaxis     Medication List with Changes/Refills   New Medications    KETOROLAC (TORADOL) 10 MG TABLET    Take 1 tablet (10 mg total) by mouth every 6 (six) hours. for 5 days    METHYLPREDNISOLONE (MEDROL DOSEPACK) 4 MG TABLET    follow package directions    OMEPRAZOLE (PRILOSEC) 40 MG CAPSULE    Take 1 capsule (40 mg total) by mouth once daily.   Current Medications    ALBUTEROL (PROVENTIL/VENTOLIN HFA) 90 MCG/ACTUATION INHALER    Inhale 2 puffs into the lungs every 4 (four) hours as needed for Wheezing.    ASPIRIN (ECOTRIN) 81 MG EC TABLET    Take 1 tablet (81 mg total) by mouth once daily.    AZELASTINE (ASTELIN) 137 MCG (0.1 %) NASAL SPRAY    1 spray (137 mcg total) by Nasal route 2 (two) times daily.    CEPHALEXIN (KEFLEX) 500 MG CAPSULE    Take 1 capsule (500 mg total) by mouth every 12 (twelve) hours. for 7 days    CHOLECALCIFEROL, VITAMIN D3, (VITAMIN D3) 25 MCG (1,000 UNIT) CAPSULE    Take 2 capsules (2,000 Units total) by mouth once daily.    CLINDAMYCIN (CLEOCIN T) 1 % EXTERNAL SOLUTION    Apply topically 2 (two) times daily.    CLINDAMYCIN (CLEOCIN T) 1 % LOTION    Apply topically 2 (two) times daily. face    FLUTICASONE PROPIONATE (FLONASE) 50 MCG/ACTUATION NASAL SPRAY    1  spray (50 mcg total) by Each Nostril route 2 (two) times daily.    IRON-VIT C-B12-FOLIC ACID (IRON 100 PLUS) TAB    Take 1 tablet by mouth once daily.    KETOCONAZOLE (NIZORAL) 2 % SHAMPOO    Apply topically twice a week.    MUPIROCIN (BACTROBAN) 2 % OINTMENT    Apply topically 3 (three) times daily.    NORETHINDRONE AC-ETH ESTRADIOL (JUNEL 1.5/30, 21,) 1.5-30 MG-MCG TAB    Take 1 tablet by mouth once daily.    PRENATAL VIT NO.130-IRON-FOLIC (PRENATAL VITAMIN) 27 MG IRON- 800 MCG TAB    Take 1 tablet by mouth once daily.   Changed and/or Refilled Medications    Modified Medication Previous Medication    ERGOCALCIFEROL (ERGOCALCIFEROL) 50,000 UNIT CAP ergocalciferol (ERGOCALCIFEROL) 50,000 unit Cap       Take 1 capsule (50,000 Units total) by mouth every 7 days.    Take 1 capsule (50,000 Units total) by mouth every 7 days.    ESCITALOPRAM OXALATE (LEXAPRO) 20 MG TABLET escitalopram oxalate (LEXAPRO) 20 MG tablet       Take 1 tablet (20 mg total) by mouth once daily.    Take 1 tablet (20 mg total) by mouth once daily.    HYDROXYZINE HCL (ATARAX) 25 MG TABLET hydrOXYzine HCL (ATARAX) 25 MG tablet       Take 2 tablets (50 mg total) by mouth every evening.    Take 2 tablets (50 mg total) by mouth every evening.    PHENTERMINE (ADIPEX-P) 37.5 MG TABLET phentermine (ADIPEX-P) 37.5 mg tablet       Take 1 tablet (37.5 mg total) by mouth before breakfast.    Take 1 tablet (37.5 mg total) by mouth before breakfast.    SEMAGLUTIDE (OZEMPIC) 0.25 MG OR 0.5 MG(2 MG/1.5 ML) PNIJ semaglutide (OZEMPIC) 0.25 mg or 0.5 mg(2 mg/1.5 mL) PnIj       Inject 0.1875 mLs (0.25 mg total) into the skin once a week for 30 days, THEN 0.375 mLs (0.5 mg total) once a week.    Inject 0.25 mg into the skin once a week for 30 days, THEN 0.5 mg once a week.      Social History     Tobacco Use    Smoking status: Never Smoker    Smokeless tobacco: Never Used   Substance Use Topics    Alcohol use: Never     Frequency: Never      Family History    Problem Relation Age of Onset    Diabetes Mother     Lupus Mother     Diabetes Father     Heart disease Father 69    Hypertension Father     Prostate cancer Father     Ovarian cancer Sister     AMY disease Brother        I have reviewed the complete PMH, social history, surgical history, allergies and medications.  As well as family history.    Review of Systems   Constitutional: Positive for fatigue. Negative for chills, fever and unexpected weight change.   HENT: Negative for ear pain and sore throat.    Eyes: Negative for redness and visual disturbance.   Respiratory: Negative for cough and shortness of breath.    Cardiovascular: Negative for chest pain and palpitations.   Gastrointestinal: Negative for nausea and vomiting.   Genitourinary: Negative for difficulty urinating and hematuria.   Musculoskeletal: Negative for arthralgias, myalgias and neck pain.   Skin: Negative for rash and wound.   Neurological: Positive for headaches. Negative for weakness.   Psychiatric/Behavioral: Positive for sleep disturbance. The patient is nervous/anxious.      Objective:   LMP 10/08/2020   Physical Exam  Constitutional:       General: She is not in acute distress.     Appearance: She is well-developed. She is not ill-appearing, toxic-appearing or diaphoretic.   HENT:      Head: Normocephalic and atraumatic.      Right Ear: Hearing and external ear normal.      Left Ear: Hearing and external ear normal.      Nose: Congestion present.   Eyes:      General: Lids are normal.      Conjunctiva/sclera: Conjunctivae normal.   Neck:      Musculoskeletal: Normal range of motion.   Pulmonary:      Effort: Pulmonary effort is normal. No respiratory distress.   Musculoskeletal: Normal range of motion.   Skin:     Coloration: Skin is not pale.   Neurological:      Mental Status: She is alert. She is not disoriented.   Psychiatric:         Attention and Perception: She is attentive.         Mood and Affect: Mood is not anxious or  depressed.         Speech: Speech is not rapid and pressured or slurred.         Behavior: Behavior normal. Behavior is not agitated, aggressive or hyperactive. Behavior is cooperative.         Thought Content: Thought content normal. Thought content is not paranoid or delusional. Thought content does not include homicidal or suicidal ideation. Thought content does not include homicidal or suicidal plan.         Cognition and Memory: Memory is not impaired.         Judgment: Judgment normal.         Assessment:     1. Sinus congestion    2. Class 3 obesity with alveolar hypoventilation, serious comorbidity, and body mass index (BMI) of 50.0 to 59.9 in adult    3. Anxiety    4. Vitamin D deficiency    5. Gastroesophageal reflux disease with esophagitis without hemorrhage    6. Snoring    7. Type 2 diabetes mellitus with hyperglycemia, without long-term current use of insulin      MDM:   Moderate complexity.  Moderate risk.  I have Reviewed and summarized old records.  I have performed thorough medication reconciliation today and discussed risk and benefits of each medication.  I have reviewed labs and discussed with patient.  All questions were answered.  I am requesting old records and will review them once they are available.  OBGYN    I have signed for the following orders AND/OR meds.  Orders Placed This Encounter   Procedures    Vitamin D     Standing Status:   Future     Standing Expiration Date:   2/2/2022    Ambulatory referral/consult to Sleep Disorders     Standing Status:   Future     Standing Expiration Date:   1/3/2022     Referral Priority:   Routine     Referral Type:   Consultation     Number of Visits Requested:   1    Ambulatory referral/consult to Weight Management Program     Standing Status:   Future     Standing Expiration Date:   1/4/2022     Referral Priority:   Routine     Referral Type:   Consultation     Referral Reason:   Specialty Services Required     Requested Specialty:   General  Surgery     Number of Visits Requested:   1     Medications Ordered This Encounter   Medications    ergocalciferol (ERGOCALCIFEROL) 50,000 unit Cap     Sig: Take 1 capsule (50,000 Units total) by mouth every 7 days.     Dispense:  12 capsule     Refill:  1    escitalopram oxalate (LEXAPRO) 20 MG tablet     Sig: Take 1 tablet (20 mg total) by mouth once daily.     Dispense:  90 tablet     Refill:  3    hydrOXYzine HCL (ATARAX) 25 MG tablet     Sig: Take 2 tablets (50 mg total) by mouth every evening.     Dispense:  60 tablet     Refill:  1    ketorolac (TORADOL) 10 mg tablet     Sig: Take 1 tablet (10 mg total) by mouth every 6 (six) hours. for 5 days     Dispense:  20 tablet     Refill:  0    methylPREDNISolone (MEDROL DOSEPACK) 4 mg tablet     Sig: follow package directions     Dispense:  21 tablet     Refill:  0    omeprazole (PRILOSEC) 40 MG capsule     Sig: Take 1 capsule (40 mg total) by mouth once daily.     Dispense:  90 capsule     Refill:  3    phentermine (ADIPEX-P) 37.5 mg tablet     Sig: Take 1 tablet (37.5 mg total) by mouth before breakfast.     Dispense:  30 tablet     Refill:  0    semaglutide (OZEMPIC) 0.25 mg or 0.5 mg(2 mg/1.5 mL) PnIj     Sig: Inject 0.1875 mLs (0.25 mg total) into the skin once a week for 30 days, THEN 0.375 mLs (0.5 mg total) once a week.     Dispense:  1 Syringe     Refill:  1        Follow up in about 3 months (around 3/3/2021), or if symptoms worsen or fail to improve, for Annual Wellness Exam.    If no improvement in symptoms or symptoms worsen, advised to call/follow-up at clinic or go to ER. Patient voiced understanding and all questions/concerns were addressed.     DISCLAIMER: This note was compiled by using a speech recognition dictation system and therefore please be aware that typographical / speech recognition errors can and do occur.  Please contact me if you see any errors specifically.    Dante Negro M.D.       Office: [phone] [address] [facility]  [facility] [place] [address]  FAX: [phone]

## 2020-12-04 NOTE — PATIENT INSTRUCTIONS
Follow up in about 3 months (around 3/3/2021), or if symptoms worsen or fail to improve, for Annual Wellness Exam.     If no improvement in symptoms or symptoms worsen, please be advised to call MD, follow-up at clinic and/or go to ER if becomes severe.    Dante Negro M.D.        We Offer TELEHEALTH & Same Day Appointments!   Book your Telehealth appointment with me through my nurse or   Clinic appointments on [employer]!    [address] [place] [address]    Office: [phone]   FAX: [phone]    Check out my Facebook Page and Follow Me at: [email or web address]    Check out my website at [employer] by clicking on: [email or web address]    To Schedule appointments online, go to [employer]: [email or web address]

## 2020-12-04 NOTE — ASSESSMENT & PLAN NOTE
Blood pressure slightly elevated.  Will have patient follow-up closely and check blood pressure at home.  Likely due to weight gain and stress from recent miscarriage.  Restart medications for diabetes.  Patient was on Ozempic previously but has been off for several weeks.  Will restart at lower dose and titrate up to 1 mg as tolerated.  We will plan to monitor hemoglobin A1c at designated intervals 3 to 6 months.  I recommend ongoing Education for diabetic diet and exercise protocol.  We will continue to monitor for side effects.    Please be advised of symptoms to monitor for and to notify me immediately if persistent or worsening.  Follow up with Ophthalmology/Optometry and Podiatry at least annually.

## 2020-12-04 NOTE — ASSESSMENT & PLAN NOTE
Patient advised that it may take several months for her hormones to adjust and even longer since starting OCP.  Will go ahead and restart medications for her chronic conditions.  Patient advised on lifestyle modification with diet and exercise.  Referral placed to weight management.

## 2020-12-04 NOTE — ASSESSMENT & PLAN NOTE
High risk for sleep apnea.  Will request records from previous home sleep study.  Referral to Sleep Disorders for further evaluation treatment.

## 2020-12-04 NOTE — ASSESSMENT & PLAN NOTE
Restart PPI.  Change to omeprazole.  Patient did not do well on pantoprazole.  GERD RECOMMENDATIONS  Please be advised of condition course.  - Take PPI in the morning 30-60 minutes before breakfast  - I recommend ongoing Education for lifestyle modifications to help control/reduce reflux/abdominal pain including: avoid large meals, avoid eating within 2-3 hours of bedtime (avoid late night eating & lying down soon after eating), elevate head of bed if nocturnal symptoms are present, smoking cessation (if current smoker), & weight loss (if overweight).   - please be advised to avoid known foods which trigger reflux symptoms & to minimize/avoid high-fat foods, chocolate, caffeine, citrus, alcohol, & tomato products.  - Advised to avoid/limit use of NSAID's, since they can cause GI upset, bleeding, and/or ulcers. If needed, take with food.

## 2020-12-04 NOTE — ASSESSMENT & PLAN NOTE
Increase hydroxyzine to 50 mg at bedtime.  Patient is not sleeping well.  Restart Lexapro 20.Discussed anxiety condition course.  Discussed SSRI as first-line treatment for this condition.  Discussed risk of discontinuing this medication without tapering.  Patient was educated, advised of side effects, and all questions were answered.  Patient voiced understanding.  Patient will follow up routinely and notify us if having any side effects or worsening or persistent symptoms.  ER precautions were given. Antidepressant/Antianxiety Medication Initiation:  Patient informed of risks, benefits, and potential side effects of medication and accepts informed consent.  Common side effects include nausea, fatigue, headache, insomnia., Specifically discussed the possibility of new or worsening suicidal thoughts/depression.  Patient instructed to stop the medication immediately and seek urgent treatment if this occurs., Patient instructed not to abruptly discontinue medication without physician guidance except in cases of sudden onset or worsening of SI.

## 2020-12-07 ENCOUNTER — CLINICAL SUPPORT (OUTPATIENT)
Dept: ALLERGY | Facility: CLINIC | Age: 35
End: 2020-12-07
Payer: COMMERCIAL

## 2020-12-07 DIAGNOSIS — J30.1 SEASONAL ALLERGIC RHINITIS DUE TO POLLEN: Chronic | ICD-10-CM

## 2020-12-07 DIAGNOSIS — J30.89 ALLERGIC RHINITIS DUE TO DERMATOPHAGOIDES FARINAE: ICD-10-CM

## 2020-12-07 DIAGNOSIS — J30.89 ALLERGIC RHINITIS DUE TO DERMATOPHAGOIDES PTERONYSSINUS: ICD-10-CM

## 2020-12-07 PROCEDURE — 99499 NO LOS: ICD-10-PCS | Mod: S$GLB,,, | Performed by: ALLERGY & IMMUNOLOGY

## 2020-12-07 PROCEDURE — 95117 PR IMMU2THERAPY, 2+ INJECTIONS: ICD-10-PCS | Mod: S$GLB,,, | Performed by: ALLERGY & IMMUNOLOGY

## 2020-12-07 PROCEDURE — 95117 IMMUNOTHERAPY INJECTIONS: CPT | Mod: S$GLB,,, | Performed by: ALLERGY & IMMUNOLOGY

## 2020-12-07 PROCEDURE — 99999 PR PBB SHADOW E&M-EST. PATIENT-LVL III: ICD-10-PCS | Mod: PBBFAC,,,

## 2020-12-07 PROCEDURE — 99499 UNLISTED E&M SERVICE: CPT | Mod: S$GLB,,, | Performed by: ALLERGY & IMMUNOLOGY

## 2020-12-07 PROCEDURE — 99999 PR PBB SHADOW E&M-EST. PATIENT-LVL III: CPT | Mod: PBBFAC,,,

## 2020-12-08 LAB — BACTERIA SPEC AEROBE CULT: ABNORMAL

## 2020-12-08 RX ORDER — AMOXICILLIN 875 MG/1
875 TABLET, FILM COATED ORAL EVERY 12 HOURS
Qty: 10 TABLET | Refills: 0 | Status: SHIPPED | OUTPATIENT
Start: 2020-12-08 | End: 2020-12-13

## 2020-12-10 ENCOUNTER — PATIENT MESSAGE (OUTPATIENT)
Dept: ORTHOPEDICS | Facility: CLINIC | Age: 35
End: 2020-12-10

## 2020-12-11 ENCOUNTER — PATIENT MESSAGE (OUTPATIENT)
Dept: ORTHOPEDICS | Facility: CLINIC | Age: 35
End: 2020-12-11

## 2020-12-11 DIAGNOSIS — G56.03 CARPAL TUNNEL SYNDROME ON BOTH SIDES: Primary | ICD-10-CM

## 2020-12-14 ENCOUNTER — CLINICAL SUPPORT (OUTPATIENT)
Dept: ALLERGY | Facility: CLINIC | Age: 35
End: 2020-12-14
Payer: COMMERCIAL

## 2020-12-14 DIAGNOSIS — J30.89 ALLERGIC RHINITIS DUE TO DERMATOPHAGOIDES PTERONYSSINUS: ICD-10-CM

## 2020-12-14 DIAGNOSIS — J30.1 SEASONAL ALLERGIC RHINITIS DUE TO POLLEN: Chronic | ICD-10-CM

## 2020-12-14 DIAGNOSIS — J30.89 ALLERGIC RHINITIS DUE TO DERMATOPHAGOIDES FARINAE: ICD-10-CM

## 2020-12-14 PROCEDURE — 99999 PR PBB SHADOW E&M-EST. PATIENT-LVL III: ICD-10-PCS | Mod: PBBFAC,,,

## 2020-12-14 PROCEDURE — 99499 NO LOS: ICD-10-PCS | Mod: S$GLB,,, | Performed by: ALLERGY & IMMUNOLOGY

## 2020-12-14 PROCEDURE — 99999 PR PBB SHADOW E&M-EST. PATIENT-LVL III: CPT | Mod: PBBFAC,,,

## 2020-12-14 PROCEDURE — 95117 PR IMMU2THERAPY, 2+ INJECTIONS: ICD-10-PCS | Mod: S$GLB,,, | Performed by: ALLERGY & IMMUNOLOGY

## 2020-12-14 PROCEDURE — 95117 IMMUNOTHERAPY INJECTIONS: CPT | Mod: S$GLB,,, | Performed by: ALLERGY & IMMUNOLOGY

## 2020-12-14 PROCEDURE — 99499 UNLISTED E&M SERVICE: CPT | Mod: S$GLB,,, | Performed by: ALLERGY & IMMUNOLOGY

## 2020-12-16 ENCOUNTER — TELEPHONE (OUTPATIENT)
Dept: PREADMISSION TESTING | Facility: HOSPITAL | Age: 35
End: 2020-12-16

## 2020-12-16 DIAGNOSIS — Z01.818 PREOP TESTING: Primary | ICD-10-CM

## 2020-12-18 ENCOUNTER — TELEPHONE (OUTPATIENT)
Dept: PREADMISSION TESTING | Facility: HOSPITAL | Age: 35
End: 2020-12-18

## 2020-12-18 ENCOUNTER — LAB VISIT (OUTPATIENT)
Dept: URGENT CARE | Facility: CLINIC | Age: 35
End: 2020-12-18
Payer: COMMERCIAL

## 2020-12-18 DIAGNOSIS — Z01.818 PREOP TESTING: ICD-10-CM

## 2020-12-18 PROCEDURE — U0003 INFECTIOUS AGENT DETECTION BY NUCLEIC ACID (DNA OR RNA); SEVERE ACUTE RESPIRATORY SYNDROME CORONAVIRUS 2 (SARS-COV-2) (CORONAVIRUS DISEASE [COVID-19]), AMPLIFIED PROBE TECHNIQUE, MAKING USE OF HIGH THROUGHPUT TECHNOLOGIES AS DESCRIBED BY CMS-2020-01-R: HCPCS

## 2020-12-19 LAB — SARS-COV-2 RNA RESP QL NAA+PROBE: NOT DETECTED

## 2020-12-20 ENCOUNTER — PATIENT MESSAGE (OUTPATIENT)
Dept: ALLERGY | Facility: CLINIC | Age: 35
End: 2020-12-20

## 2020-12-21 ENCOUNTER — PATIENT OUTREACH (OUTPATIENT)
Dept: ADMINISTRATIVE | Facility: OTHER | Age: 35
End: 2020-12-21

## 2020-12-21 ENCOUNTER — TELEPHONE (OUTPATIENT)
Dept: SURGERY | Facility: CLINIC | Age: 35
End: 2020-12-21

## 2020-12-21 ENCOUNTER — PATIENT MESSAGE (OUTPATIENT)
Dept: ORTHOPEDICS | Facility: CLINIC | Age: 35
End: 2020-12-21

## 2020-12-21 ENCOUNTER — OFFICE VISIT (OUTPATIENT)
Dept: SURGERY | Facility: CLINIC | Age: 35
End: 2020-12-21
Attending: ORTHOPAEDIC SURGERY
Payer: COMMERCIAL

## 2020-12-21 VITALS
HEART RATE: 87 BPM | SYSTOLIC BLOOD PRESSURE: 126 MMHG | TEMPERATURE: 98 F | BODY MASS INDEX: 52.49 KG/M2 | DIASTOLIC BLOOD PRESSURE: 86 MMHG | WEIGHT: 293 LBS

## 2020-12-21 DIAGNOSIS — L02.211 ABDOMINAL WALL ABSCESS: ICD-10-CM

## 2020-12-21 DIAGNOSIS — J30.1 SEASONAL ALLERGIC RHINITIS DUE TO POLLEN: ICD-10-CM

## 2020-12-21 DIAGNOSIS — J30.89 ALLERGIC RHINITIS DUE TO OTHER ALLERGIC TRIGGER, UNSPECIFIED SEASONALITY: ICD-10-CM

## 2020-12-21 DIAGNOSIS — J30.89 ALLERGIC RHINITIS DUE TO DERMATOPHAGOIDES FARINAE: ICD-10-CM

## 2020-12-21 DIAGNOSIS — L02.415 ABSCESS OF RIGHT THIGH: Primary | ICD-10-CM

## 2020-12-21 DIAGNOSIS — J30.89 ALLERGIC RHINITIS DUE TO DERMATOPHAGOIDES PTERONYSSINUS: ICD-10-CM

## 2020-12-21 PROCEDURE — 99999 PR PBB SHADOW E&M-EST. PATIENT-LVL IV: ICD-10-PCS | Mod: PBBFAC,,, | Performed by: SURGERY

## 2020-12-21 PROCEDURE — 3008F PR BODY MASS INDEX (BMI) DOCUMENTED: ICD-10-PCS | Mod: CPTII,S$GLB,, | Performed by: SURGERY

## 2020-12-21 PROCEDURE — 3008F BODY MASS INDEX DOCD: CPT | Mod: CPTII,S$GLB,, | Performed by: SURGERY

## 2020-12-21 PROCEDURE — 10060 PR DRAIN SKIN ABSCESS SIMPLE: ICD-10-PCS | Mod: S$GLB,,, | Performed by: SURGERY

## 2020-12-21 PROCEDURE — 1125F AMNT PAIN NOTED PAIN PRSNT: CPT | Mod: S$GLB,,, | Performed by: SURGERY

## 2020-12-21 PROCEDURE — 1125F PR PAIN SEVERITY QUANTIFIED, PAIN PRESENT: ICD-10-PCS | Mod: S$GLB,,, | Performed by: SURGERY

## 2020-12-21 PROCEDURE — 99999 PR PBB SHADOW E&M-EST. PATIENT-LVL IV: CPT | Mod: PBBFAC,,, | Performed by: SURGERY

## 2020-12-21 PROCEDURE — 99242 PR OFFICE CONSULTATION,LEVEL II: ICD-10-PCS | Mod: 25,S$GLB,, | Performed by: SURGERY

## 2020-12-21 PROCEDURE — 10060 I&D ABSCESS SIMPLE/SINGLE: CPT | Mod: S$GLB,,, | Performed by: SURGERY

## 2020-12-21 PROCEDURE — 99242 OFF/OP CONSLTJ NEW/EST SF 20: CPT | Mod: 25,S$GLB,, | Performed by: SURGERY

## 2020-12-21 RX ORDER — HYDROCODONE BITARTRATE AND ACETAMINOPHEN 5; 325 MG/1; MG/1
TABLET ORAL
Qty: 15 TABLET | Refills: 0 | Status: SHIPPED | OUTPATIENT
Start: 2020-12-21 | End: 2021-01-04 | Stop reason: SDUPTHER

## 2020-12-21 RX ORDER — AZELASTINE 1 MG/ML
1 SPRAY, METERED NASAL 2 TIMES DAILY
Qty: 20 ML | Refills: 5 | Status: SHIPPED | OUTPATIENT
Start: 2020-12-21 | End: 2021-02-19

## 2020-12-21 RX ORDER — PREDNISONE 10 MG/1
10 TABLET ORAL DAILY
Qty: 10 TABLET | Refills: 0 | Status: SHIPPED | OUTPATIENT
Start: 2020-12-21 | End: 2021-02-19

## 2020-12-21 NOTE — PROCEDURES
"INCISION AND DRAINAGE    Date/Time: 12/21/2020 5:20 PM  Performed by: Les Dia MD  Authorized by: Les Dia MD     Time out: Immediately prior to procedure a "time out" was called to verify the correct patient, procedure, equipment, support staff and site/side marked as required.    Consent Done?:  Yes (Verbal)    Type:  Abscess  Body area:  Trunk (Left lower abdominal wall and right thigh)  Anesthesia:  Local infiltration  Local anesthetic: lidocaine 1% without epinephrine and lidocaine 1% with epinephrine  Anesthetic total (ml):  8  Scalpel size:  15  Incision type: Straight for the abdominal wall, elliptical for the thigh.  Complexity:  Simple  Drainage:  Pus  Drainage amount:  Moderate  Wound treatment:  Incision, wound left open and drainage  Packing material: 4 x 4 by.  Patient tolerance:  Patient tolerated the procedure well with no immediate complications    For the left abdominal wall after infiltration of 3 cc of local anesthetic a transverse incision was made and purulent material was drained.  The wound was packed with gauze after hemostasis was achieved using silver nitrate.    For the right thigh elliptical incision was made over the area of erythematous skin with the opening in it.  Once the skin was opened purulent material was drained.  There was granulation tissue at the abscess base that was treated with silver nitrate.  The wound was packed.    Dry sterile dressings were placed.  Patient tolerated procedures well      "

## 2020-12-21 NOTE — PROGRESS NOTES
Patient ID: Lucía Coreas is a 35 y.o. female.    Hidradenitis    Chief Complaint: Consult      HPI:  Patient was sent for evaluation of hidradenitis of the right thigh.  She presented for carpal tunnel surgery was found to have an area of infection of her right thigh.  A surgical evaluation was requested.    The patient has been treated for multiple skin abscesses in the past by Dermatology      Review of Systems   Skin:        Multiple skin abscess       Current Outpatient Medications   Medication Sig Dispense Refill    azelastine (ASTELIN) 137 mcg (0.1 %) nasal spray 1 spray (137 mcg total) by Nasal route 2 (two) times daily. 20 mL 5    predniSONE (DELTASONE) 10 MG tablet Take 1 tablet (10 mg total) by mouth once daily. Take the night prior to allergy injection. 10 tablet 0     No current facility-administered medications for this visit.        Review of patient's allergies indicates:   Allergen Reactions    Metformin Diarrhea    Sulfa (sulfonamide antibiotics) Anaphylaxis and Swelling     Swelling (eyes)^, Swelling (throat)^  Swelling (eyes)^, Swelling (throat)^      Diclofenac      Gastritis     Shellfish containing products      anaphylaxis       Past Medical History:   Diagnosis Date    Allergy     Amenorrhea     Asthma     Diabetes     GERD (gastroesophageal reflux disease)     Infertility associated with anovulation     Iron deficiency anemia     Knee pain     Metabolic syndrome     PCO (polycystic ovaries)     Recurrent boils        Past Surgical History:   Procedure Laterality Date    CARPAL TUNNEL RELEASE Left 2019     SECTION         Family History   Problem Relation Age of Onset    Diabetes Mother     Lupus Mother     Diabetes Father     Heart disease Father 69    Hypertension Father     Prostate cancer Father     Ovarian cancer Sister     AMY disease Brother        Social History     Socioeconomic History    Marital status:      Spouse name: Not on file     Number of children: Not on file    Years of education: Not on file    Highest education level: Not on file   Occupational History    Not on file   Social Needs    Financial resource strain: Not on file    Food insecurity     Worry: Not on file     Inability: Not on file    Transportation needs     Medical: Not on file     Non-medical: Not on file   Tobacco Use    Smoking status: Never Smoker    Smokeless tobacco: Never Used   Substance and Sexual Activity    Alcohol use: Never     Frequency: Never    Drug use: Never    Sexual activity: Yes     Partners: Female     Birth control/protection: None   Lifestyle    Physical activity     Days per week: Not on file     Minutes per session: Not on file    Stress: Not on file   Relationships    Social connections     Talks on phone: Not on file     Gets together: Not on file     Attends Synagogue service: Not on file     Active member of club or organization: Not on file     Attends meetings of clubs or organizations: Not on file     Relationship status: Not on file   Other Topics Concern    Not on file   Social History Narrative    Not on file       Vitals:    12/21/20 1130   BP: 126/86   Pulse: 87   Temp: 98 °F (36.7 °C)       Physical Exam  Vitals signs reviewed.   Constitutional:       Appearance: She is obese.   Cardiovascular:      Rate and Rhythm: Normal rate and regular rhythm.   Pulmonary:      Effort: Pulmonary effort is normal.      Breath sounds: Normal breath sounds.   Abdominal:      Comments: Obese   Skin:     Comments: On the left lower abdominal wall in the area the pannus there is a 2 x 1 cm area of fluctuance and erythema.    On the right thigh there is an area of 2 cm x 3 cm of fluctuance and erythema    The groins and axilla were examined there is no evidence of hidradenitis in either area         Assessment & Plan:     skin abscesses x2      Incision and drainage of the skin abscesses.  The area then be left open to heal by secondary  intention.  Local wound care with gauze or antibiotic ointment.    She will follow up with us in several weeks

## 2020-12-21 NOTE — PROGRESS NOTES
Health Maintenance Due   Topic Date Due    Foot Exam  09/10/1995    Pneumococcal Vaccine (Medium Risk) (1 of 1 - PPSV23) 09/10/2004     Updates were requested from care everywhere.  Chart was reviewed for overdue Proactive [facility] Encounters (TYE) topics (CRS, Breast Cancer Screening, Eye exam)  Health Maintenance has been updated.  LINKS immunization registry triggered.  Immunizations were reconciled.

## 2020-12-21 NOTE — LETTER
December 21, 2020      Adeel Laughlin MD  [address]  [place] [address]           [facility]  [address] [place] [id number]  Phone: [phone]  Fax: [phone]          Patient: Lucía Coreas   MR Number: [id number]   YOB: 1985   Date of Visit: 12/21/2020       Dear Dr. Adeel Laughlin:    Thank you for referring Lucía Coreas to me for evaluation. Attached you will find relevant portions of my assessment and plan of care.    If you have questions, please do not hesitate to call me. I look forward to following Lucía Coreas along with you.    Sincerely,    Les Dia MD    Enclosure  CC:  No Recipients    If you would like to receive this communication electronically, please contact [email or web address] or [phone] to request more information on [email or web address] Link access.    For providers and/or their staff who would like to refer a patient to [facility], please contact us through our one-stop-shop provider referral line, [facility], at [phone].    If you feel you have received this communication in error or would no longer like to receive these types of communications, please e-mail [email or web address]

## 2020-12-21 NOTE — TELEPHONE ENCOUNTER
----- Message from Ivana Ghotra sent at 12/21/2020  9:55 AM CST -----  Contact: DINORAH GARZA [[id number]]  .Type:  Needs Medical Advice    Who Called: DINORAH GARZA [[id number]]  Symptoms (please be specific): hidradenitis suppurativa   How long has patient had these symptoms:    Pharmacy name and phone #:      Would the patient rather a call back or a response via [facility]?   Call    Best Call Back Number:   [phone]  Additional Information:   pt is requesting a call back from the nurse in regards to the pt know if the Dr will  see pt for hidradenitis suppurativa because she was to have surgery today with Dr garcia and it was canceled because on her right leg please

## 2020-12-21 NOTE — PATIENT INSTRUCTIONS
It is okay to shower and get the area wet    Removed the packing tomorrow.  Please treat the abscess sites with antibiotic ointment and a Band-Aid once or twice a day.    If you have any increased pain swelling redness or drainage please let us know

## 2020-12-23 ENCOUNTER — PATIENT MESSAGE (OUTPATIENT)
Dept: FAMILY MEDICINE | Facility: CLINIC | Age: 35
End: 2020-12-23

## 2020-12-23 ENCOUNTER — PATIENT MESSAGE (OUTPATIENT)
Dept: SURGERY | Facility: CLINIC | Age: 35
End: 2020-12-23

## 2020-12-23 DIAGNOSIS — L73.2 HIDRADENITIS SUPPURATIVA: ICD-10-CM

## 2020-12-23 RX ORDER — SEMAGLUTIDE 1.34 MG/ML
INJECTION, SOLUTION SUBCUTANEOUS
COMMUNITY
Start: 2020-12-03 | End: 2021-02-19

## 2020-12-23 RX ORDER — SPIRONOLACTONE 50 MG/1
100 TABLET, FILM COATED ORAL DAILY
Qty: 60 TABLET | Refills: 5 | Status: SHIPPED | OUTPATIENT
Start: 2020-12-23 | End: 2021-02-19

## 2020-12-23 RX ORDER — PANTOPRAZOLE SODIUM 40 MG/1
40 TABLET, DELAYED RELEASE ORAL DAILY
COMMUNITY
Start: 2020-12-03 | End: 2021-10-02 | Stop reason: SDUPTHER

## 2020-12-23 RX ORDER — MONTELUKAST SODIUM 10 MG/1
10 TABLET ORAL NIGHTLY
COMMUNITY
Start: 2020-12-03 | End: 2021-02-19

## 2020-12-23 NOTE — TELEPHONE ENCOUNTER
I received your message which was reviewed along with the the medication list and allergies that we have below.  Please review it for accuracy to make sure that we have the most recent records on your history.     Based on this, the following orders were placed AND/OR medicines were sent in.     No orders of the defined types were placed in this encounter.      Medications written and sent at this time include:  Medications Ordered This Encounter   Medications    spironolactone (ALDACTONE) 50 MG tablet     Sig: Take 2 tablets (100 mg total) by mouth once daily.     Dispense:  60 tablet     Refill:  5     .       Your pharmacy(ies) of choice at this time on record include the list below and any medications would have been sent to the one at the top.    [facility] [place] - [address]  [address] [facility] [place] [address]  Phone: [phone] Fax: [phone]      Thank you for choosing us as your healthcare provider!  Dr. Dante Negro    ALLERGY LIST  Review of patient's allergies indicates:   Allergen Reactions    Metformin Diarrhea    Sulfa (sulfonamide antibiotics) Anaphylaxis and Swelling     Swelling (eyes)^, Swelling (throat)^  Swelling (eyes)^, Swelling (throat)^      Diclofenac      Gastritis     Shellfish containing products      anaphylaxis       MEDICATION LIST  Current Outpatient Medications on File Prior to Visit   Medication Sig Dispense Refill    albuterol (PROVENTIL/VENTOLIN HFA) 90 mcg/actuation inhaler Inhale 2 puffs into the lungs every 4 (four) hours as needed for Wheezing. 18 g 3    aspirin (ECOTRIN) 81 MG EC tablet Take 1 tablet (81 mg total) by mouth once daily. 90 tablet 3    azelastine (ASTELIN) 137 mcg (0.1 %) nasal spray 1 spray (137 mcg total) by Nasal route 2 (two) times daily. 20 mL 5    cholecalciferol, vitamin D3, (VITAMIN D3) 25 mcg (1,000 unit) capsule Take 2 capsules (2,000 Units total) by mouth once daily. 90 capsule 3    clindamycin (CLEOCIN T)  1 % external solution Apply topically 2 (two) times daily. 60 mL 2    clindamycin (CLEOCIN T) 1 % lotion Apply topically 2 (two) times daily. face 60 mL 1    ergocalciferol (ERGOCALCIFEROL) 50,000 unit Cap Take 1 capsule (50,000 Units total) by mouth every 7 days. 12 capsule 1    escitalopram oxalate (LEXAPRO) 20 MG tablet Take 1 tablet (20 mg total) by mouth once daily. 90 tablet 3    fluticasone propionate (FLONASE) 50 mcg/actuation nasal spray 1 spray (50 mcg total) by Each Nostril route 2 (two) times daily. 1 Bottle 11    HYDROcodone-acetaminophen (NORCO) 5-325 mg per tablet take one tablet every 6 hours as needed for pain 15 tablet 0    hydrOXYzine HCL (ATARAX) 25 MG tablet Take 2 tablets (50 mg total) by mouth every evening. 60 tablet 1    iron-vit c-b12-folic acid (IRON 100 PLUS) Tab Take 1 tablet by mouth once daily. 90 tablet 3    ketoconazole (NIZORAL) 2 % shampoo Apply topically twice a week. 500 mL 3    methylPREDNISolone (MEDROL DOSEPACK) 4 mg tablet follow package directions 21 tablet 0    montelukast (SINGULAIR) 10 mg tablet Take 10 mg by mouth every evening.      mupirocin (BACTROBAN) 2 % ointment Apply topically 3 (three) times daily. 1 Tube 1    norethindrone ac-eth estradioL (JUNEL 1.5/30, 21,) 1.5-30 mg-mcg Tab Take 1 tablet by mouth once daily. 21 each 11    omeprazole (PRILOSEC) 40 MG capsule Take 1 capsule (40 mg total) by mouth once daily. 90 capsule 3    OZEMPIC 1 mg/dose (2 mg/1.5 mL) PnIj INJECT 0.75 milliliters into the skin EVERY 7 DAYS      pantoprazole (PROTONIX) 40 MG tablet Take 40 mg by mouth once daily.      phentermine (ADIPEX-P) 37.5 mg tablet Take 1 tablet (37.5 mg total) by mouth before breakfast. 30 tablet 0    predniSONE (DELTASONE) 10 MG tablet Take 1 tablet (10 mg total) by mouth once daily. Take the night prior to allergy injection. 10 tablet 0    prenatal vit no.130-iron-folic (PRENATAL VITAMIN) 27 mg iron- 800 mcg Tab Take 1 tablet by mouth once daily.  90 tablet 3    [DISCONTINUED] semaglutide (OZEMPIC) 0.25 mg or 0.5 mg(2 mg/1.5 mL) PnIj Inject 0.1875 mLs (0.25 mg total) into the skin once a week for 30 days, THEN 0.375 mLs (0.5 mg total) once a week. 1 Syringe 1     No current facility-administered medications on file prior to visit.        HEALTH MAINTENANCE THAT IS OVERDUE OR NEEDS TO BE UPDATED ON OUR CHART IS LISTED BELOW.  IF YOU HAVE HAD IT DONE ELSEWHERE, PLEASE SEND US DATES AND RECORDS IF YOU HAVE THEM TO MAKE YOUR CHART ACCURATE.  IF YOU HAVE NOT HAD THESE DONE AND ARE READY FOR US TO SCHEDULE THEM, PLEASE SEND US A MESSAGE.  Health Maintenance Due   Topic Date Due    Foot Exam  09/10/1995    Pneumococcal Vaccine (Medium Risk) (1 of 1 - PPSV23) 09/10/2004       DISCLAIMER: This note was compiled by using a speech recognition dictation system and therefore please be aware that typographical / speech recognition errors can and do occur.  Please contact me if you see any errors specifically.    Dante Negro MD  We Offer Telehealth & Same Day Appointments!   Book your Telehealth appointment with me through my nurse or   Clinic appointments on [employer]!  [phone]     Check out my Facebook Page and Follow Me at: CLICK HERE    Check out my website at [email or web address] by clicking on: CLICK HERE    To Schedule appointments online, go to [employer]: CLICK HERE     Location: [email or web address]    [address] [place] [address]    FAX: [phone]

## 2020-12-24 ENCOUNTER — CLINICAL SUPPORT (OUTPATIENT)
Dept: ALLERGY | Facility: CLINIC | Age: 35
End: 2020-12-24
Payer: COMMERCIAL

## 2020-12-24 ENCOUNTER — CLINICAL SUPPORT (OUTPATIENT)
Dept: AUDIOLOGY | Facility: CLINIC | Age: 35
End: 2020-12-24
Attending: ORTHOPAEDIC SURGERY
Payer: COMMERCIAL

## 2020-12-24 ENCOUNTER — PATIENT MESSAGE (OUTPATIENT)
Dept: OTOLARYNGOLOGY | Facility: CLINIC | Age: 35
End: 2020-12-24

## 2020-12-24 ENCOUNTER — OFFICE VISIT (OUTPATIENT)
Dept: OTOLARYNGOLOGY | Facility: CLINIC | Age: 35
End: 2020-12-24
Attending: ORTHOPAEDIC SURGERY
Payer: COMMERCIAL

## 2020-12-24 VITALS
TEMPERATURE: 98 F | HEART RATE: 87 BPM | DIASTOLIC BLOOD PRESSURE: 90 MMHG | WEIGHT: 293 LBS | SYSTOLIC BLOOD PRESSURE: 131 MMHG | BODY MASS INDEX: 52.94 KG/M2

## 2020-12-24 DIAGNOSIS — J45.40 MODERATE PERSISTENT ASTHMA WITHOUT COMPLICATION: ICD-10-CM

## 2020-12-24 DIAGNOSIS — J30.89 NON-SEASONAL ALLERGIC RHINITIS, UNSPECIFIED TRIGGER: ICD-10-CM

## 2020-12-24 DIAGNOSIS — J30.1 SEASONAL ALLERGIC RHINITIS DUE TO POLLEN: Chronic | ICD-10-CM

## 2020-12-24 DIAGNOSIS — H69.93 ETD (EUSTACHIAN TUBE DYSFUNCTION), BILATERAL: ICD-10-CM

## 2020-12-24 DIAGNOSIS — H93.13 TINNITUS, BILATERAL: Primary | ICD-10-CM

## 2020-12-24 DIAGNOSIS — H91.90 PERCEIVED HEARING CHANGES: Primary | ICD-10-CM

## 2020-12-24 DIAGNOSIS — H93.13 TINNITUS OF BOTH EARS: ICD-10-CM

## 2020-12-24 PROCEDURE — 99999 PR PBB SHADOW E&M-EST. PATIENT-LVL IV: ICD-10-PCS | Mod: PBBFAC,,, | Performed by: PHYSICIAN ASSISTANT

## 2020-12-24 PROCEDURE — 99214 OFFICE O/P EST MOD 30 MIN: CPT | Mod: S$GLB,,, | Performed by: PHYSICIAN ASSISTANT

## 2020-12-24 PROCEDURE — 99499 NO LOS: ICD-10-PCS | Mod: S$GLB,,, | Performed by: SPECIALIST

## 2020-12-24 PROCEDURE — 1125F AMNT PAIN NOTED PAIN PRSNT: CPT | Mod: S$GLB,,, | Performed by: PHYSICIAN ASSISTANT

## 2020-12-24 PROCEDURE — 99999 PR PBB SHADOW E&M-EST. PATIENT-LVL IV: CPT | Mod: PBBFAC,,, | Performed by: PHYSICIAN ASSISTANT

## 2020-12-24 PROCEDURE — 92557 COMPREHENSIVE HEARING TEST: CPT | Mod: S$GLB,,, | Performed by: AUDIOLOGIST-HEARING AID FITTER

## 2020-12-24 PROCEDURE — 95117 PR IMMU2THERAPY, 2+ INJECTIONS: ICD-10-PCS | Mod: S$GLB,,, | Performed by: ALLERGY & IMMUNOLOGY

## 2020-12-24 PROCEDURE — 99999 PR PBB SHADOW E&M-EST. PATIENT-LVL I: ICD-10-PCS | Mod: PBBFAC,,,

## 2020-12-24 PROCEDURE — 99214 PR OFFICE/OUTPT VISIT, EST, LEVL IV, 30-39 MIN: ICD-10-PCS | Mod: S$GLB,,, | Performed by: PHYSICIAN ASSISTANT

## 2020-12-24 PROCEDURE — 99999 PR PBB SHADOW E&M-EST. PATIENT-LVL I: ICD-10-PCS | Mod: PBBFAC,,, | Performed by: AUDIOLOGIST-HEARING AID FITTER

## 2020-12-24 PROCEDURE — 99999 PR PBB SHADOW E&M-EST. PATIENT-LVL I: CPT | Mod: PBBFAC,,, | Performed by: AUDIOLOGIST-HEARING AID FITTER

## 2020-12-24 PROCEDURE — 92567 TYMPANOMETRY: CPT | Mod: S$GLB,,, | Performed by: AUDIOLOGIST-HEARING AID FITTER

## 2020-12-24 PROCEDURE — 99999 PR PBB SHADOW E&M-EST. PATIENT-LVL I: CPT | Mod: PBBFAC,,,

## 2020-12-24 PROCEDURE — 1125F PR PAIN SEVERITY QUANTIFIED, PAIN PRESENT: ICD-10-PCS | Mod: S$GLB,,, | Performed by: PHYSICIAN ASSISTANT

## 2020-12-24 PROCEDURE — 3008F BODY MASS INDEX DOCD: CPT | Mod: CPTII,S$GLB,, | Performed by: PHYSICIAN ASSISTANT

## 2020-12-24 PROCEDURE — 95117 IMMUNOTHERAPY INJECTIONS: CPT | Mod: S$GLB,,, | Performed by: ALLERGY & IMMUNOLOGY

## 2020-12-24 PROCEDURE — 92567 PR TYMPA2METRY: ICD-10-PCS | Mod: S$GLB,,, | Performed by: AUDIOLOGIST-HEARING AID FITTER

## 2020-12-24 PROCEDURE — 92557 PR COMPREHENSIVE HEARING TEST: ICD-10-PCS | Mod: S$GLB,,, | Performed by: AUDIOLOGIST-HEARING AID FITTER

## 2020-12-24 PROCEDURE — 3008F PR BODY MASS INDEX (BMI) DOCUMENTED: ICD-10-PCS | Mod: CPTII,S$GLB,, | Performed by: PHYSICIAN ASSISTANT

## 2020-12-24 PROCEDURE — 99499 UNLISTED E&M SERVICE: CPT | Mod: S$GLB,,, | Performed by: SPECIALIST

## 2020-12-24 NOTE — PROGRESS NOTES
Referring Provider: Luis Armando Brayjoao Coreas was seen 12/24/2020 for an audiological evaluation.  Patient complains of ear fullness and perceived hearing changes bilaterally.  She says her hearing seems muffled at times and she's been told she has fluid in her ears.  She says her  complains that she has television too loud.  She has noted a difference in hearing between the ears, with the left ear being the better hearing ear for past 8+ months.  She has noted occasional tinnitus (high-pitched ringing) in both ears.  She has not had any recent issues with ear pain or ear drainage.  She denies a family history of hearing loss, and has not had any previous otologic surgery.  She denies any history of significant loud noise exposure. She denies issues with dizziness.  She has been diagnosed with TMJ in the past and says she occasionally needs muscle relaxants.      Results reveal normal hearing 250-8000 Hz for the right ear, and normal hearing 250-8000 Hz for the left ear.   Speech Reception Thresholds were  10 dBHL for the right ear and 10 dBHL for the left ear.   Word recognition scores were excellent for the right ear and excellent for the left ear.   Tympanograms were Type A for the right ear and Type A for the left ear.    Otoscopy was unremarkable.     Patient was counseled on the above findings.    Recommendations:  1. ENT review   2. Annual Audiograms

## 2020-12-24 NOTE — LETTER
December 24, 2020      Adeel Laughlin MD  [address]  [place] [address]           [facility]  [address] [place], [place] [address] [address]  Phone: [phone]  Fax: [phone]          Patient: Lucía Coreas   MR Number: [id number]   YOB: 1985   Date of Visit: 12/24/2020       Dear Dr. Adeel Laughlin:    Thank you for referring Lucía Coreas to me for evaluation. Attached you will find relevant portions of my assessment and plan of care.    If you have questions, please do not hesitate to call me. I look forward to following Lucía Coreas along with you.    Sincerely,    Catarina Durán PA-C    Enclosure  CC:  No Recipients    If you would like to receive this communication electronically, please contact [email or web address] or [phone] to request more information on [employer] access.    For providers and/or their staff who would like to refer a patient to [facility], please contact us through our one-stop-shop provider referral line, [facility], at [phone].    If you feel you have received this communication in error or would no longer like to receive these types of communications, please e-mail [email or web address]

## 2020-12-24 NOTE — PROGRESS NOTES
Subjective:       Patient ID: Lucía Coreas is a 35 y.o. female.    Chief Complaint: Ear Fullness    Patient is a very pleasant 35 y.o. female here to see me today for the first time for evaluation of ear fullness AU and perceived hearing changes.  She says her hearing seems muffled at times and she's been told she has fluid in her ears.  She says her  complains that she has television too loud.  She has noted a difference in hearing between the ears, with the left ear being the better hearing ear for past 8+ months.  She has noted occasional tinnitus (high-pitched ringing) in both ears.  She has not had any recent issues with ear pain or ear drainage.  She denies a family history of hearing loss, and has not had any previous otologic surgery.  She denies any history of significant loud noise exposure. She denies issues with dizziness.  She has been diagnosed with TMJ in the past and says she occasionally needs muscle relaxants.  She also has allergic rhinitis and follows with Dr. Kramer.  She is on SCIT; takes Singulair, Xyzal, Astelin and Flonase daily.  She complains of her nose (inside) hurting recently; seems worse when heat is running.  No arti epistaxis.  She has Valtrex prescribed for recurrent cold sores in her nose but says she's not currently taking it.       Review of Systems   Constitutional: Negative for activity change, appetite change and fever.   HENT: Positive for hearing loss, sinus pressure/congestion (pain/burning in nose) and tinnitus. Negative for ear discharge and ear pain (fullness AU).    Respiratory: Negative for cough.    Neurological: Negative for dizziness.         Objective:      Physical Exam  Vitals signs reviewed.   Constitutional:       General: She is not in acute distress.     Appearance: She is well-developed.   HENT:      Head: Normocephalic and atraumatic.      Jaw: Swelling present. No trismus or tenderness.      Right Ear: Ear canal and external ear normal. No  drainage, swelling or tenderness. No middle ear effusion. Tympanic membrane is retracted. Tympanic membrane is not injected or erythematous.      Left Ear: Ear canal and external ear normal. No drainage, swelling or tenderness.  No middle ear effusion. Tympanic membrane is retracted. Tympanic membrane is not injected or erythematous.      Nose: Nose normal. No nasal deformity, mucosal edema, congestion or rhinorrhea.      Right Nostril: No epistaxis.      Left Nostril: No epistaxis.      Right Turbinates: Enlarged.      Left Turbinates: Enlarged.      Right Sinus: No maxillary sinus tenderness or frontal sinus tenderness.      Left Sinus: No maxillary sinus tenderness or frontal sinus tenderness.      Mouth/Throat:      Mouth: Mucous membranes are moist. Mucous membranes are not pale and not dry.      Dentition: Normal dentition.      Pharynx: Uvula midline. No oropharyngeal exudate or posterior oropharyngeal erythema.      Tonsils: 2+ on the right. 2+ on the left.   Eyes:      General: Lids are normal. No scleral icterus.     Extraocular Movements:      Right eye: Normal extraocular motion and no nystagmus.      Left eye: Normal extraocular motion and no nystagmus.      Conjunctiva/sclera: Conjunctivae normal.      Right eye: Right conjunctiva is not injected. No chemosis.     Left eye: Left conjunctiva is not injected. No chemosis.     Pupils: Pupils are equal, round, and reactive to light.   Neck:      Thyroid: No thyroid mass or thyromegaly.      Trachea: Trachea and phonation normal. No tracheal tenderness or tracheal deviation.   Pulmonary:      Effort: Pulmonary effort is normal. No respiratory distress.      Breath sounds: No stridor.   Abdominal:      General: There is no distension.   Lymphadenopathy:      Head:      Right side of head: No submental, submandibular, preauricular or posterior auricular adenopathy.      Left side of head: No submental, submandibular, preauricular or posterior auricular  adenopathy.      Cervical: No cervical adenopathy.   Skin:     General: Skin is warm and dry.      Findings: No erythema or rash.   Neurological:      Mental Status: She is alert and oriented to person, place, and time.      Cranial Nerves: No cranial nerve deficit.   Psychiatric:         Behavior: Behavior normal. Behavior is cooperative.         Assessment:       1. Perceived hearing changes    2. Tinnitus of both ears    3. ETD (Eustachian tube dysfunction), bilateral    4. Non-seasonal allergic rhinitis, unspecified trigger        Plan:         Schedule audiogram when convenient and I'll review those results once complete.   We also discussed that tinnitus is most often caused by a hearing loss, and that as the hair cells are damaged, either genetic or as a result of loud noise exposure, they then cause tinnitus.  Some patients find that restricting the salt or caffeine in their diet helps, and there is also an OTC supplement, lipoflavinoids, that some people find to be effective though their benefit is not fully proven.  Tinnitus tends to be louder in times of stress and fatigue, and may decrease with time.  Sound machines may also be an effective masking technique if needed at night.    We had a long discussion regarding the anatomy and function of the eustachian tube.  We discussed that the eustachian tube acts as a pump to keep the appropriate amount of pressure behind the ear drum.  I recommend continued daily use of her Flonase (nasal steroid spray), and we discussed that it will take 2-3 weeks of daily use to achieve maximal effectiveness.    AR:  Continue to follow with Allergy - Dr. Kramer.  Continue SCIT and daily medicine regimen including Singulair, Xyzal, Flonase and Astelin.

## 2020-12-28 ENCOUNTER — TELEPHONE (OUTPATIENT)
Dept: ALLERGY | Facility: CLINIC | Age: 35
End: 2020-12-28

## 2020-12-28 NOTE — TELEPHONE ENCOUNTER
Pt needed to reschedule 12/31/20 appt.  Appt rescheduled to 1/4/21 at 11am for her allergy injection.

## 2020-12-28 NOTE — TELEPHONE ENCOUNTER
----- Message from Hedy Lainez sent at 12/28/2020 12:47 PM CST -----  Regarding: later nurse visit appt  Contact: pt  Pt needs to reschedule nurse visit. [phone]

## 2020-12-29 DIAGNOSIS — B95.8 STAPH INFECTION: ICD-10-CM

## 2020-12-29 DIAGNOSIS — L02.92 BOIL: ICD-10-CM

## 2020-12-29 RX ORDER — MUPIROCIN 20 MG/G
OINTMENT TOPICAL 3 TIMES DAILY
Qty: 1 TUBE | Refills: 1 | Status: SHIPPED | OUTPATIENT
Start: 2020-12-29 | End: 2021-02-19

## 2020-12-29 NOTE — TELEPHONE ENCOUNTER
Received a fx refill request from [facility] as this pended prescription was filled today leaving no refills, medication pended, please advise.

## 2021-01-04 ENCOUNTER — OFFICE VISIT (OUTPATIENT)
Dept: SURGERY | Facility: CLINIC | Age: 36
End: 2021-01-04
Attending: ORTHOPAEDIC SURGERY
Payer: COMMERCIAL

## 2021-01-04 ENCOUNTER — PATIENT MESSAGE (OUTPATIENT)
Dept: ALLERGY | Facility: CLINIC | Age: 36
End: 2021-01-04

## 2021-01-04 ENCOUNTER — OFFICE VISIT (OUTPATIENT)
Dept: PODIATRY | Facility: CLINIC | Age: 36
End: 2021-01-04
Attending: ORTHOPAEDIC SURGERY
Payer: COMMERCIAL

## 2021-01-04 ENCOUNTER — PATIENT MESSAGE (OUTPATIENT)
Dept: FAMILY MEDICINE | Facility: CLINIC | Age: 36
End: 2021-01-04

## 2021-01-04 ENCOUNTER — CLINICAL SUPPORT (OUTPATIENT)
Dept: ALLERGY | Facility: CLINIC | Age: 36
End: 2021-01-04
Attending: ORTHOPAEDIC SURGERY
Payer: COMMERCIAL

## 2021-01-04 ENCOUNTER — TELEPHONE (OUTPATIENT)
Dept: ALLERGY | Facility: CLINIC | Age: 36
End: 2021-01-04

## 2021-01-04 VITALS — BODY MASS INDEX: 53.38 KG/M2 | WEIGHT: 293 LBS

## 2021-01-04 VITALS
TEMPERATURE: 98 F | SYSTOLIC BLOOD PRESSURE: 137 MMHG | HEART RATE: 92 BPM | WEIGHT: 293 LBS | HEIGHT: 64 IN | BODY MASS INDEX: 50.02 KG/M2 | DIASTOLIC BLOOD PRESSURE: 95 MMHG

## 2021-01-04 DIAGNOSIS — M76.822 POSTERIOR TIBIAL TENDINITIS OF LEFT LOWER EXTREMITY: ICD-10-CM

## 2021-01-04 DIAGNOSIS — E66.01 MORBID OBESITY WITH BMI OF 50.0-59.9, ADULT: ICD-10-CM

## 2021-01-04 DIAGNOSIS — J30.89 ALLERGIC RHINITIS DUE TO DERMATOPHAGOIDES PTERONYSSINUS: ICD-10-CM

## 2021-01-04 DIAGNOSIS — M79.672 HEEL PAIN, BILATERAL: Primary | ICD-10-CM

## 2021-01-04 DIAGNOSIS — M79.671 HEEL PAIN, BILATERAL: Primary | ICD-10-CM

## 2021-01-04 DIAGNOSIS — M72.2 PLANTAR FASCIITIS, BILATERAL: ICD-10-CM

## 2021-01-04 DIAGNOSIS — L02.212 ABSCESS OF BACK: Primary | ICD-10-CM

## 2021-01-04 DIAGNOSIS — J30.89 ALLERGIC RHINITIS DUE TO DERMATOPHAGOIDES FARINAE: ICD-10-CM

## 2021-01-04 DIAGNOSIS — M21.42 PES PLANUS OF BOTH FEET: ICD-10-CM

## 2021-01-04 DIAGNOSIS — J30.1 SEASONAL ALLERGIC RHINITIS DUE TO POLLEN: Chronic | ICD-10-CM

## 2021-01-04 DIAGNOSIS — M21.41 PES PLANUS OF BOTH FEET: ICD-10-CM

## 2021-01-04 PROCEDURE — 3072F LOW RISK FOR RETINOPATHY: CPT | Mod: S$GLB,,, | Performed by: PODIATRIST

## 2021-01-04 PROCEDURE — 1125F PR PAIN SEVERITY QUANTIFIED, PAIN PRESENT: ICD-10-PCS | Mod: S$GLB,,, | Performed by: PODIATRIST

## 2021-01-04 PROCEDURE — 1125F PR PAIN SEVERITY QUANTIFIED, PAIN PRESENT: ICD-10-PCS | Mod: S$GLB,,, | Performed by: SURGERY

## 2021-01-04 PROCEDURE — 95117 IMMUNOTHERAPY INJECTIONS: CPT | Mod: S$GLB,,, | Performed by: ALLERGY & IMMUNOLOGY

## 2021-01-04 PROCEDURE — 99999 PR PBB SHADOW E&M-EST. PATIENT-LVL III: CPT | Mod: PBBFAC,,,

## 2021-01-04 PROCEDURE — 10060 I&D ABSCESS SIMPLE/SINGLE: CPT | Mod: S$GLB,,, | Performed by: SURGERY

## 2021-01-04 PROCEDURE — 99203 OFFICE O/P NEW LOW 30 MIN: CPT | Mod: S$GLB,,, | Performed by: PODIATRIST

## 2021-01-04 PROCEDURE — 1125F AMNT PAIN NOTED PAIN PRSNT: CPT | Mod: S$GLB,,, | Performed by: PODIATRIST

## 2021-01-04 PROCEDURE — 3072F LOW RISK FOR RETINOPATHY: CPT | Mod: S$GLB,,, | Performed by: SURGERY

## 2021-01-04 PROCEDURE — 99499 NO LOS: ICD-10-PCS | Mod: S$GLB,,, | Performed by: ALLERGY & IMMUNOLOGY

## 2021-01-04 PROCEDURE — 10060 PR DRAIN SKIN ABSCESS SIMPLE: ICD-10-PCS | Mod: S$GLB,,, | Performed by: SURGERY

## 2021-01-04 PROCEDURE — 99203 PR OFFICE/OUTPT VISIT, NEW, LEVL III, 30-44 MIN: ICD-10-PCS | Mod: S$GLB,,, | Performed by: PODIATRIST

## 2021-01-04 PROCEDURE — 99213 OFFICE O/P EST LOW 20 MIN: CPT | Mod: 25,S$GLB,, | Performed by: SURGERY

## 2021-01-04 PROCEDURE — 95117 PR IMMU2THERAPY, 2+ INJECTIONS: ICD-10-PCS | Mod: S$GLB,,, | Performed by: ALLERGY & IMMUNOLOGY

## 2021-01-04 PROCEDURE — 99499 UNLISTED E&M SERVICE: CPT | Mod: S$GLB,,, | Performed by: ALLERGY & IMMUNOLOGY

## 2021-01-04 PROCEDURE — 3008F BODY MASS INDEX DOCD: CPT | Mod: CPTII,S$GLB,, | Performed by: PODIATRIST

## 2021-01-04 PROCEDURE — 99999 PR PBB SHADOW E&M-EST. PATIENT-LVL IV: ICD-10-PCS | Mod: PBBFAC,,, | Performed by: PODIATRIST

## 2021-01-04 PROCEDURE — 99999 PR PBB SHADOW E&M-EST. PATIENT-LVL IV: CPT | Mod: PBBFAC,,, | Performed by: SURGERY

## 2021-01-04 PROCEDURE — 99213 PR OFFICE/OUTPT VISIT, EST, LEVL III, 20-29 MIN: ICD-10-PCS | Mod: 25,S$GLB,, | Performed by: SURGERY

## 2021-01-04 PROCEDURE — 3008F PR BODY MASS INDEX (BMI) DOCUMENTED: ICD-10-PCS | Mod: CPTII,S$GLB,, | Performed by: PODIATRIST

## 2021-01-04 PROCEDURE — 3008F PR BODY MASS INDEX (BMI) DOCUMENTED: ICD-10-PCS | Mod: CPTII,S$GLB,, | Performed by: SURGERY

## 2021-01-04 PROCEDURE — 3008F BODY MASS INDEX DOCD: CPT | Mod: CPTII,S$GLB,, | Performed by: SURGERY

## 2021-01-04 PROCEDURE — 99999 PR PBB SHADOW E&M-EST. PATIENT-LVL III: ICD-10-PCS | Mod: PBBFAC,,,

## 2021-01-04 PROCEDURE — 3072F PR LOW RISK FOR RETINOPATHY: ICD-10-PCS | Mod: S$GLB,,, | Performed by: SURGERY

## 2021-01-04 PROCEDURE — 99999 PR PBB SHADOW E&M-EST. PATIENT-LVL IV: CPT | Mod: PBBFAC,,, | Performed by: PODIATRIST

## 2021-01-04 PROCEDURE — 3072F PR LOW RISK FOR RETINOPATHY: ICD-10-PCS | Mod: S$GLB,,, | Performed by: PODIATRIST

## 2021-01-04 PROCEDURE — 99999 PR PBB SHADOW E&M-EST. PATIENT-LVL IV: ICD-10-PCS | Mod: PBBFAC,,, | Performed by: SURGERY

## 2021-01-04 PROCEDURE — 1125F AMNT PAIN NOTED PAIN PRSNT: CPT | Mod: S$GLB,,, | Performed by: SURGERY

## 2021-01-04 RX ORDER — HYDROCODONE BITARTRATE AND ACETAMINOPHEN 5; 325 MG/1; MG/1
TABLET ORAL
Qty: 15 TABLET | Refills: 0 | Status: SHIPPED | OUTPATIENT
Start: 2021-01-04 | End: 2021-02-19

## 2021-01-04 RX ORDER — CLINDAMYCIN HYDROCHLORIDE 300 MG/1
300 CAPSULE ORAL 3 TIMES DAILY
Qty: 30 CAPSULE | Refills: 0 | Status: SHIPPED | OUTPATIENT
Start: 2021-01-04 | End: 2021-01-14

## 2021-01-06 ENCOUNTER — TELEPHONE (OUTPATIENT)
Dept: PULMONOLOGY | Facility: CLINIC | Age: 36
End: 2021-01-06

## 2021-01-06 ENCOUNTER — OFFICE VISIT (OUTPATIENT)
Dept: PAIN MEDICINE | Facility: CLINIC | Age: 36
End: 2021-01-06
Attending: ORTHOPAEDIC SURGERY
Payer: COMMERCIAL

## 2021-01-06 VITALS
SYSTOLIC BLOOD PRESSURE: 150 MMHG | WEIGHT: 293 LBS | BODY MASS INDEX: 50.02 KG/M2 | RESPIRATION RATE: 18 BRPM | DIASTOLIC BLOOD PRESSURE: 93 MMHG | HEIGHT: 64 IN | HEART RATE: 98 BPM

## 2021-01-06 DIAGNOSIS — M79.12 MYALGIA OF AUXILIARY MUSCLES, HEAD AND NECK: Primary | ICD-10-CM

## 2021-01-06 DIAGNOSIS — G89.29 CHRONIC PAIN OF BOTH KNEES: ICD-10-CM

## 2021-01-06 DIAGNOSIS — M25.561 CHRONIC PAIN OF BOTH KNEES: ICD-10-CM

## 2021-01-06 DIAGNOSIS — E66.01 MORBID OBESITY WITH BMI OF 50.0-59.9, ADULT: ICD-10-CM

## 2021-01-06 DIAGNOSIS — M25.562 CHRONIC PAIN OF BOTH KNEES: ICD-10-CM

## 2021-01-06 DIAGNOSIS — G89.29 CHRONIC MYOFASCIAL PAIN: ICD-10-CM

## 2021-01-06 DIAGNOSIS — M79.18 CHRONIC MYOFASCIAL PAIN: ICD-10-CM

## 2021-01-06 PROCEDURE — 99214 OFFICE O/P EST MOD 30 MIN: CPT | Mod: S$GLB,,, | Performed by: PHYSICAL MEDICINE & REHABILITATION

## 2021-01-06 PROCEDURE — 3008F BODY MASS INDEX DOCD: CPT | Mod: CPTII,S$GLB,, | Performed by: PHYSICAL MEDICINE & REHABILITATION

## 2021-01-06 PROCEDURE — 99999 PR PBB SHADOW E&M-EST. PATIENT-LVL V: ICD-10-PCS | Mod: PBBFAC,,, | Performed by: PHYSICAL MEDICINE & REHABILITATION

## 2021-01-06 PROCEDURE — 1125F AMNT PAIN NOTED PAIN PRSNT: CPT | Mod: S$GLB,,, | Performed by: PHYSICAL MEDICINE & REHABILITATION

## 2021-01-06 PROCEDURE — 3072F PR LOW RISK FOR RETINOPATHY: ICD-10-PCS | Mod: S$GLB,,, | Performed by: PHYSICAL MEDICINE & REHABILITATION

## 2021-01-06 PROCEDURE — 99214 PR OFFICE/OUTPT VISIT, EST, LEVL IV, 30-39 MIN: ICD-10-PCS | Mod: S$GLB,,, | Performed by: PHYSICAL MEDICINE & REHABILITATION

## 2021-01-06 PROCEDURE — 1125F PR PAIN SEVERITY QUANTIFIED, PAIN PRESENT: ICD-10-PCS | Mod: S$GLB,,, | Performed by: PHYSICAL MEDICINE & REHABILITATION

## 2021-01-06 PROCEDURE — 99999 PR PBB SHADOW E&M-EST. PATIENT-LVL V: CPT | Mod: PBBFAC,,, | Performed by: PHYSICAL MEDICINE & REHABILITATION

## 2021-01-06 PROCEDURE — 3008F PR BODY MASS INDEX (BMI) DOCUMENTED: ICD-10-PCS | Mod: CPTII,S$GLB,, | Performed by: PHYSICAL MEDICINE & REHABILITATION

## 2021-01-06 PROCEDURE — 3072F LOW RISK FOR RETINOPATHY: CPT | Mod: S$GLB,,, | Performed by: PHYSICAL MEDICINE & REHABILITATION

## 2021-01-06 RX ORDER — GABAPENTIN 300 MG/1
300 CAPSULE ORAL NIGHTLY
Qty: 30 CAPSULE | Refills: 2 | Status: SHIPPED | OUTPATIENT
Start: 2021-01-06 | End: 2021-02-19

## 2021-01-06 RX ORDER — TIZANIDINE 4 MG/1
4 TABLET ORAL NIGHTLY PRN
Qty: 30 TABLET | Refills: 2 | Status: SHIPPED | OUTPATIENT
Start: 2021-01-06 | End: 2021-02-19

## 2021-01-06 RX ORDER — DULOXETIN HYDROCHLORIDE 30 MG/1
30 CAPSULE, DELAYED RELEASE ORAL DAILY
Qty: 30 CAPSULE | Refills: 11 | Status: SHIPPED | OUTPATIENT
Start: 2021-01-06 | End: 2021-02-19

## 2021-01-07 ENCOUNTER — PATIENT MESSAGE (OUTPATIENT)
Dept: PODIATRY | Facility: CLINIC | Age: 36
End: 2021-01-07

## 2021-01-08 ENCOUNTER — OFFICE VISIT (OUTPATIENT)
Dept: PULMONOLOGY | Facility: CLINIC | Age: 36
End: 2021-01-08
Payer: COMMERCIAL

## 2021-01-08 VITALS — BODY MASS INDEX: 50.02 KG/M2 | HEIGHT: 64 IN | WEIGHT: 293 LBS

## 2021-01-08 DIAGNOSIS — E11.65 TYPE 2 DIABETES MELLITUS WITH HYPERGLYCEMIA, WITHOUT LONG-TERM CURRENT USE OF INSULIN: Chronic | ICD-10-CM

## 2021-01-08 DIAGNOSIS — G89.29 CHRONIC NECK PAIN: ICD-10-CM

## 2021-01-08 DIAGNOSIS — R40.0 DAYTIME SLEEPINESS: ICD-10-CM

## 2021-01-08 DIAGNOSIS — F51.04 PSYCHOPHYSIOLOGICAL INSOMNIA: ICD-10-CM

## 2021-01-08 DIAGNOSIS — R06.83 SNORING: ICD-10-CM

## 2021-01-08 DIAGNOSIS — E88.810 METABOLIC SYNDROME: ICD-10-CM

## 2021-01-08 DIAGNOSIS — R53.83 FEELING TIRED: ICD-10-CM

## 2021-01-08 DIAGNOSIS — M54.2 CHRONIC NECK PAIN: ICD-10-CM

## 2021-01-08 DIAGNOSIS — G47.30 SLEEP-DISORDERED BREATHING: Primary | ICD-10-CM

## 2021-01-08 DIAGNOSIS — R51.9 MORNING HEADACHE: ICD-10-CM

## 2021-01-08 DIAGNOSIS — E66.01 MORBID OBESITY WITH BMI OF 50.0-59.9, ADULT: ICD-10-CM

## 2021-01-08 DIAGNOSIS — J45.20 MILD INTERMITTENT ASTHMA WITHOUT COMPLICATION: ICD-10-CM

## 2021-01-08 DIAGNOSIS — F41.9 ANXIETY: Chronic | ICD-10-CM

## 2021-01-08 PROCEDURE — 99204 OFFICE O/P NEW MOD 45 MIN: CPT | Mod: 95,,, | Performed by: NURSE PRACTITIONER

## 2021-01-08 PROCEDURE — 99204 PR OFFICE/OUTPT VISIT, NEW, LEVL IV, 45-59 MIN: ICD-10-PCS | Mod: 95,,, | Performed by: NURSE PRACTITIONER

## 2021-01-08 RX ORDER — PHENTERMINE HYDROCHLORIDE 37.5 MG/1
37.5 TABLET ORAL
COMMUNITY
End: 2021-02-19

## 2021-01-17 ENCOUNTER — PATIENT MESSAGE (OUTPATIENT)
Dept: ALLERGY | Facility: CLINIC | Age: 36
End: 2021-01-17

## 2021-01-18 LAB
LEFT EYE DM RETINOPATHY: NEGATIVE
RIGHT EYE DM RETINOPATHY: NEGATIVE

## 2021-01-19 DIAGNOSIS — G47.00 INSOMNIA: Primary | Chronic | ICD-10-CM

## 2021-01-21 ENCOUNTER — TELEPHONE (OUTPATIENT)
Dept: FAMILY MEDICINE | Facility: CLINIC | Age: 36
End: 2021-01-21

## 2021-01-21 ENCOUNTER — PATIENT MESSAGE (OUTPATIENT)
Dept: FAMILY MEDICINE | Facility: CLINIC | Age: 36
End: 2021-01-21

## 2021-01-25 ENCOUNTER — PATIENT OUTREACH (OUTPATIENT)
Dept: ADMINISTRATIVE | Facility: HOSPITAL | Age: 36
End: 2021-01-25

## 2021-01-26 ENCOUNTER — CLINICAL SUPPORT (OUTPATIENT)
Dept: ALLERGY | Facility: CLINIC | Age: 36
End: 2021-01-26
Payer: COMMERCIAL

## 2021-01-26 DIAGNOSIS — J30.89 ALLERGIC RHINITIS DUE TO DERMATOPHAGOIDES PTERONYSSINUS: ICD-10-CM

## 2021-01-26 DIAGNOSIS — J30.1 SEASONAL ALLERGIC RHINITIS DUE TO POLLEN: Chronic | ICD-10-CM

## 2021-01-26 DIAGNOSIS — J30.89 ALLERGIC RHINITIS DUE TO DERMATOPHAGOIDES FARINAE: ICD-10-CM

## 2021-01-26 PROCEDURE — 99499 NO LOS: ICD-10-PCS | Mod: S$GLB,,, | Performed by: ALLERGY & IMMUNOLOGY

## 2021-01-26 PROCEDURE — 99999 PR PBB SHADOW E&M-EST. PATIENT-LVL III: CPT | Mod: PBBFAC,,,

## 2021-01-26 PROCEDURE — 95117 PR IMMU2THERAPY, 2+ INJECTIONS: ICD-10-PCS | Mod: S$GLB,,, | Performed by: ALLERGY & IMMUNOLOGY

## 2021-01-26 PROCEDURE — 99999 PR PBB SHADOW E&M-EST. PATIENT-LVL III: ICD-10-PCS | Mod: PBBFAC,,,

## 2021-01-26 PROCEDURE — 99499 UNLISTED E&M SERVICE: CPT | Mod: S$GLB,,, | Performed by: ALLERGY & IMMUNOLOGY

## 2021-01-26 PROCEDURE — 95117 IMMUNOTHERAPY INJECTIONS: CPT | Mod: S$GLB,,, | Performed by: ALLERGY & IMMUNOLOGY

## 2021-01-27 ENCOUNTER — APPOINTMENT (OUTPATIENT)
Dept: URGENT CARE | Facility: CLINIC | Age: 36
End: 2021-01-27
Payer: COMMERCIAL

## 2021-01-27 DIAGNOSIS — G47.00 INSOMNIA: Chronic | ICD-10-CM

## 2021-01-27 PROCEDURE — U0003 INFECTIOUS AGENT DETECTION BY NUCLEIC ACID (DNA OR RNA); SEVERE ACUTE RESPIRATORY SYNDROME CORONAVIRUS 2 (SARS-COV-2) (CORONAVIRUS DISEASE [COVID-19]), AMPLIFIED PROBE TECHNIQUE, MAKING USE OF HIGH THROUGHPUT TECHNOLOGIES AS DESCRIBED BY CMS-2020-01-R: HCPCS

## 2021-01-28 LAB — SARS-COV-2 RNA RESP QL NAA+PROBE: NOT DETECTED

## 2021-01-30 ENCOUNTER — HOSPITAL ENCOUNTER (OUTPATIENT)
Dept: SLEEP MEDICINE | Facility: HOSPITAL | Age: 36
Discharge: HOME | End: 2021-01-30
Attending: NURSE PRACTITIONER
Payer: COMMERCIAL

## 2021-01-30 DIAGNOSIS — E88.810 METABOLIC SYNDROME: ICD-10-CM

## 2021-01-30 DIAGNOSIS — E66.01 MORBID OBESITY WITH BMI OF 50.0-59.9, ADULT: ICD-10-CM

## 2021-01-30 DIAGNOSIS — G47.00 INSOMNIA, UNSPECIFIED TYPE: Chronic | ICD-10-CM

## 2021-01-30 DIAGNOSIS — G47.30 SLEEP-DISORDERED BREATHING: ICD-10-CM

## 2021-01-30 DIAGNOSIS — F51.04 PSYCHOPHYSIOLOGICAL INSOMNIA: ICD-10-CM

## 2021-01-30 DIAGNOSIS — R40.0 DAYTIME SLEEPINESS: ICD-10-CM

## 2021-01-30 DIAGNOSIS — R51.9 MORNING HEADACHE: ICD-10-CM

## 2021-01-30 DIAGNOSIS — R53.83 FEELING TIRED: ICD-10-CM

## 2021-01-30 DIAGNOSIS — R06.83 SNORING: ICD-10-CM

## 2021-01-30 DIAGNOSIS — F45.8 BRUXISM: ICD-10-CM

## 2021-01-30 DIAGNOSIS — E11.65 TYPE 2 DIABETES MELLITUS WITH HYPERGLYCEMIA, WITHOUT LONG-TERM CURRENT USE OF INSULIN: Chronic | ICD-10-CM

## 2021-01-30 DIAGNOSIS — R06.83 PRIMARY SNORING: ICD-10-CM

## 2021-01-30 PROCEDURE — 95810 POLYSOM 6/> YRS 4/> PARAM: CPT

## 2021-01-30 PROCEDURE — 95810 POLYSOM 6/> YRS 4/> PARAM: CPT | Mod: 26,,, | Performed by: INTERNAL MEDICINE

## 2021-01-30 PROCEDURE — 95810 PR POLYSOMNOGRAPHY, 4 OR MORE: ICD-10-PCS | Mod: 26,,, | Performed by: INTERNAL MEDICINE

## 2021-02-08 ENCOUNTER — PATIENT MESSAGE (OUTPATIENT)
Dept: PULMONOLOGY | Facility: CLINIC | Age: 36
End: 2021-02-08

## 2021-02-17 ENCOUNTER — LAB VISIT (OUTPATIENT)
Dept: LAB | Facility: HOSPITAL | Age: 36
End: 2021-02-17
Attending: FAMILY MEDICINE
Payer: COMMERCIAL

## 2021-02-17 DIAGNOSIS — R76.8 POSITIVE ANA (ANTINUCLEAR ANTIBODY): ICD-10-CM

## 2021-02-17 DIAGNOSIS — E55.9 VITAMIN D DEFICIENCY: Chronic | ICD-10-CM

## 2021-02-17 DIAGNOSIS — Z79.899 ENCOUNTER FOR LONG-TERM (CURRENT) USE OF MEDICATIONS: ICD-10-CM

## 2021-02-17 DIAGNOSIS — Z00.00 WELL ADULT EXAM: ICD-10-CM

## 2021-02-17 DIAGNOSIS — E66.2 CLASS 3 OBESITY WITH ALVEOLAR HYPOVENTILATION, SERIOUS COMORBIDITY, AND BODY MASS INDEX (BMI) OF 50.0 TO 59.9 IN ADULT: ICD-10-CM

## 2021-02-17 LAB
25(OH)D3+25(OH)D2 SERPL-MCNC: 24 NG/ML (ref 30–96)
ALBUMIN SERPL BCP-MCNC: 3.5 G/DL (ref 3.5–5.2)
ALP SERPL-CCNC: 55 U/L (ref 55–135)
ALT SERPL W/O P-5'-P-CCNC: 8 U/L (ref 10–44)
ANION GAP SERPL CALC-SCNC: 8 MMOL/L (ref 8–16)
AST SERPL-CCNC: 11 U/L (ref 10–40)
BASOPHILS # BLD AUTO: 0.04 K/UL (ref 0–0.2)
BASOPHILS NFR BLD: 0.4 % (ref 0–1.9)
BILIRUB SERPL-MCNC: 0.2 MG/DL (ref 0.1–1)
BILIRUB UR QL STRIP: NEGATIVE
BUN SERPL-MCNC: 5 MG/DL (ref 6–20)
C3 SERPL-MCNC: 158 MG/DL (ref 50–180)
C4 SERPL-MCNC: 26 MG/DL (ref 11–44)
CALCIUM SERPL-MCNC: 9 MG/DL (ref 8.7–10.5)
CHLORIDE SERPL-SCNC: 105 MMOL/L (ref 95–110)
CLARITY UR: CLEAR
CO2 SERPL-SCNC: 23 MMOL/L (ref 23–29)
COLOR UR: YELLOW
CREAT SERPL-MCNC: 0.6 MG/DL (ref 0.5–1.4)
CREAT UR-MCNC: 205 MG/DL (ref 15–325)
DIFFERENTIAL METHOD: ABNORMAL
EOSINOPHIL # BLD AUTO: 0.1 K/UL (ref 0–0.5)
EOSINOPHIL NFR BLD: 1.2 % (ref 0–8)
ERYTHROCYTE [DISTWIDTH] IN BLOOD BY AUTOMATED COUNT: 14.8 % (ref 11.5–14.5)
EST. GFR  (AFRICAN AMERICAN): >60 ML/MIN/1.73 M^2
EST. GFR  (NON AFRICAN AMERICAN): >60 ML/MIN/1.73 M^2
ESTIMATED AVG GLUCOSE: 103 MG/DL (ref 68–131)
GLUCOSE SERPL-MCNC: 88 MG/DL (ref 70–110)
GLUCOSE UR QL STRIP: NEGATIVE
HBA1C MFR BLD: 5.2 % (ref 4–5.6)
HCT VFR BLD AUTO: 39.4 % (ref 37–48.5)
HGB BLD-MCNC: 12.7 G/DL (ref 12–16)
HGB UR QL STRIP: NEGATIVE
IMM GRANULOCYTES # BLD AUTO: 0.02 K/UL (ref 0–0.04)
IMM GRANULOCYTES NFR BLD AUTO: 0.2 % (ref 0–0.5)
KETONES UR QL STRIP: NEGATIVE
LEUKOCYTE ESTERASE UR QL STRIP: NEGATIVE
LYMPHOCYTES # BLD AUTO: 3.8 K/UL (ref 1–4.8)
LYMPHOCYTES NFR BLD: 36.5 % (ref 18–48)
MCH RBC QN AUTO: 24.4 PG (ref 27–31)
MCHC RBC AUTO-ENTMCNC: 32.2 G/DL (ref 32–36)
MCV RBC AUTO: 76 FL (ref 82–98)
MONOCYTES # BLD AUTO: 0.5 K/UL (ref 0.3–1)
MONOCYTES NFR BLD: 5.1 % (ref 4–15)
NEUTROPHILS # BLD AUTO: 5.9 K/UL (ref 1.8–7.7)
NEUTROPHILS NFR BLD: 56.8 % (ref 38–73)
NITRITE UR QL STRIP: NEGATIVE
NRBC BLD-RTO: 0 /100 WBC
PH UR STRIP: 8 [PH] (ref 5–8)
PLATELET # BLD AUTO: 344 K/UL (ref 150–350)
PMV BLD AUTO: 9.9 FL (ref 9.2–12.9)
POTASSIUM SERPL-SCNC: 3.9 MMOL/L (ref 3.5–5.1)
PROT SERPL-MCNC: 7.3 G/DL (ref 6–8.4)
PROT UR QL STRIP: ABNORMAL
PROT UR-MCNC: 13 MG/DL (ref 0–15)
PROT/CREAT UR: 0.06 MG/G{CREAT} (ref 0–0.2)
RBC # BLD AUTO: 5.21 M/UL (ref 4–5.4)
SODIUM SERPL-SCNC: 136 MMOL/L (ref 136–145)
SP GR UR STRIP: 1.01 (ref 1–1.03)
TSH SERPL DL<=0.005 MIU/L-ACNC: 1.6 UIU/ML (ref 0.4–4)
URN SPEC COLLECT METH UR: ABNORMAL
WBC # BLD AUTO: 10.43 K/UL (ref 3.9–12.7)

## 2021-02-17 PROCEDURE — 86225 DNA ANTIBODY NATIVE: CPT

## 2021-02-17 PROCEDURE — 86160 COMPLEMENT ANTIGEN: CPT | Mod: 59

## 2021-02-17 PROCEDURE — 84443 ASSAY THYROID STIM HORMONE: CPT

## 2021-02-17 PROCEDURE — 36415 COLL VENOUS BLD VENIPUNCTURE: CPT | Mod: PO

## 2021-02-17 PROCEDURE — 81002 URINALYSIS NONAUTO W/O SCOPE: CPT | Mod: PO

## 2021-02-17 PROCEDURE — 85025 COMPLETE CBC W/AUTO DIFF WBC: CPT | Mod: PO

## 2021-02-17 PROCEDURE — 86160 COMPLEMENT ANTIGEN: CPT

## 2021-02-17 PROCEDURE — 82306 VITAMIN D 25 HYDROXY: CPT

## 2021-02-17 PROCEDURE — 83036 HEMOGLOBIN GLYCOSYLATED A1C: CPT

## 2021-02-17 PROCEDURE — 80053 COMPREHEN METABOLIC PANEL: CPT

## 2021-02-17 PROCEDURE — 84156 ASSAY OF PROTEIN URINE: CPT

## 2021-02-18 DIAGNOSIS — E55.9 VITAMIN D DEFICIENCY: Chronic | ICD-10-CM

## 2021-02-18 LAB — DSDNA AB SER-ACNC: NORMAL [IU]/ML

## 2021-02-18 RX ORDER — ERGOCALCIFEROL 1.25 MG/1
50000 CAPSULE ORAL
Qty: 24 CAPSULE | Refills: 4 | Status: SHIPPED | OUTPATIENT
Start: 2021-02-18 | End: 2021-02-19

## 2021-02-19 ENCOUNTER — OFFICE VISIT (OUTPATIENT)
Dept: FAMILY MEDICINE | Facility: CLINIC | Age: 36
End: 2021-02-19
Payer: COMMERCIAL

## 2021-02-19 VITALS — DIASTOLIC BLOOD PRESSURE: 87 MMHG | WEIGHT: 293 LBS | BODY MASS INDEX: 52.35 KG/M2 | SYSTOLIC BLOOD PRESSURE: 136 MMHG

## 2021-02-19 DIAGNOSIS — I10 ESSENTIAL HYPERTENSION: ICD-10-CM

## 2021-02-19 DIAGNOSIS — E55.9 VITAMIN D DEFICIENCY: Chronic | ICD-10-CM

## 2021-02-19 DIAGNOSIS — Z3A.08 8 WEEKS GESTATION OF PREGNANCY: Primary | ICD-10-CM

## 2021-02-19 DIAGNOSIS — R06.02 SHORTNESS OF BREATH: ICD-10-CM

## 2021-02-19 PROCEDURE — 3072F LOW RISK FOR RETINOPATHY: CPT | Mod: ,,, | Performed by: FAMILY MEDICINE

## 2021-02-19 PROCEDURE — 99214 OFFICE O/P EST MOD 30 MIN: CPT | Mod: 95,,, | Performed by: FAMILY MEDICINE

## 2021-02-19 PROCEDURE — 3072F PR LOW RISK FOR RETINOPATHY: ICD-10-PCS | Mod: ,,, | Performed by: FAMILY MEDICINE

## 2021-02-19 PROCEDURE — 3008F BODY MASS INDEX DOCD: CPT | Mod: CPTII,,, | Performed by: FAMILY MEDICINE

## 2021-02-19 PROCEDURE — 99214 PR OFFICE/OUTPT VISIT, EST, LEVL IV, 30-39 MIN: ICD-10-PCS | Mod: 95,,, | Performed by: FAMILY MEDICINE

## 2021-02-19 PROCEDURE — 3008F PR BODY MASS INDEX (BMI) DOCUMENTED: ICD-10-PCS | Mod: CPTII,,, | Performed by: FAMILY MEDICINE

## 2021-02-19 RX ORDER — ONDANSETRON 8 MG/1
TABLET, ORALLY DISINTEGRATING ORAL
COMMUNITY
Start: 2021-02-18 | End: 2024-03-27

## 2021-02-19 RX ORDER — PEN NEEDLE, DIABETIC 32 GX 1/6"
NEEDLE, DISPOSABLE MISCELLANEOUS
COMMUNITY
Start: 2020-12-28

## 2021-02-19 RX ORDER — TRAMADOL HYDROCHLORIDE 50 MG/1
50 TABLET ORAL EVERY 12 HOURS PRN
COMMUNITY
Start: 2021-01-22 | End: 2021-02-19

## 2021-02-19 RX ORDER — ASPIRIN 81 MG/1
81 TABLET ORAL DAILY
Qty: 90 TABLET | Refills: 3 | Status: SHIPPED | OUTPATIENT
Start: 2021-02-19 | End: 2022-07-18 | Stop reason: SDUPTHER

## 2021-02-19 RX ORDER — LABETALOL 100 MG/1
100 TABLET, FILM COATED ORAL 2 TIMES DAILY
Qty: 60 TABLET | Refills: 3 | Status: SHIPPED | OUTPATIENT
Start: 2021-02-19 | End: 2021-03-05

## 2021-02-20 PROBLEM — I10 ESSENTIAL HYPERTENSION: Chronic | Status: ACTIVE | Noted: 2021-02-19

## 2021-02-24 ENCOUNTER — PATIENT MESSAGE (OUTPATIENT)
Dept: ALLERGY | Facility: CLINIC | Age: 36
End: 2021-02-24

## 2021-02-25 ENCOUNTER — TELEPHONE (OUTPATIENT)
Dept: RHEUMATOLOGY | Facility: CLINIC | Age: 36
End: 2021-02-25

## 2021-02-26 ENCOUNTER — PATIENT OUTREACH (OUTPATIENT)
Dept: ADMINISTRATIVE | Facility: OTHER | Age: 36
End: 2021-02-26

## 2021-02-26 ENCOUNTER — OFFICE VISIT (OUTPATIENT)
Dept: RHEUMATOLOGY | Facility: CLINIC | Age: 36
End: 2021-02-26
Payer: COMMERCIAL

## 2021-02-26 ENCOUNTER — LAB VISIT (OUTPATIENT)
Dept: LAB | Facility: HOSPITAL | Age: 36
End: 2021-02-26
Attending: INTERNAL MEDICINE
Payer: COMMERCIAL

## 2021-02-26 VITALS
WEIGHT: 293 LBS | SYSTOLIC BLOOD PRESSURE: 121 MMHG | HEART RATE: 86 BPM | DIASTOLIC BLOOD PRESSURE: 73 MMHG | BODY MASS INDEX: 53.43 KG/M2

## 2021-02-26 DIAGNOSIS — G89.29 CHRONIC LOW BACK PAIN WITH BILATERAL SCIATICA, UNSPECIFIED BACK PAIN LATERALITY: ICD-10-CM

## 2021-02-26 DIAGNOSIS — Z71.89 COUNSELING ON HEALTH PROMOTION AND DISEASE PREVENTION: ICD-10-CM

## 2021-02-26 DIAGNOSIS — M54.42 CHRONIC LOW BACK PAIN WITH BILATERAL SCIATICA, UNSPECIFIED BACK PAIN LATERALITY: ICD-10-CM

## 2021-02-26 DIAGNOSIS — M54.41 CHRONIC LOW BACK PAIN WITH BILATERAL SCIATICA, UNSPECIFIED BACK PAIN LATERALITY: ICD-10-CM

## 2021-02-26 DIAGNOSIS — R76.8 POSITIVE ANA (ANTINUCLEAR ANTIBODY): ICD-10-CM

## 2021-02-26 DIAGNOSIS — R76.8 POSITIVE ANA (ANTINUCLEAR ANTIBODY): Primary | ICD-10-CM

## 2021-02-26 PROCEDURE — 3072F PR LOW RISK FOR RETINOPATHY: ICD-10-PCS | Mod: S$GLB,,, | Performed by: INTERNAL MEDICINE

## 2021-02-26 PROCEDURE — 3072F LOW RISK FOR RETINOPATHY: CPT | Mod: S$GLB,,, | Performed by: INTERNAL MEDICINE

## 2021-02-26 PROCEDURE — 99999 PR PBB SHADOW E&M-EST. PATIENT-LVL III: ICD-10-PCS | Mod: PBBFAC,,, | Performed by: INTERNAL MEDICINE

## 2021-02-26 PROCEDURE — 86147 CARDIOLIPIN ANTIBODY EA IG: CPT | Mod: 59

## 2021-02-26 PROCEDURE — 86146 BETA-2 GLYCOPROTEIN ANTIBODY: CPT

## 2021-02-26 PROCEDURE — 99999 PR PBB SHADOW E&M-EST. PATIENT-LVL III: CPT | Mod: PBBFAC,,, | Performed by: INTERNAL MEDICINE

## 2021-02-26 PROCEDURE — 3008F BODY MASS INDEX DOCD: CPT | Mod: CPTII,S$GLB,, | Performed by: INTERNAL MEDICINE

## 2021-02-26 PROCEDURE — 1126F AMNT PAIN NOTED NONE PRSNT: CPT | Mod: S$GLB,,, | Performed by: INTERNAL MEDICINE

## 2021-02-26 PROCEDURE — 85613 RUSSELL VIPER VENOM DILUTED: CPT

## 2021-02-26 PROCEDURE — 99214 PR OFFICE/OUTPT VISIT, EST, LEVL IV, 30-39 MIN: ICD-10-PCS | Mod: S$GLB,,, | Performed by: INTERNAL MEDICINE

## 2021-02-26 PROCEDURE — 1126F PR PAIN SEVERITY QUANTIFIED, NO PAIN PRESENT: ICD-10-PCS | Mod: S$GLB,,, | Performed by: INTERNAL MEDICINE

## 2021-02-26 PROCEDURE — 99214 OFFICE O/P EST MOD 30 MIN: CPT | Mod: S$GLB,,, | Performed by: INTERNAL MEDICINE

## 2021-02-26 PROCEDURE — 3008F PR BODY MASS INDEX (BMI) DOCUMENTED: ICD-10-PCS | Mod: CPTII,S$GLB,, | Performed by: INTERNAL MEDICINE

## 2021-03-01 LAB
CARDIOLIPIN IGG SER IA-ACNC: <9.4 GPL (ref 0–14.99)
CARDIOLIPIN IGM SER IA-ACNC: 12.1 MPL (ref 0–12.49)

## 2021-03-02 LAB
B2 GLYCOPROT1 IGA SER QL: <9 SAU
B2 GLYCOPROT1 IGG SER QL: <9 SGU
B2 GLYCOPROT1 IGM SER QL: <9 SMU
LA PPP-IMP: NEGATIVE

## 2021-03-04 ENCOUNTER — PATIENT MESSAGE (OUTPATIENT)
Dept: ALLERGY | Facility: CLINIC | Age: 36
End: 2021-03-04

## 2021-03-05 ENCOUNTER — OFFICE VISIT (OUTPATIENT)
Dept: FAMILY MEDICINE | Facility: CLINIC | Age: 36
End: 2021-03-05
Payer: COMMERCIAL

## 2021-03-05 VITALS — SYSTOLIC BLOOD PRESSURE: 101 MMHG | DIASTOLIC BLOOD PRESSURE: 78 MMHG

## 2021-03-05 DIAGNOSIS — I10 ESSENTIAL HYPERTENSION: Chronic | ICD-10-CM

## 2021-03-05 DIAGNOSIS — J45.20 MILD INTERMITTENT ASTHMA WITHOUT COMPLICATION: Primary | ICD-10-CM

## 2021-03-05 DIAGNOSIS — A49.9 BACTERIAL INFECTION: ICD-10-CM

## 2021-03-05 PROCEDURE — 3072F LOW RISK FOR RETINOPATHY: CPT | Mod: ,,, | Performed by: FAMILY MEDICINE

## 2021-03-05 PROCEDURE — 99214 PR OFFICE/OUTPT VISIT, EST, LEVL IV, 30-39 MIN: ICD-10-PCS | Mod: 95,,, | Performed by: FAMILY MEDICINE

## 2021-03-05 PROCEDURE — 99214 OFFICE O/P EST MOD 30 MIN: CPT | Mod: 95,,, | Performed by: FAMILY MEDICINE

## 2021-03-05 PROCEDURE — 3072F PR LOW RISK FOR RETINOPATHY: ICD-10-PCS | Mod: ,,, | Performed by: FAMILY MEDICINE

## 2021-03-05 RX ORDER — AMOXICILLIN 500 MG/1
500 TABLET, FILM COATED ORAL EVERY 12 HOURS
Qty: 10 TABLET | Refills: 0 | Status: SHIPPED | OUTPATIENT
Start: 2021-03-05 | End: 2021-03-10

## 2021-03-05 RX ORDER — VALACYCLOVIR HYDROCHLORIDE 500 MG/1
TABLET, FILM COATED ORAL
COMMUNITY
Start: 2020-12-01 | End: 2021-08-18

## 2021-03-05 RX ORDER — RIFAMPIN 300 MG/1
300 CAPSULE ORAL EVERY 12 HOURS
COMMUNITY
Start: 2020-12-01 | End: 2021-08-18

## 2021-03-05 RX ORDER — NIFEDIPINE 30 MG/1
30 TABLET, FILM COATED, EXTENDED RELEASE ORAL DAILY
Qty: 30 TABLET | Refills: 5 | Status: SHIPPED | OUTPATIENT
Start: 2021-03-05 | End: 2022-01-18

## 2021-03-09 DIAGNOSIS — Z01.818 PRE-OP TESTING: ICD-10-CM

## 2021-03-09 DIAGNOSIS — J45.20 MILD INTERMITTENT ASTHMA WITHOUT COMPLICATION: Primary | ICD-10-CM

## 2021-03-12 ENCOUNTER — LAB VISIT (OUTPATIENT)
Dept: OTOLARYNGOLOGY | Facility: CLINIC | Age: 36
End: 2021-03-12
Payer: COMMERCIAL

## 2021-03-12 ENCOUNTER — OFFICE VISIT (OUTPATIENT)
Dept: ALLERGY | Facility: CLINIC | Age: 36
End: 2021-03-12
Payer: COMMERCIAL

## 2021-03-12 DIAGNOSIS — Z91.018 FOOD ALLERGY: ICD-10-CM

## 2021-03-12 DIAGNOSIS — J30.89 ALLERGIC RHINITIS DUE TO DERMATOPHAGOIDES FARINAE: ICD-10-CM

## 2021-03-12 DIAGNOSIS — Z01.818 PRE-OP TESTING: ICD-10-CM

## 2021-03-12 DIAGNOSIS — J30.89 ALLERGIC RHINITIS DUE TO DERMATOPHAGOIDES PTERONYSSINUS: ICD-10-CM

## 2021-03-12 DIAGNOSIS — J30.1 SEASONAL ALLERGIC RHINITIS DUE TO POLLEN: ICD-10-CM

## 2021-03-12 DIAGNOSIS — J45.40 MODERATE PERSISTENT ASTHMA WITHOUT COMPLICATION: Primary | ICD-10-CM

## 2021-03-12 LAB — SARS-COV-2 RNA RESP QL NAA+PROBE: NOT DETECTED

## 2021-03-12 PROCEDURE — 99999 PR PBB SHADOW E&M-EST. PATIENT-LVL I: ICD-10-PCS | Mod: PBBFAC,,, | Performed by: ALLERGY & IMMUNOLOGY

## 2021-03-12 PROCEDURE — U0003 INFECTIOUS AGENT DETECTION BY NUCLEIC ACID (DNA OR RNA); SEVERE ACUTE RESPIRATORY SYNDROME CORONAVIRUS 2 (SARS-COV-2) (CORONAVIRUS DISEASE [COVID-19]), AMPLIFIED PROBE TECHNIQUE, MAKING USE OF HIGH THROUGHPUT TECHNOLOGIES AS DESCRIBED BY CMS-2020-01-R: HCPCS | Performed by: NURSE PRACTITIONER

## 2021-03-12 PROCEDURE — U0005 INFEC AGEN DETEC AMPLI PROBE: HCPCS | Performed by: NURSE PRACTITIONER

## 2021-03-12 PROCEDURE — 99214 OFFICE O/P EST MOD 30 MIN: CPT | Mod: S$GLB,,, | Performed by: ALLERGY & IMMUNOLOGY

## 2021-03-12 PROCEDURE — 99999 PR PBB SHADOW E&M-EST. PATIENT-LVL I: CPT | Mod: PBBFAC,,, | Performed by: ALLERGY & IMMUNOLOGY

## 2021-03-12 PROCEDURE — 99214 PR OFFICE/OUTPT VISIT, EST, LEVL IV, 30-39 MIN: ICD-10-PCS | Mod: S$GLB,,, | Performed by: ALLERGY & IMMUNOLOGY

## 2021-03-12 PROCEDURE — 3072F PR LOW RISK FOR RETINOPATHY: ICD-10-PCS | Mod: S$GLB,,, | Performed by: ALLERGY & IMMUNOLOGY

## 2021-03-12 PROCEDURE — 3072F LOW RISK FOR RETINOPATHY: CPT | Mod: S$GLB,,, | Performed by: ALLERGY & IMMUNOLOGY

## 2021-03-12 RX ORDER — FLUTICASONE PROPIONATE AND SALMETEROL 250; 50 UG/1; UG/1
1 POWDER RESPIRATORY (INHALATION) 2 TIMES DAILY
Qty: 1 EACH | Refills: 5 | Status: SHIPPED | OUTPATIENT
Start: 2021-03-12 | End: 2021-04-26 | Stop reason: SDUPTHER

## 2021-03-15 ENCOUNTER — CLINICAL SUPPORT (OUTPATIENT)
Dept: PULMONOLOGY | Facility: CLINIC | Age: 36
End: 2021-03-15
Payer: COMMERCIAL

## 2021-03-15 ENCOUNTER — OFFICE VISIT (OUTPATIENT)
Dept: PULMONOLOGY | Facility: CLINIC | Age: 36
End: 2021-03-15
Payer: COMMERCIAL

## 2021-03-15 ENCOUNTER — PATIENT MESSAGE (OUTPATIENT)
Dept: PULMONOLOGY | Facility: CLINIC | Age: 36
End: 2021-03-15

## 2021-03-15 VITALS
OXYGEN SATURATION: 99 % | HEIGHT: 64 IN | HEART RATE: 96 BPM | RESPIRATION RATE: 20 BRPM | WEIGHT: 293 LBS | SYSTOLIC BLOOD PRESSURE: 120 MMHG | DIASTOLIC BLOOD PRESSURE: 80 MMHG | BODY MASS INDEX: 50.02 KG/M2

## 2021-03-15 DIAGNOSIS — J45.20 MILD INTERMITTENT ASTHMA WITHOUT COMPLICATION: ICD-10-CM

## 2021-03-15 DIAGNOSIS — R06.02 SHORTNESS OF BREATH: ICD-10-CM

## 2021-03-15 DIAGNOSIS — E11.65 TYPE 2 DIABETES MELLITUS WITH HYPERGLYCEMIA, WITHOUT LONG-TERM CURRENT USE OF INSULIN: Chronic | ICD-10-CM

## 2021-03-15 DIAGNOSIS — O99.211 OBESITY AFFECTING PREGNANCY IN FIRST TRIMESTER: ICD-10-CM

## 2021-03-15 DIAGNOSIS — J30.1 SEASONAL ALLERGIC RHINITIS DUE TO POLLEN: Chronic | ICD-10-CM

## 2021-03-15 DIAGNOSIS — R06.83 PRIMARY SNORING: ICD-10-CM

## 2021-03-15 DIAGNOSIS — R06.02 SOB (SHORTNESS OF BREATH) ON EXERTION: ICD-10-CM

## 2021-03-15 DIAGNOSIS — E66.2 CLASS 3 OBESITY WITH ALVEOLAR HYPOVENTILATION, SERIOUS COMORBIDITY, AND BODY MASS INDEX (BMI) OF 50.0 TO 59.9 IN ADULT: Primary | ICD-10-CM

## 2021-03-15 LAB
BRPFT: NORMAL
DLCO ADJ PRE: 26.18 ML/(MIN*MMHG)
DLCO SINGLE BREATH LLN: 21.75
DLCO SINGLE BREATH PRE REF: 93.1 %
DLCO SINGLE BREATH REF: 27.49
DLCOC SBVA LLN: 3.77
DLCOC SBVA PRE REF: 138 %
DLCOC SBVA REF: 5.25
DLCOC SINGLE BREATH LLN: 21.75
DLCOC SINGLE BREATH PRE REF: 95.2 %
DLCOC SINGLE BREATH REF: 27.49
DLCOVA LLN: 3.77
DLCOVA PRE REF: 134.9 %
DLCOVA PRE: 7.09 ML/(MIN*MMHG*L)
DLCOVA REF: 5.25
DLVAADJ PRE: 7.25 ML/(MIN*MMHG*L)
ERV LLN: -16448.81
ERV PRE REF: 31.7 %
ERV REF: 1.19
FEF 25 75 LLN: 1.72
FEF 25 75 PRE REF: 106.7 %
FEF 25 75 REF: 3.09
FEV1 FVC LLN: 73
FEV1 FVC PRE REF: 101.9 %
FEV1 FVC REF: 84
FEV1 LLN: 2.22
FEV1 PRE REF: 95.8 %
FEV1 REF: 2.85
FRCPLETH LLN: 1.95
FRCPLETH PREREF: 75.2 %
FRCPLETH REF: 2.78
FVC LLN: 2.67
FVC PRE REF: 93.6 %
FVC REF: 3.41
IVC PRE: 2.84 L
IVC SINGLE BREATH LLN: 2.67
IVC SINGLE BREATH PRE REF: 83.1 %
IVC SINGLE BREATH REF: 3.41
MVV LLN: 106
MVV PRE REF: 87.3 %
MVV REF: 125
PEF LLN: 4.92
PEF PRE REF: 124 %
PEF REF: 7.04
PRE DLCO: 25.6 ML/(MIN*MMHG)
PRE ERV: 0.38 L
PRE FEF 25 75: 3.29 L/S
PRE FET 100: 7.43 SEC
PRE FEV1 FVC: 85.35 %
PRE FEV1: 2.73 L
PRE FRC PL: 2.09 L
PRE FVC: 3.19 L
PRE MVV: 109 L/MIN
PRE PEF: 8.73 L/S
PRE RV: 1.79 L
PRE TLC: 5.12 L
RAW LLN: 3.06
RAW PRE REF: 91.8 %
RAW PRE: 2.81 CMH2O*S/L
RAW REF: 3.06
RV LLN: 1.01
RV PRE REF: 113.3 %
RV REF: 1.58
RVTLC LLN: 21
RVTLC PRE REF: 113.5 %
RVTLC PRE: 35.03 %
RVTLC REF: 31
TLC LLN: 4.24
TLC PRE REF: 97.8 %
TLC REF: 5.23
VA PRE: 3.61 L
VA SINGLE BREATH LLN: 5.08
VA SINGLE BREATH PRE REF: 71.1 %
VA SINGLE BREATH REF: 5.08
VC LLN: 2.67
VC PRE REF: 97.4 %
VC PRE: 3.33 L
VC REF: 3.41
VTGRAWPRE: 2.58 L

## 2021-03-15 PROCEDURE — 94729 PR C02/MEMBANE DIFFUSE CAPACITY: ICD-10-PCS | Mod: S$GLB,,, | Performed by: INTERNAL MEDICINE

## 2021-03-15 PROCEDURE — 3008F BODY MASS INDEX DOCD: CPT | Mod: CPTII,S$GLB,, | Performed by: NURSE PRACTITIONER

## 2021-03-15 PROCEDURE — 3072F LOW RISK FOR RETINOPATHY: CPT | Mod: S$GLB,,, | Performed by: NURSE PRACTITIONER

## 2021-03-15 PROCEDURE — 99999 PR PBB SHADOW E&M-EST. PATIENT-LVL V: CPT | Mod: PBBFAC,,, | Performed by: NURSE PRACTITIONER

## 2021-03-15 PROCEDURE — 94729 DIFFUSING CAPACITY: CPT | Mod: S$GLB,,, | Performed by: INTERNAL MEDICINE

## 2021-03-15 PROCEDURE — 94010 BREATHING CAPACITY TEST: ICD-10-PCS | Mod: S$GLB,,, | Performed by: INTERNAL MEDICINE

## 2021-03-15 PROCEDURE — 3044F PR MOST RECENT HEMOGLOBIN A1C LEVEL <7.0%: ICD-10-PCS | Mod: CPTII,S$GLB,, | Performed by: NURSE PRACTITIONER

## 2021-03-15 PROCEDURE — 99999 PR PBB SHADOW E&M-EST. PATIENT-LVL V: ICD-10-PCS | Mod: PBBFAC,,, | Performed by: NURSE PRACTITIONER

## 2021-03-15 PROCEDURE — 94010 BREATHING CAPACITY TEST: CPT | Mod: S$GLB,,, | Performed by: INTERNAL MEDICINE

## 2021-03-15 PROCEDURE — 94726 PULM FUNCT TST PLETHYSMOGRAP: ICD-10-PCS | Mod: S$GLB,,, | Performed by: INTERNAL MEDICINE

## 2021-03-15 PROCEDURE — 3044F HG A1C LEVEL LT 7.0%: CPT | Mod: CPTII,S$GLB,, | Performed by: NURSE PRACTITIONER

## 2021-03-15 PROCEDURE — 99214 OFFICE O/P EST MOD 30 MIN: CPT | Mod: 25,S$GLB,, | Performed by: NURSE PRACTITIONER

## 2021-03-15 PROCEDURE — 3072F PR LOW RISK FOR RETINOPATHY: ICD-10-PCS | Mod: S$GLB,,, | Performed by: NURSE PRACTITIONER

## 2021-03-15 PROCEDURE — 99214 PR OFFICE/OUTPT VISIT, EST, LEVL IV, 30-39 MIN: ICD-10-PCS | Mod: 25,S$GLB,, | Performed by: NURSE PRACTITIONER

## 2021-03-15 PROCEDURE — 94726 PLETHYSMOGRAPHY LUNG VOLUMES: CPT | Mod: S$GLB,,, | Performed by: INTERNAL MEDICINE

## 2021-03-15 PROCEDURE — 3008F PR BODY MASS INDEX (BMI) DOCUMENTED: ICD-10-PCS | Mod: CPTII,S$GLB,, | Performed by: NURSE PRACTITIONER

## 2021-03-16 ENCOUNTER — CLINICAL SUPPORT (OUTPATIENT)
Dept: PULMONOLOGY | Facility: CLINIC | Age: 36
End: 2021-03-16
Payer: COMMERCIAL

## 2021-03-16 VITALS — WEIGHT: 293 LBS | BODY MASS INDEX: 47.09 KG/M2 | HEIGHT: 66 IN

## 2021-03-16 DIAGNOSIS — E66.2 CLASS 3 OBESITY WITH ALVEOLAR HYPOVENTILATION, SERIOUS COMORBIDITY, AND BODY MASS INDEX (BMI) OF 50.0 TO 59.9 IN ADULT: ICD-10-CM

## 2021-03-16 DIAGNOSIS — R06.02 SOB (SHORTNESS OF BREATH) ON EXERTION: ICD-10-CM

## 2021-03-16 LAB
ALLENS TEST: ABNORMAL
DELSYS: ABNORMAL
HCO3 UR-SCNC: 20.9 MMOL/L (ref 24–28)
MODE: ABNORMAL
PCO2 BLDA: 31.9 MMHG (ref 35–45)
PH SMN: 7.42 [PH] (ref 7.35–7.45)
PO2 BLDA: 95 MMHG (ref 80–100)
POC BE: -3 MMOL/L
POC SATURATED O2: 98 % (ref 95–100)
SAMPLE: ABNORMAL
SITE: ABNORMAL

## 2021-03-16 PROCEDURE — 94618 PULMONARY STRESS TESTING: ICD-10-PCS | Mod: S$GLB,,, | Performed by: INTERNAL MEDICINE

## 2021-03-16 PROCEDURE — 82803 BLOOD GASES ANY COMBINATION: CPT | Mod: S$GLB,,, | Performed by: INTERNAL MEDICINE

## 2021-03-16 PROCEDURE — 82803 PR  BLOOD GASES: PH, PO2 & PCO2: ICD-10-PCS | Mod: S$GLB,,, | Performed by: INTERNAL MEDICINE

## 2021-03-16 PROCEDURE — 36600 PR WITHDRAWAL OF ARTERIAL BLOOD: ICD-10-PCS | Mod: 59,S$GLB,, | Performed by: INTERNAL MEDICINE

## 2021-03-16 PROCEDURE — 99999 PR PBB SHADOW E&M-EST. PATIENT-LVL I: CPT | Mod: PBBFAC,,,

## 2021-03-16 PROCEDURE — 36600 WITHDRAWAL OF ARTERIAL BLOOD: CPT | Mod: 59,S$GLB,, | Performed by: INTERNAL MEDICINE

## 2021-03-16 PROCEDURE — 99999 PR PBB SHADOW E&M-EST. PATIENT-LVL I: ICD-10-PCS | Mod: PBBFAC,,,

## 2021-03-16 PROCEDURE — 94618 PULMONARY STRESS TESTING: CPT | Mod: S$GLB,,, | Performed by: INTERNAL MEDICINE

## 2021-04-07 ENCOUNTER — PATIENT OUTREACH (OUTPATIENT)
Dept: ADMINISTRATIVE | Facility: OTHER | Age: 36
End: 2021-04-07

## 2021-04-14 ENCOUNTER — LAB VISIT (OUTPATIENT)
Dept: LAB | Facility: HOSPITAL | Age: 36
End: 2021-04-14
Attending: FAMILY MEDICINE
Payer: COMMERCIAL

## 2021-04-14 ENCOUNTER — OFFICE VISIT (OUTPATIENT)
Dept: FAMILY MEDICINE | Facility: CLINIC | Age: 36
End: 2021-04-14
Payer: COMMERCIAL

## 2021-04-14 VITALS
SYSTOLIC BLOOD PRESSURE: 118 MMHG | HEART RATE: 81 BPM | OXYGEN SATURATION: 100 % | TEMPERATURE: 98 F | DIASTOLIC BLOOD PRESSURE: 74 MMHG | WEIGHT: 293 LBS | RESPIRATION RATE: 14 BRPM | HEIGHT: 64 IN | BODY MASS INDEX: 50.02 KG/M2

## 2021-04-14 DIAGNOSIS — J45.909 ASTHMA AFFECTING PREGNANCY IN SECOND TRIMESTER: ICD-10-CM

## 2021-04-14 DIAGNOSIS — Z3A.18 18 WEEKS GESTATION OF PREGNANCY: ICD-10-CM

## 2021-04-14 DIAGNOSIS — Z79.899 ENCOUNTER FOR LONG-TERM (CURRENT) USE OF MEDICATIONS: ICD-10-CM

## 2021-04-14 DIAGNOSIS — I10 ESSENTIAL HYPERTENSION: Primary | Chronic | ICD-10-CM

## 2021-04-14 DIAGNOSIS — I10 ESSENTIAL HYPERTENSION: Chronic | ICD-10-CM

## 2021-04-14 DIAGNOSIS — O99.512 ASTHMA AFFECTING PREGNANCY IN SECOND TRIMESTER: ICD-10-CM

## 2021-04-14 PROBLEM — O41.8X20 SUBCHORIONIC HEMATOMA IN SECOND TRIMESTER: Status: ACTIVE | Noted: 2021-03-18

## 2021-04-14 PROBLEM — O16.9 ELEVATED BLOOD PRESSURE AFFECTING PREGNANCY, ANTEPARTUM: Status: ACTIVE | Noted: 2021-03-18

## 2021-04-14 PROBLEM — O09.522 AMA (ADVANCED MATERNAL AGE) MULTIGRAVIDA 35+, SECOND TRIMESTER: Status: ACTIVE | Noted: 2021-03-18

## 2021-04-14 PROBLEM — O46.8X2 SUBCHORIONIC HEMATOMA IN SECOND TRIMESTER: Status: ACTIVE | Noted: 2021-03-18

## 2021-04-14 LAB
ALBUMIN SERPL BCP-MCNC: 3 G/DL (ref 3.5–5.2)
ALP SERPL-CCNC: 44 U/L (ref 55–135)
ALT SERPL W/O P-5'-P-CCNC: 10 U/L (ref 10–44)
ANION GAP SERPL CALC-SCNC: 11 MMOL/L (ref 8–16)
AST SERPL-CCNC: 13 U/L (ref 10–40)
BILIRUB SERPL-MCNC: 0.2 MG/DL (ref 0.1–1)
BUN SERPL-MCNC: 4 MG/DL (ref 6–20)
CALCIUM SERPL-MCNC: 9.1 MG/DL (ref 8.7–10.5)
CHLORIDE SERPL-SCNC: 108 MMOL/L (ref 95–110)
CO2 SERPL-SCNC: 18 MMOL/L (ref 23–29)
CREAT SERPL-MCNC: 0.6 MG/DL (ref 0.5–1.4)
ERYTHROCYTE [DISTWIDTH] IN BLOOD BY AUTOMATED COUNT: 14.7 % (ref 11.5–14.5)
EST. GFR  (AFRICAN AMERICAN): >60 ML/MIN/1.73 M^2
EST. GFR  (NON AFRICAN AMERICAN): >60 ML/MIN/1.73 M^2
GLUCOSE SERPL-MCNC: 72 MG/DL (ref 70–110)
HCT VFR BLD AUTO: 36.2 % (ref 37–48.5)
HGB BLD-MCNC: 11.5 G/DL (ref 12–16)
MCH RBC QN AUTO: 24.2 PG (ref 27–31)
MCHC RBC AUTO-ENTMCNC: 31.8 G/DL (ref 32–36)
MCV RBC AUTO: 76 FL (ref 82–98)
PLATELET # BLD AUTO: 303 K/UL (ref 150–450)
PMV BLD AUTO: 10.6 FL (ref 9.2–12.9)
POTASSIUM SERPL-SCNC: 4.3 MMOL/L (ref 3.5–5.1)
PROT SERPL-MCNC: 6.8 G/DL (ref 6–8.4)
RBC # BLD AUTO: 4.75 M/UL (ref 4–5.4)
SODIUM SERPL-SCNC: 137 MMOL/L (ref 136–145)
TSH SERPL DL<=0.005 MIU/L-ACNC: 1.48 UIU/ML (ref 0.4–4)
WBC # BLD AUTO: 9.76 K/UL (ref 3.9–12.7)

## 2021-04-14 PROCEDURE — 1126F PR PAIN SEVERITY QUANTIFIED, NO PAIN PRESENT: ICD-10-PCS | Mod: S$GLB,,, | Performed by: FAMILY MEDICINE

## 2021-04-14 PROCEDURE — 3074F PR MOST RECENT SYSTOLIC BLOOD PRESSURE < 130 MM HG: ICD-10-PCS | Mod: CPTII,S$GLB,, | Performed by: FAMILY MEDICINE

## 2021-04-14 PROCEDURE — 99999 PR PBB SHADOW E&M-EST. PATIENT-LVL V: CPT | Mod: PBBFAC,,, | Performed by: FAMILY MEDICINE

## 2021-04-14 PROCEDURE — 3072F LOW RISK FOR RETINOPATHY: CPT | Mod: S$GLB,,, | Performed by: FAMILY MEDICINE

## 2021-04-14 PROCEDURE — 3008F PR BODY MASS INDEX (BMI) DOCUMENTED: ICD-10-PCS | Mod: CPTII,S$GLB,, | Performed by: FAMILY MEDICINE

## 2021-04-14 PROCEDURE — 99999 PR PBB SHADOW E&M-EST. PATIENT-LVL V: ICD-10-PCS | Mod: PBBFAC,,, | Performed by: FAMILY MEDICINE

## 2021-04-14 PROCEDURE — 80053 COMPREHEN METABOLIC PANEL: CPT | Performed by: FAMILY MEDICINE

## 2021-04-14 PROCEDURE — 3008F BODY MASS INDEX DOCD: CPT | Mod: CPTII,S$GLB,, | Performed by: FAMILY MEDICINE

## 2021-04-14 PROCEDURE — 99214 PR OFFICE/OUTPT VISIT, EST, LEVL IV, 30-39 MIN: ICD-10-PCS | Mod: S$GLB,,, | Performed by: FAMILY MEDICINE

## 2021-04-14 PROCEDURE — 3044F HG A1C LEVEL LT 7.0%: CPT | Mod: CPTII,S$GLB,, | Performed by: FAMILY MEDICINE

## 2021-04-14 PROCEDURE — 3044F PR MOST RECENT HEMOGLOBIN A1C LEVEL <7.0%: ICD-10-PCS | Mod: CPTII,S$GLB,, | Performed by: FAMILY MEDICINE

## 2021-04-14 PROCEDURE — 3078F DIAST BP <80 MM HG: CPT | Mod: CPTII,S$GLB,, | Performed by: FAMILY MEDICINE

## 2021-04-14 PROCEDURE — 3072F PR LOW RISK FOR RETINOPATHY: ICD-10-PCS | Mod: S$GLB,,, | Performed by: FAMILY MEDICINE

## 2021-04-14 PROCEDURE — 84443 ASSAY THYROID STIM HORMONE: CPT | Performed by: FAMILY MEDICINE

## 2021-04-14 PROCEDURE — 99214 OFFICE O/P EST MOD 30 MIN: CPT | Mod: S$GLB,,, | Performed by: FAMILY MEDICINE

## 2021-04-14 PROCEDURE — 36415 COLL VENOUS BLD VENIPUNCTURE: CPT | Mod: PO | Performed by: FAMILY MEDICINE

## 2021-04-14 PROCEDURE — 3074F SYST BP LT 130 MM HG: CPT | Mod: CPTII,S$GLB,, | Performed by: FAMILY MEDICINE

## 2021-04-14 PROCEDURE — 85027 COMPLETE CBC AUTOMATED: CPT | Performed by: FAMILY MEDICINE

## 2021-04-14 PROCEDURE — 1126F AMNT PAIN NOTED NONE PRSNT: CPT | Mod: S$GLB,,, | Performed by: FAMILY MEDICINE

## 2021-04-14 PROCEDURE — 3078F PR MOST RECENT DIASTOLIC BLOOD PRESSURE < 80 MM HG: ICD-10-PCS | Mod: CPTII,S$GLB,, | Performed by: FAMILY MEDICINE

## 2021-04-14 RX ORDER — ALBUTEROL SULFATE 90 UG/1
2 AEROSOL, METERED RESPIRATORY (INHALATION) EVERY 6 HOURS PRN
Qty: 18 G | Refills: 1 | Status: SHIPPED | OUTPATIENT
Start: 2021-04-14 | End: 2021-04-21 | Stop reason: SDUPTHER

## 2021-04-20 ENCOUNTER — PATIENT MESSAGE (OUTPATIENT)
Dept: ALLERGY | Facility: CLINIC | Age: 36
End: 2021-04-20

## 2021-04-21 ENCOUNTER — TELEPHONE (OUTPATIENT)
Dept: FAMILY MEDICINE | Facility: CLINIC | Age: 36
End: 2021-04-21

## 2021-04-21 DIAGNOSIS — O99.512 ASTHMA AFFECTING PREGNANCY IN SECOND TRIMESTER: ICD-10-CM

## 2021-04-21 DIAGNOSIS — J45.909 ASTHMA AFFECTING PREGNANCY IN SECOND TRIMESTER: ICD-10-CM

## 2021-04-21 RX ORDER — ALBUTEROL SULFATE 90 UG/1
2 AEROSOL, METERED RESPIRATORY (INHALATION) EVERY 6 HOURS PRN
Qty: 18 G | Refills: 1 | Status: SHIPPED | OUTPATIENT
Start: 2021-04-21 | End: 2022-07-18 | Stop reason: SDUPTHER

## 2021-04-26 ENCOUNTER — OFFICE VISIT (OUTPATIENT)
Dept: ALLERGY | Facility: CLINIC | Age: 36
End: 2021-04-26
Payer: COMMERCIAL

## 2021-04-26 VITALS
DIASTOLIC BLOOD PRESSURE: 82 MMHG | BODY MASS INDEX: 50.02 KG/M2 | TEMPERATURE: 98 F | HEART RATE: 90 BPM | SYSTOLIC BLOOD PRESSURE: 132 MMHG | WEIGHT: 293 LBS | HEIGHT: 64 IN

## 2021-04-26 DIAGNOSIS — J30.89 ALLERGIC RHINITIS DUE TO DERMATOPHAGOIDES FARINAE: ICD-10-CM

## 2021-04-26 DIAGNOSIS — J45.909 ASTHMA COMPLICATING PREGNANCY IN SECOND TRIMESTER: ICD-10-CM

## 2021-04-26 DIAGNOSIS — O99.512 ASTHMA COMPLICATING PREGNANCY IN SECOND TRIMESTER: ICD-10-CM

## 2021-04-26 DIAGNOSIS — J30.89 ALLERGIC RHINITIS DUE TO DERMATOPHAGOIDES PTERONYSSINUS: ICD-10-CM

## 2021-04-26 DIAGNOSIS — J30.1 SEASONAL ALLERGIC RHINITIS DUE TO POLLEN: Primary | ICD-10-CM

## 2021-04-26 DIAGNOSIS — Z91.018 FOOD ALLERGY: ICD-10-CM

## 2021-04-26 PROCEDURE — 1126F PR PAIN SEVERITY QUANTIFIED, NO PAIN PRESENT: ICD-10-PCS | Mod: S$GLB,,, | Performed by: ALLERGY & IMMUNOLOGY

## 2021-04-26 PROCEDURE — 99999 PR PBB SHADOW E&M-EST. PATIENT-LVL IV: CPT | Mod: PBBFAC,,, | Performed by: ALLERGY & IMMUNOLOGY

## 2021-04-26 PROCEDURE — 3072F LOW RISK FOR RETINOPATHY: CPT | Mod: S$GLB,,, | Performed by: ALLERGY & IMMUNOLOGY

## 2021-04-26 PROCEDURE — 99999 PR PBB SHADOW E&M-EST. PATIENT-LVL IV: ICD-10-PCS | Mod: PBBFAC,,, | Performed by: ALLERGY & IMMUNOLOGY

## 2021-04-26 PROCEDURE — 99214 PR OFFICE/OUTPT VISIT, EST, LEVL IV, 30-39 MIN: ICD-10-PCS | Mod: S$GLB,,, | Performed by: ALLERGY & IMMUNOLOGY

## 2021-04-26 PROCEDURE — 3072F PR LOW RISK FOR RETINOPATHY: ICD-10-PCS | Mod: S$GLB,,, | Performed by: ALLERGY & IMMUNOLOGY

## 2021-04-26 PROCEDURE — 99214 OFFICE O/P EST MOD 30 MIN: CPT | Mod: S$GLB,,, | Performed by: ALLERGY & IMMUNOLOGY

## 2021-04-26 PROCEDURE — 3008F PR BODY MASS INDEX (BMI) DOCUMENTED: ICD-10-PCS | Mod: CPTII,S$GLB,, | Performed by: ALLERGY & IMMUNOLOGY

## 2021-04-26 PROCEDURE — 3008F BODY MASS INDEX DOCD: CPT | Mod: CPTII,S$GLB,, | Performed by: ALLERGY & IMMUNOLOGY

## 2021-04-26 PROCEDURE — 1126F AMNT PAIN NOTED NONE PRSNT: CPT | Mod: S$GLB,,, | Performed by: ALLERGY & IMMUNOLOGY

## 2021-04-26 RX ORDER — ALBUTEROL SULFATE 0.83 MG/ML
2.5 SOLUTION RESPIRATORY (INHALATION) EVERY 6 HOURS PRN
Qty: 1 BOX | Refills: 4 | Status: SHIPPED | OUTPATIENT
Start: 2021-04-26 | End: 2022-04-26

## 2021-04-26 RX ORDER — FLUTICASONE PROPIONATE AND SALMETEROL 500; 50 UG/1; UG/1
1 POWDER RESPIRATORY (INHALATION) 2 TIMES DAILY
Qty: 60 EACH | Refills: 11 | Status: SHIPPED | OUTPATIENT
Start: 2021-04-26 | End: 2021-11-11 | Stop reason: CLARIF

## 2021-06-14 ENCOUNTER — OFFICE VISIT (OUTPATIENT)
Dept: ALLERGY | Facility: CLINIC | Age: 36
End: 2021-06-14
Payer: COMMERCIAL

## 2021-06-14 DIAGNOSIS — J01.10 ACUTE FRONTAL SINUSITIS, RECURRENCE NOT SPECIFIED: Primary | ICD-10-CM

## 2021-06-14 PROCEDURE — 3072F LOW RISK FOR RETINOPATHY: CPT | Mod: ,,, | Performed by: ALLERGY & IMMUNOLOGY

## 2021-06-14 PROCEDURE — 3072F PR LOW RISK FOR RETINOPATHY: ICD-10-PCS | Mod: ,,, | Performed by: ALLERGY & IMMUNOLOGY

## 2021-06-14 PROCEDURE — 99214 OFFICE O/P EST MOD 30 MIN: CPT | Mod: 95,,, | Performed by: ALLERGY & IMMUNOLOGY

## 2021-06-14 PROCEDURE — 99214 PR OFFICE/OUTPT VISIT, EST, LEVL IV, 30-39 MIN: ICD-10-PCS | Mod: 95,,, | Performed by: ALLERGY & IMMUNOLOGY

## 2021-06-14 RX ORDER — AMOXICILLIN AND CLAVULANATE POTASSIUM 875; 125 MG/1; MG/1
1 TABLET, FILM COATED ORAL EVERY 12 HOURS
Qty: 20 TABLET | Refills: 0 | Status: SHIPPED | OUTPATIENT
Start: 2021-06-14 | End: 2021-08-18

## 2021-07-28 ENCOUNTER — PATIENT OUTREACH (OUTPATIENT)
Dept: ADMINISTRATIVE | Facility: OTHER | Age: 36
End: 2021-07-28

## 2021-07-29 ENCOUNTER — OFFICE VISIT (OUTPATIENT)
Dept: ALLERGY | Facility: CLINIC | Age: 36
End: 2021-07-29
Payer: COMMERCIAL

## 2021-07-29 ENCOUNTER — OFFICE VISIT (OUTPATIENT)
Dept: RHEUMATOLOGY | Facility: CLINIC | Age: 36
End: 2021-07-29
Payer: COMMERCIAL

## 2021-07-29 VITALS
SYSTOLIC BLOOD PRESSURE: 122 MMHG | TEMPERATURE: 98 F | HEART RATE: 95 BPM | WEIGHT: 293 LBS | BODY MASS INDEX: 50.02 KG/M2 | HEIGHT: 64 IN | DIASTOLIC BLOOD PRESSURE: 79 MMHG

## 2021-07-29 VITALS
WEIGHT: 293 LBS | HEART RATE: 93 BPM | HEIGHT: 64 IN | DIASTOLIC BLOOD PRESSURE: 80 MMHG | BODY MASS INDEX: 50.02 KG/M2 | SYSTOLIC BLOOD PRESSURE: 132 MMHG

## 2021-07-29 DIAGNOSIS — J30.89 ALLERGIC RHINITIS DUE TO DERMATOPHAGOIDES PTERONYSSINUS: ICD-10-CM

## 2021-07-29 DIAGNOSIS — M54.41 CHRONIC LOW BACK PAIN WITH BILATERAL SCIATICA, UNSPECIFIED BACK PAIN LATERALITY: ICD-10-CM

## 2021-07-29 DIAGNOSIS — J30.89 ALLERGIC RHINITIS DUE TO DERMATOPHAGOIDES FARINAE: ICD-10-CM

## 2021-07-29 DIAGNOSIS — G89.29 CHRONIC LOW BACK PAIN WITH BILATERAL SCIATICA, UNSPECIFIED BACK PAIN LATERALITY: ICD-10-CM

## 2021-07-29 DIAGNOSIS — J30.1 SEASONAL ALLERGIC RHINITIS DUE TO POLLEN: ICD-10-CM

## 2021-07-29 DIAGNOSIS — M54.42 CHRONIC LOW BACK PAIN WITH BILATERAL SCIATICA, UNSPECIFIED BACK PAIN LATERALITY: ICD-10-CM

## 2021-07-29 DIAGNOSIS — J45.40 MODERATE PERSISTENT ASTHMA WITHOUT COMPLICATION: Primary | ICD-10-CM

## 2021-07-29 DIAGNOSIS — Z71.89 COUNSELING ON HEALTH PROMOTION AND DISEASE PREVENTION: ICD-10-CM

## 2021-07-29 DIAGNOSIS — R76.8 POSITIVE ANA (ANTINUCLEAR ANTIBODY): Primary | ICD-10-CM

## 2021-07-29 PROCEDURE — 99999 PR PBB SHADOW E&M-EST. PATIENT-LVL IV: ICD-10-PCS | Mod: PBBFAC,,, | Performed by: ALLERGY & IMMUNOLOGY

## 2021-07-29 PROCEDURE — 3008F PR BODY MASS INDEX (BMI) DOCUMENTED: ICD-10-PCS | Mod: CPTII,S$GLB,, | Performed by: ALLERGY & IMMUNOLOGY

## 2021-07-29 PROCEDURE — 99999 PR PBB SHADOW E&M-EST. PATIENT-LVL IV: CPT | Mod: PBBFAC,,, | Performed by: ALLERGY & IMMUNOLOGY

## 2021-07-29 PROCEDURE — 3074F PR MOST RECENT SYSTOLIC BLOOD PRESSURE < 130 MM HG: ICD-10-PCS | Mod: CPTII,S$GLB,, | Performed by: ALLERGY & IMMUNOLOGY

## 2021-07-29 PROCEDURE — 99214 OFFICE O/P EST MOD 30 MIN: CPT | Mod: S$GLB,,, | Performed by: ALLERGY & IMMUNOLOGY

## 2021-07-29 PROCEDURE — 99999 PR PBB SHADOW E&M-EST. PATIENT-LVL IV: CPT | Mod: PBBFAC,,, | Performed by: INTERNAL MEDICINE

## 2021-07-29 PROCEDURE — 3075F PR MOST RECENT SYSTOLIC BLOOD PRESS GE 130-139MM HG: ICD-10-PCS | Mod: CPTII,S$GLB,, | Performed by: INTERNAL MEDICINE

## 2021-07-29 PROCEDURE — 3072F LOW RISK FOR RETINOPATHY: CPT | Mod: CPTII,S$GLB,, | Performed by: INTERNAL MEDICINE

## 2021-07-29 PROCEDURE — 3044F PR MOST RECENT HEMOGLOBIN A1C LEVEL <7.0%: ICD-10-PCS | Mod: CPTII,S$GLB,, | Performed by: INTERNAL MEDICINE

## 2021-07-29 PROCEDURE — 3044F PR MOST RECENT HEMOGLOBIN A1C LEVEL <7.0%: ICD-10-PCS | Mod: CPTII,S$GLB,, | Performed by: ALLERGY & IMMUNOLOGY

## 2021-07-29 PROCEDURE — 1159F MED LIST DOCD IN RCRD: CPT | Mod: CPTII,S$GLB,, | Performed by: ALLERGY & IMMUNOLOGY

## 2021-07-29 PROCEDURE — 99214 PR OFFICE/OUTPT VISIT, EST, LEVL IV, 30-39 MIN: ICD-10-PCS | Mod: S$GLB,,, | Performed by: ALLERGY & IMMUNOLOGY

## 2021-07-29 PROCEDURE — 1125F PR PAIN SEVERITY QUANTIFIED, PAIN PRESENT: ICD-10-PCS | Mod: CPTII,S$GLB,, | Performed by: INTERNAL MEDICINE

## 2021-07-29 PROCEDURE — 3075F SYST BP GE 130 - 139MM HG: CPT | Mod: CPTII,S$GLB,, | Performed by: INTERNAL MEDICINE

## 2021-07-29 PROCEDURE — 3008F PR BODY MASS INDEX (BMI) DOCUMENTED: ICD-10-PCS | Mod: CPTII,S$GLB,, | Performed by: INTERNAL MEDICINE

## 2021-07-29 PROCEDURE — 99214 OFFICE O/P EST MOD 30 MIN: CPT | Mod: S$GLB,,, | Performed by: INTERNAL MEDICINE

## 2021-07-29 PROCEDURE — 3044F HG A1C LEVEL LT 7.0%: CPT | Mod: CPTII,S$GLB,, | Performed by: INTERNAL MEDICINE

## 2021-07-29 PROCEDURE — 3079F DIAST BP 80-89 MM HG: CPT | Mod: CPTII,S$GLB,, | Performed by: INTERNAL MEDICINE

## 2021-07-29 PROCEDURE — 99999 PR PBB SHADOW E&M-EST. PATIENT-LVL IV: ICD-10-PCS | Mod: PBBFAC,,, | Performed by: INTERNAL MEDICINE

## 2021-07-29 PROCEDURE — 3072F PR LOW RISK FOR RETINOPATHY: ICD-10-PCS | Mod: CPTII,S$GLB,, | Performed by: INTERNAL MEDICINE

## 2021-07-29 PROCEDURE — 3078F PR MOST RECENT DIASTOLIC BLOOD PRESSURE < 80 MM HG: ICD-10-PCS | Mod: CPTII,S$GLB,, | Performed by: ALLERGY & IMMUNOLOGY

## 2021-07-29 PROCEDURE — 1159F PR MEDICATION LIST DOCUMENTED IN MEDICAL RECORD: ICD-10-PCS | Mod: CPTII,S$GLB,, | Performed by: ALLERGY & IMMUNOLOGY

## 2021-07-29 PROCEDURE — 3078F DIAST BP <80 MM HG: CPT | Mod: CPTII,S$GLB,, | Performed by: ALLERGY & IMMUNOLOGY

## 2021-07-29 PROCEDURE — 3079F PR MOST RECENT DIASTOLIC BLOOD PRESSURE 80-89 MM HG: ICD-10-PCS | Mod: CPTII,S$GLB,, | Performed by: INTERNAL MEDICINE

## 2021-07-29 PROCEDURE — 1126F PR PAIN SEVERITY QUANTIFIED, NO PAIN PRESENT: ICD-10-PCS | Mod: CPTII,S$GLB,, | Performed by: ALLERGY & IMMUNOLOGY

## 2021-07-29 PROCEDURE — 3008F BODY MASS INDEX DOCD: CPT | Mod: CPTII,S$GLB,, | Performed by: INTERNAL MEDICINE

## 2021-07-29 PROCEDURE — 99214 PR OFFICE/OUTPT VISIT, EST, LEVL IV, 30-39 MIN: ICD-10-PCS | Mod: S$GLB,,, | Performed by: INTERNAL MEDICINE

## 2021-07-29 PROCEDURE — 3044F HG A1C LEVEL LT 7.0%: CPT | Mod: CPTII,S$GLB,, | Performed by: ALLERGY & IMMUNOLOGY

## 2021-07-29 PROCEDURE — 1126F AMNT PAIN NOTED NONE PRSNT: CPT | Mod: CPTII,S$GLB,, | Performed by: ALLERGY & IMMUNOLOGY

## 2021-07-29 PROCEDURE — 3072F LOW RISK FOR RETINOPATHY: CPT | Mod: CPTII,S$GLB,, | Performed by: ALLERGY & IMMUNOLOGY

## 2021-07-29 PROCEDURE — 3072F PR LOW RISK FOR RETINOPATHY: ICD-10-PCS | Mod: CPTII,S$GLB,, | Performed by: ALLERGY & IMMUNOLOGY

## 2021-07-29 PROCEDURE — 1125F AMNT PAIN NOTED PAIN PRSNT: CPT | Mod: CPTII,S$GLB,, | Performed by: INTERNAL MEDICINE

## 2021-07-29 PROCEDURE — 3074F SYST BP LT 130 MM HG: CPT | Mod: CPTII,S$GLB,, | Performed by: ALLERGY & IMMUNOLOGY

## 2021-07-29 PROCEDURE — 3008F BODY MASS INDEX DOCD: CPT | Mod: CPTII,S$GLB,, | Performed by: ALLERGY & IMMUNOLOGY

## 2021-07-29 RX ORDER — MONTELUKAST SODIUM 10 MG/1
10 TABLET ORAL NIGHTLY
Qty: 30 TABLET | Refills: 5 | Status: SHIPPED | OUTPATIENT
Start: 2021-07-29 | End: 2021-08-28

## 2021-08-17 ENCOUNTER — PATIENT MESSAGE (OUTPATIENT)
Dept: FAMILY MEDICINE | Facility: CLINIC | Age: 36
End: 2021-08-17

## 2021-08-18 ENCOUNTER — OFFICE VISIT (OUTPATIENT)
Dept: FAMILY MEDICINE | Facility: CLINIC | Age: 36
End: 2021-08-18
Payer: COMMERCIAL

## 2021-08-18 VITALS — SYSTOLIC BLOOD PRESSURE: 120 MMHG | DIASTOLIC BLOOD PRESSURE: 70 MMHG

## 2021-08-18 DIAGNOSIS — I10 ESSENTIAL HYPERTENSION: Primary | Chronic | ICD-10-CM

## 2021-08-18 DIAGNOSIS — O10.013 ESSENTIAL HYPERTENSION AFFECTING PREGNANCY IN THIRD TRIMESTER: ICD-10-CM

## 2021-08-18 DIAGNOSIS — Z79.899 ENCOUNTER FOR LONG-TERM (CURRENT) USE OF MEDICATIONS: ICD-10-CM

## 2021-08-18 PROBLEM — O09.893 SUPERVISION OF OTHER HIGH RISK PREGNANCIES, THIRD TRIMESTER: Status: ACTIVE | Noted: 2021-06-01

## 2021-08-18 PROBLEM — O99.013 ANEMIA IN PREGNANCY, THIRD TRIMESTER: Status: ACTIVE | Noted: 2021-07-28

## 2021-08-18 PROCEDURE — 3074F PR MOST RECENT SYSTOLIC BLOOD PRESSURE < 130 MM HG: ICD-10-PCS | Mod: CPTII,,, | Performed by: FAMILY MEDICINE

## 2021-08-18 PROCEDURE — 3078F DIAST BP <80 MM HG: CPT | Mod: CPTII,,, | Performed by: FAMILY MEDICINE

## 2021-08-18 PROCEDURE — 99213 PR OFFICE/OUTPT VISIT, EST, LEVL III, 20-29 MIN: ICD-10-PCS | Mod: 95,,, | Performed by: FAMILY MEDICINE

## 2021-08-18 PROCEDURE — 1160F PR REVIEW ALL MEDS BY PRESCRIBER/CLIN PHARMACIST DOCUMENTED: ICD-10-PCS | Mod: CPTII,,, | Performed by: FAMILY MEDICINE

## 2021-08-18 PROCEDURE — 1159F PR MEDICATION LIST DOCUMENTED IN MEDICAL RECORD: ICD-10-PCS | Mod: CPTII,,, | Performed by: FAMILY MEDICINE

## 2021-08-18 PROCEDURE — 1159F MED LIST DOCD IN RCRD: CPT | Mod: CPTII,,, | Performed by: FAMILY MEDICINE

## 2021-08-18 PROCEDURE — 99213 OFFICE O/P EST LOW 20 MIN: CPT | Mod: 95,,, | Performed by: FAMILY MEDICINE

## 2021-08-18 PROCEDURE — 1160F RVW MEDS BY RX/DR IN RCRD: CPT | Mod: CPTII,,, | Performed by: FAMILY MEDICINE

## 2021-08-18 PROCEDURE — 3044F HG A1C LEVEL LT 7.0%: CPT | Mod: CPTII,,, | Performed by: FAMILY MEDICINE

## 2021-08-18 PROCEDURE — 3072F LOW RISK FOR RETINOPATHY: CPT | Mod: CPTII,,, | Performed by: FAMILY MEDICINE

## 2021-08-18 PROCEDURE — 3078F PR MOST RECENT DIASTOLIC BLOOD PRESSURE < 80 MM HG: ICD-10-PCS | Mod: CPTII,,, | Performed by: FAMILY MEDICINE

## 2021-08-18 PROCEDURE — 3074F SYST BP LT 130 MM HG: CPT | Mod: CPTII,,, | Performed by: FAMILY MEDICINE

## 2021-08-18 PROCEDURE — 3044F PR MOST RECENT HEMOGLOBIN A1C LEVEL <7.0%: ICD-10-PCS | Mod: CPTII,,, | Performed by: FAMILY MEDICINE

## 2021-08-18 PROCEDURE — 3072F PR LOW RISK FOR RETINOPATHY: ICD-10-PCS | Mod: CPTII,,, | Performed by: FAMILY MEDICINE

## 2021-08-18 RX ORDER — METOCLOPRAMIDE 10 MG/1
10 TABLET ORAL DAILY
COMMUNITY
Start: 2021-07-28 | End: 2022-01-18

## 2021-08-18 RX ORDER — LACTULOSE 10 G/15ML
SOLUTION ORAL; RECTAL
COMMUNITY
Start: 2021-07-28 | End: 2022-01-18

## 2021-09-02 NOTE — PROGRESS NOTES
Patient presents to clinic today for a blood pressure check.   PCP is Flori James.     Patient complains of bilateral ear fullness. She stated, \"I just feel a little weird when I woke up this morning.\"  She stated she feels Dizzy but not dizzy enough to fall over.   Denies SOB  Denies Chest pain     She reports she takes her medication at night.   First BP check done manually was: 166/74  Second check completed 5 minutes later by the machine was 157/84.     Discussed above note with Flori James PA-C. Hayes recommendation was for Poly to report to Urgent care if sx worsen.  Discussed this with Patient and she stated sx seem to be bad enough to go. She stated something just isnt right.          See Flowsheet for immunotherapy administration. Patient waited in clinic 30 min for observation.

## 2021-09-21 ENCOUNTER — LAB VISIT (OUTPATIENT)
Dept: LAB | Facility: HOSPITAL | Age: 36
End: 2021-09-21
Attending: FAMILY MEDICINE
Payer: COMMERCIAL

## 2021-09-21 ENCOUNTER — OFFICE VISIT (OUTPATIENT)
Dept: FAMILY MEDICINE | Facility: CLINIC | Age: 36
End: 2021-09-21
Payer: COMMERCIAL

## 2021-09-21 VITALS
OXYGEN SATURATION: 99 % | HEIGHT: 64 IN | HEART RATE: 89 BPM | BODY MASS INDEX: 49.34 KG/M2 | DIASTOLIC BLOOD PRESSURE: 74 MMHG | WEIGHT: 289 LBS | TEMPERATURE: 98 F | SYSTOLIC BLOOD PRESSURE: 106 MMHG

## 2021-09-21 DIAGNOSIS — Z09 HOSPITAL DISCHARGE FOLLOW-UP: Primary | ICD-10-CM

## 2021-09-21 DIAGNOSIS — D62 ANEMIA DUE TO ACUTE BLOOD LOSS: ICD-10-CM

## 2021-09-21 DIAGNOSIS — T81.49XA SURGICAL SITE INFECTION: ICD-10-CM

## 2021-09-21 LAB
ALBUMIN SERPL BCP-MCNC: 3.3 G/DL (ref 3.5–5.2)
ALP SERPL-CCNC: 80 U/L (ref 55–135)
ALT SERPL W/O P-5'-P-CCNC: 9 U/L (ref 10–44)
ANION GAP SERPL CALC-SCNC: 15 MMOL/L (ref 8–16)
AST SERPL-CCNC: 13 U/L (ref 10–40)
BASOPHILS # BLD AUTO: 0.06 K/UL (ref 0–0.2)
BASOPHILS NFR BLD: 0.7 % (ref 0–1.9)
BILIRUB SERPL-MCNC: 0.2 MG/DL (ref 0.1–1)
BUN SERPL-MCNC: 9 MG/DL (ref 6–20)
CALCIUM SERPL-MCNC: 9.4 MG/DL (ref 8.7–10.5)
CHLORIDE SERPL-SCNC: 106 MMOL/L (ref 95–110)
CO2 SERPL-SCNC: 21 MMOL/L (ref 23–29)
CREAT SERPL-MCNC: 0.7 MG/DL (ref 0.5–1.4)
CRP SERPL-MCNC: 39.2 MG/L (ref 0–8.2)
DIFFERENTIAL METHOD: ABNORMAL
EOSINOPHIL # BLD AUTO: 0.3 K/UL (ref 0–0.5)
EOSINOPHIL NFR BLD: 2.8 % (ref 0–8)
ERYTHROCYTE [DISTWIDTH] IN BLOOD BY AUTOMATED COUNT: 14.9 % (ref 11.5–14.5)
ERYTHROCYTE [SEDIMENTATION RATE] IN BLOOD BY WESTERGREN METHOD: 65 MM/HR (ref 0–20)
EST. GFR  (AFRICAN AMERICAN): >60 ML/MIN/1.73 M^2
EST. GFR  (NON AFRICAN AMERICAN): >60 ML/MIN/1.73 M^2
GLUCOSE SERPL-MCNC: 79 MG/DL (ref 70–110)
HCT VFR BLD AUTO: 30.6 % (ref 37–48.5)
HGB BLD-MCNC: 9 G/DL (ref 12–16)
IMM GRANULOCYTES # BLD AUTO: 0.03 K/UL (ref 0–0.04)
IMM GRANULOCYTES NFR BLD AUTO: 0.3 % (ref 0–0.5)
LYMPHOCYTES # BLD AUTO: 3.4 K/UL (ref 1–4.8)
LYMPHOCYTES NFR BLD: 36.9 % (ref 18–48)
MCH RBC QN AUTO: 23.4 PG (ref 27–31)
MCHC RBC AUTO-ENTMCNC: 29.4 G/DL (ref 32–36)
MCV RBC AUTO: 80 FL (ref 82–98)
MONOCYTES # BLD AUTO: 0.7 K/UL (ref 0.3–1)
MONOCYTES NFR BLD: 7.1 % (ref 4–15)
NEUTROPHILS # BLD AUTO: 4.8 K/UL (ref 1.8–7.7)
NEUTROPHILS NFR BLD: 52.2 % (ref 38–73)
NRBC BLD-RTO: 0 /100 WBC
PLATELET # BLD AUTO: 635 K/UL (ref 150–450)
PMV BLD AUTO: 10.3 FL (ref 9.2–12.9)
POTASSIUM SERPL-SCNC: 3.7 MMOL/L (ref 3.5–5.1)
PROT SERPL-MCNC: 7.8 G/DL (ref 6–8.4)
RBC # BLD AUTO: 3.84 M/UL (ref 4–5.4)
SODIUM SERPL-SCNC: 142 MMOL/L (ref 136–145)
TSH SERPL DL<=0.005 MIU/L-ACNC: 1.41 UIU/ML (ref 0.4–4)
WBC # BLD AUTO: 9.18 K/UL (ref 3.9–12.7)

## 2021-09-21 PROCEDURE — 1159F MED LIST DOCD IN RCRD: CPT | Mod: CPTII,S$GLB,, | Performed by: FAMILY MEDICINE

## 2021-09-21 PROCEDURE — 3078F PR MOST RECENT DIASTOLIC BLOOD PRESSURE < 80 MM HG: ICD-10-PCS | Mod: CPTII,S$GLB,, | Performed by: FAMILY MEDICINE

## 2021-09-21 PROCEDURE — 3074F SYST BP LT 130 MM HG: CPT | Mod: CPTII,S$GLB,, | Performed by: FAMILY MEDICINE

## 2021-09-21 PROCEDURE — 3044F HG A1C LEVEL LT 7.0%: CPT | Mod: CPTII,S$GLB,, | Performed by: FAMILY MEDICINE

## 2021-09-21 PROCEDURE — 85651 RBC SED RATE NONAUTOMATED: CPT | Mod: PO | Performed by: FAMILY MEDICINE

## 2021-09-21 PROCEDURE — 3008F BODY MASS INDEX DOCD: CPT | Mod: CPTII,S$GLB,, | Performed by: FAMILY MEDICINE

## 2021-09-21 PROCEDURE — 3074F PR MOST RECENT SYSTOLIC BLOOD PRESSURE < 130 MM HG: ICD-10-PCS | Mod: CPTII,S$GLB,, | Performed by: FAMILY MEDICINE

## 2021-09-21 PROCEDURE — 36415 COLL VENOUS BLD VENIPUNCTURE: CPT | Mod: PO | Performed by: FAMILY MEDICINE

## 2021-09-21 PROCEDURE — 99999 PR PBB SHADOW E&M-EST. PATIENT-LVL V: ICD-10-PCS | Mod: PBBFAC,,, | Performed by: FAMILY MEDICINE

## 2021-09-21 PROCEDURE — 84443 ASSAY THYROID STIM HORMONE: CPT | Performed by: FAMILY MEDICINE

## 2021-09-21 PROCEDURE — 3008F PR BODY MASS INDEX (BMI) DOCUMENTED: ICD-10-PCS | Mod: CPTII,S$GLB,, | Performed by: FAMILY MEDICINE

## 2021-09-21 PROCEDURE — 3072F PR LOW RISK FOR RETINOPATHY: ICD-10-PCS | Mod: CPTII,S$GLB,, | Performed by: FAMILY MEDICINE

## 2021-09-21 PROCEDURE — 85025 COMPLETE CBC W/AUTO DIFF WBC: CPT | Performed by: FAMILY MEDICINE

## 2021-09-21 PROCEDURE — 86140 C-REACTIVE PROTEIN: CPT | Performed by: FAMILY MEDICINE

## 2021-09-21 PROCEDURE — 99214 PR OFFICE/OUTPT VISIT, EST, LEVL IV, 30-39 MIN: ICD-10-PCS | Mod: S$GLB,,, | Performed by: FAMILY MEDICINE

## 2021-09-21 PROCEDURE — 1160F RVW MEDS BY RX/DR IN RCRD: CPT | Mod: CPTII,S$GLB,, | Performed by: FAMILY MEDICINE

## 2021-09-21 PROCEDURE — 99214 OFFICE O/P EST MOD 30 MIN: CPT | Mod: S$GLB,,, | Performed by: FAMILY MEDICINE

## 2021-09-21 PROCEDURE — 1159F PR MEDICATION LIST DOCUMENTED IN MEDICAL RECORD: ICD-10-PCS | Mod: CPTII,S$GLB,, | Performed by: FAMILY MEDICINE

## 2021-09-21 PROCEDURE — 80053 COMPREHEN METABOLIC PANEL: CPT | Performed by: FAMILY MEDICINE

## 2021-09-21 PROCEDURE — 3078F DIAST BP <80 MM HG: CPT | Mod: CPTII,S$GLB,, | Performed by: FAMILY MEDICINE

## 2021-09-21 PROCEDURE — 1160F PR REVIEW ALL MEDS BY PRESCRIBER/CLIN PHARMACIST DOCUMENTED: ICD-10-PCS | Mod: CPTII,S$GLB,, | Performed by: FAMILY MEDICINE

## 2021-09-21 PROCEDURE — 3072F LOW RISK FOR RETINOPATHY: CPT | Mod: CPTII,S$GLB,, | Performed by: FAMILY MEDICINE

## 2021-09-21 PROCEDURE — 99999 PR PBB SHADOW E&M-EST. PATIENT-LVL V: CPT | Mod: PBBFAC,,, | Performed by: FAMILY MEDICINE

## 2021-09-21 PROCEDURE — 3044F PR MOST RECENT HEMOGLOBIN A1C LEVEL <7.0%: ICD-10-PCS | Mod: CPTII,S$GLB,, | Performed by: FAMILY MEDICINE

## 2021-09-21 RX ORDER — BUPROPION HYDROCHLORIDE 150 MG/1
150 TABLET ORAL
COMMUNITY
Start: 2021-09-21 | End: 2022-01-18

## 2021-09-21 RX ORDER — FLUCONAZOLE 150 MG/1
150 TABLET ORAL
COMMUNITY
Start: 2021-09-21 | End: 2021-09-21

## 2021-09-21 RX ORDER — OXYCODONE AND ACETAMINOPHEN 5; 325 MG/1; MG/1
1 TABLET ORAL
COMMUNITY
Start: 2021-09-14 | End: 2021-09-21

## 2021-09-21 RX ORDER — HYDROCODONE BITARTRATE AND ACETAMINOPHEN 10; 325 MG/1; MG/1
1 TABLET ORAL EVERY 6 HOURS PRN
Qty: 28 TABLET | Refills: 0 | Status: SHIPPED | OUTPATIENT
Start: 2021-09-21 | End: 2022-01-18

## 2021-09-21 RX ORDER — IBUPROFEN 600 MG/1
600 TABLET ORAL EVERY 6 HOURS PRN
COMMUNITY
Start: 2021-09-17 | End: 2021-11-11 | Stop reason: SDUPTHER

## 2021-09-21 RX ORDER — HYDROXYZINE HYDROCHLORIDE 25 MG/1
TABLET, FILM COATED ORAL
COMMUNITY
Start: 2021-09-14 | End: 2022-01-18

## 2021-09-21 RX ORDER — MOXIFLOXACIN HYDROCHLORIDE 400 MG/1
400 TABLET ORAL
COMMUNITY
Start: 2021-09-15 | End: 2022-01-18

## 2021-09-22 ENCOUNTER — PATIENT MESSAGE (OUTPATIENT)
Dept: FAMILY MEDICINE | Facility: CLINIC | Age: 36
End: 2021-09-22

## 2021-09-28 ENCOUNTER — OFFICE VISIT (OUTPATIENT)
Dept: WOUND CARE | Facility: CLINIC | Age: 36
End: 2021-09-28
Payer: COMMERCIAL

## 2021-09-28 VITALS
BODY MASS INDEX: 49.34 KG/M2 | RESPIRATION RATE: 20 BRPM | WEIGHT: 289 LBS | DIASTOLIC BLOOD PRESSURE: 80 MMHG | TEMPERATURE: 98 F | SYSTOLIC BLOOD PRESSURE: 118 MMHG | HEART RATE: 75 BPM | HEIGHT: 64 IN

## 2021-09-28 DIAGNOSIS — S31.109A OPEN WOUND OF ABDOMEN, INITIAL ENCOUNTER: Primary | ICD-10-CM

## 2021-09-28 PROCEDURE — 99202 OFFICE O/P NEW SF 15 MIN: CPT | Mod: S$GLB,,, | Performed by: FAMILY MEDICINE

## 2021-09-28 PROCEDURE — 3079F DIAST BP 80-89 MM HG: CPT | Mod: CPTII,S$GLB,, | Performed by: FAMILY MEDICINE

## 2021-09-28 PROCEDURE — 3074F SYST BP LT 130 MM HG: CPT | Mod: CPTII,S$GLB,, | Performed by: FAMILY MEDICINE

## 2021-09-28 PROCEDURE — 1159F PR MEDICATION LIST DOCUMENTED IN MEDICAL RECORD: ICD-10-PCS | Mod: CPTII,S$GLB,, | Performed by: FAMILY MEDICINE

## 2021-09-28 PROCEDURE — 1160F RVW MEDS BY RX/DR IN RCRD: CPT | Mod: CPTII,S$GLB,, | Performed by: FAMILY MEDICINE

## 2021-09-28 PROCEDURE — 99999 PR PBB SHADOW E&M-EST. PATIENT-LVL III: CPT | Mod: PBBFAC,,, | Performed by: FAMILY MEDICINE

## 2021-09-28 PROCEDURE — 99202 PR OFFICE/OUTPT VISIT, NEW, LEVL II, 15-29 MIN: ICD-10-PCS | Mod: S$GLB,,, | Performed by: FAMILY MEDICINE

## 2021-09-28 PROCEDURE — 3044F HG A1C LEVEL LT 7.0%: CPT | Mod: CPTII,S$GLB,, | Performed by: FAMILY MEDICINE

## 2021-09-28 PROCEDURE — 99999 PR PBB SHADOW E&M-EST. PATIENT-LVL III: ICD-10-PCS | Mod: PBBFAC,,, | Performed by: FAMILY MEDICINE

## 2021-09-28 PROCEDURE — 3044F PR MOST RECENT HEMOGLOBIN A1C LEVEL <7.0%: ICD-10-PCS | Mod: CPTII,S$GLB,, | Performed by: FAMILY MEDICINE

## 2021-09-28 PROCEDURE — 3072F LOW RISK FOR RETINOPATHY: CPT | Mod: CPTII,S$GLB,, | Performed by: FAMILY MEDICINE

## 2021-09-28 PROCEDURE — 1159F MED LIST DOCD IN RCRD: CPT | Mod: CPTII,S$GLB,, | Performed by: FAMILY MEDICINE

## 2021-09-28 PROCEDURE — 3008F BODY MASS INDEX DOCD: CPT | Mod: CPTII,S$GLB,, | Performed by: FAMILY MEDICINE

## 2021-09-28 PROCEDURE — 1160F PR REVIEW ALL MEDS BY PRESCRIBER/CLIN PHARMACIST DOCUMENTED: ICD-10-PCS | Mod: CPTII,S$GLB,, | Performed by: FAMILY MEDICINE

## 2021-09-28 PROCEDURE — 3074F PR MOST RECENT SYSTOLIC BLOOD PRESSURE < 130 MM HG: ICD-10-PCS | Mod: CPTII,S$GLB,, | Performed by: FAMILY MEDICINE

## 2021-09-28 PROCEDURE — 3072F PR LOW RISK FOR RETINOPATHY: ICD-10-PCS | Mod: CPTII,S$GLB,, | Performed by: FAMILY MEDICINE

## 2021-09-28 PROCEDURE — 3079F PR MOST RECENT DIASTOLIC BLOOD PRESSURE 80-89 MM HG: ICD-10-PCS | Mod: CPTII,S$GLB,, | Performed by: FAMILY MEDICINE

## 2021-09-28 PROCEDURE — 3008F PR BODY MASS INDEX (BMI) DOCUMENTED: ICD-10-PCS | Mod: CPTII,S$GLB,, | Performed by: FAMILY MEDICINE

## 2021-10-01 ENCOUNTER — PATIENT MESSAGE (OUTPATIENT)
Dept: FAMILY MEDICINE | Facility: CLINIC | Age: 36
End: 2021-10-01

## 2021-10-03 RX ORDER — PANTOPRAZOLE SODIUM 40 MG/1
40 TABLET, DELAYED RELEASE ORAL DAILY
Qty: 90 TABLET | Refills: 4 | Status: SHIPPED | OUTPATIENT
Start: 2021-10-03 | End: 2023-03-23 | Stop reason: SDUPTHER

## 2021-10-04 ENCOUNTER — PATIENT MESSAGE (OUTPATIENT)
Dept: WOUND CARE | Facility: CLINIC | Age: 36
End: 2021-10-04

## 2021-10-04 ENCOUNTER — TELEPHONE (OUTPATIENT)
Dept: FAMILY MEDICINE | Facility: CLINIC | Age: 36
End: 2021-10-04

## 2021-10-11 ENCOUNTER — PATIENT OUTREACH (OUTPATIENT)
Dept: ADMINISTRATIVE | Facility: HOSPITAL | Age: 36
End: 2021-10-11

## 2021-10-11 DIAGNOSIS — E11.65 TYPE 2 DIABETES MELLITUS WITH HYPERGLYCEMIA, WITHOUT LONG-TERM CURRENT USE OF INSULIN: Primary | ICD-10-CM

## 2021-10-13 ENCOUNTER — OFFICE VISIT (OUTPATIENT)
Dept: WOUND CARE | Facility: CLINIC | Age: 36
End: 2021-10-13
Payer: COMMERCIAL

## 2021-10-13 VITALS
RESPIRATION RATE: 20 BRPM | SYSTOLIC BLOOD PRESSURE: 132 MMHG | DIASTOLIC BLOOD PRESSURE: 80 MMHG | HEART RATE: 86 BPM | TEMPERATURE: 98 F

## 2021-10-13 DIAGNOSIS — S31.109D OPEN WOUND OF ABDOMEN, SUBSEQUENT ENCOUNTER: Primary | ICD-10-CM

## 2021-10-13 PROCEDURE — 99499 UNLISTED E&M SERVICE: CPT | Mod: S$GLB,,, | Performed by: FAMILY MEDICINE

## 2021-10-13 PROCEDURE — 17250 CHEM CAUT OF GRANLTJ TISSUE: CPT | Mod: S$GLB,,, | Performed by: FAMILY MEDICINE

## 2021-10-13 PROCEDURE — 99499 NO LOS: ICD-10-PCS | Mod: S$GLB,,, | Performed by: FAMILY MEDICINE

## 2021-10-13 PROCEDURE — 99999 PR PBB SHADOW E&M-EST. PATIENT-LVL IV: ICD-10-PCS | Mod: PBBFAC,,, | Performed by: FAMILY MEDICINE

## 2021-10-13 PROCEDURE — 17250 PR CHEM CAUTERY GRANULATN TISSUE: ICD-10-PCS | Mod: S$GLB,,, | Performed by: FAMILY MEDICINE

## 2021-10-13 PROCEDURE — 99999 PR PBB SHADOW E&M-EST. PATIENT-LVL IV: CPT | Mod: PBBFAC,,, | Performed by: FAMILY MEDICINE

## 2021-10-13 NOTE — PROGRESS NOTES
[facility]                         Wound Care Clinic                Progress Note    Subjective:       Patient ID: Lucía Coreas is a 36 y.o. female.    Chief Complaint: Wound Check (abdominal wound)    HPI   Pt seen in clinic for a FU visit for an open wound of the abdomen. Wound is progressing well, and the base is hyper-granulating. Pt has no new complaints at this visit.  Case Summary:    Initial Consult Date: 21  Wound onset: Aug '21, after   Referring MD: Dr. Negro, PCP  Physician List: GYN- [place]; Dr. Negro, PCP  Vascular Status/OTONIEL:    Sensilase or Vascular Studies:  Nutrition Risk/Labs:  Other Labs:  Recent Cultures & Dates:  Radiology/Xray:  Diabetes Status/HbA1c:  Offloading/Immobilization:  Edema/Compression:  Tobacco Use: none  Other:  Recent Antibiotics:  Recent MD visit: Dr. Negro  Upcoming MD Visit: sees OB/GYN every few weeks, post partum visits.    10/13/21: Wound vac removed Oct 4th by home health as home health said wound did not require NPWT any longer    Review of Systems   Skin: Positive for wound.        Open wound of the abdomen   All other systems reviewed and are negative.        Objective:        Physical Exam  Vitals and nursing note reviewed.   Constitutional:       General: She is not in acute distress.     Appearance: Normal appearance. She is not ill-appearing, toxic-appearing or diaphoretic.   Skin:     General: Skin is warm and dry.      Coloration: Skin is not jaundiced or pale.      Findings: Lesion present. No bruising, erythema or rash.      Comments: Abdomen open wound, hyper-granulated, serous drainage   Neurological:      General: No focal deficit present.      Mental Status: She is alert and oriented to person, place, and time.   Psychiatric:         Mood and Affect: Mood normal.         Behavior: Behavior normal.         Thought Content: Thought content normal.         Judgment:  Judgment normal.         Vitals:    10/13/21 1002   BP: 132/80   Pulse: 86   Resp: 20   Temp: 98.3 °F (36.8 °C)       Assessment:           ICD-10-CM ICD-9-CM   1. Open wound of abdomen, subsequent encounter  S31.109D V58.89     879.2            Incision/Site 09/28/21 1030 Abdomen medial (Active)   09/28/21 1030   Present Prior to Hospital Arrival?: Yes   Side:    Location: Abdomen   Orientation: medial   Incision Type:    Closure Method:    Additional Comments:    Removal Indication and Assessment:    Wound Outcome:    Removal Indications:    Wound Image   10/13/21 1000   Dressing Appearance Moist drainage;Intact 10/13/21 1000   Drainage Amount Scant 10/13/21 1000   Drainage Characteristics/Odor Serous 10/13/21 1000   Appearance Red;Pink;Hypergranulation 10/13/21 1000   Red (%), Wound Tissue Color 100 % 10/13/21 1000   Periwound Area Intact 10/13/21 1000   Wound Edges Defined 10/13/21 1000   Wound Length (cm) 0.5 cm 10/13/21 1000   Wound Width (cm) 6 cm 10/13/21 1000   Wound Depth (cm) 0.1 cm 10/13/21 1000   Wound Volume (cm^3) 0.3 cm^3 10/13/21 1000   Wound Surface Area (cm^2) 3 cm^2 10/13/21 1000   Care Cleansed with:;Wound cleanser 10/13/21 1000   Dressing Applied;Silver;Foam 10/13/21 1000   Periwound Care Skin barrier film applied;Other (see comments) 10/13/21 1000   Dressing Change Due 10/15/21 10/13/21 1000           Plan:            MD Orders:  Obtained wound photos and measurements.  Anesthetic:    No anesthetic needed.   Wound cleansing:    Saline or wound cleanser to cleanse wound   Debridement:   Silver nitrate used by Dr. Chakraborty today  Topical Treatments:   Skin prep as needed to periwound areas  Dressings:    Silvercel, silicone border foam; interdry AG to abdominal skin folds  Dietary:    As tolerated: 3 well-balanced meals   Increased protein: Boost or Ensure, [employer] Instant Breakfast (purchase sugarfree if Diabetic) Increasing your protein intake is very helpful with wound healing; if you  are a kidney or dialysis patient please check with your Nephrologist as to how much protein you are allowed to take in with your condition. Taking a daily Multivitamin with Zinc as well as Vitamin C, 1000 mg minimum per day will also help with healing your wound. If you are on Coumadin, please contact the Coumadin Clinic before beginning a Multivitamin. High protein items that can be added to your diet include, extra helpings of meats, fish, beans, also High protein bars that add as little as 12 gms of protein and up to 32 gms of protein per bar.   Supplement with: Multivitamin with Zinc and Vitamin C 500mg twice a day.  Return Appointment: 10/27/21    Pt to change dressing in between home health visits. Clean abdominal wound with soap/water, saline or wound cleanser. Pat dry. Apply a small strip (cut from the larger square) of silver alginate to wound, then cover with a bandage of choice. May use wicking fabric (such as Interdry, Dry Sorb or a wicking T-shirt, cut into strips) to abdominal skin folds to help absorb moisture. If dressing changed by home health remains in place and does not have a large amount of drainage, pt will not need to change dressing. Wound vac to be discontinued and may be sent back to [facility].     Home Health: Please see pt twice a week when pt goes to wound care clinic and three times a week when pt does not. Pt to return to wound care clinic on 10/27/21. Clean abdominal wound with NSS or wound cleanser, pat dry. Cover with a small strip of silver alginate then bandage of choice. Please leave extra supplies if pt needs to change in between home health visits. Silver nitrite was used on 10/13/21 wound care clinic visit, so wound may have darkened areas/discoloration. Wound vac discontinued today, 10/13/21 and pt to send back to [facility].        Lucía was seen today for wound check.    Diagnoses and all orders for this visit:    Open wound of abdomen, subsequent encounter  -     Change  dressing

## 2021-10-13 NOTE — PATIENT INSTRUCTIONS
Topical Treatments:   Skin prep as needed to periwound areas  Dressings:    Silvercel, silicone border foam; interdry AG to abdominal skin folds  Dietary:    As tolerated: 3 well-balanced meals   Increased protein: Boost or Ensure, Dyer Instant Breakfast (purchase sugarfree if Diabetic) Increasing your protein intake is very helpful with wound healing; if you are a kidney or dialysis patient please check with your Nephrologist as to how much protein you are allowed to take in with your condition. Taking a daily Multivitamin with Zinc as well as Vitamin C, 1000 mg minimum per day will also help with healing your wound. If you are on Coumadin, please contact the Coumadin Clinic before beginning a Multivitamin. High protein items that can be added to your diet include, extra helpings of meats, fish, beans, also High protein bars that add as little as 12 gms of protein and up to 32 gms of protein per bar.   Supplement with: Multivitamin with Zinc and Vitamin C 500mg twice a day.  Return Appointment: 10/27/21    Pt to change dressing in between home health visits. Clean abdominal wound with soap/water, saline or wound cleanser. Pat dry. Apply a small strip (cut from the larger square) of silver alginate to wound, then cover with a bandage of choice. May use wicking fabric (such as Interdry, Dry Sorb or a wicking T-shirt, cut into strips) to abdominal skin folds to help absorb moisture. If dressing changed by home health remains in place and does not have a large amount of drainage, pt will not need to change dressing. Wound vac to be discontinued and may be sent back to [facility].     Home Health: Please see pt twice a week when pt goes to wound care clinic and three times a week when pt does not. Pt to return to wound care clinic on 10/27/21. Clean abdominal wound with NSS or wound cleanser, pat dry. Cover with a small strip of silver alginate then bandage of choice. Please leave extra supplies if pt needs to change  in between home health visits. Silver nitrite was used on 10/13/21 wound care clinic visit, so wound may have darkened areas/discoloration. Wound vac discontinued today, 10/13/21 and pt to send back to [facility].    Discharge Instructions:     The clinic is open Mon-Fri from 8:00 a.m. until 5:00 p.m. During business hours call the [facility] at [phone]  and ask for Wound Care Department for any worsening symptoms; fever >101.0, increased pain, increased drainage, foul odor or problems with the dressing. For after hours problems or emergencies please report to the nearest emergency room.      Patient instructed on proper handwashing technique. Using warm water and soap, apply soap, lather well and vigorously apply friction for a minimum of 20 seconds, rinse well and dry well; wash before and after toileting, before and after woundcare, after sneezing, coughing etc.     Routine Dressing instructions: Patient to keep the dressing clean, dry and intact. Call for any worsening symptoms or problems. [phone].     Discharge Instructions: Changing Your Dressing  You are going home with stitches (sutures), surgical staples, special strips of tape called Steri-Strips, or surgical skin glue. These items were used to close your incision, help stop bleeding, and speed healing. Follow the tips on this sheet to help your incision heal.  Home care  · Clean your work area:  ¨ Put pets in another room.  ¨ Clean your work area with soap and water.  ¨ Spread a clean cloth or paper towel over the surface.  ¨ Move away from the clean surface if you need to cough or sneeze.  · Gather your supplies:  ¨ Packaged dressing for your wound  ¨ Irrigation solutions (if using these)  ¨ Pair of scissors (cleaned with soap and water)  ¨ Medical tape  ¨ Disposable gloves (2 pairs)  ¨ Clean plastic trash bag (open it before you wash your hands)  · Wash your hands:  ¨ Use liquid soap.  ¨ Work up a good lather and scrub for 1 to 2  minutes.  ¨ Be sure to scrub between your fingers and under your nails.  ¨ Rinse with warm water, keeping your fingers pointed down.  ¨ Use a clean paper towel to dry your hands and turn off the faucet.  · Prepare your dressing supplies:  ¨ Peel back the edges of the dressing packages. Pour any irrigation solutions into solution cups.  ¨ Cut each piece of tape 4 inches longer than the dressing.  · Remove the old dressing:  ¨ Put on disposable gloves.  ¨ Loosen the tape on the dressing by pulling gently toward the incision. Remove the dressing 1 layer at a time. Put it in the plastic bag immediately.  ¨ Remove your gloves and put them in the plastic bag. Wash your hands.  ¨ Put on a new pair of gloves.  · Clean and dress the incision:  ¨ Irrigate and clean the incision and apply a new dressing as directed.  ¨ Put all used supplies in the plastic bag. Remove your gloves last and put them in the plastic bag. Seal the bag and put it in the trash.  ¨ Be sure to wash your hands again.  Care for specific closures  Follow these guidelines unless your healthcare provider tells you otherwise:  · Sutures or staples. Once you no longer need to keep these dry, clean the wound daily, using the instructions listed above. First remove the bandage using clean hands. Then wash the area gently with soap and warm water. Use a wet cotton swab to loosen and remove any blood or crust that forms. After cleaning, put a thin layer of antibiotic ointment on if your healthcare provider instructed you to do so. Then put on a new bandage.  · Skin glue. Dont put liquid, ointment, or cream on your wound while the glue is in place. Avoid activities that cause heavy sweating. Protect the wound from sunlight. Do not scratch, rub, or pick at the glue. Do not put tape directly over the glue. The glue should peel off within 5 to 10 days.  · Surgical tape. Keep the area dry. If it gets wet, blot the area dry with a clean towel. Surgical tape usually  falls off within 7 to 10 days. If it has not fallen off after 10 days, contact your healthcare provider before taking it off yourself. If you are told to remove the tape, put mineral oil or petroleum jelly on a cotton ball. Gently rub the tape until it is removed.  Follow-up care  Follow up with your healthcare provider to ask how long sutures or staples should be left in place. Be sure to return for suture or staple removal as directed. If tape closures were used, remove them yourself when your provider recommends if they have not fallen off on their own. If skin glue was used, the glue will wear off by itself.     When to seek medical care  Call your healthcare provider right away if you have any of the following:  · More pain, bleeding, redness, or foul-smelling discharge around the incision area  · Fever of 100.4°F (38°C) or higher, or as directed by your healthcare provider  · Shaking chills  · Vomiting or nausea that doesnt go away  · Numbness, coldness, or tingling around the incision area  · Opening of the sutures or wound  · Stitches or staples come apart or fall out or surgical tape falls off before 7 days, or as directed by your provider   Date Last Reviewed: 10/1/2016  © 6562-8449 [employer]. [address] [place] [address], [place] [address]. All rights reserved. This information is not intended as a substitute for professional medical care. Always follow your healthcare professional's instructions.        Wound Care  Taking proper care of your wound will help it heal. Your healthcare provider may show you how to clean and dress the wound. He or she will also explain how to tell if the wound is healing normally. If you are unsure of how to take care of the wound, be sure to clarify what dressing to use and how often you should change the bandages. Here are the basic steps.     A wound that's not healing normally may be dark in color or have white streaks.   Wash your hands  Tips for washing your  hands include:  · Use liquid soap and lather for 2 minutes. Scrub between your fingers and under your nails.  · Rinse with warm water, keeping your fingers pointing down.  · Use a paper towel to dry your hands and to turn off the faucet.  Remove the used dressing  Here are suggestions for removing the dressing:  · If dressing changes cause you pain, be sure to take your pain medicine as prescribed by your healthcare provider 30 minutes before dressing changes.  · Set up your supplies.  · Put on disposable gloves if youre dressing a wound for someone else or your wound is infected.  · Loosen the tape by pulling gently toward the wound.  · Gently take off the old dressing. If the dressing is stuck to the wound, moisten it with saline (if available) or clean water.  · If you have a drain or tube in the wound, be careful not to pull on it.  · Remove the dressing 1 layer at a time and put it in a plastic bag.  · Remove your gloves.  Inspect and dress the wound  Check the wound carefully:  · Each time you change the dressing, inspect the wound carefully to be sure its healing normally by making sure your wound appears to be pink and moist, and is free of infection.    · Wash your hands again. Put on a new pair of gloves.  · Clean and dress the wound as directed by your healthcare provider or nurse. Do not put anything in the wound that is not prescribed or directed by your healthcare provider. If you have a drain or tube, be careful not to pull on it. Make sure to secure the drain or tube as well.  · Put all supplies in a plastic bag. Seal the bag and put it in the trash.  · Be sure to wash your hands again.  Call your healthcare provider  Call your healthcare provider if you see any of the following signs of a problem:  · Bleeding that soaks the dressing  · Pink fluid weeping from the wound  · Increased drainage or drainage that is yellow, yellow-green, or foul-smelling  · Increased swelling or pain, or redness or  swelling in the skin around the wound  · A change in the color of the wound, or if streaks develop in a direction away from the wound  · The area between any stitches opens up  · An increase in the size of the wound  · A fever of 100.4°F (38°C) or higher, or as directed by your healthcare provider  · Chills, increased fatigue, or a loss of appetite      Date Last Reviewed: 7/30/2015  © 2545-0367 [employer] [address] [place] [address]. All rights reserved. This information is not intended as a substitute for professional medical care. Always follow your healthcare professional's instructions.

## 2021-10-17 ENCOUNTER — DOCUMENT SCAN (OUTPATIENT)
Dept: HOME HEALTH SERVICES | Facility: HOSPITAL | Age: 36
End: 2021-10-17
Payer: COMMERCIAL

## 2021-10-21 ENCOUNTER — LAB VISIT (OUTPATIENT)
Dept: LAB | Facility: HOSPITAL | Age: 36
End: 2021-10-21
Attending: FAMILY MEDICINE
Payer: COMMERCIAL

## 2021-10-21 DIAGNOSIS — Z13.220 ENCOUNTER FOR LIPID SCREENING FOR CARDIOVASCULAR DISEASE: ICD-10-CM

## 2021-10-21 DIAGNOSIS — Z00.00 WELL ADULT EXAM: ICD-10-CM

## 2021-10-21 DIAGNOSIS — Z79.899 ENCOUNTER FOR LONG-TERM (CURRENT) USE OF MEDICATIONS: ICD-10-CM

## 2021-10-21 DIAGNOSIS — Z13.6 ENCOUNTER FOR LIPID SCREENING FOR CARDIOVASCULAR DISEASE: ICD-10-CM

## 2021-10-21 LAB
ALBUMIN SERPL BCP-MCNC: 3.9 G/DL (ref 3.5–5.2)
ALP SERPL-CCNC: 76 U/L (ref 55–135)
ALT SERPL W/O P-5'-P-CCNC: 14 U/L (ref 10–44)
ANION GAP SERPL CALC-SCNC: 11 MMOL/L (ref 8–16)
AST SERPL-CCNC: 14 U/L (ref 10–40)
BILIRUB SERPL-MCNC: 0.3 MG/DL (ref 0.1–1)
BUN SERPL-MCNC: 7 MG/DL (ref 6–20)
CALCIUM SERPL-MCNC: 9.4 MG/DL (ref 8.7–10.5)
CHLORIDE SERPL-SCNC: 105 MMOL/L (ref 95–110)
CHOLEST SERPL-MCNC: 179 MG/DL (ref 120–199)
CHOLEST/HDLC SERPL: 3.1 {RATIO} (ref 2–5)
CO2 SERPL-SCNC: 23 MMOL/L (ref 23–29)
CREAT SERPL-MCNC: 0.7 MG/DL (ref 0.5–1.4)
ERYTHROCYTE [DISTWIDTH] IN BLOOD BY AUTOMATED COUNT: 15.4 % (ref 11.5–14.5)
EST. GFR  (AFRICAN AMERICAN): >60 ML/MIN/1.73 M^2
EST. GFR  (NON AFRICAN AMERICAN): >60 ML/MIN/1.73 M^2
GLUCOSE SERPL-MCNC: 85 MG/DL (ref 70–110)
HCT VFR BLD AUTO: 34.6 % (ref 37–48.5)
HDLC SERPL-MCNC: 58 MG/DL (ref 40–75)
HDLC SERPL: 32.4 % (ref 20–50)
HGB BLD-MCNC: 10.5 G/DL (ref 12–16)
LDLC SERPL CALC-MCNC: 104 MG/DL (ref 63–159)
MCH RBC QN AUTO: 23.2 PG (ref 27–31)
MCHC RBC AUTO-ENTMCNC: 30.3 G/DL (ref 32–36)
MCV RBC AUTO: 76 FL (ref 82–98)
NONHDLC SERPL-MCNC: 121 MG/DL
PLATELET # BLD AUTO: 400 K/UL (ref 150–450)
PMV BLD AUTO: 10.4 FL (ref 9.2–12.9)
POTASSIUM SERPL-SCNC: 3.9 MMOL/L (ref 3.5–5.1)
PROT SERPL-MCNC: 7.7 G/DL (ref 6–8.4)
RBC # BLD AUTO: 4.53 M/UL (ref 4–5.4)
SODIUM SERPL-SCNC: 139 MMOL/L (ref 136–145)
TRIGL SERPL-MCNC: 85 MG/DL (ref 30–150)
TSH SERPL DL<=0.005 MIU/L-ACNC: 1 UIU/ML (ref 0.4–4)
WBC # BLD AUTO: 6.88 K/UL (ref 3.9–12.7)

## 2021-10-21 PROCEDURE — 80053 COMPREHEN METABOLIC PANEL: CPT | Performed by: FAMILY MEDICINE

## 2021-10-21 PROCEDURE — 83036 HEMOGLOBIN GLYCOSYLATED A1C: CPT | Performed by: FAMILY MEDICINE

## 2021-10-21 PROCEDURE — 80061 LIPID PANEL: CPT | Performed by: FAMILY MEDICINE

## 2021-10-21 PROCEDURE — 84443 ASSAY THYROID STIM HORMONE: CPT | Performed by: FAMILY MEDICINE

## 2021-10-21 PROCEDURE — 85027 COMPLETE CBC AUTOMATED: CPT | Performed by: FAMILY MEDICINE

## 2021-10-22 ENCOUNTER — DOCUMENT SCAN (OUTPATIENT)
Dept: HOME HEALTH SERVICES | Facility: HOSPITAL | Age: 36
End: 2021-10-22
Payer: COMMERCIAL

## 2021-10-22 ENCOUNTER — PATIENT MESSAGE (OUTPATIENT)
Dept: FAMILY MEDICINE | Facility: CLINIC | Age: 36
End: 2021-10-22

## 2021-10-22 DIAGNOSIS — E55.9 VITAMIN D DEFICIENCY: Primary | ICD-10-CM

## 2021-10-22 LAB
ESTIMATED AVG GLUCOSE: 91 MG/DL (ref 68–131)
HBA1C MFR BLD: 4.8 % (ref 4–5.6)

## 2021-10-22 RX ORDER — ERGOCALCIFEROL 1.25 MG/1
50000 CAPSULE ORAL
Qty: 4 CAPSULE | Refills: 3 | Status: SHIPPED | OUTPATIENT
Start: 2021-10-22 | End: 2022-01-22 | Stop reason: SDUPTHER

## 2021-10-26 ENCOUNTER — DOCUMENT SCAN (OUTPATIENT)
Dept: HOME HEALTH SERVICES | Facility: HOSPITAL | Age: 36
End: 2021-10-26
Payer: COMMERCIAL

## 2021-10-27 ENCOUNTER — OFFICE VISIT (OUTPATIENT)
Dept: WOUND CARE | Facility: CLINIC | Age: 36
End: 2021-10-27
Payer: COMMERCIAL

## 2021-10-27 VITALS
HEART RATE: 89 BPM | RESPIRATION RATE: 20 BRPM | SYSTOLIC BLOOD PRESSURE: 125 MMHG | TEMPERATURE: 98 F | DIASTOLIC BLOOD PRESSURE: 82 MMHG

## 2021-10-27 DIAGNOSIS — S31.109D OPEN WOUND OF ABDOMEN, SUBSEQUENT ENCOUNTER: Primary | ICD-10-CM

## 2021-10-27 PROCEDURE — 1159F MED LIST DOCD IN RCRD: CPT | Mod: CPTII,S$GLB,, | Performed by: FAMILY MEDICINE

## 2021-10-27 PROCEDURE — 3072F PR LOW RISK FOR RETINOPATHY: ICD-10-PCS | Mod: CPTII,S$GLB,, | Performed by: FAMILY MEDICINE

## 2021-10-27 PROCEDURE — 99499 UNLISTED E&M SERVICE: CPT | Mod: S$GLB,,, | Performed by: FAMILY MEDICINE

## 2021-10-27 PROCEDURE — 1160F RVW MEDS BY RX/DR IN RCRD: CPT | Mod: CPTII,S$GLB,, | Performed by: FAMILY MEDICINE

## 2021-10-27 PROCEDURE — 3044F HG A1C LEVEL LT 7.0%: CPT | Mod: CPTII,S$GLB,, | Performed by: FAMILY MEDICINE

## 2021-10-27 PROCEDURE — 3044F PR MOST RECENT HEMOGLOBIN A1C LEVEL <7.0%: ICD-10-PCS | Mod: CPTII,S$GLB,, | Performed by: FAMILY MEDICINE

## 2021-10-27 PROCEDURE — 99499 NO LOS: ICD-10-PCS | Mod: S$GLB,,, | Performed by: FAMILY MEDICINE

## 2021-10-27 PROCEDURE — 17250 CHEM CAUT OF GRANLTJ TISSUE: CPT | Mod: S$GLB,,, | Performed by: FAMILY MEDICINE

## 2021-10-27 PROCEDURE — 99999 PR PBB SHADOW E&M-EST. PATIENT-LVL V: ICD-10-PCS | Mod: PBBFAC,,, | Performed by: FAMILY MEDICINE

## 2021-10-27 PROCEDURE — 3072F LOW RISK FOR RETINOPATHY: CPT | Mod: CPTII,S$GLB,, | Performed by: FAMILY MEDICINE

## 2021-10-27 PROCEDURE — 3061F PR NEG MICROALBUMINURIA RESULT DOCUMENTED/REVIEW: ICD-10-PCS | Mod: CPTII,S$GLB,, | Performed by: FAMILY MEDICINE

## 2021-10-27 PROCEDURE — 3074F SYST BP LT 130 MM HG: CPT | Mod: CPTII,S$GLB,, | Performed by: FAMILY MEDICINE

## 2021-10-27 PROCEDURE — 3066F PR DOCUMENTATION OF TREATMENT FOR NEPHROPATHY: ICD-10-PCS | Mod: CPTII,S$GLB,, | Performed by: FAMILY MEDICINE

## 2021-10-27 PROCEDURE — 99999 PR PBB SHADOW E&M-EST. PATIENT-LVL V: CPT | Mod: PBBFAC,,, | Performed by: FAMILY MEDICINE

## 2021-10-27 PROCEDURE — 3079F PR MOST RECENT DIASTOLIC BLOOD PRESSURE 80-89 MM HG: ICD-10-PCS | Mod: CPTII,S$GLB,, | Performed by: FAMILY MEDICINE

## 2021-10-27 PROCEDURE — 3061F NEG MICROALBUMINURIA REV: CPT | Mod: CPTII,S$GLB,, | Performed by: FAMILY MEDICINE

## 2021-10-27 PROCEDURE — 3079F DIAST BP 80-89 MM HG: CPT | Mod: CPTII,S$GLB,, | Performed by: FAMILY MEDICINE

## 2021-10-27 PROCEDURE — 1160F PR REVIEW ALL MEDS BY PRESCRIBER/CLIN PHARMACIST DOCUMENTED: ICD-10-PCS | Mod: CPTII,S$GLB,, | Performed by: FAMILY MEDICINE

## 2021-10-27 PROCEDURE — 3074F PR MOST RECENT SYSTOLIC BLOOD PRESSURE < 130 MM HG: ICD-10-PCS | Mod: CPTII,S$GLB,, | Performed by: FAMILY MEDICINE

## 2021-10-27 PROCEDURE — 17250 PR CHEM CAUTERY GRANULATN TISSUE: ICD-10-PCS | Mod: S$GLB,,, | Performed by: FAMILY MEDICINE

## 2021-10-27 PROCEDURE — 1159F PR MEDICATION LIST DOCUMENTED IN MEDICAL RECORD: ICD-10-PCS | Mod: CPTII,S$GLB,, | Performed by: FAMILY MEDICINE

## 2021-10-27 PROCEDURE — 3066F NEPHROPATHY DOC TX: CPT | Mod: CPTII,S$GLB,, | Performed by: FAMILY MEDICINE

## 2021-11-08 ENCOUNTER — DOCUMENT SCAN (OUTPATIENT)
Dept: HOME HEALTH SERVICES | Facility: HOSPITAL | Age: 36
End: 2021-11-08
Payer: COMMERCIAL

## 2021-11-10 ENCOUNTER — OFFICE VISIT (OUTPATIENT)
Dept: WOUND CARE | Facility: CLINIC | Age: 36
End: 2021-11-10
Payer: COMMERCIAL

## 2021-11-10 VITALS
SYSTOLIC BLOOD PRESSURE: 124 MMHG | DIASTOLIC BLOOD PRESSURE: 79 MMHG | TEMPERATURE: 99 F | HEART RATE: 93 BPM | RESPIRATION RATE: 20 BRPM

## 2021-11-10 DIAGNOSIS — S31.109D OPEN WOUND OF ABDOMEN, SUBSEQUENT ENCOUNTER: Primary | ICD-10-CM

## 2021-11-10 PROCEDURE — 99999 PR PBB SHADOW E&M-EST. PATIENT-LVL V: ICD-10-PCS | Mod: PBBFAC,,, | Performed by: FAMILY MEDICINE

## 2021-11-10 PROCEDURE — 99213 OFFICE O/P EST LOW 20 MIN: CPT | Mod: S$GLB,,, | Performed by: FAMILY MEDICINE

## 2021-11-10 PROCEDURE — 3072F PR LOW RISK FOR RETINOPATHY: ICD-10-PCS | Mod: CPTII,S$GLB,, | Performed by: FAMILY MEDICINE

## 2021-11-10 PROCEDURE — 1160F RVW MEDS BY RX/DR IN RCRD: CPT | Mod: CPTII,S$GLB,, | Performed by: FAMILY MEDICINE

## 2021-11-10 PROCEDURE — 3044F PR MOST RECENT HEMOGLOBIN A1C LEVEL <7.0%: ICD-10-PCS | Mod: CPTII,S$GLB,, | Performed by: FAMILY MEDICINE

## 2021-11-10 PROCEDURE — 99999 PR PBB SHADOW E&M-EST. PATIENT-LVL V: CPT | Mod: PBBFAC,,, | Performed by: FAMILY MEDICINE

## 2021-11-10 PROCEDURE — 3078F PR MOST RECENT DIASTOLIC BLOOD PRESSURE < 80 MM HG: ICD-10-PCS | Mod: CPTII,S$GLB,, | Performed by: FAMILY MEDICINE

## 2021-11-10 PROCEDURE — 3061F PR NEG MICROALBUMINURIA RESULT DOCUMENTED/REVIEW: ICD-10-PCS | Mod: CPTII,S$GLB,, | Performed by: FAMILY MEDICINE

## 2021-11-10 PROCEDURE — 3074F SYST BP LT 130 MM HG: CPT | Mod: CPTII,S$GLB,, | Performed by: FAMILY MEDICINE

## 2021-11-10 PROCEDURE — 3078F DIAST BP <80 MM HG: CPT | Mod: CPTII,S$GLB,, | Performed by: FAMILY MEDICINE

## 2021-11-10 PROCEDURE — 3061F NEG MICROALBUMINURIA REV: CPT | Mod: CPTII,S$GLB,, | Performed by: FAMILY MEDICINE

## 2021-11-10 PROCEDURE — 3072F LOW RISK FOR RETINOPATHY: CPT | Mod: CPTII,S$GLB,, | Performed by: FAMILY MEDICINE

## 2021-11-10 PROCEDURE — 1159F PR MEDICATION LIST DOCUMENTED IN MEDICAL RECORD: ICD-10-PCS | Mod: CPTII,S$GLB,, | Performed by: FAMILY MEDICINE

## 2021-11-10 PROCEDURE — 3074F PR MOST RECENT SYSTOLIC BLOOD PRESSURE < 130 MM HG: ICD-10-PCS | Mod: CPTII,S$GLB,, | Performed by: FAMILY MEDICINE

## 2021-11-10 PROCEDURE — 1159F MED LIST DOCD IN RCRD: CPT | Mod: CPTII,S$GLB,, | Performed by: FAMILY MEDICINE

## 2021-11-10 PROCEDURE — 3044F HG A1C LEVEL LT 7.0%: CPT | Mod: CPTII,S$GLB,, | Performed by: FAMILY MEDICINE

## 2021-11-10 PROCEDURE — 3066F NEPHROPATHY DOC TX: CPT | Mod: CPTII,S$GLB,, | Performed by: FAMILY MEDICINE

## 2021-11-10 PROCEDURE — 99213 PR OFFICE/OUTPT VISIT, EST, LEVL III, 20-29 MIN: ICD-10-PCS | Mod: S$GLB,,, | Performed by: FAMILY MEDICINE

## 2021-11-10 PROCEDURE — 1160F PR REVIEW ALL MEDS BY PRESCRIBER/CLIN PHARMACIST DOCUMENTED: ICD-10-PCS | Mod: CPTII,S$GLB,, | Performed by: FAMILY MEDICINE

## 2021-11-10 PROCEDURE — 3066F PR DOCUMENTATION OF TREATMENT FOR NEPHROPATHY: ICD-10-PCS | Mod: CPTII,S$GLB,, | Performed by: FAMILY MEDICINE

## 2021-11-11 ENCOUNTER — OFFICE VISIT (OUTPATIENT)
Dept: PODIATRY | Facility: CLINIC | Age: 36
End: 2021-11-11
Payer: COMMERCIAL

## 2021-11-11 ENCOUNTER — HOSPITAL ENCOUNTER (OUTPATIENT)
Dept: RADIOLOGY | Facility: HOSPITAL | Age: 36
Discharge: HOME | End: 2021-11-11
Attending: PODIATRIST
Payer: COMMERCIAL

## 2021-11-11 ENCOUNTER — OFFICE VISIT (OUTPATIENT)
Dept: ALLERGY | Facility: CLINIC | Age: 36
End: 2021-11-11
Payer: COMMERCIAL

## 2021-11-11 VITALS
BODY MASS INDEX: 47.09 KG/M2 | HEART RATE: 92 BPM | HEIGHT: 66 IN | TEMPERATURE: 99 F | WEIGHT: 293 LBS | DIASTOLIC BLOOD PRESSURE: 77 MMHG | SYSTOLIC BLOOD PRESSURE: 130 MMHG

## 2021-11-11 DIAGNOSIS — M76.71 PERONEAL TENDONITIS OF RIGHT LOWER EXTREMITY: ICD-10-CM

## 2021-11-11 DIAGNOSIS — M79.671 FOOT PAIN, RIGHT: ICD-10-CM

## 2021-11-11 DIAGNOSIS — J30.89 ALLERGIC RHINITIS DUE TO DERMATOPHAGOIDES FARINAE: ICD-10-CM

## 2021-11-11 DIAGNOSIS — J34.89 SORE IN NOSE: ICD-10-CM

## 2021-11-11 DIAGNOSIS — M79.671 FOOT PAIN, RIGHT: Primary | ICD-10-CM

## 2021-11-11 DIAGNOSIS — J45.40 MODERATE PERSISTENT ASTHMA WITHOUT COMPLICATION: Primary | ICD-10-CM

## 2021-11-11 DIAGNOSIS — J30.89 ALLERGIC RHINITIS DUE TO DERMATOPHAGOIDES PTERONYSSINUS: ICD-10-CM

## 2021-11-11 DIAGNOSIS — E66.01 MORBID OBESITY WITH BMI OF 50.0-59.9, ADULT: ICD-10-CM

## 2021-11-11 DIAGNOSIS — J30.1 SEASONAL ALLERGIC RHINITIS DUE TO POLLEN: ICD-10-CM

## 2021-11-11 DIAGNOSIS — Z91.040 LATEX ALLERGY: ICD-10-CM

## 2021-11-11 PROCEDURE — 1160F PR REVIEW ALL MEDS BY PRESCRIBER/CLIN PHARMACIST DOCUMENTED: ICD-10-PCS | Mod: CPTII,S$GLB,, | Performed by: PODIATRIST

## 2021-11-11 PROCEDURE — 3066F NEPHROPATHY DOC TX: CPT | Mod: CPTII,S$GLB,, | Performed by: ALLERGY & IMMUNOLOGY

## 2021-11-11 PROCEDURE — 3061F PR NEG MICROALBUMINURIA RESULT DOCUMENTED/REVIEW: ICD-10-PCS | Mod: CPTII,S$GLB,, | Performed by: PODIATRIST

## 2021-11-11 PROCEDURE — 3078F DIAST BP <80 MM HG: CPT | Mod: CPTII,S$GLB,, | Performed by: ALLERGY & IMMUNOLOGY

## 2021-11-11 PROCEDURE — 3061F NEG MICROALBUMINURIA REV: CPT | Mod: CPTII,S$GLB,, | Performed by: PODIATRIST

## 2021-11-11 PROCEDURE — 3078F PR MOST RECENT DIASTOLIC BLOOD PRESSURE < 80 MM HG: ICD-10-PCS | Mod: CPTII,S$GLB,, | Performed by: ALLERGY & IMMUNOLOGY

## 2021-11-11 PROCEDURE — 99999 PR PBB SHADOW E&M-EST. PATIENT-LVL III: CPT | Mod: PBBFAC,,, | Performed by: PODIATRIST

## 2021-11-11 PROCEDURE — 3075F SYST BP GE 130 - 139MM HG: CPT | Mod: CPTII,S$GLB,, | Performed by: ALLERGY & IMMUNOLOGY

## 2021-11-11 PROCEDURE — 99214 PR OFFICE/OUTPT VISIT, EST, LEVL IV, 30-39 MIN: ICD-10-PCS | Mod: S$GLB,,, | Performed by: PODIATRIST

## 2021-11-11 PROCEDURE — 3072F PR LOW RISK FOR RETINOPATHY: ICD-10-PCS | Mod: CPTII,S$GLB,, | Performed by: PODIATRIST

## 2021-11-11 PROCEDURE — 99999 PR PBB SHADOW E&M-EST. PATIENT-LVL III: ICD-10-PCS | Mod: PBBFAC,,, | Performed by: PODIATRIST

## 2021-11-11 PROCEDURE — 3044F HG A1C LEVEL LT 7.0%: CPT | Mod: CPTII,S$GLB,, | Performed by: PODIATRIST

## 2021-11-11 PROCEDURE — 3061F PR NEG MICROALBUMINURIA RESULT DOCUMENTED/REVIEW: ICD-10-PCS | Mod: CPTII,S$GLB,, | Performed by: ALLERGY & IMMUNOLOGY

## 2021-11-11 PROCEDURE — 99214 OFFICE O/P EST MOD 30 MIN: CPT | Mod: S$GLB,,, | Performed by: ALLERGY & IMMUNOLOGY

## 2021-11-11 PROCEDURE — 3008F PR BODY MASS INDEX (BMI) DOCUMENTED: ICD-10-PCS | Mod: CPTII,S$GLB,, | Performed by: ALLERGY & IMMUNOLOGY

## 2021-11-11 PROCEDURE — 3075F PR MOST RECENT SYSTOLIC BLOOD PRESS GE 130-139MM HG: ICD-10-PCS | Mod: CPTII,S$GLB,, | Performed by: ALLERGY & IMMUNOLOGY

## 2021-11-11 PROCEDURE — 1160F RVW MEDS BY RX/DR IN RCRD: CPT | Mod: CPTII,S$GLB,, | Performed by: PODIATRIST

## 2021-11-11 PROCEDURE — 73630 X-RAY EXAM OF FOOT: CPT | Mod: TC,RT

## 2021-11-11 PROCEDURE — 3066F NEPHROPATHY DOC TX: CPT | Mod: CPTII,S$GLB,, | Performed by: PODIATRIST

## 2021-11-11 PROCEDURE — 3066F PR DOCUMENTATION OF TREATMENT FOR NEPHROPATHY: ICD-10-PCS | Mod: CPTII,S$GLB,, | Performed by: ALLERGY & IMMUNOLOGY

## 2021-11-11 PROCEDURE — 99214 PR OFFICE/OUTPT VISIT, EST, LEVL IV, 30-39 MIN: ICD-10-PCS | Mod: S$GLB,,, | Performed by: ALLERGY & IMMUNOLOGY

## 2021-11-11 PROCEDURE — 3008F BODY MASS INDEX DOCD: CPT | Mod: CPTII,S$GLB,, | Performed by: ALLERGY & IMMUNOLOGY

## 2021-11-11 PROCEDURE — 3066F PR DOCUMENTATION OF TREATMENT FOR NEPHROPATHY: ICD-10-PCS | Mod: CPTII,S$GLB,, | Performed by: PODIATRIST

## 2021-11-11 PROCEDURE — 3044F PR MOST RECENT HEMOGLOBIN A1C LEVEL <7.0%: ICD-10-PCS | Mod: CPTII,S$GLB,, | Performed by: ALLERGY & IMMUNOLOGY

## 2021-11-11 PROCEDURE — 99214 OFFICE O/P EST MOD 30 MIN: CPT | Mod: S$GLB,,, | Performed by: PODIATRIST

## 2021-11-11 PROCEDURE — 1159F PR MEDICATION LIST DOCUMENTED IN MEDICAL RECORD: ICD-10-PCS | Mod: CPTII,S$GLB,, | Performed by: ALLERGY & IMMUNOLOGY

## 2021-11-11 PROCEDURE — 73630 XR FOOT COMPLETE 3 VIEW RIGHT: ICD-10-PCS | Mod: 26,RT,, | Performed by: RADIOLOGY

## 2021-11-11 PROCEDURE — 3044F PR MOST RECENT HEMOGLOBIN A1C LEVEL <7.0%: ICD-10-PCS | Mod: CPTII,S$GLB,, | Performed by: PODIATRIST

## 2021-11-11 PROCEDURE — 3072F LOW RISK FOR RETINOPATHY: CPT | Mod: CPTII,S$GLB,, | Performed by: ALLERGY & IMMUNOLOGY

## 2021-11-11 PROCEDURE — 3061F NEG MICROALBUMINURIA REV: CPT | Mod: CPTII,S$GLB,, | Performed by: ALLERGY & IMMUNOLOGY

## 2021-11-11 PROCEDURE — 99999 PR PBB SHADOW E&M-EST. PATIENT-LVL IV: ICD-10-PCS | Mod: PBBFAC,,, | Performed by: ALLERGY & IMMUNOLOGY

## 2021-11-11 PROCEDURE — 3072F PR LOW RISK FOR RETINOPATHY: ICD-10-PCS | Mod: CPTII,S$GLB,, | Performed by: ALLERGY & IMMUNOLOGY

## 2021-11-11 PROCEDURE — 99999 PR PBB SHADOW E&M-EST. PATIENT-LVL IV: CPT | Mod: PBBFAC,,, | Performed by: ALLERGY & IMMUNOLOGY

## 2021-11-11 PROCEDURE — 1159F MED LIST DOCD IN RCRD: CPT | Mod: CPTII,S$GLB,, | Performed by: ALLERGY & IMMUNOLOGY

## 2021-11-11 PROCEDURE — 3044F HG A1C LEVEL LT 7.0%: CPT | Mod: CPTII,S$GLB,, | Performed by: ALLERGY & IMMUNOLOGY

## 2021-11-11 PROCEDURE — 3072F LOW RISK FOR RETINOPATHY: CPT | Mod: CPTII,S$GLB,, | Performed by: PODIATRIST

## 2021-11-11 PROCEDURE — 1159F PR MEDICATION LIST DOCUMENTED IN MEDICAL RECORD: ICD-10-PCS | Mod: CPTII,S$GLB,, | Performed by: PODIATRIST

## 2021-11-11 PROCEDURE — 1159F MED LIST DOCD IN RCRD: CPT | Mod: CPTII,S$GLB,, | Performed by: PODIATRIST

## 2021-11-11 PROCEDURE — 73630 X-RAY EXAM OF FOOT: CPT | Mod: 26,RT,, | Performed by: RADIOLOGY

## 2021-11-11 RX ORDER — FLUTICASONE FUROATE AND VILANTEROL 200; 25 UG/1; UG/1
1 POWDER RESPIRATORY (INHALATION) DAILY
Qty: 1 EACH | Refills: 4 | Status: SHIPPED | OUTPATIENT
Start: 2021-11-11 | End: 2022-08-31 | Stop reason: SDUPTHER

## 2021-11-11 RX ORDER — MUPIROCIN 20 MG/G
OINTMENT TOPICAL 3 TIMES DAILY
Qty: 1 EACH | Refills: 1 | Status: SHIPPED | OUTPATIENT
Start: 2021-11-11 | End: 2022-01-18

## 2021-11-11 RX ORDER — IBUPROFEN 600 MG/1
600 TABLET ORAL EVERY 6 HOURS PRN
Qty: 30 TABLET | Refills: 0 | Status: SHIPPED | OUTPATIENT
Start: 2021-11-11 | End: 2022-09-21

## 2021-11-16 ENCOUNTER — OFFICE VISIT (OUTPATIENT)
Dept: WOUND CARE | Facility: CLINIC | Age: 36
End: 2021-11-16
Payer: COMMERCIAL

## 2021-11-16 VITALS
DIASTOLIC BLOOD PRESSURE: 83 MMHG | SYSTOLIC BLOOD PRESSURE: 133 MMHG | HEART RATE: 89 BPM | RESPIRATION RATE: 20 BRPM | TEMPERATURE: 99 F

## 2021-11-16 DIAGNOSIS — S31.109D OPEN WOUND OF ABDOMEN, SUBSEQUENT ENCOUNTER: Primary | ICD-10-CM

## 2021-11-16 PROCEDURE — 3075F SYST BP GE 130 - 139MM HG: CPT | Mod: CPTII,S$GLB,, | Performed by: FAMILY MEDICINE

## 2021-11-16 PROCEDURE — 3079F DIAST BP 80-89 MM HG: CPT | Mod: CPTII,S$GLB,, | Performed by: FAMILY MEDICINE

## 2021-11-16 PROCEDURE — 1160F PR REVIEW ALL MEDS BY PRESCRIBER/CLIN PHARMACIST DOCUMENTED: ICD-10-PCS | Mod: CPTII,S$GLB,, | Performed by: FAMILY MEDICINE

## 2021-11-16 PROCEDURE — 99999 PR PBB SHADOW E&M-EST. PATIENT-LVL V: ICD-10-PCS | Mod: PBBFAC,,, | Performed by: FAMILY MEDICINE

## 2021-11-16 PROCEDURE — 99213 OFFICE O/P EST LOW 20 MIN: CPT | Mod: S$GLB,,, | Performed by: FAMILY MEDICINE

## 2021-11-16 PROCEDURE — 3072F PR LOW RISK FOR RETINOPATHY: ICD-10-PCS | Mod: CPTII,S$GLB,, | Performed by: FAMILY MEDICINE

## 2021-11-16 PROCEDURE — 3066F PR DOCUMENTATION OF TREATMENT FOR NEPHROPATHY: ICD-10-PCS | Mod: CPTII,S$GLB,, | Performed by: FAMILY MEDICINE

## 2021-11-16 PROCEDURE — 3066F NEPHROPATHY DOC TX: CPT | Mod: CPTII,S$GLB,, | Performed by: FAMILY MEDICINE

## 2021-11-16 PROCEDURE — 3044F HG A1C LEVEL LT 7.0%: CPT | Mod: CPTII,S$GLB,, | Performed by: FAMILY MEDICINE

## 2021-11-16 PROCEDURE — 1159F PR MEDICATION LIST DOCUMENTED IN MEDICAL RECORD: ICD-10-PCS | Mod: CPTII,S$GLB,, | Performed by: FAMILY MEDICINE

## 2021-11-16 PROCEDURE — 1160F RVW MEDS BY RX/DR IN RCRD: CPT | Mod: CPTII,S$GLB,, | Performed by: FAMILY MEDICINE

## 2021-11-16 PROCEDURE — 3075F PR MOST RECENT SYSTOLIC BLOOD PRESS GE 130-139MM HG: ICD-10-PCS | Mod: CPTII,S$GLB,, | Performed by: FAMILY MEDICINE

## 2021-11-16 PROCEDURE — 3079F PR MOST RECENT DIASTOLIC BLOOD PRESSURE 80-89 MM HG: ICD-10-PCS | Mod: CPTII,S$GLB,, | Performed by: FAMILY MEDICINE

## 2021-11-16 PROCEDURE — 3061F NEG MICROALBUMINURIA REV: CPT | Mod: CPTII,S$GLB,, | Performed by: FAMILY MEDICINE

## 2021-11-16 PROCEDURE — 1159F MED LIST DOCD IN RCRD: CPT | Mod: CPTII,S$GLB,, | Performed by: FAMILY MEDICINE

## 2021-11-16 PROCEDURE — 99213 PR OFFICE/OUTPT VISIT, EST, LEVL III, 20-29 MIN: ICD-10-PCS | Mod: S$GLB,,, | Performed by: FAMILY MEDICINE

## 2021-11-16 PROCEDURE — 3072F LOW RISK FOR RETINOPATHY: CPT | Mod: CPTII,S$GLB,, | Performed by: FAMILY MEDICINE

## 2021-11-16 PROCEDURE — 3044F PR MOST RECENT HEMOGLOBIN A1C LEVEL <7.0%: ICD-10-PCS | Mod: CPTII,S$GLB,, | Performed by: FAMILY MEDICINE

## 2021-11-16 PROCEDURE — 99999 PR PBB SHADOW E&M-EST. PATIENT-LVL V: CPT | Mod: PBBFAC,,, | Performed by: FAMILY MEDICINE

## 2021-11-16 PROCEDURE — 3061F PR NEG MICROALBUMINURIA RESULT DOCUMENTED/REVIEW: ICD-10-PCS | Mod: CPTII,S$GLB,, | Performed by: FAMILY MEDICINE

## 2021-11-17 ENCOUNTER — PATIENT MESSAGE (OUTPATIENT)
Dept: FAMILY MEDICINE | Facility: CLINIC | Age: 36
End: 2021-11-17
Payer: COMMERCIAL

## 2021-11-18 ENCOUNTER — DOCUMENT SCAN (OUTPATIENT)
Dept: HOME HEALTH SERVICES | Facility: HOSPITAL | Age: 36
End: 2021-11-18
Payer: COMMERCIAL

## 2021-11-18 ENCOUNTER — PATIENT MESSAGE (OUTPATIENT)
Dept: ALLERGY | Facility: CLINIC | Age: 36
End: 2021-11-18
Payer: COMMERCIAL

## 2021-11-22 ENCOUNTER — PATIENT MESSAGE (OUTPATIENT)
Dept: WOUND CARE | Facility: CLINIC | Age: 36
End: 2021-11-22
Payer: COMMERCIAL

## 2021-11-26 ENCOUNTER — PATIENT MESSAGE (OUTPATIENT)
Dept: ALLERGY | Facility: CLINIC | Age: 36
End: 2021-11-26
Payer: COMMERCIAL

## 2021-11-29 ENCOUNTER — OFFICE VISIT (OUTPATIENT)
Dept: ALLERGY | Facility: CLINIC | Age: 36
End: 2021-11-29
Payer: COMMERCIAL

## 2021-11-29 ENCOUNTER — PATIENT MESSAGE (OUTPATIENT)
Dept: ALLERGY | Facility: CLINIC | Age: 36
End: 2021-11-29
Payer: COMMERCIAL

## 2021-11-29 DIAGNOSIS — J34.89 SINUS PRESSURE: Primary | ICD-10-CM

## 2021-11-29 DIAGNOSIS — J32.1 FRONTAL SINUSITIS, UNSPECIFIED CHRONICITY: ICD-10-CM

## 2021-11-29 PROCEDURE — 3066F NEPHROPATHY DOC TX: CPT | Mod: CPTII,95,, | Performed by: ALLERGY & IMMUNOLOGY

## 2021-11-29 PROCEDURE — 3072F PR LOW RISK FOR RETINOPATHY: ICD-10-PCS | Mod: CPTII,95,, | Performed by: ALLERGY & IMMUNOLOGY

## 2021-11-29 PROCEDURE — 3072F LOW RISK FOR RETINOPATHY: CPT | Mod: CPTII,95,, | Performed by: ALLERGY & IMMUNOLOGY

## 2021-11-29 PROCEDURE — 3066F PR DOCUMENTATION OF TREATMENT FOR NEPHROPATHY: ICD-10-PCS | Mod: CPTII,95,, | Performed by: ALLERGY & IMMUNOLOGY

## 2021-11-29 PROCEDURE — 3061F PR NEG MICROALBUMINURIA RESULT DOCUMENTED/REVIEW: ICD-10-PCS | Mod: CPTII,95,, | Performed by: ALLERGY & IMMUNOLOGY

## 2021-11-29 PROCEDURE — 99214 OFFICE O/P EST MOD 30 MIN: CPT | Mod: 95,,, | Performed by: ALLERGY & IMMUNOLOGY

## 2021-11-29 PROCEDURE — 99214 PR OFFICE/OUTPT VISIT, EST, LEVL IV, 30-39 MIN: ICD-10-PCS | Mod: 95,,, | Performed by: ALLERGY & IMMUNOLOGY

## 2021-11-29 PROCEDURE — 3061F NEG MICROALBUMINURIA REV: CPT | Mod: CPTII,95,, | Performed by: ALLERGY & IMMUNOLOGY

## 2021-11-29 RX ORDER — AMOXICILLIN AND CLAVULANATE POTASSIUM 875; 125 MG/1; MG/1
1 TABLET, FILM COATED ORAL EVERY 12 HOURS
Qty: 20 TABLET | Refills: 0 | Status: SHIPPED | OUTPATIENT
Start: 2021-11-29 | End: 2022-01-18

## 2021-11-29 RX ORDER — LEVOCETIRIZINE DIHYDROCHLORIDE 5 MG/1
5 TABLET, FILM COATED ORAL NIGHTLY
Qty: 30 TABLET | Refills: 11 | Status: SHIPPED | OUTPATIENT
Start: 2021-11-29 | End: 2022-08-31 | Stop reason: SDUPTHER

## 2021-11-29 RX ORDER — PREDNISONE 20 MG/1
20 TABLET ORAL 2 TIMES DAILY
Qty: 10 TABLET | Refills: 0 | Status: SHIPPED | OUTPATIENT
Start: 2021-11-29 | End: 2022-01-18

## 2021-12-01 ENCOUNTER — TELEPHONE (OUTPATIENT)
Dept: ALLERGY | Facility: CLINIC | Age: 36
End: 2021-12-01
Payer: COMMERCIAL

## 2021-12-01 ENCOUNTER — PATIENT MESSAGE (OUTPATIENT)
Dept: FAMILY MEDICINE | Facility: CLINIC | Age: 36
End: 2021-12-01
Payer: COMMERCIAL

## 2021-12-01 ENCOUNTER — TELEPHONE (OUTPATIENT)
Dept: INFECTIOUS DISEASES | Facility: CLINIC | Age: 36
End: 2021-12-01
Payer: COMMERCIAL

## 2021-12-01 ENCOUNTER — PATIENT MESSAGE (OUTPATIENT)
Dept: PULMONOLOGY | Facility: CLINIC | Age: 36
End: 2021-12-01
Payer: COMMERCIAL

## 2021-12-01 ENCOUNTER — OFFICE VISIT (OUTPATIENT)
Dept: WOUND CARE | Facility: CLINIC | Age: 36
End: 2021-12-01
Payer: COMMERCIAL

## 2021-12-01 VITALS
TEMPERATURE: 98 F | SYSTOLIC BLOOD PRESSURE: 130 MMHG | HEART RATE: 73 BPM | DIASTOLIC BLOOD PRESSURE: 81 MMHG | RESPIRATION RATE: 20 BRPM

## 2021-12-01 DIAGNOSIS — S31.109D OPEN WOUND OF ABDOMEN, SUBSEQUENT ENCOUNTER: Primary | ICD-10-CM

## 2021-12-01 DIAGNOSIS — L02.92 RECURRENT BOILS: Primary | ICD-10-CM

## 2021-12-01 PROCEDURE — 99999 PR PBB SHADOW E&M-EST. PATIENT-LVL V: CPT | Mod: PBBFAC,,, | Performed by: FAMILY MEDICINE

## 2021-12-01 PROCEDURE — 3061F NEG MICROALBUMINURIA REV: CPT | Mod: CPTII,S$GLB,, | Performed by: FAMILY MEDICINE

## 2021-12-01 PROCEDURE — 3072F PR LOW RISK FOR RETINOPATHY: ICD-10-PCS | Mod: CPTII,S$GLB,, | Performed by: FAMILY MEDICINE

## 2021-12-01 PROCEDURE — 3066F PR DOCUMENTATION OF TREATMENT FOR NEPHROPATHY: ICD-10-PCS | Mod: CPTII,S$GLB,, | Performed by: FAMILY MEDICINE

## 2021-12-01 PROCEDURE — 3061F PR NEG MICROALBUMINURIA RESULT DOCUMENTED/REVIEW: ICD-10-PCS | Mod: CPTII,S$GLB,, | Performed by: FAMILY MEDICINE

## 2021-12-01 PROCEDURE — 99212 PR OFFICE/OUTPT VISIT, EST, LEVL II, 10-19 MIN: ICD-10-PCS | Mod: S$GLB,,, | Performed by: FAMILY MEDICINE

## 2021-12-01 PROCEDURE — 99999 PR PBB SHADOW E&M-EST. PATIENT-LVL V: ICD-10-PCS | Mod: PBBFAC,,, | Performed by: FAMILY MEDICINE

## 2021-12-01 PROCEDURE — 99212 OFFICE O/P EST SF 10 MIN: CPT | Mod: S$GLB,,, | Performed by: FAMILY MEDICINE

## 2021-12-01 PROCEDURE — 3066F NEPHROPATHY DOC TX: CPT | Mod: CPTII,S$GLB,, | Performed by: FAMILY MEDICINE

## 2021-12-01 PROCEDURE — 3072F LOW RISK FOR RETINOPATHY: CPT | Mod: CPTII,S$GLB,, | Performed by: FAMILY MEDICINE

## 2021-12-02 DIAGNOSIS — J45.50 SEVERE PERSISTENT ASTHMA WITHOUT COMPLICATION: Primary | ICD-10-CM

## 2021-12-02 RX ORDER — OMALIZUMAB 150 MG/ML
300 INJECTION, SOLUTION SUBCUTANEOUS
Qty: 4 ML | Refills: 11 | OUTPATIENT
Start: 2021-12-02 | End: 2021-12-08 | Stop reason: SDUPTHER

## 2021-12-07 ENCOUNTER — OFFICE VISIT (OUTPATIENT)
Dept: PULMONOLOGY | Facility: CLINIC | Age: 36
End: 2021-12-07
Payer: COMMERCIAL

## 2021-12-07 VITALS
RESPIRATION RATE: 18 BRPM | WEIGHT: 293 LBS | DIASTOLIC BLOOD PRESSURE: 80 MMHG | OXYGEN SATURATION: 99 % | HEART RATE: 78 BPM | BODY MASS INDEX: 47.09 KG/M2 | HEIGHT: 66 IN | SYSTOLIC BLOOD PRESSURE: 124 MMHG

## 2021-12-07 DIAGNOSIS — J45.909 NOT WELL CONTROLLED ASTHMA WITHOUT COMPLICATION: Primary | ICD-10-CM

## 2021-12-07 DIAGNOSIS — Z91.09 ENVIRONMENTAL ALLERGIES: ICD-10-CM

## 2021-12-07 DIAGNOSIS — E66.01 CLASS 3 SEVERE OBESITY DUE TO EXCESS CALORIES WITH SERIOUS COMORBIDITY AND BODY MASS INDEX (BMI) OF 45.0 TO 49.9 IN ADULT: ICD-10-CM

## 2021-12-07 DIAGNOSIS — R06.83 SNORING: ICD-10-CM

## 2021-12-07 DIAGNOSIS — R76.8 ELEVATED IGE LEVEL: ICD-10-CM

## 2021-12-07 PROCEDURE — 3072F PR LOW RISK FOR RETINOPATHY: ICD-10-PCS | Mod: CPTII,S$GLB,, | Performed by: INTERNAL MEDICINE

## 2021-12-07 PROCEDURE — 3072F LOW RISK FOR RETINOPATHY: CPT | Mod: CPTII,S$GLB,, | Performed by: INTERNAL MEDICINE

## 2021-12-07 PROCEDURE — 3061F PR NEG MICROALBUMINURIA RESULT DOCUMENTED/REVIEW: ICD-10-PCS | Mod: CPTII,S$GLB,, | Performed by: INTERNAL MEDICINE

## 2021-12-07 PROCEDURE — 3061F NEG MICROALBUMINURIA REV: CPT | Mod: CPTII,S$GLB,, | Performed by: INTERNAL MEDICINE

## 2021-12-07 PROCEDURE — 3066F NEPHROPATHY DOC TX: CPT | Mod: CPTII,S$GLB,, | Performed by: INTERNAL MEDICINE

## 2021-12-07 PROCEDURE — 99999 PR PBB SHADOW E&M-EST. PATIENT-LVL III: CPT | Mod: PBBFAC,,, | Performed by: INTERNAL MEDICINE

## 2021-12-07 PROCEDURE — 99214 OFFICE O/P EST MOD 30 MIN: CPT | Mod: S$GLB,,, | Performed by: INTERNAL MEDICINE

## 2021-12-07 PROCEDURE — 99999 PR PBB SHADOW E&M-EST. PATIENT-LVL III: ICD-10-PCS | Mod: PBBFAC,,, | Performed by: INTERNAL MEDICINE

## 2021-12-07 PROCEDURE — 3066F PR DOCUMENTATION OF TREATMENT FOR NEPHROPATHY: ICD-10-PCS | Mod: CPTII,S$GLB,, | Performed by: INTERNAL MEDICINE

## 2021-12-07 PROCEDURE — 99214 PR OFFICE/OUTPT VISIT, EST, LEVL IV, 30-39 MIN: ICD-10-PCS | Mod: S$GLB,,, | Performed by: INTERNAL MEDICINE

## 2021-12-07 RX ORDER — AMOXICILLIN 500 MG/1
CAPSULE ORAL
COMMUNITY
Start: 2021-10-04 | End: 2022-01-18

## 2021-12-07 RX ORDER — ESCITALOPRAM OXALATE 20 MG/1
20 TABLET ORAL
COMMUNITY
Start: 2021-11-03 | End: 2022-05-31 | Stop reason: SDUPTHER

## 2021-12-07 RX ORDER — LABETALOL 100 MG/1
100 TABLET, FILM COATED ORAL 2 TIMES DAILY
COMMUNITY
Start: 2021-12-06 | End: 2022-01-18

## 2021-12-07 RX ORDER — OMEPRAZOLE 40 MG/1
40 CAPSULE, DELAYED RELEASE ORAL DAILY
COMMUNITY
Start: 2021-10-18 | End: 2022-01-18

## 2021-12-07 RX ORDER — DULOXETIN HYDROCHLORIDE 30 MG/1
30 CAPSULE, DELAYED RELEASE ORAL DAILY
COMMUNITY
Start: 2021-10-02 | End: 2022-01-18

## 2021-12-07 RX ORDER — MONTELUKAST SODIUM 10 MG/1
10 TABLET ORAL DAILY
COMMUNITY
Start: 2021-12-06 | End: 2022-08-30

## 2021-12-07 RX ORDER — ACETAMINOPHEN AND CODEINE PHOSPHATE 120; 12 MG/5ML; MG/5ML
0.35 SOLUTION ORAL
COMMUNITY
Start: 2021-11-23 | End: 2022-01-18

## 2021-12-07 RX ORDER — AZELASTINE 1 MG/ML
SPRAY, METERED NASAL
COMMUNITY
Start: 2021-12-06 | End: 2023-10-02 | Stop reason: SDUPTHER

## 2021-12-07 RX ORDER — GABAPENTIN 300 MG/1
1 CAPSULE ORAL NIGHTLY
COMMUNITY
Start: 2021-10-02 | End: 2022-01-18

## 2021-12-07 RX ORDER — FLUTICASONE FUROATE AND VILANTEROL 100; 25 UG/1; UG/1
POWDER RESPIRATORY (INHALATION)
COMMUNITY
End: 2021-12-07

## 2021-12-08 ENCOUNTER — TELEPHONE (OUTPATIENT)
Dept: ALLERGY | Facility: CLINIC | Age: 36
End: 2021-12-08
Payer: COMMERCIAL

## 2021-12-08 ENCOUNTER — SPECIALTY PHARMACY (OUTPATIENT)
Dept: PHARMACY | Facility: CLINIC | Age: 36
End: 2021-12-08
Payer: COMMERCIAL

## 2021-12-08 ENCOUNTER — PATIENT MESSAGE (OUTPATIENT)
Dept: FAMILY MEDICINE | Facility: CLINIC | Age: 36
End: 2021-12-08
Payer: COMMERCIAL

## 2021-12-08 DIAGNOSIS — J45.50 SEVERE PERSISTENT ASTHMA WITHOUT COMPLICATION: Primary | ICD-10-CM

## 2021-12-08 DIAGNOSIS — E11.65 TYPE 2 DIABETES MELLITUS WITH HYPERGLYCEMIA, WITHOUT LONG-TERM CURRENT USE OF INSULIN: Primary | ICD-10-CM

## 2021-12-08 RX ORDER — SEMAGLUTIDE 1.34 MG/ML
INJECTION, SOLUTION SUBCUTANEOUS
Qty: 1 PEN | Refills: 0 | Status: SHIPPED | OUTPATIENT
Start: 2021-12-08 | End: 2022-01-18 | Stop reason: SDUPTHER

## 2021-12-21 ENCOUNTER — PATIENT MESSAGE (OUTPATIENT)
Dept: ALLERGY | Facility: CLINIC | Age: 36
End: 2021-12-21
Payer: COMMERCIAL

## 2021-12-21 ENCOUNTER — PATIENT MESSAGE (OUTPATIENT)
Dept: RHEUMATOLOGY | Facility: CLINIC | Age: 36
End: 2021-12-21
Payer: COMMERCIAL

## 2021-12-22 ENCOUNTER — TELEPHONE (OUTPATIENT)
Dept: FAMILY MEDICINE | Facility: CLINIC | Age: 36
End: 2021-12-22
Payer: COMMERCIAL

## 2021-12-27 PROBLEM — Z09 HOSPITAL DISCHARGE FOLLOW-UP: Status: RESOLVED | Noted: 2021-09-21 | Resolved: 2021-12-27

## 2021-12-28 ENCOUNTER — LAB VISIT (OUTPATIENT)
Dept: LAB | Facility: HOSPITAL | Age: 36
End: 2021-12-28
Attending: INTERNAL MEDICINE
Payer: COMMERCIAL

## 2021-12-28 DIAGNOSIS — R76.8 POSITIVE ANA (ANTINUCLEAR ANTIBODY): ICD-10-CM

## 2021-12-28 LAB
ALBUMIN SERPL BCP-MCNC: 3.6 G/DL (ref 3.5–5.2)
ALP SERPL-CCNC: 73 U/L (ref 55–135)
ALT SERPL W/O P-5'-P-CCNC: 11 U/L (ref 10–44)
ANION GAP SERPL CALC-SCNC: 7 MMOL/L (ref 8–16)
AST SERPL-CCNC: 10 U/L (ref 10–40)
BASOPHILS # BLD AUTO: 0.05 K/UL (ref 0–0.2)
BASOPHILS NFR BLD: 0.7 % (ref 0–1.9)
BILIRUB SERPL-MCNC: 0.2 MG/DL (ref 0.1–1)
BUN SERPL-MCNC: 8 MG/DL (ref 6–20)
C3 SERPL-MCNC: 151 MG/DL (ref 50–180)
C4 SERPL-MCNC: 25 MG/DL (ref 11–44)
CALCIUM SERPL-MCNC: 9.2 MG/DL (ref 8.7–10.5)
CHLORIDE SERPL-SCNC: 104 MMOL/L (ref 95–110)
CO2 SERPL-SCNC: 29 MMOL/L (ref 23–29)
CREAT SERPL-MCNC: 0.6 MG/DL (ref 0.5–1.4)
DIFFERENTIAL METHOD: ABNORMAL
EOSINOPHIL # BLD AUTO: 0.2 K/UL (ref 0–0.5)
EOSINOPHIL NFR BLD: 2.6 % (ref 0–8)
ERYTHROCYTE [DISTWIDTH] IN BLOOD BY AUTOMATED COUNT: 16 % (ref 11.5–14.5)
EST. GFR  (AFRICAN AMERICAN): >60 ML/MIN/1.73 M^2
EST. GFR  (NON AFRICAN AMERICAN): >60 ML/MIN/1.73 M^2
GLUCOSE SERPL-MCNC: 90 MG/DL (ref 70–110)
HCT VFR BLD AUTO: 36 % (ref 37–48.5)
HGB BLD-MCNC: 11.1 G/DL (ref 12–16)
IMM GRANULOCYTES # BLD AUTO: 0.01 K/UL (ref 0–0.04)
IMM GRANULOCYTES NFR BLD AUTO: 0.1 % (ref 0–0.5)
LYMPHOCYTES # BLD AUTO: 3.1 K/UL (ref 1–4.8)
LYMPHOCYTES NFR BLD: 44.5 % (ref 18–48)
MCH RBC QN AUTO: 22.1 PG (ref 27–31)
MCHC RBC AUTO-ENTMCNC: 30.8 G/DL (ref 32–36)
MCV RBC AUTO: 72 FL (ref 82–98)
MONOCYTES # BLD AUTO: 0.5 K/UL (ref 0.3–1)
MONOCYTES NFR BLD: 6.7 % (ref 4–15)
NEUTROPHILS # BLD AUTO: 3.1 K/UL (ref 1.8–7.7)
NEUTROPHILS NFR BLD: 45.5 % (ref 38–73)
NRBC BLD-RTO: 0 /100 WBC
PLATELET # BLD AUTO: 405 K/UL (ref 150–450)
PMV BLD AUTO: 9.2 FL (ref 9.2–12.9)
POTASSIUM SERPL-SCNC: 4.5 MMOL/L (ref 3.5–5.1)
PROT SERPL-MCNC: 6.9 G/DL (ref 6–8.4)
RBC # BLD AUTO: 5.03 M/UL (ref 4–5.4)
SODIUM SERPL-SCNC: 140 MMOL/L (ref 136–145)
WBC # BLD AUTO: 6.9 K/UL (ref 3.9–12.7)

## 2021-12-28 PROCEDURE — 86160 COMPLEMENT ANTIGEN: CPT | Mod: 59 | Performed by: INTERNAL MEDICINE

## 2021-12-28 PROCEDURE — 86225 DNA ANTIBODY NATIVE: CPT | Performed by: INTERNAL MEDICINE

## 2021-12-28 PROCEDURE — 85025 COMPLETE CBC W/AUTO DIFF WBC: CPT | Mod: PO | Performed by: INTERNAL MEDICINE

## 2021-12-28 PROCEDURE — 36415 COLL VENOUS BLD VENIPUNCTURE: CPT | Mod: PO | Performed by: INTERNAL MEDICINE

## 2021-12-28 PROCEDURE — 80053 COMPREHEN METABOLIC PANEL: CPT | Performed by: INTERNAL MEDICINE

## 2021-12-28 PROCEDURE — 86160 COMPLEMENT ANTIGEN: CPT | Performed by: INTERNAL MEDICINE

## 2021-12-29 LAB — DSDNA AB SER-ACNC: NORMAL [IU]/ML

## 2022-01-18 ENCOUNTER — OFFICE VISIT (OUTPATIENT)
Dept: FAMILY MEDICINE | Facility: CLINIC | Age: 37
End: 2022-01-18
Payer: COMMERCIAL

## 2022-01-18 DIAGNOSIS — D53.9 NUTRITIONAL ANEMIA: ICD-10-CM

## 2022-01-18 DIAGNOSIS — E11.65 TYPE 2 DIABETES MELLITUS WITH HYPERGLYCEMIA, WITHOUT LONG-TERM CURRENT USE OF INSULIN: Primary | ICD-10-CM

## 2022-01-18 DIAGNOSIS — Z79.899 ENCOUNTER FOR LONG-TERM (CURRENT) USE OF MEDICATIONS: ICD-10-CM

## 2022-01-18 DIAGNOSIS — E66.01 CLASS 3 SEVERE OBESITY DUE TO EXCESS CALORIES WITH SERIOUS COMORBIDITY AND BODY MASS INDEX (BMI) OF 45.0 TO 49.9 IN ADULT: ICD-10-CM

## 2022-01-18 DIAGNOSIS — R53.83 FATIGUE, UNSPECIFIED TYPE: ICD-10-CM

## 2022-01-18 DIAGNOSIS — L73.2 HIDRADENITIS SUPPURATIVA: ICD-10-CM

## 2022-01-18 PROBLEM — E66.813 CLASS 3 SEVERE OBESITY DUE TO EXCESS CALORIES WITH SERIOUS COMORBIDITY AND BODY MASS INDEX (BMI) OF 45.0 TO 49.9 IN ADULT: Chronic | Status: ACTIVE | Noted: 2021-01-08

## 2022-01-18 PROBLEM — E66.813 CLASS 3 SEVERE OBESITY DUE TO EXCESS CALORIES WITH SERIOUS COMORBIDITY AND BODY MASS INDEX (BMI) OF 45.0 TO 49.9 IN ADULT: Status: ACTIVE | Noted: 2021-01-08

## 2022-01-18 PROBLEM — S31.109A OPEN WOUND OF ABDOMEN: Status: RESOLVED | Noted: 2021-09-28 | Resolved: 2022-01-18

## 2022-01-18 PROCEDURE — 99214 OFFICE O/P EST MOD 30 MIN: CPT | Mod: 95,,, | Performed by: FAMILY MEDICINE

## 2022-01-18 PROCEDURE — 1160F PR REVIEW ALL MEDS BY PRESCRIBER/CLIN PHARMACIST DOCUMENTED: ICD-10-PCS | Mod: CPTII,95,, | Performed by: FAMILY MEDICINE

## 2022-01-18 PROCEDURE — 1160F RVW MEDS BY RX/DR IN RCRD: CPT | Mod: CPTII,95,, | Performed by: FAMILY MEDICINE

## 2022-01-18 PROCEDURE — 1159F PR MEDICATION LIST DOCUMENTED IN MEDICAL RECORD: ICD-10-PCS | Mod: CPTII,95,, | Performed by: FAMILY MEDICINE

## 2022-01-18 PROCEDURE — 1159F MED LIST DOCD IN RCRD: CPT | Mod: CPTII,95,, | Performed by: FAMILY MEDICINE

## 2022-01-18 PROCEDURE — 99214 PR OFFICE/OUTPT VISIT, EST, LEVL IV, 30-39 MIN: ICD-10-PCS | Mod: 95,,, | Performed by: FAMILY MEDICINE

## 2022-01-18 RX ORDER — OXYCODONE AND ACETAMINOPHEN 7.5; 325 MG/1; MG/1
1 TABLET ORAL EVERY 6 HOURS PRN
COMMUNITY
Start: 2021-09-28 | End: 2022-01-18

## 2022-01-18 RX ORDER — SEMAGLUTIDE 1.34 MG/ML
0.5 INJECTION, SOLUTION SUBCUTANEOUS
Qty: 1 PEN | Refills: 1 | Status: SHIPPED | OUTPATIENT
Start: 2022-01-18 | End: 2022-02-24

## 2022-01-18 RX ORDER — SPIRONOLACTONE 50 MG/1
50 TABLET, FILM COATED ORAL DAILY
Qty: 30 TABLET | Refills: 11 | Status: SHIPPED | OUTPATIENT
Start: 2022-01-18 | End: 2022-02-24

## 2022-01-18 RX ORDER — FLUCONAZOLE 200 MG/1
TABLET ORAL
COMMUNITY
Start: 2021-10-18 | End: 2022-01-18

## 2022-01-18 RX ORDER — BUSPIRONE HYDROCHLORIDE 5 MG/1
5 TABLET ORAL 2 TIMES DAILY
COMMUNITY
Start: 2021-09-28 | End: 2022-01-18

## 2022-01-18 RX ORDER — DOXYLAMINE SUCCINATE AND PYRIDOXINE HYDROCHLORIDE, DELAYED RELEASE TABLETS 10 MG/10 MG 10; 10 MG/1; MG/1
1 TABLET, DELAYED RELEASE ORAL NIGHTLY
COMMUNITY
Start: 2021-12-06 | End: 2022-01-18

## 2022-01-18 RX ORDER — LEVOFLOXACIN 750 MG/1
750 TABLET ORAL DAILY
COMMUNITY
Start: 2021-10-13 | End: 2022-01-18

## 2022-01-18 RX ORDER — SERTRALINE HYDROCHLORIDE 50 MG/1
50 TABLET, FILM COATED ORAL DAILY
COMMUNITY
Start: 2021-09-14 | End: 2022-01-18

## 2022-01-18 NOTE — ASSESSMENT & PLAN NOTE
TSH within normal limits.  Patient does have mild anemia.  Checking labs.  Discussed lifestyle modification with diet and exercise.

## 2022-01-18 NOTE — ASSESSMENT & PLAN NOTE
Extensive lifestyle modification with diet and exercise recommended.  Monitoring BMI.  Continue OZEMPIC.

## 2022-01-18 NOTE — ASSESSMENT & PLAN NOTE
Well controlled on current regimen.  Continue OZEMPI C. Titrate up to 1 milligram for better weight loss if tolerated.We will plan to monitor hemoglobin A1c at designated intervals 3 to 6 months.  I recommend ongoing Education for diabetic diet and exercise protocol.  We will continue to monitor for side effects.    Please be advised of symptoms to monitor for and to notify me immediately if persistent or worsening.  Follow up with Ophthalmology/Optometry and Podiatry at least annually.

## 2022-01-18 NOTE — Clinical Note
Please check with patient about setting up lab work.  Set up anemia labs that were ordered today in addition to TSH, A1c CMP and any other labs that are due, please coordinate these labs with Rheumatology labs if needed.

## 2022-01-18 NOTE — ASSESSMENT & PLAN NOTE
Complete history and limited elements physical was completed today.  Complete and thorough medication reconciliation was performed.  Discussed risks and benefits of medications.  Advised patient on orders and health maintenance.  We discussed old records and old labs if available.  Will request any records not available through epic.  Continue current medications listed on your summary sheet.     Pt medicated as ordered; see MAR. Insulin dose held due to blood glucose of 107.

## 2022-01-18 NOTE — PATIENT INSTRUCTIONS
"Follow up in about 4 months (around 5/18/2022), or if symptoms worsen or fail to improve, for Annual Wellness Exam.     Patient Education       Hidradenitis Suppurativa   The Basics   Written by the doctors and editors at [facility]   What is hidradenitis suppurativa? -- Hidradenitis suppurativa, or "HS," is a condition that causes swollen, painful bumps to form on the body. The bumps usually appear in places where the skin rubs together. They can cause so much pain that they make it hard to move. They can smell bad or drain pus or blood. The bumps also tend to last for weeks or months and keep coming back.  People who have HS often have a hard time dealing with their problem. It can make them feel embarrassed and worried. Sometimes the condition can even cause problems in relationships, or in the workplace. If you have this problem, see a doctor or nurse. There are treatments that can help you.  What are the symptoms of HS? -- The main symptoms are swollen, painful bumps. These bumps can drain pus or blood.  The bumps usually form in places where the skin rubs together. Common locations include:  · Armpits  · On or under the breasts  · Groin area  · Inner thighs  · Buttocks  · Around or near the anus  The skin problems caused by HS last a long time and get worse over time. Often the skin hardens and scars around the painful bumps. Many bumps can form in a single area and sometimes form tunnels under the skin (figure 1).  Should I see a doctor or nurse? -- Yes. If you have symptoms of HS, see a doctor or nurse. They can look at your skin and find out if HS is the cause of your symptoms. Make sure you tell the doctor or nurse if you feel sad, upset, or embarrassed because of your symptoms. They can help you deal with these problems.  It's also important to see your doctor regularly if you have HS. People with HS have a higher chance of getting other health problems, too, such as diabetes and heart disease. Your doctor " can check for these problems and suggest treatment if needed.  How is HS treated? -- Treatment can include:  · Antibiotic liquids or gels that you put on the affected skin  · Antibiotic pills, which you might need to take for a few months or longer  · Injections of steroid medicines into affected areas to bring down inflammation  · Hormone pills for some women with HS  · A medicine called adalimumab (brand name: Humira)  · Minor surgery  There are other medicines and treatments that might help people with HS. People with severe, long-lasting problems can have surgery that helps HS to heal.   Is there anything I can do on my own to feel better? -- Yes. First, you should know that you did not do anything to cause your condition. It is not your fault. You did not cause it by being unclean. You should also know that HS is not contagious, and you cannot spread it to other people.  Here are some things you can do to reduce your symptoms:  · Stop smoking, if you smoke. People who smoke are more likely to have HS. If you want to try to quit smoking, your doctor or nurse can help.  · Try to lose weight, if you are overweight. HS is more common and more severe in people who are overweight. If you want to lose weight, your doctor or nurse can help you come up with a plan to do this in a healthy way.  How can I learn more about hidradenitis suppurativa? -- More information is available online from the following organizations:  · Hidradenitis Suppurativa Foundation (https://www.hs-foundation.org/)  · Hidradenitis Suppurativa Trust (https://www.hstrust.org/)  These are also good resources if you are looking for support from other people living with HS. It can help to talk to people who are going through similar things.  All topics are updated as new evidence becomes available and our peer review process is complete.  This topic retrieved from [employer] on: Sep 21, 2021.  Topic 17198 Version 9.0  Release: 29.4.2 -  C29.263  © 2021 UpToDate, Inc. and/or its affiliates. All rights reserved.  figure 1: Hidradenitis suppurativa     Hidradenitis suppurativa causes red, swollen, painful bumps to form on the body (panel A). It usually affects areas where the skin rubs together, like the armpit. Over time, the skin hardens and scars around the bumps. Sometimes, tunnels form under the skin (panel B).  Graphic 060955 Version 1.0    Consumer Information Use and Disclaimer   This information is not specific medical advice and does not replace information you receive from your health care provider. This is only a brief summary of general information. It does NOT include all information about conditions, illnesses, injuries, tests, procedures, treatments, therapies, discharge instructions or life-style choices that may apply to you. You must talk with your health care provider for complete information about your health and treatment options. This information should not be used to decide whether or not to accept your health care provider's advice, instructions or recommendations. Only your health care provider has the knowledge and training to provide advice that is right for you. The use of this information is governed by the [employer] End User License Agreement, available at https://www.InStaff/en/solutions/SoFi/about/butch.The use of Parkt content is governed by the Park[employer] Terms of Use. ©2021 UpToDate, Inc. All rights reserved.  Copyright   © 2021 UpToDate, Inc. and/or its affiliates. All rights reserved.   Patient Education       Spironolactone (eboni on oh LAK tone)   Brand Names: US Aldactone; CaroSpir   Brand Names: Mya Aldactone; MINT-Spironolactone; TEVA-Spironolactone   What is this drug used for?   · It is used to get rid of extra fluid.  · It is used to raise potassium in the body.  · It is used to treat heart failure (weak heart).  · It is used to treat high blood pressure.  · It is used to treat some people  with high aldosterone levels.  · It is used to treat some kidney problems.  · It may be given to you for other reasons. Talk with the doctor.    What do I need to tell my doctor BEFORE I take this drug?   · If you are allergic to this drug; any part of this drug; or any other drugs, foods, or substances. Tell your doctor about the allergy and what signs you had.  · If you have any of these health problems: Shoshone's disease or high potassium levels.  · If you are taking any of these drugs: Amiloride, eplerenone, or triamterene.  This is not a list of all drugs or health problems that interact with this drug.  Tell your doctor and pharmacist about all of your drugs (prescription or OTC, natural products, vitamins) and health problems. You must check to make sure that it is safe for you to take this drug with all of your drugs and health problems. Do not start, stop, or change the dose of any drug without checking with your doctor.  What are some things I need to know or do while I take this drug?   · Tell all of your health care providers that you take this drug. This includes your doctors, nurses, pharmacists, and dentists.  · Avoid driving and doing other tasks or actions that call for you to be alert until you see how this drug affects you.  · Check your blood pressure as you have been told.  · Have blood work checked as you have been told by the doctor. Talk with the doctor.  · This drug may affect certain lab tests. Tell all of your health care providers and lab workers that you take this drug.  · If you are on a low-salt or salt-free diet, talk with your doctor.  · Sometimes, this drug may raise potassium levels in the blood. This can be deadly if it is not treated. The risk is highest in people with diabetes, kidney disease, severe illness, and in older adults. Your doctor will follow you closely to change the dose if needed.  · If you are taking a salt substitute that has potassium in it, a potassium-sparing  diuretic, or a potassium product, talk with your doctor.  · Talk with your doctor before you use alcohol, marijuana or other forms of cannabis, or prescription or OTC drugs that may slow your actions.  · If you have high blood sugar (diabetes), you will need to watch your blood sugar closely.  · Tell your doctor if you are pregnant, plan on getting pregnant, or are breast-feeding. You will need to talk about the benefits and risks to you and the baby.    What are some side effects that I need to call my doctor about right away?   WARNING/CAUTION: Even though it may be rare, some people may have very bad and sometimes deadly side effects when taking a drug. Tell your doctor or get medical help right away if you have any of the following signs or symptoms that may be related to a very bad side effect:  · Signs of an allergic reaction, like rash; hives; itching; red, swollen, blistered, or peeling skin with or without fever; wheezing; tightness in the chest or throat; trouble breathing, swallowing, or talking; unusual hoarseness; or swelling of the mouth, face, lips, tongue, or throat.  · Signs of fluid and electrolyte problems like mood changes, confusion, muscle pain or weakness, a heartbeat that does not feel normal, very bad dizziness or passing out, fast heartbeat, more thirst, seizures, feeling very tired or weak, not hungry, unable to pass urine or change in the amount of urine produced, dry mouth, dry eyes, or very bad upset stomach or throwing up.  · Signs of kidney problems like unable to pass urine, change in how much urine is passed, blood in the urine, or a big weight gain.  · Signs of a very bad skin reaction (Wolfe-Zane syndrome/toxic epidermal necrolysis) like red, swollen, blistered, or peeling skin (with or without fever); red or irritated eyes; or sores in the mouth, throat, nose, or eyes.  · Very bad dizziness or passing out.  · Feeling confused.  · Change in balance.  · Lowered interest in  sex.  · Not able to get or keep an erection.  · Fever, chills, or sore throat.  · Any unexplained bruising or bleeding.  · Black, tarry, or bloody stools.  · Throwing up blood or throw up that looks like coffee grounds.  · Period (menstrual) changes.  · Breast pain.  · For males, enlarged breasts.  · Liver problems have rarely happened with this drug. Sometimes, this has been deadly. Call your doctor right away if you have signs of liver problems like dark urine, feeling tired, not hungry, upset stomach or stomach pain, light-colored stools, throwing up, or yellow skin or eyes.  What are some other side effects of this drug?   All drugs may cause side effects. However, many people have no side effects or only have minor side effects. Call your doctor or get medical help if any of these side effects or any other side effects bother you or do not go away:  · Feeling dizzy or sleepy.  · Headache.  · Diarrhea, upset stomach, or throwing up.  · Stomach cramps.  · Hair loss.  These are not all of the side effects that may occur. If you have questions about side effects, call your doctor. Call your doctor for medical advice about side effects.  You may report side effects to your national health agency.  You may report side effects to the FDA at 1-225.976.9299. You may also report side effects at https://www.fda.gov/medwatch.  How is this drug best taken?   Use this drug as ordered by your doctor. Read all information given to you. Follow all instructions closely.  All products:   · Take with or without food but take the same way each time. Always take with food or always take on an empty stomach.  · Keep taking this drug as you have been told by your doctor or other health care provider, even if you feel well.  · This drug may cause you to pass urine more often. To keep from having sleep problems, try not to take too close to bedtime.  Liquid (suspension):   · Shake well before use.  · Measure liquid doses carefully. Use  the measuring device that comes with this drug. If there is none, ask the pharmacist for a device to measure this drug.  What do I do if I miss a dose?   · Take a missed dose as soon as you think about it.  · If it is close to the time for your next dose, skip the missed dose and go back to your normal time.  · Do not take 2 doses at the same time or extra doses.    How do I store and/or throw out this drug?   · Store at room temperature in a dry place. Do not store in a bathroom.  · Keep all drugs in a safe place. Keep all drugs out of the reach of children and pets.  · Throw away unused or  drugs. Do not flush down a toilet or pour down a drain unless you are told to do so. Check with your pharmacist if you have questions about the best way to throw out drugs. There may be drug take-back programs in your area.    General drug facts   · If your symptoms or health problems do not get better or if they become worse, call your doctor.  · Do not share your drugs with others and do not take anyone else's drugs.  · Some drugs may have another patient information leaflet. If you have any questions about this drug, please talk with your doctor, nurse, pharmacist, or other health care provider.  · Some drugs may have another patient information leaflet. Check with your pharmacist. If you have any questions about this drug, please talk with your doctor, nurse, pharmacist, or other health care provider.  · If you think there has been an overdose, call your poison control center or get medical care right away. Be ready to tell or show what was taken, how much, and when it happened.    Consumer Information Use and Disclaimer   This generalized information is a limited summary of diagnosis, treatment, and/or medication information. It is not meant to be comprehensive and should be used as a tool to help the user understand and/or assess potential diagnostic and treatment options. It does NOT include all information about  conditions, treatments, medications, side effects, or risks that may apply to a specific patient. It is not intended to be medical advice or a substitute for the medical advice, diagnosis, or treatment of a health care provider based on the health care provider's examination and assessment of a patient's specific and unique circumstances. Patients must speak with a health care provider for complete information about their health, medical questions, and treatment options, including any risks or benefits regarding use of medications. This information does not endorse any treatments or medications as safe, effective, or approved for treating a specific patient. UpToDate, Inc. and its affiliates disclaim any warranty or liability relating to this information or the use thereof. The use of this information is governed by the Terms of Use, available at https://www.Show de Ingressos.Fjuul/en/solutions/RatingBugicomp/about/butch.  Last Reviewed Date   2020-09-17  Copyright   © 2021 UpToDate, Inc. and its affiliates and/or licensors. All rights reserved.      Dear patient,   As a result of recent federal legislation (The Federal Cures Act), you may receive lab or pathology results from your visit in your [facility] account before your physician is able to contact you. Your physician or their representative will relay the results to you with their recommendations at their soonest availability.     If no improvement in symptoms or symptoms worsen, please be advised to call MD, follow-up at clinic and/or go to ER if becomes severe.    Datne Negro M.D.        We Offer TELEHEALTH & Same Day Appointments!   Book your Telehealth appointment with me through my nurse or   Clinic appointments on [facility]!    [id number] [place] [address]    Office: [phone]   FAX: [phone]    Check out my Facebook Page and Follow Me at: [email or web address]    Check out my website at [employer] by clicking on:  [email or web address]    To Schedule appointments online, go to [facility]: [email or web address]

## 2022-01-18 NOTE — PROGRESS NOTES
Primary Care Telemedicine Note  The patient location is:  Patient's Home - [place]  The chief complaint leading to consultation is:   Chief Complaint   Patient presents with    Fatigue    Anemia    Diabetes      Total time:  see MDM below. The total time spent on the encounter, which includes face to face time and non-face to face time preparing to see the patient (eg, review of previous medical records, tests), Obtaining and/or reviewing separately obtained history, documenting clinical information in the electronic or other health record, independently interpreting results (not separately reported)/communicating results to the patient/family/caregiver, and/or care coordination (not separately reported).    Visit type: Virtual visit with synchronous audio only and video  Each patient to whom he or she provides medical services by telemedicine is:  (1) informed of the relationship between the physician and patient and the respective role of any other health care provider with respect to management of the patient; and (2) notified that he or she may decline to receive medical services by telemedicine and may withdraw from such care at any time.  =================================================================  PLAN:      Problem List Items Addressed This Visit     Nutritional anemia (Chronic)     Check anemia labs.  Anemia precautions.         Relevant Orders    CBC Auto Differential    Iron and TIBC    Folate    Vitamin B12    Encounter for long-term (current) use of medications (Chronic)     Complete history and limited elements physical was completed today.  Complete and thorough medication reconciliation was performed.  Discussed risks and benefits of medications.  Advised patient on orders and health maintenance.  We discussed old records and old labs if available.  Will request any records not available through epic.  Continue current medications listed on your summary sheet.           Fatigue (Chronic)      TSH within normal limits.  Patient does have mild anemia.  Checking labs.  Discussed lifestyle modification with diet and exercise.         Type 2 diabetes mellitus with hyperglycemia, without long-term current use of insulin - Primary (Chronic)     Well controlled on current regimen.  Continue OZEMPI C. Titrate up to 1 milligram for better weight loss if tolerated.We will plan to monitor hemoglobin A1c at designated intervals 3 to 6 months.  I recommend ongoing Education for diabetic diet and exercise protocol.  We will continue to monitor for side effects.    Please be advised of symptoms to monitor for and to notify me immediately if persistent or worsening.  Follow up with Ophthalmology/Optometry and Podiatry at least annually.           Relevant Medications    semaglutide (OZEMPIC) 0.25 mg or 0.5 mg(2 mg/1.5 mL) pen injector    Class 3 severe obesity due to excess calories with serious comorbidity and body mass index (BMI) of 45.0 to 49.9 in adult (Chronic)     Extensive lifestyle modification with diet and exercise recommended.  Monitoring BMI.  Continue OZEMPIC.         Hidradenitis suppurativa (Chronic)     Follow-up with Infectious Disease and Dermatology.  Restart spironolactone.  Patient aware of risk and benefits of medication use.  Patient had bilateral tubal ligation and is going to be restarting birth control for this condition.         Relevant Medications    spironolactone (ALDACTONE) 50 MG tablet        Future Appointments     Date Provider Specialty Appt Notes    2/16/2022 Rossy Kramer MD Allergy 3 mo follow up    2/24/2022 Dante Negro MD Family Medicine refill     3/22/2022  Pulmonology 3 mth rev maura     3/22/2022 Julia Mendoza MD Pulmonology 3 mth rev maura     5/18/2022 Dante Negro MD Family Medicine 4 month f/u    7/6/2022 Michael Hernandez MD Rheumatology rtc/5mo/labs/di         Medication Management for assessment above:   Medication List with Changes/Refills   New  "Medications    SPIRONOLACTONE (ALDACTONE) 50 MG TABLET    Take 1 tablet (50 mg total) by mouth once daily.   Current Medications    ALBUTEROL (PROVENTIL) 2.5 MG /3 ML (0.083 %) NEBULIZER SOLUTION    Take 3 mLs (2.5 mg total) by nebulization every 6 (six) hours as needed for Wheezing. Rescue    ALBUTEROL (PROVENTIL/VENTOLIN HFA) 90 MCG/ACTUATION INHALER    Inhale 2 puffs into the lungs every 6 (six) hours as needed for Wheezing.    ASPIRIN (ECOTRIN) 81 MG EC TABLET    Take 1 tablet (81 mg total) by mouth once daily.    AZELASTINE (ASTELIN) 137 MCG (0.1 %) NASAL SPRAY    SMARTSIG:Both Nares    CHOLECALCIFEROL, VITAMIN D3, (VITAMIN D3) 25 MCG (1,000 UNIT) CAPSULE    Take 2 capsules (2,000 Units total) by mouth once daily.    ERGOCALCIFEROL (ERGOCALCIFEROL) 50,000 UNIT CAP    Take 1 capsule (50,000 Units total) by mouth every 7 days.    ESCITALOPRAM OXALATE (LEXAPRO) 20 MG TABLET    Take 20 mg by mouth.    FLUTICASONE FUROATE-VILANTEROL (BREO) 200-25 MCG/DOSE DSDV DISKUS INHALER    Inhale 1 puff into the lungs once daily. Controller    IBUPROFEN (ADVIL,MOTRIN) 600 MG TABLET    Take 1 tablet (600 mg total) by mouth every 6 (six) hours as needed (Uncontrolled pain).    IRON-VIT C-B12-FOLIC ACID (IRON 100 PLUS) TAB    Take 1 tablet by mouth once daily.    LACTOBACILLUS RHAMNOSUS GG (CULTURELLE) 10 BILLION CELL CAPSULE    Take 1 capsule by mouth once daily.    LEVOCETIRIZINE (XYZAL) 5 MG TABLET    Take 1 tablet (5 mg total) by mouth every evening.    MONTELUKAST (SINGULAIR) 10 MG TABLET    Take 10 mg by mouth once daily.    NOVOFINE PLUS 32 GAUGE X 1/6" NDLE    use as directed    ONDANSETRON (ZOFRAN-ODT) 8 MG TBDL    DISSOLVE ONE TABLET UNDER TONGUE EVERY 8 HOURS AS NEEDED FOR NAUSEA    PANTOPRAZOLE (PROTONIX) 40 MG TABLET    Take 1 tablet (40 mg total) by mouth once daily.   Changed and/or Refilled Medications    Modified Medication Previous Medication    SEMAGLUTIDE (OZEMPIC) 0.25 MG OR 0.5 MG(2 MG/1.5 ML) PEN INJECTOR " semaglutide (OZEMPIC) 0.25 mg or 0.5 mg(2 mg/1.5 mL) pen injector       Inject 0.5 mg into the skin every 7 days.    Inject 0.25 mg into the skin once a week for 30 days, THEN 0.5 mg once a week.   Discontinued Medications    AMOXICILLIN (AMOXIL) 500 MG CAPSULE    TAKE ONE CAPSULE EVERY TWELVE HOURS    AMOXICILLIN-CLAVULANATE 875-125MG (AUGMENTIN) 875-125 MG PER TABLET    Take 1 tablet by mouth every 12 (twelve) hours.    BUPROPION (WELLBUTRIN XL) 150 MG TB24 TABLET    Take 150 mg by mouth.    BUSPIRONE (BUSPAR) 5 MG TAB    Take 5 mg by mouth 2 (two) times daily.    DOXYLAMINE-PYRIDOXINE, VIT B6, (DICLEGIS) 10-10 MG TBEC    Take 1 tablet by mouth nightly.    DULOXETINE (CYMBALTA) 30 MG CAPSULE    Take 30 mg by mouth once daily.    FLUCONAZOLE (DIFLUCAN) 200 MG TAB    Take 1 tablet (200 mg total) by mouth every other day for 7 days    GABAPENTIN (NEURONTIN) 300 MG CAPSULE    Take 1 capsule by mouth every evening.    HYDROCODONE-ACETAMINOPHEN (NORCO)  MG PER TABLET    Take 1 tablet by mouth every 6 (six) hours as needed for Pain.    HYDROXYZINE HCL (ATARAX) 25 MG TABLET    SMARTSI Tablet(s) By Mouth Every Evening    LABETALOL (NORMODYNE) 100 MG TABLET    Take 100 mg by mouth 2 (two) times daily.    LACTULOSE (CHRONULAC) 10 GRAM/15 ML SOLUTION    SMARTSI Tablespoon By Mouth Daily    LEVOFLOXACIN (LEVAQUIN) 750 MG TABLET    Take 750 mg by mouth once daily.    METOCLOPRAMIDE HCL (REGLAN) 10 MG TABLET    Take 10 mg by mouth once daily.    MOXIFLOXACIN (AVELOX) 400 MG TABLET    Take 400 mg by mouth.    MUPIROCIN (BACTROBAN) 2 % OINTMENT    Apply topically 3 (three) times daily.    NIFEDIPINE (ADALAT CC) 30 MG TBSR    Take 1 tablet (30 mg total) by mouth once daily.    NORETHINDRONE (MICRONOR) 0.35 MG TABLET    Take 0.35 mg by mouth.    OMEPRAZOLE (PRILOSEC) 40 MG CAPSULE    Take 40 mg by mouth once daily.    OXYCODONE-ACETAMINOPHEN (PERCOCET) 7.5-325 MG PER TABLET    Take 1 tablet by mouth every 6 (six) hours  as needed.    PREDNISONE (DELTASONE) 20 MG TABLET    Take 1 tablet (20 mg total) by mouth 2 (two) times daily.    SERTRALINE (ZOLOFT) 50 MG TABLET    Take 50 mg by mouth once daily.       Dante Negro M.D.  ==========================================================================  Subjective:   Patient ID: Lucía Coreas is a 36 y.o. female.  has a past medical history of Allergy, Amenorrhea, Asthma, Diabetes, GERD (gastroesophageal reflux disease), Infertility associated with anovulation, Iron deficiency anemia, Knee pain, Metabolic syndrome, PCO (polycystic ovaries), and Recurrent boils.   Chief Complaint: Fatigue, Anemia, and Diabetes      Problem List Items Addressed This Visit     Nutritional anemia (Chronic)    Overview     Reviewed recent CBC from Care everywhere.      Lab Results   Component Value Date    WBC 6.90 12/28/2021    HGB 11.1 (L) 12/28/2021    HCT 36.0 (L) 12/28/2021    MCV 72 (L) 12/28/2021     12/28/2021     Lab Results   Component Value Date    IRON 116 01/22/2009    TIBC 308 01/22/2009    FERRITIN 36 01/22/2009     Lab Results   Component Value Date    PMELKHII55 387 01/22/2009     Lab Results   Component Value Date    FOLATE 13.5 01/22/2009                Current Assessment & Plan     Check anemia labs.  Anemia precautions.         Encounter for long-term (current) use of medications (Chronic)    Overview     07/12/2019 CHRONIC long-term drug therapy for managed conditions. See medication list. Reports compliance.  No side effects reported.  Routine lab work is being monitored.  Patient does not need refills today. 09/20/2019Reviewed labs. Patient is compliant with meds. She needs refills. Gaining weight on Metformin. DECEMBER 2019:  CHRONIC. Stable. Compliant with medications for managed conditions. See medication list. No SE reported. Routine lab analysis is being monitored. Refills were addressed. APRIL 2020:  CHRONIC. Stable. Compliant with medications for managed  conditions. See medication list. No SE reported. Routine lab analysis is being monitored. Refills were addressed.  AUGUST 2020:  REVIEWED LABS.  CHRONIC. Stable. Compliant with medications for managed conditions. See medication list. No SE reported. Routine lab analysis is being monitored. Refills were addressed.October 2020:  CHRONIC. Stable. Compliant with medications for managed conditions. See medication list. No SE reported. Routine lab analysis is being monitored. Refills were addressed.November 2020:CHRONIC. Stable. Compliant with medications for managed conditions. See medication list. No SE reported.   Routine lab analysis is being monitored. Refills were addressed.  April 2021:  Reviewed labs.  August 2021:  Reviewed labs.  Patient additional labs set up through [facility] at [place].  January 2022:  Reviewed labs.  Lab Results   Component Value Date    WBC 6.90 12/28/2021    HGB 11.1 (L) 12/28/2021    HCT 36.0 (L) 12/28/2021    MCV 72 (L) 12/28/2021     12/28/2021         Chemistry        Component Value Date/Time     10/21/2021 0836    K 3.9 10/21/2021 0836     10/21/2021 0836    CO2 23 10/21/2021 0836    BUN 7 10/21/2021 0836    CREATININE 0.7 10/21/2021 0836    GLU 85 10/21/2021 0836        Component Value Date/Time    CALCIUM 9.4 10/21/2021 0836    ALKPHOS 76 10/21/2021 0836    AST 14 10/21/2021 0836    ALT 14 10/21/2021 0836    BILITOT 0.3 10/21/2021 0836    ESTGFRAFRICA >60.0 10/21/2021 0836    EGFRNONAA >60.0 10/21/2021 0836          Lab Results   Component Value Date    TSH 1.004 10/21/2021    E3SSWXB 9.4 07/16/2019    T3FREE 2.9 07/16/2019              Current Assessment & Plan     Complete history and limited elements physical was completed today.  Complete and thorough medication reconciliation was performed.  Discussed risks and benefits of medications.  Advised patient on orders and health maintenance.  We discussed old records and old labs if available.   Will request any records not available through epic.  Continue current medications listed on your summary sheet.           Fatigue (Chronic)    Overview     Chronic.  No improvement.  Patient concerned about her weight and energy level.    She has restarted OZEMPIC and is starting to lose weight again.  She is currently on OZEMPIC 0.5 milligram weekly.         Current Assessment & Plan     TSH within normal limits.  Patient does have mild anemia.  Checking labs.  Discussed lifestyle modification with diet and exercise.         Type 2 diabetes mellitus with hyperglycemia, without long-term current use of insulin - Primary (Chronic)    Overview     INITIAL HPI:  No results found in epic however review her blood work from previous provider shows last A1c was 5.8.  Patient previously on metformin.  Currently having fatigue and decreased libido.09/20/2019Patient due for hemoglobin A1c next month.  Will place order for patient.She is having GI symptoms with metformin. She reports BG is elevated at home. She continues to gain weight. DECEMBER 2019:  Patient reports to having diarrhea with metformin.  Reviewed Diabetes Management StatusStatin: Not takingACE/ARB: Not takingAPRIL 2020:  Patient has started Victoza and is off of metformin.  Patient reports that she is getting slightly nauseated on the increased dose of 1.2 mg.  Symptoms resolved after taking Pepto-Bismol after few hours of injection.Reviewed Diabetes Management StatusStatin: Not takingACE/ARB: Not takingAUGUST 2020:  PATIENT REPORTS THAT SHE IS TOLERATING VICTOZA 1.2 MG.  PATIENT CONTINUES TO HAVE ISSUES WITH HER WEIGHT.  REVIEWED Diabetes Management StatusStatin: Not takingACE/ARB: Not takingOCTOBER 2020:  Patient is having side effects with Victoza.  Reviewed Diabetes Management Status.Statin: Not takingACE/ARB: Not takingNOVEMBER 2020:  Patient reports that she was doing well on OZEMPIC.  A1c is been well controlled.  Patient was recently diagnosed with  pregnancy.  Will monitor for worsening of diabetes.  Diabetes Management Status Statin: Not taking ACE/ARB: Not takingDECEMBER 2020:  Patient recently had miscarriage.  Patient wanting to get back on her diabetic medication.  Diabetes Management StatusStatin: Not takingACE/ARB: Not taking January 2022:  Diabetes Management Status    Statin: Not taking  ACE/ARB: Not taking    Screening or Prevention Patient's value Goal Complete/Controlled?   HgA1C Testing and Control   Lab Results   Component Value Date    HGBA1C 4.8 10/21/2021      Annually/Less than 8% Yes   Lipid profile : 10/21/2021 Annually Yes   LDL control Lab Results   Component Value Date    LDLCALC 104.0 10/21/2021    Annually/Less than 100 mg/dl  No   Nephropathy screening Lab Results   Component Value Date    LABMICR 8.0 10/21/2021     Lab Results   Component Value Date    PROTEINUA Negative 12/28/2021     Lab Results   Component Value Date    UTPCR Unable to calculate 12/28/2021      Annually Yes   Blood pressure BP Readings from Last 1 Encounters:   12/07/21 124/80    Less than 140/90 Yes   Dilated retinal exam : 07/16/2021 Annually Yes   Foot exam   Most Recent Foot Exam Date: Not Found Annually No              Current Assessment & Plan     Well controlled on current regimen.  Continue OZEMPI C. Titrate up to 1 milligram for better weight loss if tolerated.We will plan to monitor hemoglobin A1c at designated intervals 3 to 6 months.  I recommend ongoing Education for diabetic diet and exercise protocol.  We will continue to monitor for side effects.    Please be advised of symptoms to monitor for and to notify me immediately if persistent or worsening.  Follow up with Ophthalmology/Optometry and Podiatry at least annually.           Class 3 severe obesity due to excess calories with serious comorbidity and body mass index (BMI) of 45.0 to 49.9 in adult (Chronic)    Overview     BMI Readings from Last 10 Encounters:   12/07/21 49.39 kg/m²   11/11/21  50.15 kg/m²   09/28/21 49.61 kg/m²   09/21/21 49.61 kg/m²   07/29/21 54.57 kg/m²   07/29/21 53.96 kg/m²   04/26/21 54.11 kg/m²   04/14/21 53.97 kg/m²   03/16/21 49.82 kg/m²   03/15/21 52.98 kg/m²              Current Assessment & Plan     Extensive lifestyle modification with diet and exercise recommended.  Monitoring BMI.  Continue OZEMPIC.         Hidradenitis suppurativa (Chronic)    Overview     Chronic.  Recurrence.  Patient reports recurrence of boils under her arms and in the groin area.  Patient was on spironolactone briefly prior to her pregnancy and reports some improvement.  She is following with Infectious Disease and has been on antibiotics for other reasons which was helping the hidradenitis lesions.         Current Assessment & Plan     Follow-up with Infectious Disease and Dermatology.  Restart spironolactone.  Patient aware of risk and benefits of medication use.  Patient had bilateral tubal ligation and is going to be restarting birth control for this condition.                Review of patient's allergies indicates:   Allergen Reactions    Metformin Diarrhea    Sulfa (sulfonamide antibiotics) Anaphylaxis and Swelling     Swelling (eyes)^, Swelling (throat)^  Swelling (eyes)^, Swelling (throat)^      Diclofenac      Gastritis     Shellfish containing products      anaphylaxis     Current Outpatient Medications   Medication Instructions    albuterol (PROVENTIL) 2.5 mg, Nebulization, Every 6 hours PRN, Rescue    albuterol (PROVENTIL/VENTOLIN HFA) 90 mcg/actuation inhaler 2 puffs, Inhalation, Every 6 hours PRN    aspirin (ECOTRIN) 81 mg, Oral, Daily    azelastine (ASTELIN) 137 mcg (0.1 %) nasal spray SMARTSIG:Both Nares    cholecalciferol (vitamin D3) (VITAMIN D3) 2,000 Units, Oral, Daily    ergocalciferol (ERGOCALCIFEROL) 50,000 Units, Oral, Every 7 days    EScitalopram oxalate (LEXAPRO) 20 mg, Oral    fluticasone furoate-vilanteroL (BREO) 200-25 mcg/dose DsDv diskus inhaler 1 puff,  "Inhalation, Daily, Controller    ibuprofen (ADVIL,MOTRIN) 600 mg, Oral, Every 6 hours PRN    iron-vit c-b12-folic acid (IRON 100 PLUS) Tab 1 tablet, Oral, Daily    Lactobacillus rhamnosus GG (CULTURELLE) 10 billion cell capsule 1 capsule, Oral, Daily    levocetirizine (XYZAL) 5 mg, Oral, Nightly    montelukast (SINGULAIR) 10 mg, Oral, Daily    NOVOFINE PLUS 32 gauge x 1/6" Ndle use as directed    ondansetron (ZOFRAN-ODT) 8 MG TbDL DISSOLVE ONE TABLET UNDER TONGUE EVERY 8 HOURS AS NEEDED FOR NAUSEA    OZEMPIC 0.5 mg, Subcutaneous, Every 7 days    pantoprazole (PROTONIX) 40 mg, Oral, Daily    spironolactone (ALDACTONE) 50 mg, Oral, Daily      I have reviewed the PMH, social history, FamilyHx, surgical history, allergies and medications documented / confirmed by the patient at the time of this visit.  Review of Systems   Constitutional: Positive for activity change and fatigue. Negative for unexpected weight change.   HENT: Negative for hearing loss, rhinorrhea and trouble swallowing.    Eyes: Negative for discharge and visual disturbance.   Respiratory: Negative for chest tightness and wheezing.    Cardiovascular: Negative for chest pain and palpitations.   Gastrointestinal: Negative for blood in stool, constipation, diarrhea and vomiting.   Endocrine: Negative for polydipsia and polyuria.   Genitourinary: Negative for difficulty urinating, dysuria, hematuria and menstrual problem.   Musculoskeletal: Negative for arthralgias, joint swelling and neck pain.   Neurological: Negative for weakness and headaches.   Psychiatric/Behavioral: Negative for confusion and dysphoric mood.     Objective:   There were no vitals taken for this visit.  Physical Exam  Constitutional:       General: She is not in acute distress.     Appearance: She is well-developed and well-nourished. She is not ill-appearing, toxic-appearing, sickly-appearing or diaphoretic.   HENT:      Head: Normocephalic and atraumatic.      Right Ear: " Hearing and external ear normal.      Left Ear: Hearing and external ear normal.   Eyes:      General: Lids are normal.      Extraocular Movements: EOM normal.      Conjunctiva/sclera: Conjunctivae normal.   Pulmonary:      Effort: Pulmonary effort is normal. No respiratory distress.   Musculoskeletal:         General: Normal range of motion.      Cervical back: Normal range of motion.   Skin:     Coloration: Skin is not pale.   Neurological:      Mental Status: She is alert. She is not disoriented.   Psychiatric:         Attention and Perception: She is attentive.         Mood and Affect: Mood and affect normal. Mood is not anxious or depressed.         Speech: Speech is not rapid and pressured or slurred.         Behavior: Behavior normal. Behavior is not agitated, aggressive or hyperactive. Behavior is cooperative.         Thought Content: Thought content normal. Thought content is not paranoid or delusional. Thought content does not include homicidal or suicidal ideation. Thought content does not include homicidal or suicidal plan.         Cognition and Memory: Cognition and memory normal. Memory is not impaired.         Judgment: Judgment normal.          Assessment:     1. Type 2 diabetes mellitus with hyperglycemia, without long-term current use of insulin    2. Fatigue, unspecified type    3. Nutritional anemia    4. Hidradenitis suppurativa    5. Encounter for long-term (current) use of medications    6. Class 3 severe obesity due to excess calories with serious comorbidity and body mass index (BMI) of 45.0 to 49.9 in adult      MDM:   Moderate complexity.  Moderate risk.  Total time: 31 minutes.  This includes total time spent on the encounter, which includes face to face time and non-face to face time preparing to see the patient (eg, review of previous medical records, tests), Obtaining and/or reviewing separately obtained history, documenting clinical information in the electronic or other health  record, independently interpreting results (not separately reported)/communicating results to the patient/family/caregiver, and/or care coordination (not separately reported).    I have Reviewed and summarized old records.  I have performed thorough medication reconciliation today and discussed risk and benefits of medications.  I have reviewed labs and discussed with patient.  All questions were answered.  I am requesting old records and will review them once they are available.Infectious disease Dr. Dorys Mercedes    I have signed for the following orders AND/OR meds.  Orders Placed This Encounter   Procedures    CBC Auto Differential     Standing Status:   Future     Standing Expiration Date:   3/19/2023    Iron and TIBC     Standing Status:   Future     Standing Expiration Date:   3/19/2023    Folate     Standing Status:   Future     Standing Expiration Date:   3/19/2023    Vitamin B12     Standing Status:   Future     Standing Expiration Date:   3/19/2023     Medications Ordered This Encounter   Medications    semaglutide (OZEMPIC) 0.25 mg or 0.5 mg(2 mg/1.5 mL) pen injector     Sig: Inject 0.5 mg into the skin every 7 days.     Dispense:  1 pen     Refill:  1    spironolactone (ALDACTONE) 50 MG tablet     Sig: Take 1 tablet (50 mg total) by mouth once daily.     Dispense:  30 tablet     Refill:  11     .        Follow up in about 4 months (around 5/18/2022), or if symptoms worsen or fail to improve, for Annual Wellness Exam.    If no improvement in symptoms or symptoms worsen, advised to call/follow-up at clinic or go to ER. Patient voiced understanding and all questions/concerns were addressed.   DISCLAIMER: This note was compiled by using a speech recognition dictation system and therefore please be aware that typographical / speech recognition errors can and do occur.  Please contact me if you see any errors specifically.    Dante Negro M.D.       Office: [phone] [id number] [facility]  [facility] [place] [address]  FAX: [phone]

## 2022-01-20 ENCOUNTER — LAB VISIT (OUTPATIENT)
Dept: LAB | Facility: HOSPITAL | Age: 37
End: 2022-01-20
Attending: FAMILY MEDICINE
Payer: COMMERCIAL

## 2022-01-20 DIAGNOSIS — Z79.899 ENCOUNTER FOR LONG-TERM (CURRENT) USE OF MEDICATIONS: ICD-10-CM

## 2022-01-20 DIAGNOSIS — Z13.220 ENCOUNTER FOR LIPID SCREENING FOR CARDIOVASCULAR DISEASE: ICD-10-CM

## 2022-01-20 DIAGNOSIS — D53.9 NUTRITIONAL ANEMIA: ICD-10-CM

## 2022-01-20 DIAGNOSIS — E55.9 VITAMIN D DEFICIENCY: ICD-10-CM

## 2022-01-20 DIAGNOSIS — Z13.6 ENCOUNTER FOR LIPID SCREENING FOR CARDIOVASCULAR DISEASE: ICD-10-CM

## 2022-01-20 DIAGNOSIS — Z00.00 WELL ADULT EXAM: ICD-10-CM

## 2022-01-20 LAB
25(OH)D3+25(OH)D2 SERPL-MCNC: 19 NG/ML (ref 30–96)
ALBUMIN SERPL BCP-MCNC: 3.5 G/DL (ref 3.5–5.2)
ALP SERPL-CCNC: 71 U/L (ref 55–135)
ALT SERPL W/O P-5'-P-CCNC: 11 U/L (ref 10–44)
ANION GAP SERPL CALC-SCNC: 9 MMOL/L (ref 8–16)
AST SERPL-CCNC: 11 U/L (ref 10–40)
BASOPHILS # BLD AUTO: 0.05 K/UL (ref 0–0.2)
BASOPHILS NFR BLD: 0.5 % (ref 0–1.9)
BILIRUB SERPL-MCNC: 0.3 MG/DL (ref 0.1–1)
BUN SERPL-MCNC: 7 MG/DL (ref 6–20)
CALCIUM SERPL-MCNC: 9.4 MG/DL (ref 8.7–10.5)
CHLORIDE SERPL-SCNC: 105 MMOL/L (ref 95–110)
CHOLEST SERPL-MCNC: 182 MG/DL (ref 120–199)
CHOLEST/HDLC SERPL: 3.2 {RATIO} (ref 2–5)
CO2 SERPL-SCNC: 26 MMOL/L (ref 23–29)
CREAT SERPL-MCNC: 0.7 MG/DL (ref 0.5–1.4)
DIFFERENTIAL METHOD: ABNORMAL
EOSINOPHIL # BLD AUTO: 0.2 K/UL (ref 0–0.5)
EOSINOPHIL NFR BLD: 2 % (ref 0–8)
ERYTHROCYTE [DISTWIDTH] IN BLOOD BY AUTOMATED COUNT: 16.3 % (ref 11.5–14.5)
ERYTHROCYTE [DISTWIDTH] IN BLOOD BY AUTOMATED COUNT: 16.3 % (ref 11.5–14.5)
EST. GFR  (AFRICAN AMERICAN): >60 ML/MIN/1.73 M^2
EST. GFR  (NON AFRICAN AMERICAN): >60 ML/MIN/1.73 M^2
ESTIMATED AVG GLUCOSE: 117 MG/DL (ref 68–131)
FOLATE SERPL-MCNC: 10 NG/ML (ref 4–24)
GLUCOSE SERPL-MCNC: 92 MG/DL (ref 70–110)
HBA1C MFR BLD: 5.7 % (ref 4–5.6)
HCT VFR BLD AUTO: 36.9 % (ref 37–48.5)
HCT VFR BLD AUTO: 36.9 % (ref 37–48.5)
HDLC SERPL-MCNC: 57 MG/DL (ref 40–75)
HDLC SERPL: 31.3 % (ref 20–50)
HGB BLD-MCNC: 10.9 G/DL (ref 12–16)
HGB BLD-MCNC: 10.9 G/DL (ref 12–16)
IMM GRANULOCYTES # BLD AUTO: 0.03 K/UL (ref 0–0.04)
IMM GRANULOCYTES NFR BLD AUTO: 0.3 % (ref 0–0.5)
IRON SERPL-MCNC: 33 UG/DL (ref 30–160)
LDLC SERPL CALC-MCNC: 111.8 MG/DL (ref 63–159)
LYMPHOCYTES # BLD AUTO: 3.6 K/UL (ref 1–4.8)
LYMPHOCYTES NFR BLD: 36.1 % (ref 18–48)
MCH RBC QN AUTO: 21.8 PG (ref 27–31)
MCH RBC QN AUTO: 21.8 PG (ref 27–31)
MCHC RBC AUTO-ENTMCNC: 29.5 G/DL (ref 32–36)
MCHC RBC AUTO-ENTMCNC: 29.5 G/DL (ref 32–36)
MCV RBC AUTO: 74 FL (ref 82–98)
MCV RBC AUTO: 74 FL (ref 82–98)
MONOCYTES # BLD AUTO: 0.6 K/UL (ref 0.3–1)
MONOCYTES NFR BLD: 6.2 % (ref 4–15)
NEUTROPHILS # BLD AUTO: 5.4 K/UL (ref 1.8–7.7)
NEUTROPHILS NFR BLD: 54.9 % (ref 38–73)
NONHDLC SERPL-MCNC: 125 MG/DL
NRBC BLD-RTO: 0 /100 WBC
PLATELET # BLD AUTO: 399 K/UL (ref 150–450)
PLATELET # BLD AUTO: 399 K/UL (ref 150–450)
PMV BLD AUTO: 10 FL (ref 9.2–12.9)
PMV BLD AUTO: 10 FL (ref 9.2–12.9)
POTASSIUM SERPL-SCNC: 4.1 MMOL/L (ref 3.5–5.1)
PROT SERPL-MCNC: 7.6 G/DL (ref 6–8.4)
RBC # BLD AUTO: 5.01 M/UL (ref 4–5.4)
RBC # BLD AUTO: 5.01 M/UL (ref 4–5.4)
SATURATED IRON: 10 % (ref 20–50)
SODIUM SERPL-SCNC: 140 MMOL/L (ref 136–145)
TOTAL IRON BINDING CAPACITY: 339 UG/DL (ref 250–450)
TRANSFERRIN SERPL-MCNC: 229 MG/DL (ref 200–375)
TRIGL SERPL-MCNC: 66 MG/DL (ref 30–150)
TSH SERPL DL<=0.005 MIU/L-ACNC: 1.22 UIU/ML (ref 0.4–4)
VIT B12 SERPL-MCNC: 401 PG/ML (ref 210–950)
WBC # BLD AUTO: 9.83 K/UL (ref 3.9–12.7)
WBC # BLD AUTO: 9.83 K/UL (ref 3.9–12.7)

## 2022-01-20 PROCEDURE — 80061 LIPID PANEL: CPT | Performed by: FAMILY MEDICINE

## 2022-01-20 PROCEDURE — 36415 COLL VENOUS BLD VENIPUNCTURE: CPT | Mod: PO | Performed by: FAMILY MEDICINE

## 2022-01-20 PROCEDURE — 80053 COMPREHEN METABOLIC PANEL: CPT | Performed by: FAMILY MEDICINE

## 2022-01-20 PROCEDURE — 84443 ASSAY THYROID STIM HORMONE: CPT | Performed by: FAMILY MEDICINE

## 2022-01-20 PROCEDURE — 82306 VITAMIN D 25 HYDROXY: CPT | Performed by: FAMILY MEDICINE

## 2022-01-20 PROCEDURE — 84466 ASSAY OF TRANSFERRIN: CPT | Performed by: FAMILY MEDICINE

## 2022-01-20 PROCEDURE — 85025 COMPLETE CBC W/AUTO DIFF WBC: CPT | Performed by: FAMILY MEDICINE

## 2022-01-20 PROCEDURE — 82607 VITAMIN B-12: CPT | Performed by: FAMILY MEDICINE

## 2022-01-20 PROCEDURE — 82746 ASSAY OF FOLIC ACID SERUM: CPT | Performed by: FAMILY MEDICINE

## 2022-01-20 PROCEDURE — 83036 HEMOGLOBIN GLYCOSYLATED A1C: CPT | Performed by: FAMILY MEDICINE

## 2022-01-22 DIAGNOSIS — E61.1 IRON DEFICIENCY: ICD-10-CM

## 2022-01-22 DIAGNOSIS — E53.8 B12 DEFICIENCY: Primary | ICD-10-CM

## 2022-01-22 DIAGNOSIS — E55.9 VITAMIN D DEFICIENCY: ICD-10-CM

## 2022-01-22 RX ORDER — LANOLIN ALCOHOL/MO/W.PET/CERES
1 CREAM (GRAM) TOPICAL
Qty: 90 TABLET | Refills: 3 | Status: SHIPPED | OUTPATIENT
Start: 2022-01-22 | End: 2023-01-22

## 2022-01-22 RX ORDER — ERGOCALCIFEROL 1.25 MG/1
50000 CAPSULE ORAL
Qty: 4 CAPSULE | Refills: 3 | Status: SHIPPED | OUTPATIENT
Start: 2022-01-22 | End: 2022-05-29

## 2022-01-22 RX ORDER — LANOLIN ALCOHOL/MO/W.PET/CERES
1000 CREAM (GRAM) TOPICAL DAILY
Qty: 90 TABLET | Refills: 3 | Status: SHIPPED | OUTPATIENT
Start: 2022-01-22 | End: 2023-01-22

## 2022-01-23 ENCOUNTER — PATIENT MESSAGE (OUTPATIENT)
Dept: FAMILY MEDICINE | Facility: CLINIC | Age: 37
End: 2022-01-23
Payer: COMMERCIAL

## 2022-01-23 NOTE — PROGRESS NOTES
Make follow-up lab appointment per recommendation below.  Check to see if patient has seen the results through my chart.  If not then,  #CALL THE PATIENT# to discuss results/see if they have questions and document verification of contact. Make F/U appt if needed. [phone]    #My interpretation that was sent to them through [facility]:  Lucía, I have reviewed your recent blood work.     Your complete blood count is abnormal showing anemia likely due to iron deficiency.  Restart iron supplement.  B12 level is at lower limits of normal.  Start supplementation with B12.  Vitamin-D level is low.  Increase supplementation with vitamin-D 4000 units daily.  Your metabolic panel which shows your glucose, kidney function, electrolytes, and liver function is normal.   Thyroid study is normal.   Your cholesterol is normal.    Your hemoglobin A1c is elevated.  Restart Ozempic.  Check A1c in three months.  This test is gold standard screening test for diabetes.  It is a measures 3 months of your average blood sugar.    =========================  Also please address any outstanding health maintenance that may be due: Pneumococcal Vaccines (Age 0-64)(1 of 2 - PPSV23) Never done  Foot Exam Never done

## 2022-01-31 PROBLEM — Z3A.08 8 WEEKS GESTATION OF PREGNANCY: Status: RESOLVED | Noted: 2021-02-19 | Resolved: 2022-01-31

## 2022-02-15 ENCOUNTER — PATIENT OUTREACH (OUTPATIENT)
Dept: ADMINISTRATIVE | Facility: OTHER | Age: 37
End: 2022-02-15
Payer: COMMERCIAL

## 2022-02-15 NOTE — PROGRESS NOTES
Health Maintenance Due   Topic Date Due    Pneumococcal Vaccines (Age 0-64) (1 of 2 - PPSV23) Never done    Foot Exam  Never done     Updates were requested from care everywhere.  Chart was reviewed for overdue Proactive [facility] Encounters ([facility]E) topics (CRS, Breast Cancer Screening, Eye exam)  Health Maintenance has been updated.  LINKS immunization registry triggered.  Immunizations were reconciled.

## 2022-02-16 ENCOUNTER — OFFICE VISIT (OUTPATIENT)
Dept: ALLERGY | Facility: CLINIC | Age: 37
End: 2022-02-16
Payer: COMMERCIAL

## 2022-02-16 ENCOUNTER — PATIENT MESSAGE (OUTPATIENT)
Dept: ALLERGY | Facility: CLINIC | Age: 37
End: 2022-02-16

## 2022-02-16 ENCOUNTER — LAB VISIT (OUTPATIENT)
Dept: LAB | Facility: HOSPITAL | Age: 37
End: 2022-02-16
Attending: ALLERGY & IMMUNOLOGY
Payer: COMMERCIAL

## 2022-02-16 VITALS
HEIGHT: 65 IN | TEMPERATURE: 99 F | BODY MASS INDEX: 48.82 KG/M2 | HEART RATE: 97 BPM | DIASTOLIC BLOOD PRESSURE: 86 MMHG | WEIGHT: 293 LBS | SYSTOLIC BLOOD PRESSURE: 127 MMHG

## 2022-02-16 DIAGNOSIS — J30.89 ALLERGIC RHINITIS DUE TO DERMATOPHAGOIDES PTERONYSSINUS: ICD-10-CM

## 2022-02-16 DIAGNOSIS — J30.89 ALLERGIC RHINITIS DUE TO DERMATOPHAGOIDES FARINAE: ICD-10-CM

## 2022-02-16 DIAGNOSIS — L02.92 RECURRENT BOILS: ICD-10-CM

## 2022-02-16 DIAGNOSIS — Z91.040 LATEX ALLERGY: ICD-10-CM

## 2022-02-16 DIAGNOSIS — Z91.018 FOOD ALLERGY: ICD-10-CM

## 2022-02-16 DIAGNOSIS — J30.1 SEASONAL ALLERGIC RHINITIS DUE TO POLLEN: ICD-10-CM

## 2022-02-16 DIAGNOSIS — J45.40 MODERATE PERSISTENT ASTHMA WITHOUT COMPLICATION: Primary | ICD-10-CM

## 2022-02-16 LAB
BASOPHILS # BLD AUTO: 0.05 K/UL (ref 0–0.2)
BASOPHILS NFR BLD: 0.5 % (ref 0–1.9)
DIFFERENTIAL METHOD: ABNORMAL
EOSINOPHIL # BLD AUTO: 0.2 K/UL (ref 0–0.5)
EOSINOPHIL NFR BLD: 1.7 % (ref 0–8)
ERYTHROCYTE [DISTWIDTH] IN BLOOD BY AUTOMATED COUNT: 17.5 % (ref 11.5–14.5)
HCT VFR BLD AUTO: 40.4 % (ref 37–48.5)
HGB BLD-MCNC: 11.7 G/DL (ref 12–16)
IGA SERPL-MCNC: 125 MG/DL (ref 40–350)
IGG SERPL-MCNC: 1710 MG/DL (ref 650–1600)
IGM SERPL-MCNC: 178 MG/DL (ref 50–300)
IMM GRANULOCYTES # BLD AUTO: 0.02 K/UL (ref 0–0.04)
IMM GRANULOCYTES NFR BLD AUTO: 0.2 % (ref 0–0.5)
LYMPHOCYTES # BLD AUTO: 4 K/UL (ref 1–4.8)
LYMPHOCYTES NFR BLD: 42.2 % (ref 18–48)
MCH RBC QN AUTO: 22 PG (ref 27–31)
MCHC RBC AUTO-ENTMCNC: 29 G/DL (ref 32–36)
MCV RBC AUTO: 76 FL (ref 82–98)
MONOCYTES # BLD AUTO: 0.5 K/UL (ref 0.3–1)
MONOCYTES NFR BLD: 4.7 % (ref 4–15)
NEUTROPHILS # BLD AUTO: 4.8 K/UL (ref 1.8–7.7)
NEUTROPHILS NFR BLD: 50.7 % (ref 38–73)
NRBC BLD-RTO: 0 /100 WBC
PLATELET # BLD AUTO: 382 K/UL (ref 150–450)
PMV BLD AUTO: 10.4 FL (ref 9.2–12.9)
RBC # BLD AUTO: 5.32 M/UL (ref 4–5.4)
WBC # BLD AUTO: 9.53 K/UL (ref 3.9–12.7)

## 2022-02-16 PROCEDURE — 3008F PR BODY MASS INDEX (BMI) DOCUMENTED: ICD-10-PCS | Mod: CPTII,S$GLB,, | Performed by: ALLERGY & IMMUNOLOGY

## 2022-02-16 PROCEDURE — 1159F PR MEDICATION LIST DOCUMENTED IN MEDICAL RECORD: ICD-10-PCS | Mod: CPTII,S$GLB,, | Performed by: ALLERGY & IMMUNOLOGY

## 2022-02-16 PROCEDURE — 3008F BODY MASS INDEX DOCD: CPT | Mod: CPTII,S$GLB,, | Performed by: ALLERGY & IMMUNOLOGY

## 2022-02-16 PROCEDURE — 99215 PR OFFICE/OUTPT VISIT, EST, LEVL V, 40-54 MIN: ICD-10-PCS | Mod: S$GLB,,, | Performed by: ALLERGY & IMMUNOLOGY

## 2022-02-16 PROCEDURE — 86774 TETANUS ANTIBODY: CPT | Performed by: ALLERGY & IMMUNOLOGY

## 2022-02-16 PROCEDURE — 86161 COMPLEMENT/FUNCTION ACTIVITY: CPT | Performed by: ALLERGY & IMMUNOLOGY

## 2022-02-16 PROCEDURE — 86360 T CELL ABSOLUTE COUNT/RATIO: CPT | Performed by: ALLERGY & IMMUNOLOGY

## 2022-02-16 PROCEDURE — 85025 COMPLETE CBC W/AUTO DIFF WBC: CPT | Performed by: ALLERGY & IMMUNOLOGY

## 2022-02-16 PROCEDURE — 86357 NK CELLS TOTAL COUNT: CPT | Performed by: ALLERGY & IMMUNOLOGY

## 2022-02-16 PROCEDURE — 1159F MED LIST DOCD IN RCRD: CPT | Mod: CPTII,S$GLB,, | Performed by: ALLERGY & IMMUNOLOGY

## 2022-02-16 PROCEDURE — 82784 ASSAY IGA/IGD/IGG/IGM EACH: CPT | Performed by: ALLERGY & IMMUNOLOGY

## 2022-02-16 PROCEDURE — 86317 IMMUNOASSAY INFECTIOUS AGENT: CPT | Mod: 59 | Performed by: ALLERGY & IMMUNOLOGY

## 2022-02-16 PROCEDURE — 3079F PR MOST RECENT DIASTOLIC BLOOD PRESSURE 80-89 MM HG: ICD-10-PCS | Mod: CPTII,S$GLB,, | Performed by: ALLERGY & IMMUNOLOGY

## 2022-02-16 PROCEDURE — 36415 COLL VENOUS BLD VENIPUNCTURE: CPT | Performed by: ALLERGY & IMMUNOLOGY

## 2022-02-16 PROCEDURE — 99999 PR PBB SHADOW E&M-EST. PATIENT-LVL V: ICD-10-PCS | Mod: PBBFAC,,, | Performed by: ALLERGY & IMMUNOLOGY

## 2022-02-16 PROCEDURE — 82784 ASSAY IGA/IGD/IGG/IGM EACH: CPT | Mod: 59 | Performed by: ALLERGY & IMMUNOLOGY

## 2022-02-16 PROCEDURE — 86359 T CELLS TOTAL COUNT: CPT | Performed by: ALLERGY & IMMUNOLOGY

## 2022-02-16 PROCEDURE — 99215 OFFICE O/P EST HI 40 MIN: CPT | Mod: S$GLB,,, | Performed by: ALLERGY & IMMUNOLOGY

## 2022-02-16 PROCEDURE — 3044F PR MOST RECENT HEMOGLOBIN A1C LEVEL <7.0%: ICD-10-PCS | Mod: CPTII,S$GLB,, | Performed by: ALLERGY & IMMUNOLOGY

## 2022-02-16 PROCEDURE — 99999 PR PBB SHADOW E&M-EST. PATIENT-LVL V: CPT | Mod: PBBFAC,,, | Performed by: ALLERGY & IMMUNOLOGY

## 2022-02-16 PROCEDURE — 3044F HG A1C LEVEL LT 7.0%: CPT | Mod: CPTII,S$GLB,, | Performed by: ALLERGY & IMMUNOLOGY

## 2022-02-16 PROCEDURE — 86355 B CELLS TOTAL COUNT: CPT | Performed by: ALLERGY & IMMUNOLOGY

## 2022-02-16 PROCEDURE — 3079F DIAST BP 80-89 MM HG: CPT | Mod: CPTII,S$GLB,, | Performed by: ALLERGY & IMMUNOLOGY

## 2022-02-16 PROCEDURE — 86162 COMPLEMENT TOTAL (CH50): CPT | Performed by: ALLERGY & IMMUNOLOGY

## 2022-02-16 PROCEDURE — 3074F PR MOST RECENT SYSTOLIC BLOOD PRESSURE < 130 MM HG: ICD-10-PCS | Mod: CPTII,S$GLB,, | Performed by: ALLERGY & IMMUNOLOGY

## 2022-02-16 PROCEDURE — 3074F SYST BP LT 130 MM HG: CPT | Mod: CPTII,S$GLB,, | Performed by: ALLERGY & IMMUNOLOGY

## 2022-02-16 RX ORDER — OMALIZUMAB 150 MG/ML
300 INJECTION, SOLUTION SUBCUTANEOUS
Qty: 4 EACH | Refills: 11 | Status: SHIPPED | OUTPATIENT
Start: 2022-02-16 | End: 2022-07-20

## 2022-02-16 NOTE — PROGRESS NOTES
"    Subjective:              Patient ID: Lucía Coreas is a 36 y.o. female.        Chief Complaint:  Follow-up (3 month)      HPI 10/19/2021: 35 year old female with a history of allergic rhinitis on allergy immunotherapy complaining of "cold sores in her nose" and fluid in her ear.  She reports that the sores in her nose burn and occur intermittently. She reports that they feel like "fever blisters". She is placing Bactroban and saline, but nothing is alleviating her symptoms. She has also tried nate vera gel and saline gel, but it is not helping.  She reports fluid in her ear. She is taking levocetirizine and Singulair, but it does not help.  She is not doing nasal sprays because of nasal dryness.  Epistaxis intermittently.    Allergy immunotherapy- DM, T, Cr- 1-1 dilution,  improved symptoms.    Still avoiding shellfish, no accidental ingestions  Epipen    HPI 3/12/2021:    35 year old female on SCIT 1:1 inhlants 0.5ml  She is pregnant and recently started on hypertensive medications.  She reports feel that her asthma is worse.  She has not had asthma symptoms, since she was a child.  She is currently using her albuterol inhaler 3 times a day and sees a pulmonologist on Monday.  She is not taking controller asthma medications.    HPI 4/26/2021:  35 year old pregnant female with a history of asthma.  She reports that her breathing is "horrible".  She is taking Advair daily- bid.  Albuterol inhaler two to three times a day.  She reports coughing, but denies wheezing.  One month ago- oral steroids.  Shortness of breath is exacerbated by exertion.      HPI 6/14/2021:  35 year old female 27 weeks pregnant  Headache and erythema of the eyes.  Eye physician does not feel that is is allergies.  She reports pressure behind her eyes and forehead.  She reports nasal congestion.  She reports an ill contact last week.  She denies feeling feverish or having a fever.      HPI 7/29/2021:  35 year old female -33 weeks pregnant " "with a history of dust mite, tree pollen and cockroach allergy previously on SCIT. It was stopped due to pregnancy. She also has asthma.  She reports a significant nasal congestion.  She is taking Zyrtec and hydroxyzine.  She gets out of the shower, she has to take a breathing treatment or taker her inhaler.  Pulmonary physician suspected that symptoms were related to allergies.  She is taking Advair, Albuterol- nebulized and inhaler, and flonase.  She has not taken Astelin or Singulair.  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        HPI 11/11/2021: 12 weeks post pardom  She is here for follow up.  Asthma-  Last used albuterol on yesterday.  Exertion exacerbates symptoms  Spirometry at [facility]- seeing a pulmonologist.  Singulair and Advair  Steroids-once over the last 12 months  NO shortness of breath at rest. NO wheezing, Occasional cough      Latex caused a rash on her hands. Rash is immediate with removal of gloves.  She was using gloves to clean. Hands itched and she reports hives on her hives.      She is taking Xyzal.  She reports nasal congestion.  She was on SCIT prior to pregnancy and held during pregnancy.  She does not wish to resume SCIT at this time.  She does have itchy and watery eyes.        HPI today: 36 year old with asthma, allergic rhinitis, and food allergies.  6 days - ate french fries cooked with seafood/shellfish- felt throat closure, treated with benadryl  She did not use her Epipen.  Shellfish allergy  No symptoms with fish  Asthma- "so so. Not as bad a sit use to be."  Not required oral steroids.  Breo has helped improve symptoms  Last used albuterol- several months ago  Seeing Pulmonary at [facility], but would like to change to [facility]    Avoiding Flonase due to sores in her nose.    Previously on SCIT for dust mite and pollen, but not interested in restarting  She reports nasal congestion at night that is worse when her heater is on.  Singulair and " Xyzal    Recurrent boils      Past Medical History:   Diagnosis Date    Allergy     Amenorrhea     Asthma     Diabetes     GERD (gastroesophageal reflux disease)     Infertility associated with anovulation     Iron deficiency anemia     Knee pain     Metabolic syndrome     PCO (polycystic ovaries)     Recurrent boils      Family History   Problem Relation Age of Onset    Diabetes Mother     Lupus Mother     Diabetes Father     Heart disease Father 69    Hypertension Father     Prostate cancer Father     Ovarian cancer Sister     AMY disease Brother      Current Outpatient Medications on File Prior to Visit   Medication Sig Dispense Refill    albuterol (PROVENTIL) 2.5 mg /3 mL (0.083 %) nebulizer solution Take 3 mLs (2.5 mg total) by nebulization every 6 (six) hours as needed for Wheezing. Rescue 1 Box 4    albuterol (PROVENTIL/VENTOLIN HFA) 90 mcg/actuation inhaler Inhale 2 puffs into the lungs every 6 (six) hours as needed for Wheezing. 18 g 1    aspirin (ECOTRIN) 81 MG EC tablet Take 1 tablet (81 mg total) by mouth once daily. 90 tablet 3    azelastine (ASTELIN) 137 mcg (0.1 %) nasal spray SMARTSIG:Both Nares      cholecalciferol, vitamin D3, (VITAMIN D3) 25 mcg (1,000 unit) capsule Take 2 capsules (2,000 Units total) by mouth once daily. 90 capsule 3    cyanocobalamin (VITAMIN B-12) 1000 MCG tablet Take 1 tablet (1,000 mcg total) by mouth once daily. 90 tablet 3    ergocalciferol (ERGOCALCIFEROL) 50,000 unit Cap Take 1 capsule (50,000 Units total) by mouth every 7 days. 4 capsule 3    EScitalopram oxalate (LEXAPRO) 20 MG tablet Take 20 mg by mouth.      ferrous sulfate (FEOSOL) Tab tablet Take 1 tablet (1 each total) by mouth daily with breakfast. 90 tablet 3    fluticasone furoate-vilanteroL (BREO) 200-25 mcg/dose DsDv diskus inhaler Inhale 1 puff into the lungs once daily. Controller 1 each 4    ibuprofen (ADVIL,MOTRIN) 600 MG tablet Take 1 tablet (600 mg total) by mouth every 6  "(six) hours as needed (Uncontrolled pain). 30 tablet 0    iron-vit c-b12-folic acid (IRON 100 PLUS) Tab Take 1 tablet by mouth once daily. 90 tablet 3    Lactobacillus rhamnosus GG (CULTURELLE) 10 billion cell capsule Take 1 capsule by mouth once daily.      levocetirizine (XYZAL) 5 MG tablet Take 1 tablet (5 mg total) by mouth every evening. 30 tablet 11    montelukast (SINGULAIR) 10 mg tablet Take 10 mg by mouth once daily.      NOVOFINE PLUS 32 gauge x 1/6" Ndle use as directed      ondansetron (ZOFRAN-ODT) 8 MG TbDL DISSOLVE ONE TABLET UNDER TONGUE EVERY 8 HOURS AS NEEDED FOR NAUSEA      pantoprazole (PROTONIX) 40 MG tablet Take 1 tablet (40 mg total) by mouth once daily. 90 tablet 4    semaglutide (OZEMPIC) 0.25 mg or 0.5 mg(2 mg/1.5 mL) pen injector Inject 0.5 mg into the skin every 7 days. 1 pen 1    spironolactone (ALDACTONE) 50 MG tablet Take 1 tablet (50 mg total) by mouth once daily. 30 tablet 11     No current facility-administered medications on file prior to visit.     Review of patient's allergies indicates:   Allergen Reactions    Metformin Diarrhea    Sulfa (sulfonamide antibiotics) Anaphylaxis and Swelling     Swelling (eyes)^, Swelling (throat)^  Swelling (eyes)^, Swelling (throat)^      Diclofenac      Gastritis     Shellfish containing products      anaphylaxis       Environmental History: No pets. Not a smoker.        ROS:  Negative aside from those items mentioned in the HPI.    Objective:     Physical Exam  Vitals reviewed.   Constitutional:       General: She is not in acute distress.     Appearance: Normal appearance. She is obese. She is not ill-appearing, toxic-appearing or diaphoretic.   HENT:      Head: Normocephalic and atraumatic.      Right Ear: Tympanic membrane, ear canal and external ear normal. There is no impacted cerumen.      Left Ear: Tympanic membrane, ear canal and external ear normal. There is no impacted cerumen.      Nose: Nose normal. No congestion or " rhinorrhea.      Mouth/Throat:      Mouth: Mucous membranes are moist.      Pharynx: No oropharyngeal exudate or posterior oropharyngeal erythema.   Eyes:      General: No scleral icterus.        Right eye: No discharge.         Left eye: No discharge.   Cardiovascular:      Rate and Rhythm: Normal rate and regular rhythm.      Heart sounds: Normal heart sounds. No murmur heard.  No friction rub. No gallop.    Pulmonary:      Effort: Pulmonary effort is normal. No respiratory distress.      Breath sounds: Normal breath sounds. No stridor. No wheezing, rhonchi or rales.   Chest:      Chest wall: No tenderness.   Abdominal:      General: There is no distension.      Palpations: Abdomen is soft. There is no mass.      Tenderness: There is no abdominal tenderness. There is no guarding or rebound.      Hernia: No hernia is present.   Musculoskeletal:         General: No swelling, tenderness, deformity or signs of injury.      Cervical back: Normal range of motion and neck supple. No tenderness.   Lymphadenopathy:      Cervical: No cervical adenopathy.   Skin:     General: Skin is warm.      Coloration: Skin is not jaundiced or pale.      Findings: No bruising, erythema, lesion or rash.   Neurological:      General: No focal deficit present.      Mental Status: She is alert and oriented to person, place, and time.      Motor: No weakness.      Gait: Gait normal.   Psychiatric:         Mood and Affect: Mood normal.         Behavior: Behavior normal.         Thought Content: Thought content normal.         Judgment: Judgment normal.           Assessment:            Moderate persistent asthma without complication  -     Ambulatory referral/consult to Pulmonology; Future; Expected date: 02/23/2022  -     omalizumab (XOLAIR) 150 mg/mL injection; Inject 2 mLs (300 mg total) into the skin every 14 (fourteen) days.  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    Latex allergy    Food allergy    Recurrent boils  -     Streptococcus pneumoniae IgG Antibody (23 Serotypes), MAID; Future; Expected date: 02/16/2022  -     DIPHTHERIA / TETANUS ANTIBODY PANEL; Future; Expected date: 02/16/2022  -     IgG; Future; Expected date: 02/16/2022  -     IgA; Future; Expected date: 02/16/2022  -     IgM; Future; Expected date: 02/16/2022  -     CBC Auto Differential; Future; Expected date: 02/16/2022  -     Complement, Total; Future; Expected date: 02/16/2022  -     Complement, Alternate Pathway (AH50); Future; Expected date: 02/16/2022  -     LYMPHOCYTE PROLILE II; Future; Expected date: 02/16/2022      Recommend spirometry at least once a year, if symptomatic every 4-6 months.  Xolair was ordered previously, but she has not started. Will speak with specialty pharmacy concerning starting Xolair ASAP.  Continue Breo, as symptoms have improved with Breo.  She requested a referral to pulmonary, which was ordered.  Continue Singulair and Xyzal  Avoid shellfish and shellfish products.  Epipen 2 pack  Avoid latex  Immune work up for recurrent boils. Continue Nasal Bactroban   RTC 4 months or sooner, if needed.        YANI JOSHI spent a total of 40 minutes on the day of the visit.This includes face to face time and non-face to face time preparing to see the patient (eg, review of tests), obtaining and/or reviewing separately obtained history, documenting clinical information in the electronic or other health record, independently interpreting results and communicating results to the patient/family/caregiver, or care coordinator.

## 2022-02-17 ENCOUNTER — PATIENT MESSAGE (OUTPATIENT)
Dept: ALLERGY | Facility: CLINIC | Age: 37
End: 2022-02-17
Payer: COMMERCIAL

## 2022-02-17 LAB
CD3+CD4+ CELLS # BLD: 2405 CELLS/UL (ref 300–1400)
CD3+CD4+ CELLS NFR BLD: 57.3 % (ref 28–57)
LYMPHOCYTES NFR CSF MANUAL: 1069 CELLS/UL (ref 200–900)
LYMPHOCYTES NFR CSF MANUAL: 16.5 % (ref 6–19)
LYMPHOCYTES NFR CSF MANUAL: 2.25 % (ref 0.9–3.6)
LYMPHOCYTES NFR CSF MANUAL: 25.5 % (ref 10–39)
LYMPHOCYTES NFR CSF MANUAL: 279 CELLS/UL (ref 90–600)
LYMPHOCYTES NFR CSF MANUAL: 3244 CELLS/UL (ref 700–2100)
LYMPHOCYTES NFR CSF MANUAL: 6.1 % (ref 7–31)
LYMPHOCYTES NFR CSF MANUAL: 758 CELLS/UL (ref 100–500)
LYMPHOCYTES NFR CSF MANUAL: 77.3 % (ref 55–83)

## 2022-02-18 LAB
AH50 ACT/NOR SER IA: 101 %OF NORM
DIPHTHERIA IGG VALUE: 0.8 IU/ML
DIPHTHERIA TOXOID IGG ANTIBODY: POSITIVE
TETANUS TOXOID IGG AB: POSITIVE
TETANUS TOXOID IGG VALUE: 1.98 IU/ML

## 2022-02-21 LAB — CH50 SERPL-ACNC: 83 U/ML (ref 42–95)

## 2022-02-23 ENCOUNTER — PATIENT MESSAGE (OUTPATIENT)
Dept: ALLERGY | Facility: CLINIC | Age: 37
End: 2022-02-23
Payer: COMMERCIAL

## 2022-02-24 ENCOUNTER — TELEPHONE (OUTPATIENT)
Dept: PRIMARY CARE CLINIC | Facility: CLINIC | Age: 37
End: 2022-02-24
Payer: COMMERCIAL

## 2022-02-24 ENCOUNTER — OFFICE VISIT (OUTPATIENT)
Dept: FAMILY MEDICINE | Facility: CLINIC | Age: 37
End: 2022-02-24
Payer: COMMERCIAL

## 2022-02-24 VITALS
SYSTOLIC BLOOD PRESSURE: 124 MMHG | HEART RATE: 87 BPM | HEIGHT: 65 IN | TEMPERATURE: 97 F | DIASTOLIC BLOOD PRESSURE: 88 MMHG | WEIGHT: 293 LBS | BODY MASS INDEX: 48.82 KG/M2 | OXYGEN SATURATION: 99 %

## 2022-02-24 DIAGNOSIS — I15.2 HYPERTENSION ASSOCIATED WITH DIABETES: Primary | ICD-10-CM

## 2022-02-24 DIAGNOSIS — L73.2 HIDRADENITIS SUPPURATIVA: ICD-10-CM

## 2022-02-24 DIAGNOSIS — E11.59 HYPERTENSION ASSOCIATED WITH DIABETES: Primary | ICD-10-CM

## 2022-02-24 DIAGNOSIS — Z79.899 ENCOUNTER FOR LONG-TERM (CURRENT) USE OF MEDICATIONS: ICD-10-CM

## 2022-02-24 DIAGNOSIS — E11.65 TYPE 2 DIABETES MELLITUS WITH HYPERGLYCEMIA, WITHOUT LONG-TERM CURRENT USE OF INSULIN: ICD-10-CM

## 2022-02-24 PROBLEM — Z3A.18 18 WEEKS GESTATION OF PREGNANCY: Status: RESOLVED | Noted: 2021-04-14 | Resolved: 2022-02-24

## 2022-02-24 PROBLEM — Z98.891 STATUS POST REPEAT LOW TRANSVERSE CESAREAN SECTION: Status: ACTIVE | Noted: 2020-11-12

## 2022-02-24 PROBLEM — O99.013 ANEMIA IN PREGNANCY, THIRD TRIMESTER: Status: RESOLVED | Noted: 2021-07-28 | Resolved: 2022-02-24

## 2022-02-24 PROBLEM — O09.893 SUPERVISION OF OTHER HIGH RISK PREGNANCIES, THIRD TRIMESTER: Status: RESOLVED | Noted: 2021-06-01 | Resolved: 2022-02-24

## 2022-02-24 LAB
S PNEUM DA 1 IGG SER-MCNC: 98.5 MCG/ML
S PNEUM DA 10A IGG SER-MCNC: 105.3 MCG/ML
S PNEUM DA 11A IGG SER-MCNC: 19.9 MCG/ML
S PNEUM DA 12F IGG SER-MCNC: 14.8 MCG/ML
S PNEUM DA 14 IGG SER-MCNC: 25.6 MCG/ML
S PNEUM DA 15B IGG SER-MCNC: 33.7 MCG/ML
S PNEUM DA 17F IGG SER-MCNC: >150 MCG/ML
S PNEUM DA 18C IGG SER-MCNC: 4.1 MCG/ML
S PNEUM DA 19A IGG SER-MCNC: 59.3 MCG/ML
S PNEUM DA 2 IGG SER-MCNC: 18.8 MCG/ML
S PNEUM DA 20A IGG SER-MCNC: 23.6 MCG/ML
S PNEUM DA 22F IGG SER-MCNC: >150 MCG/ML
S PNEUM DA 23F IGG SER-MCNC: >150 MCG/ML
S PNEUM DA 3 IGG SER-MCNC: 34.9 MCG/ML
S PNEUM DA 33F IGG SER-MCNC: 25.3 MCG/ML
S PNEUM DA 4 IGG SER-MCNC: 16.3 MCG/ML
S PNEUM DA 5 IGG SER-MCNC: 79.2 MCG/ML
S PNEUM DA 6B IGG SER-MCNC: 61.5 MCG/ML
S PNEUM DA 7F IGG SER-MCNC: 141.8 MCG/ML
S PNEUM DA 8 IGG SER-MCNC: 28.3 MCG/ML
S PNEUM DA 9N IGG SER-MCNC: NORMAL MCG/ML
S PNEUM DA 9V IGG SER-MCNC: 28.8 MCG/ML
S.PNEUMONIAE TYPE 19F: >150 MCG/ML

## 2022-02-24 PROCEDURE — 3044F PR MOST RECENT HEMOGLOBIN A1C LEVEL <7.0%: ICD-10-PCS | Mod: CPTII,S$GLB,, | Performed by: FAMILY MEDICINE

## 2022-02-24 PROCEDURE — 1159F PR MEDICATION LIST DOCUMENTED IN MEDICAL RECORD: ICD-10-PCS | Mod: CPTII,S$GLB,, | Performed by: FAMILY MEDICINE

## 2022-02-24 PROCEDURE — 99999 PR PBB SHADOW E&M-EST. PATIENT-LVL V: ICD-10-PCS | Mod: PBBFAC,,, | Performed by: FAMILY MEDICINE

## 2022-02-24 PROCEDURE — 3008F PR BODY MASS INDEX (BMI) DOCUMENTED: ICD-10-PCS | Mod: CPTII,S$GLB,, | Performed by: FAMILY MEDICINE

## 2022-02-24 PROCEDURE — 3079F DIAST BP 80-89 MM HG: CPT | Mod: CPTII,S$GLB,, | Performed by: FAMILY MEDICINE

## 2022-02-24 PROCEDURE — 1160F PR REVIEW ALL MEDS BY PRESCRIBER/CLIN PHARMACIST DOCUMENTED: ICD-10-PCS | Mod: CPTII,S$GLB,, | Performed by: FAMILY MEDICINE

## 2022-02-24 PROCEDURE — 1159F MED LIST DOCD IN RCRD: CPT | Mod: CPTII,S$GLB,, | Performed by: FAMILY MEDICINE

## 2022-02-24 PROCEDURE — 99214 PR OFFICE/OUTPT VISIT, EST, LEVL IV, 30-39 MIN: ICD-10-PCS | Mod: S$GLB,,, | Performed by: FAMILY MEDICINE

## 2022-02-24 PROCEDURE — 1160F RVW MEDS BY RX/DR IN RCRD: CPT | Mod: CPTII,S$GLB,, | Performed by: FAMILY MEDICINE

## 2022-02-24 PROCEDURE — 99999 PR PBB SHADOW E&M-EST. PATIENT-LVL V: CPT | Mod: PBBFAC,,, | Performed by: FAMILY MEDICINE

## 2022-02-24 PROCEDURE — 3008F BODY MASS INDEX DOCD: CPT | Mod: CPTII,S$GLB,, | Performed by: FAMILY MEDICINE

## 2022-02-24 PROCEDURE — 99214 OFFICE O/P EST MOD 30 MIN: CPT | Mod: S$GLB,,, | Performed by: FAMILY MEDICINE

## 2022-02-24 PROCEDURE — 3044F HG A1C LEVEL LT 7.0%: CPT | Mod: CPTII,S$GLB,, | Performed by: FAMILY MEDICINE

## 2022-02-24 PROCEDURE — 3074F PR MOST RECENT SYSTOLIC BLOOD PRESSURE < 130 MM HG: ICD-10-PCS | Mod: CPTII,S$GLB,, | Performed by: FAMILY MEDICINE

## 2022-02-24 PROCEDURE — 3079F PR MOST RECENT DIASTOLIC BLOOD PRESSURE 80-89 MM HG: ICD-10-PCS | Mod: CPTII,S$GLB,, | Performed by: FAMILY MEDICINE

## 2022-02-24 PROCEDURE — 3074F SYST BP LT 130 MM HG: CPT | Mod: CPTII,S$GLB,, | Performed by: FAMILY MEDICINE

## 2022-02-24 RX ORDER — SERTRALINE HYDROCHLORIDE 50 MG/1
50 TABLET, FILM COATED ORAL DAILY
COMMUNITY
Start: 2022-02-16 | End: 2022-02-24

## 2022-02-24 RX ORDER — FLUTICASONE PROPIONATE AND SALMETEROL 500; 50 UG/1; UG/1
1 POWDER RESPIRATORY (INHALATION) 2 TIMES DAILY
COMMUNITY
Start: 2022-02-05 | End: 2023-02-03

## 2022-02-24 RX ORDER — LEVOFLOXACIN 750 MG/1
750 TABLET ORAL DAILY
COMMUNITY
Start: 2022-02-05 | End: 2022-02-24

## 2022-02-24 RX ORDER — NIFEDIPINE 30 MG/1
30 TABLET, EXTENDED RELEASE ORAL DAILY
COMMUNITY
Start: 2022-02-05

## 2022-02-24 RX ORDER — SPIRONOLACTONE 100 MG/1
100 TABLET, FILM COATED ORAL DAILY
Qty: 90 TABLET | Refills: 4 | Status: SHIPPED | OUTPATIENT
Start: 2022-02-24 | End: 2022-07-26 | Stop reason: SDUPTHER

## 2022-02-24 RX ORDER — LACTULOSE 10 G/15ML
SOLUTION ORAL; RECTAL
COMMUNITY
Start: 2022-02-05

## 2022-02-24 NOTE — ASSESSMENT & PLAN NOTE
Increase spironolactone to 100 milligrams daily to assist with blood pressure and hidradenitis.Counseled on importance of hypertension disease course, I recommend ongoing Education for DASH-diet and exercise.  Counseled on medication regimen importance of treating high blood pressure.  Please be advised of risk of untreated blood pressure as discussed.  Please advised of ER precautions were given for symptoms of hypertensive urgency and emergency.

## 2022-02-24 NOTE — TELEPHONE ENCOUNTER
----- Message from Yolis Navarro MA sent at 2/23/2022  5:07 PM CST -----  Contact: self/[phone]    ----- Message -----  From: Poly Rose  Sent: 2/23/2022   4:56 PM CST  To: [facility] Staff    Patient would like to consult with a nurse in regards to her scheduled appt. Please call back at [phone]. Thanks r/s

## 2022-02-24 NOTE — ASSESSMENT & PLAN NOTE
Increase spironolactone 100 milligrams.  Referral back to General surgery for evaluation and treatment with possible excision.Discussed condition course and signs and symptoms to expect.  Patient advised take anti-inflammatories and or Tylenol for pain or fever.  ER precautions.  Call MD or follow-up to clinic if not improving or worsening symptoms.

## 2022-02-24 NOTE — PROGRESS NOTES
PLAN:      Problem List Items Addressed This Visit     Encounter for long-term (current) use of medications (Chronic)     Complete history and physical was completed today.  Complete and thorough medication reconciliation was performed.  Discussed risks and benefits of medications.  Advised patient on orders and health maintenance.  We discussed old records and old labs if available.  Will request any records not available through epic.  Continue current medications listed on your summary sheet.             Type 2 diabetes mellitus with hyperglycemia, without long-term current use of insulin (Chronic)     Increase Ozempic to 1 milligram weekly.We will plan to monitor hemoglobin A1c at designated intervals 3 to 6 months.  I recommend ongoing Education for diabetic diet and exercise protocol.  We will continue to monitor for side effects.    Please be advised of symptoms to monitor for and to notify me immediately if persistent or worsening.  Follow up with Ophthalmology/Optometry and Podiatry at least annually.             Relevant Medications    semaglutide (OZEMPIC) 1 mg/dose (4 mg/3 mL)    Hidradenitis suppurativa (Chronic)     Increase spironolactone 100 milligrams.  Referral back to General surgery for evaluation and treatment with possible excision.Discussed condition course and signs and symptoms to expect.  Patient advised take anti-inflammatories and or Tylenol for pain or fever.  ER precautions.  Call MD or follow-up to clinic if not improving or worsening symptoms.             Relevant Medications    spironolactone (ALDACTONE) 100 MG tablet    Other Relevant Orders    Ambulatory referral/consult to General Surgery    Hypertension associated with diabetes - Primary     Increase spironolactone to 100 milligrams daily to assist with blood pressure and hidradenitis.Counseled on importance of hypertension disease course, I recommend ongoing Education for DASH-diet and exercise.  Counseled on medication regimen  importance of treating high blood pressure.  Please be advised of risk of untreated blood pressure as discussed.  Please advised of ER precautions were given for symptoms of hypertensive urgency and emergency.             Relevant Medications    spironolactone (ALDACTONE) 100 MG tablet    semaglutide (OZEMPIC) 1 mg/dose (4 mg/3 mL)        Future Appointments     Date Provider Specialty Appt Notes    2/24/2022  Allergy [facility] Auth appt    3/8/2022 Nick Jackson MD Surgery Hidradenitis suppurativa [L73.2]    3/22/2022  Pulmonology 3 mth rev maura     3/22/2022 Julia Mendoza MD Pulmonology 3 mth rev maura     4/27/2022 Rossy Kramer MD Allergy 2 month f/u    5/10/2022  Lab lab-[facility]    5/18/2022 Dante Negro MD Family Medicine 4 month f/u    7/6/2022 Michael Hernandez MD Rheumatology rtc/5mo/labs/di    8/30/2022 Dante Negro MD Family Medicine annual         Medication Management for assessment above:   Medication List with Changes/Refills   New Medications    SEMAGLUTIDE (OZEMPIC) 1 MG/DOSE (4 MG/3 ML)    Inject 1 mg into the skin every 7 days.   Current Medications    ALBUTEROL (PROVENTIL) 2.5 MG /3 ML (0.083 %) NEBULIZER SOLUTION    Take 3 mLs (2.5 mg total) by nebulization every 6 (six) hours as needed for Wheezing. Rescue    ALBUTEROL (PROVENTIL/VENTOLIN HFA) 90 MCG/ACTUATION INHALER    Inhale 2 puffs into the lungs every 6 (six) hours as needed for Wheezing.    ASPIRIN (ECOTRIN) 81 MG EC TABLET    Take 1 tablet (81 mg total) by mouth once daily.    AZELASTINE (ASTELIN) 137 MCG (0.1 %) NASAL SPRAY    SMARTSIG:Both Nares    CHOLECALCIFEROL, VITAMIN D3, (VITAMIN D3) 25 MCG (1,000 UNIT) CAPSULE    Take 2 capsules (2,000 Units total) by mouth once daily.    CYANOCOBALAMIN (VITAMIN B-12) 1000 MCG TABLET    Take 1 tablet (1,000 mcg total) by mouth once daily.    ERGOCALCIFEROL (ERGOCALCIFEROL) 50,000 UNIT CAP    Take 1 capsule (50,000 Units total) by mouth every 7 days.    ESCITALOPRAM OXALATE (LEXAPRO)  "20 MG TABLET    Take 20 mg by mouth.    FERROUS SULFATE (FEOSOL) TAB TABLET    Take 1 tablet (1 each total) by mouth daily with breakfast.    FLUTICASONE FUROATE-VILANTEROL (BREO) 200-25 MCG/DOSE DSDV DISKUS INHALER    Inhale 1 puff into the lungs once daily. Controller    IBUPROFEN (ADVIL,MOTRIN) 600 MG TABLET    Take 1 tablet (600 mg total) by mouth every 6 (six) hours as needed (Uncontrolled pain).    IRON-VIT C-B12-FOLIC ACID (IRON 100 PLUS) TAB    Take 1 tablet by mouth once daily.    LACTOBACILLUS RHAMNOSUS GG (CULTURELLE) 10 BILLION CELL CAPSULE    Take 1 capsule by mouth once daily.    LACTULOSE (CHRONULAC) 10 GRAM/15 ML SOLUTION    SMARTSI Tablespoon By Mouth Daily    LEVOCETIRIZINE (XYZAL) 5 MG TABLET    Take 1 tablet (5 mg total) by mouth every evening.    MONTELUKAST (SINGULAIR) 10 MG TABLET    Take 10 mg by mouth once daily.    NIFEDIPINE (PROCARDIA-XL) 30 MG (OSM) 24 HR TABLET    Take 30 mg by mouth once daily.    NOVOFINE PLUS 32 GAUGE X 1/6" NDLE    use as directed    OMALIZUMAB (XOLAIR) 150 MG/ML INJECTION    Inject 2 mLs (300 mg total) into the skin every 14 (fourteen) days.    ONDANSETRON (ZOFRAN-ODT) 8 MG TBDL    DISSOLVE ONE TABLET UNDER TONGUE EVERY 8 HOURS AS NEEDED FOR NAUSEA    PANTOPRAZOLE (PROTONIX) 40 MG TABLET    Take 1 tablet (40 mg total) by mouth once daily.    WIXELA INHUB 500-50 MCG/DOSE DSDV DISKUS INHALER    Inhale 1 puff into the lungs 2 (two) times daily.   Changed and/or Refilled Medications    Modified Medication Previous Medication    SPIRONOLACTONE (ALDACTONE) 100 MG TABLET spironolactone (ALDACTONE) 50 MG tablet       Take 1 tablet (100 mg total) by mouth once daily.    Take 1 tablet (50 mg total) by mouth once daily.   Discontinued Medications    LEVOFLOXACIN (LEVAQUIN) 750 MG TABLET    Take 750 mg by mouth once daily.    SEMAGLUTIDE (OZEMPIC) 0.25 MG OR 0.5 MG(2 MG/1.5 ML) PEN INJECTOR    Inject 0.5 mg into the skin every 7 days.    SERTRALINE (ZOLOFT) 50 MG TABLET    " Take 50 mg by mouth once daily.       Dante Negro M.D.  ==========================================================================  Subjective:   Patient ID: Lucía Coreas is a 36 y.o. female.  has a past medical history of Allergy, Amenorrhea, Asthma, Diabetes, GERD (gastroesophageal reflux disease), Infertility associated with anovulation, Iron deficiency anemia, Knee pain, Metabolic syndrome, PCO (polycystic ovaries), and Recurrent boils.   Chief Complaint: ozempic concerns and Blood Pressure Check      Problem List Items Addressed This Visit     Encounter for long-term (current) use of medications (Chronic)    Overview     07/12/2019 CHRONIC long-term drug therapy for managed conditions. See medication list. Reports compliance.  No side effects reported.  Routine lab work is being monitored.  Patient does not need refills today. 09/20/2019Reviewed labs. Patient is compliant with meds. She needs refills. Gaining weight on Metformin. DECEMBER 2019:  CHRONIC. Stable. Compliant with medications for managed conditions. See medication list. No SE reported. Routine lab analysis is being monitored. Refills were addressed. APRIL 2020:  CHRONIC. Stable. Compliant with medications for managed conditions. See medication list. No SE reported. Routine lab analysis is being monitored. Refills were addressed.  AUGUST 2020:  REVIEWED LABS.  CHRONIC. Stable. Compliant with medications for managed conditions. See medication list. No SE reported. Routine lab analysis is being monitored. Refills were addressed.October 2020:  CHRONIC. Stable. Compliant with medications for managed conditions. See medication list. No SE reported. Routine lab analysis is being monitored. Refills were addressed.November 2020:CHRONIC. Stable. Compliant with medications for managed conditions. See medication list. No SE reported.   Routine lab analysis is being monitored. Refills were addressed.  April 2021:  Reviewed labs.  August 2021:   Reviewed labs.  Patient additional labs set up through [facility] at [place].  January 2022:  Reviewed labs.  February 2022:  Reviewed labs.  Lab Results   Component Value Date    WBC 9.53 02/16/2022    HGB 11.7 (L) 02/16/2022    HCT 40.4 02/16/2022    MCV 76 (L) 02/16/2022     02/16/2022         Chemistry        Component Value Date/Time     01/20/2022 0831    K 4.1 01/20/2022 0831     01/20/2022 0831    CO2 26 01/20/2022 0831    BUN 7 01/20/2022 0831    CREATININE 0.7 01/20/2022 0831    GLU 92 01/20/2022 0831        Component Value Date/Time    CALCIUM 9.4 01/20/2022 0831    ALKPHOS 71 01/20/2022 0831    AST 11 01/20/2022 0831    ALT 11 01/20/2022 0831    BILITOT 0.3 01/20/2022 0831    ESTGFRAFRICA >60.0 01/20/2022 0831    EGFRNONAA >60.0 01/20/2022 0831          Lab Results   Component Value Date    TSH 1.223 01/20/2022    R7JYGEE 9.4 07/16/2019    T3FREE 2.9 07/16/2019                Current Assessment & Plan     Complete history and physical was completed today.  Complete and thorough medication reconciliation was performed.  Discussed risks and benefits of medications.  Advised patient on orders and health maintenance.  We discussed old records and old labs if available.  Will request any records not available through epic.  Continue current medications listed on your summary sheet.             Type 2 diabetes mellitus with hyperglycemia, without long-term current use of insulin (Chronic)    Overview     INITIAL HPI:  No results found in epic however review her blood work from previous provider shows last A1c was 5.8.  Patient previously on metformin.  Currently having fatigue and decreased libido.09/20/2019Patient due for hemoglobin A1c next month.  Will place order for patient.She is having GI symptoms with metformin. She reports BG is elevated at home. She continues to gain weight. DECEMBER 2019:  Patient reports to having diarrhea with metformin.  Reviewed Diabetes  Management StatusStatin: Not takingACE/ARB: Not takingAPRIL 2020:  Patient has started Victoza and is off of metformin.  Patient reports that she is getting slightly nauseated on the increased dose of 1.2 mg.  Symptoms resolved after taking Pepto-Bismol after few hours of injection.Reviewed Diabetes Management StatusStatin: Not takingACE/ARB: Not takingAUGUST 2020:  PATIENT REPORTS THAT SHE IS TOLERATING VICTOZA 1.2 MG.  PATIENT CONTINUES TO HAVE ISSUES WITH HER WEIGHT.  REVIEWED Diabetes Management StatusStatin: Not takingACE/ARB: Not takingOCTOBER 2020:  Patient is having side effects with Victoza.  Reviewed Diabetes Management Status.Statin: Not takingACE/ARB: Not takingNOVEMBER 2020:  Patient reports that she was doing well on OZEMPIC.  A1c is been well controlled.  Patient was recently diagnosed with pregnancy.  Will monitor for worsening of diabetes.  Diabetes Management Status Statin: Not taking ACE/ARB: Not takingDECEMBER 2020:  Patient recently had miscarriage.  Patient wanting to get back on her diabetic medication.Diabetes Management StatusStatin: Not takingACE/ARB: Not taking January 2022:Diabetes Management Status  February 2022:  Reviewed Diabetes Management Status.  She is taking Ozempic 0.5 milligrams weekly.  She denies any side effects.  She does not thing is working well.  Denies history of thyroid cancer, pancreatitis, MEN syndrome  Statin: Not taking  ACE/ARB: Not taking    Screening or Prevention Patient's value Goal Complete/Controlled?   HgA1C Testing and Control   Lab Results   Component Value Date    HGBA1C 5.7 (H) 01/20/2022      Annually/Less than 8% Yes   Lipid profile : 01/20/2022 Annually Yes   LDL control Lab Results   Component Value Date    LDLCALC 111.8 01/20/2022    Annually/Less than 100 mg/dl  No   Nephropathy screening Lab Results   Component Value Date    LABMICR 8.0 10/21/2021     Lab Results   Component Value Date    PROTEINUA Negative 12/28/2021     Lab Results   Component  Value Date    UTPCR Unable to calculate 12/28/2021      Annually Yes   Blood pressure BP Readings from Last 1 Encounters:   02/24/22 (!) 124/96    Less than 140/90 No   Dilated retinal exam : 07/16/2021 Annually Yes   Foot exam   Most Recent Foot Exam Date: Not Found Annually No                Current Assessment & Plan     Increase Ozempic to 1 milligram weekly.We will plan to monitor hemoglobin A1c at designated intervals 3 to 6 months.  I recommend ongoing Education for diabetic diet and exercise protocol.  We will continue to monitor for side effects.    Please be advised of symptoms to monitor for and to notify me immediately if persistent or worsening.  Follow up with Ophthalmology/Optometry and Podiatry at least annually.             Hidradenitis suppurativa (Chronic)    Overview     Chronic.  Recurrence.  Patient reports recurrence of boils under her arms and in the groin area.  Patient was on spironolactone briefly prior to her pregnancy and reports some improvement.  She is following with Infectious Disease and has been on antibiotics for other reasons which was helping the hidradenitis lesions.    February 2022:  Patient reports that she had surgery in the past for this condition.  Spironolactone 50 milligrams is not helping at this time.           Current Assessment & Plan     Increase spironolactone 100 milligrams.  Referral back to General surgery for evaluation and treatment with possible excision.Discussed condition course and signs and symptoms to expect.  Patient advised take anti-inflammatories and or Tylenol for pain or fever.  ER precautions.  Call MD or follow-up to clinic if not improving or worsening symptoms.             Hypertension associated with diabetes - Primary    Overview     CHRONIC.  February 2022:  Blood pressure is uncontrolled.  Only on spironolactone 50 milligrams daily at this time.  August 2021:  Patient reports good control with blood pressure at home on nifedipine 30  milligrams.  April 2021. Blood pressure well controlled today.  Patient reports compliance with medications as prescribed.  March 2021:  Blood pressures been well controlled at home on labetalol however her shortness of breath and breathing with asthma has gotten worse.. BP Reviewed.  Compliant with BP medications. No SE reported.   (-) CP, palpitations, dizziness, lightheadedness, arm numbness, tingling or weakness, syncope.  Creatinine   Date Value Ref Range Status   02/17/2021 0.6 0.5 - 1.4 mg/dL Final              Current Assessment & Plan     Increase spironolactone to 100 milligrams daily to assist with blood pressure and hidradenitis.Counseled on importance of hypertension disease course, I recommend ongoing Education for DASH-diet and exercise.  Counseled on medication regimen importance of treating high blood pressure.  Please be advised of risk of untreated blood pressure as discussed.  Please advised of ER precautions were given for symptoms of hypertensive urgency and emergency.                    Review of patient's allergies indicates:   Allergen Reactions    Metformin Diarrhea    Sulfa (sulfonamide antibiotics) Anaphylaxis and Swelling     Swelling (eyes)^, Swelling (throat)^  Swelling (eyes)^, Swelling (throat)^      Diclofenac      Gastritis     Shellfish containing products      anaphylaxis     Current Outpatient Medications   Medication Instructions    albuterol (PROVENTIL) 2.5 mg, Nebulization, Every 6 hours PRN, Rescue    albuterol (PROVENTIL/VENTOLIN HFA) 90 mcg/actuation inhaler 2 puffs, Inhalation, Every 6 hours PRN    aspirin (ECOTRIN) 81 mg, Oral, Daily    azelastine (ASTELIN) 137 mcg (0.1 %) nasal spray SMARTSIG:Both Nares    cholecalciferol (vitamin D3) (VITAMIN D3) 2,000 Units, Oral, Daily    cyanocobalamin (VITAMIN B-12) 1,000 mcg, Oral, Daily    ergocalciferol (ERGOCALCIFEROL) 50,000 Units, Oral, Every 7 days    EScitalopram oxalate (LEXAPRO) 20 mg, Oral    ferrous sulfate  "(FEOSOL) Tab tablet 1 each, Oral, With breakfast    fluticasone furoate-vilanteroL (BREO) 200-25 mcg/dose DsDv diskus inhaler 1 puff, Inhalation, Daily, Controller    ibuprofen (ADVIL,MOTRIN) 600 mg, Oral, Every 6 hours PRN    iron-vit c-b12-folic acid (IRON 100 PLUS) Tab 1 tablet, Oral, Daily    Lactobacillus rhamnosus GG (CULTURELLE) 10 billion cell capsule 1 capsule, Oral, Daily    lactulose (CHRONULAC) 10 gram/15 mL solution SMARTSI Tablespoon By Mouth Daily    levocetirizine (XYZAL) 5 mg, Oral, Nightly    montelukast (SINGULAIR) 10 mg, Oral, Daily    NIFEdipine (PROCARDIA-XL) 30 mg, Oral, Daily    NOVOFINE PLUS 32 gauge x 1/6" Ndle use as directed    ondansetron (ZOFRAN-ODT) 8 MG TbDL DISSOLVE ONE TABLET UNDER TONGUE EVERY 8 HOURS AS NEEDED FOR NAUSEA    pantoprazole (PROTONIX) 40 mg, Oral, Daily    semaglutide (OZEMPIC) 1 mg, Subcutaneous, Every 7 days    spironolactone (ALDACTONE) 100 mg, Oral, Daily    WIXELA INHUB 500-50 mcg/dose DsDv diskus inhaler 1 puff, Inhalation, 2 times daily    XOLAIR 300 mg, Subcutaneous, Every 14 days      I have reviewed the PMH, social history, FamilyHx, surgical history, allergies and medications documented / confirmed by the patient at the time of this visit.  Review of Systems   Constitutional: Positive for fatigue. Negative for chills, fever and unexpected weight change.   HENT: Negative for ear pain and sore throat.    Eyes: Negative for redness and visual disturbance.   Respiratory: Negative for cough and shortness of breath.    Cardiovascular: Negative for chest pain and palpitations.   Gastrointestinal: Negative for nausea and vomiting.   Genitourinary: Negative for difficulty urinating and hematuria.   Musculoskeletal: Positive for arthralgias. Negative for myalgias.   Skin: Positive for rash and wound.   Neurological: Negative for weakness and headaches.   Psychiatric/Behavioral: Negative for sleep disturbance. The patient is not nervous/anxious.  " "    Objective:   BP (!) 124/96   Pulse 87   Temp 97.4 °F (36.3 °C) (Other (see comments))   Ht 5' 5" (1.651 m)   Wt (!) 144.6 kg (318 lb 11.2 oz)   SpO2 99%   BMI 53.03 kg/m²   Physical Exam  Vitals and nursing note reviewed.   Constitutional:       General: She is not in acute distress.     Appearance: She is well-developed. She is obese. She is not ill-appearing, toxic-appearing or diaphoretic.   HENT:      Head: Normocephalic and atraumatic.      Right Ear: Hearing and external ear normal.      Left Ear: Hearing and external ear normal.      Nose: Nose normal. No rhinorrhea.   Eyes:      General: Lids are normal.      Extraocular Movements: Extraocular movements intact.      Conjunctiva/sclera: Conjunctivae normal.      Pupils: Pupils are equal, round, and reactive to light.   Cardiovascular:      Rate and Rhythm: Normal rate.      Pulses: Normal pulses.   Pulmonary:      Effort: Pulmonary effort is normal. No respiratory distress.      Breath sounds: Normal breath sounds.   Musculoskeletal:         General: Normal range of motion.      Cervical back: Normal range of motion and neck supple.   Skin:     General: Skin is warm and dry.      Capillary Refill: Capillary refill takes less than 2 seconds.      Coloration: Skin is not pale.      Findings: Lesion present.   Neurological:      General: No focal deficit present.      Mental Status: She is alert and oriented to person, place, and time. Mental status is at baseline. She is not disoriented.      Cranial Nerves: No cranial nerve deficit.      Motor: No weakness.      Gait: Gait normal.   Psychiatric:         Attention and Perception: She is attentive.         Mood and Affect: Mood is depressed. Mood is not anxious.         Speech: Speech is not rapid and pressured or slurred.         Behavior: Behavior normal. Behavior is not agitated, aggressive or hyperactive. Behavior is cooperative.         Thought Content: Thought content normal. Thought content is " not paranoid or delusional. Thought content does not include homicidal or suicidal ideation. Thought content does not include homicidal or suicidal plan.         Cognition and Memory: Memory is not impaired.         Judgment: Judgment normal.        Patient is wearing a mask which limits physical exam due to COVID restrictions.   Assessment:     1. Hypertension associated with diabetes    2. Hidradenitis suppurativa    3. Type 2 diabetes mellitus with hyperglycemia, without long-term current use of insulin    4. Encounter for long-term (current) use of medications      MDM:   Moderate complexity.  Moderate risk.  Total time: 31 minutes.  This includes total time spent on the encounter, which includes face to face time and non-face to face time preparing to see the patient (eg, review of previous medical records, tests), Obtaining and/or reviewing separately obtained history, documenting clinical information in the electronic or other health record, independently interpreting results (not separately reported)/communicating results to the patient/family/caregiver, and/or care coordination (not separately reported).    I have Reviewed and summarized old records.  I have performed thorough medication reconciliation today and discussed risk and benefits of medications.  I have reviewed labs and discussed with patient.  All questions were answered.  I am requesting old records and will review them once they are available.  Infectious Disease, General surgery    I have signed for the following orders AND/OR meds.  Orders Placed This Encounter   Procedures    Ambulatory referral/consult to General Surgery     Standing Status:   Future     Standing Expiration Date:   3/24/2023     Referral Priority:   Routine     Referral Type:   Surgical     Referral Reason:   Specialty Services Required     Requested Specialty:   General Surgery     Number of Visits Requested:   1     Medications Ordered This Encounter   Medications     semaglutide (OZEMPIC) 1 mg/dose (4 mg/3 mL)     Sig: Inject 1 mg into the skin every 7 days.     Dispense:  3 pen     Refill:  4    spironolactone (ALDACTONE) 100 MG tablet     Sig: Take 1 tablet (100 mg total) by mouth once daily.     Dispense:  90 tablet     Refill:  4     .        Follow up in about 3 months (around 5/24/2022), or if symptoms worsen or fail to improve, for HTN, DM.    If no improvement in symptoms or symptoms worsen, advised to call/follow-up at clinic or go to ER. Patient voiced understanding and all questions/concerns were addressed.   DISCLAIMER: This note was compiled by using a speech recognition dictation system and therefore please be aware that typographical / speech recognition errors can and do occur.  Please contact me if you see any errors specifically.    Dante Negro M.D.       Office: [phone]   [address] [place] [address]  FAX: [phone]

## 2022-02-24 NOTE — ASSESSMENT & PLAN NOTE
Increase Ozempic to 1 milligram weekly.We will plan to monitor hemoglobin A1c at designated intervals 3 to 6 months.  I recommend ongoing Education for diabetic diet and exercise protocol.  We will continue to monitor for side effects.    Please be advised of symptoms to monitor for and to notify me immediately if persistent or worsening.  Follow up with Ophthalmology/Optometry and Podiatry at least annually.

## 2022-02-24 NOTE — PATIENT INSTRUCTIONS
Follow up in about 3 months (around 5/24/2022), or if symptoms worsen or fail to improve, for HTN, DM.     Dear patient,   As a result of recent federal legislation (The Federal Cures Act), you may receive lab or pathology results from your visit in your [facility] account before your physician is able to contact you. Your physician or their representative will relay the results to you with their recommendations at their soonest availability.     If no improvement in symptoms or symptoms worsen, please be advised to call MD, follow-up at clinic and/or go to ER if becomes severe.    Dante Negro M.D.        We Offer TELEHEALTH & Same Day Appointments!   Book your Telehealth appointment with me through my nurse or   Clinic appointments on [facility] [employer]!    [address] [place] [address]    Office: [phone]   FAX: [phone]    Check out my Facebook Page and Follow Me at: [email or web address]    Check out my website at [employer] by clicking on: [email or web address]    To Schedule appointments online, go to [facility]: [email or web address]

## 2022-02-24 NOTE — TELEPHONE ENCOUNTER
I spoke to patient this morning.Patient has a 1 p.m. appointment on the schedule for Xolair injection.Waiting on approval to come through.

## 2022-02-28 ENCOUNTER — TELEPHONE (OUTPATIENT)
Dept: ALLERGY | Facility: CLINIC | Age: 37
End: 2022-02-28
Payer: COMMERCIAL

## 2022-02-28 NOTE — TELEPHONE ENCOUNTER
----- Message from Laura Mike LPN sent at 2/28/2022  9:02 AM CST -----  Regarding: FW: Xolair    ----- Message -----  From: Rossy Kramer MD  Sent: 2/26/2022  12:00 PM CST  To: Laura Mike LPN  Subject: RE: Xolair                                       Will they cover  and if so can we transition to office adminstration, if Mrs. Acevedo is okay with this.  Thank you  ----- Message -----  From: Laura Mike LPN  Sent: 2/25/2022   7:58 AM CST  To: Rossy Kramer MD  Subject: RE: Xolair                                       After talking to Benefits person , her insurance will not pay for home administration.  ----- Message -----  From: Rossy Kramer MD  Sent: 2/24/2022   7:12 PM CST  To: Laura Mike LPN  Subject: RE: Xolair                                       She would need to take it 3 times in the office.  ----- Message -----  From: Laura Mike LPN  Sent: 2/24/2022  10:00 AM CST  To: Rossy Kramer MD  Subject: Xoladae Contreras. Are you contemplating patient taking Xolair at home and if so how many times does she need to come into the office for injections prior to doing that? Thank you

## 2022-02-28 NOTE — TELEPHONE ENCOUNTER
We are currently waiting on the per to peer determination that was started by the medical benefits team.

## 2022-03-13 ENCOUNTER — PATIENT MESSAGE (OUTPATIENT)
Dept: ALLERGY | Facility: CLINIC | Age: 37
End: 2022-03-13
Payer: COMMERCIAL

## 2022-03-14 RX ORDER — PREDNISONE 20 MG/1
20 TABLET ORAL 2 TIMES DAILY
Qty: 10 TABLET | Refills: 0 | Status: SHIPPED | OUTPATIENT
Start: 2022-03-14 | End: 2022-06-10

## 2022-04-20 NOTE — ASSESSMENT & PLAN NOTE
Follow-up with Infectious Disease and Dermatology.  Restart spironolactone.  Patient aware of risk and benefits of medication use.  Patient had bilateral tubal ligation and is going to be restarting birth control for this condition.   Yes

## 2022-05-23 ENCOUNTER — LAB VISIT (OUTPATIENT)
Dept: LAB | Facility: HOSPITAL | Age: 37
End: 2022-05-23
Attending: FAMILY MEDICINE
Payer: COMMERCIAL

## 2022-05-23 DIAGNOSIS — E53.8 B12 DEFICIENCY: ICD-10-CM

## 2022-05-23 DIAGNOSIS — E55.9 VITAMIN D DEFICIENCY: ICD-10-CM

## 2022-05-23 LAB — VIT B12 SERPL-MCNC: 510 PG/ML (ref 210–950)

## 2022-05-23 PROCEDURE — 82652 VIT D 1 25-DIHYDROXY: CPT | Performed by: FAMILY MEDICINE

## 2022-05-23 PROCEDURE — 36415 COLL VENOUS BLD VENIPUNCTURE: CPT | Mod: PO | Performed by: FAMILY MEDICINE

## 2022-05-23 PROCEDURE — 82607 VITAMIN B-12: CPT | Performed by: FAMILY MEDICINE

## 2022-05-24 ENCOUNTER — PATIENT MESSAGE (OUTPATIENT)
Dept: PULMONOLOGY | Facility: CLINIC | Age: 37
End: 2022-05-24
Payer: COMMERCIAL

## 2022-05-26 LAB — 1,25(OH)2D3 SERPL-MCNC: 132 PG/ML (ref 20–79)

## 2022-05-29 DIAGNOSIS — E55.9 VITAMIN D DEFICIENCY: ICD-10-CM

## 2022-05-29 DIAGNOSIS — E53.8 B12 DEFICIENCY: Primary | ICD-10-CM

## 2022-05-29 NOTE — PROGRESS NOTES
1st check to see if patient has seen the results.  If not then  CALL patient with results and Document verification.  Schedule follow-up if needed.  [phone]  Your vitamin-D level is too high.  Please stop taking the additional  vitamin-D until levels normalized.  Will discuss at upcoming office visit.    B12 level remains within normal limits however not optimized.  Will need to increase B12 supplement.  Will discuss in detail at upcoming office visit.7}

## 2022-05-30 ENCOUNTER — PATIENT MESSAGE (OUTPATIENT)
Dept: FAMILY MEDICINE | Facility: CLINIC | Age: 37
End: 2022-05-30
Payer: COMMERCIAL

## 2022-05-30 DIAGNOSIS — E55.9 VITAMIN D DEFICIENCY: ICD-10-CM

## 2022-05-31 ENCOUNTER — PATIENT MESSAGE (OUTPATIENT)
Dept: FAMILY MEDICINE | Facility: CLINIC | Age: 37
End: 2022-05-31
Payer: COMMERCIAL

## 2022-05-31 RX ORDER — IRON,CARB/VIT C/VIT B12/FOLIC 100-250-1
1 TABLET ORAL DAILY
Qty: 90 TABLET | Refills: 4 | Status: SHIPPED | OUTPATIENT
Start: 2022-05-31 | End: 2022-07-26 | Stop reason: SDUPTHER

## 2022-05-31 RX ORDER — ESCITALOPRAM OXALATE 20 MG/1
20 TABLET ORAL DAILY
Qty: 90 TABLET | Refills: 4 | Status: SHIPPED | OUTPATIENT
Start: 2022-05-31 | End: 2022-08-30

## 2022-05-31 NOTE — TELEPHONE ENCOUNTER
Care Due:                  Date            Visit Type   Department     Provider  --------------------------------------------------------------------------------                                EP -                              PRIMARY      [facility]  Last Visit: 02-      CARE ([facility])   NAS Negro                              EP -                              PRIMARY      [facility]  Next Visit: 06-      CARE ([facility])   MEDICINE       Dante Negro                                                            Last  Test          Frequency    Reason                     Performed    Due Date  --------------------------------------------------------------------------------    HBA1C.......  6 months...  semaglutide..............  01- 07-    [facility] Embedded Care Gaps. Reference number: [id number]. 5/31/2022   7:09:50 AM CDT

## 2022-06-08 ENCOUNTER — TELEPHONE (OUTPATIENT)
Dept: PULMONOLOGY | Facility: CLINIC | Age: 37
End: 2022-06-08
Payer: COMMERCIAL

## 2022-06-08 NOTE — TELEPHONE ENCOUNTER
appt rescheduled to july 25th maura 8:45 appt with Lolly Salgado PA-C 9:20 at on    ----- Message from Chinyere Hutson sent at 6/8/2022  8:36 AM CDT -----  The patient called to reschedule her 06/08/2022 office visit and pulmonary lab to a date at the end of July 2022.    Please call the patient at [phone] (home).     Thanks

## 2022-06-10 ENCOUNTER — LAB VISIT (OUTPATIENT)
Dept: LAB | Facility: HOSPITAL | Age: 37
End: 2022-06-10
Attending: FAMILY MEDICINE
Payer: COMMERCIAL

## 2022-06-10 ENCOUNTER — OFFICE VISIT (OUTPATIENT)
Dept: FAMILY MEDICINE | Facility: CLINIC | Age: 37
End: 2022-06-10
Payer: COMMERCIAL

## 2022-06-10 VITALS
SYSTOLIC BLOOD PRESSURE: 122 MMHG | BODY MASS INDEX: 48.82 KG/M2 | RESPIRATION RATE: 17 BRPM | HEIGHT: 65 IN | DIASTOLIC BLOOD PRESSURE: 86 MMHG | WEIGHT: 293 LBS | HEART RATE: 83 BPM | OXYGEN SATURATION: 96 %

## 2022-06-10 DIAGNOSIS — R41.89 BRAIN FOG: ICD-10-CM

## 2022-06-10 DIAGNOSIS — Z79.899 ENCOUNTER FOR LONG-TERM (CURRENT) USE OF MEDICATIONS: ICD-10-CM

## 2022-06-10 DIAGNOSIS — Z00.00 WELL ADULT EXAM: ICD-10-CM

## 2022-06-10 DIAGNOSIS — E55.9 VITAMIN D DEFICIENCY: ICD-10-CM

## 2022-06-10 DIAGNOSIS — R53.83 FATIGUE, UNSPECIFIED TYPE: ICD-10-CM

## 2022-06-10 DIAGNOSIS — E66.2 CLASS 3 OBESITY WITH ALVEOLAR HYPOVENTILATION, SERIOUS COMORBIDITY, AND BODY MASS INDEX (BMI) OF 50.0 TO 59.9 IN ADULT: ICD-10-CM

## 2022-06-10 DIAGNOSIS — D53.9 NUTRITIONAL ANEMIA: ICD-10-CM

## 2022-06-10 DIAGNOSIS — M25.50 POLYARTHRALGIA: ICD-10-CM

## 2022-06-10 DIAGNOSIS — E11.59 HYPERTENSION ASSOCIATED WITH DIABETES: Primary | ICD-10-CM

## 2022-06-10 DIAGNOSIS — E11.65 TYPE 2 DIABETES MELLITUS WITH HYPERGLYCEMIA, WITHOUT LONG-TERM CURRENT USE OF INSULIN: ICD-10-CM

## 2022-06-10 DIAGNOSIS — I15.2 HYPERTENSION ASSOCIATED WITH DIABETES: Primary | ICD-10-CM

## 2022-06-10 DIAGNOSIS — L73.2 HIDRADENITIS SUPPURATIVA: ICD-10-CM

## 2022-06-10 DIAGNOSIS — E53.8 B12 DEFICIENCY: ICD-10-CM

## 2022-06-10 PROBLEM — Z3A.01 LESS THAN 8 WEEKS GESTATION OF PREGNANCY: Status: RESOLVED | Noted: 2020-11-16 | Resolved: 2022-06-10

## 2022-06-10 PROBLEM — B95.8 STAPH INFECTION: Status: RESOLVED | Noted: 2020-04-02 | Resolved: 2022-06-10

## 2022-06-10 PROBLEM — O16.9 ELEVATED BLOOD PRESSURE AFFECTING PREGNANCY, ANTEPARTUM: Status: RESOLVED | Noted: 2021-03-18 | Resolved: 2022-06-10

## 2022-06-10 PROBLEM — T81.49XA SURGICAL SITE INFECTION: Status: RESOLVED | Noted: 2021-09-21 | Resolved: 2022-06-10

## 2022-06-10 PROBLEM — O46.8X2 SUBCHORIONIC HEMATOMA IN SECOND TRIMESTER: Status: RESOLVED | Noted: 2021-03-18 | Resolved: 2022-06-10

## 2022-06-10 PROBLEM — Z98.891 STATUS POST REPEAT LOW TRANSVERSE CESAREAN SECTION: Status: RESOLVED | Noted: 2020-11-12 | Resolved: 2022-06-10

## 2022-06-10 PROBLEM — O10.013 ESSENTIAL HYPERTENSION AFFECTING PREGNANCY IN THIRD TRIMESTER: Status: RESOLVED | Noted: 2021-07-27 | Resolved: 2022-06-10

## 2022-06-10 PROBLEM — K52.1 DIARRHEA DUE TO DRUG: Status: RESOLVED | Noted: 2019-12-27 | Resolved: 2022-06-10

## 2022-06-10 PROBLEM — E66.813 CLASS 3 OBESITY WITH ALVEOLAR HYPOVENTILATION, SERIOUS COMORBIDITY, AND BODY MASS INDEX (BMI) OF 50.0 TO 59.9 IN ADULT: Chronic | Status: ACTIVE | Noted: 2017-06-14

## 2022-06-10 PROBLEM — O41.8X20 SUBCHORIONIC HEMATOMA IN SECOND TRIMESTER: Status: RESOLVED | Noted: 2021-03-18 | Resolved: 2022-06-10

## 2022-06-10 PROBLEM — O99.512 ASTHMA AFFECTING PREGNANCY IN SECOND TRIMESTER: Chronic | Status: RESOLVED | Noted: 2021-03-18 | Resolved: 2022-06-10

## 2022-06-10 PROBLEM — O99.211 OBESITY AFFECTING PREGNANCY IN FIRST TRIMESTER: Status: RESOLVED | Noted: 2020-11-12 | Resolved: 2022-06-10

## 2022-06-10 PROBLEM — J45.909 ASTHMA AFFECTING PREGNANCY IN SECOND TRIMESTER: Chronic | Status: RESOLVED | Noted: 2021-03-18 | Resolved: 2022-06-10

## 2022-06-10 PROBLEM — O09.522 AMA (ADVANCED MATERNAL AGE) MULTIGRAVIDA 35+, SECOND TRIMESTER: Status: RESOLVED | Noted: 2021-03-18 | Resolved: 2022-06-10

## 2022-06-10 PROBLEM — R09.81 SINUS CONGESTION: Status: RESOLVED | Noted: 2019-09-20 | Resolved: 2022-06-10

## 2022-06-10 LAB
25(OH)D3+25(OH)D2 SERPL-MCNC: 21 NG/ML (ref 30–96)
ALBUMIN SERPL BCP-MCNC: 3.7 G/DL (ref 3.5–5.2)
ALP SERPL-CCNC: 61 U/L (ref 55–135)
ALT SERPL W/O P-5'-P-CCNC: 7 U/L (ref 10–44)
ANION GAP SERPL CALC-SCNC: 9 MMOL/L (ref 8–16)
AST SERPL-CCNC: 9 U/L (ref 10–40)
BILIRUB SERPL-MCNC: 0.3 MG/DL (ref 0.1–1)
BUN SERPL-MCNC: 9 MG/DL (ref 6–20)
CALCIUM SERPL-MCNC: 9.2 MG/DL (ref 8.7–10.5)
CHLORIDE SERPL-SCNC: 107 MMOL/L (ref 95–110)
CO2 SERPL-SCNC: 26 MMOL/L (ref 23–29)
CREAT SERPL-MCNC: 0.7 MG/DL (ref 0.5–1.4)
CRP SERPL-MCNC: 5.8 MG/L (ref 0–8.2)
ERYTHROCYTE [DISTWIDTH] IN BLOOD BY AUTOMATED COUNT: 15.5 % (ref 11.5–14.5)
ERYTHROCYTE [SEDIMENTATION RATE] IN BLOOD BY WESTERGREN METHOD: 19 MM/HR (ref 0–20)
EST. GFR  (AFRICAN AMERICAN): >60 ML/MIN/1.73 M^2
EST. GFR  (NON AFRICAN AMERICAN): >60 ML/MIN/1.73 M^2
ESTIMATED AVG GLUCOSE: 117 MG/DL (ref 68–131)
FERRITIN SERPL-MCNC: 13 NG/ML (ref 20–300)
FOLATE SERPL-MCNC: 10.1 NG/ML (ref 4–24)
GLUCOSE SERPL-MCNC: 85 MG/DL (ref 70–110)
HBA1C MFR BLD: 5.7 % (ref 4–5.6)
HCT VFR BLD AUTO: 38.2 % (ref 37–48.5)
HGB BLD-MCNC: 11.5 G/DL (ref 12–16)
IRON SERPL-MCNC: 47 UG/DL (ref 30–160)
MCH RBC QN AUTO: 22.9 PG (ref 27–31)
MCHC RBC AUTO-ENTMCNC: 30.1 G/DL (ref 32–36)
MCV RBC AUTO: 76 FL (ref 82–98)
PLATELET # BLD AUTO: 403 K/UL (ref 150–450)
PMV BLD AUTO: 11.1 FL (ref 9.2–12.9)
POTASSIUM SERPL-SCNC: 4.2 MMOL/L (ref 3.5–5.1)
PROT SERPL-MCNC: 6.9 G/DL (ref 6–8.4)
RBC # BLD AUTO: 5.03 M/UL (ref 4–5.4)
SATURATED IRON: 13 % (ref 20–50)
SODIUM SERPL-SCNC: 142 MMOL/L (ref 136–145)
TOTAL IRON BINDING CAPACITY: 376 UG/DL (ref 250–450)
TRANSFERRIN SERPL-MCNC: 254 MG/DL (ref 200–375)
TSH SERPL DL<=0.005 MIU/L-ACNC: 1.03 UIU/ML (ref 0.4–4)
VIT B12 SERPL-MCNC: >2000 PG/ML (ref 210–950)
WBC # BLD AUTO: 8.77 K/UL (ref 3.9–12.7)

## 2022-06-10 PROCEDURE — 99999 PR PBB SHADOW E&M-EST. PATIENT-LVL V: ICD-10-PCS | Mod: PBBFAC,,, | Performed by: FAMILY MEDICINE

## 2022-06-10 PROCEDURE — 99215 PR OFFICE/OUTPT VISIT, EST, LEVL V, 40-54 MIN: ICD-10-PCS | Mod: 25,S$GLB,, | Performed by: FAMILY MEDICINE

## 2022-06-10 PROCEDURE — 3044F HG A1C LEVEL LT 7.0%: CPT | Mod: CPTII,S$GLB,, | Performed by: FAMILY MEDICINE

## 2022-06-10 PROCEDURE — 3079F PR MOST RECENT DIASTOLIC BLOOD PRESSURE 80-89 MM HG: ICD-10-PCS | Mod: CPTII,S$GLB,, | Performed by: FAMILY MEDICINE

## 2022-06-10 PROCEDURE — 83036 HEMOGLOBIN GLYCOSYLATED A1C: CPT | Performed by: FAMILY MEDICINE

## 2022-06-10 PROCEDURE — 82728 ASSAY OF FERRITIN: CPT | Performed by: FAMILY MEDICINE

## 2022-06-10 PROCEDURE — 36415 COLL VENOUS BLD VENIPUNCTURE: CPT | Mod: PO | Performed by: FAMILY MEDICINE

## 2022-06-10 PROCEDURE — 3074F PR MOST RECENT SYSTOLIC BLOOD PRESSURE < 130 MM HG: ICD-10-PCS | Mod: CPTII,S$GLB,, | Performed by: FAMILY MEDICINE

## 2022-06-10 PROCEDURE — 1159F MED LIST DOCD IN RCRD: CPT | Mod: CPTII,S$GLB,, | Performed by: FAMILY MEDICINE

## 2022-06-10 PROCEDURE — 3044F PR MOST RECENT HEMOGLOBIN A1C LEVEL <7.0%: ICD-10-PCS | Mod: CPTII,S$GLB,, | Performed by: FAMILY MEDICINE

## 2022-06-10 PROCEDURE — 80053 COMPREHEN METABOLIC PANEL: CPT | Performed by: FAMILY MEDICINE

## 2022-06-10 PROCEDURE — 82746 ASSAY OF FOLIC ACID SERUM: CPT | Performed by: FAMILY MEDICINE

## 2022-06-10 PROCEDURE — 82607 VITAMIN B-12: CPT | Performed by: FAMILY MEDICINE

## 2022-06-10 PROCEDURE — 85027 COMPLETE CBC AUTOMATED: CPT | Performed by: FAMILY MEDICINE

## 2022-06-10 PROCEDURE — 84443 ASSAY THYROID STIM HORMONE: CPT | Performed by: FAMILY MEDICINE

## 2022-06-10 PROCEDURE — 3079F DIAST BP 80-89 MM HG: CPT | Mod: CPTII,S$GLB,, | Performed by: FAMILY MEDICINE

## 2022-06-10 PROCEDURE — 3008F BODY MASS INDEX DOCD: CPT | Mod: CPTII,S$GLB,, | Performed by: FAMILY MEDICINE

## 2022-06-10 PROCEDURE — 1159F PR MEDICATION LIST DOCUMENTED IN MEDICAL RECORD: ICD-10-PCS | Mod: CPTII,S$GLB,, | Performed by: FAMILY MEDICINE

## 2022-06-10 PROCEDURE — 3008F PR BODY MASS INDEX (BMI) DOCUMENTED: ICD-10-PCS | Mod: CPTII,S$GLB,, | Performed by: FAMILY MEDICINE

## 2022-06-10 PROCEDURE — 96372 PR INJECTION,THERAP/PROPH/DIAG2ST, IM OR SUBCUT: ICD-10-PCS | Mod: S$GLB,,, | Performed by: FAMILY MEDICINE

## 2022-06-10 PROCEDURE — 86140 C-REACTIVE PROTEIN: CPT | Performed by: FAMILY MEDICINE

## 2022-06-10 PROCEDURE — 84466 ASSAY OF TRANSFERRIN: CPT | Performed by: FAMILY MEDICINE

## 2022-06-10 PROCEDURE — 96372 THER/PROPH/DIAG INJ SC/IM: CPT | Mod: S$GLB,,, | Performed by: FAMILY MEDICINE

## 2022-06-10 PROCEDURE — 99999 PR PBB SHADOW E&M-EST. PATIENT-LVL V: CPT | Mod: PBBFAC,,, | Performed by: FAMILY MEDICINE

## 2022-06-10 PROCEDURE — 85651 RBC SED RATE NONAUTOMATED: CPT | Mod: PO | Performed by: FAMILY MEDICINE

## 2022-06-10 PROCEDURE — 99215 OFFICE O/P EST HI 40 MIN: CPT | Mod: 25,S$GLB,, | Performed by: FAMILY MEDICINE

## 2022-06-10 PROCEDURE — 3074F SYST BP LT 130 MM HG: CPT | Mod: CPTII,S$GLB,, | Performed by: FAMILY MEDICINE

## 2022-06-10 PROCEDURE — 82306 VITAMIN D 25 HYDROXY: CPT | Performed by: FAMILY MEDICINE

## 2022-06-10 RX ORDER — SEMAGLUTIDE 2.68 MG/ML
2 INJECTION, SOLUTION SUBCUTANEOUS
Qty: 9 ML | Refills: 4 | Status: SHIPPED | OUTPATIENT
Start: 2022-06-10 | End: 2022-09-21

## 2022-06-10 RX ORDER — CYANOCOBALAMIN 1000 UG/ML
1000 INJECTION, SOLUTION INTRAMUSCULAR; SUBCUTANEOUS
Status: COMPLETED | OUTPATIENT
Start: 2022-06-10 | End: 2022-06-10

## 2022-06-10 RX ORDER — DOXYCYCLINE 100 MG/1
100 CAPSULE ORAL 2 TIMES DAILY
Status: CANCELLED | OUTPATIENT
Start: 2022-06-10

## 2022-06-10 RX ORDER — DOXYCYCLINE 100 MG/1
100 CAPSULE ORAL EVERY 12 HOURS
Qty: 20 CAPSULE | Refills: 0 | Status: SHIPPED | OUTPATIENT
Start: 2022-06-10 | End: 2022-08-16

## 2022-06-10 RX ADMIN — CYANOCOBALAMIN 1000 MCG: 1000 INJECTION, SOLUTION INTRAMUSCULAR; SUBCUTANEOUS at 10:06

## 2022-06-10 NOTE — ASSESSMENT & PLAN NOTE
Increase Ozempic to 2 milligrams.  If no improvement we did discuss consult [facility] bariatric surgery/medicine.  Increase lifestyle modification with diet and exercise.  Monitoring BMI.

## 2022-06-10 NOTE — ASSESSMENT & PLAN NOTE
Continue current meds.Counseled on importance of hypertension disease course, I recommend ongoing Education for DASH-diet and exercise.  Counseled on medication regimen importance of treating high blood pressure.  Please be advised of risk of untreated blood pressure as discussed.  Please advised of ER precautions were given for symptoms of hypertensive urgency and emergency.

## 2022-06-10 NOTE — ASSESSMENT & PLAN NOTE
Vitamin-D has overcorrected with supplementation.  Patient has stop vitamin-D supplementation.  Recheck level.  Will resume 2000 units daily.  Stop the 13917 units weekly.

## 2022-06-10 NOTE — ASSESSMENT & PLAN NOTE
Continue spironolactone.  Start doxycycline for current flare.  Establish care with Dermatology.  Follow-up with general surgery soon.Discussed condition course and signs and symptoms to expect.  Patient advised take anti-inflammatories and or Tylenol for pain or fever.  ER precautions.  Call MD or follow-up to clinic if not improving or worsening symptoms.

## 2022-06-10 NOTE — ASSESSMENT & PLAN NOTE
Start B12 injections.  If benefiting patient can receive B12 injection 1000 micrograms once weekly for four weeks and then monthly after that.  Monitoring B12 level.

## 2022-06-10 NOTE — PROGRESS NOTES
PLAN:      Problem List Items Addressed This Visit     Nutritional anemia (Chronic)     Monitoring CBC.  Anemia precautions.  Supplementing with B12, folate, iron.  If no improvement recommend Hematology consult.           Relevant Orders    Sedimentation rate    C-Reactive Protein    Iron and TIBC    Ferritin    Vitamin B12    Folate    Vitamin D    Class 3 obesity with alveolar hypoventilation, serious comorbidity, and body mass index (BMI) of 50.0 to 59.9 in adult (Chronic)     Increase Ozempic to 2 milligrams.  If no improvement we did discuss consult [facility] bariatric surgery/medicine.  Increase lifestyle modification with diet and exercise.  Monitoring BMI.           Encounter for long-term (current) use of medications (Chronic)     Complete history and physical was completed today.  Complete and thorough medication reconciliation was performed.  Discussed risks and benefits of medications.  Advised patient on orders and health maintenance.  We discussed old records and old labs if available.  Will request any records not available through epic.  Continue current medications listed on your summary sheet.             Relevant Orders    Sedimentation rate    C-Reactive Protein    Iron and TIBC    Ferritin    Vitamin B12    Folate    Vitamin D    Fatigue (Chronic)     Check labs.  Follow-up with all specialist.  Discussed the importance of lifestyle modification with diet and exercise.           Relevant Orders    Sedimentation rate    C-Reactive Protein    Iron and TIBC    Ferritin    Vitamin B12    Folate    Vitamin D    Vitamin D deficiency (Chronic)     Vitamin-D has overcorrected with supplementation.  Patient has stop vitamin-D supplementation.  Recheck level.  Will resume 2000 units daily.  Stop the 20020 units weekly.           Relevant Orders    Vitamin D    Type 2 diabetes mellitus with hyperglycemia, without long-term current use of insulin (Chronic)     Increase Ozempic to 2 milligram weekly.We will plan  to monitor hemoglobin A1c at designated intervals 3 to 6 months.  I recommend ongoing Education for diabetic diet and exercise protocol.  We will continue to monitor for side effects.    Please be advised of symptoms to monitor for and to notify me immediately if persistent or worsening.  Follow up with Ophthalmology/Optometry and Podiatry at least annually.             Relevant Medications    semaglutide (OZEMPIC) 2 mg/dose (8 mg/3 mL) PnIj    Polyarthralgia (Chronic)     Check inflammatory markers.  Follow-up with Rheumatology.  Patient advised to use low impact exercises such as biking or elliptical.  Continue anti-inflammatory.  GI precautions.           Relevant Orders    Sedimentation rate    C-Reactive Protein    Vitamin D    Hypertension associated with diabetes - Primary (Chronic)     Continue current meds.Counseled on importance of hypertension disease course, I recommend ongoing Education for DASH-diet and exercise.  Counseled on medication regimen importance of treating high blood pressure.  Please be advised of risk of untreated blood pressure as discussed.  Please advised of ER precautions were given for symptoms of hypertensive urgency and emergency.             Relevant Medications    semaglutide (OZEMPIC) 2 mg/dose (8 mg/3 mL) PnIj    Hidradenitis suppurativa (Chronic)     Continue spironolactone.  Start doxycycline for current flare.  Establish care with Dermatology.  Follow-up with general surgery soon.Discussed condition course and signs and symptoms to expect.  Patient advised take anti-inflammatories and or Tylenol for pain or fever.  ER precautions.  Call MD or follow-up to clinic if not improving or worsening symptoms.             Relevant Medications    doxycycline (VIBRAMYCIN) 100 MG Cap    Other Relevant Orders    Ambulatory referral/consult to Dermatology    Brain fog (Chronic)    Relevant Orders    Sedimentation rate    C-Reactive Protein    Iron and TIBC    Ferritin    Vitamin B12     Folate    Vitamin D    B12 deficiency (Chronic)     Start B12 injections.  If benefiting patient can receive B12 injection 1000 micrograms once weekly for four weeks and then monthly after that.  Monitoring B12 level.           Relevant Medications    cyanocobalamin injection 1,000 mcg (Completed)    Other Relevant Orders    Vitamin B12        Future Appointments     Date Provider Specialty Appt Notes    6/10/2022  Lab lobby    7/6/2022 Michael Hernandez MD Rheumatology rtc/5mo/labs/di    7/7/2022 Naida Farrell MD Dermatology Hidradenitis suppurativa [L73.2]    7/20/2022 Rossy Kramer MD Allergy 2 month f/u    7/25/2022  Pulmonology 3 mth rev maura     7/25/2022 LAWADNA Mcclain-JANEL Pulmonology 3 mth rev maura     8/2/2022 Nick Jackson MD Surgery Hidradenitis suppurativa/Dr. Nergo    8/30/2022 Dante Negro MD Family Medicine annual         Medication Management for assessment above:   Medication List with Changes/Refills   New Medications    DOXYCYCLINE (VIBRAMYCIN) 100 MG CAP    Take 1 capsule (100 mg total) by mouth every 12 (twelve) hours.    SEMAGLUTIDE (OZEMPIC) 2 MG/DOSE (8 MG/3 ML) PNIJ    Inject 2 mg into the skin every 7 days.   Current Medications    ALBUTEROL (PROVENTIL/VENTOLIN HFA) 90 MCG/ACTUATION INHALER    Inhale 2 puffs into the lungs every 6 (six) hours as needed for Wheezing.    ASPIRIN (ECOTRIN) 81 MG EC TABLET    Take 1 tablet (81 mg total) by mouth once daily.    AZELASTINE (ASTELIN) 137 MCG (0.1 %) NASAL SPRAY    SMARTSIG:Both Nares    CHOLECALCIFEROL, VITAMIN D3, (VITAMIN D3) 25 MCG (1,000 UNIT) CAPSULE    Take 2 capsules (2,000 Units total) by mouth once daily.    CYANOCOBALAMIN (VITAMIN B-12) 1000 MCG TABLET    Take 1 tablet (1,000 mcg total) by mouth once daily.    ESCITALOPRAM OXALATE (LEXAPRO) 20 MG TABLET    Take 1 tablet (20 mg total) by mouth once daily.    FERROUS SULFATE (FEOSOL) TAB TABLET    Take 1 tablet (1 each total) by mouth daily with breakfast.    FLUTICASONE  "FUROATE-VILANTEROL (BREO) 200-25 MCG/DOSE DSDV DISKUS INHALER    Inhale 1 puff into the lungs once daily. Controller    IBUPROFEN (ADVIL,MOTRIN) 600 MG TABLET    Take 1 tablet (600 mg total) by mouth every 6 (six) hours as needed (Uncontrolled pain).    IRON-VIT C-B12-FOLIC ACID (IRON 100 PLUS) TAB    Take 1 tablet by mouth once daily.    LACTOBACILLUS RHAMNOSUS GG (CULTURELLE) 10 BILLION CELL CAPSULE    Take 1 capsule by mouth once daily.    LACTULOSE (CHRONULAC) 10 GRAM/15 ML SOLUTION    SMARTSI Tablespoon By Mouth Daily    LEVOCETIRIZINE (XYZAL) 5 MG TABLET    Take 1 tablet (5 mg total) by mouth every evening.    MONTELUKAST (SINGULAIR) 10 MG TABLET    Take 10 mg by mouth once daily.    NIFEDIPINE (PROCARDIA-XL) 30 MG (OSM) 24 HR TABLET    Take 30 mg by mouth once daily.    NOVOFINE PLUS 32 GAUGE X 1/6" NDLE    use as directed    OMALIZUMAB (XOLAIR) 150 MG/ML INJECTION    Inject 2 mLs (300 mg total) into the skin every 14 (fourteen) days.    ONDANSETRON (ZOFRAN-ODT) 8 MG TBDL    DISSOLVE ONE TABLET UNDER TONGUE EVERY 8 HOURS AS NEEDED FOR NAUSEA    PANTOPRAZOLE (PROTONIX) 40 MG TABLET    Take 1 tablet (40 mg total) by mouth once daily.    SPIRONOLACTONE (ALDACTONE) 100 MG TABLET    Take 1 tablet (100 mg total) by mouth once daily.    WIXELA INHUB 500-50 MCG/DOSE DSDV DISKUS INHALER    Inhale 1 puff into the lungs 2 (two) times daily.   Discontinued Medications    PREDNISONE (DELTASONE) 20 MG TABLET    Take 1 tablet (20 mg total) by mouth 2 (two) times daily.    SEMAGLUTIDE (OZEMPIC) 1 MG/DOSE (4 MG/3 ML)    Inject 1 mg into the skin every 7 days.       Dante Negro M.D.  ==========================================================================  Subjective:   Patient ID: Lucía Coreas is a 36 y.o. female.  has a past medical history of Allergy, Amenorrhea, Asthma, Diabetes, GERD (gastroesophageal reflux disease), Infertility associated with anovulation, Iron deficiency anemia, Knee pain, Metabolic " syndrome, PCO (polycystic ovaries), Recurrent boils, and Status post repeat low transverse  section (2020).   Chief Complaint: Follow-up and brain fog      Problem List Items Addressed This Visit     Nutritional anemia (Chronic)    Overview     Reviewed recent CBC from [facility].      Lab Results   Component Value Date    WBC 9.53 2022    HGB 11.7 (L) 2022    HCT 40.4 2022    MCV 76 (L) 2022     2022     Lab Results   Component Value Date    IRON 33 2022    TIBC 339 2022    FERRITIN 36 2009     Lab Results   Component Value Date    [id number] 510 2022     Lab Results   Component Value Date    FOLATE 10.0 2022                  Current Assessment & Plan     Monitoring CBC.  Anemia precautions.  Supplementing with B12, folate, iron.  If no improvement recommend Hematology consult.           Class 3 obesity with alveolar hypoventilation, serious comorbidity, and body mass index (BMI) of 50.0 to 59.9 in adult (Chronic)    Overview     Chronic.  Previous HPI:  Uncontrolled.  Was improving on Ozempic.  BMI currently 53.  2020:  PATIENT'S BMI IS UP TO 54.  PATIENT HAS RECENTLY HAD A MISCARRIAGE AND WAS TAKEN OFF OF HER MEDICATIONS.  PATIENT IS WANTING TO RESTART MEDICATIONS FOR HER CHRONIC CONDITIONS TODAY.  SHE HAS SEEN HER OBGYN AND WAS STARTED ON ORAL CONTRACEPTIVE PILLS.  2022:  Chronic.  Worsening.  Patient is plateauing with Ozempic 1 milligram.  BMI Readings from Last 10 Encounters:   06/10/22 53.89 kg/m²   22 53.03 kg/m²   22 53.09 kg/m²   21 49.39 kg/m²   21 50.15 kg/m²   21 49.61 kg/m²   21 49.61 kg/m²   21 54.57 kg/m²   21 53.96 kg/m²   21 54.11 kg/m²                Current Assessment & Plan     Increase Ozempic to 2 milligrams.  If no improvement we did discuss consult [facility] bariatric surgery/medicine.  Increase lifestyle modification with diet and exercise.   Monitoring BMI.           Encounter for long-term (current) use of medications (Chronic)    Overview     07/12/2019 CHRONIC long-term drug therapy for managed conditions. See medication list. Reports compliance.  No side effects reported.  Routine lab work is being monitored.  Patient does not need refills today. 09/20/2019Reviewed labs. Patient is compliant with meds. She needs refills. Gaining weight on Metformin. DECEMBER 2019:  CHRONIC. Stable. Compliant with medications for managed conditions. See medication list. No SE reported. Routine lab analysis is being monitored. Refills were addressed. APRIL 2020:  CHRONIC. Stable. Compliant with medications for managed conditions. See medication list. No SE reported. Routine lab analysis is being monitored. Refills were addressed.  AUGUST 2020:  REVIEWED LABS.  CHRONIC. Stable. Compliant with medications for managed conditions. See medication list. No SE reported. Routine lab analysis is being monitored. Refills were addressed.October 2020:  CHRONIC. Stable. Compliant with medications for managed conditions. See medication list. No SE reported. Routine lab analysis is being monitored. Refills were addressed.November 2020:CHRONIC. Stable. Compliant with medications for managed conditions. See medication list. No SE reported.   Routine lab analysis is being monitored. Refills were addressed.  April 2021:  Reviewed labs.  August 2021:  Reviewed labs.  Patient additional labs set up through [facility].  January 2022:  Reviewed labs.  February 2022:  Reviewed labs.  June 2022:  Reviewed labs.  Lab Results   Component Value Date    WBC 9.53 02/16/2022    HGB 11.7 (L) 02/16/2022    HCT 40.4 02/16/2022    MCV 76 (L) 02/16/2022     02/16/2022         Chemistry        Component Value Date/Time     01/20/2022 0831    K 4.1 01/20/2022 0831     01/20/2022 0831    CO2 26 01/20/2022 0831    BUN 7 01/20/2022 0831    CREATININE 0.7  01/20/2022 0831    GLU 92 01/20/2022 0831        Component Value Date/Time    CALCIUM 9.4 01/20/2022 0831    ALKPHOS 71 01/20/2022 0831    AST 11 01/20/2022 0831    ALT 11 01/20/2022 0831    BILITOT 0.3 01/20/2022 0831    ESTGFRAFRICA >60.0 01/20/2022 0831    EGFRNONAA >60.0 01/20/2022 0831          Lab Results   Component Value Date    TSH 1.223 01/20/2022    M5FITBC 9.4 07/16/2019    T3FREE 2.9 07/16/2019                Current Assessment & Plan     Complete history and physical was completed today.  Complete and thorough medication reconciliation was performed.  Discussed risks and benefits of medications.  Advised patient on orders and health maintenance.  We discussed old records and old labs if available.  Will request any records not available through epic.  Continue current medications listed on your summary sheet.             Fatigue (Chronic)    Overview     Chronic.  No improvement.  Patient concerned about her weight and energy level.  She has restarted OZEMPIC and is starting to lose weight again.  She is currently on OZEMPIC 0.5 milligram weekly.    June 2022:  Patient reports brain fog and fatigue that is worse since her last pregnancy and history of COVID.    Lab Results   Component Value Date    WBC 9.53 02/16/2022    HGB 11.7 (L) 02/16/2022    HCT 40.4 02/16/2022    MCV 76 (L) 02/16/2022     02/16/2022       Lab Results   Component Value Date    IRON 33 01/20/2022    TIBC 339 01/20/2022    FERRITIN 36 01/22/2009     Lab Results   Component Value Date    AMILUZQE13 510 05/23/2022     Lab Results   Component Value Date    FOLATE 10.0 01/20/2022     Lab Results   Component Value Date    TSH 1.223 01/20/2022    N8BVHTO 9.4 07/16/2019                Current Assessment & Plan     Check labs.  Follow-up with all specialist.  Discussed the importance of lifestyle modification with diet and exercise.           Vitamin D deficiency (Chronic)    Overview     07/12/2019 Vit d deficiency.  Not currently  taking vitamin d supplement. No SE reported. Fatigue is notimproved. 08/15/2019 Chronic.  Uncontrolled.  Vitamin-D level is still low.  Continue supplementation.  Recheck in 3 months.09/20/2019Patient still taking vitamin-D.  Needs refill.  No side effects reported.DECEMBER 2019:  Patient doing well on vitamin-D.  Needs refill.  Checking level.November 2020:  Patient recently pregnant.Chronic. Vit d deficiency. Takes vitamin d supplement. No SE reported. Fatigue is slightly improved. December 2020:  Restart vitamin-D 70744 units weekly.February 2021:  Patient has recently had a positive pregnancy test.  She followed up with OBGYN.  Changing vitamin-D to daily supplement versus 88675 units weekly.  June 2022:   Latest Reference Range & Units 05/23/22 09:18   Vit D, 1,25-Dihydroxy 20 - 79 pg/mL 132 (H)   (H): Data is abnormally high  Lab Results   Component Value Date    [id number] 19 (L) 01/20/2022    [id number] 23 (L) 10/21/2021    [id number] 24 (L) 02/17/2021                 Current Assessment & Plan     Vitamin-D has overcorrected with supplementation.  Patient has stop vitamin-D supplementation.  Recheck level.  Will resume 2000 units daily.  Stop the 79790 units weekly.           Type 2 diabetes mellitus with hyperglycemia, without long-term current use of insulin (Chronic)    Overview     INITIAL HPI:  No results found in epic however review her blood work from previous provider shows last A1c was 5.8.  Patient previously on metformin.  Currently having fatigue and decreased libido.09/20/2019Patient due for hemoglobin A1c next month.  Will place order for patient.She is having GI symptoms with metformin. She reports BG is elevated at home. She continues to gain weight. DECEMBER 2019:  Patient reports to having diarrhea with metformin.  Reviewed Diabetes Management StatusStatin: Not takingACE/ARB: Not takingAPRIL 2020:  Patient has started Victoza and is off of metformin.  Patient reports that she is  getting slightly nauseated on the increased dose of 1.2 mg.  Symptoms resolved after taking Pepto-Bismol after few hours of injection.Reviewed Diabetes Management StatusStatin: Not takingACE/ARB: Not takingAUGUST 2020:  PATIENT REPORTS THAT SHE IS TOLERATING VICTOZA 1.2 MG.  PATIENT CONTINUES TO HAVE ISSUES WITH HER WEIGHT.  REVIEWED Diabetes Management StatusStatin: Not takingACE/ARB: Not takingOCTOBER 2020:  Patient is having side effects with Victoza.  Reviewed Diabetes Management Status.Statin: Not takingACE/ARB: Not takingNOVEMBER 2020:  Patient reports that she was doing well on OZEMPIC.  A1c is been well controlled.  Patient was recently diagnosed with pregnancy.  Will monitor for worsening of diabetes.  Diabetes Management Status Statin: Not taking ACE/ARB: Not takingDECEMBER 2020:  Patient recently had miscarriage.  Patient wanting to get back on her diabetic medication.Diabetes Management StatusStatin: Not takingACE/ARB: Not taking January 2022:Diabetes Management StatusFebruary 2022:  Reviewed Diabetes Management Status.  She is taking Ozempic 0.5 milligrams weekly.  She denies any side effects.  She does not thing is working well.  Denies history of thyroid cancer, pancreatitis, MEN syndromeStatin: Not takingACE/ARB: Not taking  June 2022:  Patient reports her weight is starting to rebound.  She is on Ozempic 1 milligram weekly.  Reports that her blood sugar is starting to increase it with her appetite.  Denies history of pancreatitis, thyroid cancer, MEN syndrome.  Diabetes Management Status    Statin: Not taking  ACE/ARB: Not taking    Screening or Prevention Patient's value Goal Complete/Controlled?   HgA1C Testing and Control   Lab Results   Component Value Date    HGBA1C 5.7 (H) 01/20/2022      Annually/Less than 8% Yes   Lipid profile : 01/20/2022 Annually Yes   LDL control Lab Results   Component Value Date    LDLCALC 111.8 01/20/2022    Annually/Less than 100 mg/dl  No   Nephropathy screening Lab  Results   Component Value Date    LABMICR 8.0 10/21/2021     Lab Results   Component Value Date    PROTEINUA Negative 12/28/2021     Lab Results   Component Value Date    UTPCR Unable to calculate 12/28/2021      Annually Yes   Blood pressure BP Readings from Last 1 Encounters:   06/10/22 122/86    Less than 140/90 Yes   Dilated retinal exam : 07/16/2021 Annually Yes   Foot exam   Most Recent Foot Exam Date: Not Found Annually No                  Current Assessment & Plan     Increase Ozempic to 2 milligram weekly.We will plan to monitor hemoglobin A1c at designated intervals 3 to 6 months.  I recommend ongoing Education for diabetic diet and exercise protocol.  We will continue to monitor for side effects.    Please be advised of symptoms to monitor for and to notify me immediately if persistent or worsening.  Follow up with Ophthalmology/Optometry and Podiatry at least annually.             Polyarthralgia (Chronic)    Overview     Chronic.  Previous HPI:  Worsening.  Patient is 35 years old and has significant arthritis x-ray.  Checking labs today for autoimmune.  Patient denies a history of autoimmune disease.  June 2022:  Patient reports that her lower back is starting to hurt more with exercise.  Patient is following with Rheumatology.   Latest Reference Range & Units 10/02/20 09:09   VANESSA Screen Negative <1:80  Positive !   VANESSA Titer 1  1:160   VANESSA PATTERN 1  Homogeneous   VANESSA Titer 2  1:160   VANESSA Pattern 2  Speckled   ds DNA Ab Negative 1:10  Negative 1:10   Anti-SSA Antibody 0.00 - 0.99 Ratio 0.27   Anti-SSA Interpretation Negative  Negative   Anti-SSB Antibody 0.00 - 0.99 Ratio 0.11   Anti-SSB Interpretation Negative  Negative   Anti Sm Antibody 0.00 - 0.99 Ratio 0.13   Anti-Sm Interpretation Negative  Negative   Anti Sm/RNP Antibody 0.00 - 0.99 Ratio 0.44   Anti-Sm/RNP Interpretation Negative  Negative   !: Data is abnormal           Current Assessment & Plan     Check inflammatory markers.  Follow-up with  Rheumatology.  Patient advised to use low impact exercises such as biking or elliptical.  Continue anti-inflammatory.  GI precautions.           Hypertension associated with diabetes - Primary (Chronic)    Overview     CHRONIC.  June 2022:  Blood pressure well controlled on current regimen.  February 2022:  Blood pressure is uncontrolled.  Only on spironolactone 50 milligrams daily at this time.  August 2021:  Patient reports good control with blood pressure at home on nifedipine 30 milligrams.  April 2021. Blood pressure well controlled today.  Patient reports compliance with medications as prescribed.  March 2021:  Blood pressures been well controlled at home on labetalol however her shortness of breath and breathing with asthma has gotten worse.. BP Reviewed.  Compliant with BP medications. No SE reported.   (-) CP, palpitations, dizziness, lightheadedness, arm numbness, tingling or weakness, syncope.  Creatinine   Date Value Ref Range Status   02/17/2021 0.6 0.5 - 1.4 mg/dL Final              Current Assessment & Plan     Continue current meds.Counseled on importance of hypertension disease course, I recommend ongoing Education for DASH-diet and exercise.  Counseled on medication regimen importance of treating high blood pressure.  Please be advised of risk of untreated blood pressure as discussed.  Please advised of ER precautions were given for symptoms of hypertensive urgency and emergency.             Hidradenitis suppurativa (Chronic)    Overview     Chronic.  Recurrence.  Patient reports recurrence of boils under her arms and in the groin area.  Patient was on spironolactone briefly prior to her pregnancy and reports some improvement.  She is following with Infectious Disease and has been on antibiotics for other reasons which was helping the hidradenitis lesions.    February 2022:  Patient reports that she had surgery in the past for this condition.  Spironolactone 50 milligrams is not helping at this  time.    June 2022:  Patient was increased to spironolactone 100 milligrams.  She reports that the medication makes her sleepy so she has been taking at bedtime.  She is currently having a flare.  She does not have any antibiotics currently.  She missed an appointment with General surgery but has a rescheduled for August.  She does not currently follow with Dermatology.           Current Assessment & Plan     Continue spironolactone.  Start doxycycline for current flare.  Establish care with Dermatology.  Follow-up with general surgery soon.Discussed condition course and signs and symptoms to expect.  Patient advised take anti-inflammatories and or Tylenol for pain or fever.  ER precautions.  Call MD or follow-up to clinic if not improving or worsening symptoms.             Brain fog (Chronic)    Overview     Chronic.  Associated with fatigue.  See problem.           B12 deficiency (Chronic)    Overview     Chronic.  Not improved on oral B12 supplement.  Lab Results   Component Value Date    [id number] 510 05/23/2022                Current Assessment & Plan     Start B12 injections.  If benefiting patient can receive B12 injection 1000 micrograms once weekly for four weeks and then monthly after that.  Monitoring B12 level.                  Review of patient's allergies indicates:   Allergen Reactions    Metformin Diarrhea    Sulfa (sulfonamide antibiotics) Anaphylaxis and Swelling     Swelling (eyes)^, Swelling (throat)^  Swelling (eyes)^, Swelling (throat)^      Diclofenac      Gastritis     Shellfish containing products      anaphylaxis     Current Outpatient Medications   Medication Instructions    albuterol (PROVENTIL/VENTOLIN HFA) 90 mcg/actuation inhaler 2 puffs, Inhalation, Every 6 hours PRN    aspirin (ECOTRIN) 81 mg, Oral, Daily    azelastine (ASTELIN) 137 mcg (0.1 %) nasal spray SMARTSIG:Both Nares    cholecalciferol (vitamin D3) (VITAMIN D3) 2,000 Units, Oral, Daily    cyanocobalamin (VITAMIN  "B-12) 1,000 mcg, Oral, Daily    doxycycline (VIBRAMYCIN) 100 mg, Oral, Every 12 hours    EScitalopram oxalate (LEXAPRO) 20 mg, Oral, Daily    ferrous sulfate (FEOSOL) Tab tablet 1 each, Oral, With breakfast    fluticasone furoate-vilanteroL (BREO) 200-25 mcg/dose DsDv diskus inhaler 1 puff, Inhalation, Daily, Controller    ibuprofen (ADVIL,MOTRIN) 600 mg, Oral, Every 6 hours PRN    iron-vit c-b12-folic acid (IRON 100 PLUS) Tab 1 tablet, Oral, Daily    Lactobacillus rhamnosus GG (CULTURELLE) 10 billion cell capsule 1 capsule, Oral, Daily    lactulose (CHRONULAC) 10 gram/15 mL solution SMARTSI Tablespoon By Mouth Daily    levocetirizine (XYZAL) 5 mg, Oral, Nightly    montelukast (SINGULAIR) 10 mg, Oral, Daily    NIFEdipine (PROCARDIA-XL) 30 mg, Oral, Daily    NOVOFINE PLUS 32 gauge x 1/6" Ndle use as directed    ondansetron (ZOFRAN-ODT) 8 MG TbDL DISSOLVE ONE TABLET UNDER TONGUE EVERY 8 HOURS AS NEEDED FOR NAUSEA    OZEMPIC 2 mg, Subcutaneous, Every 7 days    pantoprazole (PROTONIX) 40 mg, Oral, Daily    spironolactone (ALDACTONE) 100 mg, Oral, Daily    WIXELA INHUB 500-50 mcg/dose DsDv diskus inhaler 1 puff, Inhalation, 2 times daily    XOLAIR 300 mg, Subcutaneous, Every 14 days      I have reviewed the PMH, social history, FamilyHx, surgical history, allergies and medications documented / confirmed by the patient at the time of this visit.  Review of Systems   Constitutional: Positive for activity change, fatigue and unexpected weight change. Negative for chills and fever.   HENT: Negative for ear pain and sore throat.    Eyes: Negative for redness and visual disturbance.   Respiratory: Negative for cough and shortness of breath.    Cardiovascular: Negative for chest pain and palpitations.   Gastrointestinal: Negative for nausea and vomiting.   Genitourinary: Negative for difficulty urinating and hematuria.   Musculoskeletal: Positive for arthralgias. Negative for myalgias.   Skin: Positive for " "rash. Negative for wound.   Neurological: Negative for weakness and headaches.   Psychiatric/Behavioral: Positive for sleep disturbance. The patient is nervous/anxious.      Objective:   /86   Pulse 83   Resp 17   Ht 5' 5" (1.651 m)   Wt (!) 146.9 kg (323 lb 13.7 oz)   LMP 06/10/2022   SpO2 96%   Breastfeeding No   BMI 53.89 kg/m²   Physical Exam  Vitals and nursing note reviewed.   Constitutional:       General: She is not in acute distress.     Appearance: She is well-developed. She is obese. She is not ill-appearing, toxic-appearing or diaphoretic.   HENT:      Head: Normocephalic and atraumatic.      Right Ear: Hearing and external ear normal.      Left Ear: Hearing and external ear normal.      Nose: Nose normal. No rhinorrhea.   Eyes:      General: Lids are normal.      Extraocular Movements: Extraocular movements intact.      Conjunctiva/sclera: Conjunctivae normal.      Pupils: Pupils are equal, round, and reactive to light.   Cardiovascular:      Rate and Rhythm: Normal rate.      Pulses: Normal pulses.   Pulmonary:      Effort: Pulmonary effort is normal. No respiratory distress.      Breath sounds: Normal breath sounds.   Abdominal:      General: Bowel sounds are normal.      Palpations: Abdomen is soft.   Musculoskeletal:         General: Normal range of motion.      Cervical back: Normal range of motion and neck supple.   Skin:     General: Skin is warm and dry.      Capillary Refill: Capillary refill takes less than 2 seconds.      Coloration: Skin is not pale.      Findings: Lesion present.   Neurological:      General: No focal deficit present.      Mental Status: She is alert and oriented to person, place, and time. Mental status is at baseline. She is not disoriented.      Cranial Nerves: No cranial nerve deficit.      Motor: No weakness.      Gait: Gait normal.   Psychiatric:         Attention and Perception: She is attentive.         Mood and Affect: Mood is depressed. Mood is not " anxious.         Speech: Speech is not rapid and pressured or slurred.         Behavior: Behavior normal. Behavior is not agitated, aggressive or hyperactive. Behavior is cooperative.         Thought Content: Thought content normal. Thought content is not paranoid or delusional. Thought content does not include homicidal or suicidal ideation. Thought content does not include homicidal or suicidal plan.         Cognition and Memory: Memory is not impaired.         Judgment: Judgment normal.         Assessment:     1. Hypertension associated with diabetes    2. Type 2 diabetes mellitus with hyperglycemia, without long-term current use of insulin    3. Brain fog    4. Encounter for long-term (current) use of medications    5. Nutritional anemia    6. Vitamin D deficiency    7. Fatigue, unspecified type    8. Polyarthralgia    9. B12 deficiency    10. Hidradenitis suppurativa    11. Class 3 obesity with alveolar hypoventilation, serious comorbidity, and body mass index (BMI) of 50.0 to 59.9 in adult      MDM:   High complexity.  Moderate risk.  Total time: 54 minutes.  This includes total time spent on the encounter, which includes face to face time and non-face to face time preparing to see the patient (eg, review of previous medical records, tests), Obtaining and/or reviewing separately obtained history, documenting clinical information in the electronic or other health record, independently interpreting results (not separately reported)/communicating results to the patient/family/caregiver, and/or care coordination (not separately reported).    I have Reviewed and summarized old records.  I have performed thorough medication reconciliation today and discussed risk and benefits of medications.  I have reviewed labs and discussed with patient.  All questions were answered.  I am requesting old records and will review them once they are available.  Infectious Disease, General surgery    I have signed for the following  orders AND/OR meds.  Orders Placed This Encounter   Procedures    Sedimentation rate     Standing Status:   Future     Number of Occurrences:   1     Standing Expiration Date:   8/9/2023    C-Reactive Protein     Standing Status:   Future     Number of Occurrences:   1     Standing Expiration Date:   8/9/2023    Iron and TIBC     Standing Status:   Future     Number of Occurrences:   1     Standing Expiration Date:   8/9/2023    Ferritin     Standing Status:   Future     Number of Occurrences:   1     Standing Expiration Date:   8/9/2023    Vitamin B12     Standing Status:   Future     Number of Occurrences:   1     Standing Expiration Date:   8/9/2023    Folate     Standing Status:   Future     Number of Occurrences:   1     Standing Expiration Date:   8/9/2023    Vitamin D     Standing Status:   Standing     Number of Occurrences:   99     Standing Expiration Date:   8/9/2043    Vitamin B12     Standing Status:   Standing     Number of Occurrences:   99     Standing Expiration Date:   8/9/2043    Ambulatory referral/consult to Dermatology     Standing Status:   Future     Standing Expiration Date:   7/10/2023     Referral Priority:   Routine     Referral Type:   Consultation     Referral Reason:   Specialty Services Required     Requested Specialty:   Dermatology     Number of Visits Requested:   1     Medications Ordered This Encounter   Medications    cyanocobalamin injection 1,000 mcg    doxycycline (VIBRAMYCIN) 100 MG Cap     Sig: Take 1 capsule (100 mg total) by mouth every 12 (twelve) hours.     Dispense:  20 capsule     Refill:  0    semaglutide (OZEMPIC) 2 mg/dose (8 mg/3 mL) PnIj     Sig: Inject 2 mg into the skin every 7 days.     Dispense:  9 mL     Refill:  4        Follow up in 3 months (on 9/10/2022), or if symptoms worsen or fail to improve, for DM, Med refills, LAB RESULTS.    If no improvement in symptoms or symptoms worsen, advised to call/follow-up at clinic or go to ER. Patient  voiced understanding and all questions/concerns were addressed.   DISCLAIMER: This note was compiled by using a speech recognition dictation system and therefore please be aware that typographical / speech recognition errors can and do occur.  Please contact me if you see any errors specifically.    Dante Negro M.D.       Office: [phone]   [address] [place] [address]  FAX: [phone]

## 2022-06-10 NOTE — ASSESSMENT & PLAN NOTE
Check labs.  Follow-up with all specialist.  Discussed the importance of lifestyle modification with diet and exercise.

## 2022-06-10 NOTE — ASSESSMENT & PLAN NOTE
Increase Ozempic to 2 milligram weekly.We will plan to monitor hemoglobin A1c at designated intervals 3 to 6 months.  I recommend ongoing Education for diabetic diet and exercise protocol.  We will continue to monitor for side effects.    Please be advised of symptoms to monitor for and to notify me immediately if persistent or worsening.  Follow up with Ophthalmology/Optometry and Podiatry at least annually.

## 2022-06-10 NOTE — PATIENT INSTRUCTIONS
Follow up in 3 months (on 9/10/2022), or if symptoms worsen or fail to improve, for DM, Med refills, LAB RESULTS.     Dear patient,   As a result of recent federal legislation (The Federal Cures Act), you may receive lab or pathology results from your visit in your [facility] account before your physician is able to contact you. Your physician or their representative will relay the results to you with their recommendations at their soonest availability.     If no improvement in symptoms or symptoms worsen, please be advised to call MD, follow-up at clinic and/or go to ER if becomes severe.    Dante Negro M.D.        We Offer TELEHEALTH & Same Day Appointments!   Book your Telehealth appointment with me through my nurse or   Clinic appointments on [employer]!    [address] [place] [address]    Office: [phone]   FAX: [phone]    Check out my Facebook Page and Follow Me at: [email or web address]    Check out my website at [employer] by clicking on: [email or web address]    To Schedule appointments online, go to [employer]: [email or web address]

## 2022-06-10 NOTE — ASSESSMENT & PLAN NOTE
Check inflammatory markers.  Follow-up with Rheumatology.  Patient advised to use low impact exercises such as biking or elliptical.  Continue anti-inflammatory.  GI precautions.

## 2022-06-10 NOTE — ASSESSMENT & PLAN NOTE
Monitoring CBC.  Anemia precautions.  Supplementing with B12, folate, iron.  If no improvement recommend Hematology consult.

## 2022-06-12 NOTE — PROGRESS NOTES
Make follow-up lab appointment per recommendation below.  Check to see if patient has seen the results through my chart.  If not then,  #CALL THE PATIENT# to discuss results/see if they have questions and document verification of contact. Make F/U appt if needed. [phone]    #My interpretation that was sent to them through [employer]:  Lucía, I have reviewed your recent blood work.     Vitamin-D level is low.  Restart supplement as we discussed at office visit with daily supplementation over-the-counter.  B12 level has improved from previous with supplementation.  It is elevated likely due to the B12 injection that she received.  Let me know if this is helping and we can schedule B12 injections regularly.  Folate level is within normal limits however could be optimized with folate supplementation.  Inflammation markers sedimentation rate and CRP are normal.  Blood counts are stable.  Iron level remains at lower limits but is slightly improved from previous.  Continue supplementation.  Your metabolic panel which shows your glucose, kidney function, electrolytes, and liver function is stable.   Thyroid study is normal.   Your hemoglobin A1c is stable, increase Ozempic to 2 milligrams as we discussed at office visit..  This test is gold standard screening test for diabetes.  It is a measures 3 months of your average blood sugar.    Recommendations as above.  Otherwise recheck annual wellness labs in one year.  =========================  Also please address any outstanding health maintenance that may be due: Foot Exam Never done

## 2022-06-20 ENCOUNTER — TELEPHONE (OUTPATIENT)
Dept: FAMILY MEDICINE | Facility: CLINIC | Age: 37
End: 2022-06-20
Payer: COMMERCIAL

## 2022-06-20 ENCOUNTER — PATIENT MESSAGE (OUTPATIENT)
Dept: FAMILY MEDICINE | Facility: CLINIC | Age: 37
End: 2022-06-20
Payer: COMMERCIAL

## 2022-06-20 NOTE — TELEPHONE ENCOUNTER
Attempted PA 4 times and the address will not let me push the PA through.  I spoke with patient and asked her to reach out to her insurance and then send us [employer] message with correct information so we can get her medication approved.

## 2022-06-20 NOTE — TELEPHONE ENCOUNTER
----- Message from Florinda France sent at 6/20/2022  3:24 PM CDT -----  .Type:  Patient Returning Call    Who Called: .Lucía Coreas   Who Left Message for Patient: Luzma  Does the patient know what this is regarding?:  Would the patient rather a call back or a response via [facility]? Call back  Best Call Back Number:.[phone]   Additional Information:

## 2022-06-20 NOTE — TELEPHONE ENCOUNTER
----- Message from Africa Honeycutt sent at 6/20/2022 10:44 AM CDT -----  Contact: [email or web address] [phone]  Pharmacy is calling to clarify an RX.  RX name:  semaglutide (OZEMPIC) 2 mg/dose (8 mg/3 mL) PnIj  What do they need to clarify:    Comments:  [facility] Childress in drug store is calling regarding the PA on this medication

## 2022-07-07 ENCOUNTER — OFFICE VISIT (OUTPATIENT)
Dept: FAMILY MEDICINE | Facility: CLINIC | Age: 37
End: 2022-07-07
Payer: COMMERCIAL

## 2022-07-07 VITALS
TEMPERATURE: 100 F | SYSTOLIC BLOOD PRESSURE: 123 MMHG | DIASTOLIC BLOOD PRESSURE: 80 MMHG | BODY MASS INDEX: 48.82 KG/M2 | WEIGHT: 293 LBS | HEIGHT: 65 IN | HEART RATE: 89 BPM | OXYGEN SATURATION: 99 %

## 2022-07-07 DIAGNOSIS — R05.9 COUGH: Primary | ICD-10-CM

## 2022-07-07 DIAGNOSIS — J40 BRONCHITIS: ICD-10-CM

## 2022-07-07 LAB
CTP QC/QA: YES
CTP QC/QA: YES
FLUAV AG NPH QL: NEGATIVE
FLUBV AG NPH QL: NEGATIVE
SARS-COV-2 RDRP RESP QL NAA+PROBE: NEGATIVE

## 2022-07-07 PROCEDURE — 3074F SYST BP LT 130 MM HG: CPT | Mod: CPTII,S$GLB,, | Performed by: STUDENT IN AN ORGANIZED HEALTH CARE EDUCATION/TRAINING PROGRAM

## 2022-07-07 PROCEDURE — 3044F HG A1C LEVEL LT 7.0%: CPT | Mod: CPTII,S$GLB,, | Performed by: STUDENT IN AN ORGANIZED HEALTH CARE EDUCATION/TRAINING PROGRAM

## 2022-07-07 PROCEDURE — U0002: ICD-10-PCS | Mod: QW,S$GLB,, | Performed by: STUDENT IN AN ORGANIZED HEALTH CARE EDUCATION/TRAINING PROGRAM

## 2022-07-07 PROCEDURE — U0002 COVID-19 LAB TEST NON-CDC: HCPCS | Mod: QW,S$GLB,, | Performed by: STUDENT IN AN ORGANIZED HEALTH CARE EDUCATION/TRAINING PROGRAM

## 2022-07-07 PROCEDURE — 3079F PR MOST RECENT DIASTOLIC BLOOD PRESSURE 80-89 MM HG: ICD-10-PCS | Mod: CPTII,S$GLB,, | Performed by: STUDENT IN AN ORGANIZED HEALTH CARE EDUCATION/TRAINING PROGRAM

## 2022-07-07 PROCEDURE — 1160F PR REVIEW ALL MEDS BY PRESCRIBER/CLIN PHARMACIST DOCUMENTED: ICD-10-PCS | Mod: CPTII,S$GLB,, | Performed by: STUDENT IN AN ORGANIZED HEALTH CARE EDUCATION/TRAINING PROGRAM

## 2022-07-07 PROCEDURE — 3044F PR MOST RECENT HEMOGLOBIN A1C LEVEL <7.0%: ICD-10-PCS | Mod: CPTII,S$GLB,, | Performed by: STUDENT IN AN ORGANIZED HEALTH CARE EDUCATION/TRAINING PROGRAM

## 2022-07-07 PROCEDURE — 1159F MED LIST DOCD IN RCRD: CPT | Mod: CPTII,S$GLB,, | Performed by: STUDENT IN AN ORGANIZED HEALTH CARE EDUCATION/TRAINING PROGRAM

## 2022-07-07 PROCEDURE — 99999 PR PBB SHADOW E&M-EST. PATIENT-LVL V: CPT | Mod: PBBFAC,,, | Performed by: STUDENT IN AN ORGANIZED HEALTH CARE EDUCATION/TRAINING PROGRAM

## 2022-07-07 PROCEDURE — 3008F BODY MASS INDEX DOCD: CPT | Mod: CPTII,S$GLB,, | Performed by: STUDENT IN AN ORGANIZED HEALTH CARE EDUCATION/TRAINING PROGRAM

## 2022-07-07 PROCEDURE — 99213 PR OFFICE/OUTPT VISIT, EST, LEVL III, 20-29 MIN: ICD-10-PCS | Mod: S$GLB,,, | Performed by: STUDENT IN AN ORGANIZED HEALTH CARE EDUCATION/TRAINING PROGRAM

## 2022-07-07 PROCEDURE — 1160F RVW MEDS BY RX/DR IN RCRD: CPT | Mod: CPTII,S$GLB,, | Performed by: STUDENT IN AN ORGANIZED HEALTH CARE EDUCATION/TRAINING PROGRAM

## 2022-07-07 PROCEDURE — 99999 PR PBB SHADOW E&M-EST. PATIENT-LVL V: ICD-10-PCS | Mod: PBBFAC,,, | Performed by: STUDENT IN AN ORGANIZED HEALTH CARE EDUCATION/TRAINING PROGRAM

## 2022-07-07 PROCEDURE — 87804 INFLUENZA ASSAY W/OPTIC: CPT | Mod: QW,S$GLB,, | Performed by: STUDENT IN AN ORGANIZED HEALTH CARE EDUCATION/TRAINING PROGRAM

## 2022-07-07 PROCEDURE — 3074F PR MOST RECENT SYSTOLIC BLOOD PRESSURE < 130 MM HG: ICD-10-PCS | Mod: CPTII,S$GLB,, | Performed by: STUDENT IN AN ORGANIZED HEALTH CARE EDUCATION/TRAINING PROGRAM

## 2022-07-07 PROCEDURE — 99213 OFFICE O/P EST LOW 20 MIN: CPT | Mod: S$GLB,,, | Performed by: STUDENT IN AN ORGANIZED HEALTH CARE EDUCATION/TRAINING PROGRAM

## 2022-07-07 PROCEDURE — 3079F DIAST BP 80-89 MM HG: CPT | Mod: CPTII,S$GLB,, | Performed by: STUDENT IN AN ORGANIZED HEALTH CARE EDUCATION/TRAINING PROGRAM

## 2022-07-07 PROCEDURE — 3008F PR BODY MASS INDEX (BMI) DOCUMENTED: ICD-10-PCS | Mod: CPTII,S$GLB,, | Performed by: STUDENT IN AN ORGANIZED HEALTH CARE EDUCATION/TRAINING PROGRAM

## 2022-07-07 PROCEDURE — 1159F PR MEDICATION LIST DOCUMENTED IN MEDICAL RECORD: ICD-10-PCS | Mod: CPTII,S$GLB,, | Performed by: STUDENT IN AN ORGANIZED HEALTH CARE EDUCATION/TRAINING PROGRAM

## 2022-07-07 PROCEDURE — 87804 POCT INFLUENZA A/B: ICD-10-PCS | Mod: QW,S$GLB,, | Performed by: STUDENT IN AN ORGANIZED HEALTH CARE EDUCATION/TRAINING PROGRAM

## 2022-07-07 RX ORDER — AZITHROMYCIN 250 MG/1
TABLET, FILM COATED ORAL
Qty: 6 TABLET | Refills: 0 | Status: SHIPPED | OUTPATIENT
Start: 2022-07-07 | End: 2022-08-30

## 2022-07-07 RX ORDER — CODEINE PHOSPHATE AND GUAIFENESIN 10; 100 MG/5ML; MG/5ML
10 SOLUTION ORAL 4 TIMES DAILY PRN
Qty: 240 ML | Refills: 0 | Status: SHIPPED | OUTPATIENT
Start: 2022-07-07 | End: 2022-07-17

## 2022-07-07 NOTE — PROGRESS NOTES
SUBJECTIVE:   Lucía Coreas is a 36 y.o. female who complains of congestion, sneezing, sore throat, productive cough, chills, yellow nasal discharge and hoarseness for 12 days. She denies a history of chest pain, fevers, nausea, shortness of breath and vomiting and does have a history of asthma. Patient does not smoke cigarettes. Has tried Singulair and Flonase and xyzal. Sx staying the same. Sx worse at night.      OBJECTIVE:  Vitals:    07/07/22 0734   BP: 123/80   Pulse: 89   Temp: 99.5 °F (37.5 °C)     She appears well, vital signs are as noted. Ears normal.  Throat and pharynx normal.  Neck supple. No adenopathy in the neck. Nose is congested. Sinuses non tender. The chest is clear, without wheezes or rales.    ASSESSMENT:   viral upper respiratory illness and bronchitis  Neg covid and flu    PLAN:  Symptomatic therapy suggested: push fluids, rest and return office visit prn if symptoms persist or worsen. Call or return to clinic prn if these symptoms worsen or fail to improve as anticipated.    Orders Placed This Encounter   Procedures    POCT COVID-19 Rapid Screening     Order Specific Question:   Is the patient symptomatic?     Answer:   Yes    POCT Influenza A/B       Medications Ordered This Encounter   Medications    azithromycin (Z-MARTIN) 250 MG tablet     Sig: Take 2 tablets by mouth on day 1; Take 1 tablet by mouth on days 2-5     Dispense:  6 tablet     Refill:  0    guaiFENesin-codeine 100-10 mg/5 ml (TUSSI-ORGANIDIN NR)  mg/5 mL syrup     Sig: Take 10 mLs by mouth 4 (four) times daily as needed for Cough.     Dispense:  240 mL     Refill:  0     Order Specific Question:   I have reviewed the Prescription Drug Monitoring Program (PDMP) database for this patient prior to prescribing the above opioid medication     Answer:   Yes

## 2022-07-15 ENCOUNTER — TELEPHONE (OUTPATIENT)
Dept: PULMONOLOGY | Facility: CLINIC | Age: 37
End: 2022-07-15
Payer: COMMERCIAL

## 2022-07-15 NOTE — TELEPHONE ENCOUNTER
august 5th maura 11;30 august 5th kayleen najera pa-c onlc 1:40      ----- Message from Meri Pina sent at 7/15/2022  1:37 PM CDT -----  Pt is requesting a call back in regards to getting appt and lab pulmonary lab reschedule. Pt can be reached at [phone]

## 2022-07-16 ENCOUNTER — PATIENT MESSAGE (OUTPATIENT)
Dept: FAMILY MEDICINE | Facility: CLINIC | Age: 37
End: 2022-07-16
Payer: COMMERCIAL

## 2022-07-16 DIAGNOSIS — F41.9 ANXIETY: Primary | ICD-10-CM

## 2022-07-18 DIAGNOSIS — I10 ESSENTIAL HYPERTENSION: ICD-10-CM

## 2022-07-18 DIAGNOSIS — Z3A.08 8 WEEKS GESTATION OF PREGNANCY: ICD-10-CM

## 2022-07-18 RX ORDER — ASPIRIN 81 MG/1
81 TABLET ORAL DAILY
Qty: 90 TABLET | Refills: 4 | Status: SHIPPED | OUTPATIENT
Start: 2022-07-18 | End: 2022-08-31 | Stop reason: SDUPTHER

## 2022-07-18 RX ORDER — BUPROPION HYDROCHLORIDE 150 MG/1
150 TABLET ORAL DAILY
Qty: 30 TABLET | Refills: 11 | Status: SHIPPED | OUTPATIENT
Start: 2022-07-18 | End: 2022-08-30

## 2022-07-18 NOTE — TELEPHONE ENCOUNTER
I received your message which was reviewed along with the the medication list and allergies that we have below.  Please review it for accuracy to make sure that we have the most recent records on your history.     Based on this, the following orders were placed AND/OR medicines were sent in.     No orders of the defined types were placed in this encounter.      Medications written and sent at this time include:  Medications Ordered This Encounter   Medications    buPROPion (WELLBUTRIN XL) 150 MG TB24 tablet     Sig: Take 1 tablet (150 mg total) by mouth once daily.     Dispense:  30 tablet     Refill:  11       Your pharmacy(ies) of choice at this time on record include the list below and any medications would have been sent to the one at the top.    [facility] [place] - [address]  [address] [facility] [place] [address]  Phone: [phone] Fax: [phone]      Thank you for choosing us as your healthcare provider!  Dr. Dante Negro    ALLERGY LIST  Review of patient's allergies indicates:   Allergen Reactions    Metformin Diarrhea    Sulfa (sulfonamide antibiotics) Anaphylaxis and Swelling     Swelling (eyes)^, Swelling (throat)^  Swelling (eyes)^, Swelling (throat)^      Diclofenac      Gastritis     Shellfish containing products      anaphylaxis       MEDICATION LIST  Current Outpatient Medications on File Prior to Visit   Medication Sig Dispense Refill    albuterol (PROVENTIL/VENTOLIN HFA) 90 mcg/actuation inhaler Inhale 2 puffs into the lungs every 6 (six) hours as needed for Wheezing. 18 g 1    aspirin (ECOTRIN) 81 MG EC tablet Take 1 tablet (81 mg total) by mouth once daily. 90 tablet 3    azelastine (ASTELIN) 137 mcg (0.1 %) nasal spray SMARTSIG:Both Nares      azithromycin (Z-MARTIN) 250 MG tablet Take 2 tablets by mouth on day 1; Take 1 tablet by mouth on days 2-5 6 tablet 0    cholecalciferol, vitamin D3, (VITAMIN D3) 25 mcg (1,000 unit) capsule Take 2 capsules (2,000  "Units total) by mouth once daily. 90 capsule 3    cyanocobalamin (VITAMIN B-12) 1000 MCG tablet Take 1 tablet (1,000 mcg total) by mouth once daily. 90 tablet 3    doxycycline (VIBRAMYCIN) 100 MG Cap Take 1 capsule (100 mg total) by mouth every 12 (twelve) hours. (Patient not taking: Reported on 2022) 20 capsule 0    EScitalopram oxalate (LEXAPRO) 20 MG tablet Take 1 tablet (20 mg total) by mouth once daily. 90 tablet 4    ferrous sulfate (FEOSOL) Tab tablet Take 1 tablet (1 each total) by mouth daily with breakfast. 90 tablet 3    fluticasone furoate-vilanteroL (BREO) 200-25 mcg/dose DsDv diskus inhaler Inhale 1 puff into the lungs once daily. Controller 1 each 4    [] guaiFENesin-codeine 100-10 mg/5 ml (TUSSI-ORGANIDIN NR)  mg/5 mL syrup Take 10 mLs by mouth 4 (four) times daily as needed for Cough. 240 mL 0    ibuprofen (ADVIL,MOTRIN) 600 MG tablet Take 1 tablet (600 mg total) by mouth every 6 (six) hours as needed (Uncontrolled pain). 30 tablet 0    iron-vit c-b12-folic acid (IRON 100 PLUS) Tab Take 1 tablet by mouth once daily. 90 tablet 4    Lactobacillus rhamnosus GG (CULTURELLE) 10 billion cell capsule Take 1 capsule by mouth once daily.      lactulose (CHRONULAC) 10 gram/15 mL solution SMARTSI Tablespoon By Mouth Daily      levocetirizine (XYZAL) 5 MG tablet Take 1 tablet (5 mg total) by mouth every evening. 30 tablet 11    montelukast (SINGULAIR) 10 mg tablet Take 10 mg by mouth once daily.      NIFEdipine (PROCARDIA-XL) 30 MG (OSM) 24 hr tablet Take 30 mg by mouth once daily.      NOVOFINE PLUS 32 gauge x 1/6" Ndle use as directed      omalizumab (XOLAIR) 150 mg/mL injection Inject 2 mLs (300 mg total) into the skin every 14 (fourteen) days. 4 each 11    ondansetron (ZOFRAN-ODT) 8 MG TbDL DISSOLVE ONE TABLET UNDER TONGUE EVERY 8 HOURS AS NEEDED FOR NAUSEA      pantoprazole (PROTONIX) 40 MG tablet Take 1 tablet (40 mg total) by mouth once daily. 90 tablet 4    " semaglutide (OZEMPIC) 2 mg/dose (8 mg/3 mL) PnIj Inject 2 mg into the skin every 7 days. 9 mL 4    spironolactone (ALDACTONE) 100 MG tablet Take 1 tablet (100 mg total) by mouth once daily. 90 tablet 4    WIXELA INHUB 500-50 mcg/dose DsDv diskus inhaler Inhale 1 puff into the lungs 2 (two) times daily.       No current facility-administered medications on file prior to visit.       HEALTH MAINTENANCE THAT IS OVERDUE OR NEEDS TO BE UPDATED ON OUR CHART IS LISTED BELOW.  IF YOU HAVE HAD IT DONE ELSEWHERE, PLEASE SEND US DATES AND RECORDS IF YOU HAVE THEM TO MAKE YOUR CHART ACCURATE.  IF YOU HAVE NOT HAD THESE DONE AND ARE READY FOR US TO SCHEDULE THEM, PLEASE SEND US A MESSAGE.  Health Maintenance Due   Topic Date Due    Pneumococcal Vaccines (Age 0-64) (1 - PCV) Never done    Foot Exam  Never done    Eye Exam  07/16/2022       DISCLAIMER: This note was compiled by using a speech recognition dictation system and therefore please be aware that typographical / speech recognition errors can and do occur.  Please contact me if you see any errors specifically.    Dante Negro MD  We Offer Telehealth & Same Day Appointments!   Book your Telehealth appointment with me through my nurse or   Clinic appointments on [employer]!  [phone]     Check out my Facebook Page and Follow Me at: CLICK HERE    Check out my website at [email or web address] by clicking on: CLICK HERE    To Schedule appointments online, go to [employer]: CLICK HERE     Location: [email or web address]    [address] [place] [address]    FAX: [phone]

## 2022-07-20 ENCOUNTER — OFFICE VISIT (OUTPATIENT)
Dept: ALLERGY | Facility: CLINIC | Age: 37
End: 2022-07-20
Payer: COMMERCIAL

## 2022-07-20 ENCOUNTER — LAB VISIT (OUTPATIENT)
Dept: LAB | Facility: HOSPITAL | Age: 37
End: 2022-07-20
Attending: FAMILY MEDICINE
Payer: COMMERCIAL

## 2022-07-20 VITALS
BODY MASS INDEX: 47.09 KG/M2 | TEMPERATURE: 98 F | WEIGHT: 293 LBS | HEART RATE: 97 BPM | HEIGHT: 66 IN | SYSTOLIC BLOOD PRESSURE: 124 MMHG | DIASTOLIC BLOOD PRESSURE: 78 MMHG

## 2022-07-20 DIAGNOSIS — Z91.013 SHELLFISH ALLERGY: ICD-10-CM

## 2022-07-20 DIAGNOSIS — J30.89 ALLERGIC RHINITIS DUE TO AMERICAN HOUSE DUST MITE: ICD-10-CM

## 2022-07-20 DIAGNOSIS — J30.1 SEASONAL ALLERGIC RHINITIS DUE TO POLLEN: ICD-10-CM

## 2022-07-20 DIAGNOSIS — J45.40 MODERATE PERSISTENT ASTHMA WITHOUT COMPLICATION: Primary | ICD-10-CM

## 2022-07-20 DIAGNOSIS — J45.40 MODERATE PERSISTENT ASTHMA WITHOUT COMPLICATION: ICD-10-CM

## 2022-07-20 LAB — IGE SERPL-ACNC: 261 IU/ML (ref 0–100)

## 2022-07-20 PROCEDURE — 3078F PR MOST RECENT DIASTOLIC BLOOD PRESSURE < 80 MM HG: ICD-10-PCS | Mod: CPTII,S$GLB,, | Performed by: ALLERGY & IMMUNOLOGY

## 2022-07-20 PROCEDURE — 1159F PR MEDICATION LIST DOCUMENTED IN MEDICAL RECORD: ICD-10-PCS | Mod: CPTII,S$GLB,, | Performed by: ALLERGY & IMMUNOLOGY

## 2022-07-20 PROCEDURE — 3074F SYST BP LT 130 MM HG: CPT | Mod: CPTII,S$GLB,, | Performed by: ALLERGY & IMMUNOLOGY

## 2022-07-20 PROCEDURE — 3008F BODY MASS INDEX DOCD: CPT | Mod: CPTII,S$GLB,, | Performed by: ALLERGY & IMMUNOLOGY

## 2022-07-20 PROCEDURE — 3078F DIAST BP <80 MM HG: CPT | Mod: CPTII,S$GLB,, | Performed by: ALLERGY & IMMUNOLOGY

## 2022-07-20 PROCEDURE — 1159F MED LIST DOCD IN RCRD: CPT | Mod: CPTII,S$GLB,, | Performed by: ALLERGY & IMMUNOLOGY

## 2022-07-20 PROCEDURE — 36415 COLL VENOUS BLD VENIPUNCTURE: CPT | Performed by: ALLERGY & IMMUNOLOGY

## 2022-07-20 PROCEDURE — 99214 PR OFFICE/OUTPT VISIT, EST, LEVL IV, 30-39 MIN: ICD-10-PCS | Mod: S$GLB,,, | Performed by: ALLERGY & IMMUNOLOGY

## 2022-07-20 PROCEDURE — 3044F HG A1C LEVEL LT 7.0%: CPT | Mod: CPTII,S$GLB,, | Performed by: ALLERGY & IMMUNOLOGY

## 2022-07-20 PROCEDURE — 82785 ASSAY OF IGE: CPT | Performed by: ALLERGY & IMMUNOLOGY

## 2022-07-20 PROCEDURE — 3008F PR BODY MASS INDEX (BMI) DOCUMENTED: ICD-10-PCS | Mod: CPTII,S$GLB,, | Performed by: ALLERGY & IMMUNOLOGY

## 2022-07-20 PROCEDURE — 99214 OFFICE O/P EST MOD 30 MIN: CPT | Mod: S$GLB,,, | Performed by: ALLERGY & IMMUNOLOGY

## 2022-07-20 PROCEDURE — 99999 PR PBB SHADOW E&M-EST. PATIENT-LVL IV: ICD-10-PCS | Mod: PBBFAC,,, | Performed by: ALLERGY & IMMUNOLOGY

## 2022-07-20 PROCEDURE — 3044F PR MOST RECENT HEMOGLOBIN A1C LEVEL <7.0%: ICD-10-PCS | Mod: CPTII,S$GLB,, | Performed by: ALLERGY & IMMUNOLOGY

## 2022-07-20 PROCEDURE — 85025 COMPLETE CBC W/AUTO DIFF WBC: CPT | Performed by: ALLERGY & IMMUNOLOGY

## 2022-07-20 PROCEDURE — 3074F PR MOST RECENT SYSTOLIC BLOOD PRESSURE < 130 MM HG: ICD-10-PCS | Mod: CPTII,S$GLB,, | Performed by: ALLERGY & IMMUNOLOGY

## 2022-07-20 PROCEDURE — 99999 PR PBB SHADOW E&M-EST. PATIENT-LVL IV: CPT | Mod: PBBFAC,,, | Performed by: ALLERGY & IMMUNOLOGY

## 2022-07-20 NOTE — PROGRESS NOTES
"    Subjective:              Patient ID: Lucía Coreas is a 36 y.o. female.        Chief Complaint:  Follow-up      HPI 10/19/2021: 35 year old female with a history of allergic rhinitis on allergy immunotherapy complaining of "cold sores in her nose" and fluid in her ear.  She reports that the sores in her nose burn and occur intermittently. She reports that they feel like "fever blisters". She is placing Bactroban and saline, but nothing is alleviating her symptoms. She has also tried nate vera gel and saline gel, but it is not helping.  She reports fluid in her ear. She is taking levocetirizine and Singulair, but it does not help.  She is not doing nasal sprays because of nasal dryness.  Epistaxis intermittently.    Allergy immunotherapy- DM, T, Cr- 1-1 dilution,  improved symptoms.    Still avoiding shellfish, no accidental ingestions  Epipen    HPI 3/12/2021:    35 year old female on SCIT 1:1 inhlants 0.5ml  She is pregnant and recently started on hypertensive medications.  She reports feel that her asthma is worse.  She has not had asthma symptoms, since she was a child.  She is currently using her albuterol inhaler 3 times a day and sees a pulmonologist on Monday.  She is not taking controller asthma medications.    HPI 4/26/2021:  35 year old pregnant female with a history of asthma.  She reports that her breathing is "horrible".  She is taking Advair daily- bid.  Albuterol inhaler two to three times a day.  She reports coughing, but denies wheezing.  One month ago- oral steroids.  Shortness of breath is exacerbated by exertion.      HPI 6/14/2021:  35 year old female 27 weeks pregnant  Headache and erythema of the eyes.  Eye physician does not feel that is is allergies.  She reports pressure behind her eyes and forehead.  She reports nasal congestion.  She reports an ill contact last week.  She denies feeling feverish or having a fever.      HPI 7/29/2021:  35 year old female -33 weeks pregnant with a " "history of dust mite, tree pollen and cockroach allergy previously on SCIT. It was stopped due to pregnancy. She also has asthma.  She reports a significant nasal congestion.  She is taking Zyrtec and hydroxyzine.  She gets out of the shower, she has to take a breathing treatment or taker her inhaler.  Pulmonary physician suspected that symptoms were related to allergies.  She is taking Advair, Albuterol- nebulized and inhaler, and flonase.  She has not taken Astelin or Singulair.  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        HPI 11/11/2021: 12 weeks post pardom  She is here for follow up.  Asthma-  Last used albuterol on yesterday.  Exertion exacerbates symptoms  Spirometry at [facility]- seeing a pulmonologist.  Singulair and Advair  Steroids-once over the last 12 months  NO shortness of breath at rest. NO wheezing, Occasional cough      Latex caused a rash on her hands. Rash is immediate with removal of gloves.  She was using gloves to clean. Hands itched and she reports hives on her hives.      She is taking Xyzal.  She reports nasal congestion.  She was on SCIT prior to pregnancy and held during pregnancy.  She does not wish to resume SCIT at this time.  She does have itchy and watery eyes.        HPI 2/16/2022: 36 year old with asthma, allergic rhinitis, and food allergies.  6 days - ate french fries cooked with seafood/shellfish- felt throat closure, treated with benadryl  She did not use her Epipen.  Shellfish allergy  No symptoms with fish  Asthma- "so so. Not as bad a sit use to be."  Not required oral steroids.  Breo has helped improve symptoms  Last used albuterol- several months ago  Seeing Pulmonary at [facility], but would like to change to [facility]    Avoiding Flonase due to sores in her nose.    Previously on SCIT for dust mite and pollen, but not interested in restarting  She reports nasal congestion at night that is worse when her heater is on.  Singulair and " Xyzal    Recurrent boils      HPI today: 36 year old female with a history of asthma, allergic rhinitis and food allergies      Allergic Rhinitis- dust mites, cockroach and pollen  Previously on SCIT, was stopped due to pregnancy and not restarted post partum  Xyzal and Singulair  Runny nose, nasal congestion  Flonase prn  Astelin prn    Asthma  Pulmonary referral was placed, but she was not seen.  Coughing -   Wheezing at night  Last used albuterol- two months  NO oral steroid      3 weeks ago Urgent care for nasal congestion- cough syrup and Zpack- cough has persisted. Nasal congestion has improved.      Food allergies- shellfish allergies  Avoiding shellfish  Not required Epipen        Past Medical History:   Diagnosis Date    Allergy     Amenorrhea     Asthma     Diabetes     GERD (gastroesophageal reflux disease)     Infertility associated with anovulation     Iron deficiency anemia     Knee pain     Metabolic syndrome     PCO (polycystic ovaries)     Recurrent boils     Status post repeat low transverse  section 2020    Formatting of this note might be different from the original. With BTL 21     Family History   Problem Relation Age of Onset    Diabetes Mother     Lupus Mother     Diabetes Father     Heart disease Father 69    Hypertension Father     Prostate cancer Father     Ovarian cancer Sister     AMY disease Brother      Current Outpatient Medications on File Prior to Visit   Medication Sig Dispense Refill    albuterol (PROVENTIL/VENTOLIN HFA) 90 mcg/actuation inhaler Inhale 2 puffs into the lungs every 6 (six) hours as needed for Wheezing. 18 g 1    aspirin (ECOTRIN) 81 MG EC tablet Take 1 tablet (81 mg total) by mouth once daily. 90 tablet 4    azelastine (ASTELIN) 137 mcg (0.1 %) nasal spray SMARTSIG:Both Nares      azithromycin (Z-MARTIN) 250 MG tablet Take 2 tablets by mouth on day 1; Take 1 tablet by mouth on days 2-5 6 tablet 0    buPROPion (WELLBUTRIN XL)  "150 MG TB24 tablet Take 1 tablet (150 mg total) by mouth once daily. 30 tablet 11    cholecalciferol, vitamin D3, (VITAMIN D3) 25 mcg (1,000 unit) capsule Take 2 capsules (2,000 Units total) by mouth once daily. 90 capsule 3    cyanocobalamin (VITAMIN B-12) 1000 MCG tablet Take 1 tablet (1,000 mcg total) by mouth once daily. 90 tablet 3    doxycycline (VIBRAMYCIN) 100 MG Cap Take 1 capsule (100 mg total) by mouth every 12 (twelve) hours. 20 capsule 0    EScitalopram oxalate (LEXAPRO) 20 MG tablet Take 1 tablet (20 mg total) by mouth once daily. 90 tablet 4    ferrous sulfate (FEOSOL) Tab tablet Take 1 tablet (1 each total) by mouth daily with breakfast. 90 tablet 3    fluticasone furoate-vilanteroL (BREO) 200-25 mcg/dose DsDv diskus inhaler Inhale 1 puff into the lungs once daily. Controller 1 each 4    ibuprofen (ADVIL,MOTRIN) 600 MG tablet Take 1 tablet (600 mg total) by mouth every 6 (six) hours as needed (Uncontrolled pain). 30 tablet 0    iron-vit c-b12-folic acid (IRON 100 PLUS) Tab Take 1 tablet by mouth once daily. 90 tablet 4    Lactobacillus rhamnosus GG (CULTURELLE) 10 billion cell capsule Take 1 capsule by mouth once daily.      lactulose (CHRONULAC) 10 gram/15 mL solution SMARTSI Tablespoon By Mouth Daily      levocetirizine (XYZAL) 5 MG tablet Take 1 tablet (5 mg total) by mouth every evening. 30 tablet 11    montelukast (SINGULAIR) 10 mg tablet Take 10 mg by mouth once daily.      NIFEdipine (PROCARDIA-XL) 30 MG (OSM) 24 hr tablet Take 30 mg by mouth once daily.      NOVOFINE PLUS 32 gauge x 1/6" Ndle use as directed      ondansetron (ZOFRAN-ODT) 8 MG TbDL DISSOLVE ONE TABLET UNDER TONGUE EVERY 8 HOURS AS NEEDED FOR NAUSEA      pantoprazole (PROTONIX) 40 MG tablet Take 1 tablet (40 mg total) by mouth once daily. 90 tablet 4    semaglutide (OZEMPIC) 2 mg/dose (8 mg/3 mL) PnIj Inject 2 mg into the skin every 7 days. 9 mL 4    spironolactone (ALDACTONE) 100 MG tablet Take 1 tablet " (100 mg total) by mouth once daily. 90 tablet 4    WIXELA INHUB 500-50 mcg/dose DsDv diskus inhaler Inhale 1 puff into the lungs 2 (two) times daily.      [DISCONTINUED] omalizumab (XOLAIR) 150 mg/mL injection Inject 2 mLs (300 mg total) into the skin every 14 (fourteen) days. 4 each 11     No current facility-administered medications on file prior to visit.     Review of patient's allergies indicates:   Allergen Reactions    Metformin Diarrhea    Sulfa (sulfonamide antibiotics) Anaphylaxis and Swelling     Swelling (eyes)^, Swelling (throat)^  Swelling (eyes)^, Swelling (throat)^      Diclofenac      Gastritis     Latex, natural rubber      Contact dermatitis      Shellfish containing products      anaphylaxis       Environmental History: No pets. Not a smoker.        ROS:  Negative aside from those items mentioned in the HPI.    Objective:     Physical Exam  Vitals reviewed.   Constitutional:       General: She is not in acute distress.     Appearance: Normal appearance. She is obese. She is not ill-appearing, toxic-appearing or diaphoretic.   HENT:      Head: Normocephalic and atraumatic.      Right Ear: Tympanic membrane, ear canal and external ear normal. There is no impacted cerumen.      Left Ear: Tympanic membrane, ear canal and external ear normal. There is no impacted cerumen.      Nose: Nose normal. No congestion or rhinorrhea.      Mouth/Throat:      Mouth: Mucous membranes are moist.      Pharynx: No oropharyngeal exudate or posterior oropharyngeal erythema.   Eyes:      General: No scleral icterus.        Right eye: No discharge.         Left eye: No discharge.   Cardiovascular:      Rate and Rhythm: Normal rate and regular rhythm.      Heart sounds: Normal heart sounds. No murmur heard.    No friction rub. No gallop.   Pulmonary:      Effort: Pulmonary effort is normal. No respiratory distress.      Breath sounds: Normal breath sounds. No stridor. No wheezing, rhonchi or rales.   Chest:       Chest wall: No tenderness.   Abdominal:      General: There is no distension.      Palpations: Abdomen is soft. There is no mass.      Tenderness: There is no abdominal tenderness. There is no guarding or rebound.      Hernia: No hernia is present.   Musculoskeletal:         General: No swelling, tenderness, deformity or signs of injury.      Cervical back: Normal range of motion and neck supple. No tenderness.   Lymphadenopathy:      Cervical: No cervical adenopathy.   Skin:     General: Skin is warm.      Coloration: Skin is not jaundiced or pale.      Findings: No bruising, erythema, lesion or rash.   Neurological:      General: No focal deficit present.      Mental Status: She is alert and oriented to person, place, and time.      Motor: No weakness.      Gait: Gait normal.   Psychiatric:         Mood and Affect: Mood normal.         Behavior: Behavior normal.         Thought Content: Thought content normal.         Judgment: Judgment normal.           Assessment:            Moderate persistent asthma without complication  -     IgE; Future; Expected date: 07/20/2022  -     CBC Auto Differential; Future; Expected date: 07/20/2022    Seasonal allergic rhinitis due to pollen    Allergic rhinitis due to [employer] house dust mite    Shellfish allergy      Recommend spirometry at least once a year, if symptomatic every 4-6 months.  Xolair was ordered previously, but she has not started.   Checking IgE and CBC level today.  Continue Breo, as symptoms have improved with Breo.  She requested a referral to pulmonary, which was ordered.  Continue Singulair and Xyzal  Avoid shellfish and shellfish products.  Epipen 2 pack  Avoid latex     RTC 4 months or sooner, if needed.        YANI JOSHI spent a total of 30  minutes on the day of the visit.This includes face to face time and non-face to face time preparing to see the patient (eg, review of tests), obtaining and/or reviewing separately obtained history,  documenting clinical information in the electronic or other health record, independently interpreting results and communicating results to the patient/family/caregiver, or care coordinator.

## 2022-07-21 ENCOUNTER — SPECIALTY PHARMACY (OUTPATIENT)
Dept: PHARMACY | Facility: CLINIC | Age: 37
End: 2022-07-21
Payer: COMMERCIAL

## 2022-07-21 ENCOUNTER — TELEPHONE (OUTPATIENT)
Dept: ALLERGY | Facility: CLINIC | Age: 37
End: 2022-07-21
Payer: COMMERCIAL

## 2022-07-21 DIAGNOSIS — R76.8 ELEVATED IGE LEVEL: ICD-10-CM

## 2022-07-21 DIAGNOSIS — J45.40 MODERATE PERSISTENT ASTHMA WITHOUT COMPLICATION: Primary | ICD-10-CM

## 2022-07-21 LAB
BASOPHILS # BLD AUTO: 0.04 K/UL (ref 0–0.2)
BASOPHILS NFR BLD: 0.4 % (ref 0–1.9)
DIFFERENTIAL METHOD: ABNORMAL
EOSINOPHIL # BLD AUTO: 0.1 K/UL (ref 0–0.5)
EOSINOPHIL NFR BLD: 1.4 % (ref 0–8)
ERYTHROCYTE [DISTWIDTH] IN BLOOD BY AUTOMATED COUNT: 15.6 % (ref 11.5–14.5)
HCT VFR BLD AUTO: 35.3 % (ref 37–48.5)
HGB BLD-MCNC: 10.8 G/DL (ref 12–16)
IMM GRANULOCYTES # BLD AUTO: 0.02 K/UL (ref 0–0.04)
IMM GRANULOCYTES NFR BLD AUTO: 0.2 % (ref 0–0.5)
LYMPHOCYTES # BLD AUTO: 4.1 K/UL (ref 1–4.8)
LYMPHOCYTES NFR BLD: 41.5 % (ref 18–48)
MCH RBC QN AUTO: 22.5 PG (ref 27–31)
MCHC RBC AUTO-ENTMCNC: 30.6 G/DL (ref 32–36)
MCV RBC AUTO: 73 FL (ref 82–98)
MONOCYTES # BLD AUTO: 0.5 K/UL (ref 0.3–1)
MONOCYTES NFR BLD: 5.5 % (ref 4–15)
NEUTROPHILS # BLD AUTO: 5 K/UL (ref 1.8–7.7)
NEUTROPHILS NFR BLD: 51 % (ref 38–73)
NRBC BLD-RTO: 0 /100 WBC
PLATELET # BLD AUTO: 494 K/UL (ref 150–450)
PMV BLD AUTO: 11.7 FL (ref 9.2–12.9)
RBC # BLD AUTO: 4.81 M/UL (ref 4–5.4)
WBC # BLD AUTO: 9.77 K/UL (ref 3.9–12.7)

## 2022-07-21 RX ORDER — OMALIZUMAB 202.5 MG/1.4ML
300 INJECTION, SOLUTION SUBCUTANEOUS
Qty: 2 EACH | Refills: 11 | Status: SHIPPED | OUTPATIENT
Start: 2022-07-21 | End: 2023-10-02 | Stop reason: SDUPTHER

## 2022-07-21 NOTE — TELEPHONE ENCOUNTER
Test claim shows Xolair must be billed through medical benefit. InBasket sent to have provider place Buy & Bill orders. Closing referral at [facility].

## 2022-07-21 NOTE — TELEPHONE ENCOUNTER
----- Message from Gabe Hernández PharmD sent at 7/21/2022 11:03 AM CDT -----  Regarding: Xolair Buy & Bill  Good morning,    [facility] is unable to fill Xolair under this patient's pharmacy benefit.    Facility administered medications administered at [facility] must come from within [facility].    It is possible that this medication may be covered under the patient's Medical Benefit. Please re-enter the order in [facility] as a clinic administered order. For any further questions, please contact [facility] at [phone].    Thank you,    Gabe Hernández, Raul  Clinical Pharmacist   [facility]   P: [phone]

## 2022-08-02 ENCOUNTER — PATIENT MESSAGE (OUTPATIENT)
Dept: ALLERGY | Facility: CLINIC | Age: 37
End: 2022-08-02
Payer: COMMERCIAL

## 2022-08-04 ENCOUNTER — PATIENT MESSAGE (OUTPATIENT)
Dept: ALLERGY | Facility: CLINIC | Age: 37
End: 2022-08-04
Payer: COMMERCIAL

## 2022-08-05 ENCOUNTER — HOSPITAL ENCOUNTER (OUTPATIENT)
Dept: RADIOLOGY | Facility: HOSPITAL | Age: 37
Discharge: HOME | End: 2022-08-05
Attending: PHYSICIAN ASSISTANT
Payer: COMMERCIAL

## 2022-08-05 ENCOUNTER — CLINICAL SUPPORT (OUTPATIENT)
Dept: PULMONOLOGY | Facility: CLINIC | Age: 37
End: 2022-08-05
Payer: COMMERCIAL

## 2022-08-05 ENCOUNTER — OFFICE VISIT (OUTPATIENT)
Dept: PULMONOLOGY | Facility: CLINIC | Age: 37
End: 2022-08-05
Payer: COMMERCIAL

## 2022-08-05 VITALS
WEIGHT: 293 LBS | OXYGEN SATURATION: 99 % | HEIGHT: 64 IN | DIASTOLIC BLOOD PRESSURE: 82 MMHG | SYSTOLIC BLOOD PRESSURE: 130 MMHG | RESPIRATION RATE: 14 BRPM | HEART RATE: 97 BPM | BODY MASS INDEX: 50.02 KG/M2

## 2022-08-05 DIAGNOSIS — J45.40 MODERATE PERSISTENT ASTHMA WITHOUT COMPLICATION: ICD-10-CM

## 2022-08-05 DIAGNOSIS — J45.909 NOT WELL CONTROLLED ASTHMA WITHOUT COMPLICATION: ICD-10-CM

## 2022-08-05 DIAGNOSIS — J45.20 MILD INTERMITTENT ASTHMA WITHOUT COMPLICATION: Chronic | ICD-10-CM

## 2022-08-05 DIAGNOSIS — E11.65 TYPE 2 DIABETES MELLITUS WITH HYPERGLYCEMIA, WITHOUT LONG-TERM CURRENT USE OF INSULIN: Chronic | ICD-10-CM

## 2022-08-05 DIAGNOSIS — R05.9 COUGH: Primary | ICD-10-CM

## 2022-08-05 DIAGNOSIS — E66.2 CLASS 3 OBESITY WITH ALVEOLAR HYPOVENTILATION, SERIOUS COMORBIDITY, AND BODY MASS INDEX (BMI) OF 50.0 TO 59.9 IN ADULT: Chronic | ICD-10-CM

## 2022-08-05 DIAGNOSIS — R05.9 COUGH: ICD-10-CM

## 2022-08-05 LAB
BRPFT: NORMAL
FEF 25 75 LLN: 1.62
FEF 25 75 PRE REF: 116.3 %
FEF 25 75 REF: 2.93
FEV1 FVC LLN: 73
FEV1 FVC PRE REF: 102.6 %
FEV1 FVC REF: 84
FEV1 LLN: 2.08
FEV1 PRE REF: 99.9 %
FEV1 REF: 2.68
FVC LLN: 2.52
FVC PRE REF: 96.9 %
FVC REF: 3.22
PEF LLN: 4.74
PEF PRE REF: 120.4 %
PEF REF: 6.75
PRE FEF 25 75: 3.41 L/S (ref 1.62–4.24)
PRE FET 100: 5.98 SEC
PRE FEV1 FVC: 85.79 % (ref 72.72–94.43)
PRE FEV1: 2.67 L (ref 2.08–3.27)
PRE FVC: 3.12 L (ref 2.52–3.91)
PRE PEF: 8.13 L/S (ref 4.74–8.77)

## 2022-08-05 PROCEDURE — 99999 PR PBB SHADOW E&M-EST. PATIENT-LVL V: CPT | Mod: PBBFAC,,, | Performed by: PHYSICIAN ASSISTANT

## 2022-08-05 PROCEDURE — 94010 BREATHING CAPACITY TEST: CPT | Mod: S$GLB,,, | Performed by: INTERNAL MEDICINE

## 2022-08-05 PROCEDURE — 71046 X-RAY EXAM CHEST 2 VIEWS: CPT | Mod: 26,,, | Performed by: STUDENT IN AN ORGANIZED HEALTH CARE EDUCATION/TRAINING PROGRAM

## 2022-08-05 PROCEDURE — 1160F RVW MEDS BY RX/DR IN RCRD: CPT | Mod: CPTII,S$GLB,, | Performed by: PHYSICIAN ASSISTANT

## 2022-08-05 PROCEDURE — 95012 PR NITRIC OXIDE EXPIRED GAS DETERMINATION: ICD-10-PCS | Mod: S$GLB,,, | Performed by: INTERNAL MEDICINE

## 2022-08-05 PROCEDURE — 99999 PR PBB SHADOW E&M-EST. PATIENT-LVL I: CPT | Mod: PBBFAC,,,

## 2022-08-05 PROCEDURE — 95012 NITRIC OXIDE EXP GAS DETER: CPT | Mod: S$GLB,,, | Performed by: INTERNAL MEDICINE

## 2022-08-05 PROCEDURE — 1159F PR MEDICATION LIST DOCUMENTED IN MEDICAL RECORD: ICD-10-PCS | Mod: CPTII,S$GLB,, | Performed by: PHYSICIAN ASSISTANT

## 2022-08-05 PROCEDURE — 94010 BREATHING CAPACITY TEST: ICD-10-PCS | Mod: S$GLB,,, | Performed by: INTERNAL MEDICINE

## 2022-08-05 PROCEDURE — 70220 XR SINUSES MIN 3 VIEWS: ICD-10-PCS | Mod: 26,,, | Performed by: RADIOLOGY

## 2022-08-05 PROCEDURE — 3044F PR MOST RECENT HEMOGLOBIN A1C LEVEL <7.0%: ICD-10-PCS | Mod: CPTII,S$GLB,, | Performed by: PHYSICIAN ASSISTANT

## 2022-08-05 PROCEDURE — 99214 PR OFFICE/OUTPT VISIT, EST, LEVL IV, 30-39 MIN: ICD-10-PCS | Mod: S$GLB,,, | Performed by: PHYSICIAN ASSISTANT

## 2022-08-05 PROCEDURE — 3075F PR MOST RECENT SYSTOLIC BLOOD PRESS GE 130-139MM HG: ICD-10-PCS | Mod: CPTII,S$GLB,, | Performed by: PHYSICIAN ASSISTANT

## 2022-08-05 PROCEDURE — 70220 X-RAY EXAM OF SINUSES: CPT | Mod: 26,,, | Performed by: RADIOLOGY

## 2022-08-05 PROCEDURE — 3079F PR MOST RECENT DIASTOLIC BLOOD PRESSURE 80-89 MM HG: ICD-10-PCS | Mod: CPTII,S$GLB,, | Performed by: PHYSICIAN ASSISTANT

## 2022-08-05 PROCEDURE — 3044F HG A1C LEVEL LT 7.0%: CPT | Mod: CPTII,S$GLB,, | Performed by: PHYSICIAN ASSISTANT

## 2022-08-05 PROCEDURE — 99214 OFFICE O/P EST MOD 30 MIN: CPT | Mod: S$GLB,,, | Performed by: PHYSICIAN ASSISTANT

## 2022-08-05 PROCEDURE — 1160F PR REVIEW ALL MEDS BY PRESCRIBER/CLIN PHARMACIST DOCUMENTED: ICD-10-PCS | Mod: CPTII,S$GLB,, | Performed by: PHYSICIAN ASSISTANT

## 2022-08-05 PROCEDURE — 3008F BODY MASS INDEX DOCD: CPT | Mod: CPTII,S$GLB,, | Performed by: PHYSICIAN ASSISTANT

## 2022-08-05 PROCEDURE — 1159F MED LIST DOCD IN RCRD: CPT | Mod: CPTII,S$GLB,, | Performed by: PHYSICIAN ASSISTANT

## 2022-08-05 PROCEDURE — 70220 X-RAY EXAM OF SINUSES: CPT | Mod: TC

## 2022-08-05 PROCEDURE — 3008F PR BODY MASS INDEX (BMI) DOCUMENTED: ICD-10-PCS | Mod: CPTII,S$GLB,, | Performed by: PHYSICIAN ASSISTANT

## 2022-08-05 PROCEDURE — 3075F SYST BP GE 130 - 139MM HG: CPT | Mod: CPTII,S$GLB,, | Performed by: PHYSICIAN ASSISTANT

## 2022-08-05 PROCEDURE — 99999 PR PBB SHADOW E&M-EST. PATIENT-LVL I: ICD-10-PCS | Mod: PBBFAC,,,

## 2022-08-05 PROCEDURE — 99999 PR PBB SHADOW E&M-EST. PATIENT-LVL V: ICD-10-PCS | Mod: PBBFAC,,, | Performed by: PHYSICIAN ASSISTANT

## 2022-08-05 PROCEDURE — 71046 X-RAY EXAM CHEST 2 VIEWS: CPT | Mod: TC

## 2022-08-05 PROCEDURE — 71046 XR CHEST PA AND LATERAL: ICD-10-PCS | Mod: 26,,, | Performed by: STUDENT IN AN ORGANIZED HEALTH CARE EDUCATION/TRAINING PROGRAM

## 2022-08-05 PROCEDURE — 3079F DIAST BP 80-89 MM HG: CPT | Mod: CPTII,S$GLB,, | Performed by: PHYSICIAN ASSISTANT

## 2022-08-05 RX ORDER — BENZONATATE 200 MG/1
200 CAPSULE ORAL 3 TIMES DAILY PRN
Qty: 30 CAPSULE | Refills: 3 | Status: SHIPPED | OUTPATIENT
Start: 2022-08-05 | End: 2022-08-15

## 2022-08-05 NOTE — ASSESSMENT & PLAN NOTE
Controlled on BREO 200  Albuterol prn  Asthma score 22  She complains of a nightly cough, taking protonix and does not eat prior to sleep, sleeps with two pillows, FeNO is low, maura normal, suspect cough from recent URI  Recommend f/u allergy, will start xolair, return to pulm in 4 months

## 2022-08-05 NOTE — PROGRESS NOTES
Clinical Guide to Interpretation or FeNO Levels :    FeNO  (ppb) LOW INTERMEDIATE HIGH   ADULT VALUES < 25 25-50          > 50   Th2-driven Inflammation Unlikely Likely Significant     Patients FeNO level at this visit : 12 (ppb)

## 2022-08-05 NOTE — PROGRESS NOTES
Subjective:       Patient ID: Lucía Coreas is a 36 y.o. female.    Chief Complaint: asthma    37yo female here for asthma follow up  Asthma controlled on BREO daily, albuterol prn  Asthma sore 22 today  She does complain of a persistent cough, worse at night  Started about 4 weeks ago after a URI, took antibiotics and cough syrup  Sputum went from yellow and now clear  No wheezing, SOB, fever, or chest pain  Followed by allergy Dr. Kramer, starting Xolair 8/24/22  Fran today is normal  FeNO today is low at 12  She works at a [employer], around a lot of dust which makes her allergy/asthma symptoms worse    12/2021 Dr. Mendoza outpatient visit:      36-year-old female patient with history of environmental allergies being followed why Dr. Rossy drake [facility], waiting to start Xolair injections, on Breo 200 mcg 1 puff daily, on albuterol p.r.n., asthma worsened during August 2021 pregnancy that was complicated by placenta retention complicated by difficulty to close 1 status post VAC placement.     Asthma not well-controlled asthma control test questionnaire 19.         On albuterol p.r.n. on montelukast and Breo 200 mcg 1 puff daily     Mother has asthma on inhalers.    Immunization History   Administered Date(s) Administered    COVID-19, MRNA, LN-S, PF (Pfizer) (Purple Cap) 04/20/2021, 05/11/2021, 12/30/2021    DTP 02/18/1986, 04/28/1986, 06/26/1986, 02/16/1987, 05/24/1990    HIB 12/12/1988    Hepatitis A, Adult 12/14/2021    Hepatitis B (recombinant) Adjuvanted, 2 dose 12/14/2021    Hepatitis B, Adolescent/High Risk Infant 03/25/1998    Hepatitis B, Pediatric/Adolescent 08/05/2004    Influenza - Quadrivalent - PF *Preferred* (6 months and older) 10/11/2010, 11/09/2015, 03/10/2017, 01/26/2018, 09/20/2019, 10/02/2020, 12/01/2021    MMR 02/16/1987, 08/05/2004    OPV 1985, 02/18/1986, 01/16/1987, 05/24/1990    Td (ADULT) 08/05/2004, 03/13/2013    Tdap 05/09/2017, 08/26/2021      Tobacco Use:  Low Risk     Smoking Tobacco Use: Never Smoker    Smokeless Tobacco Use: Never Used      Past Medical History:   Diagnosis Date    Allergy     Amenorrhea     Asthma     Diabetes     GERD (gastroesophageal reflux disease)     Infertility associated with anovulation     Iron deficiency anemia     Knee pain     Metabolic syndrome     PCO (polycystic ovaries)     Recurrent boils     Status post repeat low transverse  section 2020    Formatting of this note might be different from the original. With BTL 21      Current Outpatient Medications on File Prior to Visit   Medication Sig Dispense Refill    albuterol (PROVENTIL/VENTOLIN HFA) 90 mcg/actuation inhaler Inhale 2 puffs into the lungs every 6 (six) hours as needed for Wheezing. 18 g 1    aspirin (ECOTRIN) 81 MG EC tablet Take 1 tablet (81 mg total) by mouth once daily. 90 tablet 4    azelastine (ASTELIN) 137 mcg (0.1 %) nasal spray SMARTSIG:Both Nares      azithromycin (Z-MARTIN) 250 MG tablet Take 2 tablets by mouth on day 1; Take 1 tablet by mouth on days 2-5 6 tablet 0    buPROPion (WELLBUTRIN XL) 150 MG TB24 tablet Take 1 tablet (150 mg total) by mouth once daily. 30 tablet 11    cholecalciferol, vitamin D3, (VITAMIN D3) 25 mcg (1,000 unit) capsule Take 2 capsules (2,000 Units total) by mouth once daily. 90 capsule 3    cyanocobalamin (VITAMIN B-12) 1000 MCG tablet Take 1 tablet (1,000 mcg total) by mouth once daily. 90 tablet 3    doxycycline (VIBRAMYCIN) 100 MG Cap Take 1 capsule (100 mg total) by mouth every 12 (twelve) hours. 20 capsule 0    EScitalopram oxalate (LEXAPRO) 20 MG tablet Take 1 tablet (20 mg total) by mouth once daily. 90 tablet 4    ferrous sulfate (FEOSOL) Tab tablet Take 1 tablet (1 each total) by mouth daily with breakfast. 90 tablet 3    fluticasone furoate-vilanteroL (BREO) 200-25 mcg/dose DsDv diskus inhaler Inhale 1 puff into the lungs once daily. Controller 1 each 4    ibuprofen (ADVIL,MOTRIN)  "600 MG tablet Take 1 tablet (600 mg total) by mouth every 6 (six) hours as needed (Uncontrolled pain). 30 tablet 0    iron-vit c-b12-folic acid (IRON 100 PLUS) Tab Take 1 tablet by mouth once daily. 90 tablet 1    Lactobacillus rhamnosus GG (CULTURELLE) 10 billion cell capsule Take 1 capsule by mouth once daily.      lactulose (CHRONULAC) 10 gram/15 mL solution SMARTSI Tablespoon By Mouth Daily      levocetirizine (XYZAL) 5 MG tablet Take 1 tablet (5 mg total) by mouth every evening. 30 tablet 11    montelukast (SINGULAIR) 10 mg tablet Take 10 mg by mouth once daily.      NIFEdipine (PROCARDIA-XL) 30 MG (OSM) 24 hr tablet Take 30 mg by mouth once daily.      NOVOFINE PLUS 32 gauge x 1/6" Ndle use as directed      omalizumab (XOLAIR) 150 mg injection Inject 300 mg into the skin every 14 (fourteen) days. 2 each 11    ondansetron (ZOFRAN-ODT) 8 MG TbDL DISSOLVE ONE TABLET UNDER TONGUE EVERY 8 HOURS AS NEEDED FOR NAUSEA      pantoprazole (PROTONIX) 40 MG tablet Take 1 tablet (40 mg total) by mouth once daily. 90 tablet 4    semaglutide (OZEMPIC) 2 mg/dose (8 mg/3 mL) PnIj Inject 2 mg into the skin every 7 days. 9 mL 4    spironolactone (ALDACTONE) 100 MG tablet Take 1 tablet (100 mg total) by mouth once daily. 90 tablet 1    WIXELA INHUB 500-50 mcg/dose DsDv diskus inhaler Inhale 1 puff into the lungs 2 (two) times daily.       No current facility-administered medications on file prior to visit.        Review of Systems   Constitutional: Negative for fever, weight loss, appetite change, fatigue and weakness.   HENT: Positive for postnasal drip and rhinorrhea. Negative for sinus pressure, trouble swallowing and congestion.    Respiratory: Positive for cough. Negative for sputum production, choking, chest tightness, shortness of breath, wheezing and dyspnea on extertion.    Cardiovascular: Negative for chest pain and leg swelling.   Musculoskeletal: Negative for arthralgias, gait problem and joint " "swelling.   Gastrointestinal: Negative for nausea, vomiting and abdominal pain.   Neurological: Negative for dizziness, weakness and headaches.   All other systems reviewed and are negative.      Objective:       Vitals:    08/05/22 1337   BP: 130/82   Pulse: 97   Resp: 14   SpO2: 99%   Weight: (!) 146.3 kg (322 lb 8.5 oz)   Height: 5' 4" (1.626 m)       Physical Exam   Constitutional: She is oriented to person, place, and time. She appears well-developed and well-nourished. No distress.   HENT:   Head: Normocephalic.   Mouth/Throat: Oropharynx is clear and moist.   Cardiovascular: Normal rate and regular rhythm.   Pulmonary/Chest: Effort normal and breath sounds normal. No respiratory distress. She has no wheezes. She has no rhonchi. She has no rales.   Musculoskeletal:         General: No edema.      Cervical back: Normal range of motion and neck supple.   Lymphadenopathy: No supraclavicular adenopathy is present.     She has no cervical adenopathy.   Neurological: She is alert and oriented to person, place, and time. Gait normal.   Skin: Skin is warm and dry.   Psychiatric: She has a normal mood and affect.   Vitals reviewed.    Personal Diagnostic Review    8/5/22  Spirometry reviewed. FEV1 99.9 % of predicted.     8/5/22  FeNO  (ppb) LOW INTERMEDIATE HIGH   ADULT VALUES < 25 25-50          > 50   Th2-driven Inflammation Unlikely Likely Significant      Patients FeNO level at this visit : 12 (ppb)          Assessment/Plan:       Problem List Items Addressed This Visit        Pulmonary    Mild intermittent asthma without complication (Chronic)     Controlled on BREO 200  Albuterol prn  Asthma score 22  She complains of a nightly cough, taking protonix and does not eat prior to sleep, sleeps with two pillows, FeNO is low, maura normal, suspect cough from recent URI  Recommend f/u allergy, will start xolair, return to pulm in 4 months             Cough - Primary     She complains of a nightly cough, taking protonix " and does not eat prior to sleep, sleeps with two pillows, FeNO is low, maura normal, suspect cough from recent URI  Will get Chest X Ray and sinus xray           Relevant Medications    benzonatate (TESSALON) 200 MG capsule    Other Relevant Orders    X-Ray Chest PA And Lateral    X-Ray Sinuses Min 3 Views    Moderate persistent asthma without complication    Relevant Orders    Fraction of  Nitric Oxide (Completed)       Endocrine    Type 2 diabetes mellitus with hyperglycemia, without long-term current use of insulin (Chronic)     F/u regularly with PCP                Other    Class 3 obesity with alveolar hypoventilation, serious comorbidity, and body mass index (BMI) of 50.0 to 59.9 in adult (Chronic)     Weight loss and exercise to improve overall health.                 Follow up in about 4 months (around 2022).    Discussed diagnosis, its evaluation, treatment and usual course. All questions answered.    Patient verbalized understanding of plan and left in no acute distress    Thank you for the courtesy of participating in the care of this patient    Lolly Salgado PA-C

## 2022-08-05 NOTE — ASSESSMENT & PLAN NOTE
She complains of a nightly cough, taking protonix and does not eat prior to sleep, sleeps with two pillows, FeNO is low, maura normal, suspect cough from recent URI  Will get Chest X Ray and sinus xray

## 2022-08-16 ENCOUNTER — OFFICE VISIT (OUTPATIENT)
Dept: SURGERY | Facility: CLINIC | Age: 37
End: 2022-08-16
Payer: COMMERCIAL

## 2022-08-16 VITALS
DIASTOLIC BLOOD PRESSURE: 78 MMHG | SYSTOLIC BLOOD PRESSURE: 136 MMHG | TEMPERATURE: 100 F | HEIGHT: 66 IN | HEART RATE: 85 BPM | WEIGHT: 293 LBS | BODY MASS INDEX: 47.09 KG/M2

## 2022-08-16 DIAGNOSIS — L73.2 HIDRADENITIS SUPPURATIVA: ICD-10-CM

## 2022-08-16 PROCEDURE — 10060 I&D ABSCESS SIMPLE/SINGLE: CPT | Mod: S$GLB,,, | Performed by: STUDENT IN AN ORGANIZED HEALTH CARE EDUCATION/TRAINING PROGRAM

## 2022-08-16 PROCEDURE — 99999 PR PBB SHADOW E&M-EST. PATIENT-LVL V: ICD-10-PCS | Mod: PBBFAC,,, | Performed by: STUDENT IN AN ORGANIZED HEALTH CARE EDUCATION/TRAINING PROGRAM

## 2022-08-16 PROCEDURE — 99499 UNLISTED E&M SERVICE: CPT | Mod: S$GLB,,, | Performed by: STUDENT IN AN ORGANIZED HEALTH CARE EDUCATION/TRAINING PROGRAM

## 2022-08-16 PROCEDURE — 3044F HG A1C LEVEL LT 7.0%: CPT | Mod: CPTII,S$GLB,, | Performed by: STUDENT IN AN ORGANIZED HEALTH CARE EDUCATION/TRAINING PROGRAM

## 2022-08-16 PROCEDURE — 1159F MED LIST DOCD IN RCRD: CPT | Mod: CPTII,S$GLB,, | Performed by: STUDENT IN AN ORGANIZED HEALTH CARE EDUCATION/TRAINING PROGRAM

## 2022-08-16 PROCEDURE — 3075F SYST BP GE 130 - 139MM HG: CPT | Mod: CPTII,S$GLB,, | Performed by: STUDENT IN AN ORGANIZED HEALTH CARE EDUCATION/TRAINING PROGRAM

## 2022-08-16 PROCEDURE — 3078F DIAST BP <80 MM HG: CPT | Mod: CPTII,S$GLB,, | Performed by: STUDENT IN AN ORGANIZED HEALTH CARE EDUCATION/TRAINING PROGRAM

## 2022-08-16 PROCEDURE — 3008F BODY MASS INDEX DOCD: CPT | Mod: CPTII,S$GLB,, | Performed by: STUDENT IN AN ORGANIZED HEALTH CARE EDUCATION/TRAINING PROGRAM

## 2022-08-16 PROCEDURE — 99499 NO LOS: ICD-10-PCS | Mod: S$GLB,,, | Performed by: STUDENT IN AN ORGANIZED HEALTH CARE EDUCATION/TRAINING PROGRAM

## 2022-08-16 PROCEDURE — 10060 PR DRAIN SKIN ABSCESS SIMPLE: ICD-10-PCS | Mod: S$GLB,,, | Performed by: STUDENT IN AN ORGANIZED HEALTH CARE EDUCATION/TRAINING PROGRAM

## 2022-08-16 PROCEDURE — 3078F PR MOST RECENT DIASTOLIC BLOOD PRESSURE < 80 MM HG: ICD-10-PCS | Mod: CPTII,S$GLB,, | Performed by: STUDENT IN AN ORGANIZED HEALTH CARE EDUCATION/TRAINING PROGRAM

## 2022-08-16 PROCEDURE — 3075F PR MOST RECENT SYSTOLIC BLOOD PRESS GE 130-139MM HG: ICD-10-PCS | Mod: CPTII,S$GLB,, | Performed by: STUDENT IN AN ORGANIZED HEALTH CARE EDUCATION/TRAINING PROGRAM

## 2022-08-16 PROCEDURE — 3008F PR BODY MASS INDEX (BMI) DOCUMENTED: ICD-10-PCS | Mod: CPTII,S$GLB,, | Performed by: STUDENT IN AN ORGANIZED HEALTH CARE EDUCATION/TRAINING PROGRAM

## 2022-08-16 PROCEDURE — 3044F PR MOST RECENT HEMOGLOBIN A1C LEVEL <7.0%: ICD-10-PCS | Mod: CPTII,S$GLB,, | Performed by: STUDENT IN AN ORGANIZED HEALTH CARE EDUCATION/TRAINING PROGRAM

## 2022-08-16 PROCEDURE — 1159F PR MEDICATION LIST DOCUMENTED IN MEDICAL RECORD: ICD-10-PCS | Mod: CPTII,S$GLB,, | Performed by: STUDENT IN AN ORGANIZED HEALTH CARE EDUCATION/TRAINING PROGRAM

## 2022-08-16 PROCEDURE — 99999 PR PBB SHADOW E&M-EST. PATIENT-LVL V: CPT | Mod: PBBFAC,,, | Performed by: STUDENT IN AN ORGANIZED HEALTH CARE EDUCATION/TRAINING PROGRAM

## 2022-08-16 RX ORDER — MUPIROCIN 20 MG/G
OINTMENT TOPICAL 3 TIMES DAILY
COMMUNITY
Start: 2022-08-15 | End: 2024-03-27 | Stop reason: SDUPTHER

## 2022-08-16 RX ORDER — CLINDAMYCIN HYDROCHLORIDE 300 MG/1
300 CAPSULE ORAL 3 TIMES DAILY
COMMUNITY
Start: 2022-08-15 | End: 2022-08-25

## 2022-08-16 NOTE — PROGRESS NOTES
This is a 36-year-old female who presented with a abscess on left back/flank.  She did consent to incision and drainage.  The area was prepped and draped in the usual fashion.  Skin was anesthetized with 1% lidocaine with epinephrine after a time-out was completed.  A 2 cm stab incision was made over a fluctuant area.  There was scant purulent drainage.  The wound was irrigated and a dressing was applied.  Patient tolerated the entire procedure without apparent complication.  Wound care instructions were provided.  Patient is already on antibiotics.  She will follow up with me as needed.

## 2022-08-24 ENCOUNTER — CLINICAL SUPPORT (OUTPATIENT)
Dept: ALLERGY | Facility: CLINIC | Age: 37
End: 2022-08-24
Payer: COMMERCIAL

## 2022-08-24 DIAGNOSIS — J45.40 MODERATE PERSISTENT ASTHMA WITHOUT COMPLICATION: Primary | ICD-10-CM

## 2022-08-24 PROCEDURE — 96372 THER/PROPH/DIAG INJ SC/IM: CPT | Mod: S$GLB,,, | Performed by: ALLERGY & IMMUNOLOGY

## 2022-08-24 PROCEDURE — 96372 PR INJECTION,THERAP/PROPH/DIAG2ST, IM OR SUBCUT: ICD-10-PCS | Mod: S$GLB,,, | Performed by: ALLERGY & IMMUNOLOGY

## 2022-08-24 PROCEDURE — 99499 NO LOS: ICD-10-PCS | Mod: S$GLB,,, | Performed by: ALLERGY & IMMUNOLOGY

## 2022-08-24 PROCEDURE — 99499 UNLISTED E&M SERVICE: CPT | Mod: S$GLB,,, | Performed by: ALLERGY & IMMUNOLOGY

## 2022-08-24 PROCEDURE — 99999 PR PBB SHADOW E&M-EST. PATIENT-LVL I: CPT | Mod: PBBFAC,,,

## 2022-08-24 PROCEDURE — 99999 PR PBB SHADOW E&M-EST. PATIENT-LVL I: ICD-10-PCS | Mod: PBBFAC,,,

## 2022-08-24 RX ORDER — EPINEPHRINE 0.3 MG/.3ML
1 INJECTION SUBCUTANEOUS ONCE
Qty: 2 EACH | Refills: 11 | Status: SHIPPED | OUTPATIENT
Start: 2022-08-24 | End: 2022-08-24 | Stop reason: SDUPTHER

## 2022-08-24 RX ORDER — EPINEPHRINE 0.3 MG/.3ML
1 INJECTION SUBCUTANEOUS ONCE
Qty: 2 EACH | Refills: 11 | Status: SHIPPED | OUTPATIENT
Start: 2022-08-24 | End: 2023-09-04 | Stop reason: SDUPTHER

## 2022-08-24 RX ORDER — KETOCONAZOLE 20 MG/ML
SHAMPOO, SUSPENSION TOPICAL
Qty: 500 ML | Refills: 2 | Status: SHIPPED | OUTPATIENT
Start: 2022-08-25 | End: 2023-02-03

## 2022-08-24 NOTE — PROGRESS NOTES
Xolair administered. Patient waited in clinic 30 min for observation. Pt had epi pen on hand.No reaction noted.

## 2022-08-30 ENCOUNTER — HOSPITAL ENCOUNTER (OUTPATIENT)
Dept: RADIOLOGY | Facility: HOSPITAL | Age: 37
Discharge: HOME | End: 2022-08-30
Attending: FAMILY MEDICINE
Payer: COMMERCIAL

## 2022-08-30 ENCOUNTER — OFFICE VISIT (OUTPATIENT)
Dept: FAMILY MEDICINE | Facility: CLINIC | Age: 37
End: 2022-08-30
Payer: COMMERCIAL

## 2022-08-30 VITALS
DIASTOLIC BLOOD PRESSURE: 72 MMHG | BODY MASS INDEX: 50.02 KG/M2 | SYSTOLIC BLOOD PRESSURE: 128 MMHG | TEMPERATURE: 98 F | HEART RATE: 73 BPM | HEIGHT: 64 IN | WEIGHT: 293 LBS

## 2022-08-30 DIAGNOSIS — G89.29 CHRONIC BILATERAL LOW BACK PAIN WITHOUT SCIATICA: ICD-10-CM

## 2022-08-30 DIAGNOSIS — F41.9 ANXIETY: Chronic | ICD-10-CM

## 2022-08-30 DIAGNOSIS — M54.50 CHRONIC BILATERAL LOW BACK PAIN WITHOUT SCIATICA: ICD-10-CM

## 2022-08-30 DIAGNOSIS — I15.2 HYPERTENSION ASSOCIATED WITH DIABETES: Chronic | ICD-10-CM

## 2022-08-30 DIAGNOSIS — Z79.899 ENCOUNTER FOR LONG-TERM (CURRENT) USE OF MEDICATIONS: ICD-10-CM

## 2022-08-30 DIAGNOSIS — Z13.6 ENCOUNTER FOR LIPID SCREENING FOR CARDIOVASCULAR DISEASE: ICD-10-CM

## 2022-08-30 DIAGNOSIS — E53.8 B12 DEFICIENCY: Chronic | ICD-10-CM

## 2022-08-30 DIAGNOSIS — Z00.00 WELL ADULT EXAM: Primary | ICD-10-CM

## 2022-08-30 DIAGNOSIS — Z23 NEED FOR PNEUMOCOCCAL VACCINE: ICD-10-CM

## 2022-08-30 DIAGNOSIS — Z13.220 ENCOUNTER FOR LIPID SCREENING FOR CARDIOVASCULAR DISEASE: ICD-10-CM

## 2022-08-30 DIAGNOSIS — E11.59 HYPERTENSION ASSOCIATED WITH DIABETES: Chronic | ICD-10-CM

## 2022-08-30 PROCEDURE — 99999 PR PBB SHADOW E&M-EST. PATIENT-LVL V: CPT | Mod: PBBFAC,,, | Performed by: FAMILY MEDICINE

## 2022-08-30 PROCEDURE — 3008F BODY MASS INDEX DOCD: CPT | Mod: CPTII,S$GLB,, | Performed by: FAMILY MEDICINE

## 2022-08-30 PROCEDURE — 85730 THROMBOPLASTIN TIME PARTIAL: CPT | Performed by: INTERNAL MEDICINE

## 2022-08-30 PROCEDURE — 3008F PR BODY MASS INDEX (BMI) DOCUMENTED: ICD-10-PCS | Mod: CPTII,S$GLB,, | Performed by: FAMILY MEDICINE

## 2022-08-30 PROCEDURE — 3078F PR MOST RECENT DIASTOLIC BLOOD PRESSURE < 80 MM HG: ICD-10-PCS | Mod: CPTII,S$GLB,, | Performed by: FAMILY MEDICINE

## 2022-08-30 PROCEDURE — 90677 PCV20 VACCINE IM: CPT | Mod: S$GLB,,, | Performed by: FAMILY MEDICINE

## 2022-08-30 PROCEDURE — 90677 PNEUMOCOCCAL CONJUGATE VACCINE 20-VALENT: ICD-10-PCS | Mod: S$GLB,,, | Performed by: FAMILY MEDICINE

## 2022-08-30 PROCEDURE — 72100 XR LUMBAR SPINE AP AND LATERAL: ICD-10-PCS | Mod: 26,,, | Performed by: RADIOLOGY

## 2022-08-30 PROCEDURE — 99999 PR PBB SHADOW E&M-EST. PATIENT-LVL V: ICD-10-PCS | Mod: PBBFAC,,, | Performed by: FAMILY MEDICINE

## 2022-08-30 PROCEDURE — 90471 IMMUNIZATION ADMIN: CPT | Mod: 59,S$GLB,, | Performed by: FAMILY MEDICINE

## 2022-08-30 PROCEDURE — 96372 THER/PROPH/DIAG INJ SC/IM: CPT | Mod: S$GLB,,, | Performed by: FAMILY MEDICINE

## 2022-08-30 PROCEDURE — 99395 PR PREVENTIVE VISIT,EST,18-39: ICD-10-PCS | Mod: 25,S$GLB,, | Performed by: FAMILY MEDICINE

## 2022-08-30 PROCEDURE — 72100 X-RAY EXAM L-S SPINE 2/3 VWS: CPT | Mod: 26,,, | Performed by: RADIOLOGY

## 2022-08-30 PROCEDURE — 96372 PR INJECTION,THERAP/PROPH/DIAG2ST, IM OR SUBCUT: ICD-10-PCS | Mod: S$GLB,,, | Performed by: FAMILY MEDICINE

## 2022-08-30 PROCEDURE — 3074F PR MOST RECENT SYSTOLIC BLOOD PRESSURE < 130 MM HG: ICD-10-PCS | Mod: CPTII,S$GLB,, | Performed by: FAMILY MEDICINE

## 2022-08-30 PROCEDURE — 90471 PNEUMOCOCCAL CONJUGATE VACCINE 20-VALENT: ICD-10-PCS | Mod: 59,S$GLB,, | Performed by: FAMILY MEDICINE

## 2022-08-30 PROCEDURE — 3044F HG A1C LEVEL LT 7.0%: CPT | Mod: CPTII,S$GLB,, | Performed by: FAMILY MEDICINE

## 2022-08-30 PROCEDURE — 3074F SYST BP LT 130 MM HG: CPT | Mod: CPTII,S$GLB,, | Performed by: FAMILY MEDICINE

## 2022-08-30 PROCEDURE — 1159F MED LIST DOCD IN RCRD: CPT | Mod: CPTII,S$GLB,, | Performed by: FAMILY MEDICINE

## 2022-08-30 PROCEDURE — 1159F PR MEDICATION LIST DOCUMENTED IN MEDICAL RECORD: ICD-10-PCS | Mod: CPTII,S$GLB,, | Performed by: FAMILY MEDICINE

## 2022-08-30 PROCEDURE — 3078F DIAST BP <80 MM HG: CPT | Mod: CPTII,S$GLB,, | Performed by: FAMILY MEDICINE

## 2022-08-30 PROCEDURE — 99395 PREV VISIT EST AGE 18-39: CPT | Mod: 25,S$GLB,, | Performed by: FAMILY MEDICINE

## 2022-08-30 PROCEDURE — 72100 X-RAY EXAM L-S SPINE 2/3 VWS: CPT | Mod: TC,PO

## 2022-08-30 PROCEDURE — 3044F PR MOST RECENT HEMOGLOBIN A1C LEVEL <7.0%: ICD-10-PCS | Mod: CPTII,S$GLB,, | Performed by: FAMILY MEDICINE

## 2022-08-30 RX ORDER — CYANOCOBALAMIN 1000 UG/ML
1000 INJECTION, SOLUTION INTRAMUSCULAR; SUBCUTANEOUS
Status: COMPLETED | OUTPATIENT
Start: 2022-08-30 | End: 2022-08-30

## 2022-08-30 RX ORDER — DESVENLAFAXINE SUCCINATE 50 MG/1
50 TABLET, EXTENDED RELEASE ORAL DAILY
Qty: 30 TABLET | Refills: 11 | Status: SHIPPED | OUTPATIENT
Start: 2022-08-30 | End: 2022-09-21

## 2022-08-30 RX ADMIN — CYANOCOBALAMIN 1000 MCG: 1000 INJECTION, SOLUTION INTRAMUSCULAR; SUBCUTANEOUS at 09:08

## 2022-08-30 NOTE — PATIENT INSTRUCTIONS
Follow up in about 3 months (around 11/30/2022), or if symptoms worsen or fail to improve, for Med refills, LAB RESULTS.     Dear patient,   As a result of recent federal legislation (The Federal Cures Act), you may receive lab or pathology results from your visit in your [facility] account before your physician is able to contact you. Your physician or their representative will relay the results to you with their recommendations at their soonest availability.     If no improvement in symptoms or symptoms worsen, please be advised to call MD, follow-up at clinic and/or go to ER if becomes severe.    Dante Negro M.D.        We Offer TELEHEALTH & Same Day Appointments!   Book your Telehealth appointment with me through my nurse or   Clinic appointments on [facility]!    [address] [place] [address]    Office: [phone]   FAX: [phone]    Check out my Facebook Page and Follow Me at: [email or web address]    Check out my website at [facility] by clicking on: [email or web address]    To Schedule appointments online, go to [facility]: [email or web address]

## 2022-08-30 NOTE — PROGRESS NOTES
This note is specifically for wellness visit performed today.   WELLNESS EXAM      Patient ID: Lucía Coreas is a 36 y.o. female with  has a past medical history of Allergy, Amenorrhea, Asthma, Diabetes, GERD (gastroesophageal reflux disease), Infertility associated with anovulation, Iron deficiency anemia, Knee pain, Metabolic syndrome, PCO (polycystic ovaries), Recurrent boils, and Status post repeat low transverse  section (2020).   Chief Complaint:  Encounter for wellness exam    Well Adult Physical: Patient here for a comprehensive physical exam.The patient reports Chronic problems.  The patient's last visit with me was on 6/10/2022.  Reviewed Care team:  Ozempic PA good 2022-2023  [facility] Sandifer, April A, MD Rabalais, Mary Diane, NP  MFMarifer Doll, CNM   [facility]  Allergy [facility]  Infectious disease Dr. Dorys Mercedes  Gen surg Nick Jackson MD    2022:  Patient reports chronic low back pain that is not improving.  Patient tried anti-inflammatory, steroids, muscle relaxers and has tried physical therapy.  No improvement.  X-Ray Lumbar Spine AP And Lateral  Narrative: EXAMINATION:  XR LUMBAR SPINE AP AND LATERAL    CLINICAL HISTORY:  Low back pain, unspecified    TECHNIQUE:  AP, lateral and spot images were performed of the lumbar spine.    COMPARISON:  None    FINDINGS:  Vertebral body heights maintained.  Mild grade 1 anterolisthesis of L5 on S1 with degenerative disc height loss.  No definite pars defects.  Lower lumbar spine degenerative findings.  No acute abnormality.  Impression: Degenerative changes    Electronically signed by: Raza Hood MD  Date:    2022  Time:    10:22    Diabetes Management Status    Statin: Not taking  ACE/ARB: Not taking    Screening or Prevention Patient's value Goal Complete/Controlled?   HgA1C Testing and Control   Lab Results   Component Value Date    HGBA1C 5.7 (H) 06/10/2022      Annually/Less  than 8% Yes     Lipid profile : 01/20/2022 Annually Yes     LDL control Lab Results   Component Value Date    LDLCALC 111.8 01/20/2022    Annually/Less than 100 mg/dl  No     Nephropathy screening Lab Results   Component Value Date    LABMICR 8.0 10/21/2021     Lab Results   Component Value Date    PROTEINUA Negative 08/30/2022     Lab Results   Component Value Date    UTPCR Unable to calculate 12/28/2021      Annually Yes     Blood pressure BP Readings from Last 1 Encounters:   08/30/22 128/72    Less than 140/90 Yes     Dilated retinal exam : 07/16/2021 Annually No     Foot exam   Most Recent Foot Exam Date: Not Found Annually No       CHRONIC. STABLE. BP Reviewed.  Compliant with BP medications. No SE reported.   (-) CP, SOB, palpitations, dizziness, lightheadedness, HA, arm numbness, tingling or weakness, syncope.  Creatinine   Date Value Ref Range Status   06/10/2022 0.7 0.5 - 1.4 mg/dL Final     CHRONIC. STABLE. Lab analysis reviewed.   (-) CP, SOB, abdominal pain, N/V/D, constipation, jaundice, skin changes.  (-) Myalgias  Lab Results   Component Value Date    CHOL 182 01/20/2022    CHOL 179 10/21/2021    CHOL 175 09/29/2020     Lab Results   Component Value Date    HDL 57 01/20/2022    HDL 58 10/21/2021    HDL 47 09/29/2020     Lab Results   Component Value Date    LDLCALC 111.8 01/20/2022    LDLCALC 104.0 10/21/2021    LDLCALC 115.6 09/29/2020     Lab Results   Component Value Date    TRIG 66 01/20/2022    TRIG 85 10/21/2021    TRIG 62 09/29/2020     Lab Results   Component Value Date    CHOLHDL 31.3 01/20/2022    CHOLHDL 32.4 10/21/2021    CHOLHDL 26.9 09/29/2020     Lab Results   Component Value Date    TOTALCHOLEST 3.2 01/20/2022    TOTALCHOLEST 3.1 10/21/2021    TOTALCHOLEST 3.7 09/29/2020     Lab Results   Component Value Date    ALT 7 (L) 06/10/2022    AST 9 (L) 06/10/2022    ALKPHOS 61 06/10/2022    BILITOT 0.3 06/10/2022     ======================================================  BMI Readings from  Last 10 Encounters:   22 55.91 kg/m²   22 53.33 kg/m²   22 55.36 kg/m²   22 53.36 kg/m²   22 53.25 kg/m²   06/10/22 53.89 kg/m²   22 53.03 kg/m²   22 53.09 kg/m²   21 49.39 kg/m²   21 50.15 kg/m²         Do you take any herbs or supplements that were not prescribed by a doctor? no   Are you taking calcium supplements? no   Lab Results   Component Value Date    WBC 7.78 2022    HGB 12.5 2022    HCT 40.8 2022    MCV 74 (L) 2022     2022       Lab Results   Component Value Date    IRON 47 06/10/2022    TIBC 376 06/10/2022    FERRITIN 13 (L) 06/10/2022     Lab Results   Component Value Date    BVJPJYWB86 >2000 (H) 06/10/2022     Lab Results   Component Value Date    FOLATE 10.1 06/10/2022        History:   LMP: Patient's last menstrual period was 08/15/2022.   MMG:   ovarian cancer in sister.   Discussed with OBGYN  PAP: 2020 follow-up with OBGYN    Colon cancer screening:   Not indicated    Health Maintenance Topics with due status: Not Due       Topic Last Completion Date    Cervical Cancer Screening 2021    TETANUS VACCINE 2021    Influenza Vaccine 2021    Lipid Panel 2022    Hemoglobin A1c 2022      ==============================================  History reviewed.   Health Maintenance Due   Topic Date Due    Foot Exam  Never done    Eye Exam  2022    Diabetes Urine Screening  10/21/2022       Past Medical History:  Past Medical History:   Diagnosis Date    Allergy     Amenorrhea     Asthma     Diabetes     GERD (gastroesophageal reflux disease)     Infertility associated with anovulation     Iron deficiency anemia     Knee pain     Metabolic syndrome     PCO (polycystic ovaries)     Recurrent boils     Status post repeat low transverse  section 2020    Formatting of this note might be different from the original. With BTL 21     Past Surgical History:   Procedure  Laterality Date    CARPAL TUNNEL RELEASE Left 2019    CARPAL TUNNEL RELEASE Right 2020    Procedure: RELEASE, CARPAL TUNNEL;  Surgeon: Adeel Laughlin MD;  Location: [facility];  Service: Orthopedics;  Laterality: Right;     SECTION       Review of patient's allergies indicates:   Allergen Reactions    Metformin Diarrhea    Sulfa (sulfonamide antibiotics) Anaphylaxis and Swelling     Swelling (eyes)^, Swelling (throat)^  Swelling (eyes)^, Swelling (throat)^      Diclofenac      Gastritis     Latex, natural rubber      Contact dermatitis      Shellfish containing products      anaphylaxis     Current Outpatient Medications on File Prior to Visit   Medication Sig Dispense Refill    aspirin (ECOTRIN) 81 MG EC tablet Take 1 tablet (81 mg total) by mouth once daily. 90 tablet 4    azelastine (ASTELIN) 137 mcg (0.1 %) nasal spray SMARTSIG:Both Nares      cholecalciferol, vitamin D3, (VITAMIN D3) 25 mcg (1,000 unit) capsule Take 2 capsules (2,000 Units total) by mouth once daily. 90 capsule 3    cyanocobalamin (VITAMIN B-12) 1000 MCG tablet Take 1 tablet (1,000 mcg total) by mouth once daily. 90 tablet 3    EPINEPHrine (EPIPEN) 0.3 mg/0.3 mL AtIn Inject 0.3 mLs (0.3 mg total) into the muscle once. for 1 dose 2 each 11    ferrous sulfate (FEOSOL) Tab tablet Take 1 tablet (1 each total) by mouth daily with breakfast. 90 tablet 3    fluticasone furoate-vilanteroL (BREO) 200-25 mcg/dose DsDv diskus inhaler Inhale 1 puff into the lungs once daily. Controller 1 each 4    iron-vit c-b12-folic acid (IRON 100 PLUS) Tab Take 1 tablet by mouth once daily. 90 tablet 1    ketoconazole (NIZORAL) 2 % shampoo Apply topically twice a week. 500 mL 2    Lactobacillus rhamnosus GG (CULTURELLE) 10 billion cell capsule Take 1 capsule by mouth once daily.      lactulose (CHRONULAC) 10 gram/15 mL solution SMARTSI Tablespoon By Mouth Daily      levocetirizine (XYZAL) 5 MG tablet Take 1 tablet (5 mg total) by mouth every  "evening. 30 tablet 11    mupirocin (BACTROBAN) 2 % ointment Apply topically 3 (three) times daily.      NIFEdipine (PROCARDIA-XL) 30 MG (OSM) 24 hr tablet Take 30 mg by mouth once daily.      NOVOFINE PLUS 32 gauge x 1/6" Ndle use as directed      omalizumab (XOLAIR) 150 mg injection Inject 300 mg into the skin every 14 (fourteen) days. 2 each 11    pantoprazole (PROTONIX) 40 MG tablet Take 1 tablet (40 mg total) by mouth once daily. 90 tablet 4    semaglutide (OZEMPIC) 2 mg/dose (8 mg/3 mL) PnIj Inject 2 mg into the skin every 7 days. 9 mL 4    spironolactone (ALDACTONE) 100 MG tablet Take 1 tablet (100 mg total) by mouth once daily. 90 tablet 1    [DISCONTINUED] albuterol (PROVENTIL/VENTOLIN HFA) 90 mcg/actuation inhaler Inhale 2 puffs into the lungs every 6 (six) hours as needed for Wheezing. 18 g 1    [DISCONTINUED] buPROPion (WELLBUTRIN XL) 150 MG TB24 tablet Take 1 tablet (150 mg total) by mouth once daily. 30 tablet 11    [DISCONTINUED] EScitalopram oxalate (LEXAPRO) 20 MG tablet Take 1 tablet (20 mg total) by mouth once daily. 90 tablet 4    [DISCONTINUED] montelukast (SINGULAIR) 10 mg tablet Take 10 mg by mouth once daily.      ibuprofen (ADVIL,MOTRIN) 600 MG tablet Take 1 tablet (600 mg total) by mouth every 6 (six) hours as needed (Uncontrolled pain). (Patient not taking: Reported on 8/30/2022) 30 tablet 0    ondansetron (ZOFRAN-ODT) 8 MG TbDL DISSOLVE ONE TABLET UNDER TONGUE EVERY 8 HOURS AS NEEDED FOR NAUSEA      WIXELA INHUB 500-50 mcg/dose DsDv diskus inhaler Inhale 1 puff into the lungs 2 (two) times daily.      [DISCONTINUED] azithromycin (Z-MARTIN) 250 MG tablet Take 2 tablets by mouth on day 1; Take 1 tablet by mouth on days 2-5 (Patient not taking: Reported on 8/30/2022) 6 tablet 0     Current Facility-Administered Medications on File Prior to Visit   Medication Dose Route Frequency Provider Last Rate Last Admin    omalizumab injection 150 mg  150 mg Subcutaneous Q14 Days Rossy S. Williams, MD   150 mg at " 08/24/22 0953    omalizumab injection 150 mg  150 mg Subcutaneous Q14 Days Rossy Kramer MD   150 mg at 08/24/22 0953     Social History     Socioeconomic History    Marital status:    Tobacco Use    Smoking status: Never    Smokeless tobacco: Never   Substance and Sexual Activity    Alcohol use: Never    Drug use: Never    Sexual activity: Yes     Partners: Female     Birth control/protection: None     Family History   Problem Relation Age of Onset    Diabetes Mother     Lupus Mother     Diabetes Father     Heart disease Father 69    Hypertension Father     Prostate cancer Father     Ovarian cancer Sister     AMY disease Brother        Review of Systems   Constitutional:  Positive for activity change, appetite change, fatigue and unexpected weight change. Negative for chills and fever.   HENT:  Negative for ear pain and sore throat.    Eyes:  Negative for redness and visual disturbance.   Respiratory:  Negative for cough and shortness of breath.    Cardiovascular:  Negative for chest pain and palpitations.   Gastrointestinal:  Negative for nausea and vomiting.   Genitourinary:  Negative for difficulty urinating and hematuria.   Musculoskeletal:  Positive for arthralgias and back pain. Negative for myalgias.   Skin:  Negative for rash and wound.   Neurological:  Negative for weakness and headaches.   Hematological:  Bruises/bleeds easily.   Psychiatric/Behavioral:  Negative for sleep disturbance. The patient is nervous/anxious.     Objective:    Nursing note and vitals reviewed.  Vitals:    08/30/22 0903   BP: 128/72   Pulse: 73   Temp: 97.6 °F (36.4 °C)     Body mass index is 55.91 kg/m².   Physical Exam  Vitals and nursing note reviewed.   Constitutional:       General: She is not in acute distress.     Appearance: She is well-developed. She is obese. She is not ill-appearing, toxic-appearing or diaphoretic.   HENT:      Head: Normocephalic and atraumatic.      Right Ear: Hearing and external ear normal.       Left Ear: Hearing and external ear normal.      Nose: Nose normal. No rhinorrhea.   Eyes:      General: Lids are normal.      Extraocular Movements: Extraocular movements intact.      Conjunctiva/sclera: Conjunctivae normal.      Pupils: Pupils are equal, round, and reactive to light.   Cardiovascular:      Rate and Rhythm: Normal rate.      Pulses: Normal pulses.   Pulmonary:      Effort: Pulmonary effort is normal. No respiratory distress.      Breath sounds: Normal breath sounds.   Abdominal:      General: Bowel sounds are normal.      Palpations: Abdomen is soft.   Musculoskeletal:         General: Tenderness and deformity present. Normal range of motion.      Cervical back: Normal range of motion and neck supple.   Skin:     General: Skin is warm and dry.      Capillary Refill: Capillary refill takes less than 2 seconds.      Coloration: Skin is not pale.      Findings: Lesion present.   Neurological:      General: No focal deficit present.      Mental Status: She is alert and oriented to person, place, and time. Mental status is at baseline. She is not disoriented.      Cranial Nerves: No cranial nerve deficit.      Motor: No weakness.      Gait: Gait normal.   Psychiatric:         Attention and Perception: She is attentive.         Mood and Affect: Mood is depressed. Mood is not anxious.         Speech: Speech is not rapid and pressured or slurred.         Behavior: Behavior normal. Behavior is not agitated, aggressive or hyperactive. Behavior is cooperative.         Thought Content: Thought content normal. Thought content is not paranoid or delusional. Thought content does not include homicidal or suicidal ideation. Thought content does not include homicidal or suicidal plan.         Cognition and Memory: Memory is not impaired.         Judgment: Judgment normal.     Clinical Support on 08/05/2022   Component Date Value Ref Range Status    Interpretation 08/05/2022 Spirometry results should be interpreted  with caution.Normal spirometry. (FEV1/VC greater than or equal to LLN and FVC greater than or equal to LLN)Flow volume loops are normal.Overall there is no significant ventilatory impairment. (FEV1 >LLN)   Final    Pre FVC 08/05/2022 3.12  2.52 - 3.91 L Final    Pre FEV1 08/05/2022 2.67  2.08 - 3.27 L Final    Pre FEV1 FVC 08/05/2022 85.79  72.72 - 94.43 % Final    Pre FEF 25 75 08/05/2022 3.41  1.62 - 4.24 L/s Final    Pre PEF 08/05/2022 8.13  4.74 - 8.77 L/s Final    Pre  08/05/2022 5.98  sec Final    FVC Ref 08/05/2022 3.22   Final    FVC LLN 08/05/2022 2.52   Final    FVC Pre Ref 08/05/2022 96.9  % Final    FEV1 Ref 08/05/2022 2.68   Final    FEV1 LLN 08/05/2022 2.08   Final    FEV1 Pre Ref 08/05/2022 99.9  % Final    FEV1 FVC Ref 08/05/2022 84   Final    FEV1 FVC LLN 08/05/2022 73   Final    FEV1 FVC Pre Ref 08/05/2022 102.6  % Final    FEF 25 75 Ref 08/05/2022 2.93   Final    FEF 25 75 LLN 08/05/2022 1.62   Final    FEF 25 75 Pre Ref 08/05/2022 116.3  % Final    PEF Ref 08/05/2022 6.75   Final    PEF LLN 08/05/2022 4.74   Final    PEF Pre Ref 08/05/2022 120.4  % Final      Assessment / Plan:      1.  ANNUAL WELLNESS EXAM -patient here for annual wellness exam.  Labs ordered.  Health maintenance was reviewed and ordered.  Medications were reviewed and reconciled.   Anticipatory guidance: Don't smoke.  Healthy diet and regular exercise recommended. Vaccine recommendations discussed.  See orders.  Reviewed Anticipatory guidance, risk factor reduction interventions and counseling, Complete history , physical was completed today.  Complete and thorough medication reconciliation was performed.  Discussed risks and benefits of medications.  Advised patient on orders and health maintenance.  We discussed old records and old labs if available.  Will request any records not available through epic.  Continue current medications listed on your summary sheet.    Diabetes hypertension:  Increase Ozempic to 2  milligrams weekly.  If no improvement with weight consider MOUNJARO. We will plan to monitor hemoglobin A1c at designated intervals 3 to 6 months.  I recommend ongoing Education for diabetic diet and exercise protocol.  We will continue to monitor for side effects.    Please be advised of symptoms to monitor for and to notify me immediately if persistent or worsening.  Follow up with Ophthalmology/Optometry and Podiatry at least annually.  Counseled on importance of hypertension disease course, I recommend ongoing Education for DASH-diet and exercise.  Counseled on medication regimen importance of treating high blood pressure.  Please be advised of risk of untreated blood pressure as discussed.  Please advised of ER precautions were given for symptoms of hypertensive urgency and emergency.    B12 deficiency:  Check level give B12 injection once monthly.  Okay to increase to every two weeks if needed.    Anxiety:  Patient is back on Lexapro and is gaining weight.  Stop Lexapro and start Pristiq.Please be advised of condition course.  SNRI/SSRI is first-line treatment for this condition.  Please be advised of the risk of discontinuing this medication without tapering/contacting MD.  Patient has been advised of side effects, and all questions were answered.  Patient voiced understanding.  Patient will follow up routinely and notify us if having any side effects or worsening or persistent symptoms.  ER precautions were given. Antidepressant/Antianxiety Medication Initiation:  Patient informed of risks, benefits, and potential side effects of medication and accepts informed consent.  Common side effects include nausea, fatigue, headache, insomnia, etc see medication insert for complete side effect profile.  Most importantly be advised of the possibility of new or worsening suicidal thoughts/depression/anxiety etcetera.  Please be advised to stop the medication immediately and seek urgent treatment if this occurs.   Therefore please do not to abruptly discontinue medication without physician guidance except in cases of sudden onset or worsening of SI.     Chronic low back pain:  Likely secondary to her weight and breast hypertrophy.  If no improvement I recommend MRI.  Referral to bariatric/Plastic surgery is also an option for consideration for breast reduction and or weight loss surgery.    All questions were answered. Patient had no further concerns. Advised of Wellness plan. Follow up in 1 year for ANNUAL WELLNESS EXAM    Orders Placed This Encounter   Procedures    X-Ray Lumbar Spine AP And Lateral     Standing Status:   Future     Number of Occurrences:   1     Standing Expiration Date:   8/30/2023     Order Specific Question:   May the Radiologist modify the order per protocol to meet the clinical needs of the patient?     Answer:   Yes    (In Office Administered) Pneumococcal Conjugate Vaccine (20 Valent) (IM)     Medications Ordered This Encounter   Medications    cyanocobalamin injection 1,000 mcg    desvenlafaxine succinate (PRISTIQ) 50 MG Tb24     Sig: Take 1 tablet (50 mg total) by mouth once daily.     Dispense:  30 tablet     Refill:  11      Future Appointments       Date Provider Specialty Appt Notes    9/8/2022  Allergy BB PWD XOLAIR/nd    9/21/2022 Laura Crowley MD Obstetrics and Gynecology coming for 8:40 Annual and no sex drive    9/22/2022  Allergy BB PWD XOLAIR/nd    9/29/2022 Naida Farrell MD Dermatology Hidradenitis suppurativa [L73.2]    9/30/2022  Family Medicine b12    10/3/2022 MILLA Villeda, OD Optometry overdue routine eye exam    11/23/2022 Rossy Kramer MD Allergy 4 month follow up     12/1/2022 Michael Hernandez MD Rheumatology LM**rtc/5mo/labs/di    12/5/2022 Lolly Salgado PA-C Pulmonology Follow up in about 4 months (around 12/5/2022            Dante Negro MD

## 2022-08-31 DIAGNOSIS — Z3A.08 8 WEEKS GESTATION OF PREGNANCY: ICD-10-CM

## 2022-08-31 DIAGNOSIS — E55.9 VITAMIN D DEFICIENCY: Chronic | ICD-10-CM

## 2022-08-31 DIAGNOSIS — J45.40 MODERATE PERSISTENT ASTHMA WITHOUT COMPLICATION: ICD-10-CM

## 2022-08-31 DIAGNOSIS — I10 ESSENTIAL HYPERTENSION: ICD-10-CM

## 2022-08-31 RX ORDER — LORATADINE 10 MG/1
10 TABLET ORAL DAILY
Qty: 90 TABLET | Refills: 4 | Status: SHIPPED | OUTPATIENT
Start: 2022-08-31 | End: 2023-09-05 | Stop reason: SDUPTHER

## 2022-08-31 RX ORDER — FLUTICASONE FUROATE AND VILANTEROL 200; 25 UG/1; UG/1
1 POWDER RESPIRATORY (INHALATION) DAILY
Qty: 1 EACH | Refills: 4 | Status: SHIPPED | OUTPATIENT
Start: 2022-08-31 | End: 2023-10-02 | Stop reason: SDUPTHER

## 2022-08-31 RX ORDER — ASPIRIN 81 MG/1
81 TABLET ORAL DAILY
Qty: 90 TABLET | Refills: 4 | Status: SHIPPED | OUTPATIENT
Start: 2022-08-31 | End: 2022-09-21

## 2022-08-31 RX ORDER — LEVOCETIRIZINE DIHYDROCHLORIDE 5 MG/1
5 TABLET, FILM COATED ORAL NIGHTLY
Qty: 30 TABLET | Refills: 4 | Status: SHIPPED | OUTPATIENT
Start: 2022-08-31 | End: 2023-03-23 | Stop reason: SDUPTHER

## 2022-08-31 RX ORDER — VIT C/E/ZN/COPPR/LUTEIN/ZEAXAN 250MG-90MG
2000 CAPSULE ORAL DAILY
Qty: 90 CAPSULE | Refills: 4 | Status: SHIPPED | OUTPATIENT
Start: 2022-08-31

## 2022-08-31 RX ORDER — IRON,CARB/VIT C/VIT B12/FOLIC 100-250-1
1 TABLET ORAL DAILY
Qty: 90 TABLET | Refills: 4 | Status: SHIPPED | OUTPATIENT
Start: 2022-08-31 | End: 2022-11-30

## 2022-08-31 NOTE — TELEPHONE ENCOUNTER
No new care gaps identified.  [facility] Embedded Care Gaps. Reference number: [id number]. 8/31/2022   3:54:12 PM CDT

## 2022-08-31 NOTE — TELEPHONE ENCOUNTER
No new care gaps identified.  [facility] Embedded Care Gaps. Reference number: [id number]. 8/31/2022   8:16:38 AM ZUNILDAT

## 2022-08-31 NOTE — TELEPHONE ENCOUNTER
----- Message from Marychuymoraima French sent at 8/31/2022  3:44 PM CDT -----  Contact: Marilee  .Type:  Pharmacy Calling to Clarify an RX    Name of Caller:Marilee  Pharmacy Name:[facility]   Prescription Name:levocetirizine  What do they need to clarify?:  Best Call Back Number:[phone] ref number [id number] 9am-5:30 pm Eastern time   Additional Information: Per Marilee the pts insurance does not cover levocetirizine she states the alternative medications that are covered under the pt insurance is Loratadine tab OTC 10 mg and Cetirizine tab OTC 5 or 10 mg. Those are the only too that are covered... thanks AW

## 2022-08-31 NOTE — PROGRESS NOTES
1st check to see if patient has seen the results.  If not then  CALL patient with results and Document verification.  Schedule follow-up if needed.  [phone]  X-ray of the lumbar spine has been reviewed by radiology.  The report shows mild degenerative changes consistent with arthritis.  I recommend exercise, stretching and anti-inflammatory medication in addition to weight loss.  If no improvement proceeding with MRI for further evaluation.

## 2022-09-06 ENCOUNTER — PATIENT MESSAGE (OUTPATIENT)
Dept: FAMILY MEDICINE | Facility: CLINIC | Age: 37
End: 2022-09-06
Payer: COMMERCIAL

## 2022-09-06 DIAGNOSIS — E11.65 TYPE 2 DIABETES MELLITUS WITH HYPERGLYCEMIA, WITHOUT LONG-TERM CURRENT USE OF INSULIN: Primary | ICD-10-CM

## 2022-09-06 RX ORDER — TIRZEPATIDE 5 MG/.5ML
5 INJECTION, SOLUTION SUBCUTANEOUS
Qty: 4 PEN | Refills: 1 | Status: SHIPPED | OUTPATIENT
Start: 2022-09-06 | End: 2022-11-01

## 2022-09-06 NOTE — TELEPHONE ENCOUNTER
I received your message which was reviewed along with the the medication list and allergies that we have below.  Please review it for accuracy to make sure that we have the most recent records on your history.     Based on this, the following orders were placed AND/OR medicines were sent in.     No orders of the defined types were placed in this encounter.      Medications written and sent at this time include:  Medications Ordered This Encounter   Medications    tirzepatide (MOUNJARO) 5 mg/0.5 mL PnIj     Sig: Inject 5 mg into the skin every 7 days.     Dispense:  4 pen     Refill:  1       Your pharmacy(ies) of choice at this time on record include the list below and any medications would have been sent to the one at the top.    [facility] - [place] - [address] [address] [facility] [address]  Phone: [phone] Fax: [phone]    [facility] - [address] [facility]  [address] [facility] [address]  Phone: [phone] Fax: [phone]      Thank you for choosing us as your healthcare provider!  Dr. Dante Negro    ALLERGY LIST  Review of patient's allergies indicates:   Allergen Reactions    Metformin Diarrhea    Sulfa (sulfonamide antibiotics) Anaphylaxis and Swelling     Swelling (eyes)^, Swelling (throat)^  Swelling (eyes)^, Swelling (throat)^      Diclofenac      Gastritis     Latex, natural rubber      Contact dermatitis      Shellfish containing products      anaphylaxis       MEDICATION LIST  Current Outpatient Medications on File Prior to Visit   Medication Sig Dispense Refill    albuterol (PROVENTIL) 2.5 mg /3 mL (0.083 %) nebulizer solution Take 3 mLs (2.5 mg total) by nebulization every 4 to 6 hours as needed for Wheezing. 60 each 3    albuterol (PROVENTIL/VENTOLIN HFA) 90 mcg/actuation inhaler Inhale 1-2 puffs into the lungs every 4 (four) hours as needed for Wheezing. 12 g 1    aspirin (ECOTRIN) 81 MG EC tablet Take 1  "tablet (81 mg total) by mouth once daily. 90 tablet 4    azelastine (ASTELIN) 137 mcg (0.1 %) nasal spray SMARTSIG:Both Nares      cholecalciferol, vitamin D3, (VITAMIN D3) 25 mcg (1,000 unit) capsule Take 2 capsules (2,000 Units total) by mouth once daily. 90 capsule 4    cyanocobalamin (VITAMIN B-12) 1000 MCG tablet Take 1 tablet (1,000 mcg total) by mouth once daily. 90 tablet 3    desvenlafaxine succinate (PRISTIQ) 50 MG Tb24 Take 1 tablet (50 mg total) by mouth once daily. 30 tablet 11    EPINEPHrine (EPIPEN) 0.3 mg/0.3 mL AtIn Inject 0.3 mLs (0.3 mg total) into the muscle once. for 1 dose 2 each 11    ferrous sulfate (FEOSOL) Tab tablet Take 1 tablet (1 each total) by mouth daily with breakfast. 90 tablet 3    fluticasone furoate-vilanteroL (BREO) 200-25 mcg/dose DsDv diskus inhaler Inhale 1 puff into the lungs once daily. Controller 1 each 4    ibuprofen (ADVIL,MOTRIN) 600 MG tablet Take 1 tablet (600 mg total) by mouth every 6 (six) hours as needed (Uncontrolled pain). 30 tablet 0    iron-vit c-b12-folic acid (IRON 100 PLUS) Tab Take 1 tablet by mouth once daily. 90 tablet 4    ketoconazole (NIZORAL) 2 % shampoo Apply topically twice a week. 500 mL 2    Lactobacillus rhamnosus GG (CULTURELLE) 10 billion cell capsule Take 1 capsule by mouth once daily.      lactulose (CHRONULAC) 10 gram/15 mL solution SMARTSI Tablespoon By Mouth Daily      levocetirizine (XYZAL) 5 MG tablet Take 1 tablet (5 mg total) by mouth every evening. 30 tablet 4    loratadine (CLARITIN) 10 mg tablet Take 1 tablet (10 mg total) by mouth once daily. 90 tablet 4    montelukast (SINGULAIR) 10 mg tablet Take 1 tablet (10 mg total) by mouth once daily. 30 tablet 3    mupirocin (BACTROBAN) 2 % ointment Apply topically 3 (three) times daily.      NIFEdipine (PROCARDIA-XL) 30 MG (OSM) 24 hr tablet Take 30 mg by mouth once daily.      NOVOFINE PLUS 32 gauge x " Ndle use as directed      omalizumab (XOLAIR) 150 mg injection Inject 300 mg " into the skin every 14 (fourteen) days. 2 each 11    ondansetron (ZOFRAN-ODT) 8 MG TbDL DISSOLVE ONE TABLET UNDER TONGUE EVERY 8 HOURS AS NEEDED FOR NAUSEA      pantoprazole (PROTONIX) 40 MG tablet Take 1 tablet (40 mg total) by mouth once daily. 90 tablet 4    semaglutide (OZEMPIC) 2 mg/dose (8 mg/3 mL) PnIj Inject 2 mg into the skin every 7 days. 9 mL 4    spironolactone (ALDACTONE) 100 MG tablet Take 1 tablet (100 mg total) by mouth once daily. 90 tablet 1    WIXELA INHUB 500-50 mcg/dose DsDv diskus inhaler Inhale 1 puff into the lungs 2 (two) times daily.       Current Facility-Administered Medications on File Prior to Visit   Medication Dose Route Frequency Provider Last Rate Last Admin    omalizumab injection 150 mg  150 mg Subcutaneous Q14 Days Rossy Kramer MD   150 mg at 08/24/22 0953    omalizumab injection 150 mg  150 mg Subcutaneous Q14 Days Rossy Kramer MD   150 mg at 08/24/22 0953       HEALTH MAINTENANCE THAT IS OVERDUE OR NEEDS TO BE UPDATED ON OUR CHART IS LISTED BELOW.  IF YOU HAVE HAD IT DONE ELSEWHERE, PLEASE SEND US DATES AND RECORDS IF YOU HAVE THEM TO MAKE YOUR CHART ACCURATE.  IF YOU HAVE NOT HAD THESE DONE AND ARE READY FOR US TO SCHEDULE THEM, PLEASE SEND US A MESSAGE.  Health Maintenance Due   Topic Date Due    Foot Exam  Never done    Eye Exam  07/16/2022    Influenza Vaccine (1) 09/01/2022    Diabetes Urine Screening  10/21/2022       DISCLAIMER: This note was compiled by using a speech recognition dictation system and therefore please be aware that typographical / speech recognition errors can and do occur.  Please contact me if you see any errors specifically.    Dante Negro MD  We Offer Telehealth & Same Day Appointments!   Book your Telehealth appointment with me through my nurse or   Clinic appointments on IM[facility]U!  [phone]     Check out my Facebook Page and Follow Me at: CLICK HERE    Check out my website at [employer] by clicking on: CLICK HERE    To Schedule  appointments online, go to [email or web address]: CLICK HERE     Location: [email or web address]    [address] [place] [address]    FAX: [phone]

## 2022-09-08 ENCOUNTER — CLINICAL SUPPORT (OUTPATIENT)
Dept: ALLERGY | Facility: CLINIC | Age: 37
End: 2022-09-08
Payer: COMMERCIAL

## 2022-09-08 DIAGNOSIS — J45.40 MODERATE PERSISTENT ASTHMA WITHOUT COMPLICATION: Primary | ICD-10-CM

## 2022-09-08 PROCEDURE — 99999 PR PBB SHADOW E&M-EST. PATIENT-LVL I: ICD-10-PCS | Mod: PBBFAC,,,

## 2022-09-08 PROCEDURE — 96372 THER/PROPH/DIAG INJ SC/IM: CPT | Mod: S$GLB,,, | Performed by: ALLERGY & IMMUNOLOGY

## 2022-09-08 PROCEDURE — 99499 UNLISTED E&M SERVICE: CPT | Mod: S$GLB,,, | Performed by: INTERNAL MEDICINE

## 2022-09-08 PROCEDURE — 99499 NO LOS: ICD-10-PCS | Mod: S$GLB,,, | Performed by: INTERNAL MEDICINE

## 2022-09-08 PROCEDURE — 96372 PR INJECTION,THERAP/PROPH/DIAG2ST, IM OR SUBCUT: ICD-10-PCS | Mod: S$GLB,,, | Performed by: ALLERGY & IMMUNOLOGY

## 2022-09-08 PROCEDURE — 99999 PR PBB SHADOW E&M-EST. PATIENT-LVL I: CPT | Mod: PBBFAC,,,

## 2022-09-08 NOTE — PROGRESS NOTES
Patient complained of headache and diarrhea a couple days after taking first Xolair injections. Patient wanted to continue taking Xolair.Xolair administered. Patient waited in clinic 30 min for observation. Pt had epi pen on hand.No reaction noted.

## 2022-09-09 ENCOUNTER — TELEPHONE (OUTPATIENT)
Dept: RHEUMATOLOGY | Facility: CLINIC | Age: 37
End: 2022-09-09
Payer: COMMERCIAL

## 2022-09-09 NOTE — TELEPHONE ENCOUNTER
"Attempted to call patient regarding rescheduling appointment that is scheduled with Dr. Hernandez on 12/01/2022 due to provider being out of the office. Left v/m for patient to call back at earliest convenience to reschedule.      Venita Rhoades (Allye), [facility]  Rheumatology Department    "

## 2022-09-13 ENCOUNTER — OFFICE VISIT (OUTPATIENT)
Dept: PAIN MEDICINE | Facility: CLINIC | Age: 37
End: 2022-09-13
Payer: COMMERCIAL

## 2022-09-13 VITALS
SYSTOLIC BLOOD PRESSURE: 125 MMHG | HEIGHT: 64 IN | BODY MASS INDEX: 50.02 KG/M2 | HEART RATE: 89 BPM | DIASTOLIC BLOOD PRESSURE: 86 MMHG | WEIGHT: 293 LBS

## 2022-09-13 DIAGNOSIS — M43.16 SPONDYLOLISTHESIS OF LUMBAR REGION: ICD-10-CM

## 2022-09-13 DIAGNOSIS — G89.29 CHRONIC BILATERAL LOW BACK PAIN WITH BILATERAL SCIATICA: Primary | ICD-10-CM

## 2022-09-13 DIAGNOSIS — M54.42 CHRONIC BILATERAL LOW BACK PAIN WITH BILATERAL SCIATICA: Primary | ICD-10-CM

## 2022-09-13 DIAGNOSIS — M54.41 CHRONIC BILATERAL LOW BACK PAIN WITH BILATERAL SCIATICA: Primary | ICD-10-CM

## 2022-09-13 PROCEDURE — 3044F HG A1C LEVEL LT 7.0%: CPT | Mod: CPTII,S$GLB,, | Performed by: PHYSICIAN ASSISTANT

## 2022-09-13 PROCEDURE — 99203 PR OFFICE/OUTPT VISIT, NEW, LEVL III, 30-44 MIN: ICD-10-PCS | Mod: S$GLB,,, | Performed by: PHYSICIAN ASSISTANT

## 2022-09-13 PROCEDURE — 3074F SYST BP LT 130 MM HG: CPT | Mod: CPTII,S$GLB,, | Performed by: PHYSICIAN ASSISTANT

## 2022-09-13 PROCEDURE — 3079F PR MOST RECENT DIASTOLIC BLOOD PRESSURE 80-89 MM HG: ICD-10-PCS | Mod: CPTII,S$GLB,, | Performed by: PHYSICIAN ASSISTANT

## 2022-09-13 PROCEDURE — 99203 OFFICE O/P NEW LOW 30 MIN: CPT | Mod: S$GLB,,, | Performed by: PHYSICIAN ASSISTANT

## 2022-09-13 PROCEDURE — 3008F PR BODY MASS INDEX (BMI) DOCUMENTED: ICD-10-PCS | Mod: CPTII,S$GLB,, | Performed by: PHYSICIAN ASSISTANT

## 2022-09-13 PROCEDURE — 3044F PR MOST RECENT HEMOGLOBIN A1C LEVEL <7.0%: ICD-10-PCS | Mod: CPTII,S$GLB,, | Performed by: PHYSICIAN ASSISTANT

## 2022-09-13 PROCEDURE — 1159F PR MEDICATION LIST DOCUMENTED IN MEDICAL RECORD: ICD-10-PCS | Mod: CPTII,S$GLB,, | Performed by: PHYSICIAN ASSISTANT

## 2022-09-13 PROCEDURE — 3008F BODY MASS INDEX DOCD: CPT | Mod: CPTII,S$GLB,, | Performed by: PHYSICIAN ASSISTANT

## 2022-09-13 PROCEDURE — 1160F RVW MEDS BY RX/DR IN RCRD: CPT | Mod: CPTII,S$GLB,, | Performed by: PHYSICIAN ASSISTANT

## 2022-09-13 PROCEDURE — 99999 PR PBB SHADOW E&M-EST. PATIENT-LVL V: ICD-10-PCS | Mod: PBBFAC,,, | Performed by: PHYSICIAN ASSISTANT

## 2022-09-13 PROCEDURE — 1160F PR REVIEW ALL MEDS BY PRESCRIBER/CLIN PHARMACIST DOCUMENTED: ICD-10-PCS | Mod: CPTII,S$GLB,, | Performed by: PHYSICIAN ASSISTANT

## 2022-09-13 PROCEDURE — 3079F DIAST BP 80-89 MM HG: CPT | Mod: CPTII,S$GLB,, | Performed by: PHYSICIAN ASSISTANT

## 2022-09-13 PROCEDURE — 99999 PR PBB SHADOW E&M-EST. PATIENT-LVL V: CPT | Mod: PBBFAC,,, | Performed by: PHYSICIAN ASSISTANT

## 2022-09-13 PROCEDURE — 1159F MED LIST DOCD IN RCRD: CPT | Mod: CPTII,S$GLB,, | Performed by: PHYSICIAN ASSISTANT

## 2022-09-13 PROCEDURE — 3074F PR MOST RECENT SYSTOLIC BLOOD PRESSURE < 130 MM HG: ICD-10-PCS | Mod: CPTII,S$GLB,, | Performed by: PHYSICIAN ASSISTANT

## 2022-09-13 RX ORDER — TIZANIDINE 4 MG/1
4 TABLET ORAL NIGHTLY PRN
Qty: 40 TABLET | Refills: 0 | Status: SHIPPED | OUTPATIENT
Start: 2022-09-13 | End: 2022-12-28

## 2022-09-13 NOTE — PROGRESS NOTES
Back and Spine Consult    Patient ID: Lucía Coreas is a 37 y.o. female.    Chief Complaint   Patient presents with    Back Pain     LBP started 3 months ago, radiates down both legs, worse in last 2 weeks, constant, upper back pain for years, hurts in both shoulders to lift arms up, comes & goes.       Review of Systems   Constitutional:  Negative for activity change, appetite change, chills, fever and unexpected weight change.   HENT:  Negative for tinnitus, trouble swallowing and voice change.    Respiratory:  Negative for apnea, cough, chest tightness and shortness of breath.    Cardiovascular:  Negative for chest pain and palpitations.   Gastrointestinal:  Negative for constipation, diarrhea, nausea and vomiting.   Genitourinary:  Negative for difficulty urinating, dysuria, frequency and urgency.   Musculoskeletal:  Positive for arthralgias, back pain, myalgias and neck pain. Negative for gait problem and neck stiffness.   Skin:  Negative for wound.   Neurological:  Positive for numbness. Negative for dizziness, tremors, seizures, facial asymmetry, speech difficulty, weakness, light-headedness and headaches.   Psychiatric/Behavioral:  Negative for confusion and decreased concentration.      Past Medical History:   Diagnosis Date    Allergy     Amenorrhea     Asthma     Diabetes     GERD (gastroesophageal reflux disease)     Infertility associated with anovulation     Iron deficiency anemia     Knee pain     Metabolic syndrome     PCO (polycystic ovaries)     Recurrent boils     Status post repeat low transverse  section 2020    Formatting of this note might be different from the original. With BTL 21     Social History     Socioeconomic History    Marital status:    Tobacco Use    Smoking status: Never    Smokeless tobacco: Never   Substance and Sexual Activity    Alcohol use: Never    Drug use: Never    Sexual activity: Yes     Partners: Female     Birth control/protection: None      Family History   Problem Relation Age of Onset    Diabetes Mother     Lupus Mother     Diabetes Father     Heart disease Father 69    Hypertension Father     Prostate cancer Father     Ovarian cancer Sister     AMY disease Brother      Review of patient's allergies indicates:   Allergen Reactions    Metformin Diarrhea    Sulfa (sulfonamide antibiotics) Anaphylaxis and Swelling     Swelling (eyes)^, Swelling (throat)^  Swelling (eyes)^, Swelling (throat)^      Diclofenac      Gastritis     Latex, natural rubber      Contact dermatitis      Shellfish containing products      anaphylaxis       Current Outpatient Medications:     albuterol (PROVENTIL) 2.5 mg /3 mL (0.083 %) nebulizer solution, Take 3 mLs (2.5 mg total) by nebulization every 4 to 6 hours as needed for Wheezing., Disp: 60 each, Rfl: 3    albuterol (PROVENTIL/VENTOLIN HFA) 90 mcg/actuation inhaler, Inhale 1-2 puffs into the lungs every 4 (four) hours as needed for Wheezing., Disp: 12 g, Rfl: 1    aspirin (ECOTRIN) 81 MG EC tablet, Take 1 tablet (81 mg total) by mouth once daily., Disp: 90 tablet, Rfl: 4    azelastine (ASTELIN) 137 mcg (0.1 %) nasal spray, SMARTSIG:Both Nares, Disp: , Rfl:     cholecalciferol, vitamin D3, (VITAMIN D3) 25 mcg (1,000 unit) capsule, Take 2 capsules (2,000 Units total) by mouth once daily., Disp: 90 capsule, Rfl: 4    cyanocobalamin (VITAMIN B-12) 1000 MCG tablet, Take 1 tablet (1,000 mcg total) by mouth once daily., Disp: 90 tablet, Rfl: 3    desvenlafaxine succinate (PRISTIQ) 50 MG Tb24, Take 1 tablet (50 mg total) by mouth once daily., Disp: 30 tablet, Rfl: 11    ferrous sulfate (FEOSOL) Tab tablet, Take 1 tablet (1 each total) by mouth daily with breakfast., Disp: 90 tablet, Rfl: 3    fluticasone furoate-vilanteroL (BREO) 200-25 mcg/dose DsDv diskus inhaler, Inhale 1 puff into the lungs once daily. Controller, Disp: 1 each, Rfl: 4    ibuprofen (ADVIL,MOTRIN) 600 MG tablet, Take 1 tablet (600 mg total) by mouth every 6  "(six) hours as needed (Uncontrolled pain)., Disp: 30 tablet, Rfl: 0    iron-vit c-b12-folic acid (IRON 100 PLUS) Tab, Take 1 tablet by mouth once daily., Disp: 90 tablet, Rfl: 4    ketoconazole (NIZORAL) 2 % shampoo, Apply topically twice a week., Disp: 500 mL, Rfl: 2    Lactobacillus rhamnosus GG (CULTURELLE) 10 billion cell capsule, Take 1 capsule by mouth once daily., Disp: , Rfl:     lactulose (CHRONULAC) 10 gram/15 mL solution, SMARTSI Tablespoon By Mouth Daily, Disp: , Rfl:     levocetirizine (XYZAL) 5 MG tablet, Take 1 tablet (5 mg total) by mouth every evening., Disp: 30 tablet, Rfl: 4    loratadine (CLARITIN) 10 mg tablet, Take 1 tablet (10 mg total) by mouth once daily., Disp: 90 tablet, Rfl: 4    montelukast (SINGULAIR) 10 mg tablet, Take 1 tablet (10 mg total) by mouth once daily., Disp: 30 tablet, Rfl: 3    mupirocin (BACTROBAN) 2 % ointment, Apply topically 3 (three) times daily., Disp: , Rfl:     NIFEdipine (PROCARDIA-XL) 30 MG (OSM) 24 hr tablet, Take 30 mg by mouth once daily., Disp: , Rfl:     NOVOFINE PLUS 32 gauge x 1/6" Ndle, use as directed, Disp: , Rfl:     omalizumab (XOLAIR) 150 mg injection, Inject 300 mg into the skin every 14 (fourteen) days., Disp: 2 each, Rfl: 11    ondansetron (ZOFRAN-ODT) 8 MG TbDL, DISSOLVE ONE TABLET UNDER TONGUE EVERY 8 HOURS AS NEEDED FOR NAUSEA, Disp: , Rfl:     pantoprazole (PROTONIX) 40 MG tablet, Take 1 tablet (40 mg total) by mouth once daily., Disp: 90 tablet, Rfl: 4    semaglutide (OZEMPIC) 2 mg/dose (8 mg/3 mL) PnIj, Inject 2 mg into the skin every 7 days., Disp: 9 mL, Rfl: 4    spironolactone (ALDACTONE) 100 MG tablet, Take 1 tablet (100 mg total) by mouth once daily., Disp: 90 tablet, Rfl: 1    tirzepatide (MOUNJARO) 5 mg/0.5 mL PnIj, Inject 5 mg into the skin every 7 days., Disp: 4 pen, Rfl: 1    WIXELA INHUB 500-50 mcg/dose DsDv diskus inhaler, Inhale 1 puff into the lungs 2 (two) times daily., Disp: , Rfl:     EPINEPHrine (EPIPEN) 0.3 mg/0.3 mL " "AtIn, Inject 0.3 mLs (0.3 mg total) into the muscle once. for 1 dose, Disp: 2 each, Rfl: 11    fluticasone furoate-vilanteroL (BREO ELLIPTA) 100-25 mcg/dose diskus inhaler, Inhale 1 puff into the lungs once daily. Controller (Patient not taking: Reported on 9/13/2022), Disp: 30 each, Rfl: 12    tiZANidine (ZANAFLEX) 4 MG tablet, Take 1 tablet (4 mg total) by mouth nightly as needed (muscle spasms/ pain)., Disp: 40 tablet, Rfl: 0    Current Facility-Administered Medications:     omalizumab injection 150 mg, 150 mg, Subcutaneous, Q14 Days, Rossy Kramer MD, 150 mg at 09/08/22 0922    omalizumab injection 150 mg, 150 mg, Subcutaneous, Q14 Days, Rossy Kramer MD, 150 mg at 09/08/22 0920    Vitals:    09/13/22 0801   BP: 125/86   BP Location: Right arm   Patient Position: Sitting   BP Method: Large (Automatic)   Pulse: 89   Weight: (!) 148.2 kg (326 lb 13.3 oz)   Height: 5' 4" (1.626 m)       Physical Exam  Vitals and nursing note reviewed.   Constitutional:       Appearance: She is well-developed.   HENT:      Head: Normocephalic and atraumatic.   Eyes:      Pupils: Pupils are equal, round, and reactive to light.   Cardiovascular:      Rate and Rhythm: Normal rate.   Pulmonary:      Effort: Pulmonary effort is normal.   Musculoskeletal:         General: Normal range of motion.      Cervical back: Normal range of motion and neck supple.   Skin:     General: Skin is warm and dry.   Neurological:      Mental Status: She is alert and oriented to person, place, and time.      Coordination: Finger-Nose-Finger Test normal.      Gait: Gait is intact. Tandem walk normal.      Deep Tendon Reflexes:      Reflex Scores:       Tricep reflexes are 2+ on the right side and 2+ on the left side.       Bicep reflexes are 2+ on the right side and 2+ on the left side.       Brachioradialis reflexes are 2+ on the right side and 2+ on the left side.       Patellar reflexes are 2+ on the right side and 2+ on the left side.       Achilles reflexes " are 2+ on the right side and 2+ on the left side.  Psychiatric:         Speech: Speech normal.         Behavior: Behavior normal.         Thought Content: Thought content normal.         Judgment: Judgment normal.       Neurologic Exam     Mental Status   Oriented to person, place, and time.   Oriented to person.   Oriented to place.   Oriented to time.   Attention: normal. Concentration: normal.   Speech: speech is normal   Level of consciousness: alert  Knowledge: consistent with education.     Cranial Nerves     CN III, IV, VI   Pupils are equal, round, and reactive to light.    CN XI   Right sternocleidomastoid strength: normal  Left sternocleidomastoid strength: normal  Right trapezius strength: normal  Left trapezius strength: normal    Motor Exam   Muscle bulk: normal  Overall muscle tone: normal  Right arm tone: normal  Left arm tone: normal  Right arm pronator drift: absent  Left arm pronator drift: absent  Right leg tone: normal  Left leg tone: normal    Strength   Right neck flexion: 5/5  Left neck flexion: 5/5  Right neck extension: 5/5  Left neck extension: 5/5  Right deltoid: 5/5  Left deltoid: 5/5  Right biceps: 5/5  Left biceps: 5/5  Right triceps: 5/5  Left triceps: 5/5  Right wrist flexion: 5/5  Left wrist flexion: 5/5  Right wrist extension: 5/5  Left wrist extension: 5/5  Right interossei: 5/5  Left interossei: 5/5  Right abdominals: 5/5  Left abdominals: 5/5  Right iliopsoas: 5/5  Left iliopsoas: 5/5  Right quadriceps: 5/5  Left quadriceps: 5/5  Right hamstrin/5  Left hamstrin/5  Right glutei: 5/5  Left glutei: 5/5  Right anterior tibial: 5/5  Left anterior tibial: 5/5  Right posterior tibial: 5/5  Left posterior tibial: 5/5  Right peroneal: 5/5  Left peroneal: 5/5  Right gastroc: 5/5  Left gastroc: 5/5    Sensory Exam   Right arm light touch: normal  Left arm light touch: normal  Right leg light touch: normal  Left leg light touch: normal    Gait, Coordination, and Reflexes      Gait  Gait: normal    Coordination   Finger to nose coordination: normal  Tandem walking coordination: normal    Tremor   Resting tremor: absent  Intention tremor: absent  Action tremor: absent    Reflexes   Right brachioradialis: 2+  Left brachioradialis: 2+  Right biceps: 2+  Left biceps: 2+  Right triceps: 2+  Left triceps: 2+  Right patellar: 2+  Left patellar: 2+  Right achilles: 2+  Left achilles: 2+  Right Fournier: absent  Left Fournier: absent  Right ankle clonus: absent  Left ankle clonus: absent    Provider dictation:    37 year old female with asthma, GERD, diabetes, anemia, allergies, obesity is referred by Dr. Negro for lower back pain.  She has felt pain for  few months with increase in the last 2 weeks.  Pain is felt across the lower lumbar region.  She also has upper back pain and at times radiation into the bilateral legs in a generalized distribution.  Pain in the lower back is the worst.  It increases with standing and walking.  It increases at the end of the day.  She takes gabapentin daily  and alternates advil, tylenol for pain control.  Recalls PT for the shoulders/ neck af few years ago.    Current medications:  gabapentin, alternates advil, tylenol  Medications tried:  no others  Physical therapy:  2-3 years ago for the neck/ shoulders  Interventional Procedures:  none     On exam, there is 5/5 strength with 2+ DTR and no sensory deficits.  Gait and station fluid.  Denies bowel/ bladder dysfunction.  Full range of motion of the upper and lower extremities. No pain with axial facet loading.  No SI joint pain on palpation.  No pain with lumbar flexion/ extension.    Xray lumbar spine from 8-30-22 reveiwed:  there is no fracture.  Mild anterolisthesis of L5 on S1 noted with mild disk height loss.  No pard defect noted.    Ms. Castrejon has acute onset lower back pain greater than upper back and leg pain.  Most likely myofascial/ mechanical back pain related to posture, weight, lack of  exercise.  Possible pain can also be influenced by L5/S1 mild anterolisthesis site.  I recommend PT, good posture and regular exercise to help with pain.  She is agreeable.  I am also perscribnigzanaflex to help at night with spasms and rest.  Follow up in 8 weeks to monitor progress.    Visit Diagnosis:  Chronic bilateral low back pain with bilateral sciatica  -     tiZANidine (ZANAFLEX) 4 MG tablet; Take 1 tablet (4 mg total) by mouth nightly as needed (muscle spasms/ pain).  Dispense: 40 tablet; Refill: 0  -     Ambulatory referral/consult to Physical/Occupational Therapy; Future; Expected date: 09/20/2022    Spondylolisthesis of lumbar region  -     tiZANidine (ZANAFLEX) 4 MG tablet; Take 1 tablet (4 mg total) by mouth nightly as needed (muscle spasms/ pain).  Dispense: 40 tablet; Refill: 0  -     Ambulatory referral/consult to Physical/Occupational Therapy; Future; Expected date: 09/20/2022      Total time spent counseling greater than fifty percent of total visit time.  Counseling included discussion regarding imaging findings, diagnosis possibilities, treatment options, risks and benefits.   The patient had many questions regarding the options and long-term effects.

## 2022-09-13 NOTE — PROGRESS NOTES
"[facility]   Physical Therapy Initial Evaluation     Date: 9/14/2022   Name: Lucía Steele [facility] Number: [id number]    Therapy Diagnosis: No diagnosis found.  Physician: Yanci Sierra PA-C    Physician Orders: PT Eval and Treat   Medical Diagnosis from Referral: M54.42,M54.41,G89.29 (ICD-10-CM) - Chronic bilateral low back pain with bilateral sciatica  M43.16 (ICD-10-CM) - Spondylolisthesis of lumbar region  Evaluation Date: 9/14/2022  Authorization Period Expiration: 9/13/23  Plan of Care Expiration: 11/14/22  Progress Note Due: 10/14/22  Visit # / Visits authorized: 1 / 1 Eval) Need Auth   FOTO: 1 / 3 (9/14/22 - IE)    Precautions:  Anxiety, Asthma, HTN, Type 2 Diabetes, Class 3 Obesity      Time In: 7:45 AM  Time Out: 8:30 AM  Total Appointment Time (timed & untimed codes): 45 minutes    SUBJECTIVE     Date of onset: ***    History of current condition - Lucía reports: ***    Falls: ***    Imaging: Xray of Lumbar Spine: Vertebral body heights maintained.  Mild grade 1 anterolisthesis of L5 on S1 with degenerative disc height loss.  No definite pars defects.  Lower lumbar spine degenerative findings.  No acute abnormality.     Impression:     Degenerative changes       Electronically signed by: Raza Hood MD  Date:                                            08/30/2022  Time:                                           10:22    Prior Therapy: ***  Social History: *** {LIVES WITH:07850}  Occupation: ***  Prior Level of Function: ***  Current Level of Function: ***    Pain:  Current {0-10:77116::"0"}/10, worst {0-10:56896::"0"}/10, best {0-10:04938::"0"}/10   Location: {RIGHT LEFT BILATERAL:74758} {LOCATION ON BODY:98264} {Pain Loc:98258}  Description: {Pain Description:17032}  Aggravating Factors: {Causes; Pain:57282}  Easing Factors: {Pain (activities that relieve):96096}    Patients goals: ***     Medical History:   Past Medical History:   Diagnosis Date    Allergy     " Amenorrhea     Asthma     Diabetes     GERD (gastroesophageal reflux disease)     Infertility associated with anovulation     Iron deficiency anemia     Knee pain     Metabolic syndrome     PCO (polycystic ovaries)     Recurrent boils     Status post repeat low transverse  section 2020    Formatting of this note might be different from the original. With BTL 21       Surgical History:   Lucía Coreas  has a past surgical history that includes Carpal tunnel release (Left, 2019);  section; and Carpal tunnel release (Right, 2020).    Medications:   Lucía has a current medication list which includes the following prescription(s): albuterol, albuterol, aspirin, azelastine, cholecalciferol (vitamin d3), cyanocobalamin, desvenlafaxine succinate, epinephrine, ferrous sulfate, fluticasone furoate-vilanterol, fluticasone furoate-vilanterol, ibuprofen, iron 100 plus, ketoconazole, lactobacillus rhamnosus gg, lactulose, levocetirizine, loratadine, montelukast, mupirocin, nifedipine, novofine plus, xolair, ondansetron, pantoprazole, ozempic, spironolactone, mounjaro, tizanidine, and wixela inhub, and the following Facility-Administered Medications: omalizumab and omalizumab.    Allergies:   Review of patient's allergies indicates:   Allergen Reactions    Metformin Diarrhea    Sulfa (sulfonamide antibiotics) Anaphylaxis and Swelling     Swelling (eyes)^, Swelling (throat)^  Swelling (eyes)^, Swelling (throat)^      Diclofenac      Gastritis     Latex, natural rubber      Contact dermatitis      Shellfish containing products      anaphylaxis      OBJECTIVE     Observation/Posture: ***    DTR:    Right Left Comment   Patellar (L3-4) *** ***     Achilles (S1) *** ***           Lumbar Range of Motion:     Degrees Pain   Flexion ***    ***        Extension ***    ***        Left Side Bending *** ***         Right Side Bending *** ***         Left rotation    *** ***         Right Rotation    ***  ***               Lower Extremity Strength  Right LE   Left LE     Knee extension: *** Knee extension: ***   Knee flexion: *** Knee flexion: ***   Hip flexion: *** Hip flexion: ***   Hip extension:  *** Hip extension: ***   Hip abduction: *** Hip abduction: ***   Hip adduction: *** Hip adduction ***   Ankle dorsiflexion: *** Ankle dorsiflexion: ***   Ankle plantarflexion: *** Ankle plantarflexion: ***         Special Tests:  -Repeated Flexion: ***  -Repeated Ext: ***     Neuro Dynamic Testing:               Sciatic nerve:                                       SLR:    R = ***                                      L = ***    Joint Mobility: ***     Palpation: ***     Sensation: ***     Flexibility:               Hamstring Test: R = *** degrees ; L = *** degrees       Limitation/Restriction for FOTO Lumbar Spine Survey    Therapist reviewed FOTO scores for Lucía Coreas on 9/14/2022.   FOTO documents entered into [employer] - see Media section.    Limitation Score: ***%       TREATMENT     Total Treatment time (time-based codes) separate from Evaluation: 10 minutes      Lucía received the treatments listed below:      Therapeutic Exercises to develop {AMB PT PROGRESS OBJECTIVE:15663} for 10 minutes including:    ***    Possible Next Session: ***    PATIENT EDUCATION AND HOME EXERCISES     Education provided:   - Home Exercise Program Administration and Review  - Post Exercise Soreness  - Maintaining a pain free range of motion with all activities  - Anatomy/Physiology of the Lumbar Spine and the surrounding musculature    Written Home Exercises Provided: yes. Exercises were reviewed and Lucía was able to demonstrate them prior to the end of the session.  Lucía demonstrated good  understanding of the education provided. See EMR under Patient Instructions for exercises provided during therapy sessions.    ASSESSMENT     Lucía is a 37 y.o. female referred to outpatient Physical Therapy with a medical diagnosis of ***. Patient  "presents with ***    Patient prognosis is {REHAB PROGNOSIS OHS:86174}.   Patient will benefit from skilled outpatient Physical Therapy to address the deficits stated above and in the chart below, provide patient /family education, and to maximize patientt's level of independence.     Plan of care discussed with patient: {YES:00435}  Patient's spiritual, cultural and educational needs considered and patient is agreeable to the plan of care and goals as stated below:     Anticipated Barriers for therapy: ***    Medical Necessity is demonstrated by the following  History  Co-morbidities and personal factors that may impact the plan of care Co-morbidities:   {Co-morbidities:10679}    Personal Factors:   {Personal Factors:79736}     {Desc; low/moderate/high:607715}   Examination  Body Structures and Functions, activity limitations and participation restrictions that may impact the plan of care Body Regions:   {Body Regions:95455}    Body Systems:    {Body Systems:34942}    Participation Restrictions:   ***    Activity limitations:   Learning and applying knowledge  {Learning and applying knowledge:50805}    General Tasks and Commands  {Gen tasks and commands:04278}    Communication  {Communication:39963}    Mobility  {Mobility:61054}    Self care  {Self Care:60827}    Domestic Life  {Domestic Life:62046}    Interactions/Relationships  {Interactions/Relationships:83563}    Life Areas  {Life Areas:82604}    Community and Social Life  {Community/Social Life:31119}         {Desc; low/moderate/high:589407}   Clinical Presentation {Clinical Presentation :23677} {Desc; low/moderate/high:627893}   Decision Making/ Complexity Score: {Desc; low/moderate/high:270499}     Goals:  Short Term Goals: *** weeks   ***    Long Term Goals: *** weeks   ***    PLAN   Plan of care Certification: 9/14/2022 to ***.    Outpatient Physical Therapy {NUMBERS 1-5:36875} times weekly for {0-10:20507::"0"} weeks to include the following interventions: " {TX PLAN:77582}.     Dilia Green PT      I CERTIFY THE NEED FOR THESE SERVICES FURNISHED UNDER THIS PLAN OF TREATMENT AND WHILE UNDER MY CARE   Physician's comments:     Physician's Signature: ___________________________________________________

## 2022-09-14 ENCOUNTER — CLINICAL SUPPORT (OUTPATIENT)
Dept: REHABILITATION | Facility: HOSPITAL | Age: 37
End: 2022-09-14
Payer: COMMERCIAL

## 2022-09-14 DIAGNOSIS — M54.41 CHRONIC BILATERAL LOW BACK PAIN WITH BILATERAL SCIATICA: ICD-10-CM

## 2022-09-14 DIAGNOSIS — R29.898 DECREASED STRENGTH OF LOWER EXTREMITY: ICD-10-CM

## 2022-09-14 DIAGNOSIS — M43.16 SPONDYLOLISTHESIS OF LUMBAR REGION: ICD-10-CM

## 2022-09-14 DIAGNOSIS — R68.89 DECREASED FUNCTIONAL ACTIVITY TOLERANCE: ICD-10-CM

## 2022-09-14 DIAGNOSIS — M54.42 CHRONIC BILATERAL LOW BACK PAIN WITH BILATERAL SCIATICA: ICD-10-CM

## 2022-09-14 DIAGNOSIS — M53.86 DECREASED ROM OF LUMBAR SPINE: ICD-10-CM

## 2022-09-14 DIAGNOSIS — G89.29 CHRONIC BILATERAL LOW BACK PAIN WITH BILATERAL SCIATICA: ICD-10-CM

## 2022-09-14 PROCEDURE — 97161 PT EVAL LOW COMPLEX 20 MIN: CPT | Mod: PN

## 2022-09-14 PROCEDURE — 97110 THERAPEUTIC EXERCISES: CPT | Mod: PN

## 2022-09-14 NOTE — PLAN OF CARE
[facility]   Physical Therapy Initial Evaluation     Date: 9/14/2022   Name: Lucía Steele [facility] Number: [id number]    Therapy Diagnosis:   Encounter Diagnoses   Name Primary?    Chronic bilateral low back pain with bilateral sciatica     Spondylolisthesis of lumbar region     Decreased strength of lower extremity     Decreased ROM of lumbar spine     Decreased functional activity tolerance      Physician: Yanci Sierra PA-C    Physician Orders: PT Eval and Treat   Medical Diagnosis from Referral: M54.42,M54.41,G89.29 (ICD-10-CM) - Chronic bilateral low back pain with bilateral sciatica  M43.16 (ICD-10-CM) - Spondylolisthesis of lumbar region  Evaluation Date: 9/14/2022  Authorization Period Expiration: 9/13/23  Plan of Care Expiration: 11/14/22  Progress Note Due: 10/14/22  Visit # / Visits authorized: (1 / 1 Eval) Need Auth   FOTO: 1 / 3 (9/14/22 - IE)    Precautions: Anxiety, Asthma, HTN, Type 2 Diabetes, Class 3 Obesity     Time In: 7:52 AM (pt arrived late)  Time Out: 8:30 AM  Total Appointment Time (timed & untimed codes): 38 minutes    SUBJECTIVE     Date of onset: 6-8 weeks ago    History of current condition - Lucía reports: that her lower back has been bothering her for the last 6-8 months but the last 2-3 weeks the pain has gotten severe. In order to wash the dishes she has to lean over the counter. When she walks she has a burning sensation that goes down both legs at times. She also has a lot of muscle tightness in the back and legs. She has had physical therapy in the past and actually had dry needling performed. She had a baby 1 year ago and ended up being immobile for about 3 months due to having a wound vac. She also has upper back pain along the middle of the upper back. No recent falls. She does not recall any specific exercises that she learned for her lower back in the past. No recent injections or prior surgeries. The pain is the worse in the lower back around  the spot where she got her epidural. She is working at the [employer] and normally sits throughout the day. She does use have a heating pad and has used that at home off and on. She was prescribed a muscle relaxer but has not taken it yet. Usually takes ibuprofen over the counter as well. She was not prescribed any pain medication.     Falls: None    Imaging: Xray of Lumbar Spine: Vertebral body heights maintained.  Mild grade 1 anterolisthesis of L5 on S1 with degenerative disc height loss.  No definite pars defects.  Lower lumbar spine degenerative findings.  No acute abnormality.     Impression:     Degenerative changes       Electronically signed by: Raza Hood MD  Date:                                            2022  Time:                                           10:22    Prior Therapy: yes about 1 year ago for her back  Social History: lives with their family  Occupation: works at [employer] - has 2 children that keeps her busy  Prior Level of Function: able to walk and be more active prior to the  last year  Current Level of Function: unable to walk long distances, able to walk about 5 minutes before having to lean over, standing long periods is also difficult    Pain:  Current 7/10, worst 9/10, best 3/10   Location: bilateral back (L>R)  Description: Aching and Dull  Aggravating Factors: Standing, Walking, and Night Time  Easing Factors: heating pad, rest, and bending over    Patients goals: improve mobility, decrease pain     Medical History:   Past Medical History:   Diagnosis Date    Allergy     Amenorrhea     Asthma     Diabetes     GERD (gastroesophageal reflux disease)     Infertility associated with anovulation     Iron deficiency anemia     Knee pain     Metabolic syndrome     PCO (polycystic ovaries)     Recurrent boils     Status post repeat low transverse  section 2020    Formatting of this note might be different from the original. With BTL 21        Surgical History:   Lucía Coreas  has a past surgical history that includes Carpal tunnel release (Left, 2019);  section; and Carpal tunnel release (Right, 2020).    Medications:   Lucía has a current medication list which includes the following prescription(s): albuterol, albuterol, aspirin, azelastine, cholecalciferol (vitamin d3), cyanocobalamin, desvenlafaxine succinate, epinephrine, ferrous sulfate, fluticasone furoate-vilanterol, fluticasone furoate-vilanterol, ibuprofen, iron 100 plus, ketoconazole, lactobacillus rhamnosus gg, lactulose, levocetirizine, loratadine, montelukast, mupirocin, nifedipine, novofine plus, xolair, ondansetron, pantoprazole, ozempic, spironolactone, mounjaro, tizanidine, and wixela inhub, and the following Facility-Administered Medications: omalizumab and omalizumab.    Allergies:   Review of patient's allergies indicates:   Allergen Reactions    Metformin Diarrhea    Sulfa (sulfonamide antibiotics) Anaphylaxis and Swelling     Swelling (eyes)^, Swelling (throat)^  Swelling (eyes)^, Swelling (throat)^      Diclofenac      Gastritis     Latex, natural rubber      Contact dermatitis      Shellfish containing products      anaphylaxis      OBJECTIVE     Observation/Posture: FAIR - rounded shoulders, slouched in sitting. Pleasant AAF.    Gait: Non-antalgic gait pattern in nature, no AD used. Decreased step length and tong present.     Lumbar Range of Motion:     Degrees Pain   Flexion 50% limited    - (tightness, stretching)        Extension 50% limited    +        Left Side Bending Distal lateral thigh -         Right Side Bending Distal lateral thigh -         Left rotation    WFL - (tightness)         Right Rotation    WFL  - (tightness)               Lower Extremity Strength  Right LE   Left LE     Knee extension: 4/5 Knee extension: 4/5   Knee flexion: 4/5 Knee flexion: 4/5   Hip flexion: 4/5* Hip flexion: 4/5*   Hip extension:  4/5 Hip extension: 4/5    Hip abduction: 4/5 Hip abduction: 4/5   Hip adduction: 4/5 Hip adduction 4/5   Ankle dorsiflexion: 5/5 Ankle dorsiflexion: 5/5   Ankle plantarflexion: 5/5 Ankle plantarflexion: 5/5      Pain = *     Special Tests:  -Repeated Flexion: -  -Repeated Ext: -     Neuro Dynamic Testing:               Sciatic nerve:                                       SLR:    R = -                                      L = -    Joint Mobility: decreased when moving into extension     Palpation: mild to moderate TTP along lumbar paraspinals, L piriformis musculature     Sensation: intact B to light touch     Flexibility:               Hamstring Test: R = 60 degrees ; L = 60 degrees       Limitation/Restriction for FOTO Lumbar Spine Survey    Therapist reviewed FOTO scores for Lucía Coreas on 9/14/2022.   FOTO documents entered into [employer] - see Media section.    Limitation Score: 63%     TREATMENT     Total Treatment time (time-based codes) separate from Evaluation: 10 minutes      Lucía received the treatments listed below:      Therapeutic Exercises to develop strength, endurance, ROM, flexibility, posture, and core stabilization for 10 minutes including:    Seated Scap Retractions x10 reps (3 sec holds)  Seated Hamstring Stretch 10x10s B + stool  Seated Lumbar Flexion x10 reps   Standing Wall Slides x10 reps B  Supine Pelvic Tilts x10 reps   Supine Modified Piriformis Stretch 3x10s B    Possible Next Session: Stability Ball Roll Outs, SAQ with TA, LE March with TA, Stability Ball push downs    PATIENT EDUCATION AND HOME EXERCISES     Education provided:   - Home Exercise Program Administration and Review  - Post Exercise Soreness  - Maintaining a pain free range of motion with all activities  - Anatomy/Physiology of the Lumbar Spine and the surrounding musculature    Written Home Exercises Provided: yes. Exercises were reviewed and Lucía was able to demonstrate them prior to the end of the session.  Lucía demonstrated good   understanding of the education provided. See EMR under Patient Instructions for exercises provided during therapy sessions.    ASSESSMENT     Lucía is a 37 y.o. female referred to outpatient Physical Therapy with a medical diagnosis of Chronic bilateral low back pain with bilateral sciatica and Spondylolisthesis of lumbar region. Patient presents with decreased lumbar spine range of motion, increased low back pain, decreased LE strength, decreased LE flexibility, poor posture, decreased core stability/activation, and decreased functional mobility overall. Treatment will focus on improving TA activation with all activities as well as basic LE strengthening. Patient seemed to prefer lumbar flexion based activities versus extension but we will continue to assess each session.    Patient prognosis is Fair.   Patient will benefit from skilled outpatient Physical Therapy to address the deficits stated above and in the chart below, provide patient /family education, and to maximize patientt's level of independence.     Plan of care discussed with patient: Yes  Patient's spiritual, cultural and educational needs considered and patient is agreeable to the plan of care and goals as stated below:     Anticipated Barriers for therapy: none stated    Medical Necessity is demonstrated by the following  History  Co-morbidities and personal factors that may impact the plan of care Co-morbidities:   prior pelvic surgery and  Anxiety, Asthma, HTN, Type 2 Diabetes, Class 3 Obesity     Personal Factors:   no deficits     high   Examination  Body Structures and Functions, activity limitations and participation restrictions that may impact the plan of care Body Regions:   back  lower extremities    Body Systems:    gross symmetry  ROM  strength  gross coordinated movement  balance  gait    Participation Restrictions:   Difficulty sitting for long periods  Difficulty walking long distances  Difficulty bending over    Activity limitations:    Learning and applying knowledge  no deficits    General Tasks and Commands  no deficits    Communication  no deficits    Mobility  lifting and carrying objects  walking  driving (bike, car, motorcycle)    Self care  no deficits    Domestic Life  shopping  cooking  doing house work (cleaning house, washing dishes, laundry)  assisting others    Interactions/Relationships  family relationships    Life Areas  employment    Community and Social Life  community life  recreation and leisure         moderate   Clinical Presentation stable and uncomplicated low   Decision Making/ Complexity Score: low     Goals:  Short Term Goals: 4 weeks   - Patient will demonstrate improved ability to ambulate at least 10 minutes with minimal to no increase in symptoms for improved physical activity.  - Patient will be able to perform a squat with proper mechanics, no signs of knee valgus, and minimal to no increase in symptoms for increased ability to perform household and work related tasks.  - Patient will be able to stand for at least 10 minutes with minimal to no increase in symptoms for increased ability to perform ADL's.  - Patient will demonstrate improved hip abduction strength, by at least 1/2 grade via MMT for improved functional mobility overall.    Long Term Goals: 8 weeks   - Patient will demonstrate improved ability to ambulate at least 15 minutes with minimal to no increase in symptoms for improved physical activity.  - Patient will be able to stand for at least 15 minutes with minimal to no increase in symptoms for increased ability to perform ADL's.  - Patient will demonstrate improved hip extension strength, by at least 1/2 grade via MMT for improved functional mobility overall.  - Patient will demonstrate independence with Home Exercise Program for continued improvement outside the clinical setting.    PLAN   Plan of care Certification: 9/14/2022 to 11/14/22.    Outpatient Physical Therapy 2 times weekly for 8 weeks  to include the following interventions: Aquatic Therapy, Cervical/Lumbar Traction, Electrical Stimulation IFC/TENS/PREMOD, Gait Training, Manual Therapy, Moist Heat/ Ice, Neuromuscular Re-ed, Patient Education, Self Care, Therapeutic Activities, Therapeutic Exercise, Ultrasound, and Dry Needling (by a certified therapist).     This patient CAN be treated by a PTA.    Possible Next Session: Stability Ball Roll Outs, SAQ with TA, LE March with TA, Stability Ball push downs    Dilia Green, PT, DPT, Cert. DN      I CERTIFY THE NEED FOR THESE SERVICES FURNISHED UNDER THIS PLAN OF TREATMENT AND WHILE UNDER MY CARE   Physician's comments:     Physician's Signature: ___________________________________________________

## 2022-09-20 ENCOUNTER — CLINICAL SUPPORT (OUTPATIENT)
Dept: REHABILITATION | Facility: HOSPITAL | Age: 37
End: 2022-09-20
Payer: COMMERCIAL

## 2022-09-20 DIAGNOSIS — M53.86 DECREASED ROM OF LUMBAR SPINE: ICD-10-CM

## 2022-09-20 DIAGNOSIS — R68.89 DECREASED FUNCTIONAL ACTIVITY TOLERANCE: ICD-10-CM

## 2022-09-20 DIAGNOSIS — R29.898 DECREASED STRENGTH OF LOWER EXTREMITY: Primary | ICD-10-CM

## 2022-09-20 PROCEDURE — 97110 THERAPEUTIC EXERCISES: CPT | Mod: PN

## 2022-09-20 NOTE — PROGRESS NOTES
[facility]   Physical Therapy Treatment Note     Name: Lucía Steele [facility] Number: [id number]    Therapy Diagnosis:   Encounter Diagnoses   Name Primary?    Decreased strength of lower extremity Yes    Decreased ROM of lumbar spine     Decreased functional activity tolerance      Physician: Yanci Sierra PA-C    Visit Date: 9/20/2022    Physician Orders: PT Eval and Treat   Medical Diagnosis from Referral: M54.42,M54.41,G89.29 (ICD-10-CM) - Chronic bilateral low back pain with bilateral sciatica  M43.16 (ICD-10-CM) - Spondylolisthesis of lumbar region  Evaluation Date: 9/14/2022  Authorization Period Expiration: 12/31/22  Plan of Care Expiration: 11/14/22  Progress Note Due: 10/14/22  Visit # / Visits authorized: 1/20 auth (1/1 eval)  FOTO: 1 / 3 (9/14/22 - IE)     Precautions: Anxiety, Asthma, HTN, Type 2 Diabetes, Class 3 Obesity     PTA Visit #: 0/5     Time In: 0915  Time Out: 0958  Total Billable Time: 43 minutes    SUBJECTIVE     Pt reports: she still complains of pain after sitting or standing in place for long periods of time. She was able to complete the exercises and she feels they have helped a little.     She was compliant with home exercise program.  Response to previous treatment: initial evaluation  Functional change: TBD    Pain: 0/10, 4/10 when symptoms   Location: bilateral back    OBJECTIVE     Objective Measures updated at progress report unless specified.     Treatment     Lucía received the treatments listed below:      Therapeutic Exercises to develop strength, endurance, ROM, flexibility, posture, and core stabilization for 43 minutes including:     Ball flexion stretch 2.5 min  Recumbent bike 5 min lvl 1 seat 6  Stability ball press down 3x10 reps  Pallof press 3x10 reps B  Long sitting hamstring stretch 2 min each  Supine hip adduction 2 min  Supine hip abduction 2 min  Supine posterior pelvic tilt  Abdominal bracing with march x20 reps     Possible Next  Session: Stability Ball Roll Outs, SAQ with TA, LE March with TA, Stability Ball push downs      Patient Education and Home Exercises     Home Exercises Provided and Patient Education Provided     Education provided:   - Home Exercise Program Administration and Review  - Post Exercise Soreness  - Maintaining a pain free range of motion with all activities  - Anatomy/Physiology of the Lumbar Spine and the surrounding musculature    Written Home Exercises Provided: Patient instructed to cont prior HEP. Exercises were reviewed and Lucía was able to demonstrate them prior to the end of the session.  Lucía demonstrated good  understanding of the education provided. See EMR under Patient Instructions for exercises provided during therapy sessions    ASSESSMENT     She is able to tolerate prescribed therex today for core/abdominal strengthening while trying not to exacerbate lower back pain. Rest periods are required but she was motivated to complete all tasks. She had the greatest fatigue with hip adduction/abdominal bracing exercise in supine. Patient also is experiencing upper trap and shoulder tightness.    Lucía Is progressing well towards her goals.   Pt prognosis is Fair.     Pt will continue to benefit from skilled outpatient physical therapy to address the deficits listed in the problem list box on initial evaluation, provide pt/family education and to maximize pt's level of independence in the home and community environment.     Pt's spiritual, cultural and educational needs considered and pt agreeable to plan of care and goals.     Anticipated barriers to physical therapy: none    Goals:  Short Term Goals: 4 weeks   - Patient will demonstrate improved ability to ambulate at least 10 minutes with minimal to no increase in symptoms for improved physical activity. (progressing, not met)  - Patient will be able to perform a squat with proper mechanics, no signs of knee valgus, and minimal to no increase in symptoms for  increased ability to perform household and work related tasks. (progressing, not met)  - Patient will be able to stand for at least 10 minutes with minimal to no increase in symptoms for increased ability to perform ADL's. (progressing, not met)  - Patient will demonstrate improved hip abduction strength, by at least 1/2 grade via MMT for improved functional mobility overall. (progressing, not met)     Long Term Goals: 8 weeks   - Patient will demonstrate improved ability to ambulate at least 15 minutes with minimal to no increase in symptoms for improved physical activity. (progressing, not met)  - Patient will be able to stand for at least 15 minutes with minimal to no increase in symptoms for increased ability to perform ADL's. (progressing, not met)  - Patient will demonstrate improved hip extension strength, by at least 1/2 grade via MMT for improved functional mobility overall. (progressing, not met)  - Patient will demonstrate independence with Home Exercise Program for continued improvement outside the clinical setting. (progressing, not met)    PLAN     Continue with PT POC to address functional deficits.     Abner Leyva, PT DPT

## 2022-09-21 ENCOUNTER — OFFICE VISIT (OUTPATIENT)
Dept: OBSTETRICS AND GYNECOLOGY | Facility: CLINIC | Age: 37
End: 2022-09-21
Payer: COMMERCIAL

## 2022-09-21 VITALS
WEIGHT: 293 LBS | BODY MASS INDEX: 50.02 KG/M2 | DIASTOLIC BLOOD PRESSURE: 82 MMHG | HEIGHT: 64 IN | SYSTOLIC BLOOD PRESSURE: 124 MMHG

## 2022-09-21 DIAGNOSIS — I15.2 HYPERTENSION ASSOCIATED WITH DIABETES: Chronic | ICD-10-CM

## 2022-09-21 DIAGNOSIS — N92.0 MENORRHAGIA WITH REGULAR CYCLE: ICD-10-CM

## 2022-09-21 DIAGNOSIS — Z01.419 ROUTINE GYNECOLOGICAL EXAMINATION: Primary | ICD-10-CM

## 2022-09-21 DIAGNOSIS — Z84.0 FAMILY HISTORY OF LUPUS ERYTHEMATOSUS: ICD-10-CM

## 2022-09-21 DIAGNOSIS — Z98.891 HISTORY OF CESAREAN DELIVERY: ICD-10-CM

## 2022-09-21 DIAGNOSIS — E66.01 CLASS 3 SEVERE OBESITY WITH BODY MASS INDEX (BMI) OF 50.0 TO 59.9 IN ADULT, UNSPECIFIED OBESITY TYPE, UNSPECIFIED WHETHER SERIOUS COMORBIDITY PRESENT: ICD-10-CM

## 2022-09-21 DIAGNOSIS — R76.0 ANTIPHOSPHOLIPID ANTIBODY POSITIVE: ICD-10-CM

## 2022-09-21 DIAGNOSIS — E28.2 PCOS (POLYCYSTIC OVARIAN SYNDROME): ICD-10-CM

## 2022-09-21 DIAGNOSIS — E11.59 HYPERTENSION ASSOCIATED WITH DIABETES: Chronic | ICD-10-CM

## 2022-09-21 DIAGNOSIS — L73.2 HIDRADENITIS SUPPURATIVA: Chronic | ICD-10-CM

## 2022-09-21 DIAGNOSIS — J45.909 ASTHMA, UNSPECIFIED ASTHMA SEVERITY, UNSPECIFIED WHETHER COMPLICATED, UNSPECIFIED WHETHER PERSISTENT: ICD-10-CM

## 2022-09-21 PROCEDURE — 99999 PR PBB SHADOW E&M-EST. PATIENT-LVL III: CPT | Mod: PBBFAC,,, | Performed by: OBSTETRICS & GYNECOLOGY

## 2022-09-21 PROCEDURE — 3044F HG A1C LEVEL LT 7.0%: CPT | Mod: CPTII,S$GLB,, | Performed by: OBSTETRICS & GYNECOLOGY

## 2022-09-21 PROCEDURE — 3079F PR MOST RECENT DIASTOLIC BLOOD PRESSURE 80-89 MM HG: ICD-10-PCS | Mod: CPTII,S$GLB,, | Performed by: OBSTETRICS & GYNECOLOGY

## 2022-09-21 PROCEDURE — 1159F PR MEDICATION LIST DOCUMENTED IN MEDICAL RECORD: ICD-10-PCS | Mod: CPTII,S$GLB,, | Performed by: OBSTETRICS & GYNECOLOGY

## 2022-09-21 PROCEDURE — 99395 PR PREVENTIVE VISIT,EST,18-39: ICD-10-PCS | Mod: S$GLB,,, | Performed by: OBSTETRICS & GYNECOLOGY

## 2022-09-21 PROCEDURE — 3074F PR MOST RECENT SYSTOLIC BLOOD PRESSURE < 130 MM HG: ICD-10-PCS | Mod: CPTII,S$GLB,, | Performed by: OBSTETRICS & GYNECOLOGY

## 2022-09-21 PROCEDURE — 88175 CYTOPATH C/V AUTO FLUID REDO: CPT | Performed by: OBSTETRICS & GYNECOLOGY

## 2022-09-21 PROCEDURE — 3008F PR BODY MASS INDEX (BMI) DOCUMENTED: ICD-10-PCS | Mod: CPTII,S$GLB,, | Performed by: OBSTETRICS & GYNECOLOGY

## 2022-09-21 PROCEDURE — 99395 PREV VISIT EST AGE 18-39: CPT | Mod: S$GLB,,, | Performed by: OBSTETRICS & GYNECOLOGY

## 2022-09-21 PROCEDURE — 99999 PR PBB SHADOW E&M-EST. PATIENT-LVL III: ICD-10-PCS | Mod: PBBFAC,,, | Performed by: OBSTETRICS & GYNECOLOGY

## 2022-09-21 PROCEDURE — 1159F MED LIST DOCD IN RCRD: CPT | Mod: CPTII,S$GLB,, | Performed by: OBSTETRICS & GYNECOLOGY

## 2022-09-21 PROCEDURE — 3044F PR MOST RECENT HEMOGLOBIN A1C LEVEL <7.0%: ICD-10-PCS | Mod: CPTII,S$GLB,, | Performed by: OBSTETRICS & GYNECOLOGY

## 2022-09-21 PROCEDURE — 3008F BODY MASS INDEX DOCD: CPT | Mod: CPTII,S$GLB,, | Performed by: OBSTETRICS & GYNECOLOGY

## 2022-09-21 PROCEDURE — 3074F SYST BP LT 130 MM HG: CPT | Mod: CPTII,S$GLB,, | Performed by: OBSTETRICS & GYNECOLOGY

## 2022-09-21 PROCEDURE — 3079F DIAST BP 80-89 MM HG: CPT | Mod: CPTII,S$GLB,, | Performed by: OBSTETRICS & GYNECOLOGY

## 2022-09-21 NOTE — PROGRESS NOTES
Chief Complaint   Patient presents with    [facility]    Low libido        History of Present Illness: Lucía Coreas is a 37 y.o. female that presents today 2022 for well gyn visit.  She reports heavy cycles 6 days with pad change every2 hours. She does see clots.     Past Medical History:   Diagnosis Date    Allergy     Amenorrhea     Asthma     Diabetes     GERD (gastroesophageal reflux disease)     Infertility associated with anovulation     Iron deficiency anemia     Knee pain     Metabolic syndrome     PCO (polycystic ovaries)     Recurrent boils     Status post repeat low transverse  section 2020    Formatting of this note might be different from the original. With BTL 21       Past Surgical History:   Procedure Laterality Date    BTL      CARPAL TUNNEL RELEASE Left 2019    CARPAL TUNNEL RELEASE Right 2020    Procedure: RELEASE, CARPAL TUNNEL;  Surgeon: Adeel aLughlin MD;  Location: [facility];  Service: Orthopedics;  Laterality: Right;     SECTION      X2    EXPLORATORY LAPAROTOMY      1 week postop with WOUND VAK, reopened uterus    WOUND VAK      3 months post-op Section, 2 weeks in hospital       Current Outpatient Medications   Medication Sig Dispense Refill    albuterol (PROVENTIL) 2.5 mg /3 mL (0.083 %) nebulizer solution Take 3 mLs (2.5 mg total) by nebulization every 4 to 6 hours as needed for Wheezing. 60 each 3    albuterol (PROVENTIL/VENTOLIN HFA) 90 mcg/actuation inhaler Inhale 1-2 puffs into the lungs every 4 (four) hours as needed for Wheezing. 12 g 1    azelastine (ASTELIN) 137 mcg (0.1 %) nasal spray SMARTSIG:Both Nares      cholecalciferol, vitamin D3, (VITAMIN D3) 25 mcg (1,000 unit) capsule Take 2 capsules (2,000 Units total) by mouth once daily. 90 capsule 4    cyanocobalamin (VITAMIN B-12) 1000 MCG tablet Take 1 tablet (1,000 mcg total) by mouth once daily. 90 tablet 3    ferrous sulfate (FEOSOL) Tab tablet Take 1 tablet  "(1 each total) by mouth daily with breakfast. 90 tablet 3    fluticasone furoate-vilanteroL (BREO) 200-25 mcg/dose DsDv diskus inhaler Inhale 1 puff into the lungs once daily. Controller 1 each 4    iron-vit c-b12-folic acid (IRON 100 PLUS) Tab Take 1 tablet by mouth once daily. 90 tablet 4    ketoconazole (NIZORAL) 2 % shampoo Apply topically twice a week. 500 mL 2    Lactobacillus rhamnosus GG (CULTURELLE) 10 billion cell capsule Take 1 capsule by mouth once daily.      lactulose (CHRONULAC) 10 gram/15 mL solution SMARTSI Tablespoon By Mouth Daily      levocetirizine (XYZAL) 5 MG tablet Take 1 tablet (5 mg total) by mouth every evening. 30 tablet 4    loratadine (CLARITIN) 10 mg tablet Take 1 tablet (10 mg total) by mouth once daily. 90 tablet 4    montelukast (SINGULAIR) 10 mg tablet Take 1 tablet (10 mg total) by mouth once daily. 30 tablet 3    mupirocin (BACTROBAN) 2 % ointment Apply topically 3 (three) times daily.      NOVOFINE PLUS 32 gauge x 1/6" Ndle use as directed      omalizumab (XOLAIR) 150 mg injection Inject 300 mg into the skin every 14 (fourteen) days. 2 each 11    pantoprazole (PROTONIX) 40 MG tablet Take 1 tablet (40 mg total) by mouth once daily. 90 tablet 4    spironolactone (ALDACTONE) 100 MG tablet Take 1 tablet (100 mg total) by mouth once daily. 90 tablet 1    tirzepatide (MOUNJARO) 5 mg/0.5 mL PnIj Inject 5 mg into the skin every 7 days. 4 pen 1    tiZANidine (ZANAFLEX) 4 MG tablet Take 1 tablet (4 mg total) by mouth nightly as needed (muscle spasms/ pain). 40 tablet 0    EPINEPHrine (EPIPEN) 0.3 mg/0.3 mL AtIn Inject 0.3 mLs (0.3 mg total) into the muscle once. for 1 dose 2 each 11    fluticasone furoate-vilanteroL (BREO ELLIPTA) 100-25 mcg/dose diskus inhaler Inhale 1 puff into the lungs once daily. Controller (Patient not taking: Reported on 2022) 30 each 12    NIFEdipine (PROCARDIA-XL) 30 MG (OSM) 24 hr tablet Take 30 mg by mouth once daily.      ondansetron (ZOFRAN-ODT) 8 MG " TbDL DISSOLVE ONE TABLET UNDER TONGUE EVERY 8 HOURS AS NEEDED FOR NAUSEA      WIXELA INHUB 500-50 mcg/dose DsDv diskus inhaler Inhale 1 puff into the lungs 2 (two) times daily.       Current Facility-Administered Medications   Medication Dose Route Frequency Provider Last Rate Last Admin    omalizumab injection 150 mg  150 mg Subcutaneous Q14 Days Rossy Kramer MD   150 mg at 22    omalizumab injection 150 mg  150 mg Subcutaneous Q14 Days Rossy Kramer MD   150 mg at 22       Review of patient's allergies indicates:   Allergen Reactions    Metformin Diarrhea    Sulfa (sulfonamide antibiotics) Anaphylaxis and Swelling     Swelling (eyes)^, Swelling (throat)^  Swelling (eyes)^, Swelling (throat)^      Diclofenac      Gastritis     Latex, natural rubber      Contact dermatitis      Shellfish containing products      anaphylaxis       Family History   Problem Relation Age of Onset    Diabetes Father     Heart disease Father 69    Hypertension Father     Prostate cancer Father     Diabetes Mother     Lupus Mother     AMY disease Brother     Ovarian cancer Sister     Breast cancer Neg Hx        Social History     Socioeconomic History    Marital status:    Tobacco Use    Smoking status: Never    Smokeless tobacco: Never   Substance and Sexual Activity    Alcohol use: Never    Drug use: Never    Sexual activity: Yes     Partners: Male     Birth control/protection: See Surgical Hx       OB History    Para Term  AB Living   4 2 2   2 2   SAB IAB Ectopic Multiple Live Births   2       2      # Outcome Date GA Lbr Edward/2nd Weight Sex Delivery Anes PTL Lv   4 Term      CS-Unspec      3 SAB            2 SAB            1 Term      CS-Unspec          Review of Symptoms:  GENERAL: Denies weight gain or weight loss. Feeling well overall.   SKIN: Denies rash or lesions.   HEAD: Denies head injury or headache.   NODES: Denies enlarged lymph nodes.   CHEST: Denies chest pain or shortness of breath.  "  CARDIOVASCULAR: Denies palpitations or left sided chest pain.   ABDOMEN: No abdominal pain, constipation, diarrhea, nausea, vomiting or rectal bleeding.   URINARY: No frequency, dysuria, hematuria, or burning on urination.  HEMATOLOGIC: No easy bruisability or excessive bleeding.   MUSCULOSKELETAL: Denies joint pain or swelling.     /82   Ht 5' 4" (1.626 m)   Wt (!) 149.5 kg (329 lb 9.4 oz)   LMP 09/17/2022 (Exact Date)   Physical Exam:  APPEARANCE: Well nourished, well developed, in no acute distress.  SKIN: Normal skin turgor, no lesions.  NECK: Neck symmetric without masses   RESPIRATORY: Normal respiratory effort with no retractions or use of accessory muscles  CARDIOVASCULAR: Peripheral vascular system with no swelling no varicosities and palpation of pulses normal  LYMPHATIC: No enlargements of the lymph nodes noted in the neck, axillae, or groin  ABDOMEN: Soft. No tenderness or masses. No hepatosplenomegaly. No hernias.  BREASTS: Symmetrical, no skin changes or visible lesions. No palpable masses, nipple discharge or adenopathy bilaterally.  PELVIC: Normal external female genitalia without lesions. Normal hair distribution. Adequate perineal body, normal urethral meatus. Urethra with no masses.  Bladder nontender. Vagina moist and well rugated without lesions or discharge. Cervix pink and without lesions. No significant cystocele or rectocele. Bimanual exam showed uterus normal size, shape, position, mobile and nontender. Adnexa without masses or tenderness. Urethra and bladder normal.   EXTREMITIES: No clubbing cyanosis or edema.    ASSESSMENT/PLAN:  Routine gynecological examination  -     Liquid-Based Pap Smear, Screening    Hypertension associated with diabetes    Class 3 severe obesity with body mass index (BMI) of 50.0 to 59.9 in adult, unspecified obesity type, unspecified whether serious comorbidity present    Hidradenitis suppurativa    Asthma, unspecified asthma severity, unspecified " whether complicated, unspecified whether persistent    Menorrhagia with regular cycle  Comments:  great candidate for mirena discussed and will consider  Orders:  -     US Pelvis Comp with Transvag NON-OB (xpd; Future; Expected date: 2022    PCOS (polycystic ovarian syndrome)    History of  delivery    Antiphospholipid antibody positive    Family history of lupus erythematosus        Patient was counseled today on Pelvic exams and Pap Smear guidelines.   We discussed STD screening if at high risk for a STD.  We discussed recommendation for breast cancer screening with mammogram every other year after the age of 40 and annually after the age of 50.    We discussed colon cancer screening when indicated.   Osteoporosis screening discussed when indicated.   She was advised to see her primary care physician for all other health maintenance.     FOLLOW-UP with me for next routine visit.

## 2022-09-22 ENCOUNTER — CLINICAL SUPPORT (OUTPATIENT)
Dept: ALLERGY | Facility: CLINIC | Age: 37
End: 2022-09-22
Payer: COMMERCIAL

## 2022-09-22 DIAGNOSIS — J45.40 MODERATE PERSISTENT ASTHMA WITHOUT COMPLICATION: Primary | ICD-10-CM

## 2022-09-22 PROCEDURE — 99999 PR PBB SHADOW E&M-EST. PATIENT-LVL I: CPT | Mod: PBBFAC,,,

## 2022-09-22 PROCEDURE — 99499 UNLISTED E&M SERVICE: CPT | Mod: S$GLB,,, | Performed by: ALLERGY & IMMUNOLOGY

## 2022-09-22 PROCEDURE — 99499 NO LOS: ICD-10-PCS | Mod: S$GLB,,, | Performed by: ALLERGY & IMMUNOLOGY

## 2022-09-22 PROCEDURE — 99999 PR PBB SHADOW E&M-EST. PATIENT-LVL I: ICD-10-PCS | Mod: PBBFAC,,,

## 2022-09-22 PROCEDURE — 96372 PR INJECTION,THERAP/PROPH/DIAG2ST, IM OR SUBCUT: ICD-10-PCS | Mod: S$GLB,,, | Performed by: ALLERGY & IMMUNOLOGY

## 2022-09-22 PROCEDURE — 96372 THER/PROPH/DIAG INJ SC/IM: CPT | Mod: S$GLB,,, | Performed by: ALLERGY & IMMUNOLOGY

## 2022-09-23 ENCOUNTER — HOSPITAL ENCOUNTER (OUTPATIENT)
Dept: RADIOLOGY | Facility: HOSPITAL | Age: 37
Discharge: HOME | End: 2022-09-23
Attending: OBSTETRICS & GYNECOLOGY
Payer: COMMERCIAL

## 2022-09-23 DIAGNOSIS — N92.0 MENORRHAGIA WITH REGULAR CYCLE: ICD-10-CM

## 2022-09-27 LAB
FINAL PATHOLOGIC DIAGNOSIS: NORMAL
Lab: NORMAL

## 2022-09-27 NOTE — PROGRESS NOTES
[facility] OUTPATIENT THERAPY AND WELLNESS   Physical Therapy Treatment Note     Name: Lucía Steele [facility] Number: [id number]    Therapy Diagnosis:   Encounter Diagnoses   Name Primary?    Decreased strength of lower extremity Yes    Decreased ROM of lumbar spine     Decreased functional activity tolerance      Physician: Yanci Sierra PA-C    Visit Date: 9/28/2022    Physician Orders: PT Eval and Treat   Medical Diagnosis from Referral: M54.42,M54.41,G89.29 (ICD-10-CM) - Chronic bilateral low back pain with bilateral sciatica  M43.16 (ICD-10-CM) - Spondylolisthesis of lumbar region  Evaluation Date: 9/14/2022  Authorization Period Expiration: 12/31/22  Plan of Care Expiration: 11/14/22  Progress Note Due: 10/14/22  Visit # / Visits authorized: 2 / 20 auth (1/1 eval)  FOTO: 1 / 3 (9/14/22 - IE)     Precautions: Anxiety, Asthma, HTN, Type 2 Diabetes, Class 3 Obesity     PTA Visit #: 0 / 5     Time In: 8:25 AM  Time Out: 9:05 AM  Total Billable Time: 40 minutes    SUBJECTIVE     Pt reports: that she is still having back pain especially when standing and doing activities.    She was compliant with home exercise program.  Response to previous treatment: mild soreness that improved over time  Functional change: no functional change at this time - in progress    Pain: 0/10, 6/10 when standing and moving around   Location: bilateral back    OBJECTIVE     Objective Measures updated at progress report unless specified.     Treatment     Lucía received the treatments listed below:      Therapeutic Exercises to develop strength, endurance, ROM, flexibility, posture, and core stabilization for 40 minutes including:     Ball Flexion Stretch x2 min  Recumbent Bike x5 min (lvl 2; seat 6)  Stability Ball Press Down with TA 3x10 reps  Long Sitting hamstring stretch x2 min each  Supine Hip Adduction x2 min  SL Clamshells x2 min + RTB around knees (B)  Supine Posterior Pelvic Tilt x20 reps  Supine DKTC with Stability Ball x1 min  TA  Contraction with LE March x2 min + RTB around knees  Standing Lat Pull Down with TA Contraction x30 reps (#13)  Standing Lat Stretch 5x5s      Possible Next Session: Dry Needling, SAQ with TA    Patient Education and Home Exercises     Home Exercises Provided and Patient Education Provided     Education provided:   - Home Exercise Program Administration and Review  - Post Exercise Soreness  - Maintaining a pain free range of motion with all activities  - Anatomy/Physiology of the Lumbar Spine and the surrounding musculature    Written Home Exercises Provided: Patient instructed to cont prior HEP and yes. Exercises were reviewed and Lucía was able to demonstrate them prior to the end of the session.  Lucía demonstrated good  understanding of the education provided. See EMR under Patient Instructions for exercises provided during therapy sessions    ASSESSMENT     Patient tolerated therapy fairly well overall today. Emphasis continues to be placed on core activation with all activities to help protect the lumbar spine. Introduced side-lying clamshells today with mild resistance - appropriate activation of the glute med was noted. May utilize dry needling next session for improved tissue extensibility.     Lucía Is progressing well towards her goals.   Pt prognosis is Fair.     Pt will continue to benefit from skilled outpatient physical therapy to address the deficits listed in the problem list box on initial evaluation, provide pt/family education and to maximize pt's level of independence in the home and community environment.     Pt's spiritual, cultural and educational needs considered and pt agreeable to plan of care and goals.     Anticipated barriers to physical therapy: none    Goals:  Short Term Goals: 4 weeks   - Patient will demonstrate improved ability to ambulate at least 10 minutes with minimal to no increase in symptoms for improved physical activity. (progressing, not met)  - Patient will be able to  perform a squat with proper mechanics, no signs of knee valgus, and minimal to no increase in symptoms for increased ability to perform household and work related tasks. (progressing, not met)  - Patient will be able to stand for at least 10 minutes with minimal to no increase in symptoms for increased ability to perform ADL's. (progressing, not met)  - Patient will demonstrate improved hip abduction strength, by at least 1/2 grade via MMT for improved functional mobility overall. (progressing, not met)     Long Term Goals: 8 weeks   - Patient will demonstrate improved ability to ambulate at least 15 minutes with minimal to no increase in symptoms for improved physical activity. (progressing, not met)  - Patient will be able to stand for at least 15 minutes with minimal to no increase in symptoms for increased ability to perform ADL's. (progressing, not met)  - Patient will demonstrate improved hip extension strength, by at least 1/2 grade via MMT for improved functional mobility overall. (progressing, not met)  - Patient will demonstrate independence with Home Exercise Program for continued improvement outside the clinical setting. (progressing, not met)    PLAN     Continue with PT POC to address functional deficits.     Possible Next Session: Dry Needling, SAQ with GABRIEL Green, PT, DPT, Cert. DN

## 2022-09-28 ENCOUNTER — CLINICAL SUPPORT (OUTPATIENT)
Dept: REHABILITATION | Facility: HOSPITAL | Age: 37
End: 2022-09-28
Payer: COMMERCIAL

## 2022-09-28 DIAGNOSIS — M53.86 DECREASED ROM OF LUMBAR SPINE: ICD-10-CM

## 2022-09-28 DIAGNOSIS — R29.898 DECREASED STRENGTH OF LOWER EXTREMITY: Primary | ICD-10-CM

## 2022-09-28 DIAGNOSIS — R68.89 DECREASED FUNCTIONAL ACTIVITY TOLERANCE: ICD-10-CM

## 2022-09-28 PROCEDURE — 97110 THERAPEUTIC EXERCISES: CPT | Mod: PN

## 2022-09-28 NOTE — PROGRESS NOTES
[facility]   Physical Therapy Treatment Note     Name: Lucía Steele [facility] Number: [id number]    Therapy Diagnosis:   Encounter Diagnoses   Name Primary?    Decreased strength of lower extremity Yes    Decreased ROM of lumbar spine     Decreased functional activity tolerance      Physician: Yanci Sierra PA-C    Visit Date: 9/30/2022    Physician Orders: PT Eval and Treat   Medical Diagnosis from Referral: M54.42,M54.41,G89.29 (ICD-10-CM) - Chronic bilateral low back pain with bilateral sciatica  M43.16 (ICD-10-CM) - Spondylolisthesis of lumbar region  Evaluation Date: 9/14/2022  Authorization Period Expiration: 12/31/22  Plan of Care Expiration: 11/14/22  Progress Note Due: 10/14/22  Visit # / Visits authorized: 3 / 20 auth (1/1 eval)  FOTO: 1 / 3 (9/14/22 - IE)     Precautions: Anxiety, Asthma, HTN, Type 2 Diabetes, Class 3 Obesity     PTA Visit #: 0 / 5     Time In: 8:50 AM (pt arrived late)  Time Out: 9:30 AM  Total Billable Time: 40 minutes    SUBJECTIVE     Pt reports: that she has been struggling this morning due to increased back pain. Felt pretty good after last session. Also having some leg pain/tightness on the right side.    She was compliant with home exercise program.  Response to previous treatment: mild soreness that improved over time  Functional change: no functional change at this time - in progress    Pain: 0/10, 6/10 when standing and moving around   Location: bilateral back and back of right leg    OBJECTIVE     Objective Measures updated at progress report unless specified.     Treatment     Lucía received the treatments listed below:      Therapeutic Exercises to develop strength, endurance, ROM, flexibility, posture, and core stabilization for 25 minutes including:     Ball Flexion Stretch x2 min  Seated Hamstring Stretch 10x10s (R only)  Recumbent Bike x5 min (lvl 2; seat 6)  Stability Ball Press Down with TA 3x10 reps  Standing Lat Pull Down with TA  Contraction x30 reps (#13)      Pt received Manual Therapy techniques in the form of Dry Needling for x15 min. This was applied to the Lumbar Paraspinals B. Dry needling was performed to decrease inflammation, increase circulation, decrease pain and restore homeostasis.      50 mm needles with 30 mm gauge were used for all insertion points. Patient prone lying for proper positioning and comfort. Bolster under LE's.    Electrical Stimulation was applied this date while needles were in situ x5 minutes. Intensity increased to patient tolerance. No adverse reactions noted.    Patient gave Written and Verbal consent to undergo dry needling. Written consent can be found in the media section in pts chart. All needles were removed and changes in signs and symptoms were assessed. No adverse reactions noted at the conclusion of the treatment.     Patient received a hot pack to the lumbar musculature at the conclusion of the treatment session x5 minutes for improved tissue extensibility and decreased pain. Patient prone with bolster under LE's. No adverse reactions observed.    Possible Next Session: Dry Needling, SAQ with TA    Patient Education and Home Exercises     Home Exercises Provided and Patient Education Provided     Education provided:   - Home Exercise Program Review  - Post Exercise Soreness  - Maintaining a pain free range of motion with all activities  - Anatomy/Physiology of the Lumbar Spine and the surrounding musculature    Written Home Exercises Provided: Patient instructed to cont prior HEP and yes. Exercises were reviewed and Lucía was able to demonstrate them prior to the end of the session.  Lucía demonstrated good  understanding of the education provided. See EMR under Patient Instructions for exercises provided during therapy sessions    ASSESSMENT     Patient with increased symptoms today but was able to perform all prescribed activities fairly well. Proper core activation with all activities was  emphasized. Dry needling was introduced this date with the specific focus of targeting the lumbar paraspinals. Appropriate twitch response observed. Skin was cleaned pre and post needle insertion/removal with no adverse reactions observed. Will continue to progress patient next session as able.    Lucía Is progressing well towards her goals.   Pt prognosis is Fair.     Pt will continue to benefit from skilled outpatient physical therapy to address the deficits listed in the problem list box on initial evaluation, provide pt/family education and to maximize pt's level of independence in the home and community environment.     Pt's spiritual, cultural and educational needs considered and pt agreeable to plan of care and goals.     Anticipated barriers to physical therapy: none    Goals:  Short Term Goals: 4 weeks   - Patient will demonstrate improved ability to ambulate at least 10 minutes with minimal to no increase in symptoms for improved physical activity. (progressing, not met)  - Patient will be able to perform a squat with proper mechanics, no signs of knee valgus, and minimal to no increase in symptoms for increased ability to perform household and work related tasks. (progressing, not met)  - Patient will be able to stand for at least 10 minutes with minimal to no increase in symptoms for increased ability to perform ADL's. (progressing, not met)  - Patient will demonstrate improved hip abduction strength, by at least 1/2 grade via MMT for improved functional mobility overall. (progressing, not met)     Long Term Goals: 8 weeks   - Patient will demonstrate improved ability to ambulate at least 15 minutes with minimal to no increase in symptoms for improved physical activity. (progressing, not met)  - Patient will be able to stand for at least 15 minutes with minimal to no increase in symptoms for increased ability to perform ADL's. (progressing, not met)  - Patient will demonstrate improved hip extension  strength, by at least 1/2 grade via MMT for improved functional mobility overall. (progressing, not met)  - Patient will demonstrate independence with Home Exercise Program for continued improvement outside the clinical setting. (progressing, not met)    PLAN     Continue with PT POC to address functional deficits.     Possible Next Session: Dry Needling, SAQ with GABRIEL Green, PT, DPT, Cert. DN

## 2022-09-29 ENCOUNTER — HOSPITAL ENCOUNTER (OUTPATIENT)
Dept: RADIOLOGY | Facility: HOSPITAL | Age: 37
Discharge: HOME | End: 2022-09-29
Attending: OBSTETRICS & GYNECOLOGY
Payer: COMMERCIAL

## 2022-09-29 PROCEDURE — 76830 TRANSVAGINAL US NON-OB: CPT | Mod: 26,,, | Performed by: RADIOLOGY

## 2022-09-29 PROCEDURE — 76830 US PELVIS COMP WITH TRANSVAG NON-OB (XPD): ICD-10-PCS | Mod: 26,,, | Performed by: RADIOLOGY

## 2022-09-29 PROCEDURE — 76856 US PELVIS COMP WITH TRANSVAG NON-OB (XPD): ICD-10-PCS | Mod: 26,,, | Performed by: RADIOLOGY

## 2022-09-29 PROCEDURE — 76856 US EXAM PELVIC COMPLETE: CPT | Mod: 26,,, | Performed by: RADIOLOGY

## 2022-09-29 PROCEDURE — 76830 TRANSVAGINAL US NON-OB: CPT | Mod: TC,PO

## 2022-09-30 ENCOUNTER — TELEPHONE (OUTPATIENT)
Dept: FAMILY MEDICINE | Facility: CLINIC | Age: 37
End: 2022-09-30

## 2022-09-30 ENCOUNTER — CLINICAL SUPPORT (OUTPATIENT)
Dept: REHABILITATION | Facility: HOSPITAL | Age: 37
End: 2022-09-30
Payer: COMMERCIAL

## 2022-09-30 ENCOUNTER — CLINICAL SUPPORT (OUTPATIENT)
Dept: FAMILY MEDICINE | Facility: CLINIC | Age: 37
End: 2022-09-30
Payer: COMMERCIAL

## 2022-09-30 DIAGNOSIS — E53.8 B12 DEFICIENCY: Primary | ICD-10-CM

## 2022-09-30 DIAGNOSIS — R68.89 DECREASED FUNCTIONAL ACTIVITY TOLERANCE: ICD-10-CM

## 2022-09-30 DIAGNOSIS — R29.898 DECREASED STRENGTH OF LOWER EXTREMITY: Primary | ICD-10-CM

## 2022-09-30 DIAGNOSIS — M53.86 DECREASED ROM OF LUMBAR SPINE: ICD-10-CM

## 2022-09-30 PROCEDURE — 96372 THER/PROPH/DIAG INJ SC/IM: CPT | Mod: S$GLB,,, | Performed by: FAMILY MEDICINE

## 2022-09-30 PROCEDURE — 99999 PR PBB SHADOW E&M-EST. PATIENT-LVL I: ICD-10-PCS | Mod: PBBFAC,,,

## 2022-09-30 PROCEDURE — 96372 PR INJECTION,THERAP/PROPH/DIAG2ST, IM OR SUBCUT: ICD-10-PCS | Mod: S$GLB,,, | Performed by: FAMILY MEDICINE

## 2022-09-30 PROCEDURE — 97110 THERAPEUTIC EXERCISES: CPT | Mod: PN

## 2022-09-30 PROCEDURE — 99999 PR PBB SHADOW E&M-EST. PATIENT-LVL I: CPT | Mod: PBBFAC,,,

## 2022-09-30 PROCEDURE — 97140 MANUAL THERAPY 1/> REGIONS: CPT | Mod: PN

## 2022-09-30 RX ORDER — CYANOCOBALAMIN 1000 UG/ML
1000 INJECTION, SOLUTION INTRAMUSCULAR; SUBCUTANEOUS
Status: COMPLETED | OUTPATIENT
Start: 2022-09-30 | End: 2022-09-30

## 2022-09-30 RX ADMIN — CYANOCOBALAMIN 1000 MCG: 1000 INJECTION, SOLUTION INTRAMUSCULAR; SUBCUTANEOUS at 02:09

## 2022-09-30 NOTE — TELEPHONE ENCOUNTER
I have signed for the following orders AND/OR meds.  Please call the patient and ask the patient to schedule the testing AND/OR inform about any medications that were sent.      No orders of the defined types were placed in this encounter.      Medications Ordered This Encounter   Medications    cyanocobalamin injection 1,000 mcg

## 2022-10-03 ENCOUNTER — CLINICAL SUPPORT (OUTPATIENT)
Dept: REHABILITATION | Facility: HOSPITAL | Age: 37
End: 2022-10-03
Payer: COMMERCIAL

## 2022-10-03 DIAGNOSIS — R68.89 DECREASED FUNCTIONAL ACTIVITY TOLERANCE: ICD-10-CM

## 2022-10-03 DIAGNOSIS — M53.86 DECREASED ROM OF LUMBAR SPINE: ICD-10-CM

## 2022-10-03 DIAGNOSIS — R29.898 DECREASED STRENGTH OF LOWER EXTREMITY: Primary | ICD-10-CM

## 2022-10-03 PROCEDURE — 97110 THERAPEUTIC EXERCISES: CPT | Mod: PN,CQ

## 2022-10-03 NOTE — PROGRESS NOTES
[facility]   Physical Therapy Treatment Note     Name: Lucía Steele [facility] Number: [id number]    Therapy Diagnosis:   Encounter Diagnoses   Name Primary?    Decreased strength of lower extremity Yes    Decreased ROM of lumbar spine     Decreased functional activity tolerance      Physician: Yanci Sierra PA-C    Visit Date: 10/5/2022    Physician Orders: PT Eval and Treat   Medical Diagnosis from Referral: M54.42,M54.41,G89.29 (ICD-10-CM) - Chronic bilateral low back pain with bilateral sciatica  M43.16 (ICD-10-CM) - Spondylolisthesis of lumbar region  Evaluation Date: 9/14/2022  Authorization Period Expiration: 12/31/22  Plan of Care Expiration: 11/14/22  Progress Note Due: 10/14/22  Visit # / Visits authorized: 5 / 20 auth (1/1 eval)  FOTO: 1 / 3 (9/14/22 - IE)     Precautions: Anxiety, Asthma, HTN, Type 2 Diabetes, Class 3 Obesity     PTA Visit #: 0 / 5     Time In: 8:35 AM (pt arrived late)  Time Out: 9:00 AM  Total Billable Time: 25 minutes    SUBJECTIVE     Pt reports: that she did have some relief after the dry needling but it did not last more than about 1 day. She ended up taking a gabapentin last night and is still hurting this morning. She needs to leave early today to make an appointment at her child's school.    She was compliant with home exercise program.  Response to previous treatment: mild soreness that improved over time  Functional change: no functional change at this time - in progress    Pain: 0/10, 6/10 when standing and moving around   Location: bilateral back and back of right leg     OBJECTIVE     Objective Measures updated at progress report unless specified.     Treatment     Lucía received the treatments listed below:      Therapeutic Exercises to develop strength, endurance, ROM, flexibility, posture, and core stabilization for 2 minutes including:     Recumbent Bike x2 min (lvl 2; seat 6)    Pt received Manual Therapy techniques in the form of Dry  Needling for 23 min. This was applied to the Lumbar Paraspinals B. Dry needling was performed to decrease inflammation, increase circulation, decrease pain and restore homeostasis.     50 mm needles with 30 mm gauge were used for all insertion points. Patient prone lying for proper positioning and comfort. Bolster under LE's.     Electrical Stimulation was applied this date while needles were in situ 8 minutes. Intensity increased to patient tolerance. No adverse reactions noted.     Patient gave Verbal consent to undergo dry needling. Written consent can be found in the media section in pts chart. All needles were removed and changes in signs and symptoms were assessed. No adverse reactions noted at the conclusion of the treatment.     Patient received a hot pack to the lumbar musculature at the conclusion of the treatment session 5 minutes for improved tissue extensibility and decreased pain. Patient prone with bolster under LE's. No adverse reactions observed.    Patient Education and Home Exercises     Home Exercises Provided and Patient Education Provided     Education provided:   - Home Exercise Program Review  - Post Exercise Soreness  - Maintaining a pain free range of motion with all activities  - Anatomy/Physiology of the Lumbar Spine and the surrounding musculature    Written Home Exercises Provided: Patient instructed to cont prior HEP and yes. Exercises were reviewed and Lucía was able to demonstrate them prior to the end of the session.  Lucía demonstrated good  understanding of the education provided. See EMR under Patient Instructions for exercises provided during therapy sessions    ASSESSMENT     Patient with limited time today due to late arrival and early departure. Dry needling was performed again this date with the specific focus of improving tissue extensibility and for pain relief along the lumbar spine. Skin was cleaned pre and post needle insertion/removal with no adverse reactions observed.  Encouraged patient to continue with Home Exercise Program. Will continue to assess patient next session.    Lucía Is progressing well towards her goals.   Pt prognosis is Fair.     Pt will continue to benefit from skilled outpatient physical therapy to address the deficits listed in the problem list box on initial evaluation, provide pt/family education and to maximize pt's level of independence in the home and community environment.     Pt's spiritual, cultural and educational needs considered and pt agreeable to plan of care and goals.     Anticipated barriers to physical therapy: none    Goals:  Short Term Goals: 4 weeks   - Patient will demonstrate improved ability to ambulate at least 10 minutes with minimal to no increase in symptoms for improved physical activity. (progressing, not met)  - Patient will be able to perform a squat with proper mechanics, no signs of knee valgus, and minimal to no increase in symptoms for increased ability to perform household and work related tasks. (progressing, not met)  - Patient will be able to stand for at least 10 minutes with minimal to no increase in symptoms for increased ability to perform ADL's. (progressing, not met)  - Patient will demonstrate improved hip abduction strength, by at least 1/2 grade via MMT for improved functional mobility overall. (progressing, not met)     Long Term Goals: 8 weeks   - Patient will demonstrate improved ability to ambulate at least 15 minutes with minimal to no increase in symptoms for improved physical activity. (progressing, not met)  - Patient will be able to stand for at least 15 minutes with minimal to no increase in symptoms for increased ability to perform ADL's. (progressing, not met)  - Patient will demonstrate improved hip extension strength, by at least 1/2 grade via MMT for improved functional mobility overall. (progressing, not met)  - Patient will demonstrate independence with Home Exercise Program for continued  improvement outside the clinical setting. (progressing, not met)    PLAN     Continue with PT POC to address functional deficits.     Dilia Green, PT, DPT, Cert. DN

## 2022-10-03 NOTE — PROGRESS NOTES
[facility]   Physical Therapy Treatment Note     Name: Lucía Steele [facility] Number: [id number]    Therapy Diagnosis:   Encounter Diagnoses   Name Primary?    Decreased strength of lower extremity Yes    Decreased ROM of lumbar spine     Decreased functional activity tolerance        Physician: Yanci Sierra PA-C    Visit Date: 10/3/2022    Physician Orders: PT Eval and Treat   Medical Diagnosis from Referral: M54.42,M54.41,G89.29 (ICD-10-CM) - Chronic bilateral low back pain with bilateral sciatica  M43.16 (ICD-10-CM) - Spondylolisthesis of lumbar region  Evaluation Date: 9/14/2022  Authorization Period Expiration: 12/31/22  Plan of Care Expiration: 11/14/22  Progress Note Due: 10/14/22  Visit # / Visits authorized: 4 / 20 auth (1/1 eval)  FOTO: 1 / 3 (9/14/22 - IE)     Precautions: Anxiety, Asthma, HTN, Type 2 Diabetes, Class 3 Obesity     PTA Visit #: 1 / 5     Time In: 830 AM  Time Out: 910 AM  Total Billable Time: 40 minutes    SUBJECTIVE     Pt reports: the knee is really aching this morning. She felt okay after last session. She feels that the needling helped last session.    She was compliant with home exercise program.  Response to previous treatment: mild soreness that improved over time  Functional change: no functional change at this time - in progress    Pain: 0/10, 6/10 when standing and moving around   Location: bilateral back and back of right leg     OBJECTIVE     Objective Measures updated at progress report unless specified.     Treatment     Lucía received the treatments listed below:      Therapeutic Exercises to develop strength, endurance, ROM, flexibility, posture, and core stabilization for 40 minutes including:     Recumbent Bike x5 min (lvl 2; seat 6)  Ball Flexion Stretch x2 min  Seated Hamstring Stretch 10x10s (R only)  Stability Ball Press Down with TA 3x10 reps  Supine Hip Adduction x2 min  Supine Posterior Pelvic Tilt x20 reps  Supine DKTC with  Stability Ball x1 min  TA Contraction with LE March x2 min + RTB around knees  SL Clamshells x2 min + RTB around knees (B)  Standing Lat Stretch 5x5s   Standing Lat Pull Down with TA Contraction (#13) x30 reps    Pt received Manual Therapy techniques in the form of Dry Needling for 00 min. This was applied to the Lumbar Paraspinals B. Dry needling was performed to decrease inflammation, increase circulation, decrease pain and restore homeostasis. 50 mm needles with 30 mm gauge were used for all insertion points. Patient prone lying for proper positioning and comfort. Bolster under LE's. Electrical Stimulation was applied this date while needles were in situ 00 minutes. Intensity increased to patient tolerance. No adverse reactions noted. Patient gave Written and Verbal consent to undergo dry needling. Written consent can be found in the media section in pts chart. All needles were removed and changes in signs and symptoms were assessed. No adverse reactions noted at the conclusion of the treatment.     Patient received a hot pack to the lumbar musculature at the conclusion of the treatment session 00 minutes for improved tissue extensibility and decreased pain. Patient prone with bolster under LE's. No adverse reactions observed.    Possible Next Session: SAQ with TA    Patient Education and Home Exercises     Home Exercises Provided and Patient Education Provided     Education provided:   - Home Exercise Program Review  - Post Exercise Soreness  - Maintaining a pain free range of motion with all activities  - Anatomy/Physiology of the Lumbar Spine and the surrounding musculature    Written Home Exercises Provided: Patient instructed to cont prior HEP and yes. Exercises were reviewed and Lucía was able to demonstrate them prior to the end of the session.  Lucía demonstrated good  understanding of the education provided. See EMR under Patient Instructions for exercises provided during therapy sessions    ASSESSMENT      Patient reported provocation of back pain before the conclusion of double knee to chest exercise so this was stopped. She was able to get some relief in this pain with standing lat stretch. She also reported muscular fatigue at the end of lat pull downs. She expressed interest in receiving dry needling again at next session.    Lucía Is progressing well towards her goals.   Pt prognosis is Fair.     Pt will continue to benefit from skilled outpatient physical therapy to address the deficits listed in the problem list box on initial evaluation, provide pt/family education and to maximize pt's level of independence in the home and community environment.     Pt's spiritual, cultural and educational needs considered and pt agreeable to plan of care and goals.     Anticipated barriers to physical therapy: none    Goals:  Short Term Goals: 4 weeks   - Patient will demonstrate improved ability to ambulate at least 10 minutes with minimal to no increase in symptoms for improved physical activity. (progressing, not met)  - Patient will be able to perform a squat with proper mechanics, no signs of knee valgus, and minimal to no increase in symptoms for increased ability to perform household and work related tasks. (progressing, not met)  - Patient will be able to stand for at least 10 minutes with minimal to no increase in symptoms for increased ability to perform ADL's. (progressing, not met)  - Patient will demonstrate improved hip abduction strength, by at least 1/2 grade via MMT for improved functional mobility overall. (progressing, not met)     Long Term Goals: 8 weeks   - Patient will demonstrate improved ability to ambulate at least 15 minutes with minimal to no increase in symptoms for improved physical activity. (progressing, not met)  - Patient will be able to stand for at least 15 minutes with minimal to no increase in symptoms for increased ability to perform ADL's. (progressing, not met)  - Patient will  demonstrate improved hip extension strength, by at least 1/2 grade via MMT for improved functional mobility overall. (progressing, not met)  - Patient will demonstrate independence with Home Exercise Program for continued improvement outside the clinical setting. (progressing, not met)    PLAN     Continue with PT POC to address functional deficits.     Lupillo Hamilton, PTA

## 2022-10-05 ENCOUNTER — CLINICAL SUPPORT (OUTPATIENT)
Dept: REHABILITATION | Facility: HOSPITAL | Age: 37
End: 2022-10-05
Payer: COMMERCIAL

## 2022-10-05 DIAGNOSIS — R29.898 DECREASED STRENGTH OF LOWER EXTREMITY: Primary | ICD-10-CM

## 2022-10-05 DIAGNOSIS — M53.86 DECREASED ROM OF LUMBAR SPINE: ICD-10-CM

## 2022-10-05 DIAGNOSIS — R68.89 DECREASED FUNCTIONAL ACTIVITY TOLERANCE: ICD-10-CM

## 2022-10-05 PROCEDURE — 97140 MANUAL THERAPY 1/> REGIONS: CPT | Mod: PN

## 2022-10-06 ENCOUNTER — HOSPITAL ENCOUNTER (OUTPATIENT)
Dept: RADIOLOGY | Facility: HOSPITAL | Age: 37
Discharge: HOME | End: 2022-10-06
Attending: PHYSICIAN ASSISTANT
Payer: COMMERCIAL

## 2022-10-06 ENCOUNTER — CLINICAL SUPPORT (OUTPATIENT)
Dept: ALLERGY | Facility: CLINIC | Age: 37
End: 2022-10-06
Payer: COMMERCIAL

## 2022-10-06 ENCOUNTER — PATIENT MESSAGE (OUTPATIENT)
Dept: OBSTETRICS AND GYNECOLOGY | Facility: CLINIC | Age: 37
End: 2022-10-06
Payer: COMMERCIAL

## 2022-10-06 ENCOUNTER — OFFICE VISIT (OUTPATIENT)
Dept: INTERNAL MEDICINE | Facility: CLINIC | Age: 37
End: 2022-10-06
Payer: COMMERCIAL

## 2022-10-06 VITALS
DIASTOLIC BLOOD PRESSURE: 72 MMHG | SYSTOLIC BLOOD PRESSURE: 120 MMHG | WEIGHT: 293 LBS | HEART RATE: 95 BPM | HEIGHT: 64 IN | TEMPERATURE: 97 F | OXYGEN SATURATION: 98 % | RESPIRATION RATE: 17 BRPM | BODY MASS INDEX: 50.02 KG/M2

## 2022-10-06 DIAGNOSIS — I15.2 HYPERTENSION ASSOCIATED WITH DIABETES: Chronic | ICD-10-CM

## 2022-10-06 DIAGNOSIS — J45.40 MODERATE PERSISTENT ASTHMA WITHOUT COMPLICATION: Primary | ICD-10-CM

## 2022-10-06 DIAGNOSIS — M79.672 LEFT FOOT PAIN: Primary | ICD-10-CM

## 2022-10-06 DIAGNOSIS — M79.672 LEFT FOOT PAIN: ICD-10-CM

## 2022-10-06 DIAGNOSIS — E11.59 HYPERTENSION ASSOCIATED WITH DIABETES: Chronic | ICD-10-CM

## 2022-10-06 DIAGNOSIS — N92.0 MENORRHAGIA WITH REGULAR CYCLE: Primary | ICD-10-CM

## 2022-10-06 DIAGNOSIS — J30.89 ALLERGIC RHINITIS DUE TO AMERICAN HOUSE DUST MITE: ICD-10-CM

## 2022-10-06 PROCEDURE — 3044F PR MOST RECENT HEMOGLOBIN A1C LEVEL <7.0%: ICD-10-PCS | Mod: CPTII,S$GLB,, | Performed by: PHYSICIAN ASSISTANT

## 2022-10-06 PROCEDURE — 99999 PR PBB SHADOW E&M-EST. PATIENT-LVL I: ICD-10-PCS | Mod: PBBFAC,,,

## 2022-10-06 PROCEDURE — 96372 PR INJECTION,THERAP/PROPH/DIAG2ST, IM OR SUBCUT: ICD-10-PCS | Mod: S$GLB,,, | Performed by: ALLERGY & IMMUNOLOGY

## 2022-10-06 PROCEDURE — 73630 X-RAY EXAM OF FOOT: CPT | Mod: 26,LT,, | Performed by: RADIOLOGY

## 2022-10-06 PROCEDURE — 1159F MED LIST DOCD IN RCRD: CPT | Mod: CPTII,S$GLB,, | Performed by: PHYSICIAN ASSISTANT

## 2022-10-06 PROCEDURE — 3074F SYST BP LT 130 MM HG: CPT | Mod: CPTII,S$GLB,, | Performed by: PHYSICIAN ASSISTANT

## 2022-10-06 PROCEDURE — 99999 PR PBB SHADOW E&M-EST. PATIENT-LVL IV: CPT | Mod: PBBFAC,,, | Performed by: PHYSICIAN ASSISTANT

## 2022-10-06 PROCEDURE — 73630 X-RAY EXAM OF FOOT: CPT | Mod: TC,LT

## 2022-10-06 PROCEDURE — 3078F PR MOST RECENT DIASTOLIC BLOOD PRESSURE < 80 MM HG: ICD-10-PCS | Mod: CPTII,S$GLB,, | Performed by: PHYSICIAN ASSISTANT

## 2022-10-06 PROCEDURE — 99214 PR OFFICE/OUTPT VISIT, EST, LEVL IV, 30-39 MIN: ICD-10-PCS | Mod: S$GLB,,, | Performed by: PHYSICIAN ASSISTANT

## 2022-10-06 PROCEDURE — 99214 OFFICE O/P EST MOD 30 MIN: CPT | Mod: S$GLB,,, | Performed by: PHYSICIAN ASSISTANT

## 2022-10-06 PROCEDURE — 1160F PR REVIEW ALL MEDS BY PRESCRIBER/CLIN PHARMACIST DOCUMENTED: ICD-10-PCS | Mod: CPTII,S$GLB,, | Performed by: PHYSICIAN ASSISTANT

## 2022-10-06 PROCEDURE — 3074F PR MOST RECENT SYSTOLIC BLOOD PRESSURE < 130 MM HG: ICD-10-PCS | Mod: CPTII,S$GLB,, | Performed by: PHYSICIAN ASSISTANT

## 2022-10-06 PROCEDURE — 3078F DIAST BP <80 MM HG: CPT | Mod: CPTII,S$GLB,, | Performed by: PHYSICIAN ASSISTANT

## 2022-10-06 PROCEDURE — 3044F HG A1C LEVEL LT 7.0%: CPT | Mod: CPTII,S$GLB,, | Performed by: PHYSICIAN ASSISTANT

## 2022-10-06 PROCEDURE — 96372 THER/PROPH/DIAG INJ SC/IM: CPT | Mod: S$GLB,,, | Performed by: ALLERGY & IMMUNOLOGY

## 2022-10-06 PROCEDURE — 73630 XR FOOT COMPLETE 3 VIEW LEFT: ICD-10-PCS | Mod: 26,LT,, | Performed by: RADIOLOGY

## 2022-10-06 PROCEDURE — 3008F BODY MASS INDEX DOCD: CPT | Mod: CPTII,S$GLB,, | Performed by: PHYSICIAN ASSISTANT

## 2022-10-06 PROCEDURE — 99499 NO LOS: ICD-10-PCS | Mod: S$GLB,,, | Performed by: ALLERGY & IMMUNOLOGY

## 2022-10-06 PROCEDURE — 3008F PR BODY MASS INDEX (BMI) DOCUMENTED: ICD-10-PCS | Mod: CPTII,S$GLB,, | Performed by: PHYSICIAN ASSISTANT

## 2022-10-06 PROCEDURE — 1159F PR MEDICATION LIST DOCUMENTED IN MEDICAL RECORD: ICD-10-PCS | Mod: CPTII,S$GLB,, | Performed by: PHYSICIAN ASSISTANT

## 2022-10-06 PROCEDURE — 99999 PR PBB SHADOW E&M-EST. PATIENT-LVL IV: ICD-10-PCS | Mod: PBBFAC,,, | Performed by: PHYSICIAN ASSISTANT

## 2022-10-06 PROCEDURE — 99499 UNLISTED E&M SERVICE: CPT | Mod: S$GLB,,, | Performed by: ALLERGY & IMMUNOLOGY

## 2022-10-06 PROCEDURE — 99999 PR PBB SHADOW E&M-EST. PATIENT-LVL I: CPT | Mod: PBBFAC,,,

## 2022-10-06 PROCEDURE — 1160F RVW MEDS BY RX/DR IN RCRD: CPT | Mod: CPTII,S$GLB,, | Performed by: PHYSICIAN ASSISTANT

## 2022-10-06 RX ORDER — NAPROXEN 500 MG/1
500 TABLET ORAL 2 TIMES DAILY PRN
Qty: 30 TABLET | Refills: 0 | Status: SHIPPED | OUTPATIENT
Start: 2022-10-06 | End: 2022-10-16

## 2022-10-06 NOTE — PROGRESS NOTES
"Subjective:      Patient ID: Lucía Coreas is a 37 y.o. female.    Chief Complaint: Foot Pain    Patient is new to me, being seen today for R foot pain x1 day.  Burning and pain to foot.  Even sensitive for the bed sheet to touch it.  Mild swelling.  Denies known trauma/injury.  Did have PT yesterday but no problems during.  Pain worse with pressure.  Denies redness.  No rashes.    Takes gabapentin prn      PMH chronic low back pain w sciatica, B12 and D def, diabetes, polyarthralgia   Recent labs show B12 lab >2000, A1c 5.6%  Currently undergoing PT for low back pain, lower extremity weakness  Followed by Rheum for SLE    Last visit August 2022 with PCP.     Review of Systems   Constitutional:  Negative for chills, diaphoresis and fever.   HENT:  Negative for congestion, rhinorrhea and sore throat.    Respiratory:  Negative for cough, shortness of breath and wheezing.    Gastrointestinal:  Negative for abdominal pain, constipation, diarrhea, nausea and vomiting.   Musculoskeletal:  Positive for arthralgias.   Skin:  Negative for rash.   Neurological:  Negative for dizziness, light-headedness and headaches.     Objective:   /72   Pulse 95   Temp 97.1 °F (36.2 °C)   Resp 17   Ht 5' 4" (1.626 m)   Wt (!) 150 kg (330 lb 11 oz)   LMP 09/17/2022 (Exact Date)   SpO2 98%   BMI 56.76 kg/m²   Physical Exam  Constitutional:       General: She is not in acute distress.     Appearance: She is well-developed. She is not ill-appearing or diaphoretic.   HENT:      Head: Normocephalic and atraumatic.      Right Ear: External ear normal.      Left Ear: External ear normal.   Eyes:      General: Lids are normal.         Right eye: No discharge.         Left eye: No discharge.      Conjunctiva/sclera: Conjunctivae normal.      Right eye: Right conjunctiva is not injected.      Left eye: Left conjunctiva is not injected.   Cardiovascular:      Pulses:           Dorsalis pedis pulses are 2+ on the left side. "   Pulmonary:      Effort: Pulmonary effort is normal. No respiratory distress.   Musculoskeletal:      Left foot: Normal range of motion. Bony tenderness present. No swelling.        Feet:    Skin:     General: Skin is warm and dry.      Findings: No rash.   Neurological:      Mental Status: She is alert and oriented to person, place, and time.   Psychiatric:         Speech: Speech normal.         Behavior: Behavior normal.         Thought Content: Thought content normal.         Judgment: Judgment normal.     Assessment:      1. Left foot pain    2. Hypertension associated with diabetes       Plan:   Left foot pain  -     X-Ray Foot Complete 3 view Left; Future; Expected date: 10/06/2022  -     Cancel: Uric Acid; Future; Expected date: 10/06/2022  -     Uric Acid; Future; Expected date: 10/06/2022    Hypertension associated with diabetes   Controlled     Sensitive to diclofenac but no issues with other NSAIDs     Discussed RICE  Additional treatment pending above results     Discussed worsening signs/symptoms and when to return to clinic or go to ED.   Patient expresses understanding and agrees with treatment plan.

## 2022-10-06 NOTE — TELEPHONE ENCOUNTER
Spoke with pt and informed her results per Dr Crowley instructions:Fundal fibroid found  Could still try mirena  Needs auth if ready and make follow-up appt   Scheduled appointment for 10/13/22 at 8 am. Pt verbalized understanding.

## 2022-10-06 NOTE — PROGRESS NOTES
Xolair administered. Patient waited in clinic 30 min for observation. Pt had epi pen on hand. No reaction.

## 2022-10-11 NOTE — PROGRESS NOTES
[facility]   Physical Therapy Treatment Note & Re-assessment    [facility]  [facility] Number: [id number]    Therapy Diagnosis:   Encounter Diagnoses   Name Primary?    Decreased strength of lower extremity Yes    Decreased ROM of lumbar spine     Decreased functional activity tolerance      Physician: Yanci Sierra PA-C    Visit Date: 10/12/2022    Physician Orders: PT Eval and Treat   Medical Diagnosis from Referral: M54.42,M54.41,G89.29 (ICD-10-CM) - Chronic bilateral low back pain with bilateral sciatica  M43.16 (ICD-10-CM) - Spondylolisthesis of lumbar region  Evaluation Date: 9/14/2022  Authorization Period Expiration: 12/31/22  Plan of Care Expiration: 11/14/22  Progress Note Due: 11/12/22  Visit # / Visits authorized: 6 / 20 auth (1/1 eval)(re-assessed on 10/12/22)  FOTO: 2 / 3 (9/14/22 - IE, 10/12/22)     Precautions: Anxiety, Asthma, HTN, Type 2 Diabetes, Class 3 Obesity     PTA Visit #: 0 / 5     Time In: 8:27 AM  Time Out: 9:05 AM  Total Billable Time: 38 minutes    SUBJECTIVE     Pt reports: she is not hurting too bad this morning but standing and moving around continue to cause her back pain to increase. She does think that therapy has helped in terms of decreasing her symptoms slightly since starting. Walking continues to be difficult.     She was compliant with home exercise program.  Response to previous treatment: mild soreness that improved over time  Functional change: slight decrease in back pain    Pain: 0/10, 5/10 when standing and moving around   Location: bilateral back and back of right leg     OBJECTIVE     Objective Measures updated at progress report unless specified.     Observation/Posture: FAIR - rounded shoulders, slouched in sitting. Pleasant AAF.     Gait: Non-antalgic gait pattern in nature, no AD used. Decreased step length and tong present.     Lumbar Range of Motion:     Degrees Pain   Flexion 50% limited    - (tightness, stretching)          Extension 50% limited    +         Left Side Bending Distal lateral thigh -         Right Side Bending Distal lateral thigh -         Left rotation    WFL - (tightness)         Right Rotation    WFL  - (tightness)               Lower Extremity Strength  Right LE   Left LE     Knee extension: 4+/5 Knee extension: 4+/5   Knee flexion: 4/5 Knee flexion: 4/5   Hip flexion: 4/5 Hip flexion: 4+/5   Hip extension:  4+/5 Hip extension: 4+/5   Hip abduction: 4/5 Hip abduction: 4/5   Hip adduction: 4/5 Hip adduction 4/5   Ankle dorsiflexion: 5/5 Ankle dorsiflexion: 5/5   Ankle plantarflexion: 5/5 Ankle plantarflexion: 5/5      Pain = *     Special Tests:  -Repeated Flexion: -  -Repeated Ext: -     Neuro Dynamic Testing:               Sciatic nerve:                                       SLR:    R = -                                      L = -     Joint Mobility: decreased when moving into extension      Palpation: mild to moderate TTP along lumbar paraspinals, L piriformis musculature     Sensation: intact B to light touch     Flexibility:               Hamstring Test: R = 60 degrees ; L = 60 degrees        Limitation/Restriction for FOTO Lumbar Spine Survey     Therapist reviewed FOTO scores for Lucía Coreas on 10/12/2022.   FOTO documents entered into [employer] - see Media section.     Limitation Score: 53%     Treatment     Lucía received the treatments listed below:      Therapeutic Exercises to develop strength, endurance, ROM, flexibility, posture, and core stabilization for 15 minutes including: (billing for re-assessment and therapeutic exercise)     Recumbent Bike x5 min (lvl 2; seat 6)  Ball Flexion Stretch x2 min    Pt received Manual Therapy techniques in the form of Dry Needling for 23 min. This was applied to the Lumbar Paraspinals B. Dry needling was performed to decrease inflammation, increase circulation, decrease pain and restore homeostasis.     50 mm needles with 30 mm gauge were used for all insertion  points. Patient prone lying for proper positioning and comfort. Bolster under LE's was denied today.    Electrical Stimulation was applied this date while needles were in situ 8 minutes. Intensity increased to patient tolerance. No adverse reactions noted.     Patient gave Verbal consent to undergo dry needling. Written consent can be found in the media section in pts chart. All needles were removed and changes in signs and symptoms were assessed. No adverse reactions noted at the conclusion of the treatment.     Patient received a hot pack to the lumbar musculature at the conclusion of the treatment session 5 minutes for improved tissue extensibility and decreased pain. Patient prone with bolster under LE's. No adverse reactions observed.    Patient Education and Home Exercises     Home Exercises Provided and Patient Education Provided     Education provided:   - Home Exercise Program Review  - Post Exercise Soreness  - Maintaining a pain free range of motion with all activities  - Anatomy/Physiology of the Lumbar Spine and the surrounding musculature    Written Home Exercises Provided: Patient instructed to cont prior HEP and yes. Exercises were reviewed and Lucía was able to demonstrate them prior to the end of the session.  Lucía demonstrated good  understanding of the education provided. See EMR under Patient Instructions for exercises provided during therapy sessions    ASSESSMENT     Upon re-assessment, noted patient to demonstrate an improved FOTO score, indicative of increased ability to perform ADL's. However, pain remains the most limiting aspect. The patient continues to have difficulty standing and walking. LE strength has improved slightly, but the RLE remains slightly weaker compared to the left. Back pain was reproduced with standing extension. Dry needling has been a positive form of treatment that contributes to pain reduction. Overall, patient will continue to benefit from skilled outpatient PT  services to continue addressing the above stated deficits. No adverse reactions with dry needling today - skin was cleaned pre and post needle insertion/removal with no adverse reactions.     Lucía Is progressing well towards her goals.   Pt prognosis is Fair.     Pt will continue to benefit from skilled outpatient physical therapy to address the deficits listed in the problem list box on initial evaluation, provide pt/family education and to maximize pt's level of independence in the home and community environment.     Pt's spiritual, cultural and educational needs considered and pt agreeable to plan of care and goals.     Anticipated barriers to physical therapy: none    Goals:  Short Term Goals: 4 weeks   - Patient will demonstrate improved ability to ambulate at least 10 minutes with minimal to no increase in symptoms for improved physical activity. (progressing, not met)  - Patient will be able to perform a squat with proper mechanics, no signs of knee valgus, and minimal to no increase in symptoms for increased ability to perform household and work related tasks. (progressing, not met)  - Patient will be able to stand for at least 10 minutes with minimal to no increase in symptoms for increased ability to perform ADL's. (progressing, not met)  - Patient will demonstrate improved hip abduction strength, by at least 1/2 grade via MMT for improved functional mobility overall. (progressing, not met)     Long Term Goals: 8 weeks   - Patient will demonstrate improved ability to ambulate at least 15 minutes with minimal to no increase in symptoms for improved physical activity. (progressing, not met)  - Patient will be able to stand for at least 15 minutes with minimal to no increase in symptoms for increased ability to perform ADL's. (progressing, not met)  - Patient will demonstrate improved hip extension strength, by at least 1/2 grade via MMT for improved functional mobility overall. (MET: 10/12/22)  - Patient  will demonstrate independence with Home Exercise Program for continued improvement outside the clinical setting. (progressing, not met)    PLAN     Continue with PT POC to address functional deficits.     Dilia Green, PT, DPT, Cert. DN

## 2022-10-12 ENCOUNTER — CLINICAL SUPPORT (OUTPATIENT)
Dept: REHABILITATION | Facility: HOSPITAL | Age: 37
End: 2022-10-12
Payer: COMMERCIAL

## 2022-10-12 DIAGNOSIS — R29.898 DECREASED STRENGTH OF LOWER EXTREMITY: Primary | ICD-10-CM

## 2022-10-12 DIAGNOSIS — R68.89 DECREASED FUNCTIONAL ACTIVITY TOLERANCE: ICD-10-CM

## 2022-10-12 DIAGNOSIS — M53.86 DECREASED ROM OF LUMBAR SPINE: ICD-10-CM

## 2022-10-12 PROCEDURE — 97140 MANUAL THERAPY 1/> REGIONS: CPT | Mod: PN

## 2022-10-12 PROCEDURE — 97110 THERAPEUTIC EXERCISES: CPT | Mod: PN

## 2022-10-12 NOTE — PLAN OF CARE
[facility]   Physical Therapy Treatment Note & Re-assessment    [facility]  [facility] Number: [id number]    Therapy Diagnosis:   Encounter Diagnoses   Name Primary?    Decreased strength of lower extremity Yes    Decreased ROM of lumbar spine     Decreased functional activity tolerance      Physician: Yanci Sierra PA-C    Visit Date: 10/12/2022    Physician Orders: PT Eval and Treat   Medical Diagnosis from Referral: M54.42,M54.41,G89.29 (ICD-10-CM) - Chronic bilateral low back pain with bilateral sciatica  M43.16 (ICD-10-CM) - Spondylolisthesis of lumbar region  Evaluation Date: 9/14/2022  Authorization Period Expiration: 12/31/22  Plan of Care Expiration: 11/14/22  Progress Note Due: 11/12/22  Visit # / Visits authorized: 6 / 20 auth (1/1 eval)(re-assessed on 10/12/22)  FOTO: 2 / 3 (9/14/22 - IE, 10/12/22)     Precautions: Anxiety, Asthma, HTN, Type 2 Diabetes, Class 3 Obesity     PTA Visit #: 0 / 5     Time In: 8:27 AM  Time Out: 9:05 AM  Total Billable Time: 38 minutes    SUBJECTIVE     Pt reports: she is not hurting too bad this morning but standing and moving around continue to cause her back pain to increase. She does think that therapy has helped in terms of decreasing her symptoms slightly since starting. Walking continues to be difficult.     She was compliant with home exercise program.  Response to previous treatment: mild soreness that improved over time  Functional change: slight decrease in back pain    Pain: 0/10, 5/10 when standing and moving around   Location: bilateral back and back of right leg     OBJECTIVE     Objective Measures updated at progress report unless specified.     Observation/Posture: FAIR - rounded shoulders, slouched in sitting. Pleasant AAF.     Gait: Non-antalgic gait pattern in nature, no AD used. Decreased step length and tong present.     Lumbar Range of Motion:     Degrees Pain   Flexion 50% limited    - (tightness, stretching)          Extension 50% limited    +         Left Side Bending Distal lateral thigh -         Right Side Bending Distal lateral thigh -         Left rotation    WFL - (tightness)         Right Rotation    WFL  - (tightness)               Lower Extremity Strength  Right LE   Left LE     Knee extension: 4+/5 Knee extension: 4+/5   Knee flexion: 4/5 Knee flexion: 4/5   Hip flexion: 4/5 Hip flexion: 4+/5   Hip extension:  4+/5 Hip extension: 4+/5   Hip abduction: 4/5 Hip abduction: 4/5   Hip adduction: 4/5 Hip adduction 4/5   Ankle dorsiflexion: 5/5 Ankle dorsiflexion: 5/5   Ankle plantarflexion: 5/5 Ankle plantarflexion: 5/5      Pain = *     Special Tests:  -Repeated Flexion: -  -Repeated Ext: -     Neuro Dynamic Testing:               Sciatic nerve:                                       SLR:    R = -                                      L = -     Joint Mobility: decreased when moving into extension      Palpation: mild to moderate TTP along lumbar paraspinals, L piriformis musculature     Sensation: intact B to light touch     Flexibility:               Hamstring Test: R = 60 degrees ; L = 60 degrees        Limitation/Restriction for FOTO Lumbar Spine Survey     Therapist reviewed FOTO scores for Lucía Coreas on 10/12/2022.   FOTO documents entered into [employer] - see Media section.     Limitation Score: 53%     Treatment     Lucía received the treatments listed below:      Therapeutic Exercises to develop strength, endurance, ROM, flexibility, posture, and core stabilization for 15 minutes including: (billing for re-assessment and therapeutic exercise)     Recumbent Bike x5 min (lvl 2; seat 6)  Ball Flexion Stretch x2 min    Pt received Manual Therapy techniques in the form of Dry Needling for 23 min. This was applied to the Lumbar Paraspinals B. Dry needling was performed to decrease inflammation, increase circulation, decrease pain and restore homeostasis.     50 mm needles with 30 mm gauge were used for all insertion  points. Patient prone lying for proper positioning and comfort. Bolster under LE's was denied today.    Electrical Stimulation was applied this date while needles were in situ 8 minutes. Intensity increased to patient tolerance. No adverse reactions noted.     Patient gave Verbal consent to undergo dry needling. Written consent can be found in the media section in pts chart. All needles were removed and changes in signs and symptoms were assessed. No adverse reactions noted at the conclusion of the treatment.     Patient received a hot pack to the lumbar musculature at the conclusion of the treatment session 5 minutes for improved tissue extensibility and decreased pain. Patient prone with bolster under LE's. No adverse reactions observed.    Patient Education and Home Exercises     Home Exercises Provided and Patient Education Provided     Education provided:   - Home Exercise Program Review  - Post Exercise Soreness  - Maintaining a pain free range of motion with all activities  - Anatomy/Physiology of the Lumbar Spine and the surrounding musculature    Written Home Exercises Provided: Patient instructed to cont prior HEP and yes. Exercises were reviewed and Lucía was able to demonstrate them prior to the end of the session.  Lucía demonstrated good  understanding of the education provided. See EMR under Patient Instructions for exercises provided during therapy sessions    ASSESSMENT     Upon re-assessment, noted patient to demonstrate an improved FOTO score, indicative of increased ability to perform ADL's. However, pain remains the most limiting aspect. The patient continues to have difficulty standing and walking. LE strength has improved slightly, but the RLE remains slightly weaker compared to the left. Back pain was reproduced with standing extension. Dry needling has been a positive form of treatment that contributes to pain reduction. Overall, patient will continue to benefit from skilled outpatient PT  services to continue addressing the above stated deficits. No adverse reactions with dry needling today - skin was cleaned pre and post needle insertion/removal with no adverse reactions.     Lucía Is progressing well towards her goals.   Pt prognosis is Fair.     Pt will continue to benefit from skilled outpatient physical therapy to address the deficits listed in the problem list box on initial evaluation, provide pt/family education and to maximize pt's level of independence in the home and community environment.     Pt's spiritual, cultural and educational needs considered and pt agreeable to plan of care and goals.     Anticipated barriers to physical therapy: none    Goals:  Short Term Goals: 4 weeks   - Patient will demonstrate improved ability to ambulate at least 10 minutes with minimal to no increase in symptoms for improved physical activity. (progressing, not met)  - Patient will be able to perform a squat with proper mechanics, no signs of knee valgus, and minimal to no increase in symptoms for increased ability to perform household and work related tasks. (progressing, not met)  - Patient will be able to stand for at least 10 minutes with minimal to no increase in symptoms for increased ability to perform ADL's. (progressing, not met)  - Patient will demonstrate improved hip abduction strength, by at least 1/2 grade via MMT for improved functional mobility overall. (progressing, not met)     Long Term Goals: 8 weeks   - Patient will demonstrate improved ability to ambulate at least 15 minutes with minimal to no increase in symptoms for improved physical activity. (progressing, not met)  - Patient will be able to stand for at least 15 minutes with minimal to no increase in symptoms for increased ability to perform ADL's. (progressing, not met)  - Patient will demonstrate improved hip extension strength, by at least 1/2 grade via MMT for improved functional mobility overall. (MET: 10/12/22)  - Patient  will demonstrate independence with Home Exercise Program for continued improvement outside the clinical setting. (progressing, not met)    PLAN     Continue with PT POC to address functional deficits.     Dilia Green, PT, DPT, Cert. DN

## 2022-10-13 ENCOUNTER — OFFICE VISIT (OUTPATIENT)
Dept: OBSTETRICS AND GYNECOLOGY | Facility: CLINIC | Age: 37
End: 2022-10-13
Payer: COMMERCIAL

## 2022-10-13 VITALS
HEIGHT: 64 IN | DIASTOLIC BLOOD PRESSURE: 74 MMHG | WEIGHT: 293 LBS | SYSTOLIC BLOOD PRESSURE: 122 MMHG | BODY MASS INDEX: 50.02 KG/M2

## 2022-10-13 DIAGNOSIS — N92.0 MENORRHAGIA WITH REGULAR CYCLE: ICD-10-CM

## 2022-10-13 DIAGNOSIS — Z98.891 HISTORY OF CESAREAN DELIVERY: ICD-10-CM

## 2022-10-13 DIAGNOSIS — D64.9 ANEMIA, UNSPECIFIED TYPE: ICD-10-CM

## 2022-10-13 DIAGNOSIS — R76.0 ANTIPHOSPHOLIPID ANTIBODY POSITIVE: ICD-10-CM

## 2022-10-13 DIAGNOSIS — E11.59 HYPERTENSION ASSOCIATED WITH DIABETES: ICD-10-CM

## 2022-10-13 DIAGNOSIS — E28.2 PCOS (POLYCYSTIC OVARIAN SYNDROME): ICD-10-CM

## 2022-10-13 DIAGNOSIS — E66.01 CLASS 3 SEVERE OBESITY WITH BODY MASS INDEX (BMI) OF 50.0 TO 59.9 IN ADULT, UNSPECIFIED OBESITY TYPE, UNSPECIFIED WHETHER SERIOUS COMORBIDITY PRESENT: ICD-10-CM

## 2022-10-13 DIAGNOSIS — D21.9 FIBROID: Primary | ICD-10-CM

## 2022-10-13 DIAGNOSIS — I15.2 HYPERTENSION ASSOCIATED WITH DIABETES: ICD-10-CM

## 2022-10-13 PROCEDURE — 99499 NO LOS: ICD-10-PCS | Mod: S$GLB,,, | Performed by: OBSTETRICS & GYNECOLOGY

## 2022-10-13 PROCEDURE — 1159F MED LIST DOCD IN RCRD: CPT | Mod: CPTII,S$GLB,, | Performed by: OBSTETRICS & GYNECOLOGY

## 2022-10-13 PROCEDURE — 3074F PR MOST RECENT SYSTOLIC BLOOD PRESSURE < 130 MM HG: ICD-10-PCS | Mod: CPTII,S$GLB,, | Performed by: OBSTETRICS & GYNECOLOGY

## 2022-10-13 PROCEDURE — 3078F DIAST BP <80 MM HG: CPT | Mod: CPTII,S$GLB,, | Performed by: OBSTETRICS & GYNECOLOGY

## 2022-10-13 PROCEDURE — 99999 PR PBB SHADOW E&M-EST. PATIENT-LVL III: ICD-10-PCS | Mod: PBBFAC,,, | Performed by: OBSTETRICS & GYNECOLOGY

## 2022-10-13 PROCEDURE — 3074F SYST BP LT 130 MM HG: CPT | Mod: CPTII,S$GLB,, | Performed by: OBSTETRICS & GYNECOLOGY

## 2022-10-13 PROCEDURE — 99499 UNLISTED E&M SERVICE: CPT | Mod: S$GLB,,, | Performed by: OBSTETRICS & GYNECOLOGY

## 2022-10-13 PROCEDURE — 3044F PR MOST RECENT HEMOGLOBIN A1C LEVEL <7.0%: ICD-10-PCS | Mod: CPTII,S$GLB,, | Performed by: OBSTETRICS & GYNECOLOGY

## 2022-10-13 PROCEDURE — 3044F HG A1C LEVEL LT 7.0%: CPT | Mod: CPTII,S$GLB,, | Performed by: OBSTETRICS & GYNECOLOGY

## 2022-10-13 PROCEDURE — 3078F PR MOST RECENT DIASTOLIC BLOOD PRESSURE < 80 MM HG: ICD-10-PCS | Mod: CPTII,S$GLB,, | Performed by: OBSTETRICS & GYNECOLOGY

## 2022-10-13 PROCEDURE — 1159F PR MEDICATION LIST DOCUMENTED IN MEDICAL RECORD: ICD-10-PCS | Mod: CPTII,S$GLB,, | Performed by: OBSTETRICS & GYNECOLOGY

## 2022-10-13 PROCEDURE — 99999 PR PBB SHADOW E&M-EST. PATIENT-LVL III: CPT | Mod: PBBFAC,,, | Performed by: OBSTETRICS & GYNECOLOGY

## 2022-10-13 NOTE — PROGRESS NOTES
Chief Complaint   Patient presents with    follow up from US    bladder issues       History of Present Illness: Lucía Coreas is a 37 y.o. female that presents today 10/13/2022 for   Chief Complaint   Patient presents with    follow up from US    bladder issues         Past Medical History:   Diagnosis Date    Allergy     Amenorrhea     Asthma     Diabetes     GERD (gastroesophageal reflux disease)     Infertility associated with anovulation     Iron deficiency anemia     Knee pain     Metabolic syndrome     PCO (polycystic ovaries)     Recurrent boils     Status post repeat low transverse  section 2020    Formatting of this note might be different from the original. With BTL 21       Past Surgical History:   Procedure Laterality Date    BTL      CARPAL TUNNEL RELEASE Left 2019    CARPAL TUNNEL RELEASE Right 2020    Procedure: RELEASE, CARPAL TUNNEL;  Surgeon: Adeel Laughlin MD;  Location: [facility];  Service: Orthopedics;  Laterality: Right;     SECTION      X2    EXPLORATORY LAPAROTOMY      1 week postop with WOUND VAK, reopened uterus    WOUND VAK      3 months post-op Section, 2 weeks in hospital       Current Outpatient Medications   Medication Sig Dispense Refill    albuterol (PROVENTIL) 2.5 mg /3 mL (0.083 %) nebulizer solution Take 3 mLs (2.5 mg total) by nebulization every 4 to 6 hours as needed for Wheezing. 60 each 3    albuterol (PROVENTIL/VENTOLIN HFA) 90 mcg/actuation inhaler Inhale 1-2 puffs into the lungs every 4 (four) hours as needed for Wheezing. 12 g 1    azelastine (ASTELIN) 137 mcg (0.1 %) nasal spray SMARTSIG:Both Nares      cholecalciferol, vitamin D3, (VITAMIN D3) 25 mcg (1,000 unit) capsule Take 2 capsules (2,000 Units total) by mouth once daily. 90 capsule 4    cyanocobalamin (VITAMIN B-12) 1000 MCG tablet Take 1 tablet (1,000 mcg total) by mouth once daily. 90 tablet 3    ferrous sulfate (FEOSOL) Tab tablet Take 1 tablet (1 each total) by  "mouth daily with breakfast. 90 tablet 3    fluticasone furoate-vilanteroL (BREO ELLIPTA) 100-25 mcg/dose diskus inhaler Inhale 1 puff into the lungs once daily. Controller 30 each 12    fluticasone furoate-vilanteroL (BREO) 200-25 mcg/dose DsDv diskus inhaler Inhale 1 puff into the lungs once daily. Controller 1 each 4    iron-vit c-b12-folic acid (IRON 100 PLUS) Tab Take 1 tablet by mouth once daily. 90 tablet 4    ketoconazole (NIZORAL) 2 % shampoo Apply topically twice a week. 500 mL 2    Lactobacillus rhamnosus GG (CULTURELLE) 10 billion cell capsule Take 1 capsule by mouth once daily.      lactulose (CHRONULAC) 10 gram/15 mL solution SMARTSI Tablespoon By Mouth Daily      levocetirizine (XYZAL) 5 MG tablet Take 1 tablet (5 mg total) by mouth every evening. 30 tablet 4    loratadine (CLARITIN) 10 mg tablet Take 1 tablet (10 mg total) by mouth once daily. 90 tablet 4    montelukast (SINGULAIR) 10 mg tablet Take 1 tablet (10 mg total) by mouth once daily. 30 tablet 3    mupirocin (BACTROBAN) 2 % ointment Apply topically 3 (three) times daily.      naproxen (NAPROSYN) 500 MG tablet Take 1 tablet (500 mg total) by mouth 2 (two) times daily as needed (pain). 30 tablet 0    NIFEdipine (PROCARDIA-XL) 30 MG (OSM) 24 hr tablet Take 30 mg by mouth once daily.      NOVOFINE PLUS 32 gauge x 1/6" Ndle use as directed      omalizumab (XOLAIR) 150 mg injection Inject 300 mg into the skin every 14 (fourteen) days. 2 each 11    ondansetron (ZOFRAN-ODT) 8 MG TbDL DISSOLVE ONE TABLET UNDER TONGUE EVERY 8 HOURS AS NEEDED FOR NAUSEA      pantoprazole (PROTONIX) 40 MG tablet Take 1 tablet (40 mg total) by mouth once daily. 90 tablet 4    spironolactone (ALDACTONE) 100 MG tablet Take 1 tablet (100 mg total) by mouth once daily. 90 tablet 1    tirzepatide (MOUNJARO) 5 mg/0.5 mL PnIj Inject 5 mg into the skin every 7 days. 4 pen 1    tiZANidine (ZANAFLEX) 4 MG tablet Take 1 tablet (4 mg total) by mouth nightly as needed (muscle " "spasms/ pain). 40 tablet 0    WIXELA INHUB 500-50 mcg/dose DsDv diskus inhaler Inhale 1 puff into the lungs 2 (two) times daily.      EPINEPHrine (EPIPEN) 0.3 mg/0.3 mL AtIn Inject 0.3 mLs (0.3 mg total) into the muscle once. for 1 dose 2 each 11     Current Facility-Administered Medications   Medication Dose Route Frequency Provider Last Rate Last Admin    omalizumab injection 150 mg  150 mg Subcutaneous Q14 Days Rossy Kramer MD   150 mg at 10/06/22 1008    omalizumab injection 150 mg  150 mg Subcutaneous Q14 Days Rossy Kramer MD   150 mg at 10/06/22 1043       Review of patient's allergies indicates:   Allergen Reactions    Metformin Diarrhea    Sulfa (sulfonamide antibiotics) Anaphylaxis and Swelling     Swelling (eyes)^, Swelling (throat)^  Swelling (eyes)^, Swelling (throat)^      Diclofenac      Gastritis     Latex, natural rubber      Contact dermatitis      Shellfish containing products      anaphylaxis       Family History   Problem Relation Age of Onset    Diabetes Father     Heart disease Father 69    Hypertension Father     Prostate cancer Father     Diabetes Mother     Lupus Mother     AMY disease Brother     Ovarian cancer Sister     Breast cancer Neg Hx        Social History     Tobacco Use    Smoking status: Never    Smokeless tobacco: Never   Substance Use Topics    Alcohol use: Never    Drug use: Never       OB History    Para Term  AB Living   4 2 2   2 2   SAB IAB Ectopic Multiple Live Births   2       2      # Outcome Date GA Lbr Edward/2nd Weight Sex Delivery Anes PTL Lv   4 Term      CS-Unspec      3 SAB            2 SAB            1 Term      CS-Unspec            /74   Ht 5' 4" (1.626 m)   Wt (!) 146.4 kg (322 lb 12.1 oz)   LMP 2022 (Exact Date)       ASSESSMENT/PLAN:  Fibroid    Menorrhagia with regular cycle    Hypertension associated with diabetes    Antiphospholipid antibody positive    History of  delivery    PCOS (polycystic ovarian syndrome)    Class 3 " severe obesity with body mass index (BMI) of 50.0 to 59.9 in adult, unspecified obesity type, unspecified whether serious comorbidity present    Anemia, unspecified type      We discussed fibroid and treatment options for fibroid, anemia and menorrhagia/heavy bleeding including Oriahnn and mirena IUD.  Due to APA+ I recommended mirena.  She will consider and return for placement.

## 2022-10-17 NOTE — PROGRESS NOTES
[facility]   Physical Therapy Treatment Note    Name: Lucía Steele [facility] Number: [id number]    Therapy Diagnosis:   Encounter Diagnoses   Name Primary?    Decreased strength of lower extremity Yes    Decreased ROM of lumbar spine     Decreased functional activity tolerance      Physician: Yanci Sierra PA-C    Visit Date: 10/19/2022    Physician Orders: PT Eval and Treat   Medical Diagnosis from Referral: M54.42,M54.41,G89.29 (ICD-10-CM) - Chronic bilateral low back pain with bilateral sciatica  M43.16 (ICD-10-CM) - Spondylolisthesis of lumbar region  Evaluation Date: 9/14/2022  Authorization Period Expiration: 12/31/22  Plan of Care Expiration: 11/14/22  Progress Note Due: 11/12/22  Visit # / Visits authorized: 7 / 20 auth (1/1 eval)(re-assessed on 10/12/22)  FOTO: 2 / 3 (9/14/22 - IE, 10/12/22)     Precautions: Anxiety, Asthma, HTN, Type 2 Diabetes, Class 3 Obesity     PTA Visit #: 0 / 5     Time In: 8:28 AM  Time Out: 9:06 AM  Total Billable Time: 38 minutes    SUBJECTIVE     Pt reports: that she is feeling okay this morning, has seen some improvement since the dry needling last session.    She was compliant with home exercise program.  Response to previous treatment: mild soreness that improved over time  Functional change: slight decrease in back pain    Pain: 0/10, 4/10 when standing and moving around   Location: bilateral back and back of right leg     OBJECTIVE     Objective Measures updated at progress report unless specified.     Treatment     Lucía received the treatments listed below:      Therapeutic Exercises to develop strength, endurance, ROM, flexibility, posture, and core stabilization for 10 minutes including:      Recumbent Bike x5 min (lvl 2; seat 6)  Ball Flexion Stretch x2 min    Pt received Manual Therapy techniques in the form of Dry Needling for 28 min. This was applied to the Lumbar Paraspinals B. Dry needling was performed to decrease inflammation,  increase circulation, decrease pain and restore homeostasis.     50 mm needles with 30 mm gauge were used for all insertion points. Patient prone lying for proper positioning and comfort. Bolster under LE's was denied today.    Electrical Stimulation was applied this date while needles were in situ x10 minutes. Intensity increased to patient tolerance. No adverse reactions noted.     Patient gave Verbal consent to undergo dry needling. Written consent can be found in the media section in pts chart. All needles were removed and changes in signs and symptoms were assessed. No adverse reactions noted at the conclusion of the treatment.       Patient received a hot pack to the lumbar musculature at the conclusion of the treatment session 5 minutes for improved tissue extensibility and decreased pain. Patient prone with bolster under LE's. No adverse reactions observed.    Patient Education and Home Exercises     Home Exercises Provided and Patient Education Provided     Education provided:   - Home Exercise Program Review  - Post Exercise Soreness  - Maintaining a pain free range of motion with all activities  - Anatomy/Physiology of the Lumbar Spine and the surrounding musculature    Written Home Exercises Provided: Patient instructed to cont prior HEP and yes. Exercises were reviewed and Lucía was able to demonstrate them prior to the end of the session.  Lucía demonstrated good  understanding of the education provided. See EMR under Patient Instructions for exercises provided during therapy sessions    ASSESSMENT     Patient tolerated therapy fairly well overall today. Dry needling continues to be the main point of treatment - continuing to target the  lumbar paraspinals. Skin was cleaned pre and post needle insertion/removal with no adverse reactions observed. Encouraged patient to maintain a pain free range of motion with all activities and to continue improving Home Exercise Program as able.     Lucía Is progressing  well towards her goals.   Pt prognosis is Fair.     Pt will continue to benefit from skilled outpatient physical therapy to address the deficits listed in the problem list box on initial evaluation, provide pt/family education and to maximize pt's level of independence in the home and community environment.     Pt's spiritual, cultural and educational needs considered and pt agreeable to plan of care and goals.     Anticipated barriers to physical therapy: none    Goals:  Short Term Goals: 4 weeks   - Patient will demonstrate improved ability to ambulate at least 10 minutes with minimal to no increase in symptoms for improved physical activity. (progressing, not met)  - Patient will be able to perform a squat with proper mechanics, no signs of knee valgus, and minimal to no increase in symptoms for increased ability to perform household and work related tasks. (progressing, not met)  - Patient will be able to stand for at least 10 minutes with minimal to no increase in symptoms for increased ability to perform ADL's. (progressing, not met)  - Patient will demonstrate improved hip abduction strength, by at least 1/2 grade via MMT for improved functional mobility overall. (progressing, not met)     Long Term Goals: 8 weeks   - Patient will demonstrate improved ability to ambulate at least 15 minutes with minimal to no increase in symptoms for improved physical activity. (progressing, not met)  - Patient will be able to stand for at least 15 minutes with minimal to no increase in symptoms for increased ability to perform ADL's. (progressing, not met)  - Patient will demonstrate improved hip extension strength, by at least 1/2 grade via MMT for improved functional mobility overall. (MET: 10/12/22)  - Patient will demonstrate independence with Home Exercise Program for continued improvement outside the clinical setting. (progressing, not met)    PLAN     Continue with PT POC to address functional deficits.     Dilia  Peter, PT, DPT, Cert. DN

## 2022-10-19 ENCOUNTER — CLINICAL SUPPORT (OUTPATIENT)
Dept: REHABILITATION | Facility: HOSPITAL | Age: 37
End: 2022-10-19
Payer: COMMERCIAL

## 2022-10-19 DIAGNOSIS — R68.89 DECREASED FUNCTIONAL ACTIVITY TOLERANCE: ICD-10-CM

## 2022-10-19 DIAGNOSIS — R29.898 DECREASED STRENGTH OF LOWER EXTREMITY: Primary | ICD-10-CM

## 2022-10-19 DIAGNOSIS — M53.86 DECREASED ROM OF LUMBAR SPINE: ICD-10-CM

## 2022-10-19 PROCEDURE — 97110 THERAPEUTIC EXERCISES: CPT | Mod: PN

## 2022-10-19 PROCEDURE — 97140 MANUAL THERAPY 1/> REGIONS: CPT | Mod: PN

## 2022-10-20 ENCOUNTER — CLINICAL SUPPORT (OUTPATIENT)
Dept: ALLERGY | Facility: CLINIC | Age: 37
End: 2022-10-20
Payer: COMMERCIAL

## 2022-10-20 DIAGNOSIS — J45.40 MODERATE PERSISTENT ASTHMA WITHOUT COMPLICATION: Primary | ICD-10-CM

## 2022-10-20 PROCEDURE — 99499 NO LOS: ICD-10-PCS | Mod: S$GLB,,, | Performed by: OTOLARYNGOLOGY

## 2022-10-20 PROCEDURE — 99999 PR PBB SHADOW E&M-EST. PATIENT-LVL I: ICD-10-PCS | Mod: PBBFAC,,,

## 2022-10-20 PROCEDURE — 96372 THER/PROPH/DIAG INJ SC/IM: CPT | Mod: S$GLB,,, | Performed by: ALLERGY & IMMUNOLOGY

## 2022-10-20 PROCEDURE — 99999 PR PBB SHADOW E&M-EST. PATIENT-LVL I: CPT | Mod: PBBFAC,,,

## 2022-10-20 PROCEDURE — 99499 UNLISTED E&M SERVICE: CPT | Mod: S$GLB,,, | Performed by: OTOLARYNGOLOGY

## 2022-10-20 PROCEDURE — 96372 PR INJECTION,THERAP/PROPH/DIAG2ST, IM OR SUBCUT: ICD-10-PCS | Mod: S$GLB,,, | Performed by: ALLERGY & IMMUNOLOGY

## 2022-10-20 NOTE — PROGRESS NOTES
[facility]   Physical Therapy Treatment Note    Name: Lucía Steele [facility] Number: [id number]    Therapy Diagnosis:   Encounter Diagnoses   Name Primary?    Decreased strength of lower extremity Yes    Decreased ROM of lumbar spine     Decreased functional activity tolerance      Physician: Yanci Sierra PA-C    Visit Date: 10/21/2022    Physician Orders: PT Eval and Treat   Medical Diagnosis from Referral: M54.42,M54.41,G89.29 (ICD-10-CM) - Chronic bilateral low back pain with bilateral sciatica  M43.16 (ICD-10-CM) - Spondylolisthesis of lumbar region  Evaluation Date: 9/14/2022  Authorization Period Expiration: 12/31/22  Plan of Care Expiration: 11/14/22  Progress Note Due: 11/12/22  Visit # / Visits authorized: 8 / 20 auth (1/1 eval)(re-assessed on 10/12/22)  FOTO: 2 / 3 (9/14/22 - IE, 10/12/22)     Precautions: Anxiety, Asthma, HTN, Type 2 Diabetes, Class 3 Obesity     PTA Visit #: 0 / 5     Time In: 8:00 AM  Time Out: 8:38 AM  Total Billable Time: 38 minutes    SUBJECTIVE     Pt reports: that she is actually doing okay so far this morning. She has not been having to take her muscle relaxer's lately. Felt relief after the dry needling last session.    She was compliant with home exercise program.  Response to previous treatment: mild soreness that improved over time  Functional change: slight decrease in back pain    Pain: 0/10, 3/10 when standing and moving around   Location: bilateral back and back of right leg     OBJECTIVE     Objective Measures updated at progress report unless specified.     Treatment     Lucía received the treatments listed below:      Therapeutic Exercises to develop strength, endurance, ROM, flexibility, posture, and core stabilization for 00 minutes including:      Recumbent Bike x5 min (lvl 2; seat 6)  Ball Flexion Stretch x2 min    Pt received Manual Therapy techniques in the form of Dry Needling for 28 min. This was applied to the Lumbar Paraspinals  B. Dry needling was performed to decrease inflammation, increase circulation, decrease pain and restore homeostasis.     50 mm needles with 30 mm gauge were used for all insertion points. Patient prone lying for proper positioning and comfort. Bolster under LE's was denied today.    Electrical Stimulation was applied this date while needles were in situ x10 minutes. Intensity increased to patient tolerance. No adverse reactions noted.     Patient gave Verbal consent to undergo dry needling. Written consent can be found in the media section in pts chart. All needles were removed and changes in signs and symptoms were assessed. No adverse reactions noted at the conclusion of the treatment.       Supervised modalities after being cleared for contradictions: IFC Electrical Stimulation: Lucía received IFC Electrical Stimulation for pain control applied to the Lumbar Musculature  Pt received stimulation at 100% scan for 10 minutes. Lucía tolerated treatment well without any adverse effects. Patient prone with LE bolster denied.    Patient received a hot pack to the lumbar musculature at the conclusion of the treatment session 10 minutes for improved tissue extensibility and decreased pain. Patient prone with bolster under LE's denied. No adverse reactions observed.    Patient Education and Home Exercises     Home Exercises Provided and Patient Education Provided     Education provided:   - Home Exercise Program Review  - Post Exercise Soreness  - Maintaining a pain free range of motion with all activities  - Anatomy/Physiology of the Lumbar Spine and the surrounding musculature    Written Home Exercises Provided: Patient instructed to cont prior HEP and yes. Exercises were reviewed and Lucía was able to demonstrate them prior to the end of the session.  Lucía demonstrated good  understanding of the education provided. See EMR under Patient Instructions for exercises provided during therapy sessions    ASSESSMENT     Dry  needling continues to yield positive results; therefore, this particular treatment was performed again today and targeted the lower lumbar paraspinals. Increased tension noted with needle insertion into muscle tissue. Appropriate twitch response observed, skin was cleaned pre and post needle insertion/removal with no adverse reactions observed. IFC-electrical stimulation was applied to assist with further tissue extensibility and for pain relief.     Lucía Is progressing well towards her goals.   Pt prognosis is Fair.     Pt will continue to benefit from skilled outpatient physical therapy to address the deficits listed in the problem list box on initial evaluation, provide pt/family education and to maximize pt's level of independence in the home and community environment.     Pt's spiritual, cultural and educational needs considered and pt agreeable to plan of care and goals.     Anticipated barriers to physical therapy: none    Goals:  Short Term Goals: 4 weeks   - Patient will demonstrate improved ability to ambulate at least 10 minutes with minimal to no increase in symptoms for improved physical activity. (progressing, not met)  - Patient will be able to perform a squat with proper mechanics, no signs of knee valgus, and minimal to no increase in symptoms for increased ability to perform household and work related tasks. (progressing, not met)  - Patient will be able to stand for at least 10 minutes with minimal to no increase in symptoms for increased ability to perform ADL's. (progressing, not met)  - Patient will demonstrate improved hip abduction strength, by at least 1/2 grade via MMT for improved functional mobility overall. (progressing, not met)     Long Term Goals: 8 weeks   - Patient will demonstrate improved ability to ambulate at least 15 minutes with minimal to no increase in symptoms for improved physical activity. (progressing, not met)  - Patient will be able to stand for at least 15 minutes  with minimal to no increase in symptoms for increased ability to perform ADL's. (progressing, not met)  - Patient will demonstrate improved hip extension strength, by at least 1/2 grade via MMT for improved functional mobility overall. (MET: 10/12/22)  - Patient will demonstrate independence with Home Exercise Program for continued improvement outside the clinical setting. (progressing, not met)    PLAN     Continue with PT POC to address functional deficits.     Dilia Green, PT, DPT, Cert. DN

## 2022-10-21 ENCOUNTER — CLINICAL SUPPORT (OUTPATIENT)
Dept: REHABILITATION | Facility: HOSPITAL | Age: 37
End: 2022-10-21
Payer: COMMERCIAL

## 2022-10-21 DIAGNOSIS — M53.86 DECREASED ROM OF LUMBAR SPINE: ICD-10-CM

## 2022-10-21 DIAGNOSIS — R29.898 DECREASED STRENGTH OF LOWER EXTREMITY: Primary | ICD-10-CM

## 2022-10-21 DIAGNOSIS — R68.89 DECREASED FUNCTIONAL ACTIVITY TOLERANCE: ICD-10-CM

## 2022-10-21 PROCEDURE — 97014 ELECTRIC STIMULATION THERAPY: CPT | Mod: PN

## 2022-10-21 PROCEDURE — 97140 MANUAL THERAPY 1/> REGIONS: CPT | Mod: PN

## 2022-10-24 ENCOUNTER — PATIENT MESSAGE (OUTPATIENT)
Dept: FAMILY MEDICINE | Facility: CLINIC | Age: 37
End: 2022-10-24
Payer: COMMERCIAL

## 2022-10-25 ENCOUNTER — E-VISIT (OUTPATIENT)
Dept: FAMILY MEDICINE | Facility: CLINIC | Age: 37
End: 2022-10-25
Payer: COMMERCIAL

## 2022-10-25 DIAGNOSIS — B37.9 YEAST INFECTION: Primary | ICD-10-CM

## 2022-10-25 PROCEDURE — 99421 OL DIG E/M SVC 5-10 MIN: CPT | Mod: S$GLB,,, | Performed by: FAMILY MEDICINE

## 2022-10-25 PROCEDURE — 99421 PR E&M, ONLINE DIGIT, EST, < 7 DAYS, 5-10 MINS: ICD-10-PCS | Mod: S$GLB,,, | Performed by: FAMILY MEDICINE

## 2022-10-25 RX ORDER — NYSTATIN 100000 [USP'U]/G
POWDER TOPICAL 2 TIMES DAILY
Qty: 60 G | Refills: 1 | Status: SHIPPED | OUTPATIENT
Start: 2022-10-25 | End: 2024-03-27

## 2022-10-25 RX ORDER — FLUCONAZOLE 150 MG/1
150 TABLET ORAL
Qty: 3 TABLET | Refills: 0 | Status: SHIPPED | OUTPATIENT
Start: 2022-10-25 | End: 2022-11-01

## 2022-10-25 NOTE — PROGRESS NOTES
Patient ID: Lucía Coreas is a 37 y.o. female.    Chief Complaint: Rash    The patient initiated a request through [employer] on 10/25/2022 for evaluation and management with a chief complaint of Rash     I evaluated the questionnaire submission on 10/25/2022    .    [facility] Peq Evisit Rash    10/25/2022  8:32 AM CDT - Filed by Patient   Do you agree to participate in an E-Visit? Yes   If you have any of the following symptoms, please present to your local ER or call 911:  I acknowledge   What is the main issue that you would like for your doctor to address today? Rash under stomach fold   Are you able to take your vital signs? No   How would you describe your skin problem? Rash   When did your symptoms first appear? 10/21/2022   Where is it located?  Clothes covered areas   Does it itch? Yes   Does it hurt? No   Is there discharge or drainage? No   Is there bleeding? No   Describe the character [place]   Describe the color Red   Has it changed over time? No change   Frequency of skin problem Seasonally   Duration of the skin problem (how long does it stay when it is present) Years   I have had a new exposure to No new exposures   What have you used to treat the skin problem? Keep it dry   If you have used anything for treatment, has it helped the symptoms? No   Other generalized symptoms that you associate with the rash No other symptoms   At least one photo is required for treatment to be provided. You can upload a maximum of three photos of the affected area.            Active Problem List with Overview Notes    Diagnosis Date Noted    Decreased strength of lower extremity 09/14/2022    Decreased ROM of lumbar spine 09/14/2022    Decreased functional activity tolerance 09/14/2022    Chronic bilateral low back pain without sciatica 08/30/2022    Cough 08/05/2022    Moderate persistent asthma without complication 08/05/2022    Brain fog 06/10/2022     Chronic.  Associated with fatigue.  See problem.      B12 deficiency  06/10/2022     Chronic.  Not improved on oral B12 supplement.  Lab Results   Component Value Date    AGTDLLRL16 510 05/23/2022           Hidradenitis suppurativa 01/18/2022     Chronic.  Recurrence.  Patient reports recurrence of boils under her arms and in the groin area.  Patient was on spironolactone briefly prior to her pregnancy and reports some improvement.  She is following with Infectious Disease and has been on antibiotics for other reasons which was helping the hidradenitis lesions.    February 2022:  Patient reports that she had surgery in the past for this condition.  Spironolactone 50 milligrams is not helping at this time.    June 2022:  Patient was increased to spironolactone 100 milligrams.  She reports that the medication makes her sleepy so she has been taking at bedtime.  She is currently having a flare.  She does not have any antibiotics currently.  She missed an appointment with General surgery but has a rescheduled for August.  She does not currently follow with Dermatology.      Bacterial infection 03/05/2021     Subacute.  Over two weeks.  Patient has been taking Zyrtec and nasal spray with minimal relief.  Patient reports increased sinus pressure.  Reports swollen lymph nodes.      Hypertension associated with diabetes 02/19/2021     CHRONIC.  June 2022:  Blood pressure well controlled on current regimen.  February 2022:  Blood pressure is uncontrolled.  Only on spironolactone 50 milligrams daily at this time.  August 2021:  Patient reports good control with blood pressure at home on nifedipine 30 milligrams.  April 2021. Blood pressure well controlled today.  Patient reports compliance with medications as prescribed.  March 2021:  Blood pressures been well controlled at home on labetalol however her shortness of breath and breathing with asthma has gotten worse.. BP Reviewed.  Compliant with BP medications. No SE reported.   (-) CP, palpitations, dizziness, lightheadedness, arm numbness,  tingling or weakness, syncope.  Creatinine   Date Value Ref Range Status   02/17/2021 0.6 0.5 - 1.4 mg/dL Final       Shortness of breath 02/19/2021     Chronic.  Intermittent controlled.  Worsening.  Patient reports that she has gained some of her weight back and is starting to have shortness of breath on exertion.  Denies any wheezing.  Denies fever chills exposure to COVID..      Daytime sleepiness     Bruxism     Primary snoring      PSG 1/30/2021 No obstructive sleep apnea, AHI 0.3, primary snoring.        Class 3 severe obesity due to excess calories with serious comorbidity and body mass index (BMI) of 45.0 to 49.9 in adult 01/08/2021     BMI Readings from Last 10 Encounters:   12/07/21 49.39 kg/m²   11/11/21 50.15 kg/m²   09/28/21 49.61 kg/m²   09/21/21 49.61 kg/m²   07/29/21 54.57 kg/m²   07/29/21 53.96 kg/m²   04/26/21 54.11 kg/m²   04/14/21 53.97 kg/m²   03/16/21 49.82 kg/m²   03/15/21 52.98 kg/m²           Snoring 12/04/2020     Chronic problem.  Patient reports that she had home sleep study a couple of years ago but never got the results.  Patient was seen at [facility].    Patient reports she is waking up with morning headaches.  Reports of snoring from family.  Starting to have weight gain and increased blood pressure.      Carpal tunnel syndrome of right wrist 11/06/2020    Chronic neck pain 11/06/2020    Mild intermittent asthma without complication 11/06/2020     Chronic.  Worsening since recent pregnancy and over the last few weeks.  Patient is starting to use albuterol more often.  Patient reports that she was on nebulized treatments years ago when she was younger.  Patient was recently started on labetalol for blood pressure and reports her breathing has gotten worse since then.  She has also been dealing with upper respiratory for several weeks and allergy medication is no longer helping for her symptoms.  See bacterial problem.      Insomnia 10/02/2020     New problem.  Subacute.  Patient  reports that she cannot fall asleep.  Denies any recent job change, unhealthy habits.      Dysfunction of eustachian tube 10/02/2020     Acute on chronic.  Recurrent.  Occurs during season and weather changes.  Patient is taking over-the-counter medication without relief.      Abnormal MRI, cervical spine 10/02/2020    Polyarthralgia 10/02/2020     Chronic.  Previous HPI:  Worsening.  Patient is 35 years old and has significant arthritis x-ray.  Checking labs today for autoimmune.  Patient denies a history of autoimmune disease.  June 2022:  Patient reports that her lower back is starting to hurt more with exercise.  Patient is following with Rheumatology.   Latest Reference Range & Units 10/02/20 09:09   VANESSA Screen Negative <1:80  Positive !   VANESSA Titer 1  1:160   VANESSA PATTERN 1  Homogeneous   VANESSA Titer 2  1:160   VANESSA Pattern 2  Speckled   ds DNA Ab Negative 1:10  Negative 1:10   Anti-SSA Antibody 0.00 - 0.99 Ratio 0.27   Anti-SSA Interpretation Negative  Negative   Anti-SSB Antibody 0.00 - 0.99 Ratio 0.11   Anti-SSB Interpretation Negative  Negative   Anti Sm Antibody 0.00 - 0.99 Ratio 0.13   Anti-Sm Interpretation Negative  Negative   Anti Sm/RNP Antibody 0.00 - 0.99 Ratio 0.44   Anti-Sm/RNP Interpretation Negative  Negative   !: Data is abnormal      Nail biting 08/29/2020     CHRONIC.  WORSE WITH ANXIETY.      Constipation 08/28/2020     CHRONIC.  UNCONTROLLED.  PATIENT REPORTS THAT SHE HAS TRIED SOME OVER-THE-COUNTER MEDICATIONS LIKE DULCOLAX BUT IT HAS CAUSED PAIN IN HER RECTUM WITH BOWEL MOVEMENTS.  PATIENT DENIES ANY BLOOD OR BLEEDING.      Anxiety 04/29/2020     ==================================================  DECEMBER 2020:  Currently uncontrolled.  Patient was taken off of her medication during pregnancy last month.  Patient reports that she recently had miscarriage and her stress is very high right now.  She wants to restart her medication.  Patient was also on hydroxyzine as needed for panic attacks  and insomnia.  =================================================  October 2020:  Patient reports doing well on Lexapro 20 mg daily.  Patient takes hydroxyzine very rarely.  ==================================================  AUGUST 2020:  CHRONIC.  INTERMITTENT CONTROL.  PATIENT REPORTS THAT SHE IS NOT HAVING ANY IMPROVEMENT ON LEXAPRO 10 MG.  DENIES ANY SI HI OR HALLUCINATIONS.  SHE IS STILL TAKING HYDROXYZINE SEVERAL TIMES A DAY FOR AND PANIC ATTACKS.  PATIENT DOES STILL HAVE NAIL BITING TENDENCIES.  =================================================  New problem.  Subacute.  Has been getting worse over the last few weeks during COVID-19 outbreak.  Patient reports that she is biting her nails and unable to sleep at night.  Patient has not been on any treatment or medications for this recently.  Denies any SI HI or hallucinations.  ====================================================  MAY 2020:  Patient reports hydroxyzine helps with anxiety but is still biting her nails.  Patient is ready to start daily medication for anxiety.  Patient reports that she did start back at work which she thought would help but it is not helping.  Denies SI HI or hallucinations.  ====================================================      GERD with esophagitis 08/15/2019     Chronic.  Borderline control on Protonix.  Patient currently having exacerbation of reflux and abdominal pain associated with nausea and vomiting.  December 2020:  Patient reports that she has had increase in reflux since gaining weight during her recent pregnancy.      Encounter for long-term (current) use of medications 07/12/2019 07/12/2019 CHRONIC long-term drug therapy for managed conditions. See medication list. Reports compliance.  No side effects reported.  Routine lab work is being monitored.  Patient does not need refills today. 09/20/2019Reviewed labs. Patient is compliant with meds. She needs refills. Gaining weight on Metformin. DECEMBER 2019:   CHRONIC. Stable. Compliant with medications for managed conditions. See medication list. No SE reported. Routine lab analysis is being monitored. Refills were addressed. APRIL 2020:  CHRONIC. Stable. Compliant with medications for managed conditions. See medication list. No SE reported. Routine lab analysis is being monitored. Refills were addressed.  AUGUST 2020:  REVIEWED LABS.  CHRONIC. Stable. Compliant with medications for managed conditions. See medication list. No SE reported. Routine lab analysis is being monitored. Refills were addressed.October 2020:  CHRONIC. Stable. Compliant with medications for managed conditions. See medication list. No SE reported. Routine lab analysis is being monitored. Refills were addressed.November 2020:CHRONIC. Stable. Compliant with medications for managed conditions. See medication list. No SE reported.   Routine lab analysis is being monitored. Refills were addressed.  April 2021:  Reviewed labs.  August 2021:  Reviewed labs.  Patient additional labs set up through [facility] [place].  January 2022:  Reviewed labs.  February 2022:  Reviewed labs.  June 2022:  Reviewed labs.  Lab Results   Component Value Date    WBC 9.53 02/16/2022    HGB 11.7 (L) 02/16/2022    HCT 40.4 02/16/2022    MCV 76 (L) 02/16/2022     02/16/2022       Chemistry        Component Value Date/Time     01/20/2022 0831    K 4.1 01/20/2022 0831     01/20/2022 0831    CO2 26 01/20/2022 0831    BUN 7 01/20/2022 0831    CREATININE 0.7 01/20/2022 0831    GLU 92 01/20/2022 0831        Component Value Date/Time    CALCIUM 9.4 01/20/2022 0831    ALKPHOS 71 01/20/2022 0831    AST 11 01/20/2022 0831    ALT 11 01/20/2022 0831    BILITOT 0.3 01/20/2022 0831    ESTGFRAFRICA >60.0 01/20/2022 0831    EGFRNONAA >60.0 01/20/2022 0831          Lab Results   Component Value Date    TSH 1.223 01/20/2022    V6RCXTG 9.4 07/16/2019    T3FREE 2.9 07/16/2019         Fatigue 07/12/2019      Chronic.  No improvement.  Patient concerned about her weight and energy level.  She has restarted OZEMPIC and is starting to lose weight again.  She is currently on OZEMPIC 0.5 milligram weekly.    June 2022:  Patient reports brain fog and fatigue that is worse since her last pregnancy and history of COVID.    Lab Results   Component Value Date    WBC 9.53 02/16/2022    HGB 11.7 (L) 02/16/2022    HCT 40.4 02/16/2022    MCV 76 (L) 02/16/2022     02/16/2022       Lab Results   Component Value Date    IRON 33 01/20/2022    TIBC 339 01/20/2022    FERRITIN 36 01/22/2009     Lab Results   Component Value Date    IJUCYMBF78 510 05/23/2022     Lab Results   Component Value Date    FOLATE 10.0 01/20/2022     Lab Results   Component Value Date    TSH 1.223 01/20/2022    E5EBGTQ 9.4 07/16/2019           Vitamin D deficiency 07/12/2019 07/12/2019 Vit d deficiency.  Not currently taking vitamin d supplement. No SE reported. Fatigue is notimproved. 08/15/2019 Chronic.  Uncontrolled.  Vitamin-D level is still low.  Continue supplementation.  Recheck in 3 months.09/20/2019Patient still taking vitamin-D.  Needs refill.  No side effects reported.DECEMBER 2019:  Patient doing well on vitamin-D.  Needs refill.  Checking level.November 2020:  Patient recently pregnant.Chronic. Vit d deficiency. Takes vitamin d supplement. No SE reported. Fatigue is slightly improved. December 2020:  Restart vitamin-D 61630 units weekly.February 2021:  Patient has recently had a positive pregnancy test.  She followed up with OBGYN.  Changing vitamin-D to daily supplement versus 31994 units weekly.  June 2022:   Latest Reference Range & Units 05/23/22 09:18   Vit D, 1,25-Dihydroxy 20 - 79 pg/mL 132 (H)   (H): Data is abnormally high  Lab Results   Component Value Date    WPGPLZKQ11QQ 19 (L) 01/20/2022    [id number] 23 (L) 10/21/2021    BLYJGELN66IY 24 (L) 02/17/2021            Type 2 diabetes mellitus with hyperglycemia, without long-term  current use of insulin 07/12/2019     INITIAL HPI:  No results found in epic however review her blood work from previous provider shows last A1c was 5.8.  Patient previously on metformin.  Currently having fatigue and decreased libido.09/20/2019Patient due for hemoglobin A1c next month.  Will place order for patient.She is having GI symptoms with metformin. She reports BG is elevated at home. She continues to gain weight. DECEMBER 2019:  Patient reports to having diarrhea with metformin.  Reviewed Diabetes Management StatusStatin: Not takingACE/ARB: Not takingAPRIL 2020:  Patient has started Victoza and is off of metformin.  Patient reports that she is getting slightly nauseated on the increased dose of 1.2 mg.  Symptoms resolved after taking Pepto-Bismol after few hours of injection.Reviewed Diabetes Management StatusStatin: Not takingACE/ARB: Not takingAUGUST 2020:  PATIENT REPORTS THAT SHE IS TOLERATING VICTOZA 1.2 MG.  PATIENT CONTINUES TO HAVE ISSUES WITH HER WEIGHT.  REVIEWED Diabetes Management StatusStatin: Not takingACE/ARB: Not takingOCTOBER 2020:  Patient is having side effects with Victoza.  Reviewed Diabetes Management Status.Statin: Not takingACE/ARB: Not takingNOVEMBER 2020:  Patient reports that she was doing well on OZEMPIC.  A1c is been well controlled.  Patient was recently diagnosed with pregnancy.  Will monitor for worsening of diabetes.  Diabetes Management Status Statin: Not taking ACE/ARB: Not takingDECEMBER 2020:  Patient recently had miscarriage.  Patient wanting to get back on her diabetic medication.Diabetes Management StatusStatin: Not takingACE/ARB: Not taking January 2022:Diabetes Management StatusFebruary 2022:  Reviewed Diabetes Management Status.  She is taking Ozempic 0.5 milligrams weekly.  She denies any side effects.  She does not thing is working well.  Denies history of thyroid cancer, pancreatitis, MEN syndromeStatin: Not takingACE/ARB: Not taking  June 2022:  Patient reports  her weight is starting to rebound.  She is on Ozempic 1 milligram weekly.  Reports that her blood sugar is starting to increase it with her appetite.  Denies history of pancreatitis, thyroid cancer, MEN syndrome.  Diabetes Management Status    Statin: Not taking  ACE/ARB: Not taking    Screening or Prevention Patient's value Goal Complete/Controlled?   HgA1C Testing and Control   Lab Results   Component Value Date    HGBA1C 5.7 (H) 01/20/2022      Annually/Less than 8% Yes   Lipid profile : 01/20/2022 Annually Yes   LDL control Lab Results   Component Value Date    LDLCALC 111.8 01/20/2022    Annually/Less than 100 mg/dl  No   Nephropathy screening Lab Results   Component Value Date    LABMICR 8.0 10/21/2021     Lab Results   Component Value Date    PROTEINUA Negative 12/28/2021     Lab Results   Component Value Date    UTPCR Unable to calculate 12/28/2021      Annually Yes   Blood pressure BP Readings from Last 1 Encounters:   06/10/22 122/86    Less than 140/90 Yes   Dilated retinal exam : 07/16/2021 Annually Yes   Foot exam   Most Recent Foot Exam Date: Not Found Annually No           Class 3 obesity with alveolar hypoventilation, serious comorbidity, and body mass index (BMI) of 50.0 to 59.9 in adult 06/14/2017     Chronic.  Previous HPI:  Uncontrolled.  Was improving on Ozempic.  BMI currently 53.  DECEMBER 2020:  PATIENT'S BMI IS UP TO 54.  PATIENT HAS RECENTLY HAD A MISCARRIAGE AND WAS TAKEN OFF OF HER MEDICATIONS.  PATIENT IS WANTING TO RESTART MEDICATIONS FOR HER CHRONIC CONDITIONS TODAY.  SHE HAS SEEN HER OBGYN AND WAS STARTED ON ORAL CONTRACEPTIVE PILLS.  June 2022:  Chronic.  Worsening.  Patient is plateauing with Ozempic 1 milligram.  BMI Readings from Last 10 Encounters:   06/10/22 53.89 kg/m²   02/24/22 53.03 kg/m²   02/16/22 53.09 kg/m²   12/07/21 49.39 kg/m²   11/11/21 50.15 kg/m²   09/28/21 49.61 kg/m²   09/21/21 49.61 kg/m²   07/29/21 54.57 kg/m²   07/29/21 53.96 kg/m²   04/26/21 54.11 kg/m²            Recurrent boils 06/02/2016     Chronic.  Recurrent.  Patient has had several episodes in the last few months.  Responds well to doxycycline and Bactroban.  Patient has been applying Hibiclens once per week and having bleach baths diluted several times each week with no improvement.  Patient is allergic to sulfa drugs.  Patient has not seen Dermatology yet.      Seasonal allergic rhinitis 09/11/2015     Chronic.  Uncontrolled.  Patient on medical therapy with Flonase, Singulair, Zyrtec.  Reports compliance.  No side effects reported.      Metabolic syndrome 04/30/2015    Nutritional anemia 12/19/2014     Reviewed recent CBC from [facility].      Lab Results   Component Value Date    WBC 9.53 02/16/2022    HGB 11.7 (L) 02/16/2022    HCT 40.4 02/16/2022    MCV 76 (L) 02/16/2022     02/16/2022     Lab Results   Component Value Date    IRON 33 01/20/2022    TIBC 339 01/20/2022    FERRITIN 36 01/22/2009     Lab Results   Component Value Date    YHEIXHXE50 510 05/23/2022     Lab Results   Component Value Date    FOLATE 10.0 01/20/2022             PCOS (polycystic ovarian syndrome) 09/30/2013     Chronic.  Patient was on metformin previously.  And got pregnant.  She would like to start back on his medication.  Onset was years ago per        Recent Labs Obtained:  No visits with results within 7 Day(s) from this visit.   Latest known visit with results is:   Lab Visit on 10/06/2022   Component Date Value Ref Range Status    Uric Acid 10/06/2022 4.9  2.4 - 5.7 mg/dL Final       Encounter Diagnosis   Name Primary?    Yeast infection Yes        No orders of the defined types were placed in this encounter.     Medications Ordered This Encounter   Medications    fluconazole (DIFLUCAN) 150 MG Tab     Sig: Take 1 tablet (150 mg total) by mouth every 72 hours. for 3 doses     Dispense:  3 tablet     Refill:  0    nystatin (MYCOSTATIN) powder     Sig: Apply topically 2 (two) times daily.     Dispense:  60 g      Refill:  1        E-Visit Time Tracking:    Day 1 Time (in minutes): 7     Total Time (in minutes): 7

## 2022-10-25 NOTE — PATIENT INSTRUCTIONS
Follow up if symptoms worsen or fail to improve.     Dear patient,   As a result of recent federal legislation (The Federal Cures Act), you may receive lab or pathology results from your visit in your [facility] account before your physician is able to contact you. Your physician or their representative will relay the results to you with their recommendations at their soonest availability.     If no improvement in symptoms or symptoms worsen, please be advised to call MD, follow-up at clinic and/or go to ER if becomes severe.    Dante Negro M.D.        We Offer TELEHEALTH & Same Day Appointments!   Book your Telehealth appointment with me through my nurse or   Clinic appointments on [employer]!    [address] [place] [address]    Office: [phone]   FAX: [phone]    Check out my Facebook Page and Follow Me at: [email or web address]    Check out my website at [employer] by clicking on: [email or web address]    To Schedule appointments [email or web address] go to [email or web address]: [email or web address]

## 2022-10-26 NOTE — PROGRESS NOTES
[facility]   Physical Therapy Treatment Note    Name: Lucía Steele [facility] Number: [id number]    Therapy Diagnosis:   Encounter Diagnoses   Name Primary?    Decreased strength of lower extremity Yes    Decreased ROM of lumbar spine     Decreased functional activity tolerance      Physician: Yanci Sierra PA-C    Visit Date: 10/28/2022    Physician Orders: PT Eval and Treat   Medical Diagnosis from Referral: M54.42,M54.41,G89.29 (ICD-10-CM) - Chronic bilateral low back pain with bilateral sciatica  M43.16 (ICD-10-CM) - Spondylolisthesis of lumbar region  Evaluation Date: 9/14/2022  Authorization Period Expiration: 12/31/22  Plan of Care Expiration: 11/14/22  Progress Note Due: 11/12/22  Visit # / Visits authorized: 9 / 20 auth (1/1 eval)(re-assessed on 10/12/22)  FOTO: 2 / 3 (9/14/22 - IE, 10/12/22)     Precautions: Anxiety, Asthma, HTN, Type 2 Diabetes, Class 3 Obesity     PTA Visit #: 0 / 5     Time In: 8:02 AM  Time Out: 8:40 AM  Total Billable Time: 38 minutes    SUBJECTIVE     Pt reports: that is feeling okay this morning in regards to her back but her allergies are acting up pretty bad. Had a little soreness on the right side after the dry needling last session but it did seem to help.    She was compliant with home exercise program.  Response to previous treatment: mild soreness that improved over time  Functional change: slight decrease in back pain    Pain: 0/10, 3/10 when standing and moving around   Location: bilateral back and back of right leg     OBJECTIVE     Objective Measures updated at progress report unless specified.     Treatment     Lucía received the treatments listed below:      Therapeutic Exercises to develop strength, endurance, ROM, flexibility, posture, and core stabilization for 00 minutes including:        Pt received Manual Therapy techniques in the form of Dry Needling for 26 min. This was applied to the Lumbar Paraspinals B. Dry needling was performed  to decrease inflammation, increase circulation, decrease pain and restore homeostasis.     (6) 50 mm needles with 30 mm gauge were used for all insertion points. Patient prone lying for proper positioning and comfort. Bolster under LE's was denied today.    Electrical Stimulation was applied this date while needles were in situ x10 minutes. Intensity increased to patient tolerance. No adverse reactions noted.     Patient gave Verbal consent to undergo dry needling. Written consent can be found in the media section in pts chart. All needles were removed and changes in signs and symptoms were assessed. No adverse reactions noted at the conclusion of the treatment.       Supervised modalities after being cleared for contradictions: IFC Electrical Stimulation: Lucía received IFC Electrical Stimulation for pain control applied to the Lumbar Musculature  Pt received stimulation at 100% scan for 12 minutes. Lucía tolerated treatment well without any adverse effects. Patient prone with LE bolster denied.    Patient received a hot pack to the lumbar musculature at the conclusion of the treatment session 10 minutes for improved tissue extensibility and decreased pain. Patient prone with bolster under LE's denied. No adverse reactions observed.    Patient Education and Home Exercises     Home Exercises Provided and Patient Education Provided     Education provided:   - Home Exercise Program Review  - Post Exercise Soreness  - Maintaining a pain free range of motion with all activities  - Anatomy/Physiology of the Lumbar Spine and the surrounding musculature    Written Home Exercises Provided: Patient instructed to cont prior HEP and yes. Exercises were reviewed and Lucía was able to demonstrate them prior to the end of the session.  Lucía demonstrated good  understanding of the education provided. See EMR under Patient Instructions for exercises provided during therapy sessions    ASSESSMENT     FDN continues to feel beneficial -  palpable trigger points noted along the lumbar paraspinals - more on the right compared to the left. Skin was cleaned pre and post needle insertion/removal with no adverse reactions observed. IFC-electrical stimulation was applied to the lumbar musculature for pain modulation and tissue extensibility. Encouraged patient to continue to perform Home Exercise Program for maintenance outside the clinical setting.    Lucía Is progressing well towards her goals.   Pt prognosis is Fair.     Pt will continue to benefit from skilled outpatient physical therapy to address the deficits listed in the problem list box on initial evaluation, provide pt/family education and to maximize pt's level of independence in the home and community environment.     Pt's spiritual, cultural and educational needs considered and pt agreeable to plan of care and goals.     Anticipated barriers to physical therapy: none    Goals:  Short Term Goals: 4 weeks   - Patient will demonstrate improved ability to ambulate at least 10 minutes with minimal to no increase in symptoms for improved physical activity. (progressing, not met)  - Patient will be able to perform a squat with proper mechanics, no signs of knee valgus, and minimal to no increase in symptoms for increased ability to perform household and work related tasks. (progressing, not met)  - Patient will be able to stand for at least 10 minutes with minimal to no increase in symptoms for increased ability to perform ADL's. (progressing, not met)  - Patient will demonstrate improved hip abduction strength, by at least 1/2 grade via MMT for improved functional mobility overall. (progressing, not met)     Long Term Goals: 8 weeks   - Patient will demonstrate improved ability to ambulate at least 15 minutes with minimal to no increase in symptoms for improved physical activity. (progressing, not met)  - Patient will be able to stand for at least 15 minutes with minimal to no increase in symptoms  for increased ability to perform ADL's. (progressing, not met)  - Patient will demonstrate improved hip extension strength, by at least 1/2 grade via MMT for improved functional mobility overall. (MET: 10/12/22)  - Patient will demonstrate independence with Home Exercise Program for continued improvement outside the clinical setting. (progressing, not met)    PLAN     Continue with PT POC to address functional deficits.     Dilia Green, PT, DPT, Cert. DN

## 2022-10-27 ENCOUNTER — TELEPHONE (OUTPATIENT)
Dept: FAMILY MEDICINE | Facility: CLINIC | Age: 37
End: 2022-10-27
Payer: COMMERCIAL

## 2022-10-27 NOTE — TELEPHONE ENCOUNTER
----- Message from Bambi Degroot sent at 10/27/2022  1:09 PM CDT -----  Contact: Lucía Castrejon is calling to have nurse visit for b12 injection rescheduled for Monday 10/31 or Tuesday November 01. Please give patient a call back at  [phone]    Thanks   LJ

## 2022-10-28 ENCOUNTER — CLINICAL SUPPORT (OUTPATIENT)
Dept: FAMILY MEDICINE | Facility: CLINIC | Age: 37
End: 2022-10-28
Payer: COMMERCIAL

## 2022-10-28 ENCOUNTER — CLINICAL SUPPORT (OUTPATIENT)
Dept: REHABILITATION | Facility: HOSPITAL | Age: 37
End: 2022-10-28
Payer: COMMERCIAL

## 2022-10-28 ENCOUNTER — TELEPHONE (OUTPATIENT)
Dept: FAMILY MEDICINE | Facility: CLINIC | Age: 37
End: 2022-10-28
Payer: COMMERCIAL

## 2022-10-28 DIAGNOSIS — R29.898 DECREASED STRENGTH OF LOWER EXTREMITY: Primary | ICD-10-CM

## 2022-10-28 DIAGNOSIS — E53.8 B12 DEFICIENCY: Primary | ICD-10-CM

## 2022-10-28 DIAGNOSIS — M53.86 DECREASED ROM OF LUMBAR SPINE: ICD-10-CM

## 2022-10-28 DIAGNOSIS — R68.89 DECREASED FUNCTIONAL ACTIVITY TOLERANCE: ICD-10-CM

## 2022-10-28 PROCEDURE — 96372 PR INJECTION,THERAP/PROPH/DIAG2ST, IM OR SUBCUT: ICD-10-PCS | Mod: S$GLB,,, | Performed by: INTERNAL MEDICINE

## 2022-10-28 PROCEDURE — 96372 THER/PROPH/DIAG INJ SC/IM: CPT | Mod: S$GLB,,, | Performed by: INTERNAL MEDICINE

## 2022-10-28 PROCEDURE — 97110 THERAPEUTIC EXERCISES: CPT | Mod: PN

## 2022-10-28 PROCEDURE — 99999 PR PBB SHADOW E&M-EST. PATIENT-LVL III: CPT | Mod: PBBFAC,,,

## 2022-10-28 PROCEDURE — 99999 PR PBB SHADOW E&M-EST. PATIENT-LVL III: ICD-10-PCS | Mod: PBBFAC,,,

## 2022-10-28 RX ORDER — CYANOCOBALAMIN 1000 UG/ML
1000 INJECTION, SOLUTION INTRAMUSCULAR; SUBCUTANEOUS ONCE
Status: COMPLETED | OUTPATIENT
Start: 2022-10-28 | End: 2022-10-28

## 2022-10-28 RX ADMIN — CYANOCOBALAMIN 1000 MCG: 1000 INJECTION, SOLUTION INTRAMUSCULAR; SUBCUTANEOUS at 01:10

## 2022-10-28 NOTE — TELEPHONE ENCOUNTER
I have signed for the following orders AND/OR meds.  Please call the patient and ask the patient to schedule the testing AND/OR inform about any medications that were sent. Medications have been sent to pharmacy listed below      No orders of the defined types were placed in this encounter.      Medications Ordered This Encounter   Medications    cyanocobalamin injection 1,000 mcg         [facility] - [address]  [address] [facility]  [place] [address]  Phone: [phone] Fax: [phone]

## 2022-10-31 NOTE — PROGRESS NOTES
[facility]   Physical Therapy Treatment Note    Name: Lucía Steele [facility] Number: [id number]    Therapy Diagnosis:   Encounter Diagnoses   Name Primary?    Decreased strength of lower extremity Yes    Decreased ROM of lumbar spine     Decreased functional activity tolerance      Physician: Yanci Sierra PA-C    Visit Date: 11/2/2022    Physician Orders: PT Eval and Treat   Medical Diagnosis from Referral: M54.42,M54.41,G89.29 (ICD-10-CM) - Chronic bilateral low back pain with bilateral sciatica  M43.16 (ICD-10-CM) - Spondylolisthesis of lumbar region  Evaluation Date: 9/14/2022  Authorization Period Expiration: 12/31/22  Plan of Care Expiration: 11/14/22  Progress Note Due: 11/12/22  Visit # / Visits authorized: 9 / 20 auth (1/1 eval)(re-assessed on 10/12/22)  FOTO: 2 / 3 (9/14/22 - IE, 10/12/22)     Precautions: Anxiety, Asthma, HTN, Type 2 Diabetes, Class 3 Obesity     PTA Visit #: 0 / 5     Time In: 8:30 AM  Time Out: 9:08 AM  Total Billable Time: 38 minutes    SUBJECTIVE     Pt reports: that she did okay over the weekend. Having a little discomfort this morning. She has noticed that she was able to clean up around the house a little more which is good.    She was compliant with home exercise program.  Response to previous treatment: mild soreness that improved over time  Functional change: slight decrease in back pain    Pain: 0/10, 4/10 when standing and moving around   Location: bilateral back and back of right leg     OBJECTIVE     Objective Measures updated at progress report unless specified.     Treatment     Lucía received the treatments listed below:      Therapeutic Exercises to develop strength, endurance, ROM, flexibility, posture, and core stabilization for 00 minutes including:        Pt received Manual Therapy techniques in the form of Dry Needling for 26 min. This was applied to the Lumbar Paraspinals B. Dry needling was performed to decrease inflammation,  increase circulation, decrease pain and restore homeostasis.     (6) 50 mm needles with 30 mm gauge were used for all insertion points. Patient prone lying for proper positioning and comfort. Bolster under LE's for comfort.    Electrical Stimulation was applied this date while needles were in situ x10 minutes. Intensity increased to patient tolerance. No adverse reactions noted.     Patient gave Verbal consent to undergo dry needling. Written consent can be found in the media section in pts chart. All needles were removed and changes in signs and symptoms were assessed. No adverse reactions noted at the conclusion of the treatment.       Supervised modalities after being cleared for contradictions: IFC Electrical Stimulation: Lucía received IFC Electrical Stimulation for pain control applied to the Lumbar Musculature  Pt received stimulation at 100% scan for 12 minutes. Lucía tolerated treatment well without any adverse effects. Patient prone with LE bolster denied.    Patient received a hot pack to the lumbar musculature at the conclusion of the treatment session 10 minutes for improved tissue extensibility and decreased pain. Patient prone with bolster under LE's denied. No adverse reactions observed.    Patient Education and Home Exercises     Home Exercises Provided and Patient Education Provided     Education provided:   - Home Exercise Program Review  - Post Exercise Soreness - especially with dry needling  - Maintaining a pain free range of motion with all activities  - Anatomy/Physiology of the Lumbar Spine and the surrounding musculature    Written Home Exercises Provided: Patient instructed to cont prior HEP. Exercises were reviewed and Lucía was able to demonstrate them prior to the end of the session.  Lucía demonstrated good  understanding of the education provided. See EMR under Patient Instructions for exercises provided during therapy sessions    ASSESSMENT     Patient continues to report improved  ability to perform ADL's with decreased pain. Dry needling was utilized again today as it continues to yield positive results. Skin was cleaned pre and post needle insertion/removal with no adverse reactions. IFC-electrical stimulation was applied to assist with pain relief and tissue extensibility. Will continue to address symptoms each session.     Lucía Is progressing well towards her goals.   Pt prognosis is Fair.     Pt will continue to benefit from skilled outpatient physical therapy to address the deficits listed in the problem list box on initial evaluation, provide pt/family education and to maximize pt's level of independence in the home and community environment.     Pt's spiritual, cultural and educational needs considered and pt agreeable to plan of care and goals.     Anticipated barriers to physical therapy: none    Goals:  Short Term Goals: 4 weeks   - Patient will demonstrate improved ability to ambulate at least 10 minutes with minimal to no increase in symptoms for improved physical activity. (progressing, not met)  - Patient will be able to perform a squat with proper mechanics, no signs of knee valgus, and minimal to no increase in symptoms for increased ability to perform household and work related tasks. (progressing, not met)  - Patient will be able to stand for at least 10 minutes with minimal to no increase in symptoms for increased ability to perform ADL's. (progressing, not met)  - Patient will demonstrate improved hip abduction strength, by at least 1/2 grade via MMT for improved functional mobility overall. (progressing, not met)     Long Term Goals: 8 weeks   - Patient will demonstrate improved ability to ambulate at least 15 minutes with minimal to no increase in symptoms for improved physical activity. (progressing, not met)  - Patient will be able to stand for at least 15 minutes with minimal to no increase in symptoms for increased ability to perform ADL's. (progressing, not met)  -  Patient will demonstrate improved hip extension strength, by at least 1/2 grade via MMT for improved functional mobility overall. (MET: 10/12/22)  - Patient will demonstrate independence with Home Exercise Program for continued improvement outside the clinical setting. (progressing, not met)    PLAN     Continue with PT POC to address functional deficits.     Dilia Green, PT, DPT, Cert. DN

## 2022-11-02 ENCOUNTER — CLINICAL SUPPORT (OUTPATIENT)
Dept: REHABILITATION | Facility: HOSPITAL | Age: 37
End: 2022-11-02
Payer: COMMERCIAL

## 2022-11-02 ENCOUNTER — PATIENT MESSAGE (OUTPATIENT)
Dept: FAMILY MEDICINE | Facility: CLINIC | Age: 37
End: 2022-11-02
Payer: COMMERCIAL

## 2022-11-02 DIAGNOSIS — R29.898 DECREASED STRENGTH OF LOWER EXTREMITY: Primary | ICD-10-CM

## 2022-11-02 DIAGNOSIS — M53.86 DECREASED ROM OF LUMBAR SPINE: ICD-10-CM

## 2022-11-02 DIAGNOSIS — R68.89 DECREASED FUNCTIONAL ACTIVITY TOLERANCE: ICD-10-CM

## 2022-11-02 PROCEDURE — 97014 ELECTRIC STIMULATION THERAPY: CPT | Mod: PN

## 2022-11-02 PROCEDURE — 97140 MANUAL THERAPY 1/> REGIONS: CPT | Mod: PN

## 2022-11-07 ENCOUNTER — OFFICE VISIT (OUTPATIENT)
Dept: PAIN MEDICINE | Facility: CLINIC | Age: 37
End: 2022-11-07
Payer: COMMERCIAL

## 2022-11-07 ENCOUNTER — OFFICE VISIT (OUTPATIENT)
Dept: OPTOMETRY | Facility: CLINIC | Age: 37
End: 2022-11-07
Payer: COMMERCIAL

## 2022-11-07 VITALS
WEIGHT: 293 LBS | DIASTOLIC BLOOD PRESSURE: 92 MMHG | HEIGHT: 64 IN | HEART RATE: 100 BPM | BODY MASS INDEX: 50.02 KG/M2 | SYSTOLIC BLOOD PRESSURE: 142 MMHG

## 2022-11-07 DIAGNOSIS — M43.16 SPONDYLOLISTHESIS OF LUMBAR REGION: ICD-10-CM

## 2022-11-07 DIAGNOSIS — H43.393 VITREOUS FLOATERS, BILATERAL: ICD-10-CM

## 2022-11-07 DIAGNOSIS — G89.29 CHRONIC BILATERAL LOW BACK PAIN WITH BILATERAL SCIATICA: Primary | ICD-10-CM

## 2022-11-07 DIAGNOSIS — M54.42 CHRONIC BILATERAL LOW BACK PAIN WITH BILATERAL SCIATICA: Primary | ICD-10-CM

## 2022-11-07 DIAGNOSIS — M54.41 CHRONIC BILATERAL LOW BACK PAIN WITH BILATERAL SCIATICA: Primary | ICD-10-CM

## 2022-11-07 DIAGNOSIS — H52.13 MYOPIA OF BOTH EYES WITH ASTIGMATISM: ICD-10-CM

## 2022-11-07 DIAGNOSIS — E11.9 DIABETES MELLITUS TYPE 2 WITHOUT RETINOPATHY: Primary | ICD-10-CM

## 2022-11-07 DIAGNOSIS — H04.123 DRY EYES, BILATERAL: ICD-10-CM

## 2022-11-07 DIAGNOSIS — H40.023 OAG (OPEN ANGLE GLAUCOMA) SUSPECT, HIGH RISK, BILATERAL: ICD-10-CM

## 2022-11-07 DIAGNOSIS — H52.203 MYOPIA OF BOTH EYES WITH ASTIGMATISM: ICD-10-CM

## 2022-11-07 PROCEDURE — 2023F DILAT RTA XM W/O RTNOPTHY: CPT | Mod: CPTII,S$GLB,, | Performed by: OPTOMETRIST

## 2022-11-07 PROCEDURE — 3080F PR MOST RECENT DIASTOLIC BLOOD PRESSURE >= 90 MM HG: ICD-10-PCS | Mod: CPTII,S$GLB,, | Performed by: PHYSICIAN ASSISTANT

## 2022-11-07 PROCEDURE — 99999 PR PBB SHADOW E&M-EST. PATIENT-LVL III: ICD-10-PCS | Mod: PBBFAC,,, | Performed by: PHYSICIAN ASSISTANT

## 2022-11-07 PROCEDURE — 3044F PR MOST RECENT HEMOGLOBIN A1C LEVEL <7.0%: ICD-10-PCS | Mod: CPTII,S$GLB,, | Performed by: PHYSICIAN ASSISTANT

## 2022-11-07 PROCEDURE — 1159F MED LIST DOCD IN RCRD: CPT | Mod: CPTII,S$GLB,, | Performed by: OPTOMETRIST

## 2022-11-07 PROCEDURE — 1159F PR MEDICATION LIST DOCUMENTED IN MEDICAL RECORD: ICD-10-PCS | Mod: CPTII,S$GLB,, | Performed by: PHYSICIAN ASSISTANT

## 2022-11-07 PROCEDURE — 3080F DIAST BP >= 90 MM HG: CPT | Mod: CPTII,S$GLB,, | Performed by: PHYSICIAN ASSISTANT

## 2022-11-07 PROCEDURE — 92015 DETERMINE REFRACTIVE STATE: CPT | Mod: S$GLB,,, | Performed by: OPTOMETRIST

## 2022-11-07 PROCEDURE — 3008F PR BODY MASS INDEX (BMI) DOCUMENTED: ICD-10-PCS | Mod: CPTII,S$GLB,, | Performed by: PHYSICIAN ASSISTANT

## 2022-11-07 PROCEDURE — 3044F HG A1C LEVEL LT 7.0%: CPT | Mod: CPTII,S$GLB,, | Performed by: OPTOMETRIST

## 2022-11-07 PROCEDURE — 99999 PR PBB SHADOW E&M-EST. PATIENT-LVL II: ICD-10-PCS | Mod: PBBFAC,,, | Performed by: OPTOMETRIST

## 2022-11-07 PROCEDURE — 3044F PR MOST RECENT HEMOGLOBIN A1C LEVEL <7.0%: ICD-10-PCS | Mod: CPTII,S$GLB,, | Performed by: OPTOMETRIST

## 2022-11-07 PROCEDURE — 3044F HG A1C LEVEL LT 7.0%: CPT | Mod: CPTII,S$GLB,, | Performed by: PHYSICIAN ASSISTANT

## 2022-11-07 PROCEDURE — 99213 PR OFFICE/OUTPT VISIT, EST, LEVL III, 20-29 MIN: ICD-10-PCS | Mod: S$GLB,,, | Performed by: PHYSICIAN ASSISTANT

## 2022-11-07 PROCEDURE — 92014 COMPRE OPH EXAM EST PT 1/>: CPT | Mod: S$GLB,,, | Performed by: OPTOMETRIST

## 2022-11-07 PROCEDURE — 2023F PR DILATED RETINAL EXAM W/O EVID OF RETINOPATHY: ICD-10-PCS | Mod: CPTII,S$GLB,, | Performed by: OPTOMETRIST

## 2022-11-07 PROCEDURE — 3077F PR MOST RECENT SYSTOLIC BLOOD PRESSURE >= 140 MM HG: ICD-10-PCS | Mod: CPTII,S$GLB,, | Performed by: PHYSICIAN ASSISTANT

## 2022-11-07 PROCEDURE — 92015 PR REFRACTION: ICD-10-PCS | Mod: S$GLB,,, | Performed by: OPTOMETRIST

## 2022-11-07 PROCEDURE — 1159F MED LIST DOCD IN RCRD: CPT | Mod: CPTII,S$GLB,, | Performed by: PHYSICIAN ASSISTANT

## 2022-11-07 PROCEDURE — 99213 OFFICE O/P EST LOW 20 MIN: CPT | Mod: S$GLB,,, | Performed by: PHYSICIAN ASSISTANT

## 2022-11-07 PROCEDURE — 1160F RVW MEDS BY RX/DR IN RCRD: CPT | Mod: CPTII,S$GLB,, | Performed by: PHYSICIAN ASSISTANT

## 2022-11-07 PROCEDURE — 3077F SYST BP >= 140 MM HG: CPT | Mod: CPTII,S$GLB,, | Performed by: PHYSICIAN ASSISTANT

## 2022-11-07 PROCEDURE — 3008F BODY MASS INDEX DOCD: CPT | Mod: CPTII,S$GLB,, | Performed by: PHYSICIAN ASSISTANT

## 2022-11-07 PROCEDURE — 99999 PR PBB SHADOW E&M-EST. PATIENT-LVL II: CPT | Mod: PBBFAC,,, | Performed by: OPTOMETRIST

## 2022-11-07 PROCEDURE — 99999 PR PBB SHADOW E&M-EST. PATIENT-LVL III: CPT | Mod: PBBFAC,,, | Performed by: PHYSICIAN ASSISTANT

## 2022-11-07 PROCEDURE — 92014 PR EYE EXAM, EST PATIENT,COMPREHESV: ICD-10-PCS | Mod: S$GLB,,, | Performed by: OPTOMETRIST

## 2022-11-07 PROCEDURE — 1159F PR MEDICATION LIST DOCUMENTED IN MEDICAL RECORD: ICD-10-PCS | Mod: CPTII,S$GLB,, | Performed by: OPTOMETRIST

## 2022-11-07 PROCEDURE — 1160F PR REVIEW ALL MEDS BY PRESCRIBER/CLIN PHARMACIST DOCUMENTED: ICD-10-PCS | Mod: CPTII,S$GLB,, | Performed by: PHYSICIAN ASSISTANT

## 2022-11-07 NOTE — PROGRESS NOTES
HPI    Routine dm-dle-20    Pt complains of blurred vision. Wearing old glasses. No flashes, some   floaters. BSL controlled.    Hemoglobin A1C       Date                     Value               Ref Range             Status                08/30/2022               5.6                 4.0 - 5.6 %           Final                      06/10/2022               5.7 (H)             4.0 - 5.6 %           Final                      01/20/2022               5.7 (H)             4.0 - 5.6 %           Final                  Last edited by Dianne Mcdowell on 11/7/2022  4:22 PM.        ROS    Positive for: Eyes  Negative for: Constitutional, Gastrointestinal, Neurological, Skin,   Genitourinary, Musculoskeletal, HENT, Endocrine, Cardiovascular,   Respiratory, Psychiatric, Allergic/Imm, Heme/Lymph  Last edited by MILLA Villeda, OD on 11/7/2022  5:11 PM.        Assessment /Plan     For exam results, see Encounter Report.    Diabetes mellitus type 2 without retinopathy    Vitreous floaters, bilateral    OAG (open angle glaucoma) suspect, high risk, bilateral  -     King Visual Field - OU - Extended - Both Eyes; Future  -     OCT, Optic Nerve - OU - Both Eyes; Future    Dry eyes, bilateral    Myopia of both eyes with astigmatism        No justo/ no csme, gave Diabetic Retinopathy info, advise tight control glucose, BP---Advise annual dilated fundus exam  Mild OU, high myopia precautions, RD precautions given and reviewed. Patient knows to call/ message if any further changes in symptoms occur.    High normal IOP   moderate -large c/d  Angles open   + fhx both parents w/ glaucoma     /568    Last fields / OCT [id number]  G 103 wnl  G 101 wnl    No previous tx     Order new baseline to complete prior to end of year   4. No gross spk, moderate tear lake OU  Good hydration / monitor fans/ vents     5. Updated specs rx, gave copy, fill prn     Discussed and educated patient on current findings /plan.  RTC glaucoma w/u, then  "pending results, 1 year-- prn if any changes / issues    Gilman complete 11/11/22  G 101 wnl  G 99 wnl    PSD  3.07 <2% "onl" w/ some sup arc depression  1.70 <10% bord w/ few missed spots   No gross progression     High normal IOP/ strong fhx, relatively young patient age---very high suspect for eventual progression     RTC IOP recheck in 4 months                "

## 2022-11-07 NOTE — PROGRESS NOTES
[facility]   Physical Therapy Treatment Note    Name: Lucía Steele [facility] Number: [id number]    Therapy Diagnosis:   Encounter Diagnoses   Name Primary?    Decreased strength of lower extremity Yes    Decreased ROM of lumbar spine     Decreased functional activity tolerance      Physician: Yanci Sierra PA-C    Visit Date: 11/9/2022    Physician Orders: PT Eval and Treat   Medical Diagnosis from Referral: M54.42,M54.41,G89.29 (ICD-10-CM) - Chronic bilateral low back pain with bilateral sciatica  M43.16 (ICD-10-CM) - Spondylolisthesis of lumbar region  Evaluation Date: 9/14/2022  Authorization Period Expiration: 12/31/22  Plan of Care Expiration: 11/14/22  Progress Note Due: 11/12/22  Visit # / Visits authorized: 10 / 20 auth (1/1 eval)(re-assessed on 10/12/22)  FOTO: 2 / 3 (9/14/22 - IE, 10/12/22)     Precautions: Anxiety, Asthma, HTN, Type 2 Diabetes, Class 3 Obesity     PTA Visit #: 0 / 5     Time In: 8:30 AM  Time Out: 9:08 AM  Total Billable Time: 38 minutes    SUBJECTIVE     Pt reports: that her back has been feeling a lot better. Not having too much of a problem today so that is great.    She was compliant with home exercise program.  Response to previous treatment: mild soreness that improved over time  Functional change: slight decrease in back pain    Pain: 0/10, 1/10 when standing and moving around   Location: bilateral back and back of right leg     OBJECTIVE     Objective Measures updated at progress report unless specified.     Treatment     Lucía received the treatments listed below:      Therapeutic Exercises to develop strength, endurance, ROM, flexibility, posture, and core stabilization for 00 minutes including:        Pt received Manual Therapy techniques in the form of Dry Needling for 26 min. This was applied to the Lumbar Paraspinals B. Dry needling was performed to decrease inflammation, increase circulation, decrease pain and restore homeostasis.     (6) 50  mm needles with 30 mm gauge were used for all insertion points. Patient prone lying for proper positioning and comfort. Bolster under LE's for comfort.    Electrical Stimulation was applied this date while needles were in situ x10 minutes. Intensity increased to patient tolerance. No adverse reactions noted.     Patient gave Verbal consent to undergo dry needling. Written consent can be found in the media section in pts chart. All needles were removed and changes in signs and symptoms were assessed. No adverse reactions noted at the conclusion of the treatment.       Supervised modalities after being cleared for contradictions: IFC Electrical Stimulation: Lucía received IFC Electrical Stimulation for pain control applied to the Lumbar Musculature  Pt received stimulation at 100% scan for 12 minutes. Lucía tolerated treatment well without any adverse effects. Patient prone with LE bolster denied.    Patient received a hot pack to the lumbar musculature at the conclusion of the treatment session 10 minutes for improved tissue extensibility and decreased pain. Patient prone with bolster under LE's denied. No adverse reactions observed.    Patient Education and Home Exercises     Home Exercises Provided and Patient Education Provided     Education provided:   - Home Exercise Program Review  - Post Exercise Soreness - especially with dry needling  - Maintaining a pain free range of motion with all activities  - Anatomy/Physiology of the Lumbar Spine and the surrounding musculature    Written Home Exercises Provided: Patient instructed to cont prior HEP. Exercises were reviewed and Lucía was able to demonstrate them prior to the end of the session.  Lucía demonstrated good  understanding of the education provided. See EMR under Patient Instructions for exercises provided during therapy sessions    ASSESSMENT     Dry Needling continues to be utilized to assist with pain relief and tissue extensibility. Skin was cleaned pre and  post needle insertion/removal with no adverse reactions observed. IFC-electrical stimulation has also proven beneficial for the patient in terms of pain modulation. Will continue to progress patient as able next session.    Lucía Is progressing well towards her goals.   Pt prognosis is Fair.     Pt will continue to benefit from skilled outpatient physical therapy to address the deficits listed in the problem list box on initial evaluation, provide pt/family education and to maximize pt's level of independence in the home and community environment.     Pt's spiritual, cultural and educational needs considered and pt agreeable to plan of care and goals.     Anticipated barriers to physical therapy: none    Goals:  Short Term Goals: 4 weeks   - Patient will demonstrate improved ability to ambulate at least 10 minutes with minimal to no increase in symptoms for improved physical activity. (progressing, not met)  - Patient will be able to perform a squat with proper mechanics, no signs of knee valgus, and minimal to no increase in symptoms for increased ability to perform household and work related tasks. (progressing, not met)  - Patient will be able to stand for at least 10 minutes with minimal to no increase in symptoms for increased ability to perform ADL's. (progressing, not met)  - Patient will demonstrate improved hip abduction strength, by at least 1/2 grade via MMT for improved functional mobility overall. (progressing, not met)     Long Term Goals: 8 weeks   - Patient will demonstrate improved ability to ambulate at least 15 minutes with minimal to no increase in symptoms for improved physical activity. (progressing, not met)  - Patient will be able to stand for at least 15 minutes with minimal to no increase in symptoms for increased ability to perform ADL's. (progressing, not met)  - Patient will demonstrate improved hip extension strength, by at least 1/2 grade via MMT for improved functional mobility  overall. (MET: 10/12/22)  - Patient will demonstrate independence with Home Exercise Program for continued improvement outside the clinical setting. (progressing, not met)    PLAN     Continue with PT POC to address functional deficits.     Dilia Green, PT, DPT, Cert. DN

## 2022-11-07 NOTE — PATIENT INSTRUCTIONS
"DRY EYES -- BURNING OR BRIAN SYMPTOMS:  Use Over The Counter artificial tears as needed for dry eye symptoms.   Some common brands include:  Systane, Optive, Refresh, and Thera-Tears.  These drops can be used as frequently as desired, but may be most helpful use during long periods of concentrated work.  For example, reading / working at the computer. Start with 3-4x per day.     Nighttime Ophthalmic gel or ointments are available: Refresh PM, Genteal, and Lacrilube.    Avoid drops that "get redness out" (Visine, Murine, Clear Eyes), as these may contain medication that could further irritate the eyes, especially with chronic use.    ALLERGY EYES -- ITCHING SYMPTOMS:  Over the counter medications include--Pataday, Zaditor, and Alaway.  Use as directed 1-2 drops daily for symptoms of itching / watering eyes.  These drops will not help for dry eye or exposure symptoms.    REDNESS RELIEF:  Lumify---is a good redness reliever that will not cause irritation if used chronically.        FLASHES / FLOATERS / POSTERIOR VITREOUS DETACHMENT    Call the clinic if you have any further changes in symptoms.  Including:  Increased numbers of floaters or flashing lights, dimness or darkness that moves through or stays constant in your vision, or any pain in the eye (s).    You may sometimes see small specks or clouds moving in your field of vision.  They are called FLOATERS.  You can often see them when looking at a plain background, like a blank wall or blue sofia.  Floaters are actually tiny clumps of gel or cells inside the VITREOUS, the clear jelly-like fluid that fills the inside of your eye.    While these objects look like they are in front of your eye, they are actually floating inside.  What you see are the shadows they cast on the RETINA, the nerve layer at the back of the eye that senses light and allows you to see.      POSTERIOR VITREOUS DETACHMENT    The appearance of new floaters may be alarming.  If you suddenly " develop new floaters, you should contact your eye care professional  right away.    The retina can tear if the shrinking vitreous pulls away from the wall of the eye.  This sometimes causes a small amount of bleeding in the eye that may appear as new floaters.    A torn retina is always a serious problem, since it can lead to a retinal detachment.  You should see your eye care professional as soon as possible if:    even one new floater appears suddenly;  you see sudden flashes of light;  you notice other symptoms, like the loss of side vision, or a curtain closes down in your vision        POSTERIOR VITREOUS DETACHMENT is more common for people who:    are nearsighted;  have had cataract surgery;  have had YAG laser surgery of the eye;  have had inflammation inside the eye;  are over age 60.      While some floaters may remain visible, many of them will fade over time and become less noticeable.  Even if you've had some floaters for years, you should have your eyes checked as soon as possible if you notice new ones.    FLASHING LIGHTS    When the vitreous gel rubs or pulls on the retina, you may see what look like flashing lights or lightning streaks.  These flashes can appear off and on for several weeks or months.      Some people experience flashes of light that appear as jagged lines or heat waves in both eyes, lasting 10-20 minutes.  These flashes are caused by a spasm of blood vessels in the brain, which is called a migraine.    If a headache follows these flashes, it's called a migraine headache.  If   no headache occurs, these flashes are called Ophthalmic or Ocular Migraine.           DIABETES AND THE EYE / DIABETIC RETINOPATHY    Diabetic retinopathy is a condition occurring in persons with diabetes, which causes progressive damage to the retina, the light sensitive lining at the back of the eye. It is a serious sight-threatening complication of diabetes.    Diabetic retinopathy is the result of  damage to the tiny blood vessels that nourish the retina. They leak blood and other fluids that cause swelling of retinal tissue and clouding of vision. The condition usually affects both eyes. The longer a person has diabetes, the more likely they will develop diabetic retinopathy. If left untreated, diabetic retinopathy can cause blindness.  There are two basic types of diabetic retinopathy:    Background or nonproliferative diabetic retinopathy (NPDR)  Nonproliferative diabetic retinopathy (NPDR) is the earliest stage of diabetic retinopathy. With this condition, damaged blood vessels in the retina begin to leak extra fluid and small amounts of blood into the eye. Sometimes, deposits of cholesterol or other fats from the blood may leak into the retina. Many people with diabetes have mild NPDR, which usually does not affect their vision. However, if their vision is affected, it is the result of macular edema and macular ischemia.    If vision is affected due to macular changes, a consult with a Retina Specialist may be advised.  This is an ophthalmologist that treats retina conditions, including diabetic retinopathy.     Proliferative diabetic retinopathy (PDR)  Proliferative diabetic retinopathy (PDR) mainly occurs when many of the blood vessels in the retina close, preventing enough blood flow. In an attempt to supply blood to the area where the original vessels closed, the retina responds by growing new blood vessels. This is called neovascularization. However, these new blood vessels are abnormal and do not supply the retina with proper blood flow. The new vessels are also often accompanied by scar tissue that may cause the retina to wrinkle or detach. PDR may cause more severe vision loss than NPDR because it can affect both central and peripheral vision.     A patient diagnosed with proliferative diabetic eye disease will be referred to a retinal specialist for consultation.    Often there are no visual  symptoms in the early stages of diabetic retinopathy. That is why our eye care professionals recommend that everyone with diabetes have a comprehensive dilated eye examination once a year. Early detection and treatment can limit the potential for significant vision loss from diabetic retinopathy.       GLAUCOMA SUSPECT    Glaucoma is a condition in which damage occurs to the optic nerve inside the eye.  The optic nerve is the wire that transmits vision signals to the brain.   It is typically, but not always, associated with a painless increase in eye pressure over time. In some cases if untreated, Glaucoma can cause blindness.    A Glaucoma Suspect could be defined as a patient that has one or more of the following signs:    Elevated eye pressure.  Enlarged optic nerve head cupping.  Narrow anterior chamber angle.      Tests that may be performed to rule out glaucoma as a diagnosis include:    Visual fields- This test measures the health of the optic nerve and the extent of the peripheral vision as compared to normal.  Visual field loss can be consistent with advancing glaucoma.  HRT / OCT imaging- These computerized tests can obtain 3D scanning images of the optic nerve  / optic nerve cupping to compare past and future results.    Corneal thickness / pachymetry- This test measures the thickness of the clear outer surface of the eye, the cornea.  This physical thickness can have a bearing on the measurement of the eye pressure.  Gonioscopy- This measures or grades the anterior drainage angle.  This structure depth is the distance between the cornea (the clear surface layer) and iris (the colored portion) inside the eye.  If this angle is very slim, or narrow, it can impede normal fluid outflow from the eye, and sometimes cause the eye pressure to rise, and damage the optic nerve.  Optic nerve photography- baseline pictures of the optic nerve may be taken for future comparison.      If a diagnosis of Glaucoma is  made based on test results, you and your doctor will discuss treatment options. Treatment may include:    Rx Eye Drops- Many Glaucoma patients have their disease controlled with 1or 2 topical (eye drop) medications.    Laser Treatment of the Iris or Anterior Drainage Angle-  Out patient / in office laser treatments may be used as an alternative / additional therapy to prescription eye drops.  Advanced eye surgeries-  A small percentage of difficult cases may need more involved surgery with a Glaucoma specialist.  This is an eye surgeon that specializes in the treatment of Glaucoma.

## 2022-11-09 ENCOUNTER — CLINICAL SUPPORT (OUTPATIENT)
Dept: REHABILITATION | Facility: HOSPITAL | Age: 37
End: 2022-11-09
Payer: COMMERCIAL

## 2022-11-09 DIAGNOSIS — R29.898 DECREASED STRENGTH OF LOWER EXTREMITY: Primary | ICD-10-CM

## 2022-11-09 DIAGNOSIS — M53.86 DECREASED ROM OF LUMBAR SPINE: ICD-10-CM

## 2022-11-09 DIAGNOSIS — R68.89 DECREASED FUNCTIONAL ACTIVITY TOLERANCE: ICD-10-CM

## 2022-11-09 PROCEDURE — 97140 MANUAL THERAPY 1/> REGIONS: CPT | Mod: PN

## 2022-11-09 PROCEDURE — 97014 ELECTRIC STIMULATION THERAPY: CPT | Mod: PN

## 2022-11-11 ENCOUNTER — CLINICAL SUPPORT (OUTPATIENT)
Dept: OPHTHALMOLOGY | Facility: CLINIC | Age: 37
End: 2022-11-11
Payer: COMMERCIAL

## 2022-11-11 DIAGNOSIS — H40.023 OAG (OPEN ANGLE GLAUCOMA) SUSPECT, HIGH RISK, BILATERAL: ICD-10-CM

## 2022-11-11 NOTE — PROGRESS NOTES
HPI    24-2 sf HVF and OCT Nerve done today  Pt. States she has latex allergy. Used traditional patch.  Pt. Sat well for testing.   Last edited by Ara Saini on 11/11/2022  4:02 PM.            Assessment /Plan     For exam results, see Encounter Report.    OAG (open angle glaucoma) suspect, high risk, bilateral  -     King Visual Field - OU - Extended - Both Eyes  -     OCT, Optic Nerve - OU - Both Eyes

## 2022-11-15 NOTE — PROGRESS NOTES
Back and Spine Follow Up    Patient ID: Lucía Coreas is a 37 y.o. female.    Chief Complaint   Patient presents with    Follow-up     8 wk f/u for lbp. Going to PT.       Review of Systems   Constitutional:  Negative for activity change, appetite change, chills, fever and unexpected weight change.   HENT:  Negative for tinnitus, trouble swallowing and voice change.    Respiratory:  Negative for apnea, cough, chest tightness and shortness of breath.    Cardiovascular:  Negative for chest pain and palpitations.   Gastrointestinal:  Negative for constipation, diarrhea, nausea and vomiting.   Genitourinary:  Negative for difficulty urinating, dysuria, frequency and urgency.   Musculoskeletal:  Positive for arthralgias, back pain, myalgias and neck pain. Negative for gait problem and neck stiffness.   Skin:  Negative for wound.   Neurological:  Positive for numbness. Negative for dizziness, tremors, seizures, facial asymmetry, speech difficulty, weakness, light-headedness and headaches.   Psychiatric/Behavioral:  Negative for confusion and decreased concentration.      Past Medical History:   Diagnosis Date    Allergy     Amenorrhea     Asthma     Diabetes     GERD (gastroesophageal reflux disease)     Infertility associated with anovulation     Iron deficiency anemia     Knee pain     Metabolic syndrome     PCO (polycystic ovaries)     Recurrent boils     Status post repeat low transverse  section 2020    Formatting of this note might be different from the original. With BTL 21     Social History     Socioeconomic History    Marital status:    Tobacco Use    Smoking status: Never    Smokeless tobacco: Never   Substance and Sexual Activity    Alcohol use: Never    Drug use: Never    Sexual activity: Yes     Partners: Male     Birth control/protection: See Surgical Hx     Family History   Problem Relation Age of Onset    Diabetes Father     Heart disease Father 69    Hypertension Father      Prostate cancer Father     Diabetes Mother     Lupus Mother     AMY disease Brother     Ovarian cancer Sister     Breast cancer Neg Hx      Review of patient's allergies indicates:   Allergen Reactions    Metformin Diarrhea    Sulfa (sulfonamide antibiotics) Anaphylaxis and Swelling     Swelling (eyes)^, Swelling (throat)^  Swelling (eyes)^, Swelling (throat)^      Diclofenac      Gastritis     Latex, natural rubber      Contact dermatitis      Shellfish containing products      anaphylaxis       Current Outpatient Medications:     albuterol (PROVENTIL) 2.5 mg /3 mL (0.083 %) nebulizer solution, Take 3 mLs (2.5 mg total) by nebulization every 4 to 6 hours as needed for Wheezing., Disp: 60 each, Rfl: 3    albuterol (PROVENTIL/VENTOLIN HFA) 90 mcg/actuation inhaler, Inhale 1-2 puffs into the lungs every 4 (four) hours as needed for Wheezing., Disp: 12 g, Rfl: 1    azelastine (ASTELIN) 137 mcg (0.1 %) nasal spray, SMARTSIG:Both Nares, Disp: , Rfl:     cholecalciferol, vitamin D3, (VITAMIN D3) 25 mcg (1,000 unit) capsule, Take 2 capsules (2,000 Units total) by mouth once daily., Disp: 90 capsule, Rfl: 4    cyanocobalamin (VITAMIN B-12) 1000 MCG tablet, Take 1 tablet (1,000 mcg total) by mouth once daily., Disp: 90 tablet, Rfl: 3    ferrous sulfate (FEOSOL) Tab tablet, Take 1 tablet (1 each total) by mouth daily with breakfast., Disp: 90 tablet, Rfl: 3    fluticasone furoate-vilanteroL (BREO ELLIPTA) 100-25 mcg/dose diskus inhaler, Inhale 1 puff into the lungs once daily. Controller, Disp: 30 each, Rfl: 12    fluticasone furoate-vilanteroL (BREO) 200-25 mcg/dose DsDv diskus inhaler, Inhale 1 puff into the lungs once daily. Controller, Disp: 1 each, Rfl: 4    iron-vit c-b12-folic acid (IRON 100 PLUS) Tab, Take 1 tablet by mouth once daily., Disp: 90 tablet, Rfl: 4    ketoconazole (NIZORAL) 2 % shampoo, Apply topically twice a week., Disp: 500 mL, Rfl: 2    Lactobacillus rhamnosus GG (CULTURELLE) 10 billion cell capsule,  "Take 1 capsule by mouth once daily., Disp: , Rfl:     lactulose (CHRONULAC) 10 gram/15 mL solution, SMARTSI Tablespoon By Mouth Daily, Disp: , Rfl:     levocetirizine (XYZAL) 5 MG tablet, Take 1 tablet (5 mg total) by mouth every evening., Disp: 30 tablet, Rfl: 4    loratadine (CLARITIN) 10 mg tablet, Take 1 tablet (10 mg total) by mouth once daily., Disp: 90 tablet, Rfl: 4    montelukast (SINGULAIR) 10 mg tablet, Take 1 tablet (10 mg total) by mouth once daily., Disp: 30 tablet, Rfl: 3    mupirocin (BACTROBAN) 2 % ointment, Apply topically 3 (three) times daily., Disp: , Rfl:     NIFEdipine (PROCARDIA-XL) 30 MG (OSM) 24 hr tablet, Take 30 mg by mouth once daily., Disp: , Rfl:     NOVOFINE PLUS 32 gauge x 1/6" Ndle, use as directed, Disp: , Rfl:     nystatin (MYCOSTATIN) powder, Apply topically 2 (two) times daily., Disp: 60 g, Rfl: 1    omalizumab (XOLAIR) 150 mg injection, Inject 300 mg into the skin every 14 (fourteen) days., Disp: 2 each, Rfl: 11    ondansetron (ZOFRAN-ODT) 8 MG TbDL, DISSOLVE ONE TABLET UNDER TONGUE EVERY 8 HOURS AS NEEDED FOR NAUSEA, Disp: , Rfl:     pantoprazole (PROTONIX) 40 MG tablet, Take 1 tablet (40 mg total) by mouth once daily., Disp: 90 tablet, Rfl: 4    spironolactone (ALDACTONE) 100 MG tablet, Take 1 tablet (100 mg total) by mouth once daily., Disp: 90 tablet, Rfl: 1    tirzepatide (MOUNJARO) 5 mg/0.5 mL PnIj, Inject 5 mg into the skin every 7 days., Disp: 4 pen, Rfl: 1    tiZANidine (ZANAFLEX) 4 MG tablet, Take 1 tablet (4 mg total) by mouth nightly as needed (muscle spasms/ pain)., Disp: 40 tablet, Rfl: 0    WIXELA INHUB 500-50 mcg/dose DsDv diskus inhaler, Inhale 1 puff into the lungs 2 (two) times daily., Disp: , Rfl:     EPINEPHrine (EPIPEN) 0.3 mg/0.3 mL AtIn, Inject 0.3 mLs (0.3 mg total) into the muscle once. for 1 dose, Disp: 2 each, Rfl: 11    Current Facility-Administered Medications:     omalizumab injection 150 mg, 150 mg, Subcutaneous, Q14 Days, Rossy Kramer MD, 150 " "mg at 10/20/22 0925    omalizumab injection 150 mg, 150 mg, Subcutaneous, Q14 Days, Rossy Kramer MD, 150 mg at 10/20/22 0919    Vitals:    11/07/22 1543   BP: (!) 142/92   BP Location: Left arm   Patient Position: Sitting   BP Method: Large (Automatic)   Pulse: 100   Weight: (!) 144.7 kg (319 lb 1.9 oz)   Height: 5' 4" (1.626 m)       Physical Exam  Vitals and nursing note reviewed.   Constitutional:       Appearance: She is well-developed.   HENT:      Head: Normocephalic and atraumatic.   Eyes:      Pupils: Pupils are equal, round, and reactive to light.   Cardiovascular:      Rate and Rhythm: Normal rate.   Pulmonary:      Effort: Pulmonary effort is normal.   Musculoskeletal:         General: Normal range of motion.      Cervical back: Normal range of motion and neck supple.   Skin:     General: Skin is warm and dry.   Neurological:      Mental Status: She is alert and oriented to person, place, and time.      Coordination: Finger-Nose-Finger Test normal.      Gait: Gait is intact. Tandem walk normal.      Deep Tendon Reflexes:      Reflex Scores:       Tricep reflexes are 2+ on the right side and 2+ on the left side.       Bicep reflexes are 2+ on the right side and 2+ on the left side.       Brachioradialis reflexes are 2+ on the right side and 2+ on the left side.       Patellar reflexes are 2+ on the right side and 2+ on the left side.       Achilles reflexes are 2+ on the right side and 2+ on the left side.  Psychiatric:         Speech: Speech normal.         Behavior: Behavior normal.         Thought Content: Thought content normal.         Judgment: Judgment normal.       Neurologic Exam     Mental Status   Oriented to person, place, and time.   Oriented to person.   Oriented to place.   Oriented to time.   Attention: normal. Concentration: normal.   Speech: speech is normal   Level of consciousness: alert  Knowledge: consistent with education.     Cranial Nerves     CN III, IV, VI   Pupils are equal, " round, and reactive to light.    CN XI   Right sternocleidomastoid strength: normal  Left sternocleidomastoid strength: normal  Right trapezius strength: normal  Left trapezius strength: normal    Motor Exam   Muscle bulk: normal  Overall muscle tone: normal  Right arm tone: normal  Left arm tone: normal  Right arm pronator drift: absent  Left arm pronator drift: absent  Right leg tone: normal  Left leg tone: normal    Strength   Right neck flexion: 5/5  Left neck flexion: 5/5  Right neck extension: 5/5  Left neck extension: 5/5  Right deltoid: 5/5  Left deltoid: 5/5  Right biceps: 5/5  Left biceps: 5/5  Right triceps: 5/5  Left triceps: 5/5  Right wrist flexion: 5/5  Left wrist flexion: 5/5  Right wrist extension: 5/5  Left wrist extension: 5/5  Right interossei: 5/5  Left interossei: 5/5  Right abdominals: 5/5  Left abdominals: 5/5  Right iliopsoas: 5/5  Left iliopsoas: 5/5  Right quadriceps: 5/5  Left quadriceps: 5/5  Right hamstrin/5  Left hamstrin/5  Right glutei: 5/5  Left glutei: 5/5  Right anterior tibial: 5/5  Left anterior tibial: 5/5  Right posterior tibial: 5/5  Left posterior tibial: 5/5  Right peroneal: 5/5  Left peroneal: 5/5  Right gastroc: 5/5  Left gastroc: 5/5    Sensory Exam   Right arm light touch: normal  Left arm light touch: normal  Right leg light touch: normal  Left leg light touch: normal    Gait, Coordination, and Reflexes     Gait  Gait: normal    Coordination   Finger to nose coordination: normal  Tandem walking coordination: normal    Tremor   Resting tremor: absent  Intention tremor: absent  Action tremor: absent    Reflexes   Right brachioradialis: 2+  Left brachioradialis: 2+  Right biceps: 2+  Left biceps: 2+  Right triceps: 2+  Left triceps: 2+  Right patellar: 2+  Left patellar: 2+  Right achilles: 2+  Left achilles: 2+  Right Fournier: absent  Left Fournier: absent  Right ankle clonus: absent  Left ankle clonus: absent    Provider dictation:    37 year old female with  asthma, GERD, diabetes, anemia, allergies, obesity presents for follow up of lower back pain.  Pain started a few months ago and felt across the lower lumbar region.  She also had upper back pain and at times radiation into the bilateral legs in a generalized distribution.  Pain wouuld increases with standing and walking and worse at the end of the day.  She takes gabapentin daily  and alternates advil, tylenol for pain control.  Recalls PT for the shoulders/ neck af few years ago.    Since last visit, she has been going to PT with dry needling.  She is doing much better.  She is very happy with her progress thus far.  Severity and frequency of pain is much less.  She has no pain today.    Current medications:  gabapentin, alternates advil, tylenol, zanaflex sparingly  Medications tried:  no others  Physical therapy:  2-3 years ago for the neck/ shoulders, currently undergoing with benefit  Interventional Procedures:  none     On exam, there is 5/5 strength with 2+ DTR and no sensory deficits.  Gait and station fluid.  Denies bowel/ bladder dysfunction.  Full range of motion of the upper and lower extremities. No pain with axial facet loading.  No SI joint pain on palpation.  No pain with lumbar flexion/ extension.    Xray lumbar spine from 8-30-22 reveiwed:  there is no fracture.  Mild anterolisthesis of L5 on S1 noted with mild disk height loss.  No pard defect noted.    Ms. Castrejon has much improved acute onset lower back pain greater than upper back and leg pain.  Most likely myofascial/ mechanical back pain related to posture, weight, lack of exercise.  Possible pain can also be influenced by L5/S1 mild anterolisthesis site.  I recommend she continue with PT until discharged, good posture and regular exercise to help with pain.  She is agreeable.  She is very pleased with her progress and improvement thus far.  Follow up as needed.     Visit Diagnosis:  Chronic bilateral low back pain with bilateral  sciatica    Spondylolisthesis of lumbar region        Total time spent counseling greater than fifty percent of total visit time.  Counseling included discussion regarding imaging findings, diagnosis possibilities, treatment options, risks and benefits.   The patient had many questions regarding the options and long-term effects.

## 2022-11-16 NOTE — PROGRESS NOTES
[facility]   Physical Therapy Treatment Note & Re-assessment    [facility]  [facility] Number: [id number]    Therapy Diagnosis:   Encounter Diagnoses   Name Primary?    Decreased strength of lower extremity Yes    Decreased ROM of lumbar spine     Decreased functional activity tolerance      Physician: Yanci Sierra PA-C    Visit Date: 11/18/2022    Physician Orders: PT Eval and Treat   Medical Diagnosis from Referral: M54.42,M54.41,G89.29 (ICD-10-CM) - Chronic bilateral low back pain with bilateral sciatica  M43.16 (ICD-10-CM) - Spondylolisthesis of lumbar region  Evaluation Date: 9/14/2022  Authorization Period Expiration: 12/31/22  Plan of Care Expiration: 1/18/23 = NEW POC DATE  Progress Note Due: 12/18/22  Visit # / Visits authorized: 12 / 20 auth (1/1 eval)(re-assessed on 10/12/22)(re-assessed on 11/18/22)  FOTO: 3 / 3 (9/14/22 - IE, 10/12/22, 11/18/22)     Precautions: Anxiety, Asthma, HTN, Type 2 Diabetes, Class 3 Obesity     PTA Visit #: 0 / 5     Time In: 8:00 AM  Time Out: 8:40 AM  Total Billable Time: 40 minutes    SUBJECTIVE     Pt reports: that she is having some discomfort this morning. Feeling better overall since beginning therapy. Household chores are easier to perform at this point.    She was compliant with home exercise program.  Response to previous treatment: mild soreness that improved over time  Functional change: slight decrease in back pain    Pain: 3/10  Location: bilateral back and back of right leg     OBJECTIVE     Objective Measures updated at progress report unless specified.     Observation/Posture: FAIR - rounded shoulders, slouched in sitting. Pleasant AAF.     Gait: Non-antalgic gait pattern in nature, no AD used. Decreased step length and tong present.     Lumbar Range of Motion:     Degrees Pain   Flexion 25% limited    - (tightness, stretching)         Extension 25% limited    + end range only         Left Side Bending Distal lateral  thigh -         Right Side Bending Distal lateral thigh -         Left rotation    WFL - (tightness)         Right Rotation    WFL  - (tightness)               Lower Extremity Strength  Right LE   Left LE     Knee extension: 4+/5 Knee extension: 4+/5   Knee flexion: 4+/5 Knee flexion: 4+/5   Hip flexion: 4/5 Hip flexion: 4+/5   Hip extension:  4+/5 Hip extension: 4+/5   Hip abduction: 4/5 Hip abduction: 4/5   Hip adduction: 4+/5 Hip adduction 4+/5   Ankle dorsiflexion: 5/5 Ankle dorsiflexion: 5/5   Ankle plantarflexion: 5/5 Ankle plantarflexion: 5/5      Pain = *     Special Tests:  -Repeated Flexion: -  -Repeated Ext: -     Neuro Dynamic Testing:               Sciatic nerve:                                       SLR:    R = -                                      L = -     Joint Mobility: decreased when moving into extension      Palpation: mild to moderate TTP along lumbar paraspinals, L piriformis musculature     Sensation: intact B to light touch     Flexibility:               Hamstring Test: R = 60 degrees ; L = 60 degrees        Limitation/Restriction for FOTO Lumbar Spine Survey     Therapist reviewed FOTO scores for Lucía Coreas on 11/18/2022.   FOTO documents entered into [employer] - see Media section.     Limitation Score: 51%     Treatment     Lucía received the treatments listed below:      Therapeutic Exercises to develop strength, endurance, ROM, flexibility, posture, and core stabilization for 12 minutes including: (billing for re-assessment and therapeutic exercise)         Pt received Manual Therapy techniques in the form of Dry Needling for 18 min. This was applied to the Lumbar Paraspinals B. Dry needling was performed to decrease inflammation, increase circulation, decrease pain and restore homeostasis.     (6) 50 mm needles with 30 mm gauge were used for all insertion points. Patient prone lying for proper positioning and comfort. Bolster under LE's for comfort.    Electrical Stimulation was  applied this date while needles were in situ x8 minutes. Intensity increased to patient tolerance. No adverse reactions noted.     Patient gave Verbal consent to undergo dry needling. Written consent can be found in the media section in pts chart. All needles were removed and changes in signs and symptoms were assessed. No adverse reactions noted at the conclusion of the treatment.       Supervised modalities after being cleared for contradictions: IFC Electrical Stimulation: Lucía received IFC Electrical Stimulation for pain control applied to the Lumbar Musculature  Pt received stimulation at 100% scan for 10 minutes. Lucía tolerated treatment well without any adverse effects. Patient prone with LE bolster denied.    Patient received a hot pack to the lumbar musculature at the conclusion of the treatment session 10 minutes for improved tissue extensibility and decreased pain. Patient prone with bolster under LE's denied. No adverse reactions observed.    Patient Education and Home Exercises     Home Exercises Provided and Patient Education Provided     Education provided:   - Home Exercise Program Review  - Post Exercise Soreness - especially with dry needling  - Maintaining a pain free range of motion with all activities  - Anatomy/Physiology of the Lumbar Spine and the surrounding musculature    Written Home Exercises Provided: Patient instructed to cont prior HEP. Exercises were reviewed and Lucía was able to demonstrate them prior to the end of the session.  Lucía demonstrated good  understanding of the education provided. See EMR under Patient Instructions for exercises provided during therapy sessions    ASSESSMENT     Upon re-assessment, noted patient to demonstrate improved lumbar range of motion into flexion and extension - pain is still present with lumbar extension but mainly at end range. FOTO score has consistently shown slight improvement since beginning therapy and LE strength has improved as well with  no reproduction of pain. The right lumbar paraspinals continue to have TTP along with increased tension. Overall, the patient is improving in terms of functional mobility and strength and will continue to to benefit from skilled outpatient PT services to address the remaining deficits.     Lucía Is progressing well towards her goals.   Pt prognosis is Fair.     Pt will continue to benefit from skilled outpatient physical therapy to address the deficits listed in the problem list box on initial evaluation, provide pt/family education and to maximize pt's level of independence in the home and community environment.     Pt's spiritual, cultural and educational needs considered and pt agreeable to plan of care and goals.     Anticipated barriers to physical therapy: none    Goals:  Short Term Goals: 4 weeks   - Patient will demonstrate improved ability to ambulate at least 10 minutes with minimal to no increase in symptoms for improved physical activity. (progressing, not met)  - Patient will be able to perform a squat with proper mechanics, no signs of knee valgus, and minimal to no increase in symptoms for increased ability to perform household and work related tasks. (MET: 11/18/22)  - Patient will be able to stand for at least 10 minutes with minimal to no increase in symptoms for increased ability to perform ADL's. (progressing, not met)  - Patient will demonstrate improved hip abduction strength, by at least 1/2 grade via MMT for improved functional mobility overall. (progressing, not met)     Long Term Goals: 8 weeks   - Patient will demonstrate improved ability to ambulate at least 15 minutes with minimal to no increase in symptoms for improved physical activity. (progressing, not met)  - Patient will be able to stand for at least 15 minutes with minimal to no increase in symptoms for increased ability to perform ADL's. (progressing, not met)  - Patient will demonstrate improved hip extension strength, by at  least 1/2 grade via MMT for improved functional mobility overall. (MET: 10/12/22)  - Patient will demonstrate independence with Home Exercise Program for continued improvement outside the clinical setting. (MET: 11/18/22)    PLAN     Plan of Care Expiration: 1/18/23 = NEW POC DATE    Continue with PT POC to address functional deficits.     Dilia Green, PT, DPT, Cert. DN

## 2022-11-17 ENCOUNTER — PATIENT MESSAGE (OUTPATIENT)
Dept: OPTOMETRY | Facility: CLINIC | Age: 37
End: 2022-11-17
Payer: COMMERCIAL

## 2022-11-17 ENCOUNTER — CLINICAL SUPPORT (OUTPATIENT)
Dept: ALLERGY | Facility: CLINIC | Age: 37
End: 2022-11-17
Payer: COMMERCIAL

## 2022-11-17 DIAGNOSIS — J45.40 MODERATE PERSISTENT ASTHMA WITHOUT COMPLICATION: Primary | ICD-10-CM

## 2022-11-17 PROCEDURE — 99999 PR PBB SHADOW E&M-EST. PATIENT-LVL I: CPT | Mod: PBBFAC,,,

## 2022-11-17 PROCEDURE — 96372 PR INJECTION,THERAP/PROPH/DIAG2ST, IM OR SUBCUT: ICD-10-PCS | Mod: S$GLB,,, | Performed by: ALLERGY & IMMUNOLOGY

## 2022-11-17 PROCEDURE — 96372 THER/PROPH/DIAG INJ SC/IM: CPT | Mod: S$GLB,,, | Performed by: ALLERGY & IMMUNOLOGY

## 2022-11-17 PROCEDURE — 99999 PR PBB SHADOW E&M-EST. PATIENT-LVL I: ICD-10-PCS | Mod: PBBFAC,,,

## 2022-11-18 ENCOUNTER — CLINICAL SUPPORT (OUTPATIENT)
Dept: REHABILITATION | Facility: HOSPITAL | Age: 37
End: 2022-11-18
Payer: COMMERCIAL

## 2022-11-18 DIAGNOSIS — R68.89 DECREASED FUNCTIONAL ACTIVITY TOLERANCE: ICD-10-CM

## 2022-11-18 DIAGNOSIS — M53.86 DECREASED ROM OF LUMBAR SPINE: ICD-10-CM

## 2022-11-18 DIAGNOSIS — R29.898 DECREASED STRENGTH OF LOWER EXTREMITY: Primary | ICD-10-CM

## 2022-11-18 PROCEDURE — 97014 ELECTRIC STIMULATION THERAPY: CPT | Mod: PN

## 2022-11-18 PROCEDURE — 97140 MANUAL THERAPY 1/> REGIONS: CPT | Mod: PN

## 2022-11-18 PROCEDURE — 97110 THERAPEUTIC EXERCISES: CPT | Mod: PN

## 2022-11-18 NOTE — PLAN OF CARE
[facility]   Physical Therapy Treatment Note & Re-assessment    [facility]  [facility] Number: [id number]    Therapy Diagnosis:   Encounter Diagnoses   Name Primary?    Decreased strength of lower extremity Yes    Decreased ROM of lumbar spine     Decreased functional activity tolerance      Physician: Yanci Sierra PA-C    Visit Date: 11/18/2022    Physician Orders: PT Eval and Treat   Medical Diagnosis from Referral: M54.42,M54.41,G89.29 (ICD-10-CM) - Chronic bilateral low back pain with bilateral sciatica  M43.16 (ICD-10-CM) - Spondylolisthesis of lumbar region  Evaluation Date: 9/14/2022  Authorization Period Expiration: 12/31/22  Plan of Care Expiration: 1/18/23 = NEW POC DATE  Progress Note Due: 12/18/22  Visit # / Visits authorized: 12 / 20 auth (1/1 eval)(re-assessed on 10/12/22)(re-assessed on 11/18/22)  FOTO: 3 / 3 (9/14/22 - IE, 10/12/22, 11/18/22)     Precautions: Anxiety, Asthma, HTN, Type 2 Diabetes, Class 3 Obesity     PTA Visit #: 0 / 5     Time In: 8:00 AM  Time Out: 8:40 AM  Total Billable Time: 40 minutes    SUBJECTIVE     Pt reports: that she is having some discomfort this morning. Feeling better overall since beginning therapy. Household chores are easier to perform at this point.    She was compliant with home exercise program.  Response to previous treatment: mild soreness that improved over time  Functional change: slight decrease in back pain    Pain: 3/10  Location: bilateral back and back of right leg     OBJECTIVE     Objective Measures updated at progress report unless specified.     Observation/Posture: FAIR - rounded shoulders, slouched in sitting. Pleasant AAF.     Gait: Non-antalgic gait pattern in nature, no AD used. Decreased step length and tong present.     Lumbar Range of Motion:     Degrees Pain   Flexion 25% limited    - (tightness, stretching)         Extension 25% limited    + end range only         Left Side Bending Distal lateral  thigh -         Right Side Bending Distal lateral thigh -         Left rotation    WFL - (tightness)         Right Rotation    WFL  - (tightness)               Lower Extremity Strength  Right LE   Left LE     Knee extension: 4+/5 Knee extension: 4+/5   Knee flexion: 4+/5 Knee flexion: 4+/5   Hip flexion: 4/5 Hip flexion: 4+/5   Hip extension:  4+/5 Hip extension: 4+/5   Hip abduction: 4/5 Hip abduction: 4/5   Hip adduction: 4+/5 Hip adduction 4+/5   Ankle dorsiflexion: 5/5 Ankle dorsiflexion: 5/5   Ankle plantarflexion: 5/5 Ankle plantarflexion: 5/5      Pain = *     Special Tests:  -Repeated Flexion: -  -Repeated Ext: -     Neuro Dynamic Testing:               Sciatic nerve:                                       SLR:    R = -                                      L = -     Joint Mobility: decreased when moving into extension      Palpation: mild to moderate TTP along lumbar paraspinals, L piriformis musculature     Sensation: intact B to light touch     Flexibility:               Hamstring Test: R = 60 degrees ; L = 60 degrees        Limitation/Restriction for FOTO Lumbar Spine Survey     Therapist reviewed FOTO scores for Lucía Coreas on 11/18/2022.   FOTO documents entered into [employer] - see Media section.     Limitation Score: 51%     Treatment     Lucía received the treatments listed below:      Therapeutic Exercises to develop strength, endurance, ROM, flexibility, posture, and core stabilization for 12 minutes including: (billing for re-assessment and therapeutic exercise)         Pt received Manual Therapy techniques in the form of Dry Needling for 18 min. This was applied to the Lumbar Paraspinals B. Dry needling was performed to decrease inflammation, increase circulation, decrease pain and restore homeostasis.     (6) 50 mm needles with 30 mm gauge were used for all insertion points. Patient prone lying for proper positioning and comfort. Bolster under LE's for comfort.    Electrical Stimulation was  applied this date while needles were in situ x8 minutes. Intensity increased to patient tolerance. No adverse reactions noted.     Patient gave Verbal consent to undergo dry needling. Written consent can be found in the media section in pts chart. All needles were removed and changes in signs and symptoms were assessed. No adverse reactions noted at the conclusion of the treatment.       Supervised modalities after being cleared for contradictions: IFC Electrical Stimulation: Lucía received IFC Electrical Stimulation for pain control applied to the Lumbar Musculature  Pt received stimulation at 100% scan for 10 minutes. Lucía tolerated treatment well without any adverse effects. Patient prone with LE bolster denied.    Patient received a hot pack to the lumbar musculature at the conclusion of the treatment session 10 minutes for improved tissue extensibility and decreased pain. Patient prone with bolster under LE's denied. No adverse reactions observed.    Patient Education and Home Exercises     Home Exercises Provided and Patient Education Provided     Education provided:   - Home Exercise Program Review  - Post Exercise Soreness - especially with dry needling  - Maintaining a pain free range of motion with all activities  - Anatomy/Physiology of the Lumbar Spine and the surrounding musculature    Written Home Exercises Provided: Patient instructed to cont prior HEP. Exercises were reviewed and Luíca was able to demonstrate them prior to the end of the session.  Lucía demonstrated good  understanding of the education provided. See EMR under Patient Instructions for exercises provided during therapy sessions    ASSESSMENT     Upon re-assessment, noted patient to demonstrate improved lumbar range of motion into flexion and extension - pain is still present with lumbar extension but mainly at end range. FOTO score has consistently shown slight improvement since beginning therapy and LE strength has improved as well with  no reproduction of pain. The right lumbar paraspinals continue to have TTP along with increased tension. Overall, the patient is improving in terms of functional mobility and strength and will continue to to benefit from skilled outpatient PT services to address the remaining deficits.     Lucía Is progressing well towards her goals.   Pt prognosis is Fair.     Pt will continue to benefit from skilled outpatient physical therapy to address the deficits listed in the problem list box on initial evaluation, provide pt/family education and to maximize pt's level of independence in the home and community environment.     Pt's spiritual, cultural and educational needs considered and pt agreeable to plan of care and goals.     Anticipated barriers to physical therapy: none    Goals:  Short Term Goals: 4 weeks   - Patient will demonstrate improved ability to ambulate at least 10 minutes with minimal to no increase in symptoms for improved physical activity. (progressing, not met)  - Patient will be able to perform a squat with proper mechanics, no signs of knee valgus, and minimal to no increase in symptoms for increased ability to perform household and work related tasks. (MET: 11/18/22)  - Patient will be able to stand for at least 10 minutes with minimal to no increase in symptoms for increased ability to perform ADL's. (progressing, not met)  - Patient will demonstrate improved hip abduction strength, by at least 1/2 grade via MMT for improved functional mobility overall. (progressing, not met)     Long Term Goals: 8 weeks   - Patient will demonstrate improved ability to ambulate at least 15 minutes with minimal to no increase in symptoms for improved physical activity. (progressing, not met)  - Patient will be able to stand for at least 15 minutes with minimal to no increase in symptoms for increased ability to perform ADL's. (progressing, not met)  - Patient will demonstrate improved hip extension strength, by at  least 1/2 grade via MMT for improved functional mobility overall. (MET: 10/12/22)  - Patient will demonstrate independence with Home Exercise Program for continued improvement outside the clinical setting. (MET: 11/18/22)    PLAN     Plan of Care Expiration: 1/18/23 = NEW POC DATE    Continue with PT POC to address functional deficits.     Dilia Green, PT, DPT, Cert. DN

## 2022-11-29 ENCOUNTER — TELEPHONE (OUTPATIENT)
Dept: FAMILY MEDICINE | Facility: CLINIC | Age: 37
End: 2022-11-29
Payer: COMMERCIAL

## 2022-11-29 DIAGNOSIS — E53.8 B12 DEFICIENCY: Primary | ICD-10-CM

## 2022-11-29 RX ORDER — CYANOCOBALAMIN 1000 UG/ML
1000 INJECTION, SOLUTION INTRAMUSCULAR; SUBCUTANEOUS
Status: ACTIVE | OUTPATIENT
Start: 2022-12-02 | End: 2023-05-31

## 2022-11-30 NOTE — PROGRESS NOTES
[facility] OUTPATIENT THERAPY AND WELLNESS   Physical Therapy Treatment Note    Name: Lucía Steele [facility] Number: [id number]    Therapy Diagnosis:   Encounter Diagnoses   Name Primary?    Decreased strength of lower extremity Yes    Decreased ROM of lumbar spine     Decreased functional activity tolerance      Physician: Yanci Sierra PA-C    Visit Date: 12/2/2022    Physician Orders: PT Eval and Treat   Medical Diagnosis from Referral: M54.42,M54.41,G89.29 (ICD-10-CM) - Chronic bilateral low back pain with bilateral sciatica  M43.16 (ICD-10-CM) - Spondylolisthesis of lumbar region  Evaluation Date: 9/14/2022  Authorization Period Expiration: 12/31/22  Plan of Care Expiration: 1/18/23   Progress Note Due: 12/18/22  Visit # / Visits authorized: 13 / 20 auth (1/1 eval)(re-assessed on 10/12/22)(re-assessed on 11/18/22)  FOTO: 3 / 3 (9/14/22 - IE, 10/12/22, 11/18/22)     Precautions: Anxiety, Asthma, HTN, Type 2 Diabetes, Class 3 Obesity     PTA Visit #: 0 / 5     Time In: 8:52 AM (pt arrived late and was checked in late)  Time Out: 9:15 AM  Total Billable Time: 23 minutes    SUBJECTIVE     Pt reports: that her back pain has been up and down. Having a little more discomfort this morning. She has to leave early today to go to another appointment.    She was compliant with home exercise program.  Response to previous treatment: mild soreness that improved over time  Functional change: slight decrease in back pain    Pain: 4/10  Location: bilateral back and back of right leg     OBJECTIVE     Objective Measures updated at progress report unless specified.     Treatment     Lucía received the treatments listed below:      Therapeutic Exercises to develop strength, endurance, ROM, flexibility, posture, and core stabilization for 00 minutes including:          Pt received Manual Therapy techniques in the form of Dry Needling for 23 min. This was applied to the Lumbar Paraspinals B. Dry needling was performed to decrease  inflammation, increase circulation, decrease pain and restore homeostasis.     (6) 50 mm needles with 30 mm gauge were used for all insertion points. Patient prone lying for proper positioning and comfort. Bolster under LE's for comfort.    Electrical Stimulation was applied this date while needles were in situ x10 minutes. Intensity increased to patient tolerance. No adverse reactions noted.     Patient gave Verbal consent to undergo dry needling. Written consent can be found in the media section in pts chart. All needles were removed and changes in signs and symptoms were assessed. No adverse reactions noted at the conclusion of the treatment.         Patient Education and Home Exercises     Home Exercises Provided and Patient Education Provided     Education provided:   - Home Exercise Program Review  - Post Exercise Soreness - especially with dry needling  - Maintaining a pain free range of motion with all activities  - Anatomy/Physiology of the Lumbar Spine and the surrounding musculature    Written Home Exercises Provided: Patient instructed to cont prior HEP. Exercises were reviewed and Lucía was able to demonstrate them prior to the end of the session.  Lucía demonstrated good  understanding of the education provided. See EMR under Patient Instructions for exercises provided during therapy sessions    ASSESSMENT     Patient limited with treatment today due to time restraints. Dry needling was utilized to assist with pain relief and for improved tissue extensibility. Skin was cleaned pre and post needle insertion/removal with no adverse reactions observed. Will continue to assess and progress patient as able next week.    Lucía Is progressing well towards her goals.   Pt prognosis is Fair.     Pt will continue to benefit from skilled outpatient physical therapy to address the deficits listed in the problem list box on initial evaluation, provide pt/family education and to maximize pt's level of independence in  the home and community environment.     Pt's spiritual, cultural and educational needs considered and pt agreeable to plan of care and goals.     Anticipated barriers to physical therapy: none    Goals:  Short Term Goals: 4 weeks   - Patient will demonstrate improved ability to ambulate at least 10 minutes with minimal to no increase in symptoms for improved physical activity. (progressing, not met)  - Patient will be able to perform a squat with proper mechanics, no signs of knee valgus, and minimal to no increase in symptoms for increased ability to perform household and work related tasks. (MET: 11/18/22)  - Patient will be able to stand for at least 10 minutes with minimal to no increase in symptoms for increased ability to perform ADL's. (progressing, not met)  - Patient will demonstrate improved hip abduction strength, by at least 1/2 grade via MMT for improved functional mobility overall. (progressing, not met)     Long Term Goals: 8 weeks   - Patient will demonstrate improved ability to ambulate at least 15 minutes with minimal to no increase in symptoms for improved physical activity. (progressing, not met)  - Patient will be able to stand for at least 15 minutes with minimal to no increase in symptoms for increased ability to perform ADL's. (progressing, not met)  - Patient will demonstrate improved hip extension strength, by at least 1/2 grade via MMT for improved functional mobility overall. (MET: 10/12/22)  - Patient will demonstrate independence with Home Exercise Program for continued improvement outside the clinical setting. (MET: 11/18/22)    PLAN     Continue with PT POC to address functional deficits.     Dilia Green, PT, DPT, Cert. DN

## 2022-12-02 ENCOUNTER — CLINICAL SUPPORT (OUTPATIENT)
Dept: FAMILY MEDICINE | Facility: CLINIC | Age: 37
End: 2022-12-02
Payer: COMMERCIAL

## 2022-12-02 ENCOUNTER — CLINICAL SUPPORT (OUTPATIENT)
Dept: REHABILITATION | Facility: HOSPITAL | Age: 37
End: 2022-12-02
Payer: COMMERCIAL

## 2022-12-02 DIAGNOSIS — R68.89 DECREASED FUNCTIONAL ACTIVITY TOLERANCE: ICD-10-CM

## 2022-12-02 DIAGNOSIS — M53.86 DECREASED ROM OF LUMBAR SPINE: ICD-10-CM

## 2022-12-02 DIAGNOSIS — E53.8 B12 DEFICIENCY: Primary | ICD-10-CM

## 2022-12-02 DIAGNOSIS — R29.898 DECREASED STRENGTH OF LOWER EXTREMITY: Primary | ICD-10-CM

## 2022-12-02 PROCEDURE — 97140 MANUAL THERAPY 1/> REGIONS: CPT | Mod: PN

## 2022-12-02 PROCEDURE — 96372 PR INJECTION,THERAP/PROPH/DIAG2ST, IM OR SUBCUT: ICD-10-PCS | Mod: S$GLB,,, | Performed by: FAMILY MEDICINE

## 2022-12-02 PROCEDURE — 99999 PR PBB SHADOW E&M-EST. PATIENT-LVL III: CPT | Mod: PBBFAC,,,

## 2022-12-02 PROCEDURE — 99999 PR PBB SHADOW E&M-EST. PATIENT-LVL III: ICD-10-PCS | Mod: PBBFAC,,,

## 2022-12-02 PROCEDURE — 96372 THER/PROPH/DIAG INJ SC/IM: CPT | Mod: S$GLB,,, | Performed by: FAMILY MEDICINE

## 2022-12-02 RX ADMIN — CYANOCOBALAMIN 1000 MCG: 1000 INJECTION, SOLUTION INTRAMUSCULAR; SUBCUTANEOUS at 01:12

## 2022-12-05 NOTE — PROGRESS NOTES
[facility]   Physical Therapy Treatment Note    Name: Lucía Steele [facility] Number: [id number]    Therapy Diagnosis:   Encounter Diagnoses   Name Primary?    Decreased strength of lower extremity Yes    Decreased ROM of lumbar spine     Decreased functional activity tolerance      Physician: Yanci Sierra PA-C    Visit Date: 12/7/2022    Physician Orders: PT Eval and Treat   Medical Diagnosis from Referral: M54.42,M54.41,G89.29 (ICD-10-CM) - Chronic bilateral low back pain with bilateral sciatica  M43.16 (ICD-10-CM) - Spondylolisthesis of lumbar region  Evaluation Date: 9/14/2022  Authorization Period Expiration: 12/31/22  Plan of Care Expiration: 1/18/23   Progress Note Due: 12/18/22  Visit # / Visits authorized: 14 / 20 auth (1/1 eval)(re-assessed on 10/12/22)(re-assessed on 11/18/22)  FOTO: 3 / 3 (9/14/22 - IE, 10/12/22, 11/18/22)     Precautions: Anxiety, Asthma, HTN, Type 2 Diabetes, Class 3 Obesity     PTA Visit #: 0 / 5     Time In: 8:30 AM   Time Out: 9:11 AM  Total Billable Time: 41 minutes    SUBJECTIVE     Pt reports: that she is feeling okay this morning. Had a little soreness after the dry needling from last session but overall not too bad.    She was compliant with home exercise program.  Response to previous treatment: mild soreness that improved over time  Functional change: slight decrease in back pain    Pain: 2-3/10  Location: bilateral back and back of right leg     OBJECTIVE     Objective Measures updated at progress report unless specified.     Treatment     Lucía received the treatments listed below:      Therapeutic Exercises to develop strength, endurance, ROM, flexibility, posture, and core stabilization for 8 minutes including:      Ball Flexion Stretch x3 min  Seated Hamstring Stretch 5x10s (R only) + stool      Pt received Manual Therapy techniques in the form of Dry Needling for 25 min. This was applied to the Lumbar Paraspinals B. Dry needling was  performed to decrease inflammation, increase circulation, decrease pain and restore homeostasis.     (6) 50 mm needles with 30 mm gauge were used for all insertion points. Patient prone lying for proper positioning and comfort. Bolster under LE's for comfort.    Electrical Stimulation was applied this date while needles were in situ x10 minutes. Intensity increased to patient tolerance. No adverse reactions noted.     Patient gave Verbal consent to undergo dry needling. Written consent can be found in the media section in pts chart. All needles were removed and changes in signs and symptoms were assessed. No adverse reactions noted at the conclusion of the treatment.       Supervised modalities after being cleared for contradictions: IFC Electrical Stimulation: Lucía received IFC Electrical Stimulation for pain control applied to the Lumbar Musculature  Pt received stimulation at 100% scan for 8 minutes. Lucía tolerated treatment well without any adverse effects. Patient prone with LE bolster denied.    Patient received a hot pack to the lumbar musculature at the conclusion of the treatment session 8 minutes for improved tissue extensibility and decreased pain. Patient prone with bolster under LE's denied. No adverse reactions observed.    Patient Education and Home Exercises     Home Exercises Provided and Patient Education Provided     Education provided:   - Home Exercise Program Review  - Post Exercise Soreness - especially with dry needling  - Maintaining a pain free range of motion with all activities  - Anatomy/Physiology of the Lumbar Spine and the surrounding musculature    Written Home Exercises Provided: Patient instructed to cont prior HEP. Exercises were reviewed and Lucía was able to demonstrate them prior to the end of the session.  Lucía demonstrated good  understanding of the education provided. See EMR under Patient Instructions for exercises provided during therapy sessions    ASSESSMENT     Patient  with decreased pain overall today compared to the previous session. Encouraged LE flexibility prior to dry needling treatment. Dry needling continues to yield positive results and was therefore utilized again this date with specific focus on targeting the lumbar paraspinals. Noted the L to have greater tightness and tenderness compared to the right side. Skin was cleaned pre and post needle insertion/removal with no adverse reactions observed. Will continue to progress patient as able next session.    Lucía Is progressing well towards her goals.   Pt prognosis is Fair.     Pt will continue to benefit from skilled outpatient physical therapy to address the deficits listed in the problem list box on initial evaluation, provide pt/family education and to maximize pt's level of independence in the home and community environment.     Pt's spiritual, cultural and educational needs considered and pt agreeable to plan of care and goals.     Anticipated barriers to physical therapy: none    Goals:  Short Term Goals: 4 weeks   - Patient will demonstrate improved ability to ambulate at least 10 minutes with minimal to no increase in symptoms for improved physical activity. (progressing, not met)  - Patient will be able to perform a squat with proper mechanics, no signs of knee valgus, and minimal to no increase in symptoms for increased ability to perform household and work related tasks. (MET: 11/18/22)  - Patient will be able to stand for at least 10 minutes with minimal to no increase in symptoms for increased ability to perform ADL's. (progressing, not met)  - Patient will demonstrate improved hip abduction strength, by at least 1/2 grade via MMT for improved functional mobility overall. (progressing, not met)     Long Term Goals: 8 weeks   - Patient will demonstrate improved ability to ambulate at least 15 minutes with minimal to no increase in symptoms for improved physical activity. (progressing, not met)  - Patient will  be able to stand for at least 15 minutes with minimal to no increase in symptoms for increased ability to perform ADL's. (progressing, not met)  - Patient will demonstrate improved hip extension strength, by at least 1/2 grade via MMT for improved functional mobility overall. (MET: 10/12/22)  - Patient will demonstrate independence with Home Exercise Program for continued improvement outside the clinical setting. (MET: 11/18/22)    PLAN     Continue with PT POC to address functional deficits.     Dilia Green, PT, DPT, Cert. DN

## 2022-12-07 ENCOUNTER — CLINICAL SUPPORT (OUTPATIENT)
Dept: REHABILITATION | Facility: HOSPITAL | Age: 37
End: 2022-12-07
Payer: COMMERCIAL

## 2022-12-07 DIAGNOSIS — M53.86 DECREASED ROM OF LUMBAR SPINE: ICD-10-CM

## 2022-12-07 DIAGNOSIS — R68.89 DECREASED FUNCTIONAL ACTIVITY TOLERANCE: ICD-10-CM

## 2022-12-07 DIAGNOSIS — R29.898 DECREASED STRENGTH OF LOWER EXTREMITY: Primary | ICD-10-CM

## 2022-12-07 PROCEDURE — 97140 MANUAL THERAPY 1/> REGIONS: CPT | Mod: PN

## 2022-12-07 PROCEDURE — 97110 THERAPEUTIC EXERCISES: CPT | Mod: PN

## 2022-12-08 ENCOUNTER — CLINICAL SUPPORT (OUTPATIENT)
Dept: ALLERGY | Facility: CLINIC | Age: 37
End: 2022-12-08
Payer: COMMERCIAL

## 2022-12-08 DIAGNOSIS — J45.40 MODERATE PERSISTENT ASTHMA WITHOUT COMPLICATION: Primary | ICD-10-CM

## 2022-12-08 PROCEDURE — 99999 PR PBB SHADOW E&M-EST. PATIENT-LVL III: ICD-10-PCS | Mod: PBBFAC,,,

## 2022-12-08 PROCEDURE — 96372 PR INJECTION,THERAP/PROPH/DIAG2ST, IM OR SUBCUT: ICD-10-PCS | Mod: S$GLB,,, | Performed by: ALLERGY & IMMUNOLOGY

## 2022-12-08 PROCEDURE — 96372 THER/PROPH/DIAG INJ SC/IM: CPT | Mod: S$GLB,,, | Performed by: ALLERGY & IMMUNOLOGY

## 2022-12-08 PROCEDURE — 99999 PR PBB SHADOW E&M-EST. PATIENT-LVL III: CPT | Mod: PBBFAC,,,

## 2022-12-08 NOTE — PROGRESS NOTES
[facility]   Physical Therapy Treatment Note    Name: Lucía Steele [facility] Number: [id number]    Therapy Diagnosis:   Encounter Diagnoses   Name Primary?    Decreased strength of lower extremity Yes    Decreased ROM of lumbar spine     Decreased functional activity tolerance      Physician: Yanci Sierra PA-C    Visit Date: 12/9/2022    Physician Orders: PT Eval and Treat   Medical Diagnosis from Referral: M54.42,M54.41,G89.29 (ICD-10-CM) - Chronic bilateral low back pain with bilateral sciatica  M43.16 (ICD-10-CM) - Spondylolisthesis of lumbar region  Evaluation Date: 9/14/2022  Authorization Period Expiration: 12/31/22  Plan of Care Expiration: 1/18/23   Progress Note Due: 12/18/22  Visit # / Visits authorized: 15 / 20 auth (1/1 eval)(re-assessed on 10/12/22)(re-assessed on 11/18/22)  FOTO: 3 / 3 (9/14/22 - IE, 10/12/22, 11/18/22)     Precautions: Anxiety, Asthma, HTN, Type 2 Diabetes, Class 3 Obesity     PTA Visit #: 0 / 5     Time In: 8:46 AM   Time Out: 9:24 AM  Total Billable Time: 38 minutes    SUBJECTIVE     Pt reports: that she is doing okay this morning - having minimal pain/discomfort. Had a little soreness after last session but the dry needling continues to help.     She was compliant with home exercise program.  Response to previous treatment: mild soreness that improved over time  Functional change: slight decrease in back pain    Pain: 2/10  Location: bilateral back and back of right leg     OBJECTIVE     Objective Measures updated at progress report unless specified.     Treatment     Lucía received the treatments listed below:      Therapeutic Exercises to develop strength, endurance, ROM, flexibility, posture, and core stabilization for 0 minutes including:          Pt received Manual Therapy techniques in the form of Dry Needling for 28 min. This was applied to the Lumbar Paraspinals B. Dry needling was performed to decrease inflammation, increase circulation,  decrease pain and restore homeostasis.     (6) 50 mm needles with 30 mm gauge were used for all insertion points. Patient prone lying for proper positioning and comfort. Bolster under LE's for comfort.    Electrical Stimulation was applied this date while needles were in situ x12 minutes. Intensity increased to patient tolerance. No adverse reactions noted.     Patient gave Verbal consent to undergo dry needling. Written consent can be found in the media section in pts chart. All needles were removed and changes in signs and symptoms were assessed. No adverse reactions noted at the conclusion of the treatment.       Supervised modalities after being cleared for contradictions: IFC Electrical Stimulation: Lucía received IFC Electrical Stimulation for pain control applied to the Lumbar Musculature  Pt received stimulation at 100% scan for 10 minutes. Lucía tolerated treatment well without any adverse effects. Patient prone with LE bolster denied.    Patient received a hot pack to the lumbar musculature at the conclusion of the treatment session 10 minutes for improved tissue extensibility and decreased pain. Patient prone with bolster under LE's denied. No adverse reactions observed.    Patient Education and Home Exercises     Home Exercises Provided and Patient Education Provided     Education provided:   - Home Exercise Program Review  - Post Exercise Soreness - especially with dry needling  - Maintaining a pain free range of motion with all activities  - Anatomy/Physiology of the Lumbar Spine and the surrounding musculature    Written Home Exercises Provided: Patient instructed to cont prior HEP. Exercises were reviewed and Lucía was able to demonstrate them prior to the end of the session.  Lucía demonstrated good  understanding of the education provided. See EMR under Patient Instructions for exercises provided during therapy sessions    ASSESSMENT     Dry needling was utilized again this date due to the continued  positive responses. Skin was cleaned pre and post needle insertion/removal with no adverse reactions observed. Encouraged patient to continue performing Home Exercise Program for management of symptoms outside the clinical setting. Patient verbalized understanding.    Lucía Is progressing well towards her goals.   Pt prognosis is Fair.     Pt will continue to benefit from skilled outpatient physical therapy to address the deficits listed in the problem list box on initial evaluation, provide pt/family education and to maximize pt's level of independence in the home and community environment.     Pt's spiritual, cultural and educational needs considered and pt agreeable to plan of care and goals.     Anticipated barriers to physical therapy: none    Goals:  Short Term Goals: 4 weeks   - Patient will demonstrate improved ability to ambulate at least 10 minutes with minimal to no increase in symptoms for improved physical activity. (progressing, not met)  - Patient will be able to perform a squat with proper mechanics, no signs of knee valgus, and minimal to no increase in symptoms for increased ability to perform household and work related tasks. (MET: 11/18/22)  - Patient will be able to stand for at least 10 minutes with minimal to no increase in symptoms for increased ability to perform ADL's. (progressing, not met)  - Patient will demonstrate improved hip abduction strength, by at least 1/2 grade via MMT for improved functional mobility overall. (progressing, not met)     Long Term Goals: 8 weeks   - Patient will demonstrate improved ability to ambulate at least 15 minutes with minimal to no increase in symptoms for improved physical activity. (progressing, not met)  - Patient will be able to stand for at least 15 minutes with minimal to no increase in symptoms for increased ability to perform ADL's. (progressing, not met)  - Patient will demonstrate improved hip extension strength, by at least 1/2 grade via MMT  for improved functional mobility overall. (MET: 10/12/22)  - Patient will demonstrate independence with Home Exercise Program for continued improvement outside the clinical setting. (MET: 11/18/22)    PLAN     Continue with PT POC to address functional deficits.     Dilia Green, PT, DPT, Cert. DN

## 2022-12-09 ENCOUNTER — PATIENT MESSAGE (OUTPATIENT)
Dept: DERMATOLOGY | Facility: CLINIC | Age: 37
End: 2022-12-09
Payer: COMMERCIAL

## 2022-12-09 ENCOUNTER — CLINICAL SUPPORT (OUTPATIENT)
Dept: REHABILITATION | Facility: HOSPITAL | Age: 37
End: 2022-12-09
Payer: COMMERCIAL

## 2022-12-09 DIAGNOSIS — M53.86 DECREASED ROM OF LUMBAR SPINE: ICD-10-CM

## 2022-12-09 DIAGNOSIS — R68.89 DECREASED FUNCTIONAL ACTIVITY TOLERANCE: ICD-10-CM

## 2022-12-09 DIAGNOSIS — R29.898 DECREASED STRENGTH OF LOWER EXTREMITY: Primary | ICD-10-CM

## 2022-12-09 PROCEDURE — 97014 ELECTRIC STIMULATION THERAPY: CPT | Mod: PN

## 2022-12-09 PROCEDURE — 97140 MANUAL THERAPY 1/> REGIONS: CPT | Mod: PN

## 2022-12-16 NOTE — PROGRESS NOTES
[facility]   Physical Therapy Treatment Note    Name: Lucía Steele [facility] Number: [id number]    Therapy Diagnosis:   Encounter Diagnoses   Name Primary?    Decreased strength of lower extremity Yes    Decreased ROM of lumbar spine     Decreased functional activity tolerance      Physician: Yanci Sierra PA-C    Visit Date: 12/20/2022    Physician Orders: PT Eval and Treat   Medical Diagnosis from Referral: M54.42,M54.41,G89.29 (ICD-10-CM) - Chronic bilateral low back pain with bilateral sciatica  M43.16 (ICD-10-CM) - Spondylolisthesis of lumbar region  Evaluation Date: 9/14/2022  Authorization Period Expiration: 12/31/22  Plan of Care Expiration: 1/18/23   Progress Note Due: 12/18/22  Visit # / Visits authorized: 16 / 20 auth (1/1 eval)(re-assessed on 10/12/22)(re-assessed on 11/18/22)  FOTO: 3 / 3 (9/14/22 - IE, 10/12/22, 11/18/22)     Precautions: Anxiety, Asthma, HTN, Type 2 Diabetes, Class 3 Obesity     PTA Visit #: 0 / 5     Time In: 8:30 AM   Time Out: 9:08 AM  Total Billable Time: 38 minutes    SUBJECTIVE     Pt reports: that everything is feeling pretty good. Not really having any pain this morning.    She was compliant with home exercise program.  Response to previous treatment: mild soreness that improved over time  Functional change: slight decrease in back pain    Pain: 2/10  Location: bilateral back and back of right leg     OBJECTIVE     Objective Measures updated at progress report unless specified.     Treatment     Lucía received the treatments listed below:      Therapeutic Exercises to develop strength, endurance, ROM, flexibility, posture, and core stabilization for 0 minutes including:        Pt received Manual Therapy techniques in the form of Dry Needling for 27 min. This was applied to the Lumbar Paraspinals B. Dry needling was performed to decrease inflammation, increase circulation, decrease pain and restore homeostasis.     (6) 50 mm needles with 30 mm  gauge were used for all insertion points. Patient prone lying for proper positioning and comfort. Bolster under LE's for comfort.    Electrical Stimulation was applied this date while needles were in situ x12 minutes. Intensity increased to patient tolerance. No adverse reactions noted.     Patient gave Verbal consent to undergo dry needling. Written consent can be found in the media section in pts chart. All needles were removed and changes in signs and symptoms were assessed. No adverse reactions noted at the conclusion of the treatment.       Supervised modalities after being cleared for contradictions: IFC Electrical Stimulation: Lucía received IFC Electrical Stimulation for pain control applied to the Lumbar Musculature  Pt received stimulation at 100% scan for 11 minutes. Lucía tolerated treatment well without any adverse effects. Patient prone with LE bolster denied.    Patient received a hot pack to the lumbar musculature at the conclusion of the treatment session 11 minutes for improved tissue extensibility and decreased pain. Patient prone with bolster under LE's denied. No adverse reactions observed.    Patient Education and Home Exercises     Home Exercises Provided and Patient Education Provided     Education provided:   - Home Exercise Program Review  - Post Exercise Soreness - especially with dry needling  - Maintaining a pain free range of motion with all activities  - Anatomy/Physiology of the Lumbar Spine and the surrounding musculature    Written Home Exercises Provided: Patient instructed to cont prior HEP. Exercises were reviewed and Lucía was able to demonstrate them prior to the end of the session.  Lucía demonstrated good  understanding of the education provided. See EMR under Patient Instructions for exercises provided during therapy sessions    ASSESSMENT     Patient with minimal to no pain complaints this morning. Dry needling continues to yield positive results in terms of alleviating  muscular tension and pain. No adverse reactions observed this date. Will continue to progress patient and move towards discharge.    Lucía Is progressing well towards her goals.   Pt prognosis is Fair.     Pt will continue to benefit from skilled outpatient physical therapy to address the deficits listed in the problem list box on initial evaluation, provide pt/family education and to maximize pt's level of independence in the home and community environment.     Pt's spiritual, cultural and educational needs considered and pt agreeable to plan of care and goals.     Anticipated barriers to physical therapy: none    Goals:  Short Term Goals: 4 weeks   - Patient will demonstrate improved ability to ambulate at least 10 minutes with minimal to no increase in symptoms for improved physical activity. (progressing, not met)  - Patient will be able to perform a squat with proper mechanics, no signs of knee valgus, and minimal to no increase in symptoms for increased ability to perform household and work related tasks. (MET: 11/18/22)  - Patient will be able to stand for at least 10 minutes with minimal to no increase in symptoms for increased ability to perform ADL's. (progressing, not met)  - Patient will demonstrate improved hip abduction strength, by at least 1/2 grade via MMT for improved functional mobility overall. (progressing, not met)     Long Term Goals: 8 weeks   - Patient will demonstrate improved ability to ambulate at least 15 minutes with minimal to no increase in symptoms for improved physical activity. (progressing, not met)  - Patient will be able to stand for at least 15 minutes with minimal to no increase in symptoms for increased ability to perform ADL's. (progressing, not met)  - Patient will demonstrate improved hip extension strength, by at least 1/2 grade via MMT for improved functional mobility overall. (MET: 10/12/22)  - Patient will demonstrate independence with Home Exercise Program for  continued improvement outside the clinical setting. (MET: 11/18/22)    PLAN     Continue with PT POC to address functional deficits.     Dilia Green, PT, DPT, Cert. DN

## 2022-12-20 ENCOUNTER — CLINICAL SUPPORT (OUTPATIENT)
Dept: REHABILITATION | Facility: HOSPITAL | Age: 37
End: 2022-12-20
Payer: COMMERCIAL

## 2022-12-20 DIAGNOSIS — R29.898 DECREASED STRENGTH OF LOWER EXTREMITY: Primary | ICD-10-CM

## 2022-12-20 DIAGNOSIS — M53.86 DECREASED ROM OF LUMBAR SPINE: ICD-10-CM

## 2022-12-20 DIAGNOSIS — R68.89 DECREASED FUNCTIONAL ACTIVITY TOLERANCE: ICD-10-CM

## 2022-12-20 PROCEDURE — 97140 MANUAL THERAPY 1/> REGIONS: CPT | Mod: PN

## 2022-12-20 PROCEDURE — 97014 ELECTRIC STIMULATION THERAPY: CPT | Mod: PN

## 2022-12-22 ENCOUNTER — CLINICAL SUPPORT (OUTPATIENT)
Dept: ALLERGY | Facility: CLINIC | Age: 37
End: 2022-12-22
Payer: COMMERCIAL

## 2022-12-22 DIAGNOSIS — J45.40 MODERATE PERSISTENT ASTHMA WITHOUT COMPLICATION: Primary | ICD-10-CM

## 2022-12-22 PROCEDURE — 99999 PR PBB SHADOW E&M-EST. PATIENT-LVL III: ICD-10-PCS | Mod: PBBFAC,,,

## 2022-12-22 PROCEDURE — 99999 PR PBB SHADOW E&M-EST. PATIENT-LVL III: CPT | Mod: PBBFAC,,,

## 2022-12-22 PROCEDURE — 96372 THER/PROPH/DIAG INJ SC/IM: CPT | Mod: S$GLB,,, | Performed by: ALLERGY & IMMUNOLOGY

## 2022-12-22 PROCEDURE — 96372 PR INJECTION,THERAP/PROPH/DIAG2ST, IM OR SUBCUT: ICD-10-PCS | Mod: S$GLB,,, | Performed by: ALLERGY & IMMUNOLOGY

## 2022-12-27 ENCOUNTER — PATIENT MESSAGE (OUTPATIENT)
Dept: FAMILY MEDICINE | Facility: CLINIC | Age: 37
End: 2022-12-27
Payer: COMMERCIAL

## 2022-12-27 DIAGNOSIS — E11.65 TYPE 2 DIABETES MELLITUS WITH HYPERGLYCEMIA, WITHOUT LONG-TERM CURRENT USE OF INSULIN: ICD-10-CM

## 2022-12-28 ENCOUNTER — PATIENT MESSAGE (OUTPATIENT)
Dept: PSYCHIATRY | Facility: CLINIC | Age: 37
End: 2022-12-28
Payer: COMMERCIAL

## 2022-12-28 ENCOUNTER — OFFICE VISIT (OUTPATIENT)
Dept: RHEUMATOLOGY | Facility: CLINIC | Age: 37
End: 2022-12-28
Payer: COMMERCIAL

## 2022-12-28 VITALS
WEIGHT: 293 LBS | HEART RATE: 84 BPM | RESPIRATION RATE: 17 BRPM | SYSTOLIC BLOOD PRESSURE: 127 MMHG | DIASTOLIC BLOOD PRESSURE: 75 MMHG | BODY MASS INDEX: 50.02 KG/M2 | HEIGHT: 64 IN

## 2022-12-28 DIAGNOSIS — M47.819 SPONDYLOARTHRITIS: ICD-10-CM

## 2022-12-28 DIAGNOSIS — G89.29 CHRONIC LOW BACK PAIN WITH BILATERAL SCIATICA, UNSPECIFIED BACK PAIN LATERALITY: ICD-10-CM

## 2022-12-28 DIAGNOSIS — R53.82 CHRONIC FATIGUE: ICD-10-CM

## 2022-12-28 DIAGNOSIS — R76.8 POSITIVE ANA (ANTINUCLEAR ANTIBODY): Primary | ICD-10-CM

## 2022-12-28 DIAGNOSIS — F41.9 ANXIETY: ICD-10-CM

## 2022-12-28 DIAGNOSIS — M54.42 CHRONIC LOW BACK PAIN WITH BILATERAL SCIATICA, UNSPECIFIED BACK PAIN LATERALITY: ICD-10-CM

## 2022-12-28 DIAGNOSIS — M54.41 CHRONIC LOW BACK PAIN WITH BILATERAL SCIATICA, UNSPECIFIED BACK PAIN LATERALITY: ICD-10-CM

## 2022-12-28 PROCEDURE — 99999 PR PBB SHADOW E&M-EST. PATIENT-LVL V: CPT | Mod: PBBFAC,,, | Performed by: INTERNAL MEDICINE

## 2022-12-28 PROCEDURE — 99214 PR OFFICE/OUTPT VISIT, EST, LEVL IV, 30-39 MIN: ICD-10-PCS | Mod: S$GLB,,, | Performed by: INTERNAL MEDICINE

## 2022-12-28 PROCEDURE — 3074F SYST BP LT 130 MM HG: CPT | Mod: CPTII,S$GLB,, | Performed by: INTERNAL MEDICINE

## 2022-12-28 PROCEDURE — 1159F MED LIST DOCD IN RCRD: CPT | Mod: CPTII,S$GLB,, | Performed by: INTERNAL MEDICINE

## 2022-12-28 PROCEDURE — 3044F PR MOST RECENT HEMOGLOBIN A1C LEVEL <7.0%: ICD-10-PCS | Mod: CPTII,S$GLB,, | Performed by: INTERNAL MEDICINE

## 2022-12-28 PROCEDURE — 3008F PR BODY MASS INDEX (BMI) DOCUMENTED: ICD-10-PCS | Mod: CPTII,S$GLB,, | Performed by: INTERNAL MEDICINE

## 2022-12-28 PROCEDURE — 3078F DIAST BP <80 MM HG: CPT | Mod: CPTII,S$GLB,, | Performed by: INTERNAL MEDICINE

## 2022-12-28 PROCEDURE — 3008F BODY MASS INDEX DOCD: CPT | Mod: CPTII,S$GLB,, | Performed by: INTERNAL MEDICINE

## 2022-12-28 PROCEDURE — 3044F HG A1C LEVEL LT 7.0%: CPT | Mod: CPTII,S$GLB,, | Performed by: INTERNAL MEDICINE

## 2022-12-28 PROCEDURE — 3074F PR MOST RECENT SYSTOLIC BLOOD PRESSURE < 130 MM HG: ICD-10-PCS | Mod: CPTII,S$GLB,, | Performed by: INTERNAL MEDICINE

## 2022-12-28 PROCEDURE — 3078F PR MOST RECENT DIASTOLIC BLOOD PRESSURE < 80 MM HG: ICD-10-PCS | Mod: CPTII,S$GLB,, | Performed by: INTERNAL MEDICINE

## 2022-12-28 PROCEDURE — 99999 PR PBB SHADOW E&M-EST. PATIENT-LVL V: ICD-10-PCS | Mod: PBBFAC,,, | Performed by: INTERNAL MEDICINE

## 2022-12-28 PROCEDURE — 99214 OFFICE O/P EST MOD 30 MIN: CPT | Mod: S$GLB,,, | Performed by: INTERNAL MEDICINE

## 2022-12-28 PROCEDURE — 1159F PR MEDICATION LIST DOCUMENTED IN MEDICAL RECORD: ICD-10-PCS | Mod: CPTII,S$GLB,, | Performed by: INTERNAL MEDICINE

## 2022-12-28 RX ORDER — METHOCARBAMOL 500 MG/1
500 TABLET, FILM COATED ORAL NIGHTLY
Qty: 30 TABLET | Refills: 0 | Status: SHIPPED | OUTPATIENT
Start: 2022-12-28 | End: 2023-02-06

## 2022-12-28 RX ORDER — GABAPENTIN 300 MG/1
300 CAPSULE ORAL 3 TIMES DAILY
Qty: 90 CAPSULE | Refills: 11 | Status: SHIPPED | OUTPATIENT
Start: 2022-12-28 | End: 2023-09-04 | Stop reason: SDUPTHER

## 2022-12-28 RX ORDER — TIRZEPATIDE 7.5 MG/.5ML
7.5 INJECTION, SOLUTION SUBCUTANEOUS
Qty: 4 PEN | Refills: 2 | Status: SHIPPED | OUTPATIENT
Start: 2022-12-28 | End: 2023-02-03

## 2022-12-28 RX ORDER — TIRZEPATIDE 5 MG/.5ML
7.5 INJECTION, SOLUTION SUBCUTANEOUS
Qty: 4 PEN | Refills: 1 | Status: CANCELLED | OUTPATIENT
Start: 2022-12-28

## 2022-12-28 NOTE — PROGRESS NOTES
RHEUMATOLOGY OUTPATIENT CLINIC NOTE    2022    Attending Rheumatologist: Michael Hernandez  Primary Care Provider/Physician Requesting Consultation: Dante Negro MD   Chief Complaint/Reason For Consultation:  Positive VANESSA      Subjective:     Lucía Coreas is a 37 y.o. Black or  female with VANESSA for follow up.    No acute complaints at present.  Refers good response to gabapentin, currently at 300mg qhs.    Review of Systems   Constitutional:  Positive for malaise/fatigue. Negative for fever.   HENT:          Moderate xerophthalmia   Eyes:         Borderline glaucoma   Respiratory:  Negative for shortness of breath.    Gastrointestinal:  Negative for blood in stool and melena.   Genitourinary:  Negative for hematuria.   Musculoskeletal:  Positive for back pain. Negative for joint pain.   Skin:  Negative for rash (recurernt boils, hx of HS).        Denies RP   Neurological:  Negative for focal weakness and weakness.     Chronic comorbid conditions affecting medical decision making today:  Past Medical History:   Diagnosis Date    Allergy     Amenorrhea     Asthma     Diabetes     GERD (gastroesophageal reflux disease)     Infertility associated with anovulation     Iron deficiency anemia     Knee pain     Metabolic syndrome     PCO (polycystic ovaries)     Recurrent boils     Status post repeat low transverse  section 2020    Formatting of this note might be different from the original. With BTL 21     Past Surgical History:   Procedure Laterality Date    BTL      CARPAL TUNNEL RELEASE Left 2019    CARPAL TUNNEL RELEASE Right 2020    Procedure: RELEASE, CARPAL TUNNEL;  Surgeon: Adeel Laughlin MD;  Location: [facility];  Service: Orthopedics;  Laterality: Right;     SECTION      X2    EXPLORATORY LAPAROTOMY      1 week postop with WOUND VAK, reopened uterus    WOUND VAK      3 months post-op Section, 2 weeks in hospital     Family History   Problem  Relation Age of Onset    Diabetes Father     Heart disease Father 69    Hypertension Father     Prostate cancer Father     Diabetes Mother     Lupus Mother     AMY disease Brother     Ovarian cancer Sister     Breast cancer Neg Hx      Social History     Tobacco Use   Smoking Status Never   Smokeless Tobacco Never       Current Outpatient Medications:     albuterol (PROVENTIL) 2.5 mg /3 mL (0.083 %) nebulizer solution, Take 3 mLs (2.5 mg total) by nebulization every 4 to 6 hours as needed for Wheezing., Disp: 60 each, Rfl: 3    albuterol (PROVENTIL/VENTOLIN HFA) 90 mcg/actuation inhaler, Inhale 1-2 puffs into the lungs every 4 (four) hours as needed for Wheezing., Disp: 12 g, Rfl: 1    azelastine (ASTELIN) 137 mcg (0.1 %) nasal spray, SMARTSIG:Both Nares, Disp: , Rfl:     cholecalciferol, vitamin D3, (VITAMIN D3) 25 mcg (1,000 unit) capsule, Take 2 capsules (2,000 Units total) by mouth once daily., Disp: 90 capsule, Rfl: 4    cyanocobalamin (VITAMIN B-12) 1000 MCG tablet, Take 1 tablet (1,000 mcg total) by mouth once daily., Disp: 90 tablet, Rfl: 3    ferrous sulfate (FEOSOL) Tab tablet, Take 1 tablet (1 each total) by mouth daily with breakfast., Disp: 90 tablet, Rfl: 3    fluticasone furoate-vilanteroL (BREO ELLIPTA) 100-25 mcg/dose diskus inhaler, Inhale 1 puff into the lungs once daily. Controller, Disp: 30 each, Rfl: 12    fluticasone furoate-vilanteroL (BREO) 200-25 mcg/dose DsDv diskus inhaler, Inhale 1 puff into the lungs once daily. Controller, Disp: 1 each, Rfl: 4    IRON 100 PLUS Tab, Take 1 tablet by mouth once daily., Disp: 90 tablet, Rfl: 1    ketoconazole (NIZORAL) 2 % shampoo, Apply topically twice a week., Disp: 500 mL, Rfl: 2    Lactobacillus rhamnosus GG (CULTURELLE) 10 billion cell capsule, Take 1 capsule by mouth once daily., Disp: , Rfl:     lactulose (CHRONULAC) 10 gram/15 mL solution, SMARTSI Tablespoon By Mouth Daily, Disp: , Rfl:     levocetirizine (XYZAL) 5 MG tablet, Take 1 tablet (5  "mg total) by mouth every evening., Disp: 30 tablet, Rfl: 4    loratadine (CLARITIN) 10 mg tablet, Take 1 tablet (10 mg total) by mouth once daily., Disp: 90 tablet, Rfl: 4    montelukast (SINGULAIR) 10 mg tablet, Take 1 tablet (10 mg total) by mouth once daily., Disp: 30 tablet, Rfl: 3    mupirocin (BACTROBAN) 2 % ointment, Apply topically 3 (three) times daily., Disp: , Rfl:     NIFEdipine (PROCARDIA-XL) 30 MG (OSM) 24 hr tablet, Take 30 mg by mouth once daily., Disp: , Rfl:     NOVOFINE PLUS 32 gauge x 1/6" Ndle, use as directed, Disp: , Rfl:     nystatin (MYCOSTATIN) powder, Apply topically 2 (two) times daily., Disp: 60 g, Rfl: 1    omalizumab (XOLAIR) 150 mg injection, Inject 300 mg into the skin every 14 (fourteen) days., Disp: 2 each, Rfl: 11    ondansetron (ZOFRAN-ODT) 8 MG TbDL, DISSOLVE ONE TABLET UNDER TONGUE EVERY 8 HOURS AS NEEDED FOR NAUSEA, Disp: , Rfl:     pantoprazole (PROTONIX) 40 MG tablet, Take 1 tablet (40 mg total) by mouth once daily., Disp: 90 tablet, Rfl: 4    spironolactone (ALDACTONE) 100 MG tablet, Take 1 tablet (100 mg total) by mouth once daily., Disp: 90 tablet, Rfl: 1    tirzepatide (MOUNJARO) 5 mg/0.5 mL PnIj, Inject 5 mg into the skin every 7 days., Disp: 4 pen, Rfl: 1    tiZANidine (ZANAFLEX) 4 MG tablet, Take 1 tablet (4 mg total) by mouth nightly as needed (muscle spasms/ pain)., Disp: 40 tablet, Rfl: 0    WIXELA INHUB 500-50 mcg/dose DsDv diskus inhaler, Inhale 1 puff into the lungs 2 (two) times daily., Disp: , Rfl:     EPINEPHrine (EPIPEN) 0.3 mg/0.3 mL AtIn, Inject 0.3 mLs (0.3 mg total) into the muscle once. for 1 dose, Disp: 2 each, Rfl: 11    Current Facility-Administered Medications:     cyanocobalamin injection 1,000 mcg, 1,000 mcg, Intramuscular, Q30 Days, Dante Negro MD, 1,000 mcg at 12/02/22 1330    omalizumab injection 150 mg, 150 mg, Subcutaneous, Q14 Days, Rossy Kramer MD, 150 mg at 12/08/22 0934    omalizumab injection 150 mg, 150 mg, Subcutaneous, Q14 Days, " Rossy Kramer MD, 150 mg at 12/08/22 0933    omalizumab injection 300 mg, 300 mg, Subcutaneous, Q28 Days, Rossy Kramer MD, 300 mg at 12/22/22 0925     Objective:     Vitals:    12/28/22 1102   BP: 127/75   Pulse: 84   Resp: 17     Physical Exam   Constitutional: She appears obese.   Eyes: Conjunctivae are normal.   Pulmonary/Chest: Effort normal. No respiratory distress.   Musculoskeletal:         General: No swelling or tenderness. Normal range of motion.   Neurological: She displays no weakness.   Skin: Rash (seborrheic dermatitis with inflammatory features, scalp. Evidence of nail picking) noted.     Reviewed available old and all outside pertinent medical records available.    All lab results personally reviewed and interpreted by me.       ASSESSMENT:     Positive VANESSA    Chronic fatigue syndrome    Fibromyalgia    HS    Inflamed seborrheic dermatitis    Chronic back pain    Sicca    TORI    PLAN:     Chronic fatigue syndrome with characteristics of FMS.  Rash with appearance of seborrheic dermatitis, recommend f/u with dermatology for DDx of CTD related rash.  HS w/o evident synovitis.  ANNA panel negative.  No progressive cytopenias or progressive end organ damage associated with serologic abnormality.  Imaging with DJD characteristics. Recommend MRI SI to rule out NrSpA.  Add RF and myomarker panel with next set of labs.  Referral to mental health.  Adequate response to gabapentin, refills provided with robaxin as alternative muscle relaxant for symptomatic relief.  Side effects discussed.      Disclaimer: This note is prepared using voice recognition software and as such is likely to have errors and has not been proof read. Please contact me for questions.       Michael Hernandez M.D.

## 2022-12-28 NOTE — TELEPHONE ENCOUNTER
I have signed for the following orders AND/OR meds.  Please call the patient and ask the patient to schedule the testing AND/OR inform about any medications that were sent. Medications have been sent to pharmacy listed below      Medications Ordered This Encounter   Medications    tirzepatide (MOUNJARO) 7.5 mg/0.5 mL PnIj     Sig: Inject 7.5 mg into the skin every 7 days.     Dispense:  4 pen     Refill:  2       [facility] - [address]  [address] [facility]  [place] [address]  Phone: [phone] Fax: [phone]

## 2022-12-28 NOTE — TELEPHONE ENCOUNTER
No new care gaps identified.  [facility] Embedded Care Gaps. Reference number: [id number]. 12/28/2022   8:37:17 AM CST

## 2022-12-30 ENCOUNTER — CLINICAL SUPPORT (OUTPATIENT)
Dept: FAMILY MEDICINE | Facility: CLINIC | Age: 37
End: 2022-12-30
Payer: COMMERCIAL

## 2022-12-30 DIAGNOSIS — E53.8 B12 DEFICIENCY: Primary | ICD-10-CM

## 2022-12-30 PROCEDURE — 99999 PR PBB SHADOW E&M-EST. PATIENT-LVL III: ICD-10-PCS | Mod: PBBFAC,,,

## 2022-12-30 PROCEDURE — 99999 PR PBB SHADOW E&M-EST. PATIENT-LVL III: CPT | Mod: PBBFAC,,,

## 2022-12-30 PROCEDURE — 96372 PR INJECTION,THERAP/PROPH/DIAG2ST, IM OR SUBCUT: ICD-10-PCS | Mod: S$GLB,,, | Performed by: FAMILY MEDICINE

## 2022-12-30 PROCEDURE — 96372 THER/PROPH/DIAG INJ SC/IM: CPT | Mod: S$GLB,,, | Performed by: FAMILY MEDICINE

## 2022-12-30 RX ADMIN — CYANOCOBALAMIN 1000 MCG: 1000 INJECTION, SOLUTION INTRAMUSCULAR; SUBCUTANEOUS at 10:12

## 2023-01-05 ENCOUNTER — CLINICAL SUPPORT (OUTPATIENT)
Dept: ALLERGY | Facility: CLINIC | Age: 38
End: 2023-01-05
Payer: COMMERCIAL

## 2023-01-05 DIAGNOSIS — J45.40 MODERATE PERSISTENT ASTHMA WITHOUT COMPLICATION: Primary | ICD-10-CM

## 2023-01-05 PROCEDURE — 99999 PR PBB SHADOW E&M-EST. PATIENT-LVL I: ICD-10-PCS | Mod: PBBFAC,,,

## 2023-01-05 PROCEDURE — 99999 PR PBB SHADOW E&M-EST. PATIENT-LVL I: CPT | Mod: PBBFAC,,,

## 2023-01-05 PROCEDURE — 96372 THER/PROPH/DIAG INJ SC/IM: CPT | Mod: S$GLB,,, | Performed by: ALLERGY & IMMUNOLOGY

## 2023-01-05 PROCEDURE — 96372 PR INJECTION,THERAP/PROPH/DIAG2ST, IM OR SUBCUT: ICD-10-PCS | Mod: S$GLB,,, | Performed by: ALLERGY & IMMUNOLOGY

## 2023-01-11 DIAGNOSIS — M47.819 SPONDYLOARTHRITIS: Primary | ICD-10-CM

## 2023-01-12 ENCOUNTER — PATIENT MESSAGE (OUTPATIENT)
Dept: FAMILY MEDICINE | Facility: CLINIC | Age: 38
End: 2023-01-12
Payer: COMMERCIAL

## 2023-01-19 ENCOUNTER — CLINICAL SUPPORT (OUTPATIENT)
Dept: ALLERGY | Facility: CLINIC | Age: 38
End: 2023-01-19
Payer: COMMERCIAL

## 2023-01-19 ENCOUNTER — OFFICE VISIT (OUTPATIENT)
Dept: ALLERGY | Facility: CLINIC | Age: 38
End: 2023-01-19
Payer: COMMERCIAL

## 2023-01-19 VITALS
HEART RATE: 102 BPM | HEIGHT: 64 IN | DIASTOLIC BLOOD PRESSURE: 86 MMHG | TEMPERATURE: 98 F | SYSTOLIC BLOOD PRESSURE: 133 MMHG | BODY MASS INDEX: 50.02 KG/M2 | WEIGHT: 293 LBS

## 2023-01-19 DIAGNOSIS — J45.40 MODERATE PERSISTENT ASTHMA WITHOUT COMPLICATION: Primary | ICD-10-CM

## 2023-01-19 DIAGNOSIS — J30.1 SEASONAL ALLERGIC RHINITIS DUE TO POLLEN: ICD-10-CM

## 2023-01-19 DIAGNOSIS — J30.89 ALLERGIC RHINITIS DUE TO AMERICAN HOUSE DUST MITE: ICD-10-CM

## 2023-01-19 DIAGNOSIS — Z91.018 FOOD ALLERGY: Primary | ICD-10-CM

## 2023-01-19 DIAGNOSIS — J45.40 MODERATE PERSISTENT ASTHMA WITHOUT COMPLICATION: ICD-10-CM

## 2023-01-19 DIAGNOSIS — Z91.040 LATEX ALLERGY: ICD-10-CM

## 2023-01-19 PROCEDURE — 3079F PR MOST RECENT DIASTOLIC BLOOD PRESSURE 80-89 MM HG: ICD-10-PCS | Mod: CPTII,S$GLB,, | Performed by: ALLERGY & IMMUNOLOGY

## 2023-01-19 PROCEDURE — 99214 OFFICE O/P EST MOD 30 MIN: CPT | Mod: 25,S$GLB,, | Performed by: ALLERGY & IMMUNOLOGY

## 2023-01-19 PROCEDURE — 3072F LOW RISK FOR RETINOPATHY: CPT | Mod: CPTII,S$GLB,, | Performed by: ALLERGY & IMMUNOLOGY

## 2023-01-19 PROCEDURE — 3079F DIAST BP 80-89 MM HG: CPT | Mod: CPTII,S$GLB,, | Performed by: ALLERGY & IMMUNOLOGY

## 2023-01-19 PROCEDURE — 99999 PR PBB SHADOW E&M-EST. PATIENT-LVL V: CPT | Mod: PBBFAC,,, | Performed by: ALLERGY & IMMUNOLOGY

## 2023-01-19 PROCEDURE — 3072F PR LOW RISK FOR RETINOPATHY: ICD-10-PCS | Mod: CPTII,S$GLB,, | Performed by: ALLERGY & IMMUNOLOGY

## 2023-01-19 PROCEDURE — 99214 PR OFFICE/OUTPT VISIT, EST, LEVL IV, 30-39 MIN: ICD-10-PCS | Mod: 25,S$GLB,, | Performed by: ALLERGY & IMMUNOLOGY

## 2023-01-19 PROCEDURE — 99999 PR PBB SHADOW E&M-EST. PATIENT-LVL I: CPT | Mod: PBBFAC,,,

## 2023-01-19 PROCEDURE — 1159F MED LIST DOCD IN RCRD: CPT | Mod: CPTII,S$GLB,, | Performed by: ALLERGY & IMMUNOLOGY

## 2023-01-19 PROCEDURE — 99999 PR PBB SHADOW E&M-EST. PATIENT-LVL V: ICD-10-PCS | Mod: PBBFAC,,, | Performed by: ALLERGY & IMMUNOLOGY

## 2023-01-19 PROCEDURE — 1159F PR MEDICATION LIST DOCUMENTED IN MEDICAL RECORD: ICD-10-PCS | Mod: CPTII,S$GLB,, | Performed by: ALLERGY & IMMUNOLOGY

## 2023-01-19 PROCEDURE — 3075F SYST BP GE 130 - 139MM HG: CPT | Mod: CPTII,S$GLB,, | Performed by: ALLERGY & IMMUNOLOGY

## 2023-01-19 PROCEDURE — 99999 PR PBB SHADOW E&M-EST. PATIENT-LVL I: ICD-10-PCS | Mod: PBBFAC,,,

## 2023-01-19 PROCEDURE — 3008F PR BODY MASS INDEX (BMI) DOCUMENTED: ICD-10-PCS | Mod: CPTII,S$GLB,, | Performed by: ALLERGY & IMMUNOLOGY

## 2023-01-19 PROCEDURE — 96372 THER/PROPH/DIAG INJ SC/IM: CPT | Mod: S$GLB,,, | Performed by: ALLERGY & IMMUNOLOGY

## 2023-01-19 PROCEDURE — 96372 PR INJECTION,THERAP/PROPH/DIAG2ST, IM OR SUBCUT: ICD-10-PCS | Mod: S$GLB,,, | Performed by: ALLERGY & IMMUNOLOGY

## 2023-01-19 PROCEDURE — 3008F BODY MASS INDEX DOCD: CPT | Mod: CPTII,S$GLB,, | Performed by: ALLERGY & IMMUNOLOGY

## 2023-01-19 PROCEDURE — 3075F PR MOST RECENT SYSTOLIC BLOOD PRESS GE 130-139MM HG: ICD-10-PCS | Mod: CPTII,S$GLB,, | Performed by: ALLERGY & IMMUNOLOGY

## 2023-01-19 NOTE — PROGRESS NOTES
"    Subjective:              Patient ID: Lucía Coreas is a 37 y.o. female.        Chief Complaint:  Follow-up      HPI 10/19/2021: 35 year old female with a history of allergic rhinitis on allergy immunotherapy complaining of "cold sores in her nose" and fluid in her ear.  She reports that the sores in her nose burn and occur intermittently. She reports that they feel like "fever blisters". She is placing Bactroban and saline, but nothing is alleviating her symptoms. She has also tried nate vera gel and saline gel, but it is not helping.  She reports fluid in her ear. She is taking levocetirizine and Singulair, but it does not help.  She is not doing nasal sprays because of nasal dryness.  Epistaxis intermittently.    Allergy immunotherapy- DM, T, Cr- 1-1 dilution,  improved symptoms.    Still avoiding shellfish, no accidental ingestions  Epipen    HPI 3/12/2021:    35 year old female on SCIT 1:1 inhlants 0.5ml  She is pregnant and recently started on hypertensive medications.  She reports feel that her asthma is worse.  She has not had asthma symptoms, since she was a child.  She is currently using her albuterol inhaler 3 times a day and sees a pulmonologist on Monday.  She is not taking controller asthma medications.    HPI 4/26/2021:  35 year old pregnant female with a history of asthma.  She reports that her breathing is "horrible".  She is taking Advair daily- bid.  Albuterol inhaler two to three times a day.  She reports coughing, but denies wheezing.  One month ago- oral steroids.  Shortness of breath is exacerbated by exertion.      HPI 6/14/2021:  35 year old female 27 weeks pregnant  Headache and erythema of the eyes.  Eye physician does not feel that is is allergies.  She reports pressure behind her eyes and forehead.  She reports nasal congestion.  She reports an ill contact last week.  She denies feeling feverish or having a fever.      HPI 7/29/2021:  35 year old female -33 weeks pregnant with a " "history of dust mite, tree pollen and cockroach allergy previously on SCIT. It was stopped due to pregnancy. She also has asthma.  She reports a significant nasal congestion.  She is taking Zyrtec and hydroxyzine.  She gets out of the shower, she has to take a breathing treatment or taker her inhaler.  Pulmonary physician suspected that symptoms were related to allergies.  She is taking Advair, Albuterol- nebulized and inhaler, and flonase.  She has not taken Astelin or Singulair.  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        HPI 11/11/2021: 12 weeks post pardom  She is here for follow up.  Asthma-  Last used albuterol on yesterday.  Exertion exacerbates symptoms  Spirometry at [facility]- seeing a pulmonologist.  Singulair and Advair  Steroids-once over the last 12 months  NO shortness of breath at rest. NO wheezing, Occasional cough      Latex caused a rash on her hands. Rash is immediate with removal of gloves.  She was using gloves to clean. Hands itched and she reports hives on her hives.      She is taking Xyzal.  She reports nasal congestion.  She was on SCIT prior to pregnancy and held during pregnancy.  She does not wish to resume SCIT at this time.  She does have itchy and watery eyes.        HPI 2/16/2022: 36 year old with asthma, allergic rhinitis, and food allergies.  6 days - ate french fries cooked with seafood/shellfish- felt throat closure, treated with benadryl  She did not use her Epipen.  Shellfish allergy  No symptoms with fish  Asthma- "so so. Not as bad a sit use to be."  Not required oral steroids.  Breo has helped improve symptoms  Last used albuterol- several months ago  Seeing Pulmonary at [facility], but would like to change to [facility]    Avoiding Flonase due to sores in her nose.    Previously on SCIT for dust mite and pollen, but not interested in restarting  She reports nasal congestion at night that is worse when her heater is on.  Singulair and " "Xyzal    Recurrent boils      HPI 2022: 36 year old female with a history of asthma, allergic rhinitis and food allergies      Allergic Rhinitis- dust mites, cockroach and pollen  Previously on SCIT, was stopped due to pregnancy and not restarted post partum  Xyzal and Singulair  Runny nose, nasal congestion  Flonase prn  Astelin prn    Asthma  Pulmonary referral was placed, but she was not seen.  Coughing -   Wheezing at night  Last used albuterol- two months  NO oral steroid      3 weeks ago Urgent care for nasal congestion- cough syrup and Zpack- cough has persisted. Nasal congestion has improved.      Food allergies- shellfish allergies  Avoiding shellfish  Not required Epipen      HPI today: 37 year old female with a history of asthma, allergic rhinitis and food allergies.    Allergic Rhinitis- dust mites, cockroach, and pollen  SCIT previously  Currently on Xolair   She feels Xolair has controlled nasal and eye symptoms.    Asthma- "good"  NO oral steroids, since seen here previously  NO night time cough  Flare -four days ago- albuterol-unsure of trigger-resolved with albuterol- was short of breath only  NO wheezing or cough  On Xolair    Food Allergies- shellfish  Avoiding, no reactions  Epipen          Past Medical History:   Diagnosis Date    Allergy     Amenorrhea     Asthma     Diabetes     GERD (gastroesophageal reflux disease)     Infertility associated with anovulation     Iron deficiency anemia     Knee pain     Metabolic syndrome     PCO (polycystic ovaries)     Recurrent boils     Status post repeat low transverse  section 2020    Formatting of this note might be different from the original. With BTL 21     Family History   Problem Relation Age of Onset    Diabetes Father     Heart disease Father 69    Hypertension Father     Prostate cancer Father     Diabetes Mother     Lupus Mother     AMY disease Brother     Ovarian cancer Sister     Breast cancer Neg Hx      Current " Outpatient Medications on File Prior to Visit   Medication Sig Dispense Refill    albuterol (PROVENTIL) 2.5 mg /3 mL (0.083 %) nebulizer solution Take 3 mLs (2.5 mg total) by nebulization every 4 to 6 hours as needed for Wheezing. 60 each 3    albuterol (PROVENTIL/VENTOLIN HFA) 90 mcg/actuation inhaler Inhale 1-2 puffs into the lungs every 4 (four) hours as needed for Wheezing. 12 g 1    azelastine (ASTELIN) 137 mcg (0.1 %) nasal spray SMARTSIG:Both Nares      cholecalciferol, vitamin D3, (VITAMIN D3) 25 mcg (1,000 unit) capsule Take 2 capsules (2,000 Units total) by mouth once daily. 90 capsule 4    cyanocobalamin (VITAMIN B-12) 1000 MCG tablet Take 1 tablet (1,000 mcg total) by mouth once daily. 90 tablet 3    ferrous sulfate (FEOSOL) Tab tablet Take 1 tablet (1 each total) by mouth daily with breakfast. 90 tablet 3    fluticasone furoate-vilanteroL (BREO ELLIPTA) 100-25 mcg/dose diskus inhaler Inhale 1 puff into the lungs once daily. Controller 30 each 12    fluticasone furoate-vilanteroL (BREO) 200-25 mcg/dose DsDv diskus inhaler Inhale 1 puff into the lungs once daily. Controller 1 each 4    gabapentin (NEURONTIN) 300 MG capsule Take 1 capsule (300 mg total) by mouth 3 (three) times daily. 90 capsule 11    IRON 100 PLUS Tab Take 1 tablet by mouth once daily. 90 tablet 1    ketoconazole (NIZORAL) 2 % shampoo Apply topically twice a week. 500 mL 2    Lactobacillus rhamnosus GG (CULTURELLE) 10 billion cell capsule Take 1 capsule by mouth once daily.      lactulose (CHRONULAC) 10 gram/15 mL solution SMARTSI Tablespoon By Mouth Daily      levocetirizine (XYZAL) 5 MG tablet Take 1 tablet (5 mg total) by mouth every evening. 30 tablet 4    loratadine (CLARITIN) 10 mg tablet Take 1 tablet (10 mg total) by mouth once daily. 90 tablet 4    methocarbamoL (ROBAXIN) 500 MG Tab Take 1 tablet (500 mg total) by mouth every evening. 30 tablet 0    montelukast (SINGULAIR) 10 mg tablet Take 1 tablet (10 mg total) by mouth once  "daily. 30 tablet 3    mupirocin (BACTROBAN) 2 % ointment Apply topically 3 (three) times daily.      NIFEdipine (PROCARDIA-XL) 30 MG (OSM) 24 hr tablet Take 30 mg by mouth once daily.      NOVOFINE PLUS 32 gauge x 1/6" Ndle use as directed      nystatin (MYCOSTATIN) powder Apply topically 2 (two) times daily. 60 g 1    omalizumab (XOLAIR) 150 mg injection Inject 300 mg into the skin every 14 (fourteen) days. 2 each 11    ondansetron (ZOFRAN-ODT) 8 MG TbDL DISSOLVE ONE TABLET UNDER TONGUE EVERY 8 HOURS AS NEEDED FOR NAUSEA      pantoprazole (PROTONIX) 40 MG tablet Take 1 tablet (40 mg total) by mouth once daily. 90 tablet 4    spironolactone (ALDACTONE) 100 MG tablet Take 1 tablet (100 mg total) by mouth once daily. 90 tablet 1    tirzepatide (MOUNJARO) 7.5 mg/0.5 mL PnIj Inject 7.5 mg into the skin every 7 days. 4 pen 2    WIXELA INHUB 500-50 mcg/dose DsDv diskus inhaler Inhale 1 puff into the lungs 2 (two) times daily.      EPINEPHrine (EPIPEN) 0.3 mg/0.3 mL AtIn Inject 0.3 mLs (0.3 mg total) into the muscle once. for 1 dose 2 each 11     Current Facility-Administered Medications on File Prior to Visit   Medication Dose Route Frequency Provider Last Rate Last Admin    cyanocobalamin injection 1,000 mcg  1,000 mcg Intramuscular Q30 Days Dante Negro MD   1,000 mcg at 12/30/22 1030    omalizumab injection 150 mg  150 mg Subcutaneous Q14 Days Rossy Kramer MD   150 mg at 01/05/23 0933    omalizumab injection 150 mg  150 mg Subcutaneous Q14 Days Rossy Kramer MD   150 mg at 01/05/23 0933    omalizumab injection 300 mg  300 mg Subcutaneous Q28 Days Rossy Kramer MD   300 mg at 01/19/23 0856     Review of patient's allergies indicates:   Allergen Reactions    Metformin Diarrhea    Sulfa (sulfonamide antibiotics) Anaphylaxis and Swelling     Swelling (eyes)^, Swelling (throat)^  Swelling (eyes)^, Swelling (throat)^      Diclofenac      Gastritis     Latex, natural rubber      Contact dermatitis    Other reaction(s): Other " (See Comments)  Contact dermatitis    Shellfish containing products      anaphylaxis       Environmental History:  No pets. Not a smoker.        ROS:  Negative aside from those items mentioned in the HPI.    Objective:     Physical Exam  Vitals reviewed.   Constitutional:       General: She is not in acute distress.     Appearance: Normal appearance. She is obese. She is not ill-appearing, toxic-appearing or diaphoretic.   HENT:      Head: Normocephalic and atraumatic.      Right Ear: Tympanic membrane, ear canal and external ear normal. There is no impacted cerumen.      Left Ear: Tympanic membrane, ear canal and external ear normal. There is no impacted cerumen.      Nose: Nose normal. No congestion or rhinorrhea.      Mouth/Throat:      Mouth: Mucous membranes are moist.      Pharynx: No oropharyngeal exudate or posterior oropharyngeal erythema.   Eyes:      General: No scleral icterus.        Right eye: No discharge.         Left eye: No discharge.   Cardiovascular:      Rate and Rhythm: Normal rate and regular rhythm.      Heart sounds: Normal heart sounds. No murmur heard.    No friction rub. No gallop.   Pulmonary:      Effort: Pulmonary effort is normal. No respiratory distress.      Breath sounds: Normal breath sounds. No stridor. No wheezing, rhonchi or rales.   Chest:      Chest wall: No tenderness.   Abdominal:      General: There is no distension.      Palpations: Abdomen is soft. There is no mass.      Tenderness: There is no abdominal tenderness. There is no guarding or rebound.      Hernia: No hernia is present.   Musculoskeletal:         General: No swelling, tenderness, deformity or signs of injury.      Cervical back: Normal range of motion and neck supple. No tenderness.   Lymphadenopathy:      Cervical: No cervical adenopathy.   Skin:     General: Skin is warm.      Coloration: Skin is not jaundiced or pale.      Findings: No bruising, erythema, lesion or rash.   Neurological:      General: No  focal deficit present.      Mental Status: She is alert and oriented to person, place, and time.      Motor: No weakness.      Gait: Gait normal.   Psychiatric:         Mood and Affect: Mood normal.         Behavior: Behavior normal.         Thought Content: Thought content normal.         Judgment: Judgment normal.         Assessment:            Food allergy    Allergic rhinitis due to [place] house dust mite    Seasonal allergic rhinitis due to pollen    Moderate persistent asthma without complication    Latex allergy        Recommend spirometry at least once a year, if symptomatic every 4-6 months.  Continue Xolair  Continue Breo and other medications  Continue Singulair and Xyzal  Avoid shellfish and shellfish products.  Epipen 2 pack  Avoid latex     RTC 4 -6 months or sooner, if needed.        MD,YANI                      Problems Address                                                 Amount and/or Complexity                                                                      Risk       3           [] 2 or more self-limited or minor problems                      [] Limited                                                                        [] Low                  [] 1 stable chronic illness                                                  Any combination of the two                                               OTC drugs                  []Acute, uncomplicated illness or injury                            Review of prior external notes from unique source           Minor surgery with no risk factors                                                                                                               [] 1 []2  []3+                                                                                                              Review of results from each unique test                                                                                                               [] 1 []2  [] 3+                                                                                                               Order of each unique test                                                                                                               [] 1 []2  [] 3+                                                                                                              Or                                                                                                             [] Assessment requiring an independent historian      4            [] One or more chronic illness with exacerbation,              [] Moderate                                                                      [x] Moderate                 Progression, or side effects of treatment                            -test documents or independent historians                        Prescription drug management                [x]  2 or more stable chronic illnesses                                    [] Independent interpretation of tests                              Minor surgery with identifiable risk                [] 1 undiagnosed new problem with uncertain prognosis    [] Discussion or management of test results                    elective major surgery                [] 1 acute illness with                systemic symptoms                                                                                                                                                              [] 1 acute complicated injury                                                                                                                                          Elective major surgery                                                                                                                                                                                                                                                                                                                                                                                                   5            [] 1 or more chronic illnesses with severe exacerbation,     [] Extensive(two from below)                                         [] High                                                                                                               [] Independent interpretation of results                         Drug therapy requiring intensive                                                                                                               []Discussion of management or test interpretation           monitoring                                                                                                                                                                                                       Decision to de-escalate care                 [] 1 acute or chronic illness or injury that poses a threat                                                                                               Decision regarding hospitalization

## 2023-01-19 NOTE — LETTER
January 19, 2023      [facility]  [address] [facility] [address]  Phone: [phone]  Fax: [phone]       Patient: Lucía Coreas   YOB: 1985  Date of Visit: 01/19/2023    To Whom It May Concern:    Bhupinder Coreas  was at [facility] on 01/19/2023. The patient may return to work/school on 1/19/2023 with no restrictions. If you have any questions or concerns, or if I can be of further assistance, please do not hesitate to contact me.    Sincerely,    Rossy Kramer MD

## 2023-01-20 ENCOUNTER — PATIENT MESSAGE (OUTPATIENT)
Dept: OPTOMETRY | Facility: CLINIC | Age: 38
End: 2023-01-20
Payer: COMMERCIAL

## 2023-01-23 ENCOUNTER — DOCUMENTATION ONLY (OUTPATIENT)
Dept: REHABILITATION | Facility: HOSPITAL | Age: 38
End: 2023-01-23
Payer: COMMERCIAL

## 2023-01-23 PROBLEM — R68.89 DECREASED FUNCTIONAL ACTIVITY TOLERANCE: Status: RESOLVED | Noted: 2022-09-14 | Resolved: 2023-01-23

## 2023-01-23 PROBLEM — M53.86 DECREASED ROM OF LUMBAR SPINE: Status: RESOLVED | Noted: 2022-09-14 | Resolved: 2023-01-23

## 2023-01-23 PROBLEM — R29.898 DECREASED STRENGTH OF LOWER EXTREMITY: Status: RESOLVED | Noted: 2022-09-14 | Resolved: 2023-01-23

## 2023-01-23 NOTE — PROGRESS NOTES
[facility]  Physical Therapy Discharge Note    Name: Lucía Steele [facility] Number: [id number]    Therapy Diagnosis:        Encounter Diagnoses   Name Primary?    Decreased strength of lower extremity Yes    Decreased ROM of lumbar spine      Decreased functional activity tolerance        Physician: Yanci Sierra PA-C     Visit Date: 12/20/2022     Physician Orders: PT Eval and Treat   Medical Diagnosis from Referral: M54.42,M54.41,G89.29 (ICD-10-CM) - Chronic bilateral low back pain with bilateral sciatica  M43.16 (ICD-10-CM) - Spondylolisthesis of lumbar region  Evaluation Date: 9/14/2022  Authorization Period Expiration: 12/31/22  Plan of Care Expiration: 1/18/23   Progress Note Due: 12/18/22  Visit # / Visits authorized: 16 / 20 auth (1/1 eval)(re-assessed on 10/12/22)(re-assessed on 11/18/22)  FOTO: 3 / 3 (9/14/22 - IE, 10/12/22, 11/18/22)     Precautions: Anxiety, Asthma, HTN, Type 2 Diabetes, Class 3 Obesity      PTA Visit #: 0 / 5     Date of Last visit: 12/20/22  Total Visits Received: 17    ASSESSMENT      This patient has not attended therapy since 12/20/22. This patient is now discharged from skilled outpatient therapy.    Discharge reason: Patient has not attended therapy since 12/20/22    Discharge FOTO Score: N/A    Goals:   - Patient will demonstrate improved ability to ambulate at least 10 minutes with minimal to no increase in symptoms for improved physical activity. (Not met, pt stopped attending therapy)  - Patient will be able to perform a squat with proper mechanics, no signs of knee valgus, and minimal to no increase in symptoms for increased ability to perform household and work related tasks. (MET: 11/18/22)  - Patient will be able to stand for at least 10 minutes with minimal to no increase in symptoms for increased ability to perform ADL's. (Not met, pt stopped attending therapy)  - Patient will demonstrate improved hip abduction strength, by at least 1/2  grade via MMT for improved functional mobility overall. (Not met, pt stopped attending therapy)     Long Term Goals: 8 weeks   - Patient will demonstrate improved ability to ambulate at least 15 minutes with minimal to no increase in symptoms for improved physical activity. (Not met, pt stopped attending therapy)  - Patient will be able to stand for at least 15 minutes with minimal to no increase in symptoms for increased ability to perform ADL's. (Not met, pt stopped attending therapy)  - Patient will demonstrate improved hip extension strength, by at least 1/2 grade via MMT for improved functional mobility overall. (MET: 10/12/22)  - Patient will demonstrate independence with Home Exercise Program for continued improvement outside the clinical setting. (MET: 11/18/22)    PLAN     This patient is discharged from skilled outpatient  Physical Therapy services.    Dilia Green, PT, DPT, Cert. DN

## 2023-01-27 ENCOUNTER — TELEPHONE (OUTPATIENT)
Dept: RHEUMATOLOGY | Facility: CLINIC | Age: 38
End: 2023-01-27
Payer: COMMERCIAL

## 2023-01-30 ENCOUNTER — PATIENT MESSAGE (OUTPATIENT)
Dept: RADIOLOGY | Facility: HOSPITAL | Age: 38
End: 2023-01-30
Payer: COMMERCIAL

## 2023-01-31 ENCOUNTER — HOSPITAL ENCOUNTER (OUTPATIENT)
Dept: RADIOLOGY | Facility: HOSPITAL | Age: 38
Discharge: HOME | End: 2023-01-31
Attending: INTERNAL MEDICINE
Payer: COMMERCIAL

## 2023-01-31 DIAGNOSIS — M47.819 SPONDYLOARTHRITIS: ICD-10-CM

## 2023-01-31 PROCEDURE — 72197 MRI PELVIS W/O & W/DYE: CPT | Mod: TC,PO

## 2023-01-31 PROCEDURE — 72197 MRI SACROILIAC JOINTS W W/O CONTRAST: ICD-10-PCS | Mod: 26,,, | Performed by: RADIOLOGY

## 2023-01-31 PROCEDURE — 25500020 PHARM REV CODE 255: Mod: PO | Performed by: INTERNAL MEDICINE

## 2023-01-31 PROCEDURE — A9585 GADOBUTROL INJECTION: HCPCS | Mod: PO | Performed by: INTERNAL MEDICINE

## 2023-01-31 PROCEDURE — 72197 MRI PELVIS W/O & W/DYE: CPT | Mod: 26,,, | Performed by: RADIOLOGY

## 2023-01-31 RX ORDER — GADOBUTROL 604.72 MG/ML
10 INJECTION INTRAVENOUS
Status: COMPLETED | OUTPATIENT
Start: 2023-01-31 | End: 2023-01-31

## 2023-01-31 RX ADMIN — GADOBUTROL 10 ML: 604.72 INJECTION INTRAVENOUS at 09:01

## 2023-02-02 ENCOUNTER — CLINICAL SUPPORT (OUTPATIENT)
Dept: ALLERGY | Facility: CLINIC | Age: 38
End: 2023-02-02
Payer: COMMERCIAL

## 2023-02-02 DIAGNOSIS — J45.40 MODERATE PERSISTENT ASTHMA WITHOUT COMPLICATION: Primary | ICD-10-CM

## 2023-02-02 PROCEDURE — 99999 PR PBB SHADOW E&M-EST. PATIENT-LVL III: ICD-10-PCS | Mod: PBBFAC,,,

## 2023-02-02 PROCEDURE — 96372 THER/PROPH/DIAG INJ SC/IM: CPT | Mod: S$GLB,,, | Performed by: ALLERGY & IMMUNOLOGY

## 2023-02-02 PROCEDURE — 99999 PR PBB SHADOW E&M-EST. PATIENT-LVL III: CPT | Mod: PBBFAC,,,

## 2023-02-02 PROCEDURE — 96372 PR INJECTION,THERAP/PROPH/DIAG2ST, IM OR SUBCUT: ICD-10-PCS | Mod: S$GLB,,, | Performed by: ALLERGY & IMMUNOLOGY

## 2023-02-02 NOTE — PROGRESS NOTES
Xolair administered. Patient waited in clinic 30 min for observation. Pt had epi pen on hand.  No reaction noted.

## 2023-02-03 ENCOUNTER — LAB VISIT (OUTPATIENT)
Dept: LAB | Facility: HOSPITAL | Age: 38
End: 2023-02-03
Attending: INTERNAL MEDICINE
Payer: COMMERCIAL

## 2023-02-03 ENCOUNTER — CLINICAL SUPPORT (OUTPATIENT)
Dept: FAMILY MEDICINE | Facility: CLINIC | Age: 38
End: 2023-02-03
Payer: COMMERCIAL

## 2023-02-03 ENCOUNTER — OFFICE VISIT (OUTPATIENT)
Dept: FAMILY MEDICINE | Facility: CLINIC | Age: 38
End: 2023-02-03
Payer: COMMERCIAL

## 2023-02-03 ENCOUNTER — OFFICE VISIT (OUTPATIENT)
Dept: DERMATOLOGY | Facility: CLINIC | Age: 38
End: 2023-02-03
Payer: COMMERCIAL

## 2023-02-03 ENCOUNTER — OFFICE VISIT (OUTPATIENT)
Dept: PULMONOLOGY | Facility: CLINIC | Age: 38
End: 2023-02-03
Payer: COMMERCIAL

## 2023-02-03 VITALS
RESPIRATION RATE: 16 BRPM | WEIGHT: 293 LBS | TEMPERATURE: 98 F | OXYGEN SATURATION: 99 % | HEIGHT: 64 IN | DIASTOLIC BLOOD PRESSURE: 80 MMHG | SYSTOLIC BLOOD PRESSURE: 122 MMHG | HEART RATE: 88 BPM | BODY MASS INDEX: 50.02 KG/M2

## 2023-02-03 VITALS
SYSTOLIC BLOOD PRESSURE: 130 MMHG | BODY MASS INDEX: 50.02 KG/M2 | HEIGHT: 64 IN | HEART RATE: 70 BPM | RESPIRATION RATE: 16 BRPM | WEIGHT: 293 LBS | DIASTOLIC BLOOD PRESSURE: 80 MMHG | OXYGEN SATURATION: 99 %

## 2023-02-03 DIAGNOSIS — Z00.00 WELL ADULT EXAM: ICD-10-CM

## 2023-02-03 DIAGNOSIS — M47.819 SPONDYLOARTHRITIS: ICD-10-CM

## 2023-02-03 DIAGNOSIS — L73.2 HIDRADENITIS SUPPURATIVA: ICD-10-CM

## 2023-02-03 DIAGNOSIS — E53.8 B12 DEFICIENCY: Primary | ICD-10-CM

## 2023-02-03 DIAGNOSIS — E66.01 MORBID OBESITY WITH BMI OF 50.0-59.9, ADULT: ICD-10-CM

## 2023-02-03 DIAGNOSIS — E11.59 HYPERTENSION ASSOCIATED WITH DIABETES: Chronic | ICD-10-CM

## 2023-02-03 DIAGNOSIS — R76.8 POSITIVE ANA (ANTINUCLEAR ANTIBODY): ICD-10-CM

## 2023-02-03 DIAGNOSIS — E11.65 TYPE 2 DIABETES MELLITUS WITH HYPERGLYCEMIA, WITHOUT LONG-TERM CURRENT USE OF INSULIN: ICD-10-CM

## 2023-02-03 DIAGNOSIS — I15.2 HYPERTENSION ASSOCIATED WITH DIABETES: Chronic | ICD-10-CM

## 2023-02-03 DIAGNOSIS — E66.2 CLASS 3 OBESITY WITH ALVEOLAR HYPOVENTILATION, SERIOUS COMORBIDITY, AND BODY MASS INDEX (BMI) OF 50.0 TO 59.9 IN ADULT: Chronic | ICD-10-CM

## 2023-02-03 DIAGNOSIS — L70.0 ACNE VULGARIS: ICD-10-CM

## 2023-02-03 DIAGNOSIS — Z00.00 WELL ADULT EXAM: Primary | ICD-10-CM

## 2023-02-03 DIAGNOSIS — K59.00 CONSTIPATION, UNSPECIFIED CONSTIPATION TYPE: ICD-10-CM

## 2023-02-03 DIAGNOSIS — Z79.899 ENCOUNTER FOR LONG-TERM (CURRENT) USE OF MEDICATIONS: ICD-10-CM

## 2023-02-03 DIAGNOSIS — L02.91 ABSCESS: ICD-10-CM

## 2023-02-03 DIAGNOSIS — N30.00 ACUTE CYSTITIS WITHOUT HEMATURIA: Primary | ICD-10-CM

## 2023-02-03 DIAGNOSIS — L21.9 SEBORRHEIC DERMATITIS: Primary | ICD-10-CM

## 2023-02-03 DIAGNOSIS — J45.40 MODERATE PERSISTENT ASTHMA WITHOUT COMPLICATION: Primary | ICD-10-CM

## 2023-02-03 LAB
BACTERIA #/AREA URNS HPF: ABNORMAL /HPF
BASOPHILS # BLD AUTO: 0.05 K/UL (ref 0–0.2)
BASOPHILS NFR BLD: 0.5 % (ref 0–1.9)
BILIRUB UR QL STRIP: NEGATIVE
CLARITY UR: ABNORMAL
COLOR UR: ABNORMAL
DIFFERENTIAL METHOD: ABNORMAL
EOSINOPHIL # BLD AUTO: 0.2 K/UL (ref 0–0.5)
EOSINOPHIL NFR BLD: 1.7 % (ref 0–8)
ERYTHROCYTE [DISTWIDTH] IN BLOOD BY AUTOMATED COUNT: 16.6 % (ref 11.5–14.5)
ERYTHROCYTE [DISTWIDTH] IN BLOOD BY AUTOMATED COUNT: 16.6 % (ref 11.5–14.5)
GLUCOSE UR QL STRIP: NEGATIVE
HCT VFR BLD AUTO: 35.1 % (ref 37–48.5)
HCT VFR BLD AUTO: 35.1 % (ref 37–48.5)
HGB BLD-MCNC: 10.7 G/DL (ref 12–16)
HGB BLD-MCNC: 10.7 G/DL (ref 12–16)
HGB UR QL STRIP: ABNORMAL
HYALINE CASTS #/AREA URNS LPF: 0 /LPF
IMM GRANULOCYTES # BLD AUTO: 0.01 K/UL (ref 0–0.04)
IMM GRANULOCYTES NFR BLD AUTO: 0.1 % (ref 0–0.5)
KETONES UR QL STRIP: NEGATIVE
LEUKOCYTE ESTERASE UR QL STRIP: ABNORMAL
LYMPHOCYTES # BLD AUTO: 4 K/UL (ref 1–4.8)
LYMPHOCYTES NFR BLD: 42.6 % (ref 18–48)
MCH RBC QN AUTO: 22.7 PG (ref 27–31)
MCH RBC QN AUTO: 22.7 PG (ref 27–31)
MCHC RBC AUTO-ENTMCNC: 30.5 G/DL (ref 32–36)
MCHC RBC AUTO-ENTMCNC: 30.5 G/DL (ref 32–36)
MCV RBC AUTO: 75 FL (ref 82–98)
MCV RBC AUTO: 75 FL (ref 82–98)
MICROSCOPIC COMMENT: ABNORMAL
MONOCYTES # BLD AUTO: 0.6 K/UL (ref 0.3–1)
MONOCYTES NFR BLD: 6.3 % (ref 4–15)
NEUTROPHILS # BLD AUTO: 4.6 K/UL (ref 1.8–7.7)
NEUTROPHILS NFR BLD: 48.9 % (ref 38–73)
NITRITE UR QL STRIP: NEGATIVE
NRBC BLD-RTO: 0 /100 WBC
PH UR STRIP: 7 [PH] (ref 5–8)
PLATELET # BLD AUTO: 334 K/UL (ref 150–450)
PLATELET # BLD AUTO: 334 K/UL (ref 150–450)
PMV BLD AUTO: 9.5 FL (ref 9.2–12.9)
PMV BLD AUTO: 9.5 FL (ref 9.2–12.9)
PROT UR QL STRIP: ABNORMAL
RBC # BLD AUTO: 4.71 M/UL (ref 4–5.4)
RBC # BLD AUTO: 4.71 M/UL (ref 4–5.4)
RBC #/AREA URNS HPF: >100 /HPF (ref 0–4)
SP GR UR STRIP: 1.02 (ref 1–1.03)
SQUAMOUS #/AREA URNS HPF: 4 /HPF
URN SPEC COLLECT METH UR: ABNORMAL
WBC # BLD AUTO: 9.39 K/UL (ref 3.9–12.7)
WBC # BLD AUTO: 9.39 K/UL (ref 3.9–12.7)
WBC #/AREA URNS HPF: 10 /HPF (ref 0–5)

## 2023-02-03 PROCEDURE — 3008F PR BODY MASS INDEX (BMI) DOCUMENTED: ICD-10-PCS | Mod: CPTII,S$GLB,, | Performed by: FAMILY MEDICINE

## 2023-02-03 PROCEDURE — 3079F PR MOST RECENT DIASTOLIC BLOOD PRESSURE 80-89 MM HG: ICD-10-PCS | Mod: CPTII,S$GLB,, | Performed by: PHYSICIAN ASSISTANT

## 2023-02-03 PROCEDURE — 86146 BETA-2 GLYCOPROTEIN ANTIBODY: CPT | Performed by: INTERNAL MEDICINE

## 2023-02-03 PROCEDURE — 81374 HLA I TYPING 1 ANTIGEN LR: CPT | Performed by: INTERNAL MEDICINE

## 2023-02-03 PROCEDURE — 99214 PR OFFICE/OUTPT VISIT, EST, LEVL IV, 30-39 MIN: ICD-10-PCS | Mod: S$GLB,,, | Performed by: PHYSICIAN ASSISTANT

## 2023-02-03 PROCEDURE — 3075F SYST BP GE 130 - 139MM HG: CPT | Mod: CPTII,S$GLB,, | Performed by: PHYSICIAN ASSISTANT

## 2023-02-03 PROCEDURE — 99999 PR PBB SHADOW E&M-EST. PATIENT-LVL V: ICD-10-PCS | Mod: PBBFAC,,, | Performed by: FAMILY MEDICINE

## 2023-02-03 PROCEDURE — 99999 PR PBB SHADOW E&M-EST. PATIENT-LVL V: CPT | Mod: PBBFAC,,, | Performed by: STUDENT IN AN ORGANIZED HEALTH CARE EDUCATION/TRAINING PROGRAM

## 2023-02-03 PROCEDURE — 3072F LOW RISK FOR RETINOPATHY: CPT | Mod: CPTII,S$GLB,, | Performed by: STUDENT IN AN ORGANIZED HEALTH CARE EDUCATION/TRAINING PROGRAM

## 2023-02-03 PROCEDURE — 1159F PR MEDICATION LIST DOCUMENTED IN MEDICAL RECORD: ICD-10-PCS | Mod: CPTII,S$GLB,, | Performed by: FAMILY MEDICINE

## 2023-02-03 PROCEDURE — 1160F PR REVIEW ALL MEDS BY PRESCRIBER/CLIN PHARMACIST DOCUMENTED: ICD-10-PCS | Mod: CPTII,S$GLB,, | Performed by: FAMILY MEDICINE

## 2023-02-03 PROCEDURE — 3008F BODY MASS INDEX DOCD: CPT | Mod: CPTII,S$GLB,, | Performed by: PHYSICIAN ASSISTANT

## 2023-02-03 PROCEDURE — 1160F RVW MEDS BY RX/DR IN RCRD: CPT | Mod: CPTII,S$GLB,, | Performed by: PHYSICIAN ASSISTANT

## 2023-02-03 PROCEDURE — 86147 CARDIOLIPIN ANTIBODY EA IG: CPT | Mod: 59 | Performed by: INTERNAL MEDICINE

## 2023-02-03 PROCEDURE — 86225 DNA ANTIBODY NATIVE: CPT | Performed by: INTERNAL MEDICINE

## 2023-02-03 PROCEDURE — 36415 COLL VENOUS BLD VENIPUNCTURE: CPT | Mod: PO | Performed by: INTERNAL MEDICINE

## 2023-02-03 PROCEDURE — 3072F PR LOW RISK FOR RETINOPATHY: ICD-10-PCS | Mod: CPTII,S$GLB,, | Performed by: FAMILY MEDICINE

## 2023-02-03 PROCEDURE — 10060 I&D ABSCESS SIMPLE/SINGLE: CPT | Mod: S$GLB,,, | Performed by: STUDENT IN AN ORGANIZED HEALTH CARE EDUCATION/TRAINING PROGRAM

## 2023-02-03 PROCEDURE — 99214 OFFICE O/P EST MOD 30 MIN: CPT | Mod: S$GLB,,, | Performed by: PHYSICIAN ASSISTANT

## 2023-02-03 PROCEDURE — 99395 PR PREVENTIVE VISIT,EST,18-39: ICD-10-PCS | Mod: 25,S$GLB,, | Performed by: FAMILY MEDICINE

## 2023-02-03 PROCEDURE — 99999 PR PBB SHADOW E&M-EST. PATIENT-LVL III: ICD-10-PCS | Mod: PBBFAC,,,

## 2023-02-03 PROCEDURE — 1159F PR MEDICATION LIST DOCUMENTED IN MEDICAL RECORD: ICD-10-PCS | Mod: CPTII,S$GLB,, | Performed by: PHYSICIAN ASSISTANT

## 2023-02-03 PROCEDURE — 1160F RVW MEDS BY RX/DR IN RCRD: CPT | Mod: CPTII,S$GLB,, | Performed by: FAMILY MEDICINE

## 2023-02-03 PROCEDURE — 87077 CULTURE AEROBIC IDENTIFY: CPT | Mod: 59 | Performed by: STUDENT IN AN ORGANIZED HEALTH CARE EDUCATION/TRAINING PROGRAM

## 2023-02-03 PROCEDURE — 99214 OFFICE O/P EST MOD 30 MIN: CPT | Mod: 25,S$GLB,, | Performed by: STUDENT IN AN ORGANIZED HEALTH CARE EDUCATION/TRAINING PROGRAM

## 2023-02-03 PROCEDURE — 99214 PR OFFICE/OUTPT VISIT, EST, LEVL IV, 30-39 MIN: ICD-10-PCS | Mod: 25,S$GLB,, | Performed by: STUDENT IN AN ORGANIZED HEALTH CARE EDUCATION/TRAINING PROGRAM

## 2023-02-03 PROCEDURE — 83036 HEMOGLOBIN GLYCOSYLATED A1C: CPT | Performed by: FAMILY MEDICINE

## 2023-02-03 PROCEDURE — 1160F PR REVIEW ALL MEDS BY PRESCRIBER/CLIN PHARMACIST DOCUMENTED: ICD-10-PCS | Mod: CPTII,S$GLB,, | Performed by: PHYSICIAN ASSISTANT

## 2023-02-03 PROCEDURE — 80053 COMPREHEN METABOLIC PANEL: CPT | Performed by: FAMILY MEDICINE

## 2023-02-03 PROCEDURE — 3074F PR MOST RECENT SYSTOLIC BLOOD PRESSURE < 130 MM HG: ICD-10-PCS | Mod: CPTII,S$GLB,, | Performed by: FAMILY MEDICINE

## 2023-02-03 PROCEDURE — 3079F PR MOST RECENT DIASTOLIC BLOOD PRESSURE 80-89 MM HG: ICD-10-PCS | Mod: CPTII,S$GLB,, | Performed by: FAMILY MEDICINE

## 2023-02-03 PROCEDURE — 99999 PR PBB SHADOW E&M-EST. PATIENT-LVL V: CPT | Mod: PBBFAC,,, | Performed by: PHYSICIAN ASSISTANT

## 2023-02-03 PROCEDURE — 96372 PR INJECTION,THERAP/PROPH/DIAG2ST, IM OR SUBCUT: ICD-10-PCS | Mod: S$GLB,,, | Performed by: FAMILY MEDICINE

## 2023-02-03 PROCEDURE — 3072F PR LOW RISK FOR RETINOPATHY: ICD-10-PCS | Mod: CPTII,S$GLB,, | Performed by: PHYSICIAN ASSISTANT

## 2023-02-03 PROCEDURE — 99999 PR PBB SHADOW E&M-EST. PATIENT-LVL V: CPT | Mod: PBBFAC,,, | Performed by: FAMILY MEDICINE

## 2023-02-03 PROCEDURE — 1159F MED LIST DOCD IN RCRD: CPT | Mod: CPTII,S$GLB,, | Performed by: PHYSICIAN ASSISTANT

## 2023-02-03 PROCEDURE — 87070 CULTURE OTHR SPECIMN AEROBIC: CPT | Performed by: STUDENT IN AN ORGANIZED HEALTH CARE EDUCATION/TRAINING PROGRAM

## 2023-02-03 PROCEDURE — 1159F MED LIST DOCD IN RCRD: CPT | Mod: CPTII,S$GLB,, | Performed by: FAMILY MEDICINE

## 2023-02-03 PROCEDURE — 3074F SYST BP LT 130 MM HG: CPT | Mod: CPTII,S$GLB,, | Performed by: FAMILY MEDICINE

## 2023-02-03 PROCEDURE — 86431 RHEUMATOID FACTOR QUANT: CPT | Performed by: INTERNAL MEDICINE

## 2023-02-03 PROCEDURE — 84443 ASSAY THYROID STIM HORMONE: CPT | Performed by: FAMILY MEDICINE

## 2023-02-03 PROCEDURE — 3075F PR MOST RECENT SYSTOLIC BLOOD PRESS GE 130-139MM HG: ICD-10-PCS | Mod: CPTII,S$GLB,, | Performed by: PHYSICIAN ASSISTANT

## 2023-02-03 PROCEDURE — 85025 COMPLETE CBC W/AUTO DIFF WBC: CPT | Mod: PO | Performed by: INTERNAL MEDICINE

## 2023-02-03 PROCEDURE — 99999 PR PBB SHADOW E&M-EST. PATIENT-LVL V: ICD-10-PCS | Mod: PBBFAC,,, | Performed by: PHYSICIAN ASSISTANT

## 2023-02-03 PROCEDURE — 86160 COMPLEMENT ANTIGEN: CPT | Mod: 59 | Performed by: INTERNAL MEDICINE

## 2023-02-03 PROCEDURE — 10060 PR DRAIN SKIN ABSCESS SIMPLE: ICD-10-PCS | Mod: S$GLB,,, | Performed by: STUDENT IN AN ORGANIZED HEALTH CARE EDUCATION/TRAINING PROGRAM

## 2023-02-03 PROCEDURE — 96372 THER/PROPH/DIAG INJ SC/IM: CPT | Mod: S$GLB,,, | Performed by: FAMILY MEDICINE

## 2023-02-03 PROCEDURE — 3008F PR BODY MASS INDEX (BMI) DOCUMENTED: ICD-10-PCS | Mod: CPTII,S$GLB,, | Performed by: PHYSICIAN ASSISTANT

## 2023-02-03 PROCEDURE — 87186 SC STD MICRODIL/AGAR DIL: CPT | Mod: 59 | Performed by: STUDENT IN AN ORGANIZED HEALTH CARE EDUCATION/TRAINING PROGRAM

## 2023-02-03 PROCEDURE — 3079F DIAST BP 80-89 MM HG: CPT | Mod: CPTII,S$GLB,, | Performed by: FAMILY MEDICINE

## 2023-02-03 PROCEDURE — 99999 PR PBB SHADOW E&M-EST. PATIENT-LVL III: CPT | Mod: PBBFAC,,,

## 2023-02-03 PROCEDURE — 3072F PR LOW RISK FOR RETINOPATHY: ICD-10-PCS | Mod: CPTII,S$GLB,, | Performed by: STUDENT IN AN ORGANIZED HEALTH CARE EDUCATION/TRAINING PROGRAM

## 2023-02-03 PROCEDURE — 85730 THROMBOPLASTIN TIME PARTIAL: CPT | Performed by: INTERNAL MEDICINE

## 2023-02-03 PROCEDURE — 3079F DIAST BP 80-89 MM HG: CPT | Mod: CPTII,S$GLB,, | Performed by: PHYSICIAN ASSISTANT

## 2023-02-03 PROCEDURE — 3072F LOW RISK FOR RETINOPATHY: CPT | Mod: CPTII,S$GLB,, | Performed by: PHYSICIAN ASSISTANT

## 2023-02-03 PROCEDURE — 85613 RUSSELL VIPER VENOM DILUTED: CPT | Performed by: INTERNAL MEDICINE

## 2023-02-03 PROCEDURE — 99999 PR PBB SHADOW E&M-EST. PATIENT-LVL V: ICD-10-PCS | Mod: PBBFAC,,, | Performed by: STUDENT IN AN ORGANIZED HEALTH CARE EDUCATION/TRAINING PROGRAM

## 2023-02-03 PROCEDURE — 86160 COMPLEMENT ANTIGEN: CPT | Performed by: INTERNAL MEDICINE

## 2023-02-03 PROCEDURE — 82570 ASSAY OF URINE CREATININE: CPT | Performed by: FAMILY MEDICINE

## 2023-02-03 PROCEDURE — 81000 URINALYSIS NONAUTO W/SCOPE: CPT | Mod: PO | Performed by: INTERNAL MEDICINE

## 2023-02-03 PROCEDURE — 82570 ASSAY OF URINE CREATININE: CPT | Mod: 91 | Performed by: INTERNAL MEDICINE

## 2023-02-03 PROCEDURE — 3008F BODY MASS INDEX DOCD: CPT | Mod: CPTII,S$GLB,, | Performed by: FAMILY MEDICINE

## 2023-02-03 PROCEDURE — 3072F LOW RISK FOR RETINOPATHY: CPT | Mod: CPTII,S$GLB,, | Performed by: FAMILY MEDICINE

## 2023-02-03 PROCEDURE — 99395 PREV VISIT EST AGE 18-39: CPT | Mod: 25,S$GLB,, | Performed by: FAMILY MEDICINE

## 2023-02-03 RX ORDER — KETOCONAZOLE 20 MG/ML
SHAMPOO, SUSPENSION TOPICAL WEEKLY
Qty: 120 ML | Refills: 5 | Status: SHIPPED | OUTPATIENT
Start: 2023-02-03 | End: 2023-03-23 | Stop reason: SDUPTHER

## 2023-02-03 RX ORDER — DOXYCYCLINE HYCLATE 100 MG
100 TABLET ORAL 2 TIMES DAILY
Qty: 30 TABLET | Refills: 2 | Status: SHIPPED | OUTPATIENT
Start: 2023-02-03 | End: 2024-03-27

## 2023-02-03 RX ORDER — FLUOCINONIDE TOPICAL SOLUTION USP, 0.05% 0.5 MG/ML
SOLUTION TOPICAL 2 TIMES DAILY
Qty: 60 ML | Refills: 5 | Status: SHIPPED | OUTPATIENT
Start: 2023-02-03

## 2023-02-03 RX ORDER — NITROFURANTOIN 25; 75 MG/1; MG/1
100 CAPSULE ORAL 2 TIMES DAILY
Qty: 10 CAPSULE | Refills: 0 | Status: SHIPPED | OUTPATIENT
Start: 2023-02-03 | End: 2023-02-07 | Stop reason: ALTCHOICE

## 2023-02-03 RX ORDER — TRETINOIN 0.5 MG/G
CREAM TOPICAL NIGHTLY
Qty: 45 G | Refills: 5 | Status: SHIPPED | OUTPATIENT
Start: 2023-02-03 | End: 2023-09-04 | Stop reason: SDUPTHER

## 2023-02-03 RX ADMIN — CYANOCOBALAMIN 1000 MCG: 1000 INJECTION, SOLUTION INTRAMUSCULAR; SUBCUTANEOUS at 02:02

## 2023-02-03 NOTE — PROGRESS NOTES
This note is specifically for wellness visit performed today.   WELLNESS EXAM      Patient ID: Lucía Coreas is a 37 y.o. female with  has a past medical history of Allergy, Amenorrhea, Asthma, Diabetes, GERD (gastroesophageal reflux disease), Infertility associated with anovulation, Iron deficiency anemia, Knee pain, Metabolic syndrome, PCO (polycystic ovaries), Recurrent boils, and Status post repeat low transverse  section (2020).   Chief Complaint:  Encounter for wellness exam    Well Adult Physical: Patient here for a comprehensive physical exam.The patient reports Chronic problems.  The patient's last visit with me was on 2022.    2023: Patient saw Pulmonary and Dermatology today.  Patient has a active ball that is being treated.  Patient has an appointment with Psychiatry soon.  Patient reports she continues to gain weight on MOUNJARO.  She does report increase constipation on this medication.  She is been using stool softener with no relief.  BMI Readings from Last 10 Encounters:   23 53.94 kg/m²   23 54.13 kg/m²   23 54.00 kg/m²   22 54.25 kg/m²   22 54.78 kg/m²   10/13/22 55.40 kg/m²   10/06/22 56.76 kg/m²   22 56.57 kg/m²   22 56.10 kg/m²   22 55.91 kg/m²     Diabetes Management Status    Statin: Not taking  ACE/ARB: Not taking    Screening or Prevention Patient's value Goal Complete/Controlled?   HgA1C Testing and Control   Lab Results   Component Value Date    HGBA1C 5.6 2022      Annually/Less than 8% Yes     Lipid profile : 2022 Annually Yes     LDL control Lab Results   Component Value Date    LDLCALC 126.0 2022    Annually/Less than 100 mg/dl  No     Nephropathy screening Lab Results   Component Value Date    LABMICR 8.0 10/21/2021     Lab Results   Component Value Date    PROTEINUA Negative 2022     Lab Results   Component Value Date    UTPCR Unable to calculate 2022      Annually Yes      Blood pressure BP Readings from Last 1 Encounters:   02/03/23 122/80    Less than 140/90 Yes     Dilated retinal exam : 11/07/2022 Annually Yes     Foot exam   Most Recent Foot Exam Date: Not Found Annually No           Reviewed Care team:  Ozempic PA good 6/20/2022-6/20/2023  [facility] Sandifer, April A, MD Robles, Sheri Carter, NP  MFM Kristi, Marifer, CNM   [facility] [place]  [facility]  Infectious disease Dr. Dorys Mercedes  Gen surg Nick Jackson MD    August 2022:  Patient reports chronic low back pain that is not improving.  Patient tried anti-inflammatory, steroids, muscle relaxers and has tried physical therapy.  No improvement.  She has been to physical therapy for her lower back.  MRI Sacroiliac Joint W W/O Contrast  Narrative: EXAMINATION:  MRI SACROILIAC JOINTS W W/O CONTRAST    CLINICAL HISTORY:  rule out non radiographic spondyloarthritis;  Spondylosis without myelopathy or radiculopathy, site unspecified    TECHNIQUE:  Multiplanar, multisequence MR imaging of the sacroiliac joints was performed before and after intravenous administration of Gadavist 10 cc    COMPARISON:  None    FINDINGS:  The sacroiliac joints are well maintained.  There are no erosive or sclerotic changes.  There is no periarticular enhancement.  No joint effusion.  There are no significant degenerative changes.  Impression: Normal appearance of the sacroiliac joints.    Electronically signed by: Prince Correa MD  Date:    01/31/2023  Time:    10:14        CHRONIC. STABLE. BP Reviewed.  Compliant with BP medications. No SE reported.   (-) CP, SOB, palpitations, dizziness, lightheadedness, HA, arm numbness, tingling or weakness, syncope.  Creatinine   Date Value Ref Range Status   01/31/2023 0.7 0.5 - 1.4 mg/dL Final     CHRONIC. STABLE. Lab analysis reviewed.   (-) CP, SOB, abdominal pain, N/V/D, constipation, jaundice, skin changes.  (-) Myalgias  Lab Results   Component Value Date    CHOL 197 08/30/2022     CHOL 182 01/20/2022    CHOL 179 10/21/2021     Lab Results   Component Value Date    HDL 56 08/30/2022    HDL 57 01/20/2022    HDL 58 10/21/2021     Lab Results   Component Value Date    LDLCALC 126.0 08/30/2022    LDLCALC 111.8 01/20/2022    LDLCALC 104.0 10/21/2021     Lab Results   Component Value Date    TRIG 75 08/30/2022    TRIG 66 01/20/2022    TRIG 85 10/21/2021     Lab Results   Component Value Date    CHOLHDL 28.4 08/30/2022    CHOLHDL 31.3 01/20/2022    CHOLHDL 32.4 10/21/2021     Lab Results   Component Value Date    TOTALCHOLEST 3.5 08/30/2022    TOTALCHOLEST 3.2 01/20/2022    TOTALCHOLEST 3.1 10/21/2021     Lab Results   Component Value Date    ALT 21 08/30/2022    AST 16 08/30/2022    ALKPHOS 84 08/30/2022    BILITOT 0.2 08/30/2022     ======================================================  BMI Readings from Last 10 Encounters:   02/03/23 53.94 kg/m²   02/03/23 54.13 kg/m²   01/19/23 54.00 kg/m²   12/28/22 54.25 kg/m²   11/07/22 54.78 kg/m²   10/13/22 55.40 kg/m²   10/06/22 56.76 kg/m²   09/21/22 56.57 kg/m²   09/13/22 56.10 kg/m²   08/30/22 55.91 kg/m²         Do you take any herbs or supplements that were not prescribed by a doctor? no   Are you taking calcium supplements? no   Lab Results   Component Value Date    WBC 7.78 08/30/2022    HGB 12.5 08/30/2022    HCT 40.8 08/30/2022    MCV 74 (L) 08/30/2022     08/30/2022       Lab Results   Component Value Date    IRON 47 06/10/2022    TIBC 376 06/10/2022    FERRITIN 13 (L) 06/10/2022     Lab Results   Component Value Date    ZXWNNOUX01 >2000 (H) 08/30/2022     Lab Results   Component Value Date    FOLATE 10.1 06/10/2022        History:   LMP: No LMP recorded.   MMG:   ovarian cancer in sister.  No family historyof breast cancer  Discussed with OBGYN  PAP: 9/27/2022 follow-up with OBGYN    Colon cancer screening:   Not indicated  no family history of colon cancer    Health Maintenance Topics with due status: Not Due       Topic Last  Completion Date    TETANUS VACCINE 2021    Lipid Panel 2022    Cervical Cancer Screening 2022    Hemoglobin A1c 10/29/2022    Eye Exam 2022      ==============================================  History reviewed.   Health Maintenance Due   Topic Date Due    Foot Exam  Never done    COVID-19 Vaccine (4 - Booster for Pfizer series) 2022    Diabetes Urine Screening  10/21/2022       Past Medical History:  Past Medical History:   Diagnosis Date    Allergy     Amenorrhea     Asthma     Diabetes     GERD (gastroesophageal reflux disease)     Infertility associated with anovulation     Iron deficiency anemia     Knee pain     Metabolic syndrome     PCO (polycystic ovaries)     Recurrent boils     Status post repeat low transverse  section 2020    Formatting of this note might be different from the original. With BTL 21     Past Surgical History:   Procedure Laterality Date    BTL      CARPAL TUNNEL RELEASE Left 2019    CARPAL TUNNEL RELEASE Right 2020    Procedure: RELEASE, CARPAL TUNNEL;  Surgeon: Adeel Laughlin MD;  Location: [facility];  Service: Orthopedics;  Laterality: Right;     SECTION      X2    EXPLORATORY LAPAROTOMY      1 week postop with WOUND VAK, reopened uterus    WOUND VAK      3 months post-op Section, 2 weeks in hospital     Review of patient's allergies indicates:   Allergen Reactions    Metformin Diarrhea    Sulfa (sulfonamide antibiotics) Anaphylaxis and Swelling     Swelling (eyes)^, Swelling (throat)^  Swelling (eyes)^, Swelling (throat)^      Diclofenac      Gastritis     Latex, natural rubber      Contact dermatitis    Other reaction(s): Other (See Comments)  Contact dermatitis    Shellfish containing products      anaphylaxis     Current Outpatient Medications on File Prior to Visit   Medication Sig Dispense Refill    albuterol (PROVENTIL) 2.5 mg /3 mL (0.083 %) nebulizer solution Take 3 mLs (2.5 mg total) by nebulization every  4 to 6 hours as needed for Wheezing. 60 each 3    albuterol (PROVENTIL/VENTOLIN HFA) 90 mcg/actuation inhaler Inhale 1-2 puffs into the lungs every 4 (four) hours as needed for Wheezing. 12 g 1    azelastine (ASTELIN) 137 mcg (0.1 %) nasal spray SMARTSIG:Both Nares      cholecalciferol, vitamin D3, (VITAMIN D3) 25 mcg (1,000 unit) capsule Take 2 capsules (2,000 Units total) by mouth once daily. 90 capsule 4    doxycycline (VIBRA-TABS) 100 MG tablet Take 1 tablet (100 mg total) by mouth 2 (two) times daily. Take for 5 days prn flares 30 tablet 2    EPINEPHrine (EPIPEN) 0.3 mg/0.3 mL AtIn Inject 0.3 mLs (0.3 mg total) into the muscle once. for 1 dose 2 each 11    fluocinonide (LIDEX) 0.05 % external solution Apply topically 2 (two) times daily. Use on scalp 60 mL 5    fluticasone furoate-vilanteroL (BREO) 200-25 mcg/dose DsDv diskus inhaler Inhale 1 puff into the lungs once daily. Controller 1 each 4    gabapentin (NEURONTIN) 300 MG capsule Take 1 capsule (300 mg total) by mouth 3 (three) times daily. 90 capsule 11    IRON 100 PLUS Tab Take 1 tablet by mouth once daily. 90 tablet 1    ketoconazole (NIZORAL) 2 % shampoo Apply topically once a week. Lather in for 5-10 min before rinsing 120 mL 5    Lactobacillus rhamnosus GG (CULTURELLE) 10 billion cell capsule Take 1 capsule by mouth once daily.      lactulose (CHRONULAC) 10 gram/15 mL solution SMARTSI Tablespoon By Mouth Daily      levocetirizine (XYZAL) 5 MG tablet Take 1 tablet (5 mg total) by mouth every evening. 30 tablet 4    loratadine (CLARITIN) 10 mg tablet Take 1 tablet (10 mg total) by mouth once daily. 90 tablet 4    methocarbamoL (ROBAXIN) 500 MG Tab Take 1 tablet (500 mg total) by mouth every evening. 30 tablet 0    montelukast (SINGULAIR) 10 mg tablet Take 1 tablet (10 mg total) by mouth once daily. 30 tablet 3    mupirocin (BACTROBAN) 2 % ointment Apply topically 3 (three) times daily.      NIFEdipine (PROCARDIA-XL) 30 MG (OSM) 24 hr tablet Take 30  "mg by mouth once daily.      NOVOFINE PLUS 32 gauge x 1/6" Ndle use as directed      nystatin (MYCOSTATIN) powder Apply topically 2 (two) times daily. 60 g 1    omalizumab (XOLAIR) 150 mg injection Inject 300 mg into the skin every 14 (fourteen) days. 2 each 11    ondansetron (ZOFRAN-ODT) 8 MG TbDL DISSOLVE ONE TABLET UNDER TONGUE EVERY 8 HOURS AS NEEDED FOR NAUSEA      pantoprazole (PROTONIX) 40 MG tablet Take 1 tablet (40 mg total) by mouth once daily. 90 tablet 4    spironolactone (ALDACTONE) 100 MG tablet Take 1 tablet (100 mg total) by mouth once daily. 90 tablet 1    tretinoin (RETIN-A) 0.05 % cream Apply topically every evening. Use on face 45 g 5    [DISCONTINUED] tirzepatide (MOUNJARO) 7.5 mg/0.5 mL PnIj Inject 7.5 mg into the skin every 7 days. 4 pen 2    [DISCONTINUED] fluticasone furoate-vilanteroL (BREO ELLIPTA) 100-25 mcg/dose diskus inhaler Inhale 1 puff into the lungs once daily. Controller 30 each 12    [DISCONTINUED] ketoconazole (NIZORAL) 2 % shampoo Apply topically twice a week. 500 mL 2    [DISCONTINUED] WIXELA INHUB 500-50 mcg/dose DsDv diskus inhaler Inhale 1 puff into the lungs 2 (two) times daily.       Current Facility-Administered Medications on File Prior to Visit   Medication Dose Route Frequency Provider Last Rate Last Admin    cyanocobalamin injection 1,000 mcg  1,000 mcg Intramuscular Q30 Days Dante Negro MD   1,000 mcg at 02/03/23 1416    omalizumab injection 150 mg  150 mg Subcutaneous Q14 Days Rossy Kramer MD   150 mg at 02/02/23 0947    omalizumab injection 150 mg  150 mg Subcutaneous Q14 Days Rossy Kramer MD   150 mg at 02/02/23 0946    omalizumab injection 300 mg  300 mg Subcutaneous Q28 Days Rossy Kramer MD   300 mg at 01/19/23 0856     Social History     Socioeconomic History    Marital status:    Tobacco Use    Smoking status: Never    Smokeless tobacco: Never   Substance and Sexual Activity    Alcohol use: Never    Drug use: Never    Sexual activity: Yes     " Partners: Male     Birth control/protection: See Surgical Hx     Family History   Problem Relation Age of Onset    Diabetes Father     Heart disease Father 69    Hypertension Father     Prostate cancer Father     Diabetes Mother     Lupus Mother     AMY disease Brother     Ovarian cancer Sister     Breast cancer Neg Hx        Review of Systems   Constitutional:  Positive for activity change, appetite change, fatigue and unexpected weight change. Negative for chills and fever.   HENT:  Negative for ear pain and sore throat.    Eyes:  Negative for redness and visual disturbance.   Respiratory:  Negative for cough and shortness of breath.    Cardiovascular:  Negative for chest pain and palpitations.   Gastrointestinal:  Negative for nausea and vomiting.   Genitourinary:  Negative for difficulty urinating and hematuria.   Musculoskeletal:  Positive for arthralgias and back pain. Negative for myalgias.   Skin:  Negative for rash and wound.   Neurological:  Negative for weakness and headaches.   Hematological:  Bruises/bleeds easily.   Psychiatric/Behavioral:  Negative for sleep disturbance. The patient is nervous/anxious.     Objective:    Nursing note and vitals reviewed.  Vitals:    02/03/23 1355   BP: 122/80   Pulse: 88   Resp: 16   Temp: 97.8 °F (36.6 °C)     Body mass index is 53.94 kg/m².   Physical Exam  Vitals and nursing note reviewed.   Constitutional:       General: She is not in acute distress.     Appearance: She is well-developed. She is obese. She is not ill-appearing, toxic-appearing or diaphoretic.   HENT:      Head: Normocephalic and atraumatic.      Right Ear: Hearing and external ear normal.      Left Ear: Hearing and external ear normal.      Nose: Nose normal. No rhinorrhea.   Eyes:      General: Lids are normal.      Extraocular Movements: Extraocular movements intact.      Conjunctiva/sclera: Conjunctivae normal.      Pupils: Pupils are equal, round, and reactive to light.   Cardiovascular:       Rate and Rhythm: Normal rate.      Pulses: Normal pulses.   Pulmonary:      Effort: Pulmonary effort is normal. No respiratory distress.      Breath sounds: Normal breath sounds.   Abdominal:      General: Bowel sounds are normal.      Palpations: Abdomen is soft.   Musculoskeletal:         General: Tenderness and deformity present. Normal range of motion.      Cervical back: Normal range of motion and neck supple.   Skin:     General: Skin is warm and dry.      Capillary Refill: Capillary refill takes less than 2 seconds.      Coloration: Skin is not pale.      Findings: Lesion present.   Neurological:      General: No focal deficit present.      Mental Status: She is alert and oriented to person, place, and time. Mental status is at baseline. She is not disoriented.      Cranial Nerves: No cranial nerve deficit.      Motor: No weakness.      Gait: Gait normal.   Psychiatric:         Attention and Perception: She is attentive.         Mood and Affect: Mood is depressed. Mood is not anxious.         Speech: Speech is not rapid and pressured or slurred.         Behavior: Behavior normal. Behavior is not agitated, aggressive or hyperactive. Behavior is cooperative.         Thought Content: Thought content normal. Thought content is not paranoid or delusional. Thought content does not include homicidal or suicidal ideation. Thought content does not include homicidal or suicidal plan.         Cognition and Memory: Memory is not impaired.         Judgment: Judgment normal.     Lab Visit on 01/31/2023   Component Date Value Ref Range Status    Creatinine 01/31/2023 0.7  0.5 - 1.4 mg/dL Final    eGFR 01/31/2023 >60  >60 mL/min/1.73 m^2 Final      Assessment / Plan:      1.  ANNUAL WELLNESS EXAM -patient here for annual wellness exam.  Labs ordered.  Health maintenance was reviewed and ordered.  Medications were reviewed and reconciled.   Anticipatory guidance: Don't smoke.  Healthy diet and regular exercise recommended.  Vaccine recommendations discussed.  See orders.  Reviewed Anticipatory guidance, risk factor reduction interventions and counseling, Complete history , physical was completed today.  Complete and thorough medication reconciliation was performed.  Discussed risks and benefits of medications.  Advised patient on orders and health maintenance.  We discussed old records and old labs if available.  Will request any records not available through epic.  Continue current medications listed on your summary sheet.    Diabetes hypertension:  Increase MOUNJARO to 10 milligrams weekly.  If no improvement in way consider bariatric surgery consult.  Blood pressure is well controlled on current regimen.  Hypertension Medications               NIFEdipine (PROCARDIA-XL) 30 MG (OSM) 24 hr tablet Take 30 mg by mouth once daily.    spironolactone (ALDACTONE) 100 MG tablet Take 1 tablet (100 mg total) by mouth once daily.              Previous plan:  Increase Ozempic to 2 milligrams weekly.  If no improvement with weight consider MOUNJARO. We will plan to monitor hemoglobin A1c at designated intervals 3 to 6 months.  I recommend ongoing Education for diabetic diet and exercise protocol.  We will continue to monitor for side effects.    Please be advised of symptoms to monitor for and to notify me immediately if persistent or worsening.  Follow up with Ophthalmology/Optometry and Podiatry at least annually.  Counseled on importance of hypertension disease course, I recommend ongoing Education for DASH-diet and exercise.  Counseled on medication regimen importance of treating high blood pressure.  Please be advised of risk of untreated blood pressure as discussed.  Please advised of ER precautions were given for symptoms of hypertensive urgency and emergency.    B12 deficiency:  Check level give B12 injection once monthly.  Okay to increase to every two weeks if needed.    Anxiety:  Consider starting SNRI with low weight gain side effect  profile such as Pristiq or Wellbutrin  Previous history:  Patient is back on Lexapro and is gaining weight.  Stop Lexapro and start Pristiq.Please be advised of condition course.  SNRI/SSRI is first-line treatment for this condition.  Please be advised of the risk of discontinuing this medication without tapering/contacting MD.  Patient has been advised of side effects, and all questions were answered.  Patient voiced understanding.  Patient will follow up routinely and notify us if having any side effects or worsening or persistent symptoms.  ER precautions were given. Antidepressant/Antianxiety Medication Initiation:  Patient informed of risks, benefits, and potential side effects of medication and accepts informed consent.  Common side effects include nausea, fatigue, headache, insomnia, etc see medication insert for complete side effect profile.  Most importantly be advised of the possibility of new or worsening suicidal thoughts/depression/anxiety etcetera.  Please be advised to stop the medication immediately and seek urgent treatment if this occurs.  Therefore please do not to abruptly discontinue medication without physician guidance except in cases of sudden onset or worsening of SI.   Constipation:  Start Linzess.  Please be advised constipation condition course.  I recommend increase daily water intake to an acceptable level.  Increase fiber.  Recommend stool softener and laxative if needed.  Goal of 1 bowel movement daily.  Follow-up to clinic or notify me if no improvement or symptoms are persistent or worsening.  ER precautions were given.  Recommend daily exercise as tolerated, adequate water intake (six 8-oz glasses of water daily), and high fiber diet. OTC fiber supplements are recommended if diet does not reach daily fiber goal (20-30 grams daily), such as Metamucil, Citrucel, or FiberCon (take as directed, separate from other oral medications by >2 hours).  - CONTINUE OTC stool softener such as  Colace as directed to avoid hard stools and straining with bowel movements PRN  -If still no improvement with these measures, call/follow-up    Chronic low back pain:  Likely secondary to her weight and breast hypertrophy.  If no improvement I recommend MRI.  Referral to bariatric/Plastic surgery is also an option for consideration for breast reduction and or weight loss surgery.    All questions were answered. Patient had no further concerns. Advised of Wellness plan. Follow up in 1 year for ANNUAL WELLNESS EXAM    No orders of the defined types were placed in this encounter.    Medications Ordered This Encounter   Medications    linaCLOtide (LINZESS) 72 mcg Cap capsule     Sig: Take 1 capsule (72 mcg total) by mouth once daily.     Dispense:  30 capsule     Refill:  1    tirzepatide 10 mg/0.5 mL PnIj     Sig: Inject 10 mg into the skin every 7 days.     Dispense:  4 pen     Refill:  12        Future Appointments       Next 10 Appointments       Date Provider Specialty Appt Notes    2/3/2023  Family Medicine b12    2/3/2023  Lab l    2/10/2023 Letha Cunningham Psychiatry np consult    2/16/2023  Allergy BB Xoliar/nd    3/1/2023 Santosh Hoffman MD Orthopedics right hand pain possible carpal tunnel      3/2/2023  Allergy BB Xoliar/nd    3/3/2023  Family Medicine b12    3/6/2023 MILLA Villeda, OD Optometry IOP check in 4 months     3/13/2023 MILLA Villeda, OD Optometry Eye pressure    6/21/2023  Lab               Displaying the next 10 appointments. This patient has additional appointments scheduled.            Dante Negro MD

## 2023-02-03 NOTE — PROGRESS NOTES
Patient Information  Name: Lucía Coreas  : 1985  MRN: [id number]     Referring Physician:  Dr. Negro   Primary Care Physician:  Dr. Dante Negro MD   Date of Visit: 2023      Subjective:       Lucía Coreas is a 37 y.o. female who presents for   Chief Complaint   Patient presents with    Hair/Scalp Problem     C/o dandruff on scalp     Cyst     C/o boil on left abdomen      HPI  Patient with new complaint of lesion(s)  Location: scalp  Duration: years  Symptoms: flaking  Relieving factors/Previous treatments: none    Hx of HS. Currently on spironolactone with adequate control. Has an abscess on abdomen today.    Patient with new complaint of lesion(s)  Location: face  Duration: years  Symptoms: bumps on skin  Relieving factors/Previous treatments: none    Patient was last seen:Visit date not found     Prior notes by myself reviewed.   Clinical documentation obtained by nursing staff reviewed.    Review of Systems   Skin:  Negative for itching and rash.      Objective:    Physical Exam   Constitutional: She appears well-developed and well-nourished. No distress.   Neurological: She is alert and oriented to person, place, and time. She is not disoriented.   Psychiatric: She has a normal mood and affect.   Skin:   Areas Examined (abnormalities noted in diagram):   Scalp / Hair Palpated and Inspected  Head / Face Inspection Performed  Neck Inspection Performed  Abdomen Inspection Performed                 Diagram Legend     Erythematous scaling macule/papule c/w actinic keratosis       Vascular papule c/w angioma      Pigmented verrucoid papule/plaque c/w seborrheic keratosis      Yellow umbilicated papule c/w sebaceous hyperplasia      Irregularly shaped tan macule c/w lentigo     1-2 mm smooth white papules consistent with Milia      Movable subcutaneous cyst with punctum c/w epidermal inclusion cyst      Subcutaneous movable cyst c/w pilar cyst      Firm pink to brown papule c/w  dermatofibroma      Pedunculated fleshy papule(s) c/w skin tag(s)      Evenly pigmented macule c/w junctional nevus     Mildly variegated pigmented, slightly irregular-bordered macule c/w mildly atypical nevus      Flesh colored to evenly pigmented papule c/w intradermal nevus       Pink pearly papule/plaque c/w basal cell carcinoma      Erythematous hyperkeratotic cursted plaque c/w SCC      Surgical scar with no sign of skin cancer recurrence      Open and closed comedones      Inflammatory papules and pustules      Verrucoid papule consistent consistent with wart     Erythematous eczematous patches and plaques     Dystrophic onycholytic nail with subungual debris c/w onychomycosis     Umbilicated papule    Erythematous-base heme-crusted tan verrucoid plaque consistent with inflamed seborrheic keratosis     Erythematous Silvery Scaling Plaque c/w Psoriasis     See annotation      No images are attached to the encounter or orders placed in the encounter.    [] Data reviewed  [] Independent review of test  [] Management discussed with another provider    Assessment / Plan:        Seborrheic dermatitis  -     ketoconazole (NIZORAL) 2 % shampoo; Apply topically once a week. Lather in for 5-10 min before rinsing  Dispense: 120 mL; Refill: 5  -     fluocinonide (LIDEX) 0.05 % external solution; Apply topically 2 (two) times daily. Use on scalp  Dispense: 60 mL; Refill: 5  Counseling on topical steroids:  Patient counseled that the prolonged use of topical steroids can result in the increased appearance superficial blood vessels (telangiectasias) lightening (hypopigmentation), and   thinning of the skin ( atrophy).  Patient understands to avoid using high potency steroids in skin folds, the groin or the face.  The patient verbalized understanding of proper use and possible adverse effects of topical steroids.  All patient's questions and concerns were addressed.    Hidradenitis suppurativa  -     Stable on  spironolactone    Abscess  -     doxycycline (VIBRA-TABS) 100 MG tablet; Take 1 tablet (100 mg total) by mouth 2 (two) times daily. Take for 5 days prn flares  Dispense: 30 tablet; Refill: 2  -     Aerobic culture  Discussed benefits and risks of doxycyline therapy including but not limited to GI discomfort, esophageal irritation/ulceration, and increased sun sensitivity. Patient was counseled to take medicine with meals and at least 1 hour before lying down.   Lesion incised with #11 blade and drained on date. Bacterial culture performed.  Patient instructed to perform warm compresses 2 - 3 times/daily.    Acne vulgaris  -     tretinoin (RETIN-A) 0.05 % cream; Apply topically every evening. Use on face  Dispense: 45 g; Refill: 5             LOS NUMBER AND COMPLEXITY OF PROBLEMS    COMPLEXITY OF DATA RISK TOTAL TIME (m)   18692  17928 [] 1 self-limited or minor problem [x] Minimal to none [] No treatment recommended or patient to monitor 15-29  10-19   05606  55068 Low  [] 2 or > self limited or minor problems  [] 1 stable chronic illness  [x] 1 acute, uncomplicated illness or injury Limited (2)  [] Prior external notes from each unique source  [] Review result of each unique test  [] Order each unique test []  Low  OTC medications, minor skin biopsy 30-44  20-29   15982  08298 Moderate  [x]  1 or > chronic illness with progression, exacerbation or SE of treatment  []  2 or more stable chronic illnesses  []  1 acute illness with systemic symptoms  []  1 acute complicated injury  []  1 undiagnosed new problem with uncertain prognosis Moderate (1/3 below)  []  3 or more data items        *Now includes assessment requiring independent historian  []  Independent interpretation of a test  []  Discuss management/test with another provider Moderate  [x]  Prescription drug mgmt  []  Minor surgery with risk discussed  []  Mgmt limited by social determinates 45-59  30-39   89266  13665 High  []  1 or more chronic illness with  severe exacerbation, progression or SE of treatment  []  1 acute or chronic illness/injury that poses a threat to life or bodily function Extensive (2/3 below)  []  3 or more data items        *Now includes assessment requiring independent historian.  []  Independent interpretation of a test  []  Discuss management/test with another provider High  []  Major surgery with risk discussed  []  Drug therapy requiring intensive monitoring for toxicity  []  Hospitalization  []  Decision for DNR 60-74  40-54      No follow-ups on file.    Yanci Zaman MD, FAAD  [facility]

## 2023-02-03 NOTE — PROGRESS NOTES
Subjective:       Patient ID: Lucía Coreas is a 37 y.o. female.    Chief Complaint: asthma    2/3/23  38yo female here for asthma follow up  Started Xolair since last visit, she feels this has improved her asthma  Able to go outside now without wheezing  On BREO 200, singulair daily; albuterol as needed - she feels asthma is well controlled  Wears a mask at work to decrease dust exposure  She wants to work on weight loss; difficulty due to back pain, she is established with ortho and pt  Stress eats; she is establishing with psychiatry soon    8/5/22  35yo female here for asthma follow up  Asthma controlled on BREO daily, albuterol prn  Asthma sore 22 today  She does complain of a persistent cough, worse at night  Started about 4 weeks ago after a URI, took antibiotics and cough syrup  Sputum went from yellow and now clear  No wheezing, SOB, fever, or chest pain  Followed by allergy Dr. Kramer, starting Xolair 8/24/22  [place] today is normal  FeNO today is low at 12  She works at a [employer], around a lot of dust which makes her allergy/asthma symptoms worse    Asthma Control Test  In the past 4  weeks, how much of the time did your asthma keep you from getting as much done at work, school or at home?: Most of the time  During the past 4 weeks, how often have you had shortness of breath?: Once a day  During the past 4 weeks, how often did your asthma symptoms (wheezing, couging, shortness of breath, chest tightness or pain) wake you up at night or earlier than usual in the morning?: 2 or 3 nights a week  During the past 4 weeks, how often have you used your rescue inhaler or nebulizer medication (such as albuterol)?: 2 or 3 times a week  How would you rate your asthma control during the past 4 weeks?: Completely controlled  If your score is 19 or less, your asthma may not be under control: 14     12/2021 Dr. Mendoza outpatient visit:      36-year-old female patient with history of environmental allergies being  followed why Dr. Rossy drake [facility], waiting to start Xolair injections, on Breo 200 mcg 1 puff daily, on albuterol p.r.n., asthma worsened during 2021 pregnancy that was complicated by placenta retention complicated by difficulty to close 1 status post VAC placement.     Asthma not well-controlled asthma control test questionnaire 19.         On albuterol p.r.n. on montelukast and Breo 200 mcg 1 puff daily     Mother has asthma on inhalers.      Immunization History   Administered Date(s) Administered    COVID-19, MRNA, LN-S, PF (Pfizer) (Purple Cap) 2021, 2021, 2021    DTP 1986, 1986, 1986, 1987, 1990    HIB 1988    Hepatitis A, Adult 2021    Hepatitis B (recombinant) Adjuvanted, 2 dose 2021    Hepatitis B, Adolescent/High Risk Infant 1998    Hepatitis B, Pediatric/Adolescent 2004    Influenza - Quadrivalent - PF *Preferred* (6 months and older) 10/11/2010, 2015, 03/10/2017, 2018, 2019, 10/02/2020, 2021    MMR 1987, 2004    OPV 1985, 1986, 1987, 1990    Pneumococcal Conjugate - 20 Valent 2022    Td (ADULT) 2004, 2013    Tdap 2017, 2021      Tobacco Use: Low Risk     Smoking Tobacco Use: Never    Smokeless Tobacco Use: Never    Passive Exposure: Not on file      Past Medical History:   Diagnosis Date    Allergy     Amenorrhea     Asthma     Diabetes     GERD (gastroesophageal reflux disease)     Infertility associated with anovulation     Iron deficiency anemia     Knee pain     Metabolic syndrome     PCO (polycystic ovaries)     Recurrent boils     Status post repeat low transverse  section 2020    Formatting of this note might be different from the original. With BTL 21      Current Outpatient Medications on File Prior to Visit   Medication Sig Dispense Refill    albuterol (PROVENTIL) 2.5 mg /3 mL (0.083 %)  nebulizer solution Take 3 mLs (2.5 mg total) by nebulization every 4 to 6 hours as needed for Wheezing. 60 each 3    albuterol (PROVENTIL/VENTOLIN HFA) 90 mcg/actuation inhaler Inhale 1-2 puffs into the lungs every 4 (four) hours as needed for Wheezing. 12 g 1    azelastine (ASTELIN) 137 mcg (0.1 %) nasal spray SMARTSIG:Both Nares      cholecalciferol, vitamin D3, (VITAMIN D3) 25 mcg (1,000 unit) capsule Take 2 capsules (2,000 Units total) by mouth once daily. 90 capsule 4    doxycycline (VIBRA-TABS) 100 MG tablet Take 1 tablet (100 mg total) by mouth 2 (two) times daily. Take for 5 days prn flares 30 tablet 2    EPINEPHrine (EPIPEN) 0.3 mg/0.3 mL AtIn Inject 0.3 mLs (0.3 mg total) into the muscle once. for 1 dose 2 each 11    fluocinonide (LIDEX) 0.05 % external solution Apply topically 2 (two) times daily. Use on scalp 60 mL 5    fluticasone furoate-vilanteroL (BREO) 200-25 mcg/dose DsDv diskus inhaler Inhale 1 puff into the lungs once daily. Controller 1 each 4    gabapentin (NEURONTIN) 300 MG capsule Take 1 capsule (300 mg total) by mouth 3 (three) times daily. 90 capsule 11    IRON 100 PLUS Tab Take 1 tablet by mouth once daily. 90 tablet 1    ketoconazole (NIZORAL) 2 % shampoo Apply topically once a week. Lather in for 5-10 min before rinsing 120 mL 5    Lactobacillus rhamnosus GG (CULTURELLE) 10 billion cell capsule Take 1 capsule by mouth once daily.      lactulose (CHRONULAC) 10 gram/15 mL solution SMARTSI Tablespoon By Mouth Daily      levocetirizine (XYZAL) 5 MG tablet Take 1 tablet (5 mg total) by mouth every evening. 30 tablet 4    loratadine (CLARITIN) 10 mg tablet Take 1 tablet (10 mg total) by mouth once daily. 90 tablet 4    methocarbamoL (ROBAXIN) 500 MG Tab Take 1 tablet (500 mg total) by mouth every evening. 30 tablet 0    montelukast (SINGULAIR) 10 mg tablet Take 1 tablet (10 mg total) by mouth once daily. 30 tablet 3    mupirocin (BACTROBAN) 2 % ointment Apply topically 3 (three) times  "daily.      NIFEdipine (PROCARDIA-XL) 30 MG (OSM) 24 hr tablet Take 30 mg by mouth once daily.      NOVOFINE PLUS 32 gauge x 1/6" Ndle use as directed      nystatin (MYCOSTATIN) powder Apply topically 2 (two) times daily. 60 g 1    omalizumab (XOLAIR) 150 mg injection Inject 300 mg into the skin every 14 (fourteen) days. 2 each 11    ondansetron (ZOFRAN-ODT) 8 MG TbDL DISSOLVE ONE TABLET UNDER TONGUE EVERY 8 HOURS AS NEEDED FOR NAUSEA      pantoprazole (PROTONIX) 40 MG tablet Take 1 tablet (40 mg total) by mouth once daily. 90 tablet 4    spironolactone (ALDACTONE) 100 MG tablet Take 1 tablet (100 mg total) by mouth once daily. 90 tablet 1    tirzepatide (MOUNJARO) 7.5 mg/0.5 mL PnIj Inject 7.5 mg into the skin every 7 days. 4 pen 2    tretinoin (RETIN-A) 0.05 % cream Apply topically every evening. Use on face 45 g 5    [DISCONTINUED] fluticasone furoate-vilanteroL (BREO ELLIPTA) 100-25 mcg/dose diskus inhaler Inhale 1 puff into the lungs once daily. Controller 30 each 12    [DISCONTINUED] ketoconazole (NIZORAL) 2 % shampoo Apply topically twice a week. 500 mL 2    [DISCONTINUED] WIXELA INHUB 500-50 mcg/dose DsDv diskus inhaler Inhale 1 puff into the lungs 2 (two) times daily.       Current Facility-Administered Medications on File Prior to Visit   Medication Dose Route Frequency Provider Last Rate Last Admin    cyanocobalamin injection 1,000 mcg  1,000 mcg Intramuscular Q30 Days Dante Negro MD   1,000 mcg at 12/30/22 1030    omalizumab injection 150 mg  150 mg Subcutaneous Q14 Days Rossy Kramer MD   150 mg at 02/02/23 0947    omalizumab injection 150 mg  150 mg Subcutaneous Q14 Days Rossy Kramer MD   150 mg at 02/02/23 0946    omalizumab injection 300 mg  300 mg Subcutaneous Q28 Days Rossy Kramer MD   300 mg at 01/19/23 0856        Review of Systems   Constitutional:  Negative for fever, weight loss, appetite change, fatigue and weakness.   HENT:  Negative for postnasal drip, rhinorrhea, sinus pressure, trouble " "swallowing and congestion.    Respiratory:  Negative for cough, sputum production, choking, chest tightness, shortness of breath, wheezing and dyspnea on extertion.    Cardiovascular:  Negative for chest pain and leg swelling.   Musculoskeletal:  Negative for arthralgias, gait problem and joint swelling.   Gastrointestinal:  Negative for nausea, vomiting and abdominal pain.   Neurological:  Negative for dizziness, weakness and headaches.   All other systems reviewed and are negative.    Objective:       Vitals:    02/03/23 1000   BP: 130/80   Pulse: 70   Resp: 16   SpO2: 99%   Weight: (!) 143.1 kg (315 lb 5.9 oz)   Height: 5' 4" (1.626 m)         Physical Exam   Constitutional: She is oriented to person, place, and time. She appears well-developed and well-nourished. No distress. She is obese.   HENT:   Head: Normocephalic.   Mouth/Throat: Oropharynx is clear and moist.   Cardiovascular: Normal rate and regular rhythm.   Pulmonary/Chest: Effort normal and breath sounds normal. No respiratory distress. She has no wheezes. She has no rhonchi. She has no rales.   Musculoskeletal:         General: No edema.      Cervical back: Normal range of motion and neck supple.   Lymphadenopathy: No supraclavicular adenopathy is present.     She has no cervical adenopathy.   Neurological: She is alert and oriented to person, place, and time. Gait normal.   Skin: Skin is warm and dry.   Psychiatric: She has a normal mood and affect.   Vitals reviewed.  Personal Diagnostic Review    8/5/22  Spirometry reviewed. FEV1 99.9 % of predicted.     8/5/22  FeNO  (ppb) LOW INTERMEDIATE HIGH   ADULT VALUES < 25 25-50          > 50   Th2-driven Inflammation Unlikely Likely Significant      Patients FeNO level at this visit : 12 (ppb)          Assessment/Plan:       Problem List Items Addressed This Visit          Pulmonary    Moderate persistent asthma without complication - Primary     Controlled on Breo 200, Singulair, Xolair  ovidio prn  feno " and maura in 6 months          Relevant Orders    Spirometry with/without bronchodilator    Fraction of  Nitric Oxide       Cardiac/Vascular    Hypertension associated with diabetes (Chronic)     Controlled, F/u regularly with PCP              Endocrine    Morbid obesity with BMI of 50.0-59.9, adult     Weight loss and exercise to improve overall health.  We discussed diet today, stressed portion control - follow up with psychiatry as she notes anxiety contributing            Other    Class 3 obesity with alveolar hypoventilation, serious comorbidity, and body mass index (BMI) of 50.0 to 59.9 in adult (Chronic)       Follow up in about 6 months (around 8/3/2023) for maura, feno next.    Discussed diagnosis, its evaluation, treatment and usual course. All questions answered.    Patient verbalized understanding of plan and left in no acute distress    Thank you for the courtesy of participating in the care of this patient    Lolly Salgado PA-C

## 2023-02-03 NOTE — ASSESSMENT & PLAN NOTE
Weight loss and exercise to improve overall health.  We discussed diet today, stressed portion control - follow up with psychiatry as she notes anxiety contributing

## 2023-02-03 NOTE — PATIENT INSTRUCTIONS
Follow up in about 1 year (around 2/3/2024), or if symptoms worsen or fail to improve, for Annual Wellness Exam.     Dear patient,   As a result of recent federal legislation (The Federal Cures Act), you may receive lab or pathology results from your visit in your [facility] account before your physician is able to contact you. Your physician or their representative will relay the results to you with their recommendations at their soonest availability.     If no improvement in symptoms or symptoms worsen, please be advised to call MD, follow-up at clinic and/or go to ER if becomes severe.    Dante Negro M.D.        We Offer TELEHEALTH & Same Day Appointments!   Book your Telehealth appointment with me through my nurse or   Clinic appointments on [facility]!    [address] [place] [address]    Office: [phone]   FAX: [phone]    Check out my Facebook Page and Follow Me at: [email or web address]    Check out my website at [employer] by clicking on: [email or web address]    To Schedule appointments online, go to [facility]: [email or web address]

## 2023-02-04 LAB
ALBUMIN SERPL BCP-MCNC: 3.7 G/DL (ref 3.5–5.2)
ALBUMIN/CREAT UR: 77.6 UG/MG (ref 0–30)
ALP SERPL-CCNC: 70 U/L (ref 55–135)
ALT SERPL W/O P-5'-P-CCNC: 9 U/L (ref 10–44)
ANION GAP SERPL CALC-SCNC: 6 MMOL/L (ref 8–16)
AST SERPL-CCNC: 9 U/L (ref 10–40)
BILIRUB SERPL-MCNC: 0.2 MG/DL (ref 0.1–1)
BUN SERPL-MCNC: 8 MG/DL (ref 6–20)
C3 SERPL-MCNC: 134 MG/DL (ref 50–180)
C4 SERPL-MCNC: 21 MG/DL (ref 11–44)
CALCIUM SERPL-MCNC: 9.3 MG/DL (ref 8.7–10.5)
CHLORIDE SERPL-SCNC: 108 MMOL/L (ref 95–110)
CO2 SERPL-SCNC: 28 MMOL/L (ref 23–29)
CREAT SERPL-MCNC: 0.7 MG/DL (ref 0.5–1.4)
CREAT UR-MCNC: 225 MG/DL (ref 15–325)
CREAT UR-MCNC: 232 MG/DL (ref 15–325)
EST. GFR  (NO RACE VARIABLE): >60 ML/MIN/1.73 M^2
ESTIMATED AVG GLUCOSE: 105 MG/DL (ref 68–131)
GLUCOSE SERPL-MCNC: 70 MG/DL (ref 70–110)
HBA1C MFR BLD: 5.3 % (ref 4–5.6)
MICROALBUMIN UR DL<=1MG/L-MCNC: 180 UG/ML
POTASSIUM SERPL-SCNC: 3.7 MMOL/L (ref 3.5–5.1)
PROT SERPL-MCNC: 6.7 G/DL (ref 6–8.4)
PROT UR-MCNC: 44 MG/DL (ref 0–15)
PROT/CREAT UR: 0.2 MG/G{CREAT} (ref 0–0.2)
SODIUM SERPL-SCNC: 142 MMOL/L (ref 136–145)
TSH SERPL DL<=0.005 MIU/L-ACNC: 1.42 UIU/ML (ref 0.4–4)

## 2023-02-06 LAB
DSDNA AB SER-ACNC: NORMAL [IU]/ML
RHEUMATOID FACT SERPL-ACNC: <13 IU/ML (ref 0–15)

## 2023-02-07 ENCOUNTER — TELEPHONE (OUTPATIENT)
Dept: FAMILY MEDICINE | Facility: CLINIC | Age: 38
End: 2023-02-07
Payer: COMMERCIAL

## 2023-02-07 DIAGNOSIS — L02.91 ABSCESS: Primary | ICD-10-CM

## 2023-02-07 DIAGNOSIS — E11.65 TYPE 2 DIABETES MELLITUS WITH HYPERGLYCEMIA, WITHOUT LONG-TERM CURRENT USE OF INSULIN: ICD-10-CM

## 2023-02-07 DIAGNOSIS — D53.9 NUTRITIONAL ANEMIA: Chronic | ICD-10-CM

## 2023-02-07 DIAGNOSIS — R80.9 MICROALBUMINURIA: Primary | ICD-10-CM

## 2023-02-07 LAB
BACTERIA SPEC AEROBE CULT: ABNORMAL
BACTERIA SPEC AEROBE CULT: ABNORMAL

## 2023-02-07 RX ORDER — LISINOPRIL 2.5 MG/1
2.5 TABLET ORAL DAILY
Qty: 90 TABLET | Refills: 3 | Status: SHIPPED | OUTPATIENT
Start: 2023-02-07 | End: 2024-03-27

## 2023-02-07 RX ORDER — LEVOFLOXACIN 750 MG/1
750 TABLET ORAL DAILY
Qty: 7 TABLET | Refills: 0 | Status: SHIPPED | OUTPATIENT
Start: 2023-02-07 | End: 2023-02-14

## 2023-02-07 NOTE — PROGRESS NOTES
Make follow-up lab appointment per recommendation below.  Check to see if patient has seen the results through my chart.  If not then,  #CALL THE PATIENT# to discuss results/see if they have questions and document verification of contact. Make F/U appt if needed. [phone]    #My interpretation that was sent to them through [employer]:  Lucía, I have reviewed your recent blood work.     Urine test was positive for infection.  Make sure that you are taking the antibiotic that was called and by Dr. Hernandez.  Follow-up sooner if having any symptoms of UTI  Urine microalbumin creatinine ratio test is elevated.  This can be a precursor to future problems with your kidneys.  We need to make sure that your blood pressure and blood sugar is well controlled.  I also recommend adding a medication called lisinopril.  Let me know if you are in agreement and I will send the medication to the pharmacy.  Your complete blood count is abnormal.  Mild anemia persist.  Follow-up with specialist.  Your metabolic panel which shows your glucose, kidney function, electrolytes, and liver function is normal.   Thyroid study is normal.    Your hemoglobin A1c is improved on MOUNJARO.  This test is gold standard screening test for diabetes.  It is a measures 3 months of your average blood sugar.    =========================  Also please address any outstanding health maintenance that may be due: Foot Exam Never done  COVID-19 Vaccine(4 - Booster for Pfizer series) due on 02/24/2022

## 2023-02-07 NOTE — TELEPHONE ENCOUNTER
----- Message from Jackeline Samaniego MA sent at 2/7/2023 10:39 AM CST -----  Patient informed and verbally understood results.   Patient is okay with starting lisinopril.   She has never seen hematology for anemia referral is needed.

## 2023-02-07 NOTE — TELEPHONE ENCOUNTER
----- Message from Keshia Barragan sent at 2/7/2023 10:29 AM CST -----  Contact: Lucía  Lucía is returning a missed call from a nurse regarding her results. Please call her back at [phone].

## 2023-02-07 NOTE — TELEPHONE ENCOUNTER
I have signed for the following orders AND/OR meds.  Please call the patient and ask the patient to schedule the testing AND/OR inform about any medications that were sent.      Orders Placed This Encounter   Procedures    Ambulatory referral/consult to Hematology / Oncology     Standing Status:   Future     Standing Expiration Date:   3/7/2024     Referral Priority:   Routine     Referral Type:   Consultation     Referral Reason:   Specialty Services Required     Requested Specialty:   Hematology and Oncology     Number of Visits Requested:   1       Medications Ordered This Encounter   Medications    lisinopriL (PRINIVIL,ZESTRIL) 2.5 MG tablet     Sig: Take 1 tablet (2.5 mg total) by mouth once daily.     Dispense:  90 tablet     Refill:  3     .

## 2023-02-08 ENCOUNTER — TELEPHONE (OUTPATIENT)
Dept: HEMATOLOGY/ONCOLOGY | Facility: CLINIC | Age: 38
End: 2023-02-08
Payer: COMMERCIAL

## 2023-02-08 NOTE — TELEPHONE ENCOUNTER
Pt scheduled incorrectly by outside clinic.   This was sched on [facility].  Called pt and informed her we will be changing her appt to a benign hem appt and she may see this come up on her portal.  Pt verbalized understanding and confirmed her appt date and time.      [facility] hem slot created by advanced schedulers.

## 2023-02-09 LAB
B2 GLYCOPROT1 IGA SER QL: <9 SAU
B2 GLYCOPROT1 IGG SER QL: <9 SGU
B2 GLYCOPROT1 IGM SER QL: <9 SMU
CARDIOLIPIN IGG SER IA-ACNC: <9.4 GPL (ref 0–14.99)
CARDIOLIPIN IGM SER IA-ACNC: 16.8 MPL (ref 0–12.49)

## 2023-02-10 ENCOUNTER — LAB VISIT (OUTPATIENT)
Dept: LAB | Facility: HOSPITAL | Age: 38
End: 2023-02-10
Attending: INTERNAL MEDICINE
Payer: COMMERCIAL

## 2023-02-10 ENCOUNTER — OFFICE VISIT (OUTPATIENT)
Dept: HEMATOLOGY/ONCOLOGY | Facility: CLINIC | Age: 38
End: 2023-02-10
Payer: COMMERCIAL

## 2023-02-10 ENCOUNTER — PATIENT MESSAGE (OUTPATIENT)
Dept: ALLERGY | Facility: CLINIC | Age: 38
End: 2023-02-10
Payer: COMMERCIAL

## 2023-02-10 VITALS
RESPIRATION RATE: 18 BRPM | HEART RATE: 98 BPM | OXYGEN SATURATION: 98 % | WEIGHT: 293 LBS | HEIGHT: 64 IN | DIASTOLIC BLOOD PRESSURE: 80 MMHG | BODY MASS INDEX: 50.02 KG/M2 | TEMPERATURE: 98 F | SYSTOLIC BLOOD PRESSURE: 124 MMHG

## 2023-02-10 DIAGNOSIS — D53.9 NUTRITIONAL ANEMIA: Chronic | ICD-10-CM

## 2023-02-10 DIAGNOSIS — E53.8 B12 DEFICIENCY: Primary | Chronic | ICD-10-CM

## 2023-02-10 DIAGNOSIS — E66.01 MORBID OBESITY WITH BMI OF 50.0-59.9, ADULT: ICD-10-CM

## 2023-02-10 DIAGNOSIS — Z80.41 FAMILY HISTORY OF OVARIAN CANCER: ICD-10-CM

## 2023-02-10 DIAGNOSIS — R53.82 CHRONIC FATIGUE: Chronic | ICD-10-CM

## 2023-02-10 DIAGNOSIS — Z80.42 FAMILY HISTORY OF PROSTATE CANCER: ICD-10-CM

## 2023-02-10 DIAGNOSIS — M32.19 OTHER SYSTEMIC LUPUS ERYTHEMATOSUS WITH OTHER ORGAN INVOLVEMENT: ICD-10-CM

## 2023-02-10 PROBLEM — M32.8 OTHER FORMS OF SYSTEMIC LUPUS ERYTHEMATOSUS: Status: ACTIVE | Noted: 2023-02-10

## 2023-02-10 LAB
ALBUMIN SERPL BCP-MCNC: 3.9 G/DL (ref 3.5–5.2)
ALP SERPL-CCNC: 62 U/L (ref 55–135)
ALT SERPL W/O P-5'-P-CCNC: 9 U/L (ref 10–44)
ANION GAP SERPL CALC-SCNC: 8 MMOL/L (ref 8–16)
AST SERPL-CCNC: 9 U/L (ref 10–40)
BILIRUB SERPL-MCNC: 0.2 MG/DL (ref 0.1–1)
BUN SERPL-MCNC: 8 MG/DL (ref 6–20)
CALCIUM SERPL-MCNC: 9.2 MG/DL (ref 8.7–10.5)
CHLORIDE SERPL-SCNC: 107 MMOL/L (ref 95–110)
CO2 SERPL-SCNC: 25 MMOL/L (ref 23–29)
CREAT SERPL-MCNC: 0.8 MG/DL (ref 0.5–1.4)
EST. GFR  (NO RACE VARIABLE): >60 ML/MIN/1.73 M^2
FERRITIN SERPL-MCNC: 11 NG/ML (ref 20–300)
GLUCOSE SERPL-MCNC: 87 MG/DL (ref 70–110)
IRON SERPL-MCNC: 44 UG/DL (ref 30–160)
LDH SERPL L TO P-CCNC: 185 U/L (ref 110–260)
POTASSIUM SERPL-SCNC: 4.1 MMOL/L (ref 3.5–5.1)
PROT SERPL-MCNC: 7.5 G/DL (ref 6–8.4)
SATURATED IRON: 13 % (ref 20–50)
SODIUM SERPL-SCNC: 140 MMOL/L (ref 136–145)
TOTAL IRON BINDING CAPACITY: 349 UG/DL (ref 250–450)
TRANSFERRIN SERPL-MCNC: 236 MG/DL (ref 200–375)

## 2023-02-10 PROCEDURE — 4010F ACE/ARB THERAPY RXD/TAKEN: CPT | Mod: CPTII,S$GLB,, | Performed by: INTERNAL MEDICINE

## 2023-02-10 PROCEDURE — 3074F PR MOST RECENT SYSTOLIC BLOOD PRESSURE < 130 MM HG: ICD-10-PCS | Mod: CPTII,S$GLB,, | Performed by: INTERNAL MEDICINE

## 2023-02-10 PROCEDURE — 3060F PR POS MICROALBUMINURIA RESULT DOCUMENTED/REVIEW: ICD-10-PCS | Mod: CPTII,S$GLB,, | Performed by: INTERNAL MEDICINE

## 2023-02-10 PROCEDURE — 83615 LACTATE (LD) (LDH) ENZYME: CPT | Mod: PN | Performed by: INTERNAL MEDICINE

## 2023-02-10 PROCEDURE — 80053 COMPREHEN METABOLIC PANEL: CPT | Mod: PN | Performed by: INTERNAL MEDICINE

## 2023-02-10 PROCEDURE — 99999 PR PBB SHADOW E&M-EST. PATIENT-LVL V: ICD-10-PCS | Mod: PBBFAC,,, | Performed by: INTERNAL MEDICINE

## 2023-02-10 PROCEDURE — 99204 OFFICE O/P NEW MOD 45 MIN: CPT | Mod: S$GLB,,, | Performed by: INTERNAL MEDICINE

## 2023-02-10 PROCEDURE — 3060F POS MICROALBUMINURIA REV: CPT | Mod: CPTII,S$GLB,, | Performed by: INTERNAL MEDICINE

## 2023-02-10 PROCEDURE — 1160F RVW MEDS BY RX/DR IN RCRD: CPT | Mod: CPTII,S$GLB,, | Performed by: INTERNAL MEDICINE

## 2023-02-10 PROCEDURE — 4010F PR ACE/ARB THEARPY RXD/TAKEN: ICD-10-PCS | Mod: CPTII,S$GLB,, | Performed by: INTERNAL MEDICINE

## 2023-02-10 PROCEDURE — 3008F BODY MASS INDEX DOCD: CPT | Mod: CPTII,S$GLB,, | Performed by: INTERNAL MEDICINE

## 2023-02-10 PROCEDURE — 3066F PR DOCUMENTATION OF TREATMENT FOR NEPHROPATHY: ICD-10-PCS | Mod: CPTII,S$GLB,, | Performed by: INTERNAL MEDICINE

## 2023-02-10 PROCEDURE — 3066F NEPHROPATHY DOC TX: CPT | Mod: CPTII,S$GLB,, | Performed by: INTERNAL MEDICINE

## 2023-02-10 PROCEDURE — 1160F PR REVIEW ALL MEDS BY PRESCRIBER/CLIN PHARMACIST DOCUMENTED: ICD-10-PCS | Mod: CPTII,S$GLB,, | Performed by: INTERNAL MEDICINE

## 2023-02-10 PROCEDURE — 3044F HG A1C LEVEL LT 7.0%: CPT | Mod: CPTII,S$GLB,, | Performed by: INTERNAL MEDICINE

## 2023-02-10 PROCEDURE — 36415 COLL VENOUS BLD VENIPUNCTURE: CPT | Mod: PN | Performed by: INTERNAL MEDICINE

## 2023-02-10 PROCEDURE — 3044F PR MOST RECENT HEMOGLOBIN A1C LEVEL <7.0%: ICD-10-PCS | Mod: CPTII,S$GLB,, | Performed by: INTERNAL MEDICINE

## 2023-02-10 PROCEDURE — 3079F DIAST BP 80-89 MM HG: CPT | Mod: CPTII,S$GLB,, | Performed by: INTERNAL MEDICINE

## 2023-02-10 PROCEDURE — 3079F PR MOST RECENT DIASTOLIC BLOOD PRESSURE 80-89 MM HG: ICD-10-PCS | Mod: CPTII,S$GLB,, | Performed by: INTERNAL MEDICINE

## 2023-02-10 PROCEDURE — 1159F PR MEDICATION LIST DOCUMENTED IN MEDICAL RECORD: ICD-10-PCS | Mod: CPTII,S$GLB,, | Performed by: INTERNAL MEDICINE

## 2023-02-10 PROCEDURE — 1159F MED LIST DOCD IN RCRD: CPT | Mod: CPTII,S$GLB,, | Performed by: INTERNAL MEDICINE

## 2023-02-10 PROCEDURE — 82728 ASSAY OF FERRITIN: CPT | Performed by: INTERNAL MEDICINE

## 2023-02-10 PROCEDURE — 99999 PR PBB SHADOW E&M-EST. PATIENT-LVL V: CPT | Mod: PBBFAC,,, | Performed by: INTERNAL MEDICINE

## 2023-02-10 PROCEDURE — 3008F PR BODY MASS INDEX (BMI) DOCUMENTED: ICD-10-PCS | Mod: CPTII,S$GLB,, | Performed by: INTERNAL MEDICINE

## 2023-02-10 PROCEDURE — 84466 ASSAY OF TRANSFERRIN: CPT | Performed by: INTERNAL MEDICINE

## 2023-02-10 PROCEDURE — 3072F LOW RISK FOR RETINOPATHY: CPT | Mod: CPTII,S$GLB,, | Performed by: INTERNAL MEDICINE

## 2023-02-10 PROCEDURE — 3074F SYST BP LT 130 MM HG: CPT | Mod: CPTII,S$GLB,, | Performed by: INTERNAL MEDICINE

## 2023-02-10 PROCEDURE — 99204 PR OFFICE/OUTPT VISIT, NEW, LEVL IV, 45-59 MIN: ICD-10-PCS | Mod: S$GLB,,, | Performed by: INTERNAL MEDICINE

## 2023-02-10 PROCEDURE — 3072F PR LOW RISK FOR RETINOPATHY: ICD-10-PCS | Mod: CPTII,S$GLB,, | Performed by: INTERNAL MEDICINE

## 2023-02-10 NOTE — PROGRESS NOTES
Subjective:       Name: Lucía Coreas  : 1985  MRN: [id number]    Chief Complaint   Patient presents with    Nutritional anemia     New Patient          HPI: Lucía Coreas is a 37 y.o. female presents for evaluation of Nutritional anemia (New Patient)    The patient had blood workup done on February 3, 2023 that showed a white count of 9.39 hemoglobin of 10.7 MCV of 75 platelet count of 334. Her CMP TSH free T4 C3-C4 anti DNA antibody double-stranded rheumatoid factor were normal. The UA showed 2+ protein and 3+ occult yes,blood.  The patient had a vitamin B12 injection given in the clinic on February 3, 2023.  Her iron studies done on Roxi 10, 2022 showed an iron of 47 TIBC of 376 ferritin of 13.  Her vitamin B12 was above 2000.  Her folate was 10.1.      The hypercoagulable workup included; beta 2 glycoprotein antibodies were normal, cardiolipin antibodies showed an elevation in the IgM of 16.8. The DVVRT is pending.  This workup has been ordered in view of her personal history of lupus.  The patient denies any previous history of thromboembolic events.    She denies receiving blood transfusions. She eats red meat. Her period is regular, last 5-7 days, sometimes heavy 5 pads/ day day 2-3.  The patient denies any weight loss poor appetite melena hematochezia hematemesis or hematuria.  She is feeling fatigued.  She has shortness of breath upon exertion.    Father had prostate cancer at the age of 69 and  at the same age.  Sister had ovarian cancer at the age of 52 yo. She is alive at the age of 58.  Cousin had ovarian cancer in her 40's. She id .  Maternal grandmother had an ENT cancer secondary to chewing tobacco.    Oncology History    No history exists.        Past Medical History:   Diagnosis Date    Allergy     Amenorrhea     Asthma     Diabetes     GERD (gastroesophageal reflux disease)     Infertility associated with anovulation     Iron deficiency anemia     Knee pain      Metabolic syndrome     PCO (polycystic ovaries)     Recurrent boils     Status post repeat low transverse  section 2020    Formatting of this note might be different from the original. With BTL 21       Past Surgical History:   Procedure Laterality Date    BTL      CARPAL TUNNEL RELEASE Left 2019    CARPAL TUNNEL RELEASE Right 2020    Procedure: RELEASE, CARPAL TUNNEL;  Surgeon: Adeel Laughlin MD;  Location: [facility];  Service: Orthopedics;  Laterality: Right;     SECTION      X2    EXPLORATORY LAPAROTOMY      1 week postop with WOUND VAK, reopened uterus    WOUND VAK      3 months post-op Section, 2 weeks in hospital       Family History   Problem Relation Age of Onset    Diabetes Father     Heart disease Father 69    Hypertension Father     Prostate cancer Father     Diabetes Mother     Lupus Mother     AMY disease Brother     Ovarian cancer Sister     Breast cancer Neg Hx        Social History     Socioeconomic History    Marital status:    Tobacco Use    Smoking status: Never    Smokeless tobacco: Never   Substance and Sexual Activity    Alcohol use: Never    Drug use: Never    Sexual activity: Yes     Partners: Male     Birth control/protection: See Surgical Hx       Review of patient's allergies indicates:   Allergen Reactions    Metformin Diarrhea    Sulfa (sulfonamide antibiotics) Anaphylaxis and Swelling     Swelling (eyes)^, Swelling (throat)^  Swelling (eyes)^, Swelling (throat)^      Diclofenac      Gastritis     Latex, natural rubber      Contact dermatitis    Other reaction(s): Other (See Comments)  Contact dermatitis    Shellfish containing products      anaphylaxis       Review of Systems   Constitutional:  Positive for fatigue. Negative for appetite change, fever and unexpected weight change.   HENT:  Negative for facial swelling, mouth sores, postnasal drip and sore throat.    Eyes:  Negative for photophobia and visual disturbance.   Respiratory:   "Positive for cough and shortness of breath. Negative for chest tightness.    Cardiovascular:  Negative for chest pain and leg swelling.   Gastrointestinal:  Negative for abdominal pain, blood in stool, diarrhea, nausea and vomiting.   Endocrine: Negative for cold intolerance and polyuria.   Genitourinary:  Negative for dysuria, flank pain, frequency and hematuria.   Musculoskeletal:  Negative for arthralgias, back pain and joint swelling.   Integumentary:  Positive for rash. Negative for color change and mole/lesion.   Neurological:  Positive for dizziness. Negative for weakness, numbness, headaches and memory loss.   Hematological:  Negative for adenopathy. Does not bruise/bleed easily.   Psychiatric/Behavioral:  Negative for decreased concentration. The patient is not nervous/anxious.           Objective:     Vitals:    02/10/23 1103   BP: 124/80   BP Location: Left arm   Patient Position: Sitting   BP Method: Large (Automatic)   Pulse: 98   Resp: 18   Temp: 97.9 °F (36.6 °C)   TempSrc: Temporal   SpO2: 98%   Weight: (!) 142.2 kg (313 lb 7.9 oz)   Height: 5' 4" (1.626 m)        Physical Exam  Vitals reviewed.   Constitutional:       Appearance: Normal appearance.   HENT:      Head: Normocephalic and atraumatic.      Mouth/Throat:      Mouth: Mucous membranes are moist.      Pharynx: No oropharyngeal exudate.   Eyes:      General: No scleral icterus.     Pupils: Pupils are equal, round, and reactive to light.   Cardiovascular:      Rate and Rhythm: Normal rate and regular rhythm.      Pulses: Normal pulses.      Heart sounds: Normal heart sounds.   Pulmonary:      Effort: Pulmonary effort is normal.      Breath sounds: Normal breath sounds. No wheezing.   Abdominal:      General: Bowel sounds are normal. There is no distension.      Tenderness: There is no abdominal tenderness.   Musculoskeletal:         General: No swelling or tenderness.      Cervical back: Neck supple.   Lymphadenopathy:      Cervical: No " cervical adenopathy.   Skin:     Coloration: Skin is not jaundiced.      Findings: No bruising or rash.   Neurological:      General: No focal deficit present.      Mental Status: She is alert and oriented to person, place, and time.      Cranial Nerves: No cranial nerve deficit.      Motor: No weakness.      Gait: Gait normal.   Psychiatric:         Mood and Affect: Mood normal.         Behavior: Behavior normal.              Current Outpatient Medications on File Prior to Visit   Medication Sig    albuterol (PROVENTIL) 2.5 mg /3 mL (0.083 %) nebulizer solution Take 3 mLs (2.5 mg total) by nebulization every 4 to 6 hours as needed for Wheezing.    albuterol (PROVENTIL/VENTOLIN HFA) 90 mcg/actuation inhaler Inhale 1-2 puffs into the lungs every 4 (four) hours as needed for Wheezing.    azelastine (ASTELIN) 137 mcg (0.1 %) nasal spray SMARTSIG:Both Nares    cholecalciferol, vitamin D3, (VITAMIN D3) 25 mcg (1,000 unit) capsule Take 2 capsules (2,000 Units total) by mouth once daily.    doxycycline (VIBRA-TABS) 100 MG tablet Take 1 tablet (100 mg total) by mouth 2 (two) times daily. Take for 5 days prn flares    fluocinonide (LIDEX) 0.05 % external solution Apply topically 2 (two) times daily. Use on scalp    fluticasone furoate-vilanteroL (BREO) 200-25 mcg/dose DsDv diskus inhaler Inhale 1 puff into the lungs once daily. Controller    gabapentin (NEURONTIN) 300 MG capsule Take 1 capsule (300 mg total) by mouth 3 (three) times daily.    IRON 100 PLUS Tab Take 1 tablet by mouth once daily.    ketoconazole (NIZORAL) 2 % shampoo Apply topically once a week. Lather in for 5-10 min before rinsing    Lactobacillus rhamnosus GG (CULTURELLE) 10 billion cell capsule Take 1 capsule by mouth once daily.    lactulose (CHRONULAC) 10 gram/15 mL solution SMARTSI Tablespoon By Mouth Daily    levoFLOXacin (LEVAQUIN) 750 MG tablet Take 1 tablet (750 mg total) by mouth once daily. for 7 days    linaCLOtide (LINZESS) 72 mcg Cap capsule  "Take 1 capsule (72 mcg total) by mouth once daily.    lisinopriL (PRINIVIL,ZESTRIL) 2.5 MG tablet Take 1 tablet (2.5 mg total) by mouth once daily.    loratadine (CLARITIN) 10 mg tablet Take 1 tablet (10 mg total) by mouth once daily.    montelukast (SINGULAIR) 10 mg tablet Take 1 tablet (10 mg total) by mouth once daily.    mupirocin (BACTROBAN) 2 % ointment Apply topically 3 (three) times daily.    NIFEdipine (PROCARDIA-XL) 30 MG (OSM) 24 hr tablet Take 30 mg by mouth once daily.    NOVOFINE PLUS 32 gauge x 1/6" Ndle use as directed    nystatin (MYCOSTATIN) powder Apply topically 2 (two) times daily.    omalizumab (XOLAIR) 150 mg injection Inject 300 mg into the skin every 14 (fourteen) days.    ondansetron (ZOFRAN-ODT) 8 MG TbDL DISSOLVE ONE TABLET UNDER TONGUE EVERY 8 HOURS AS NEEDED FOR NAUSEA    pantoprazole (PROTONIX) 40 MG tablet Take 1 tablet (40 mg total) by mouth once daily.    spironolactone (ALDACTONE) 100 MG tablet Take 1 tablet (100 mg total) by mouth once daily.    tirzepatide 10 mg/0.5 mL PnIj Inject 10 mg into the skin every 7 days.    tretinoin (RETIN-A) 0.05 % cream Apply topically every evening. Use on face    EPINEPHrine (EPIPEN) 0.3 mg/0.3 mL AtIn Inject 0.3 mLs (0.3 mg total) into the muscle once. for 1 dose    levocetirizine (XYZAL) 5 MG tablet Take 1 tablet (5 mg total) by mouth every evening.     Current Facility-Administered Medications on File Prior to Visit   Medication    cyanocobalamin injection 1,000 mcg    omalizumab injection 150 mg    omalizumab injection 150 mg    omalizumab injection 300 mg       CBC:  Lab Results   Component Value Date    WBC 9.39 02/03/2023    WBC 9.39 02/03/2023    HGB 10.7 (L) 02/03/2023    HGB 10.7 (L) 02/03/2023    HCT 35.1 (L) 02/03/2023    HCT 35.1 (L) 02/03/2023    MCV 75 (L) 02/03/2023    MCV 75 (L) 02/03/2023     02/03/2023     02/03/2023         CMP:  Sodium   Date Value Ref Range Status   02/03/2023 142 136 - 145 mmol/L Final "     Potassium   Date Value Ref Range Status   02/03/2023 3.7 3.5 - 5.1 mmol/L Final     Chloride   Date Value Ref Range Status   02/03/2023 108 95 - 110 mmol/L Final     CO2   Date Value Ref Range Status   02/03/2023 28 23 - 29 mmol/L Final     Glucose   Date Value Ref Range Status   02/03/2023 70 70 - 110 mg/dL Final     BUN   Date Value Ref Range Status   02/03/2023 8 6 - 20 mg/dL Final     Creatinine   Date Value Ref Range Status   02/03/2023 0.7 0.5 - 1.4 mg/dL Final     Calcium   Date Value Ref Range Status   02/03/2023 9.3 8.7 - 10.5 mg/dL Final     Total Protein   Date Value Ref Range Status   02/03/2023 6.7 6.0 - 8.4 g/dL Final     Albumin   Date Value Ref Range Status   02/03/2023 3.7 3.5 - 5.2 g/dL Final     Total Bilirubin   Date Value Ref Range Status   02/03/2023 0.2 0.1 - 1.0 mg/dL Final     Comment:     For infants and newborns, interpretation of results should be based  on gestational age, weight and in agreement with clinical  observations.    Premature Infant recommended reference ranges:  Up to 24 hours.............<8.0 mg/dL  Up to 48 hours............<12.0 mg/dL  3-5 days..................<15.0 mg/dL  6-29 days.................<15.0 mg/dL       Alkaline Phosphatase   Date Value Ref Range Status   02/03/2023 70 55 - 135 U/L Final     AST   Date Value Ref Range Status   02/03/2023 9 (L) 10 - 40 U/L Final     ALT   Date Value Ref Range Status   02/03/2023 9 (L) 10 - 44 U/L Final     Anion Gap   Date Value Ref Range Status   02/03/2023 6 (L) 8 - 16 mmol/L Final     eGFR if    Date Value Ref Range Status   06/10/2022 >60.0 >60 mL/min/1.73 m^2 Final     eGFR if non    Date Value Ref Range Status   06/10/2022 >60.0 >60 mL/min/1.73 m^2 Final     Comment:     Calculation used to obtain the estimated glomerular filtration  rate (eGFR) is the CKD-EPI equation.          MRI Sacroiliac Joint W W/O Contrast  Narrative: EXAMINATION:  MRI SACROILIAC JOINTS W W/O  CONTRAST    CLINICAL HISTORY:  rule out non radiographic spondyloarthritis;  Spondylosis without myelopathy or radiculopathy, site unspecified    TECHNIQUE:  Multiplanar, multisequence MR imaging of the sacroiliac joints was performed before and after intravenous administration of Gadavist 10 cc    COMPARISON:  None    FINDINGS:  The sacroiliac joints are well maintained.  There are no erosive or sclerotic changes.  There is no periarticular enhancement.  No joint effusion.  There are no significant degenerative changes.  Impression: Normal appearance of the sacroiliac joints.    Electronically signed by: Prince Correa MD  Date:    01/31/2023  Time:    10:14       ECOG SCORE    1 - Restricted in strenuous activity-ambulatory and able to carry out work of a light nature              Assessment/Plan:           1-Microcytic anemia:  -I reviewed independently the patient laboratory and radiologic findings.  Her history is favoring the possibility of iron-deficiency anemia.  The patient will have blood workup drawn today for iron studies ESR LDH.  Depending on the results further recommendation will be given concerning starting supplementation with oral iron.  An underlying thalassemia trait as well he is to be addressed once her anemia is corrected.    2- B12 deficiency.    Patient receiving vitamin B12 injections on a monthly basis.  Her last blood workup showing that her vitamin B12 is about the 1000.  She stated that she received them to help with her chronic fatigue.    3-Family history of ovarian and prostate cancer.    -in view of her family history of multiple cancers the patient will be referred to be assessed by the genetic counselor.    4-Morbid obesity BMI 53.8  -the patient was advised to exercise maintain healthy lifestyle and to lose weight.    5- Personal history of lupus:  -according to the patient her antibody came back positive.  She did not require any treatment.  She is currently followed by   David.           Med Onc Chart Routing      Follow up with physician 2 weeks. to discuss blood work-up   Follow up with JACKIE    Infusion scheduling note    Injection scheduling note    Labs Ferritin, iron and TIBC, CMP and LDH   Lab interval:  ESR. referral to [facility]   Imaging    Pharmacy appointment    Other referrals         Plan was discussed with the patient at length, and she verbalized understanding. Lucía was given an opportunity to ask questions that were answered to her satisfaction, and she was advised to call in the interval if any problems or questions arise.  Signed:  Ariana Molina MD   Hematology and Oncology  [facility] - HEMATOLOGY ONCOLOGY  [facility], [place]

## 2023-02-13 LAB
APTT IMM NP PPP: NORMAL SEC (ref 32–48)
APTT P HEP NEUT PPP: NORMAL SEC (ref 32–48)
CONFIRM APTT STACLOT: NORMAL
DRVVT SCREEN TO CONFIRM RATIO: NORMAL RATIO
LA 3 SCREEN W REFLEX-IMP: NORMAL
LA NT DPL PPP QL: NORMAL
MIXING DRVVT: NORMAL SEC (ref 33–44)
PROTHROMBIN TIME: 13.6 SEC (ref 12–15.5)
REPTILASE TIME: NORMAL SEC
SCREEN APTT: 45 SEC (ref 32–48)
SCREEN DRVVT: 40 SEC (ref 33–44)
THROMBIN TIME: NORMAL SEC (ref 14.7–19.5)

## 2023-02-15 ENCOUNTER — CLINICAL SUPPORT (OUTPATIENT)
Dept: ALLERGY | Facility: CLINIC | Age: 38
End: 2023-02-15
Payer: COMMERCIAL

## 2023-02-15 DIAGNOSIS — J45.40 MODERATE PERSISTENT ASTHMA WITHOUT COMPLICATION: Primary | ICD-10-CM

## 2023-02-15 LAB
HLA B27 INTERPRETATION: NORMAL
HLA-B27 RELATED AG QL: NEGATIVE
HLA-B27 RELATED AG QL: NORMAL

## 2023-02-15 PROCEDURE — 99999 PR PBB SHADOW E&M-EST. PATIENT-LVL III: ICD-10-PCS | Mod: PBBFAC,,,

## 2023-02-15 PROCEDURE — 99999 PR PBB SHADOW E&M-EST. PATIENT-LVL III: CPT | Mod: PBBFAC,,,

## 2023-02-15 PROCEDURE — 96372 PR INJECTION,THERAP/PROPH/DIAG2ST, IM OR SUBCUT: ICD-10-PCS | Mod: S$GLB,,, | Performed by: ALLERGY & IMMUNOLOGY

## 2023-02-15 PROCEDURE — 96372 THER/PROPH/DIAG INJ SC/IM: CPT | Mod: S$GLB,,, | Performed by: ALLERGY & IMMUNOLOGY

## 2023-02-16 DIAGNOSIS — J45.40 MODERATE PERSISTENT ASTHMA WITHOUT COMPLICATION: Primary | ICD-10-CM

## 2023-02-17 ENCOUNTER — PATIENT MESSAGE (OUTPATIENT)
Dept: DERMATOLOGY | Facility: CLINIC | Age: 38
End: 2023-02-17
Payer: COMMERCIAL

## 2023-02-27 NOTE — PROGRESS NOTES
[facility]    Subjective:       Name: Lucía Coreas  : 1985  MRN: [id number]    Chief Complaint   Patient presents with    microcytic anemia          HPI: Lucía Coreas is a 37 y.o. female presents for evaluation of microcytic anemia  The patient is here today to discuss the results of her blood workup  done to assess her microcytic anemia.  Her CMP drawn on February 10, 2023 came back normal.  Her iron studies showed an iron saturation of 13 with a normal iron transferrin and TIBC.  Her ferritin came back low at 11.  Her LDH came back normal.  She is reporting today a new onset constipation for which she was started by her primary care physician on Linzess.      She denies receiving blood transfusions. She eats red meat. Her period is regular, last 5-7 days, sometimes heavy 5 pads/ day day 2-3.  The patient denies any weight loss poor appetite melena hematochezia hematemesis or hematuria.  She is feeling fatigued.  S    Father had prostate cancer at the age of 69 and  at the same age.  Sister had ovarian cancer at the age of 54 yo. She is alive at the age of 58.  Cousin had ovarian cancer in her 40's. She id .  Maternal grandmother had an ENT cancer secondary to chewing tobacco.    Oncology History    No history exists.        Past Medical History:   Diagnosis Date    Allergy     Amenorrhea     Asthma     Diabetes     GERD (gastroesophageal reflux disease)     Infertility associated with anovulation     Iron deficiency anemia     Knee pain     Metabolic syndrome     PCO (polycystic ovaries)     Recurrent boils     Status post repeat low transverse  section 2020    Formatting of this note might be different from the original. With BTL 21       Past Surgical History:   Procedure Laterality Date    BTL      CARPAL TUNNEL RELEASE Left 2019    CARPAL TUNNEL RELEASE Right 2020    Procedure: RELEASE, CARPAL TUNNEL;  Surgeon: Adeel Laughlin MD;  Location: [facility] OR;   Service: Orthopedics;  Laterality: Right;     SECTION      X2    EXPLORATORY LAPAROTOMY      1 week postop with WOUND VAK, reopened uterus    WOUND VAK      3 months post-op Section, 2 weeks in hospital       Family History   Problem Relation Age of Onset    Diabetes Father     Heart disease Father 69    Hypertension Father     Prostate cancer Father     Diabetes Mother     Lupus Mother     AMY disease Brother     Ovarian cancer Sister     Breast cancer Neg Hx        Social History     Socioeconomic History    Marital status:    Tobacco Use    Smoking status: Never    Smokeless tobacco: Never   Substance and Sexual Activity    Alcohol use: Never    Drug use: Never    Sexual activity: Yes     Partners: Male     Birth control/protection: See Surgical Hx       Review of patient's allergies indicates:   Allergen Reactions    Metformin Diarrhea    Sulfa (sulfonamide antibiotics) Anaphylaxis and Swelling     Swelling (eyes)^, Swelling (throat)^  Swelling (eyes)^, Swelling (throat)^      Diclofenac      Gastritis     Latex, natural rubber      Contact dermatitis    Other reaction(s): Other (See Comments)  Contact dermatitis    Shellfish containing products      anaphylaxis       Review of Systems   Constitutional:  Positive for fatigue. Negative for appetite change, fever and unexpected weight change.   HENT:  Negative for facial swelling, mouth sores, postnasal drip and sore throat.    Eyes:  Negative for photophobia and visual disturbance.   Respiratory:  Positive for cough and shortness of breath. Negative for chest tightness.    Cardiovascular:  Negative for chest pain and leg swelling.   Gastrointestinal:  Negative for abdominal pain, blood in stool, diarrhea, nausea and vomiting.   Endocrine: Negative for cold intolerance and polyuria.   Genitourinary:  Negative for dysuria, flank pain, frequency and hematuria.   Musculoskeletal:  Negative for arthralgias, back pain and joint swelling.   Integumentary:  " Positive for rash. Negative for color change and mole/lesion.   Neurological:  Positive for dizziness. Negative for weakness, numbness, headaches and memory loss.   Hematological:  Negative for adenopathy. Does not bruise/bleed easily.   Psychiatric/Behavioral:  Negative for decreased concentration. The patient is not nervous/anxious.           Objective:     Vitals:    02/28/23 0902   BP: 114/74   BP Location: Left arm   Patient Position: Sitting   BP Method: Large (Automatic)   Pulse: 92   Resp: 18   Temp: 97.6 °F (36.4 °C)   TempSrc: Temporal   SpO2: 96%   Weight: (!) 141 kg (310 lb 13.6 oz)   Height: 5' 4" (1.626 m)          Physical Exam  Vitals reviewed.   Constitutional:       Appearance: Normal appearance.   HENT:      Head: Normocephalic and atraumatic.      Mouth/Throat:      Mouth: Mucous membranes are moist.      Pharynx: No oropharyngeal exudate.   Eyes:      General: No scleral icterus.     Pupils: Pupils are equal, round, and reactive to light.   Cardiovascular:      Rate and Rhythm: Normal rate and regular rhythm.      Pulses: Normal pulses.      Heart sounds: Normal heart sounds.   Pulmonary:      Effort: Pulmonary effort is normal.      Breath sounds: Normal breath sounds. No wheezing.   Abdominal:      General: Bowel sounds are normal. There is no distension.      Tenderness: There is no abdominal tenderness.   Musculoskeletal:         General: No swelling or tenderness.      Cervical back: Neck supple.   Lymphadenopathy:      Cervical: No cervical adenopathy.   Skin:     Coloration: Skin is not jaundiced.      Findings: No bruising or rash.   Neurological:      General: No focal deficit present.      Mental Status: She is alert and oriented to person, place, and time.      Cranial Nerves: No cranial nerve deficit.      Motor: No weakness.      Gait: Gait normal.   Psychiatric:         Mood and Affect: Mood normal.         Behavior: Behavior normal.              Current Outpatient Medications on " "File Prior to Visit   Medication Sig    albuterol (PROVENTIL) 2.5 mg /3 mL (0.083 %) nebulizer solution Take 3 mLs (2.5 mg total) by nebulization every 4 to 6 hours as needed for Wheezing.    albuterol (PROVENTIL/VENTOLIN HFA) 90 mcg/actuation inhaler Inhale 1-2 puffs into the lungs every 4 (four) hours as needed for Wheezing.    azelastine (ASTELIN) 137 mcg (0.1 %) nasal spray SMARTSIG:Both Nares    cholecalciferol, vitamin D3, (VITAMIN D3) 25 mcg (1,000 unit) capsule Take 2 capsules (2,000 Units total) by mouth once daily.    doxycycline (VIBRA-TABS) 100 MG tablet Take 1 tablet (100 mg total) by mouth 2 (two) times daily. Take for 5 days prn flares    fluocinonide (LIDEX) 0.05 % external solution Apply topically 2 (two) times daily. Use on scalp    fluticasone furoate-vilanteroL (BREO) 200-25 mcg/dose DsDv diskus inhaler Inhale 1 puff into the lungs once daily. Controller    gabapentin (NEURONTIN) 300 MG capsule Take 1 capsule (300 mg total) by mouth 3 (three) times daily.    IRON 100 PLUS Tab Take 1 tablet by mouth once daily.    ketoconazole (NIZORAL) 2 % shampoo Apply topically once a week. Lather in for 5-10 min before rinsing    Lactobacillus rhamnosus GG (CULTURELLE) 10 billion cell capsule Take 1 capsule by mouth once daily.    lactulose (CHRONULAC) 10 gram/15 mL solution SMARTSI Tablespoon By Mouth Daily    linaCLOtide (LINZESS) 72 mcg Cap capsule Take 1 capsule (72 mcg total) by mouth once daily.    lisinopriL (PRINIVIL,ZESTRIL) 2.5 MG tablet Take 1 tablet (2.5 mg total) by mouth once daily.    loratadine (CLARITIN) 10 mg tablet Take 1 tablet (10 mg total) by mouth once daily.    montelukast (SINGULAIR) 10 mg tablet Take 1 tablet (10 mg total) by mouth once daily.    mupirocin (BACTROBAN) 2 % ointment Apply topically 3 (three) times daily.    NIFEdipine (PROCARDIA-XL) 30 MG (OSM) 24 hr tablet Take 30 mg by mouth once daily.    NOVOFINE PLUS 32 gauge x " Ndle use as directed    nystatin (MYCOSTATIN) " powder Apply topically 2 (two) times daily.    omalizumab (XOLAIR) 150 mg injection Inject 300 mg into the skin every 14 (fourteen) days.    ondansetron (ZOFRAN-ODT) 8 MG TbDL DISSOLVE ONE TABLET UNDER TONGUE EVERY 8 HOURS AS NEEDED FOR NAUSEA    pantoprazole (PROTONIX) 40 MG tablet Take 1 tablet (40 mg total) by mouth once daily.    spironolactone (ALDACTONE) 100 MG tablet Take 1 tablet (100 mg total) by mouth once daily.    tirzepatide 10 mg/0.5 mL PnIj Inject 10 mg into the skin every 7 days.    tretinoin (RETIN-A) 0.05 % cream Apply topically every evening. Use on face    EPINEPHrine (EPIPEN) 0.3 mg/0.3 mL AtIn Inject 0.3 mLs (0.3 mg total) into the muscle once. for 1 dose    levocetirizine (XYZAL) 5 MG tablet Take 1 tablet (5 mg total) by mouth every evening.     Current Facility-Administered Medications on File Prior to Visit   Medication    cyanocobalamin injection 1,000 mcg    omalizumab injection 150 mg    omalizumab injection 150 mg    omalizumab injection 300 mg       CBC:  Lab Results   Component Value Date    WBC 9.39 02/03/2023    WBC 9.39 02/03/2023    HGB 10.7 (L) 02/03/2023    HGB 10.7 (L) 02/03/2023    HCT 35.1 (L) 02/03/2023    HCT 35.1 (L) 02/03/2023    MCV 75 (L) 02/03/2023    MCV 75 (L) 02/03/2023     02/03/2023     02/03/2023         CMP:  Sodium   Date Value Ref Range Status   02/10/2023 140 136 - 145 mmol/L Final     Potassium   Date Value Ref Range Status   02/10/2023 4.1 3.5 - 5.1 mmol/L Final     Chloride   Date Value Ref Range Status   02/10/2023 107 95 - 110 mmol/L Final     CO2   Date Value Ref Range Status   02/10/2023 25 23 - 29 mmol/L Final     Glucose   Date Value Ref Range Status   02/10/2023 87 70 - 110 mg/dL Final     BUN   Date Value Ref Range Status   02/10/2023 8 6 - 20 mg/dL Final     Creatinine   Date Value Ref Range Status   02/10/2023 0.8 0.5 - 1.4 mg/dL Final     Calcium   Date Value Ref Range Status   02/10/2023 9.2 8.7 - 10.5 mg/dL Final     Total Protein    Date Value Ref Range Status   02/10/2023 7.5 6.0 - 8.4 g/dL Final     Albumin   Date Value Ref Range Status   02/10/2023 3.9 3.5 - 5.2 g/dL Final     Total Bilirubin   Date Value Ref Range Status   02/10/2023 0.2 0.1 - 1.0 mg/dL Final     Comment:     For infants and newborns, interpretation of results should be based  on gestational age, weight and in agreement with clinical  observations.    Premature Infant recommended reference ranges:  Up to 24 hours.............<8.0 mg/dL  Up to 48 hours............<12.0 mg/dL  3-5 days..................<15.0 mg/dL  6-29 days.................<15.0 mg/dL       Alkaline Phosphatase   Date Value Ref Range Status   02/10/2023 62 55 - 135 U/L Final     AST   Date Value Ref Range Status   02/10/2023 9 (L) 10 - 40 U/L Final     ALT   Date Value Ref Range Status   02/10/2023 9 (L) 10 - 44 U/L Final     Anion Gap   Date Value Ref Range Status   02/10/2023 8 8 - 16 mmol/L Final     eGFR if    Date Value Ref Range Status   06/10/2022 >60.0 >60 mL/min/1.73 m^2 Final     eGFR if non    Date Value Ref Range Status   06/10/2022 >60.0 >60 mL/min/1.73 m^2 Final     Comment:     Calculation used to obtain the estimated glomerular filtration  rate (eGFR) is the CKD-EPI equation.        Lab Results   Component Value Date    UIBC 192 01/22/2009    IRON 44 02/10/2023    TRANSFERRIN 236 02/10/2023    TIBC 349 02/10/2023    FESATURATED 13 (L) 02/10/2023         Lab Results   Component Value Date    FERRITIN 11 (L) 02/10/2023        MRI Sacroiliac Joint W W/O Contrast  Narrative: EXAMINATION:  MRI SACROILIAC JOINTS W W/O CONTRAST    CLINICAL HISTORY:  rule out non radiographic spondyloarthritis;  Spondylosis without myelopathy or radiculopathy, site unspecified    TECHNIQUE:  Multiplanar, multisequence MR imaging of the sacroiliac joints was performed before and after intravenous administration of Gadavist 10 cc    COMPARISON:  None    FINDINGS:  The sacroiliac joints  are well maintained.  There are no erosive or sclerotic changes.  There is no periarticular enhancement.  No joint effusion.  There are no significant degenerative changes.  Impression: Normal appearance of the sacroiliac joints.    Electronically signed by: Prince Correa MD  Date:    01/31/2023  Time:    10:14       ECOG SCORE    1 - Restricted in strenuous activity-ambulatory and able to carry out work of a light nature              Assessment/Plan:           1-Microcytic anemia:  -I reviewed independently the patient laboratory findings.  The patient workup is favoring the diagnosis of iron-deficiency anemia.  Her iron saturation and ferritin came back low 13% and 11 respectively.  The patient has been taking oral iron daily.  It is not helping her anemia or constipation.  She will be switched to IV Venofer 300 mg weekly x4.  She will be seen back again in 3 months for a follow-up visit with repeat CBC and a ferritin.  She was advised to call us back in case she feels more tired short of breath in the interim.    2- B12 deficiency.    Patient receiving vitamin B12 injections on a monthly basis.  Her last blood workup showing that her vitamin B12 is about the 1000.  She stated that she received them to help with her chronic fatigue.    3-Family history of ovarian and prostate cancer.    -in view of her family history of multiple cancers the patient was referred to be assessed by the genetic counselor.    4-Morbid obesity BMI 53.8  -the patient was advised to exercise maintain healthy lifestyle and to lose weight.    5- Personal history of lupus:  -according to the patient her antibody came back positive.  She did not require any treatment.  She is currently followed by Dr. Hernandez.           Med Onc Chart Routing      Follow up with physician 3 months. with repeat CBC and ferritine. She will be scheduled to have IV venofer weekly x4.   Follow up with JACKIE    Infusion scheduling note    Injection scheduling note     Labs    Imaging    Pharmacy appointment    Other referrals         Plan was discussed with the patient at length, and she verbalized understanding. Lucía was given an opportunity to ask questions that were answered to her satisfaction, and she was advised to call in the interval if any problems or questions arise.  Signed:  Ariana Molina MD   Hematology and Oncology  [facility] - HEMATOLOGY ONCOLOGY  [facility], [place]

## 2023-02-28 ENCOUNTER — OFFICE VISIT (OUTPATIENT)
Dept: HEMATOLOGY/ONCOLOGY | Facility: CLINIC | Age: 38
End: 2023-02-28
Payer: COMMERCIAL

## 2023-02-28 VITALS
WEIGHT: 293 LBS | RESPIRATION RATE: 18 BRPM | TEMPERATURE: 98 F | BODY MASS INDEX: 50.02 KG/M2 | SYSTOLIC BLOOD PRESSURE: 114 MMHG | HEIGHT: 64 IN | OXYGEN SATURATION: 96 % | DIASTOLIC BLOOD PRESSURE: 74 MMHG | HEART RATE: 92 BPM

## 2023-02-28 DIAGNOSIS — D50.0 IRON DEFICIENCY ANEMIA DUE TO CHRONIC BLOOD LOSS: ICD-10-CM

## 2023-02-28 DIAGNOSIS — D50.0 IRON DEFICIENCY ANEMIA DUE TO CHRONIC BLOOD LOSS: Primary | ICD-10-CM

## 2023-02-28 DIAGNOSIS — Z80.41 FAMILY HISTORY OF OVARIAN CANCER: Primary | ICD-10-CM

## 2023-02-28 DIAGNOSIS — Z80.42 FAMILY HISTORY OF PROSTATE CANCER: ICD-10-CM

## 2023-02-28 DIAGNOSIS — E53.8 B12 DEFICIENCY: Chronic | ICD-10-CM

## 2023-02-28 PROCEDURE — 99214 PR OFFICE/OUTPT VISIT, EST, LEVL IV, 30-39 MIN: ICD-10-PCS | Mod: S$GLB,,, | Performed by: INTERNAL MEDICINE

## 2023-02-28 PROCEDURE — 3060F PR POS MICROALBUMINURIA RESULT DOCUMENTED/REVIEW: ICD-10-PCS | Mod: CPTII,S$GLB,, | Performed by: INTERNAL MEDICINE

## 2023-02-28 PROCEDURE — 3072F LOW RISK FOR RETINOPATHY: CPT | Mod: CPTII,S$GLB,, | Performed by: INTERNAL MEDICINE

## 2023-02-28 PROCEDURE — 3072F PR LOW RISK FOR RETINOPATHY: ICD-10-PCS | Mod: CPTII,S$GLB,, | Performed by: INTERNAL MEDICINE

## 2023-02-28 PROCEDURE — 3066F PR DOCUMENTATION OF TREATMENT FOR NEPHROPATHY: ICD-10-PCS | Mod: CPTII,S$GLB,, | Performed by: INTERNAL MEDICINE

## 2023-02-28 PROCEDURE — 99999 PR PBB SHADOW E&M-EST. PATIENT-LVL V: CPT | Mod: PBBFAC,,, | Performed by: INTERNAL MEDICINE

## 2023-02-28 PROCEDURE — 3078F PR MOST RECENT DIASTOLIC BLOOD PRESSURE < 80 MM HG: ICD-10-PCS | Mod: CPTII,S$GLB,, | Performed by: INTERNAL MEDICINE

## 2023-02-28 PROCEDURE — 3008F BODY MASS INDEX DOCD: CPT | Mod: CPTII,S$GLB,, | Performed by: INTERNAL MEDICINE

## 2023-02-28 PROCEDURE — 3078F DIAST BP <80 MM HG: CPT | Mod: CPTII,S$GLB,, | Performed by: INTERNAL MEDICINE

## 2023-02-28 PROCEDURE — 99214 OFFICE O/P EST MOD 30 MIN: CPT | Mod: S$GLB,,, | Performed by: INTERNAL MEDICINE

## 2023-02-28 PROCEDURE — 3044F PR MOST RECENT HEMOGLOBIN A1C LEVEL <7.0%: ICD-10-PCS | Mod: CPTII,S$GLB,, | Performed by: INTERNAL MEDICINE

## 2023-02-28 PROCEDURE — 1160F RVW MEDS BY RX/DR IN RCRD: CPT | Mod: CPTII,S$GLB,, | Performed by: INTERNAL MEDICINE

## 2023-02-28 PROCEDURE — 1159F PR MEDICATION LIST DOCUMENTED IN MEDICAL RECORD: ICD-10-PCS | Mod: CPTII,S$GLB,, | Performed by: INTERNAL MEDICINE

## 2023-02-28 PROCEDURE — 1159F MED LIST DOCD IN RCRD: CPT | Mod: CPTII,S$GLB,, | Performed by: INTERNAL MEDICINE

## 2023-02-28 PROCEDURE — 99999 PR PBB SHADOW E&M-EST. PATIENT-LVL V: ICD-10-PCS | Mod: PBBFAC,,, | Performed by: INTERNAL MEDICINE

## 2023-02-28 PROCEDURE — 4010F PR ACE/ARB THEARPY RXD/TAKEN: ICD-10-PCS | Mod: CPTII,S$GLB,, | Performed by: INTERNAL MEDICINE

## 2023-02-28 PROCEDURE — 4010F ACE/ARB THERAPY RXD/TAKEN: CPT | Mod: CPTII,S$GLB,, | Performed by: INTERNAL MEDICINE

## 2023-02-28 PROCEDURE — 3044F HG A1C LEVEL LT 7.0%: CPT | Mod: CPTII,S$GLB,, | Performed by: INTERNAL MEDICINE

## 2023-02-28 PROCEDURE — 3066F NEPHROPATHY DOC TX: CPT | Mod: CPTII,S$GLB,, | Performed by: INTERNAL MEDICINE

## 2023-02-28 PROCEDURE — 3074F PR MOST RECENT SYSTOLIC BLOOD PRESSURE < 130 MM HG: ICD-10-PCS | Mod: CPTII,S$GLB,, | Performed by: INTERNAL MEDICINE

## 2023-02-28 PROCEDURE — 3008F PR BODY MASS INDEX (BMI) DOCUMENTED: ICD-10-PCS | Mod: CPTII,S$GLB,, | Performed by: INTERNAL MEDICINE

## 2023-02-28 PROCEDURE — 3060F POS MICROALBUMINURIA REV: CPT | Mod: CPTII,S$GLB,, | Performed by: INTERNAL MEDICINE

## 2023-02-28 PROCEDURE — 1160F PR REVIEW ALL MEDS BY PRESCRIBER/CLIN PHARMACIST DOCUMENTED: ICD-10-PCS | Mod: CPTII,S$GLB,, | Performed by: INTERNAL MEDICINE

## 2023-02-28 PROCEDURE — 3074F SYST BP LT 130 MM HG: CPT | Mod: CPTII,S$GLB,, | Performed by: INTERNAL MEDICINE

## 2023-02-28 RX ORDER — HEPARIN 100 UNIT/ML
500 SYRINGE INTRAVENOUS
Status: CANCELLED | OUTPATIENT
Start: 2023-02-28

## 2023-02-28 RX ORDER — EPINEPHRINE 0.3 MG/.3ML
0.3 INJECTION SUBCUTANEOUS ONCE AS NEEDED
Status: CANCELLED | OUTPATIENT
Start: 2023-02-28

## 2023-02-28 RX ORDER — METHYLPREDNISOLONE SOD SUCC 125 MG
125 VIAL (EA) INJECTION ONCE AS NEEDED
Status: CANCELLED | OUTPATIENT
Start: 2023-02-28

## 2023-02-28 RX ORDER — DIPHENHYDRAMINE HYDROCHLORIDE 50 MG/ML
50 INJECTION INTRAMUSCULAR; INTRAVENOUS ONCE AS NEEDED
Status: CANCELLED | OUTPATIENT
Start: 2023-02-28

## 2023-02-28 RX ORDER — SODIUM CHLORIDE 0.9 % (FLUSH) 0.9 %
10 SYRINGE (ML) INJECTION
Status: CANCELLED | OUTPATIENT
Start: 2023-02-28

## 2023-03-01 ENCOUNTER — PATIENT MESSAGE (OUTPATIENT)
Dept: ALLERGY | Facility: CLINIC | Age: 38
End: 2023-03-01
Payer: COMMERCIAL

## 2023-03-03 ENCOUNTER — TELEPHONE (OUTPATIENT)
Dept: FAMILY MEDICINE | Facility: CLINIC | Age: 38
End: 2023-03-03
Payer: COMMERCIAL

## 2023-03-03 DIAGNOSIS — E53.8 B12 DEFICIENCY: Primary | ICD-10-CM

## 2023-03-03 RX ORDER — CYANOCOBALAMIN 1000 UG/ML
1000 INJECTION, SOLUTION INTRAMUSCULAR; SUBCUTANEOUS
Status: ACTIVE | OUTPATIENT
Start: 2023-03-07 | End: 2023-07-05

## 2023-03-03 NOTE — TELEPHONE ENCOUNTER
----- Message from Gwen Brown sent at 3/3/2023 10:04 AM CST -----  Pt is requesting a call back from the nurse to reschedule her appt for today. Call back number [phone]  Thx jm

## 2023-03-06 ENCOUNTER — PATIENT MESSAGE (OUTPATIENT)
Dept: ALLERGY | Facility: CLINIC | Age: 38
End: 2023-03-06
Payer: COMMERCIAL

## 2023-03-07 ENCOUNTER — CLINICAL SUPPORT (OUTPATIENT)
Dept: FAMILY MEDICINE | Facility: CLINIC | Age: 38
End: 2023-03-07
Payer: COMMERCIAL

## 2023-03-07 DIAGNOSIS — E53.8 B12 DEFICIENCY: Primary | ICD-10-CM

## 2023-03-07 PROCEDURE — 99999 PR PBB SHADOW E&M-EST. PATIENT-LVL III: ICD-10-PCS | Mod: PBBFAC,,,

## 2023-03-07 PROCEDURE — 99999 PR PBB SHADOW E&M-EST. PATIENT-LVL III: CPT | Mod: PBBFAC,,,

## 2023-03-07 PROCEDURE — 96372 PR INJECTION,THERAP/PROPH/DIAG2ST, IM OR SUBCUT: ICD-10-PCS | Mod: S$GLB,,, | Performed by: FAMILY MEDICINE

## 2023-03-07 PROCEDURE — 96372 THER/PROPH/DIAG INJ SC/IM: CPT | Mod: S$GLB,,, | Performed by: FAMILY MEDICINE

## 2023-03-07 RX ADMIN — CYANOCOBALAMIN 1000 MCG: 1000 INJECTION, SOLUTION INTRAMUSCULAR; SUBCUTANEOUS at 09:03

## 2023-03-08 ENCOUNTER — CLINICAL SUPPORT (OUTPATIENT)
Dept: ALLERGY | Facility: CLINIC | Age: 38
End: 2023-03-08
Payer: COMMERCIAL

## 2023-03-08 DIAGNOSIS — J45.40 MODERATE PERSISTENT ASTHMA WITHOUT COMPLICATION: Primary | ICD-10-CM

## 2023-03-08 PROCEDURE — 99999 PR PBB SHADOW E&M-EST. PATIENT-LVL I: CPT | Mod: PBBFAC,,,

## 2023-03-08 PROCEDURE — 99999 PR PBB SHADOW E&M-EST. PATIENT-LVL I: ICD-10-PCS | Mod: PBBFAC,,,

## 2023-03-08 PROCEDURE — 96372 PR INJECTION,THERAP/PROPH/DIAG2ST, IM OR SUBCUT: ICD-10-PCS | Mod: S$GLB,,, | Performed by: ALLERGY & IMMUNOLOGY

## 2023-03-08 PROCEDURE — 96372 THER/PROPH/DIAG INJ SC/IM: CPT | Mod: S$GLB,,, | Performed by: ALLERGY & IMMUNOLOGY

## 2023-03-08 NOTE — PROGRESS NOTES
See Flowsheet for immunotherapy administration. Patient waited in clinic 30 min for observation. Pt had epi pen on hand.no reaction noted.

## 2023-03-09 ENCOUNTER — INFUSION (OUTPATIENT)
Dept: INFUSION THERAPY | Facility: HOSPITAL | Age: 38
End: 2023-03-09
Payer: COMMERCIAL

## 2023-03-09 VITALS
WEIGHT: 293 LBS | TEMPERATURE: 98 F | SYSTOLIC BLOOD PRESSURE: 127 MMHG | HEART RATE: 85 BPM | BODY MASS INDEX: 50.02 KG/M2 | DIASTOLIC BLOOD PRESSURE: 79 MMHG | HEIGHT: 64 IN | RESPIRATION RATE: 18 BRPM

## 2023-03-09 DIAGNOSIS — D50.0 IRON DEFICIENCY ANEMIA DUE TO CHRONIC BLOOD LOSS: Primary | ICD-10-CM

## 2023-03-09 PROCEDURE — 63600175 PHARM REV CODE 636 W HCPCS: Mod: PN | Performed by: INTERNAL MEDICINE

## 2023-03-09 PROCEDURE — 25000003 PHARM REV CODE 250: Mod: PN | Performed by: INTERNAL MEDICINE

## 2023-03-09 PROCEDURE — 96365 THER/PROPH/DIAG IV INF INIT: CPT | Mod: PN

## 2023-03-09 RX ORDER — HEPARIN 100 UNIT/ML
500 SYRINGE INTRAVENOUS
Status: DISCONTINUED | OUTPATIENT
Start: 2023-03-09 | End: 2023-03-09 | Stop reason: HOSPADM

## 2023-03-09 RX ORDER — SODIUM CHLORIDE 0.9 % (FLUSH) 0.9 %
10 SYRINGE (ML) INJECTION
Status: DISCONTINUED | OUTPATIENT
Start: 2023-03-09 | End: 2023-03-09 | Stop reason: HOSPADM

## 2023-03-09 RX ORDER — SODIUM CHLORIDE 0.9 % (FLUSH) 0.9 %
10 SYRINGE (ML) INJECTION
Status: CANCELLED | OUTPATIENT
Start: 2023-03-16

## 2023-03-09 RX ORDER — DIPHENHYDRAMINE HYDROCHLORIDE 50 MG/ML
50 INJECTION INTRAMUSCULAR; INTRAVENOUS ONCE AS NEEDED
Status: CANCELLED | OUTPATIENT
Start: 2023-03-16

## 2023-03-09 RX ORDER — HEPARIN 100 UNIT/ML
500 SYRINGE INTRAVENOUS
Status: CANCELLED | OUTPATIENT
Start: 2023-03-16

## 2023-03-09 RX ORDER — EPINEPHRINE 0.3 MG/.3ML
0.3 INJECTION SUBCUTANEOUS ONCE AS NEEDED
Status: CANCELLED | OUTPATIENT
Start: 2023-03-16

## 2023-03-09 RX ORDER — METHYLPREDNISOLONE SOD SUCC 125 MG
125 VIAL (EA) INJECTION ONCE AS NEEDED
Status: CANCELLED | OUTPATIENT
Start: 2023-03-16

## 2023-03-09 RX ADMIN — IRON SUCROSE 300 MG: 20 INJECTION, SOLUTION INTRAVENOUS at 02:03

## 2023-03-09 RX ADMIN — SODIUM CHLORIDE: 9 INJECTION, SOLUTION INTRAVENOUS at 02:03

## 2023-03-13 ENCOUNTER — OFFICE VISIT (OUTPATIENT)
Dept: OPTOMETRY | Facility: CLINIC | Age: 38
End: 2023-03-13
Payer: COMMERCIAL

## 2023-03-13 DIAGNOSIS — H40.023 OAG (OPEN ANGLE GLAUCOMA) SUSPECT, HIGH RISK, BILATERAL: Primary | ICD-10-CM

## 2023-03-13 DIAGNOSIS — H10.13 ALLERGIC CONJUNCTIVITIS OF BOTH EYES: ICD-10-CM

## 2023-03-13 PROCEDURE — 1159F PR MEDICATION LIST DOCUMENTED IN MEDICAL RECORD: ICD-10-PCS | Mod: CPTII,S$GLB,, | Performed by: OPTOMETRIST

## 2023-03-13 PROCEDURE — 4010F PR ACE/ARB THEARPY RXD/TAKEN: ICD-10-PCS | Mod: CPTII,S$GLB,, | Performed by: OPTOMETRIST

## 2023-03-13 PROCEDURE — 99999 PR PBB SHADOW E&M-EST. PATIENT-LVL IV: CPT | Mod: PBBFAC,,, | Performed by: OPTOMETRIST

## 2023-03-13 PROCEDURE — 3066F PR DOCUMENTATION OF TREATMENT FOR NEPHROPATHY: ICD-10-PCS | Mod: CPTII,S$GLB,, | Performed by: OPTOMETRIST

## 2023-03-13 PROCEDURE — 3066F NEPHROPATHY DOC TX: CPT | Mod: CPTII,S$GLB,, | Performed by: OPTOMETRIST

## 2023-03-13 PROCEDURE — 3044F HG A1C LEVEL LT 7.0%: CPT | Mod: CPTII,S$GLB,, | Performed by: OPTOMETRIST

## 2023-03-13 PROCEDURE — 3060F PR POS MICROALBUMINURIA RESULT DOCUMENTED/REVIEW: ICD-10-PCS | Mod: CPTII,S$GLB,, | Performed by: OPTOMETRIST

## 2023-03-13 PROCEDURE — 99213 OFFICE O/P EST LOW 20 MIN: CPT | Mod: S$GLB,,, | Performed by: OPTOMETRIST

## 2023-03-13 PROCEDURE — 99999 PR PBB SHADOW E&M-EST. PATIENT-LVL IV: ICD-10-PCS | Mod: PBBFAC,,, | Performed by: OPTOMETRIST

## 2023-03-13 PROCEDURE — 4010F ACE/ARB THERAPY RXD/TAKEN: CPT | Mod: CPTII,S$GLB,, | Performed by: OPTOMETRIST

## 2023-03-13 PROCEDURE — 3044F PR MOST RECENT HEMOGLOBIN A1C LEVEL <7.0%: ICD-10-PCS | Mod: CPTII,S$GLB,, | Performed by: OPTOMETRIST

## 2023-03-13 PROCEDURE — 3060F POS MICROALBUMINURIA REV: CPT | Mod: CPTII,S$GLB,, | Performed by: OPTOMETRIST

## 2023-03-13 PROCEDURE — 1159F MED LIST DOCD IN RCRD: CPT | Mod: CPTII,S$GLB,, | Performed by: OPTOMETRIST

## 2023-03-13 PROCEDURE — 2023F PR DILATED RETINAL EXAM W/O EVID OF RETINOPATHY: ICD-10-PCS | Mod: CPTII,S$GLB,, | Performed by: OPTOMETRIST

## 2023-03-13 PROCEDURE — 99213 PR OFFICE/OUTPT VISIT, EST, LEVL III, 20-29 MIN: ICD-10-PCS | Mod: S$GLB,,, | Performed by: OPTOMETRIST

## 2023-03-13 PROCEDURE — 2023F DILAT RTA XM W/O RTNOPTHY: CPT | Mod: CPTII,S$GLB,, | Performed by: OPTOMETRIST

## 2023-03-13 RX ORDER — LOTEPREDNOL ETABONATE 5 MG/ML
1 SUSPENSION/ DROPS OPHTHALMIC 2 TIMES DAILY PRN
Qty: 5 ML | Refills: 1 | Status: SHIPPED | OUTPATIENT
Start: 2023-03-13 | End: 2023-05-15

## 2023-03-13 NOTE — PROGRESS NOTES
"HPI    Dle- 11/07/20222    Pt here for 4 month IOP check. Pt sts va slightly blurred since last   visit. Slight eye pressure OU, occasional, pt notices about 1-2 per wk.   Floaters, occasional, nothing new, no increase. Denies flashes/eye pain.   Headaches, 1-2 per wk. Gtts: otc AT's prn. DM controlled with meds.     Hemoglobin A1C       Date                     Value               Ref Range             Status                02/03/2023               5.3                 4.0 - 5.6 %           Final                       08/30/2022               5.6                 4.0 - 5.6 %           Final                       06/10/2022               5.7 (H)             4.0 - 5.6 %           Final                Last edited by Lexis Mckeon MA on 3/13/2023  9:23 AM.        ROS    Positive for: Eyes  Negative for: Constitutional, Gastrointestinal, Neurological, Skin,   Genitourinary, Musculoskeletal, HENT, Endocrine, Cardiovascular,   Respiratory, Psychiatric, Allergic/Imm, Heme/Lymph  Last edited by MILLA Villeda, OD on 3/13/2023  9:42 AM.        Assessment /Plan     For exam results, see Encounter Report.    OAG (open angle glaucoma) suspect, high risk, bilateral    Allergic conjunctivitis of both eyes  -     loteprednol (LOTEMAX) 0.5 % ophthalmic suspension; Place 1 drop into both eyes 2 (two) times daily as needed (to control allergy symptoms. Not to exceed 10 days.).  Dispense: 5 mL; Refill: 1    High normal IOP   moderate -large c/d  Angles open   + fhx both parents w/ glaucoma      /568     Gilman complete 11/11/22  G 101 wnl  G 99 wnl     PSD  3.07 <2% "onl" w/ some sup arc depression  1.70 <10% bord w/ few missed spots   No gross progression        High normal IOP/ strong fhx, relatively young patient age---very high suspect for eventual progression           2. Seasonal allergic complaint // short course lotemax, then use otc ATs and pataday for s/s   Message if not effective       Continue no tx, monitor closely " ---IOP recheck in 4 months

## 2023-03-17 ENCOUNTER — INFUSION (OUTPATIENT)
Dept: INFUSION THERAPY | Facility: HOSPITAL | Age: 38
End: 2023-03-17
Attending: INTERNAL MEDICINE
Payer: COMMERCIAL

## 2023-03-17 VITALS
HEIGHT: 64 IN | SYSTOLIC BLOOD PRESSURE: 117 MMHG | TEMPERATURE: 98 F | DIASTOLIC BLOOD PRESSURE: 82 MMHG | HEART RATE: 78 BPM | BODY MASS INDEX: 50.02 KG/M2 | RESPIRATION RATE: 18 BRPM | WEIGHT: 293 LBS

## 2023-03-17 DIAGNOSIS — D50.0 IRON DEFICIENCY ANEMIA DUE TO CHRONIC BLOOD LOSS: Primary | ICD-10-CM

## 2023-03-17 PROCEDURE — 63600175 PHARM REV CODE 636 W HCPCS: Mod: PN | Performed by: INTERNAL MEDICINE

## 2023-03-17 PROCEDURE — 25000003 PHARM REV CODE 250: Mod: PN | Performed by: INTERNAL MEDICINE

## 2023-03-17 PROCEDURE — 96365 THER/PROPH/DIAG IV INF INIT: CPT | Mod: PN

## 2023-03-17 RX ORDER — HEPARIN 100 UNIT/ML
500 SYRINGE INTRAVENOUS
Status: CANCELLED | OUTPATIENT
Start: 2023-03-23

## 2023-03-17 RX ORDER — SODIUM CHLORIDE 0.9 % (FLUSH) 0.9 %
10 SYRINGE (ML) INJECTION
Status: DISCONTINUED | OUTPATIENT
Start: 2023-03-17 | End: 2023-03-17 | Stop reason: HOSPADM

## 2023-03-17 RX ORDER — SODIUM CHLORIDE 0.9 % (FLUSH) 0.9 %
10 SYRINGE (ML) INJECTION
Status: CANCELLED | OUTPATIENT
Start: 2023-03-23

## 2023-03-17 RX ORDER — METHYLPREDNISOLONE SOD SUCC 125 MG
125 VIAL (EA) INJECTION ONCE AS NEEDED
Status: CANCELLED | OUTPATIENT
Start: 2023-03-23

## 2023-03-17 RX ORDER — DIPHENHYDRAMINE HYDROCHLORIDE 50 MG/ML
50 INJECTION INTRAMUSCULAR; INTRAVENOUS ONCE AS NEEDED
Status: CANCELLED | OUTPATIENT
Start: 2023-03-23

## 2023-03-17 RX ORDER — EPINEPHRINE 0.3 MG/.3ML
0.3 INJECTION SUBCUTANEOUS ONCE AS NEEDED
Status: CANCELLED | OUTPATIENT
Start: 2023-03-23

## 2023-03-17 RX ORDER — HEPARIN 100 UNIT/ML
500 SYRINGE INTRAVENOUS
Status: DISCONTINUED | OUTPATIENT
Start: 2023-03-17 | End: 2023-03-17 | Stop reason: HOSPADM

## 2023-03-17 RX ADMIN — SODIUM CHLORIDE: 9 INJECTION, SOLUTION INTRAVENOUS at 02:03

## 2023-03-17 RX ADMIN — IRON SUCROSE 300 MG: 20 INJECTION, SOLUTION INTRAVENOUS at 02:03

## 2023-03-17 NOTE — PLAN OF CARE
Tolerated venofer well.  Observed pt post infusion, no reactions noted.  No questions or concerns at this time.  Ambulated off unit in NAD.

## 2023-03-20 ENCOUNTER — DOCUMENTATION ONLY (OUTPATIENT)
Dept: INFUSION THERAPY | Facility: HOSPITAL | Age: 38
End: 2023-03-20
Payer: COMMERCIAL

## 2023-03-20 NOTE — PROGRESS NOTES
This patient was seen at the [facility] on 3/17/23.  Please excuse the patient from work.  She is able to return with full duties on 3/18/23.    Thanks,  Sharon Rene RN Manager

## 2023-03-21 ENCOUNTER — DOCUMENTATION ONLY (OUTPATIENT)
Dept: INFUSION THERAPY | Facility: HOSPITAL | Age: 38
End: 2023-03-21
Payer: COMMERCIAL

## 2023-03-22 ENCOUNTER — CLINICAL SUPPORT (OUTPATIENT)
Dept: ALLERGY | Facility: CLINIC | Age: 38
End: 2023-03-22
Payer: COMMERCIAL

## 2023-03-22 DIAGNOSIS — J45.40 MODERATE PERSISTENT ASTHMA WITHOUT COMPLICATION: Primary | ICD-10-CM

## 2023-03-22 PROCEDURE — 99999 PR PBB SHADOW E&M-EST. PATIENT-LVL I: CPT | Mod: PBBFAC,,,

## 2023-03-22 PROCEDURE — 99999 PR PBB SHADOW E&M-EST. PATIENT-LVL I: ICD-10-PCS | Mod: PBBFAC,,,

## 2023-03-22 PROCEDURE — 96372 PR INJECTION,THERAP/PROPH/DIAG2ST, IM OR SUBCUT: ICD-10-PCS | Mod: S$GLB,,, | Performed by: ALLERGY & IMMUNOLOGY

## 2023-03-22 PROCEDURE — 96372 THER/PROPH/DIAG INJ SC/IM: CPT | Mod: S$GLB,,, | Performed by: ALLERGY & IMMUNOLOGY

## 2023-03-24 ENCOUNTER — INFUSION (OUTPATIENT)
Dept: INFUSION THERAPY | Facility: HOSPITAL | Age: 38
End: 2023-03-24
Attending: INTERNAL MEDICINE
Payer: COMMERCIAL

## 2023-03-24 VITALS
HEART RATE: 92 BPM | RESPIRATION RATE: 19 BRPM | DIASTOLIC BLOOD PRESSURE: 64 MMHG | SYSTOLIC BLOOD PRESSURE: 120 MMHG | TEMPERATURE: 98 F

## 2023-03-24 DIAGNOSIS — D50.0 IRON DEFICIENCY ANEMIA DUE TO CHRONIC BLOOD LOSS: Primary | ICD-10-CM

## 2023-03-24 PROCEDURE — 96365 THER/PROPH/DIAG IV INF INIT: CPT | Mod: PN

## 2023-03-24 PROCEDURE — 96366 THER/PROPH/DIAG IV INF ADDON: CPT | Mod: PN

## 2023-03-24 PROCEDURE — 25000003 PHARM REV CODE 250: Mod: PN | Performed by: INTERNAL MEDICINE

## 2023-03-24 PROCEDURE — 63600175 PHARM REV CODE 636 W HCPCS: Mod: PN | Performed by: INTERNAL MEDICINE

## 2023-03-24 RX ORDER — SODIUM CHLORIDE 0.9 % (FLUSH) 0.9 %
10 SYRINGE (ML) INJECTION
Status: DISCONTINUED | OUTPATIENT
Start: 2023-03-24 | End: 2023-03-24 | Stop reason: HOSPADM

## 2023-03-24 RX ORDER — HEPARIN 100 UNIT/ML
500 SYRINGE INTRAVENOUS
Status: CANCELLED | OUTPATIENT
Start: 2023-03-31

## 2023-03-24 RX ORDER — DIPHENHYDRAMINE HYDROCHLORIDE 50 MG/ML
50 INJECTION INTRAMUSCULAR; INTRAVENOUS ONCE AS NEEDED
Status: CANCELLED | OUTPATIENT
Start: 2023-03-31

## 2023-03-24 RX ORDER — HEPARIN 100 UNIT/ML
500 SYRINGE INTRAVENOUS
Status: DISCONTINUED | OUTPATIENT
Start: 2023-03-24 | End: 2023-03-24 | Stop reason: HOSPADM

## 2023-03-24 RX ORDER — SODIUM CHLORIDE 0.9 % (FLUSH) 0.9 %
10 SYRINGE (ML) INJECTION
Status: CANCELLED | OUTPATIENT
Start: 2023-03-31

## 2023-03-24 RX ORDER — METHYLPREDNISOLONE SOD SUCC 125 MG
125 VIAL (EA) INJECTION ONCE AS NEEDED
Status: CANCELLED | OUTPATIENT
Start: 2023-03-31

## 2023-03-24 RX ORDER — EPINEPHRINE 0.3 MG/.3ML
0.3 INJECTION SUBCUTANEOUS ONCE AS NEEDED
Status: CANCELLED | OUTPATIENT
Start: 2023-03-31

## 2023-03-24 RX ADMIN — IRON SUCROSE 300 MG: 20 INJECTION, SOLUTION INTRAVENOUS at 01:03

## 2023-03-24 RX ADMIN — SODIUM CHLORIDE: 9 INJECTION, SOLUTION INTRAVENOUS at 12:03

## 2023-03-24 NOTE — PLAN OF CARE
Problem: Adult Inpatient Plan of Care  Goal: Plan of Care Review  Outcome: Ongoing, Progressing  Flowsheets (Taken 3/24/2023 1314)  Plan of Care Reviewed With:   patient   spouse   Pt tolerated venofer infusion well.  No adverse reaction noted.   IV flushed with NS and D/C per protocol.  Patient left clinic in no acute distress.

## 2023-03-24 NOTE — PLAN OF CARE
Problem: Adult Inpatient Plan of Care  Goal: Patient-Specific Goal (Individualized)  Outcome: Ongoing, Progressing  Flowsheets (Taken 3/24/2023 1245)  Anxieties, Fears or Concerns: multiple sticks to start iv  Individualized Care Needs: recliner, warm blanket     Problem: Fatigue  Goal: Improved Activity Tolerance  Outcome: Ongoing, Progressing  Intervention: Promote Improved Energy  Flowsheets (Taken 3/24/2023 1245)  Fatigue Management: frequent rest breaks encouraged  Sleep/Rest Enhancement: relaxation techniques promoted  Activity Management: Ambulated -L4

## 2023-03-27 ENCOUNTER — TELEPHONE (OUTPATIENT)
Dept: FAMILY MEDICINE | Facility: CLINIC | Age: 38
End: 2023-03-27
Payer: COMMERCIAL

## 2023-03-27 NOTE — TELEPHONE ENCOUNTER
----- Message from Leona Martinez sent at 3/27/2023  8:36 AM CDT -----  Contact: Zachery Bingham with [facility] is calling regarding levocetirizine (XYZAL) 5 MG tablet, patients insurance doesn't cover it and he's looking for alternatives. Please call back [phone], (Reference number [id number])

## 2023-03-28 ENCOUNTER — TELEPHONE (OUTPATIENT)
Dept: FAMILY MEDICINE | Facility: CLINIC | Age: 38
End: 2023-03-28
Payer: COMMERCIAL

## 2023-03-28 NOTE — TELEPHONE ENCOUNTER
Spoke to pt over the phone, informed her that her insurance does not cover , she need to call cha to find out was medication she can take. Randall bryan

## 2023-03-28 NOTE — TELEPHONE ENCOUNTER
----- Message from Bakari Morrison sent at 3/28/2023  8:50 AM CDT -----  Zachery with [facility] is calling regarding levocetirizine (XYZAL) 5 MG tablet, patients insurance doesn't cover it and he's looking for alternatives. Please call back [phone], (Reference number [id number])       PLS CALL [facility] AND PT AFTER

## 2023-03-31 ENCOUNTER — INFUSION (OUTPATIENT)
Dept: INFUSION THERAPY | Facility: HOSPITAL | Age: 38
End: 2023-03-31
Attending: INTERNAL MEDICINE
Payer: COMMERCIAL

## 2023-03-31 VITALS
WEIGHT: 293 LBS | HEART RATE: 90 BPM | DIASTOLIC BLOOD PRESSURE: 74 MMHG | SYSTOLIC BLOOD PRESSURE: 123 MMHG | HEIGHT: 64 IN | BODY MASS INDEX: 50.02 KG/M2 | TEMPERATURE: 99 F | RESPIRATION RATE: 16 BRPM | OXYGEN SATURATION: 99 %

## 2023-03-31 DIAGNOSIS — D50.0 IRON DEFICIENCY ANEMIA DUE TO CHRONIC BLOOD LOSS: Primary | ICD-10-CM

## 2023-03-31 PROCEDURE — 96366 THER/PROPH/DIAG IV INF ADDON: CPT | Mod: PN

## 2023-03-31 PROCEDURE — 96365 THER/PROPH/DIAG IV INF INIT: CPT | Mod: PN

## 2023-03-31 PROCEDURE — A4216 STERILE WATER/SALINE, 10 ML: HCPCS | Mod: PN | Performed by: INTERNAL MEDICINE

## 2023-03-31 PROCEDURE — 25000003 PHARM REV CODE 250: Mod: PN | Performed by: INTERNAL MEDICINE

## 2023-03-31 PROCEDURE — 63600175 PHARM REV CODE 636 W HCPCS: Mod: PN | Performed by: INTERNAL MEDICINE

## 2023-03-31 RX ORDER — HEPARIN 100 UNIT/ML
500 SYRINGE INTRAVENOUS
OUTPATIENT
Start: 2023-04-07

## 2023-03-31 RX ORDER — SODIUM CHLORIDE 0.9 % (FLUSH) 0.9 %
10 SYRINGE (ML) INJECTION
Status: DISCONTINUED | OUTPATIENT
Start: 2023-03-31 | End: 2023-03-31 | Stop reason: HOSPADM

## 2023-03-31 RX ORDER — EPINEPHRINE 0.3 MG/.3ML
0.3 INJECTION SUBCUTANEOUS ONCE AS NEEDED
OUTPATIENT
Start: 2023-04-07

## 2023-03-31 RX ORDER — SODIUM CHLORIDE 0.9 % (FLUSH) 0.9 %
10 SYRINGE (ML) INJECTION
OUTPATIENT
Start: 2023-04-07

## 2023-03-31 RX ORDER — METHYLPREDNISOLONE SOD SUCC 125 MG
125 VIAL (EA) INJECTION ONCE AS NEEDED
OUTPATIENT
Start: 2023-04-07

## 2023-03-31 RX ORDER — DIPHENHYDRAMINE HYDROCHLORIDE 50 MG/ML
50 INJECTION INTRAMUSCULAR; INTRAVENOUS ONCE AS NEEDED
OUTPATIENT
Start: 2023-04-07

## 2023-03-31 RX ADMIN — IRON SUCROSE 300 MG: 20 INJECTION, SOLUTION INTRAVENOUS at 01:03

## 2023-03-31 RX ADMIN — Medication 10 ML: at 04:03

## 2023-03-31 RX ADMIN — SODIUM CHLORIDE: 9 INJECTION, SOLUTION INTRAVENOUS at 01:03

## 2023-03-31 NOTE — PLAN OF CARE
Problem: Adult Inpatient Plan of Care  Goal: Plan of Care Review  Outcome: Ongoing, Progressing  Flowsheets (Taken 3/31/2023 1611)  Plan of Care Reviewed With: patient  Goal: Patient-Specific Goal (Individualized)  Outcome: Ongoing, Progressing  Flowsheets (Taken 3/31/2023 1611)  Anxieties, Fears or Concerns: none voiced  Individualized Care Needs: recliner, blanket, dim lights, tv, education, conversation, snacks, pillow     Problem: Fatigue  Goal: Improved Activity Tolerance  Outcome: Ongoing, Progressing  Intervention: Promote Improved Energy  Flowsheets (Taken 3/31/2023 1611)  Fatigue Management:   frequent rest breaks encouraged   paced activity encouraged   fatigue-related activity identified  Sleep/Rest Enhancement:   therapeutic touch utilized   room darkened   relaxation techniques promoted   noise level reduced   family presence promoted   natural light exposure provided   consistent schedule promoted  Activity Management:   Ambulated -L4   Up in chair - L3

## 2023-04-05 ENCOUNTER — PATIENT MESSAGE (OUTPATIENT)
Dept: ALLERGY | Facility: CLINIC | Age: 38
End: 2023-04-05
Payer: COMMERCIAL

## 2023-04-06 ENCOUNTER — CLINICAL SUPPORT (OUTPATIENT)
Dept: FAMILY MEDICINE | Facility: CLINIC | Age: 38
End: 2023-04-06
Payer: COMMERCIAL

## 2023-04-06 DIAGNOSIS — E53.8 B12 DEFICIENCY: Primary | ICD-10-CM

## 2023-04-06 PROCEDURE — 96372 PR INJECTION,THERAP/PROPH/DIAG2ST, IM OR SUBCUT: ICD-10-PCS | Mod: S$GLB,,, | Performed by: FAMILY MEDICINE

## 2023-04-06 PROCEDURE — 96372 THER/PROPH/DIAG INJ SC/IM: CPT | Mod: S$GLB,,, | Performed by: FAMILY MEDICINE

## 2023-04-06 PROCEDURE — 99999 PR PBB SHADOW E&M-EST. PATIENT-LVL III: ICD-10-PCS | Mod: PBBFAC,,,

## 2023-04-06 PROCEDURE — 99999 PR PBB SHADOW E&M-EST. PATIENT-LVL III: CPT | Mod: PBBFAC,,,

## 2023-04-06 RX ADMIN — CYANOCOBALAMIN 1000 MCG: 1000 INJECTION, SOLUTION INTRAMUSCULAR; SUBCUTANEOUS at 09:04

## 2023-04-10 ENCOUNTER — CLINICAL SUPPORT (OUTPATIENT)
Dept: ALLERGY | Facility: CLINIC | Age: 38
End: 2023-04-10
Payer: COMMERCIAL

## 2023-04-10 DIAGNOSIS — J45.40 MODERATE PERSISTENT ASTHMA WITHOUT COMPLICATION: Primary | ICD-10-CM

## 2023-04-10 PROCEDURE — 99999 PR PBB SHADOW E&M-EST. PATIENT-LVL I: ICD-10-PCS | Mod: PBBFAC,,,

## 2023-04-10 PROCEDURE — 99999 PR PBB SHADOW E&M-EST. PATIENT-LVL I: CPT | Mod: PBBFAC,,,

## 2023-04-10 PROCEDURE — 96372 THER/PROPH/DIAG INJ SC/IM: CPT | Mod: S$GLB,,, | Performed by: ALLERGY & IMMUNOLOGY

## 2023-04-10 PROCEDURE — 96372 PR INJECTION,THERAP/PROPH/DIAG2ST, IM OR SUBCUT: ICD-10-PCS | Mod: S$GLB,,, | Performed by: ALLERGY & IMMUNOLOGY

## 2023-04-18 ENCOUNTER — PATIENT MESSAGE (OUTPATIENT)
Dept: ALLERGY | Facility: CLINIC | Age: 38
End: 2023-04-18
Payer: COMMERCIAL

## 2023-04-26 ENCOUNTER — OFFICE VISIT (OUTPATIENT)
Dept: PSYCHIATRY | Facility: CLINIC | Age: 38
End: 2023-04-26
Payer: COMMERCIAL

## 2023-04-26 DIAGNOSIS — F41.9 ANXIETY: ICD-10-CM

## 2023-04-26 DIAGNOSIS — F32.1 CURRENT MODERATE EPISODE OF MAJOR DEPRESSIVE DISORDER, UNSPECIFIED WHETHER RECURRENT: Primary | ICD-10-CM

## 2023-04-26 PROCEDURE — 4010F ACE/ARB THERAPY RXD/TAKEN: CPT | Mod: CPTII,95,, | Performed by: SOCIAL WORKER

## 2023-04-26 PROCEDURE — 3060F POS MICROALBUMINURIA REV: CPT | Mod: CPTII,95,, | Performed by: SOCIAL WORKER

## 2023-04-26 PROCEDURE — 3066F NEPHROPATHY DOC TX: CPT | Mod: CPTII,95,, | Performed by: SOCIAL WORKER

## 2023-04-26 PROCEDURE — 90791 PSYCH DIAGNOSTIC EVALUATION: CPT | Mod: 95,,, | Performed by: SOCIAL WORKER

## 2023-04-26 PROCEDURE — 3044F HG A1C LEVEL LT 7.0%: CPT | Mod: CPTII,95,, | Performed by: SOCIAL WORKER

## 2023-04-26 PROCEDURE — 3060F PR POS MICROALBUMINURIA RESULT DOCUMENTED/REVIEW: ICD-10-PCS | Mod: CPTII,95,, | Performed by: SOCIAL WORKER

## 2023-04-26 PROCEDURE — 3044F PR MOST RECENT HEMOGLOBIN A1C LEVEL <7.0%: ICD-10-PCS | Mod: CPTII,95,, | Performed by: SOCIAL WORKER

## 2023-04-26 PROCEDURE — 3072F PR LOW RISK FOR RETINOPATHY: ICD-10-PCS | Mod: CPTII,95,, | Performed by: SOCIAL WORKER

## 2023-04-26 PROCEDURE — 3066F PR DOCUMENTATION OF TREATMENT FOR NEPHROPATHY: ICD-10-PCS | Mod: CPTII,95,, | Performed by: SOCIAL WORKER

## 2023-04-26 PROCEDURE — 4010F PR ACE/ARB THEARPY RXD/TAKEN: ICD-10-PCS | Mod: CPTII,95,, | Performed by: SOCIAL WORKER

## 2023-04-26 PROCEDURE — 90791 PR PSYCHIATRIC DIAGNOSTIC EVALUATION: ICD-10-PCS | Mod: 95,,, | Performed by: SOCIAL WORKER

## 2023-04-26 PROCEDURE — 3072F LOW RISK FOR RETINOPATHY: CPT | Mod: CPTII,95,, | Performed by: SOCIAL WORKER

## 2023-04-26 NOTE — PROGRESS NOTES
Psychiatry Initial Visit (PhD/LCSW)  Diagnostic Interview - CPT 71270    Due to the nature of this visit type, a virtual visit with synchronous audio and video, each patient to whom this provider administers behavioral health services by telemedicine is: (1) informed of the relationship between the provider and patient and the respective role of any other health care provider with respect to management of the patient; and (2) notified that he or she may decline to receive services by telemedicine and may withdraw from such care at any time.    The patient was informed of the following:     Provider's contact info:  [facility] [address], [address], [address]  [place] [address]  (Phone) [phone]    If technology issues occur, call office phone: : [phone]  If crisis: Dial 911 or go to nearest Emergency Room (ER)  If questions related to privacy practices: contact [facility] Information Department: [phone]    For security purposes, the pt identified that [place] [address] during today's session and contact number is [phone].    The pt's emergency contact(s) is Extended Emergency Contact Information  Primary Emergency Contact: Sophy Coreas  Address: [id number] [place] [address] [place]  Home Phone: [phone]  Mobile Phone: [phone]  Relation: Spouse  Preferred language: English   needed? No.    Crisis Disclaimer: Patient was informed that due to the virtual nature of the visit, that if a crisis develops, protocols will be implemented to ensure patient safety, including but not limited to: 1) Initiating a welfare check with local law enforcement and/or 2) Calling 911.    Date: 4/26/2023    Site: [place]    Referral source: self referred    Clinical status of patient: Outpatient    Lucía Coreas, a 37 y.o. female, for initial evaluation visit.  Met with  patient.    Chief complaint/reason for encounter: depression, anxiety, and interpersonal    PHQ-9 Depression Patient Health Questionnaire 4/26/2023   Patient agreed to terms: Yes   Little interest or pleasure in doing things 1   Feeling down, depressed, or hopeless 1   Trouble falling or staying asleep, or sleeping too much 3   Feeling tired or having little energy 3   Poor appetite or overeating 2   Feeling bad about yourself - or that you are a failure or have let yourself or your family down 1   Trouble concentrating on things, such as reading the newspaper or watching television 2   Moving or speaking so slowly that other people could have noticed. Or the opposite - being so fidgety or restless that you have been moving around a lot more than usual 0   Thoughts that you would be better off dead, or of hurting yourself in some way 0   PHQ-9 Total Score 13   If you checked off any problems, how difficult have these problems made it for you to do your work, take care of things at home, or get along with other people? Somewhat difficult   Interpretation Moderate       GAD7 8/15/2019   1. Feeling nervous, anxious, or on edge? 0   2. Not being able to stop or control worrying? 0       History of present illness:  Met with this patient for her online virtual initial visit appointment.  The patient was in her home throughout the appointment.  She stated that she lives in [facility] [address] [facility] with her  La with whom she has been  for 11 years.  The patient has 2 children, a 5-year-old daughter and a 20-month-old son.  She states that she works at the [employer] as a .  She enjoys her job but states that she has ongoing anxiety and some depression.  Her PHQ-9 indicated a score of 13, which indicates moderate depression.  The patient states that she bites her nails and her cuticles all the way down until they are bleeding.  She takes Lexapro to cope with her anxiety but finds that she still feels  anxious and does not sleep well at night.  She states that after she had her last child, her 20-month-old she experienced brain fog.  She had an infection in her uterus after the baby was born and was rehospitalized for a time.  She states that she lost muscle tone due to the infection.  She was also given propofol during a procedure to treat the infection.  She feels that this has affected her brain and her memory.  She states that her relationship with her  is sometimes troubled but they are trying to work through their problems.  The patient described what she does as over thinking things.  She is hoping to be able to discuss her issues as well as her relationship with her mother.  Helped the client to identify some of her negative self talk and the ways that she puts herself down and has low self-esteem.  Taught about conflict resolution skills to use with her  as well as treating each other lovingly and with kindness.  Pointed out her strengths and coping abilities and explored how she has dealt with difficult situations in the past discussed alternatives to biting her nails.  She stated she would make a follow-up appointment.    Pain: noncontributory    Symptoms:   Mood: depressed mood, worthlessness/guilt, and poor concentration  Anxiety: excessive anxiety/worry and irritability  Substance abuse: denied  Cognitive functioning: denied  Health behaviors: noncontributory    Psychiatric history: psychotropic management by PCP    Medical history:   Past Medical History:   Diagnosis Date    Allergy     Amenorrhea     Asthma     Diabetes     GERD (gastroesophageal reflux disease)     Infertility associated with anovulation     Iron deficiency anemia     Knee pain     Metabolic syndrome     PCO (polycystic ovaries)     Recurrent boils     Status post repeat low transverse  section 2020    Formatting of this note might be different from the original. With BTL 21       Family  history of psychiatric illness:   Family History   Problem Relation Age of Onset    Diabetes Father     Heart disease Father 69    Hypertension Father     Prostate cancer Father     Diabetes Mother     Lupus Mother     AMY disease Brother     Ovarian cancer Sister     Breast cancer Neg Hx         Social history (marriage, employment, etc.):  , works full-time as a  for the [employer].  The patient has 2 children, age 5 and age 20 months.  Social History     Social History Narrative    Not on file        Substance use:     Social History     Tobacco Use    Smoking status: Never    Smokeless tobacco: Never   Substance Use Topics    Alcohol use: Never        Current medications and drug reactions (include OTC, herbal):    Current Outpatient Medications:     albuterol (PROVENTIL) 2.5 mg /3 mL (0.083 %) nebulizer solution, Take 3 mLs (2.5 mg total) by nebulization every 4 to 6 hours as needed for Wheezing., Disp: 60 each, Rfl: 3    albuterol (PROVENTIL/VENTOLIN HFA) 90 mcg/actuation inhaler, USE 1 TO 2 INHALATIONS EVERY 4 HOURS AS NEEDED FOR WHEEZING, Disp: 8.5 g, Rfl: 10    azelastine (ASTELIN) 137 mcg (0.1 %) nasal spray, SMARTSIG:Both Nares, Disp: , Rfl:     cholecalciferol, vitamin D3, (VITAMIN D3) 25 mcg (1,000 unit) capsule, Take 2 capsules (2,000 Units total) by mouth once daily., Disp: 90 capsule, Rfl: 4    doxycycline (VIBRA-TABS) 100 MG tablet, Take 1 tablet (100 mg total) by mouth 2 (two) times daily. Take for 5 days prn flares, Disp: 30 tablet, Rfl: 2    EPINEPHrine (EPIPEN) 0.3 mg/0.3 mL AtIn, Inject 0.3 mLs (0.3 mg total) into the muscle once. for 1 dose, Disp: 2 each, Rfl: 11    fluocinonide (LIDEX) 0.05 % external solution, Apply topically 2 (two) times daily. Use on scalp, Disp: 60 mL, Rfl: 5    fluticasone furoate-vilanteroL (BREO) 200-25 mcg/dose DsDv diskus inhaler, Inhale 1 puff into the lungs once daily. Controller, Disp: 1 each, Rfl: 4    gabapentin (NEURONTIN) 300 MG capsule,  "Take 1 capsule (300 mg total) by mouth 3 (three) times daily., Disp: 90 capsule, Rfl: 11    iron-vit c-b12-folic acid (IRON 100 PLUS) Tab, Take 1 tablet by mouth once daily., Disp: 90 tablet, Rfl: 1    ketoconazole (NIZORAL) 2 % shampoo, Apply topically once a week. Lather in for 5-10 min before rinsing, Disp: 360 mL, Rfl: 3    Lactobacillus rhamnosus GG (CULTURELLE) 10 billion cell capsule, Take 1 capsule by mouth once daily., Disp: , Rfl:     lactulose (CHRONULAC) 10 gram/15 mL solution, SMARTSI Tablespoon By Mouth Daily, Disp: , Rfl:     levocetirizine (XYZAL) 5 MG tablet, Take 1 tablet (5 mg total) by mouth every evening., Disp: 30 tablet, Rfl: 4    linaCLOtide (LINZESS) 72 mcg Cap capsule, Take 1 capsule (72 mcg total) by mouth once daily., Disp: 30 capsule, Rfl: 1    lisinopriL (PRINIVIL,ZESTRIL) 2.5 MG tablet, Take 1 tablet (2.5 mg total) by mouth once daily., Disp: 90 tablet, Rfl: 3    loratadine (CLARITIN) 10 mg tablet, Take 1 tablet (10 mg total) by mouth once daily., Disp: 90 tablet, Rfl: 4    loteprednol (LOTEMAX) 0.5 % ophthalmic suspension, Place 1 drop into both eyes daily as needed (to control allergy symptoms. Not for chronic daily use.)., Disp: 5 mL, Rfl: 1    methylPREDNISolone (MEDROL, MARTIN,) 4 mg tablet, use as directed, Disp: 1 each, Rfl: 0    montelukast (SINGULAIR) 10 mg tablet, Take 1 tablet (10 mg total) by mouth once daily., Disp: 30 tablet, Rfl: 3    mupirocin (BACTROBAN) 2 % ointment, Apply topically 3 (three) times daily., Disp: , Rfl:     naproxen (NAPROSYN) 500 MG tablet, Take 1 tablet (500 mg total) by mouth 2 (two) times daily., Disp: 60 tablet, Rfl: 1    NIFEdipine (PROCARDIA-XL) 30 MG (OSM) 24 hr tablet, Take 30 mg by mouth once daily., Disp: , Rfl:     NOVOFINE PLUS 32 gauge x 1/6" Ndle, use as directed, Disp: , Rfl:     nystatin (MYCOSTATIN) powder, Apply topically 2 (two) times daily., Disp: 60 g, Rfl: 1    omalizumab (XOLAIR) 150 mg injection, Inject 300 mg into the skin " every 14 (fourteen) days., Disp: 2 each, Rfl: 11    ondansetron (ZOFRAN-ODT) 8 MG TbDL, DISSOLVE ONE TABLET UNDER TONGUE EVERY 8 HOURS AS NEEDED FOR NAUSEA, Disp: , Rfl:     pantoprazole (PROTONIX) 40 MG tablet, Take 1 tablet (40 mg total) by mouth once daily., Disp: 90 tablet, Rfl: 4    spironolactone (ALDACTONE) 100 MG tablet, Take 1 tablet (100 mg total) by mouth once daily., Disp: 90 tablet, Rfl: 3    tirzepatide 10 mg/0.5 mL PnIj, Inject 10 mg into the skin every 7 days., Disp: 4 pen, Rfl: 12    tiZANidine (ZANAFLEX) 4 MG tablet, Take 1 tablet (4 mg total) by mouth every 12 (twelve) hours as needed (muscle spasms/ pain)., Disp: 40 tablet, Rfl: 0    tretinoin (RETIN-A) 0.05 % cream, Apply topically every evening. Use on face, Disp: 45 g, Rfl: 5    Current Facility-Administered Medications:     cyanocobalamin injection 1,000 mcg, 1,000 mcg, Intramuscular, Q30 Days, Dante Negro MD, 1,000 mcg at 02/03/23 1416    cyanocobalamin injection 1,000 mcg, 1,000 mcg, Intramuscular, Q30 Days, Dante Negro MD, 1,000 mcg at 04/06/23 0948    omalizumab injection 150 mg, 150 mg, Subcutaneous, Q14 Days, Rossy Kramer MD, 150 mg at 05/10/23 0921    omalizumab injection 150 mg, 150 mg, Subcutaneous, Q14 Days, Rossy Kramer MD, 150 mg at 05/10/23 0920    omalizumab injection 300 mg, 300 mg, Subcutaneous, Q28 Days, Rossy Kramer MD, 300 mg at 04/10/23 0942      Strengths and liabilities: Strength: Patient accepts guidance/feedback, Strength: Patient is expressive/articulate., Liability: Patient lacks coping skills.    Current Evaluation:     Mental Status Exam:  General Appearance:  unremarkable, age appropriate   Speech: normal tone, normal rate, normal pitch, normal volume      Level of Cooperation: cooperative      Thought Processes: normal and logical   Mood: anxious      Thought Content: normal, no suicidality, no homicidality, delusions, or paranoia   Affect: congruent and appropriate   Orientation: Oriented x3   Memory:  recent >  intact   Attention Span & Concentration: intact   Fund of General Knowledge: intact and appropriate to age and level of education   Abstract Reasoning: interpretation of similarities was abstract   Judgment & Insight: fair     Language  intact     Diagnostic Impression - Plan:       ICD-10-CM ICD-9-CM   1. Current moderate episode of major depressive disorder, unspecified whether recurrent  F32.1 296.22   2. Anxiety  F41.9 300.00       Plan:individual psychotherapy    Return to Clinic: as scheduled    Length of Service (minutes): 30       Letha Cunningham LCSW  05/23/2023   9:09 AM

## 2023-05-02 ENCOUNTER — PATIENT MESSAGE (OUTPATIENT)
Dept: ALLERGY | Facility: CLINIC | Age: 38
End: 2023-05-02
Payer: COMMERCIAL

## 2023-05-09 ENCOUNTER — TELEPHONE (OUTPATIENT)
Dept: HEMATOLOGY/ONCOLOGY | Facility: CLINIC | Age: 38
End: 2023-05-09
Payer: COMMERCIAL

## 2023-05-09 NOTE — TELEPHONE ENCOUNTER
Called and spoke to pt about genetics appt on May 23rd.  Informed pt the appt will become a virtual appt.  Pt verbalized understanding and ok with the virtual visit.  Explained to pt if any testing will be necHANNAH, genetic counselor will send a home kit (saliva kit) or put in lab orders if needed.

## 2023-05-10 ENCOUNTER — CLINICAL SUPPORT (OUTPATIENT)
Dept: ALLERGY | Facility: CLINIC | Age: 38
End: 2023-05-10
Payer: COMMERCIAL

## 2023-05-10 DIAGNOSIS — J45.40 MODERATE PERSISTENT ASTHMA WITHOUT COMPLICATION: Primary | ICD-10-CM

## 2023-05-10 PROCEDURE — 96372 THER/PROPH/DIAG INJ SC/IM: CPT | Mod: S$GLB,,, | Performed by: ALLERGY & IMMUNOLOGY

## 2023-05-10 PROCEDURE — 96372 PR INJECTION,THERAP/PROPH/DIAG2ST, IM OR SUBCUT: ICD-10-PCS | Mod: S$GLB,,, | Performed by: ALLERGY & IMMUNOLOGY

## 2023-05-12 DIAGNOSIS — H10.13 ALLERGIC CONJUNCTIVITIS OF BOTH EYES: ICD-10-CM

## 2023-05-12 NOTE — TELEPHONE ENCOUNTER
No care due was identified.  [facility] Embedded Care Due Messages. Reference number: [id number].   5/12/2023 5:31:31 PM CDT

## 2023-05-13 RX ORDER — ESCITALOPRAM OXALATE 20 MG/1
TABLET ORAL
Refills: 0 | OUTPATIENT
Start: 2023-05-13

## 2023-05-13 NOTE — TELEPHONE ENCOUNTER
Refill Decision Note   Lucía Coreas  is requesting a refill authorization.  Brief Assessment and Rationale for Refill:  Quick Discontinue     Medication Therapy Plan:      Pharmacy is requesting new scripts for the following medications without required information, (sig/ frequency/qty/etc)      Medication Reconciliation Completed: No     Comments: Pharmacies have been requesting medications for patients without required information, (sig, frequency, qty, etc.). In addition, requests are sent for medication(s) pt. are currently not taking, and medications patients have never taken.    We have spoken to the pharmacies about these request types and advised their teams previously that we are unable to assess these New Script requests and require all details for these requests. This is a known issue and has been reported.     Note composed:4:00 AM 05/13/2023

## 2023-05-15 ENCOUNTER — PATIENT MESSAGE (OUTPATIENT)
Dept: OPTOMETRY | Facility: CLINIC | Age: 38
End: 2023-05-15
Payer: COMMERCIAL

## 2023-05-15 RX ORDER — LOTEPREDNOL ETABONATE 5 MG/ML
1 SUSPENSION/ DROPS OPHTHALMIC DAILY PRN
Qty: 5 ML | Refills: 1 | Status: SHIPPED | OUTPATIENT
Start: 2023-05-15 | End: 2024-05-15

## 2023-05-22 ENCOUNTER — HOSPITAL ENCOUNTER (OUTPATIENT)
Dept: RADIOLOGY | Facility: HOSPITAL | Age: 38
Discharge: HOME | End: 2023-05-22
Attending: ORTHOPAEDIC SURGERY
Payer: COMMERCIAL

## 2023-05-22 ENCOUNTER — OFFICE VISIT (OUTPATIENT)
Dept: ORTHOPEDICS | Facility: CLINIC | Age: 38
End: 2023-05-22
Payer: COMMERCIAL

## 2023-05-22 ENCOUNTER — OFFICE VISIT (OUTPATIENT)
Dept: PAIN MEDICINE | Facility: CLINIC | Age: 38
End: 2023-05-22
Payer: COMMERCIAL

## 2023-05-22 VITALS
DIASTOLIC BLOOD PRESSURE: 88 MMHG | HEIGHT: 64 IN | WEIGHT: 293 LBS | SYSTOLIC BLOOD PRESSURE: 136 MMHG | HEART RATE: 86 BPM | BODY MASS INDEX: 50.02 KG/M2

## 2023-05-22 VITALS — HEIGHT: 64 IN | BODY MASS INDEX: 50.02 KG/M2 | WEIGHT: 293 LBS

## 2023-05-22 DIAGNOSIS — M79.671 RIGHT FOOT PAIN: Primary | ICD-10-CM

## 2023-05-22 DIAGNOSIS — M25.562 BILATERAL KNEE PAIN: Primary | ICD-10-CM

## 2023-05-22 DIAGNOSIS — M25.561 BILATERAL KNEE PAIN: ICD-10-CM

## 2023-05-22 DIAGNOSIS — M25.562 BILATERAL KNEE PAIN: ICD-10-CM

## 2023-05-22 DIAGNOSIS — M43.16 SPONDYLOLISTHESIS OF LUMBAR REGION: ICD-10-CM

## 2023-05-22 DIAGNOSIS — M25.561 BILATERAL KNEE PAIN: Primary | ICD-10-CM

## 2023-05-22 DIAGNOSIS — G89.29 CHRONIC RIGHT-SIDED LOW BACK PAIN WITH RIGHT-SIDED SCIATICA: Primary | ICD-10-CM

## 2023-05-22 DIAGNOSIS — M25.561 RIGHT KNEE PAIN: Primary | ICD-10-CM

## 2023-05-22 DIAGNOSIS — M54.41 CHRONIC RIGHT-SIDED LOW BACK PAIN WITH RIGHT-SIDED SCIATICA: Primary | ICD-10-CM

## 2023-05-22 PROCEDURE — 3044F HG A1C LEVEL LT 7.0%: CPT | Mod: CPTII,S$GLB,, | Performed by: PHYSICIAN ASSISTANT

## 2023-05-22 PROCEDURE — 99213 OFFICE O/P EST LOW 20 MIN: CPT | Mod: 25,S$GLB,, | Performed by: PHYSICIAN ASSISTANT

## 2023-05-22 PROCEDURE — 4010F PR ACE/ARB THEARPY RXD/TAKEN: ICD-10-PCS | Mod: CPTII,S$GLB,, | Performed by: ORTHOPAEDIC SURGERY

## 2023-05-22 PROCEDURE — 3072F PR LOW RISK FOR RETINOPATHY: ICD-10-PCS | Mod: CPTII,S$GLB,, | Performed by: ORTHOPAEDIC SURGERY

## 2023-05-22 PROCEDURE — 3008F PR BODY MASS INDEX (BMI) DOCUMENTED: ICD-10-PCS | Mod: CPTII,S$GLB,, | Performed by: ORTHOPAEDIC SURGERY

## 2023-05-22 PROCEDURE — 3008F PR BODY MASS INDEX (BMI) DOCUMENTED: ICD-10-PCS | Mod: CPTII,S$GLB,, | Performed by: PHYSICIAN ASSISTANT

## 2023-05-22 PROCEDURE — 1159F PR MEDICATION LIST DOCUMENTED IN MEDICAL RECORD: ICD-10-PCS | Mod: CPTII,S$GLB,, | Performed by: PHYSICIAN ASSISTANT

## 2023-05-22 PROCEDURE — 99204 OFFICE O/P NEW MOD 45 MIN: CPT | Mod: S$GLB,,, | Performed by: ORTHOPAEDIC SURGERY

## 2023-05-22 PROCEDURE — 4010F PR ACE/ARB THEARPY RXD/TAKEN: ICD-10-PCS | Mod: CPTII,S$GLB,, | Performed by: PHYSICIAN ASSISTANT

## 2023-05-22 PROCEDURE — 3079F PR MOST RECENT DIASTOLIC BLOOD PRESSURE 80-89 MM HG: ICD-10-PCS | Mod: CPTII,S$GLB,, | Performed by: PHYSICIAN ASSISTANT

## 2023-05-22 PROCEDURE — 99999 PR PBB SHADOW E&M-EST. PATIENT-LVL V: CPT | Mod: PBBFAC,,, | Performed by: ORTHOPAEDIC SURGERY

## 2023-05-22 PROCEDURE — 99213 PR OFFICE/OUTPT VISIT, EST, LEVL III, 20-29 MIN: ICD-10-PCS | Mod: 25,S$GLB,, | Performed by: PHYSICIAN ASSISTANT

## 2023-05-22 PROCEDURE — 3072F LOW RISK FOR RETINOPATHY: CPT | Mod: CPTII,S$GLB,, | Performed by: PHYSICIAN ASSISTANT

## 2023-05-22 PROCEDURE — 3008F BODY MASS INDEX DOCD: CPT | Mod: CPTII,S$GLB,, | Performed by: PHYSICIAN ASSISTANT

## 2023-05-22 PROCEDURE — 96372 THER/PROPH/DIAG INJ SC/IM: CPT | Mod: S$GLB,,, | Performed by: PHYSICIAN ASSISTANT

## 2023-05-22 PROCEDURE — 3066F PR DOCUMENTATION OF TREATMENT FOR NEPHROPATHY: ICD-10-PCS | Mod: CPTII,S$GLB,, | Performed by: PHYSICIAN ASSISTANT

## 2023-05-22 PROCEDURE — 1159F PR MEDICATION LIST DOCUMENTED IN MEDICAL RECORD: ICD-10-PCS | Mod: CPTII,S$GLB,, | Performed by: ORTHOPAEDIC SURGERY

## 2023-05-22 PROCEDURE — 3060F POS MICROALBUMINURIA REV: CPT | Mod: CPTII,S$GLB,, | Performed by: ORTHOPAEDIC SURGERY

## 2023-05-22 PROCEDURE — 99999 PR PBB SHADOW E&M-EST. PATIENT-LVL V: CPT | Mod: PBBFAC,,, | Performed by: PHYSICIAN ASSISTANT

## 2023-05-22 PROCEDURE — 3044F PR MOST RECENT HEMOGLOBIN A1C LEVEL <7.0%: ICD-10-PCS | Mod: CPTII,S$GLB,, | Performed by: ORTHOPAEDIC SURGERY

## 2023-05-22 PROCEDURE — 1160F RVW MEDS BY RX/DR IN RCRD: CPT | Mod: CPTII,S$GLB,, | Performed by: PHYSICIAN ASSISTANT

## 2023-05-22 PROCEDURE — 73562 XR KNEE ORTHO BILAT: ICD-10-PCS | Mod: 26,,, | Performed by: RADIOLOGY

## 2023-05-22 PROCEDURE — 1159F MED LIST DOCD IN RCRD: CPT | Mod: CPTII,S$GLB,, | Performed by: ORTHOPAEDIC SURGERY

## 2023-05-22 PROCEDURE — 3075F SYST BP GE 130 - 139MM HG: CPT | Mod: CPTII,S$GLB,, | Performed by: PHYSICIAN ASSISTANT

## 2023-05-22 PROCEDURE — 1160F PR REVIEW ALL MEDS BY PRESCRIBER/CLIN PHARMACIST DOCUMENTED: ICD-10-PCS | Mod: CPTII,S$GLB,, | Performed by: PHYSICIAN ASSISTANT

## 2023-05-22 PROCEDURE — 3066F PR DOCUMENTATION OF TREATMENT FOR NEPHROPATHY: ICD-10-PCS | Mod: CPTII,S$GLB,, | Performed by: ORTHOPAEDIC SURGERY

## 2023-05-22 PROCEDURE — 3066F NEPHROPATHY DOC TX: CPT | Mod: CPTII,S$GLB,, | Performed by: PHYSICIAN ASSISTANT

## 2023-05-22 PROCEDURE — 99999 PR PBB SHADOW E&M-EST. PATIENT-LVL V: ICD-10-PCS | Mod: PBBFAC,,, | Performed by: PHYSICIAN ASSISTANT

## 2023-05-22 PROCEDURE — 3008F BODY MASS INDEX DOCD: CPT | Mod: CPTII,S$GLB,, | Performed by: ORTHOPAEDIC SURGERY

## 2023-05-22 PROCEDURE — 3075F PR MOST RECENT SYSTOLIC BLOOD PRESS GE 130-139MM HG: ICD-10-PCS | Mod: CPTII,S$GLB,, | Performed by: PHYSICIAN ASSISTANT

## 2023-05-22 PROCEDURE — 1160F RVW MEDS BY RX/DR IN RCRD: CPT | Mod: CPTII,S$GLB,, | Performed by: ORTHOPAEDIC SURGERY

## 2023-05-22 PROCEDURE — 1159F MED LIST DOCD IN RCRD: CPT | Mod: CPTII,S$GLB,, | Performed by: PHYSICIAN ASSISTANT

## 2023-05-22 PROCEDURE — 73562 X-RAY EXAM OF KNEE 3: CPT | Mod: TC,50,PO

## 2023-05-22 PROCEDURE — 4010F ACE/ARB THERAPY RXD/TAKEN: CPT | Mod: CPTII,S$GLB,, | Performed by: ORTHOPAEDIC SURGERY

## 2023-05-22 PROCEDURE — 3072F LOW RISK FOR RETINOPATHY: CPT | Mod: CPTII,S$GLB,, | Performed by: ORTHOPAEDIC SURGERY

## 2023-05-22 PROCEDURE — 99204 PR OFFICE/OUTPT VISIT, NEW, LEVL IV, 45-59 MIN: ICD-10-PCS | Mod: S$GLB,,, | Performed by: ORTHOPAEDIC SURGERY

## 2023-05-22 PROCEDURE — 99999 PR PBB SHADOW E&M-EST. PATIENT-LVL V: ICD-10-PCS | Mod: PBBFAC,,, | Performed by: ORTHOPAEDIC SURGERY

## 2023-05-22 PROCEDURE — 3060F PR POS MICROALBUMINURIA RESULT DOCUMENTED/REVIEW: ICD-10-PCS | Mod: CPTII,S$GLB,, | Performed by: ORTHOPAEDIC SURGERY

## 2023-05-22 PROCEDURE — 4010F ACE/ARB THERAPY RXD/TAKEN: CPT | Mod: CPTII,S$GLB,, | Performed by: PHYSICIAN ASSISTANT

## 2023-05-22 PROCEDURE — 3060F POS MICROALBUMINURIA REV: CPT | Mod: CPTII,S$GLB,, | Performed by: PHYSICIAN ASSISTANT

## 2023-05-22 PROCEDURE — 96372 PR INJECTION,THERAP/PROPH/DIAG2ST, IM OR SUBCUT: ICD-10-PCS | Mod: S$GLB,,, | Performed by: PHYSICIAN ASSISTANT

## 2023-05-22 PROCEDURE — 3044F HG A1C LEVEL LT 7.0%: CPT | Mod: CPTII,S$GLB,, | Performed by: ORTHOPAEDIC SURGERY

## 2023-05-22 PROCEDURE — 73562 X-RAY EXAM OF KNEE 3: CPT | Mod: 26,,, | Performed by: RADIOLOGY

## 2023-05-22 PROCEDURE — 3066F NEPHROPATHY DOC TX: CPT | Mod: CPTII,S$GLB,, | Performed by: ORTHOPAEDIC SURGERY

## 2023-05-22 PROCEDURE — 1160F PR REVIEW ALL MEDS BY PRESCRIBER/CLIN PHARMACIST DOCUMENTED: ICD-10-PCS | Mod: CPTII,S$GLB,, | Performed by: ORTHOPAEDIC SURGERY

## 2023-05-22 PROCEDURE — 3072F PR LOW RISK FOR RETINOPATHY: ICD-10-PCS | Mod: CPTII,S$GLB,, | Performed by: PHYSICIAN ASSISTANT

## 2023-05-22 PROCEDURE — 3060F PR POS MICROALBUMINURIA RESULT DOCUMENTED/REVIEW: ICD-10-PCS | Mod: CPTII,S$GLB,, | Performed by: PHYSICIAN ASSISTANT

## 2023-05-22 PROCEDURE — 3079F DIAST BP 80-89 MM HG: CPT | Mod: CPTII,S$GLB,, | Performed by: PHYSICIAN ASSISTANT

## 2023-05-22 PROCEDURE — 3044F PR MOST RECENT HEMOGLOBIN A1C LEVEL <7.0%: ICD-10-PCS | Mod: CPTII,S$GLB,, | Performed by: PHYSICIAN ASSISTANT

## 2023-05-22 RX ORDER — NAPROXEN 500 MG/1
500 TABLET ORAL 2 TIMES DAILY
Qty: 60 TABLET | Refills: 1 | Status: SHIPPED | OUTPATIENT
Start: 2023-05-22 | End: 2023-08-02

## 2023-05-22 RX ORDER — KETOROLAC TROMETHAMINE 30 MG/ML
30 INJECTION, SOLUTION INTRAMUSCULAR; INTRAVENOUS ONCE
Status: DISCONTINUED | OUTPATIENT
Start: 2023-05-22 | End: 2023-05-22

## 2023-05-22 RX ORDER — KETOROLAC TROMETHAMINE 30 MG/ML
60 INJECTION, SOLUTION INTRAMUSCULAR; INTRAVENOUS
Status: COMPLETED | OUTPATIENT
Start: 2023-05-22 | End: 2023-05-22

## 2023-05-22 RX ORDER — METHYLPREDNISOLONE 4 MG/1
TABLET ORAL
Qty: 1 EACH | Refills: 0 | Status: SHIPPED | OUTPATIENT
Start: 2023-05-23 | End: 2023-06-12

## 2023-05-22 RX ORDER — TIZANIDINE 4 MG/1
4 TABLET ORAL EVERY 12 HOURS PRN
Qty: 40 TABLET | Refills: 0 | Status: SHIPPED | OUTPATIENT
Start: 2023-05-22

## 2023-05-22 RX ADMIN — KETOROLAC TROMETHAMINE 60 MG: 30 INJECTION, SOLUTION INTRAMUSCULAR; INTRAVENOUS at 09:05

## 2023-05-22 NOTE — PROGRESS NOTES
37 years old right knee pain for about 6 days time noticed after started new job that involves a lot of walking.  Describes a pulling pain 3 on good days 6 on bad days.  Relieved with rest and heat worse with activity walking     Exam shows tenderness throughout the knee I can not detect any instability, extensor mechanism functioning well, no outward signs of injury or infection, hip range of motion does not reproduce her symptoms    X-rays show arthritic changes     Assessment: Right knee arthrosis    Plan:  We will give her a refill prescription for Naprosyn which he is taken in the past and responded well to, long-term strengthening and weight loss, follow-up in several weeks time as needed    Patient seen as a consult from Dr. Santosh Huddleston, communication via epic

## 2023-05-22 NOTE — PROGRESS NOTES
"  Cancer Genetics  Hereditary/High Risk Clinic  Department of Hematology and Oncology  [facility]        Date of Service:  2023  Provider:  Marleny Westbrook MS, [facility] [place]    Patient Information  Name:  Lucía Coreas  :  1985  MRN:  [id number]        Referring Provider: Ariana Molina MD    Televisit Information  The patient location is: [place], [place].  The chief complaint leading to consultation is: as below.  Visit type: audiovisual.  Face-to-face time with patient: approximately 40 minutes.  Approximately 100 minutes in total were spent on this encounter, which includes face-to-face time and non-face-to-face time preparing to see the patient (e.g., review of tests), obtaining and/or reviewing separately obtained history, documenting clinical information in the electronic or other health record, independently interpreting results (not separately reported) and communicating results to the patient/family/caregiver, or care coordination (not separately reported).  Each patient to whom he or she provides medical services by telemedicine is:  (1) informed of the relationship between the physician and patient and the respective role of any other health care provider with respect to management of the patient; and (2) notified that he or she may decline to receive medical services by telemedicine and may withdraw from such care at any time.      SUBJECTIVE:      Chief Complaint: Genetic Counseling    History of Present Illness (HPI):  Lucía Coreas ("Lucía"), 37 y.o., assigned female sex at birth is established with the [facility] Department of Hematology and Oncology but new to me.  She was referred by Medical Oncology for genetic cancer risk assessment given her family history of ovarian and prostate cancer. She has no personal history of cancer.    Focused Medical History:   Germline cancer-genetic testing:  No  Cancer:  No  No colonoscopy hx  No mammo hx  Other benign tumor/findings:  Yes  PCOS (2013) "   Uterine fibroids   Pancreatitis:  No  Pancreatic cyst:  No     Focused Surgical History  Reproductive organs intact    Ancestry:  Ashkenazi Temple ancestry:  No  Paternal:    Maternal:     Focused Family History:  Consanguinity in ancestors:  No  Germline cancer-genetic testing in blood relatives:  No  Cancer in family:  Yes; there are no known blood relatives affected with cancer other than those mentioned in the pedigree below    Family Cancer Pedigree:          Review of Systems:  See HPI.     SUBJECTIVE:   Records Reviewed  Medical History  Problem List  Any pertinent, available Procedures and Pathology reports in both [facility] Documents    COUNSELING   Causes of cancer  Germline cancer genetic testing is the testing of genes associated with cancer, known as cancer susceptibility genes.  Just as these genes are inherited from parents, mutations in these genes can be inherited, as well.  A mutation in a cancer susceptibility gene adversely affects the gene's ability to prevent cancer; therefore, carriers of cancer susceptibility gene mutations may be at increased risk for certain cancers.     Only 5-10% cancers are caused by a cancer susceptibility gene mutation, meaning the cancer is genetic/hereditary; rather, most cancers are sporadic.  Causes of sporadic cancers may include environmental risk factors, lifestyle risk factors, and non-modifiable risk factors.  It is important to note that members of a family often share not only their genetics but also risk factors including environmental and lifestyle risk factors, so cancers can be familial.    Mutations in BRCA1 and BRCA2 are associated with an increased risk for breast and ovarian cancer, in addition to male breast cancer, pancreatic, melanoma, and prostate cancer. Discussed that mutations in other genes may increase the risk of these cancers, in addition to other cancers.     Potential results of genetic testing, and  "their implications  Potential results of genetic testing include positive, negative, and variant of unknown significance (VUS).    A positive result indicates the presence of at least one clinically significant mutation, and the patient's associated cancer risks vary depending upon the cancer susceptibility gene(s) in which there is/are a mutation(s).  With a positive result, in some cases, depending upon the specific result and the patient's clinical history, modified risk management may be recommended, including measures for risk reduction and/or surveillance; however, modified management is not always an option.    A negative result indicates that no clinically significant mutations were identified in the gene(s) tested.    A VUS indicates that there is not presently enough data for the laboratory to make a determination as to whether the variant is clinically significant.  VUSs are not typically acted upon clinically.       The ability to interpret the meaning of a negative genetic testing result in genes associated with cancer with which the patient has not personally been affected, when done prior to testing the appropriate affected relative(s), is significantly limited.  A negative result in the patient does not indicate that she cannot develop cancer, and, in fact, the patient may even be at increased risk for cancer based on shared risk factors with affected relatives.  The most informative candidates for initial genetic testing in a family are those who have been affected with cancer.     Tyer Cuzik Score  Modified management may also be recommended, even with a result of no or unknown significance, based upon risk assessment that incorporates the family history.      Breast-Specific History  Height:  5'4"  Weight:  302 lbs  Breast density per BI-RADS:  unknown  Age at menarche:  14 years  Age at first live birth:  31 years  Menopause:  premenopausal   HRT usage:  never  BRCA testing:  No  Personal history " of ovarian cancer:  No  Personal history of breast biopsy:  no  Ashkenazi Baptist inheritance:  No  History of XRT to chest wall:  No  Family history:  as detailed in pedigree/family history    Breast Cancer Risk Stratification  Current estimated lifetime risk of breast cancer, as calculated utilizing the Tyrer-Cuzick risk-assessment model v8.0b:  14.5%.  This places the patient in the average-risk range according to current clinical guidelines.    As such, the NCCN guidelines recommend that you:  See a healthcare provider to review personal and family history, have your breast cancer risk assessed (your risk is not static and can change), discuss breast cancer risk reduction, and undergo a clinical breast exam.  This visit would typically be with a gynecologist or a breast healthcare specialist, should start at age 25, and should occur annually.  Practice breast awareness, which means that you are familiar with your breasts and perform periodic (I recommend at least monthly), consistent breast self-exams and promptly report to a healthcare provider (typically, a gynecologist or a breast healthcare specialist) any changes or concerns.  Breast awareness should begin by age 25.  Undergo a screening mammogram (preferably, one that is 3-D) annually.  This can be ordered by your primary care provider, gynecologist, or breast healthcare specialist and should begin at age 40.     Genetic mutation inheritance  If Lucía tests positive for a mutation, her first-degree relatives would each have a 50% chance of having the same mutation, and other, more distantly related blood-relatives would also be at risk of having the same mutation.       Genetic discrimination  The Genetic Information Nondiscrimination Act (ROSY) is U[place] federal legislation that provides some protections against use of an individual's genetic information by their health insurer and by their employer.  Title I of ROSY prohibits most health insurers from  utilizing an individual's genetic information to make decisions regarding insurance eligibility or premium charges.  Title II of ROSY prohibits covered entities, including employers, from requesting the genetic information of employees and applicants.  ROSY does not protect individuals from genetic discrimination toward health insurance obtained through a job with the  or through the Federal Employees Health Benefits Plan; from genetic discrimination by employers with fewer than 15 employees or if employed by the ; or from genetic discrimination by any other type of policies/entities, including but not limited to life insurance, disability insurance, long-term care insurance,  benefits, and [facility] benefits.     Genetic testing logistics  An outside laboratory would perform the testing after a saliva sample is collected by the patient at home.  With genetic testing, there is a potential for the patient to incur out-of-pocket costs.  Results can take 2-3 weeks.  Post-test genetic counseling can be conducted once the genetic testing results are available.     Assessment/Plan  Based on the information provided by Lucaí, she meets current criteria for genetic testing of hereditary breast and ovarian cancer syndrome according to current clinical guidelines. Lucía's clinical history, including personal history and/or family history, is strongly suggestive of a hereditary cancer syndrome in the family given the family history of ovarian cancer in multiple 2nd degree maternal relatives.     Offered germline cancer-genetic testing at this time versus deferring testing at this time or declining testing altogether.  Various test panel options were discussed. Lucía decided to proceed with genetic testing through the 2-gene BRCA1/BRCA2 Core Panel and the 84-gene Multi-Cancer Panel (AIP, ALK, APC, LOLI, AXIN2, BAP1, BARD1, BLM, BMPR1A, BRCA1, BRCA2, BRIP1, CASR, CDC73, CDH1, CDK4, CDKN1B,  CDKN1C, CDKN2A, CEBPA, CHEK2, CTNNA1, DICER1, DIS3L2, EGFR, EPCAM, FH, FLCN, GATA2, GPC3, GREM1, HOXB13, HRAS, KIT, MAX, MEN1, MET, MITF, MLH1, MSH2, MSH3, MSH6, MUTYH, NBN, NF1, NF2, NTHL1, PALB2, PDGFRA, PHOX2B, PMS2, POLD1, POLE, POT1, JNWGO2Z, PTCH1, PTEN, RAD50, RAD51C, RAD51D, RB1, RECQL4, RET, RUNX1, SDHA, SDHAF2, SDHB, SDHC, SDHD, SMAD4, SMARCA4, SMARCB1, SMARCE1, STK11, SUFU, TERC, TERT, JXTD131, TP53, TSC1, TSC2, VHL, WRN, WT1).  Lucía has provided her verbal informed consent to proceed. A saliva kit will be mailed to her.     Questions were encouraged and answered to the patient's satisfaction, and she verbalized understanding of information and agreement with the plan.         Signed,    Marleny Westbrook MS, [facility] [place]  Certified Genetic Counselor, Hereditary and High-Risk Clinic  Hematology/Oncology, [facility]    05/23/2023

## 2023-05-23 ENCOUNTER — HOSPITAL ENCOUNTER (OUTPATIENT)
Dept: RADIOLOGY | Facility: HOSPITAL | Age: 38
Discharge: HOME | End: 2023-05-23
Attending: ORTHOPAEDIC SURGERY
Payer: COMMERCIAL

## 2023-05-23 ENCOUNTER — OFFICE VISIT (OUTPATIENT)
Dept: HEMATOLOGY/ONCOLOGY | Facility: CLINIC | Age: 38
End: 2023-05-23
Payer: COMMERCIAL

## 2023-05-23 ENCOUNTER — OFFICE VISIT (OUTPATIENT)
Dept: ORTHOPEDICS | Facility: CLINIC | Age: 38
End: 2023-05-23
Payer: COMMERCIAL

## 2023-05-23 DIAGNOSIS — M21.41 ACQUIRED PES PLANOVALGUS OF RIGHT FOOT: Primary | ICD-10-CM

## 2023-05-23 DIAGNOSIS — M25.871 SUBFIBULAR IMPINGEMENT OF RIGHT LOWER EXTREMITY: ICD-10-CM

## 2023-05-23 DIAGNOSIS — Z80.0 FAMILY HISTORY OF COLON CANCER: Primary | ICD-10-CM

## 2023-05-23 DIAGNOSIS — M79.671 RIGHT FOOT PAIN: ICD-10-CM

## 2023-05-23 DIAGNOSIS — Z80.41 FAMILY HISTORY OF OVARIAN CANCER: ICD-10-CM

## 2023-05-23 DIAGNOSIS — S90.122A CONTUSION OF LEFT LESSER TOE(S) W/O DAMAGE TO NAIL, INIT: ICD-10-CM

## 2023-05-23 DIAGNOSIS — Z80.42 FAMILY HISTORY OF PROSTATE CANCER: ICD-10-CM

## 2023-05-23 DIAGNOSIS — M62.89 TIGHTNESS OF RIGHT GASTROCNEMIUS MUSCLE: ICD-10-CM

## 2023-05-23 PROCEDURE — 73610 XR ANKLE COMPLETE 3 VIEW BILATERAL: ICD-10-PCS | Mod: 26,,, | Performed by: RADIOLOGY

## 2023-05-23 PROCEDURE — 3060F POS MICROALBUMINURIA REV: CPT | Mod: CPTII,S$GLB,, | Performed by: ORTHOPAEDIC SURGERY

## 2023-05-23 PROCEDURE — 99214 PR OFFICE/OUTPT VISIT, EST, LEVL IV, 30-39 MIN: ICD-10-PCS | Mod: S$GLB,,, | Performed by: ORTHOPAEDIC SURGERY

## 2023-05-23 PROCEDURE — 99499 UNLISTED E&M SERVICE: CPT | Mod: 95,,, | Performed by: INTERNAL MEDICINE

## 2023-05-23 PROCEDURE — 99999 PR PBB SHADOW E&M-EST. PATIENT-LVL III: CPT | Mod: PBBFAC,,, | Performed by: ORTHOPAEDIC SURGERY

## 2023-05-23 PROCEDURE — 3066F PR DOCUMENTATION OF TREATMENT FOR NEPHROPATHY: ICD-10-PCS | Mod: CPTII,S$GLB,, | Performed by: ORTHOPAEDIC SURGERY

## 2023-05-23 PROCEDURE — 3072F PR LOW RISK FOR RETINOPATHY: ICD-10-PCS | Mod: CPTII,S$GLB,, | Performed by: ORTHOPAEDIC SURGERY

## 2023-05-23 PROCEDURE — 1159F MED LIST DOCD IN RCRD: CPT | Mod: CPTII,S$GLB,, | Performed by: ORTHOPAEDIC SURGERY

## 2023-05-23 PROCEDURE — 73630 X-RAY EXAM OF FOOT: CPT | Mod: TC,50,PO

## 2023-05-23 PROCEDURE — 99999 PR PBB SHADOW E&M-EST. PATIENT-LVL III: ICD-10-PCS | Mod: PBBFAC,,, | Performed by: ORTHOPAEDIC SURGERY

## 2023-05-23 PROCEDURE — 99499 NO LOS: ICD-10-PCS | Mod: 95,,, | Performed by: INTERNAL MEDICINE

## 2023-05-23 PROCEDURE — 73610 X-RAY EXAM OF ANKLE: CPT | Mod: TC,50,PO

## 2023-05-23 PROCEDURE — 99214 OFFICE O/P EST MOD 30 MIN: CPT | Mod: S$GLB,,, | Performed by: ORTHOPAEDIC SURGERY

## 2023-05-23 PROCEDURE — 1160F RVW MEDS BY RX/DR IN RCRD: CPT | Mod: CPTII,S$GLB,, | Performed by: ORTHOPAEDIC SURGERY

## 2023-05-23 PROCEDURE — 3060F PR POS MICROALBUMINURIA RESULT DOCUMENTED/REVIEW: ICD-10-PCS | Mod: CPTII,S$GLB,, | Performed by: ORTHOPAEDIC SURGERY

## 2023-05-23 PROCEDURE — 73630 X-RAY EXAM OF FOOT: CPT | Mod: 26,,, | Performed by: RADIOLOGY

## 2023-05-23 PROCEDURE — 4010F ACE/ARB THERAPY RXD/TAKEN: CPT | Mod: CPTII,S$GLB,, | Performed by: ORTHOPAEDIC SURGERY

## 2023-05-23 PROCEDURE — 1160F PR REVIEW ALL MEDS BY PRESCRIBER/CLIN PHARMACIST DOCUMENTED: ICD-10-PCS | Mod: CPTII,S$GLB,, | Performed by: ORTHOPAEDIC SURGERY

## 2023-05-23 PROCEDURE — 1159F PR MEDICATION LIST DOCUMENTED IN MEDICAL RECORD: ICD-10-PCS | Mod: CPTII,S$GLB,, | Performed by: ORTHOPAEDIC SURGERY

## 2023-05-23 PROCEDURE — 3044F HG A1C LEVEL LT 7.0%: CPT | Mod: CPTII,S$GLB,, | Performed by: ORTHOPAEDIC SURGERY

## 2023-05-23 PROCEDURE — 4010F PR ACE/ARB THEARPY RXD/TAKEN: ICD-10-PCS | Mod: CPTII,S$GLB,, | Performed by: ORTHOPAEDIC SURGERY

## 2023-05-23 PROCEDURE — 73630 XR FOOT COMPLETE 3 VIEW BILATERAL: ICD-10-PCS | Mod: 26,,, | Performed by: RADIOLOGY

## 2023-05-23 PROCEDURE — 73610 X-RAY EXAM OF ANKLE: CPT | Mod: 26,,, | Performed by: RADIOLOGY

## 2023-05-23 PROCEDURE — 3072F LOW RISK FOR RETINOPATHY: CPT | Mod: CPTII,S$GLB,, | Performed by: ORTHOPAEDIC SURGERY

## 2023-05-23 PROCEDURE — 3066F NEPHROPATHY DOC TX: CPT | Mod: CPTII,S$GLB,, | Performed by: ORTHOPAEDIC SURGERY

## 2023-05-23 PROCEDURE — 3044F PR MOST RECENT HEMOGLOBIN A1C LEVEL <7.0%: ICD-10-PCS | Mod: CPTII,S$GLB,, | Performed by: ORTHOPAEDIC SURGERY

## 2023-05-23 NOTE — PROGRESS NOTES
Status/Diagnosis: Bilateral pes planovalgus with Right-sided subfibular impingement; Bilateral gastroc contracture; Left 2nd toe bony contusion.  Date of Surgery: none  Date of Injury: none; DOO 1 week ago  Return visit: PRN  X-rays on Return: pending patient complaint    Chief Complaint:   Chief Complaint   Patient presents with    Right Ankle - Pain, Numbness    Right Foot - Pain, Numbness    Left Foot - Pain, Numbness     Present History:  Lucía Coreas is a 37 y.o. female who presents today for new patient evaluation.  Patient endorses a week-long history of acute on chronic right lateral ankle/hindfoot pain.  Also with acute onset left 2nd toe pain localized to the tip at the nail fold.  Denies any history of injury or other inciting event.  Has had intermittent bilateral ankle and hindfoot pain related to chronic flatfoot deformity over the years.  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  This requires her to be up on her feet for long periods.  No history of shoe wear or activity modification.  Did perform physical therapy in the past for bilateral ankles in 2019.  Also with vague complaint of right-sided paresthesias along the dorsum of the midfoot.  Pain present at rest and with weight-bearing.  Given a prescription for p.o. naproxen by Dr. Davis yesterday for diagnosis of right knee arthritis.  Has yet to initiate this.      Past Medical History:   Diagnosis Date    Allergy     Amenorrhea     Asthma     Diabetes     GERD (gastroesophageal reflux disease)     Infertility associated with anovulation     Iron deficiency anemia     Knee pain     Metabolic syndrome     PCO (polycystic ovaries)     Recurrent boils     Status post repeat low transverse  section 2020    Formatting of this note might be different from the original. With BTL 21       Past Surgical History:   Procedure Laterality Date    BTL      CARPAL TUNNEL RELEASE Left 2019    CARPAL  TUNNEL RELEASE Right 2020    Procedure: RELEASE, CARPAL TUNNEL;  Surgeon: Adeel Laughlin MD;  Location: [facility] OR;  Service: Orthopedics;  Laterality: Right;     SECTION      X2    EXPLORATORY LAPAROTOMY      1 week postop with WOUND VAK, reopened uterus    WOUND VAK      3 months post-op Section, 2 weeks in hospital       Current Outpatient Medications   Medication Sig    albuterol (PROVENTIL) 2.5 mg /3 mL (0.083 %) nebulizer solution Take 3 mLs (2.5 mg total) by nebulization every 4 to 6 hours as needed for Wheezing.    albuterol (PROVENTIL/VENTOLIN HFA) 90 mcg/actuation inhaler USE 1 TO 2 INHALATIONS EVERY 4 HOURS AS NEEDED FOR WHEEZING    azelastine (ASTELIN) 137 mcg (0.1 %) nasal spray SMARTSIG:Both Nares    cholecalciferol, vitamin D3, (VITAMIN D3) 25 mcg (1,000 unit) capsule Take 2 capsules (2,000 Units total) by mouth once daily.    doxycycline (VIBRA-TABS) 100 MG tablet Take 1 tablet (100 mg total) by mouth 2 (two) times daily. Take for 5 days prn flares    fluocinonide (LIDEX) 0.05 % external solution Apply topically 2 (two) times daily. Use on scalp    fluticasone furoate-vilanteroL (BREO) 200-25 mcg/dose DsDv diskus inhaler Inhale 1 puff into the lungs once daily. Controller    gabapentin (NEURONTIN) 300 MG capsule Take 1 capsule (300 mg total) by mouth 3 (three) times daily.    iron-vit c-b12-folic acid (IRON 100 PLUS) Tab Take 1 tablet by mouth once daily.    ketoconazole (NIZORAL) 2 % shampoo Apply topically once a week. Lather in for 5-10 min before rinsing    Lactobacillus rhamnosus GG (CULTURELLE) 10 billion cell capsule Take 1 capsule by mouth once daily.    lactulose (CHRONULAC) 10 gram/15 mL solution SMARTSI Tablespoon By Mouth Daily    levocetirizine (XYZAL) 5 MG tablet Take 1 tablet (5 mg total) by mouth every evening.    linaCLOtide (LINZESS) 72 mcg Cap capsule Take 1 capsule (72 mcg total) by mouth once daily.    lisinopriL (PRINIVIL,ZESTRIL) 2.5 MG tablet Take 1  "tablet (2.5 mg total) by mouth once daily.    loratadine (CLARITIN) 10 mg tablet Take 1 tablet (10 mg total) by mouth once daily.    loteprednol (LOTEMAX) 0.5 % ophthalmic suspension Place 1 drop into both eyes daily as needed (to control allergy symptoms. Not for chronic daily use.).    methylPREDNISolone (MEDROL, MARTIN,) 4 mg tablet use as directed    montelukast (SINGULAIR) 10 mg tablet Take 1 tablet (10 mg total) by mouth once daily.    mupirocin (BACTROBAN) 2 % ointment Apply topically 3 (three) times daily.    naproxen (NAPROSYN) 500 MG tablet Take 1 tablet (500 mg total) by mouth 2 (two) times daily.    NIFEdipine (PROCARDIA-XL) 30 MG (OSM) 24 hr tablet Take 30 mg by mouth once daily.    NOVOFINE PLUS 32 gauge x 1/6" Ndle use as directed    nystatin (MYCOSTATIN) powder Apply topically 2 (two) times daily.    omalizumab (XOLAIR) 150 mg injection Inject 300 mg into the skin every 14 (fourteen) days.    ondansetron (ZOFRAN-ODT) 8 MG TbDL DISSOLVE ONE TABLET UNDER TONGUE EVERY 8 HOURS AS NEEDED FOR NAUSEA    pantoprazole (PROTONIX) 40 MG tablet Take 1 tablet (40 mg total) by mouth once daily.    spironolactone (ALDACTONE) 100 MG tablet Take 1 tablet (100 mg total) by mouth once daily.    tirzepatide 10 mg/0.5 mL PnIj Inject 10 mg into the skin every 7 days.    tiZANidine (ZANAFLEX) 4 MG tablet Take 1 tablet (4 mg total) by mouth every 12 (twelve) hours as needed (muscle spasms/ pain).    tretinoin (RETIN-A) 0.05 % cream Apply topically every evening. Use on face    EPINEPHrine (EPIPEN) 0.3 mg/0.3 mL AtIn Inject 0.3 mLs (0.3 mg total) into the muscle once. for 1 dose     Current Facility-Administered Medications   Medication    cyanocobalamin injection 1,000 mcg    cyanocobalamin injection 1,000 mcg    omalizumab injection 150 mg    omalizumab injection 150 mg    omalizumab injection 300 mg       Review of patient's allergies indicates:   Allergen Reactions    Metformin Diarrhea    Sulfa (sulfonamide antibiotics) " Anaphylaxis and Swelling     Swelling (eyes)^, Swelling (throat)^  Swelling (eyes)^, Swelling (throat)^      Diclofenac      Gastritis     Latex, natural rubber      Contact dermatitis    Other reaction(s): Other (See Comments)  Contact dermatitis    Shellfish containing products      anaphylaxis       Family History   Problem Relation Age of Onset    Diabetes Father     Heart disease Father 69    Hypertension Father     Prostate cancer Father     Diabetes Mother     Lupus Mother     AMY disease Brother     Ovarian cancer Sister     Breast cancer Neg Hx        Social History     Socioeconomic History    Marital status:    Tobacco Use    Smoking status: Never    Smokeless tobacco: Never   Substance and Sexual Activity    Alcohol use: Never    Drug use: Never    Sexual activity: Yes     Partners: Male     Birth control/protection: See Surgical Hx       Physical exam:  There were no vitals filed for this visit.  There is no height or weight on file to calculate BMI.  General: In no apparent distress; well developed and well nourished.  HEENT: normocephalic; atraumatic.  Cardiovascular: regular rate.  Respiratory: no increased work of breathing.  Musculoskeletal:   Gait: mild antalgic  Inspection:  Moderate flatfoot deformity bilaterally with associated hindfoot valgus.  No significant forefoot abduction.  Okay single limb heel rise on the left, unable to perform on the right.    On the left, patient does have mild swelling and ecchymosis involving the distal most aspect of the 2nd toe.  Moderate tenderness on deep palpation at the D IP joint and distal phalanx.  Full painless range of motion.  No subungual hematoma noted.    On the right, patient has pain localized to the sinus tarsi consistent with sub fibular impingement.  Mild fullness and swelling along the course of the posterior tibial tendon but minimal tenderness on deep palpation.  Regarding dorsal midfoot paresthesias, negative compression and Tinel's  testing over the superficial peroneal nerve.  Mild tenderness over the 2/3/4 tarsometatarsal joint.  No pain/laxity with Lisfranc stress.  Silfverskiold: positive  Alignment:  Knee: neutral               Ankle: neutral              Hindfoot: valgus              Forefoot: neutral   Strength:              Dorsiflexion 5/5  Plantar flexion 5/5  Inversion 4/5   Eversion 5/5   Sensation:              Good sensation on monofilament testing  ROM:              Ankle: Full and painless.              Subtalar: Painless inversion and eversion   Pulses: 2+ DP/PT pulses.                   Imaging Studies/Outside documentation:  I have ordered/reviewed/interpreted the following images/outside documentation:  1. Weight bearing 3-views of Bilateral foot and ankle:  No acute bony abnormality noted.  Moderate decreased calcaneal pitch.  Talonavicular uncoverage within normal limits.  Mild-to-moderate midfoot sag at the NC joint.  Congruent ankle mortise.  No significant degenerative joint changes.  Left> > right enthesophyte at the Achilles insertion.        Assessment:  Lucía Coreas is a 37 y.o. female with Bilateral pes planovalgus with Right-sided subfibular impingement; Bilateral gastroc contracture; Left 2nd toe bony contusion.     Plan:   Clinical and radiographic findings were discussed.  Recommend conservative management to include initiation of p.o. naproxen as prescribed by Dr. Davis yesterday.  Also given information for increased arch support inserts, [employer] handout.  Discussed rest, ice, compression, elevation.    Discussed sinus tarsi corticosteroid injection however patient deferred today.  We will continue to monitor this going forward.  Patient may return to clinic for injection should symptoms persist or worsen.  Patient voiced understanding.  All questions were answered.    This note was created using voice recognition software and may contain grammatical errors.

## 2023-05-25 ENCOUNTER — LAB VISIT (OUTPATIENT)
Dept: LAB | Facility: HOSPITAL | Age: 38
End: 2023-05-25
Attending: INTERNAL MEDICINE
Payer: COMMERCIAL

## 2023-05-25 ENCOUNTER — TELEPHONE (OUTPATIENT)
Dept: FAMILY MEDICINE | Facility: CLINIC | Age: 38
End: 2023-05-25
Payer: COMMERCIAL

## 2023-05-25 DIAGNOSIS — E53.8 B12 DEFICIENCY: Primary | ICD-10-CM

## 2023-05-25 DIAGNOSIS — D50.0 IRON DEFICIENCY ANEMIA DUE TO CHRONIC BLOOD LOSS: ICD-10-CM

## 2023-05-25 LAB
BASOPHILS # BLD AUTO: 0.02 K/UL (ref 0–0.2)
BASOPHILS NFR BLD: 0.2 % (ref 0–1.9)
DIFFERENTIAL METHOD: ABNORMAL
EOSINOPHIL # BLD AUTO: 0 K/UL (ref 0–0.5)
EOSINOPHIL NFR BLD: 0.1 % (ref 0–8)
ERYTHROCYTE [DISTWIDTH] IN BLOOD BY AUTOMATED COUNT: 17 % (ref 11.5–14.5)
FERRITIN SERPL-MCNC: 83 NG/ML (ref 20–300)
HCT VFR BLD AUTO: 38.2 % (ref 37–48.5)
HGB BLD-MCNC: 12.3 G/DL (ref 12–16)
IMM GRANULOCYTES # BLD AUTO: 0.03 K/UL (ref 0–0.04)
IMM GRANULOCYTES NFR BLD AUTO: 0.3 % (ref 0–0.5)
LYMPHOCYTES # BLD AUTO: 2 K/UL (ref 1–4.8)
LYMPHOCYTES NFR BLD: 20.8 % (ref 18–48)
MCH RBC QN AUTO: 24.2 PG (ref 27–31)
MCHC RBC AUTO-ENTMCNC: 32.2 G/DL (ref 32–36)
MCV RBC AUTO: 75 FL (ref 82–98)
MONOCYTES # BLD AUTO: 0.4 K/UL (ref 0.3–1)
MONOCYTES NFR BLD: 3.7 % (ref 4–15)
NEUTROPHILS # BLD AUTO: 7.2 K/UL (ref 1.8–7.7)
NEUTROPHILS NFR BLD: 75.2 % (ref 38–73)
NRBC BLD-RTO: 0 /100 WBC
PLATELET # BLD AUTO: 325 K/UL (ref 150–450)
PMV BLD AUTO: 10.5 FL (ref 9.2–12.9)
RBC # BLD AUTO: 5.08 M/UL (ref 4–5.4)
WBC # BLD AUTO: 9.56 K/UL (ref 3.9–12.7)

## 2023-05-25 PROCEDURE — 36415 COLL VENOUS BLD VENIPUNCTURE: CPT | Mod: PO | Performed by: INTERNAL MEDICINE

## 2023-05-25 PROCEDURE — 82728 ASSAY OF FERRITIN: CPT | Performed by: INTERNAL MEDICINE

## 2023-05-25 PROCEDURE — 85025 COMPLETE CBC W/AUTO DIFF WBC: CPT | Mod: PO | Performed by: INTERNAL MEDICINE

## 2023-05-25 RX ORDER — CYANOCOBALAMIN 1000 UG/ML
1000 INJECTION, SOLUTION INTRAMUSCULAR; SUBCUTANEOUS
Status: ACTIVE | OUTPATIENT
Start: 2023-05-25 | End: 2023-09-22

## 2023-05-26 ENCOUNTER — CLINICAL SUPPORT (OUTPATIENT)
Dept: FAMILY MEDICINE | Facility: CLINIC | Age: 38
End: 2023-05-26
Payer: COMMERCIAL

## 2023-05-26 DIAGNOSIS — E53.8 B12 DEFICIENCY: ICD-10-CM

## 2023-05-26 PROCEDURE — 96372 PR INJECTION,THERAP/PROPH/DIAG2ST, IM OR SUBCUT: ICD-10-PCS | Mod: S$GLB,,, | Performed by: FAMILY MEDICINE

## 2023-05-26 PROCEDURE — 99999 PR PBB SHADOW E&M-EST. PATIENT-LVL III: ICD-10-PCS | Mod: PBBFAC,,,

## 2023-05-26 PROCEDURE — 99999 PR PBB SHADOW E&M-EST. PATIENT-LVL III: CPT | Mod: PBBFAC,,,

## 2023-05-26 PROCEDURE — 96372 THER/PROPH/DIAG INJ SC/IM: CPT | Mod: S$GLB,,, | Performed by: FAMILY MEDICINE

## 2023-05-26 RX ADMIN — CYANOCOBALAMIN 1000 MCG: 1000 INJECTION, SOLUTION INTRAMUSCULAR; SUBCUTANEOUS at 02:05

## 2023-05-29 NOTE — PROGRESS NOTES
[facility]    Subjective:       Name: Lucía Coreas  : 1985  MRN: [id number]    Chief Complaint   Patient presents with    iron deficiency anemia      Patient is in clinic by herself.    HPI: Lucía Coreas is a 37 y.o. female presents for evaluation of iron deficiency anemia  The patient received 4 doses of IV Venofer the last 1 was on 2023.  Blood workup done on May 25, 2023 showed a normal CBC with an MCV of 75.  Her ferritin went from 11-83.    She is still feeling cold. Her period seems less heavy. No other complaints.    Oncology History    No history exists.        Past Medical History:   Diagnosis Date    Allergy     Amenorrhea     Asthma     Diabetes     GERD (gastroesophageal reflux disease)     Infertility associated with anovulation     Iron deficiency anemia     Knee pain     Metabolic syndrome     PCO (polycystic ovaries)     Recurrent boils     Status post repeat low transverse  section 2020    Formatting of this note might be different from the original. With BTL 21       Past Surgical History:   Procedure Laterality Date    BTL      CARPAL TUNNEL RELEASE Left 2019    CARPAL TUNNEL RELEASE Right 2020    Procedure: RELEASE, CARPAL TUNNEL;  Surgeon: Adeel Laughlin MD;  Location: [facility];  Service: Orthopedics;  Laterality: Right;     SECTION      X2    EXPLORATORY LAPAROTOMY      1 week postop with WOUND VAK, reopened uterus    WOUND VAK      3 months post-op Section, 2 weeks in hospital       Family History   Problem Relation Age of Onset    Diabetes Father     Heart disease Father 69    Hypertension Father     Prostate cancer Father     Diabetes Mother     Lupus Mother     AMY disease Brother     Ovarian cancer Sister     Breast cancer Neg Hx      Father had prostate cancer at the age of 69 and  at the same age.  Sister had ovarian cancer at the age of 54 yo. She is alive at the age of 58.  Cousin had ovarian cancer in her 40's. She id  .  Maternal grandmother had an ENT cancer secondary to chewing tobacco.      Social History     Socioeconomic History    Marital status:    Tobacco Use    Smoking status: Never    Smokeless tobacco: Never   Substance and Sexual Activity    Alcohol use: Never    Drug use: Never    Sexual activity: Yes     Partners: Male     Birth control/protection: See Surgical Hx       Review of patient's allergies indicates:   Allergen Reactions    Metformin Diarrhea    Sulfa (sulfonamide antibiotics) Anaphylaxis and Swelling     Swelling (eyes)^, Swelling (throat)^  Swelling (eyes)^, Swelling (throat)^      Diclofenac      Gastritis     Latex, natural rubber      Contact dermatitis    Other reaction(s): Other (See Comments)  Contact dermatitis    Shellfish containing products      anaphylaxis       Review of Systems   Constitutional:  Positive for fatigue. Negative for appetite change, fever and unexpected weight change.   HENT:  Negative for facial swelling, mouth sores, postnasal drip and sore throat.    Eyes:  Negative for photophobia and visual disturbance.   Respiratory:  Positive for cough and shortness of breath. Negative for chest tightness.    Cardiovascular:  Negative for chest pain and leg swelling.   Gastrointestinal:  Negative for abdominal pain, blood in stool, diarrhea, nausea and vomiting.   Endocrine: Negative for cold intolerance and polyuria.   Genitourinary:  Negative for dysuria, flank pain, frequency and hematuria.   Musculoskeletal:  Negative for arthralgias, back pain and joint swelling.   Integumentary:  Positive for rash. Negative for color change and mole/lesion.   Neurological:  Positive for dizziness. Negative for weakness, numbness, headaches and memory loss.   Hematological:  Negative for adenopathy. Does not bruise/bleed easily.   Psychiatric/Behavioral:  Negative for decreased concentration. The patient is not nervous/anxious.           Objective:     Vitals:    23 0846   BP:  "118/82   BP Location: Left arm   Patient Position: Sitting   BP Method: Large (Manual)   Pulse: 82   Resp: 16   Temp: 97.6 °F (36.4 °C)   TempSrc: Temporal   SpO2: 99%   Weight: (!) 137.7 kg (303 lb 9.2 oz)   Height: 5' 4" (1.626 m)            Physical Exam  Vitals reviewed.   Constitutional:       Appearance: Normal appearance.   HENT:      Head: Normocephalic and atraumatic.      Mouth/Throat:      Mouth: Mucous membranes are moist.      Pharynx: No oropharyngeal exudate.   Eyes:      General: No scleral icterus.     Pupils: Pupils are equal, round, and reactive to light.   Cardiovascular:      Rate and Rhythm: Normal rate and regular rhythm.      Pulses: Normal pulses.      Heart sounds: Normal heart sounds.   Pulmonary:      Effort: Pulmonary effort is normal.      Breath sounds: Normal breath sounds. No wheezing.   Abdominal:      General: Bowel sounds are normal. There is no distension.      Tenderness: There is no abdominal tenderness.   Musculoskeletal:         General: No swelling or tenderness.      Cervical back: Neck supple.   Lymphadenopathy:      Cervical: No cervical adenopathy.   Skin:     Coloration: Skin is not jaundiced.      Findings: No bruising or rash.   Neurological:      General: No focal deficit present.      Mental Status: She is alert and oriented to person, place, and time.      Cranial Nerves: No cranial nerve deficit.      Motor: No weakness.      Gait: Gait normal.   Psychiatric:         Mood and Affect: Mood normal.         Behavior: Behavior normal.              Current Outpatient Medications on File Prior to Visit   Medication Sig    albuterol (PROVENTIL) 2.5 mg /3 mL (0.083 %) nebulizer solution Take 3 mLs (2.5 mg total) by nebulization every 4 to 6 hours as needed for Wheezing.    albuterol (PROVENTIL/VENTOLIN HFA) 90 mcg/actuation inhaler USE 1 TO 2 INHALATIONS EVERY 4 HOURS AS NEEDED FOR WHEEZING    azelastine (ASTELIN) 137 mcg (0.1 %) nasal spray SMARTSIG:Both Nares    " "cholecalciferol, vitamin D3, (VITAMIN D3) 25 mcg (1,000 unit) capsule Take 2 capsules (2,000 Units total) by mouth once daily.    EScitalopram oxalate (LEXAPRO) 20 MG tablet Take 20 mg by mouth.    fluocinonide (LIDEX) 0.05 % external solution Apply topically 2 (two) times daily. Use on scalp    fluticasone furoate-vilanteroL (BREO) 200-25 mcg/dose DsDv diskus inhaler Inhale 1 puff into the lungs once daily. Controller    gabapentin (NEURONTIN) 300 MG capsule Take 1 capsule (300 mg total) by mouth 3 (three) times daily.    iron-vit c-b12-folic acid (IRON 100 PLUS) Tab Take 1 tablet by mouth once daily.    ketoconazole (NIZORAL) 2 % shampoo Apply topically once a week. Lather in for 5-10 min before rinsing    Lactobacillus rhamnosus GG (CULTURELLE) 10 billion cell capsule Take 1 capsule by mouth once daily.    levocetirizine (XYZAL) 5 MG tablet Take 1 tablet (5 mg total) by mouth every evening.    linaCLOtide (LINZESS) 72 mcg Cap capsule Take 1 capsule (72 mcg total) by mouth once daily.    lisinopriL (PRINIVIL,ZESTRIL) 2.5 MG tablet Take 1 tablet (2.5 mg total) by mouth once daily.    loratadine (CLARITIN) 10 mg tablet Take 1 tablet (10 mg total) by mouth once daily.    loteprednol (LOTEMAX) 0.5 % ophthalmic suspension Place 1 drop into both eyes daily as needed (to control allergy symptoms. Not for chronic daily use.).    montelukast (SINGULAIR) 10 mg tablet Take 1 tablet (10 mg total) by mouth once daily.    mupirocin (BACTROBAN) 2 % ointment Apply topically 3 (three) times daily.    naproxen (NAPROSYN) 500 MG tablet Take 1 tablet (500 mg total) by mouth 2 (two) times daily.    NIFEdipine (PROCARDIA-XL) 30 MG (OSM) 24 hr tablet Take 30 mg by mouth once daily.    NOVOFINE PLUS 32 gauge x 1/6" Ndle use as directed    nystatin (MYCOSTATIN) powder Apply topically 2 (two) times daily.    omalizumab (XOLAIR) 150 mg injection Inject 300 mg into the skin every 14 (fourteen) days.    ondansetron (ZOFRAN-ODT) 8 MG TbDL " DISSOLVE ONE TABLET UNDER TONGUE EVERY 8 HOURS AS NEEDED FOR NAUSEA    pantoprazole (PROTONIX) 40 MG tablet Take 1 tablet (40 mg total) by mouth once daily.    spironolactone (ALDACTONE) 100 MG tablet Take 1 tablet (100 mg total) by mouth once daily.    tirzepatide 10 mg/0.5 mL PnIj Inject 10 mg into the skin every 7 days.    tiZANidine (ZANAFLEX) 4 MG tablet Take 1 tablet (4 mg total) by mouth every 12 (twelve) hours as needed (muscle spasms/ pain).    tretinoin (RETIN-A) 0.05 % cream Apply topically every evening. Use on face    doxycycline (VIBRA-TABS) 100 MG tablet Take 1 tablet (100 mg total) by mouth 2 (two) times daily. Take for 5 days prn flares (Patient not taking: Reported on 2023)    EPINEPHrine (EPIPEN) 0.3 mg/0.3 mL AtIn Inject 0.3 mLs (0.3 mg total) into the muscle once. for 1 dose    lactulose (CHRONULAC) 10 gram/15 mL solution SMARTSI Tablespoon By Mouth Daily    methylPREDNISolone (MEDROL, MARTIN,) 4 mg tablet use as directed (Patient not taking: Reported on 2023)     Current Facility-Administered Medications on File Prior to Visit   Medication    cyanocobalamin injection 1,000 mcg    cyanocobalamin injection 1,000 mcg    cyanocobalamin injection 1,000 mcg    omalizumab injection 150 mg    omalizumab injection 150 mg    omalizumab injection 300 mg       CBC:  Lab Results   Component Value Date    WBC 9.56 2023    HGB 12.3 2023    HCT 38.2 2023    MCV 75 (L) 2023     2023         CMP:  Sodium   Date Value Ref Range Status   02/10/2023 140 136 - 145 mmol/L Final     Potassium   Date Value Ref Range Status   02/10/2023 4.1 3.5 - 5.1 mmol/L Final     Chloride   Date Value Ref Range Status   02/10/2023 107 95 - 110 mmol/L Final     CO2   Date Value Ref Range Status   02/10/2023 25 23 - 29 mmol/L Final     Glucose   Date Value Ref Range Status   02/10/2023 87 70 - 110 mg/dL Final     BUN   Date Value Ref Range Status   02/10/2023 8 6 - 20 mg/dL Final      Creatinine   Date Value Ref Range Status   02/10/2023 0.8 0.5 - 1.4 mg/dL Final     Calcium   Date Value Ref Range Status   02/10/2023 9.2 8.7 - 10.5 mg/dL Final     Total Protein   Date Value Ref Range Status   02/10/2023 7.5 6.0 - 8.4 g/dL Final     Albumin   Date Value Ref Range Status   02/10/2023 3.9 3.5 - 5.2 g/dL Final     Total Bilirubin   Date Value Ref Range Status   02/10/2023 0.2 0.1 - 1.0 mg/dL Final     Comment:     For infants and newborns, interpretation of results should be based  on gestational age, weight and in agreement with clinical  observations.    Premature Infant recommended reference ranges:  Up to 24 hours.............<8.0 mg/dL  Up to 48 hours............<12.0 mg/dL  3-5 days..................<15.0 mg/dL  6-29 days.................<15.0 mg/dL       Alkaline Phosphatase   Date Value Ref Range Status   02/10/2023 62 55 - 135 U/L Final     AST   Date Value Ref Range Status   02/10/2023 9 (L) 10 - 40 U/L Final     ALT   Date Value Ref Range Status   02/10/2023 9 (L) 10 - 44 U/L Final     Anion Gap   Date Value Ref Range Status   02/10/2023 8 8 - 16 mmol/L Final     eGFR if    Date Value Ref Range Status   06/10/2022 >60.0 >60 mL/min/1.73 m^2 Final     eGFR if non    Date Value Ref Range Status   06/10/2022 >60.0 >60 mL/min/1.73 m^2 Final     Comment:     Calculation used to obtain the estimated glomerular filtration  rate (eGFR) is the CKD-EPI equation.        Lab Results   Component Value Date    UIBC 192 01/22/2009    IRON 44 02/10/2023    TRANSFERRIN 236 02/10/2023    TIBC 349 02/10/2023    FESATURATED 13 (L) 02/10/2023         Lab Results   Component Value Date    FERRITIN 83 05/25/2023        X-Ray Foot Complete Bilateral  Narrative: EXAMINATION:  ANKLE COMPLETE BILATERALXR ANKLE COMPLETE 3 VIEW BILATERAL; XR FOOT COMPLETE 3 VIEW BILATERAL    CLINICAL HISTORY:  Pain in right foot    TECHNIQUE:  AP, oblique/mortise view, and lateral radiographs of the  left and right ankle and foot were acquired.    COMPARISON:  Left foot-10/06/2022 and right foot-11/11/2021    FINDINGS:  The ankle mortise width is symmetric.  No left or right talar dome osteochondral lesion appreciated.  There is bilateral Achilles insertion calcaneal enthesophyte formation and plantar calcaneal spur formation.  There is right great toe hallux valgus and bunion formation at the great toe metatarsal head.  No radiographically evident acute, displaced fracture, dislocation, or osseous destructive process.  There is scattered degenerative changes of the interphalangeal joints of both feet.  No soft tissue foreign body appreciated radiographically.  Impression: As above    Electronically signed by: Sabas Hutchinson MD  Date:    05/23/2023  Time:    11:26  X-Ray Ankle Complete Bilateral  Narrative: EXAMINATION:  ANKLE COMPLETE BILATERALXR ANKLE COMPLETE 3 VIEW BILATERAL; XR FOOT COMPLETE 3 VIEW BILATERAL    CLINICAL HISTORY:  Pain in right foot    TECHNIQUE:  AP, oblique/mortise view, and lateral radiographs of the left and right ankle and foot were acquired.    COMPARISON:  Left foot-10/06/2022 and right foot-11/11/2021    FINDINGS:  The ankle mortise width is symmetric.  No left or right talar dome osteochondral lesion appreciated.  There is bilateral Achilles insertion calcaneal enthesophyte formation and plantar calcaneal spur formation.  There is right great toe hallux valgus and bunion formation at the great toe metatarsal head.  No radiographically evident acute, displaced fracture, dislocation, or osseous destructive process.  There is scattered degenerative changes of the interphalangeal joints of both feet.  No soft tissue foreign body appreciated radiographically.  Impression: As above    Electronically signed by: Sabas Hutchinson MD  Date:    05/23/2023  Time:    11:26       ECOG SCORE                  Assessment/Plan:           1-Iron deficiency anemia:  - resolved. She responded well to her  infusions.   She will be seen back again in 3 months for a follow-up visit with repeat CBC and iron studies.  She was advised to call us back in case she feels more tired short of breath in the interim.    2- B12 deficiency.    Patient receiving vitamin B12 injections on a monthly basis.  Her last blood workup showing that her vitamin B12 is about the 1000.  She stated that she received them to help with her chronic fatigue.    3-Family history of ovarian and prostate cancer.    -in view of her family history of multiple cancers the patient was seen by the genetic counselor. She is due to to mail her test to [facility].    4-Morbid obesity BMI 52.11  -the patient was advised to exercise maintain healthy lifestyle and to lose weight.    5- Personal history of lupus:  -according to the patient her antibody came back positive.  She did not require any treatment.  She is currently followed by Dr. Hernandez.    6- DM type 2 without long term use of insulin.  Lab Results   Component Value Date    HGBA1C 5.3 02/03/2023    On omalizumab           Med Onc Chart Routing      Follow up with physician    Follow up with JACKIE 3 months. with repeat CBC and iron studies   Infusion scheduling note    Injection scheduling note    Labs    Imaging    Pharmacy appointment    Other referrals            Plan was discussed with the patient at length, and she verbalized understanding. Lucía was given an opportunity to ask questions that were answered to her satisfaction, and she was advised to call in the interval if any problems or questions arise.  Signed:  Ariana Molina MD   Hematology and Oncology  [facility] - HEMATOLOGY ONCOLOGY  [facility], [place]

## 2023-05-30 ENCOUNTER — OFFICE VISIT (OUTPATIENT)
Dept: HEMATOLOGY/ONCOLOGY | Facility: CLINIC | Age: 38
End: 2023-05-30
Payer: COMMERCIAL

## 2023-05-30 VITALS
DIASTOLIC BLOOD PRESSURE: 82 MMHG | TEMPERATURE: 98 F | RESPIRATION RATE: 16 BRPM | BODY MASS INDEX: 50.02 KG/M2 | SYSTOLIC BLOOD PRESSURE: 118 MMHG | HEIGHT: 64 IN | WEIGHT: 293 LBS | HEART RATE: 82 BPM | OXYGEN SATURATION: 99 %

## 2023-05-30 DIAGNOSIS — E11.65 TYPE 2 DIABETES MELLITUS WITH HYPERGLYCEMIA, WITHOUT LONG-TERM CURRENT USE OF INSULIN: Chronic | ICD-10-CM

## 2023-05-30 DIAGNOSIS — D50.0 IRON DEFICIENCY ANEMIA DUE TO CHRONIC BLOOD LOSS: Primary | ICD-10-CM

## 2023-05-30 DIAGNOSIS — E53.8 B12 DEFICIENCY: Chronic | ICD-10-CM

## 2023-05-30 DIAGNOSIS — Z80.41 FAMILY HISTORY OF OVARIAN CANCER: ICD-10-CM

## 2023-05-30 DIAGNOSIS — Z80.42 FAMILY HISTORY OF PROSTATE CANCER: ICD-10-CM

## 2023-05-30 DIAGNOSIS — E66.01 MORBID OBESITY WITH BMI OF 50.0-59.9, ADULT: ICD-10-CM

## 2023-05-30 PROCEDURE — 1160F PR REVIEW ALL MEDS BY PRESCRIBER/CLIN PHARMACIST DOCUMENTED: ICD-10-PCS | Mod: CPTII,S$GLB,, | Performed by: INTERNAL MEDICINE

## 2023-05-30 PROCEDURE — 3066F NEPHROPATHY DOC TX: CPT | Mod: CPTII,S$GLB,, | Performed by: INTERNAL MEDICINE

## 2023-05-30 PROCEDURE — 3044F HG A1C LEVEL LT 7.0%: CPT | Mod: CPTII,S$GLB,, | Performed by: INTERNAL MEDICINE

## 2023-05-30 PROCEDURE — 99999 PR PBB SHADOW E&M-EST. PATIENT-LVL V: CPT | Mod: PBBFAC,,, | Performed by: INTERNAL MEDICINE

## 2023-05-30 PROCEDURE — 4010F ACE/ARB THERAPY RXD/TAKEN: CPT | Mod: CPTII,S$GLB,, | Performed by: INTERNAL MEDICINE

## 2023-05-30 PROCEDURE — 3072F PR LOW RISK FOR RETINOPATHY: ICD-10-PCS | Mod: CPTII,S$GLB,, | Performed by: INTERNAL MEDICINE

## 2023-05-30 PROCEDURE — 1159F MED LIST DOCD IN RCRD: CPT | Mod: CPTII,S$GLB,, | Performed by: INTERNAL MEDICINE

## 2023-05-30 PROCEDURE — 3072F LOW RISK FOR RETINOPATHY: CPT | Mod: CPTII,S$GLB,, | Performed by: INTERNAL MEDICINE

## 2023-05-30 PROCEDURE — 3066F PR DOCUMENTATION OF TREATMENT FOR NEPHROPATHY: ICD-10-PCS | Mod: CPTII,S$GLB,, | Performed by: INTERNAL MEDICINE

## 2023-05-30 PROCEDURE — 3074F SYST BP LT 130 MM HG: CPT | Mod: CPTII,S$GLB,, | Performed by: INTERNAL MEDICINE

## 2023-05-30 PROCEDURE — 3074F PR MOST RECENT SYSTOLIC BLOOD PRESSURE < 130 MM HG: ICD-10-PCS | Mod: CPTII,S$GLB,, | Performed by: INTERNAL MEDICINE

## 2023-05-30 PROCEDURE — 3060F PR POS MICROALBUMINURIA RESULT DOCUMENTED/REVIEW: ICD-10-PCS | Mod: CPTII,S$GLB,, | Performed by: INTERNAL MEDICINE

## 2023-05-30 PROCEDURE — 99214 PR OFFICE/OUTPT VISIT, EST, LEVL IV, 30-39 MIN: ICD-10-PCS | Mod: S$GLB,,, | Performed by: INTERNAL MEDICINE

## 2023-05-30 PROCEDURE — 3079F PR MOST RECENT DIASTOLIC BLOOD PRESSURE 80-89 MM HG: ICD-10-PCS | Mod: CPTII,S$GLB,, | Performed by: INTERNAL MEDICINE

## 2023-05-30 PROCEDURE — 99214 OFFICE O/P EST MOD 30 MIN: CPT | Mod: S$GLB,,, | Performed by: INTERNAL MEDICINE

## 2023-05-30 PROCEDURE — 3060F POS MICROALBUMINURIA REV: CPT | Mod: CPTII,S$GLB,, | Performed by: INTERNAL MEDICINE

## 2023-05-30 PROCEDURE — 99999 PR PBB SHADOW E&M-EST. PATIENT-LVL V: ICD-10-PCS | Mod: PBBFAC,,, | Performed by: INTERNAL MEDICINE

## 2023-05-30 PROCEDURE — 1160F RVW MEDS BY RX/DR IN RCRD: CPT | Mod: CPTII,S$GLB,, | Performed by: INTERNAL MEDICINE

## 2023-05-30 PROCEDURE — 3044F PR MOST RECENT HEMOGLOBIN A1C LEVEL <7.0%: ICD-10-PCS | Mod: CPTII,S$GLB,, | Performed by: INTERNAL MEDICINE

## 2023-05-30 PROCEDURE — 1159F PR MEDICATION LIST DOCUMENTED IN MEDICAL RECORD: ICD-10-PCS | Mod: CPTII,S$GLB,, | Performed by: INTERNAL MEDICINE

## 2023-05-30 PROCEDURE — 3008F BODY MASS INDEX DOCD: CPT | Mod: CPTII,S$GLB,, | Performed by: INTERNAL MEDICINE

## 2023-05-30 PROCEDURE — 4010F PR ACE/ARB THEARPY RXD/TAKEN: ICD-10-PCS | Mod: CPTII,S$GLB,, | Performed by: INTERNAL MEDICINE

## 2023-05-30 PROCEDURE — 3079F DIAST BP 80-89 MM HG: CPT | Mod: CPTII,S$GLB,, | Performed by: INTERNAL MEDICINE

## 2023-05-30 PROCEDURE — 3008F PR BODY MASS INDEX (BMI) DOCUMENTED: ICD-10-PCS | Mod: CPTII,S$GLB,, | Performed by: INTERNAL MEDICINE

## 2023-05-30 RX ORDER — ESCITALOPRAM OXALATE 20 MG/1
20 TABLET ORAL
COMMUNITY
Start: 2023-04-27 | End: 2023-09-26

## 2023-06-02 ENCOUNTER — TELEPHONE (OUTPATIENT)
Dept: PSYCHIATRY | Facility: CLINIC | Age: 38
End: 2023-06-02
Payer: COMMERCIAL

## 2023-06-02 NOTE — TELEPHONE ENCOUNTER
----- Message from Ruba Dobbs sent at 6/2/2023  8:35 AM CDT -----  Contact: Lucía Pereira is calling in regards to getting appt for today 06/02 cancelled. Please call back at [phone]                  Thanks  KT

## 2023-07-07 ENCOUNTER — PATIENT MESSAGE (OUTPATIENT)
Dept: INFECTIOUS DISEASES | Facility: CLINIC | Age: 38
End: 2023-07-07
Payer: COMMERCIAL

## 2023-08-02 ENCOUNTER — LAB VISIT (OUTPATIENT)
Dept: LAB | Facility: HOSPITAL | Age: 38
End: 2023-08-02
Attending: FAMILY MEDICINE
Payer: COMMERCIAL

## 2023-08-02 ENCOUNTER — PATIENT MESSAGE (OUTPATIENT)
Dept: HEMATOLOGY/ONCOLOGY | Facility: CLINIC | Age: 38
End: 2023-08-02
Payer: COMMERCIAL

## 2023-08-02 ENCOUNTER — HOSPITAL ENCOUNTER (OUTPATIENT)
Dept: RADIOLOGY | Facility: HOSPITAL | Age: 38
Discharge: HOME | End: 2023-08-02
Attending: FAMILY MEDICINE
Payer: COMMERCIAL

## 2023-08-02 ENCOUNTER — OFFICE VISIT (OUTPATIENT)
Dept: FAMILY MEDICINE | Facility: CLINIC | Age: 38
End: 2023-08-02
Payer: COMMERCIAL

## 2023-08-02 ENCOUNTER — PATIENT MESSAGE (OUTPATIENT)
Dept: FAMILY MEDICINE | Facility: CLINIC | Age: 38
End: 2023-08-02

## 2023-08-02 VITALS
WEIGHT: 293 LBS | DIASTOLIC BLOOD PRESSURE: 70 MMHG | HEIGHT: 64 IN | BODY MASS INDEX: 50.02 KG/M2 | SYSTOLIC BLOOD PRESSURE: 130 MMHG | HEART RATE: 95 BPM | OXYGEN SATURATION: 96 %

## 2023-08-02 DIAGNOSIS — Z00.00 WELL ADULT EXAM: Primary | ICD-10-CM

## 2023-08-02 DIAGNOSIS — Z00.00 WELL ADULT EXAM: ICD-10-CM

## 2023-08-02 DIAGNOSIS — M25.561 ACUTE PAIN OF RIGHT KNEE: ICD-10-CM

## 2023-08-02 DIAGNOSIS — M25.561 ACUTE PAIN OF RIGHT KNEE: Primary | ICD-10-CM

## 2023-08-02 DIAGNOSIS — Z13.6 ENCOUNTER FOR LIPID SCREENING FOR CARDIOVASCULAR DISEASE: ICD-10-CM

## 2023-08-02 DIAGNOSIS — Z79.899 ENCOUNTER FOR LONG-TERM (CURRENT) USE OF MEDICATIONS: ICD-10-CM

## 2023-08-02 DIAGNOSIS — Z13.220 ENCOUNTER FOR LIPID SCREENING FOR CARDIOVASCULAR DISEASE: ICD-10-CM

## 2023-08-02 DIAGNOSIS — E11.65 TYPE 2 DIABETES MELLITUS WITH HYPERGLYCEMIA, WITHOUT LONG-TERM CURRENT USE OF INSULIN: ICD-10-CM

## 2023-08-02 LAB
ALBUMIN SERPL BCP-MCNC: 3.7 G/DL (ref 3.5–5.2)
ALP SERPL-CCNC: 62 U/L (ref 55–135)
ALT SERPL W/O P-5'-P-CCNC: 10 U/L (ref 10–44)
ANION GAP SERPL CALC-SCNC: 9 MMOL/L (ref 8–16)
AST SERPL-CCNC: 10 U/L (ref 10–40)
BILIRUB SERPL-MCNC: 0.4 MG/DL (ref 0.1–1)
BUN SERPL-MCNC: 14 MG/DL (ref 6–20)
CALCIUM SERPL-MCNC: 9.4 MG/DL (ref 8.7–10.5)
CHLORIDE SERPL-SCNC: 108 MMOL/L (ref 95–110)
CHOLEST SERPL-MCNC: 170 MG/DL (ref 120–199)
CHOLEST/HDLC SERPL: 3.5 {RATIO} (ref 2–5)
CO2 SERPL-SCNC: 23 MMOL/L (ref 23–29)
CREAT SERPL-MCNC: 0.7 MG/DL (ref 0.5–1.4)
ERYTHROCYTE [DISTWIDTH] IN BLOOD BY AUTOMATED COUNT: 14.5 % (ref 11.5–14.5)
EST. GFR  (NO RACE VARIABLE): >60 ML/MIN/1.73 M^2
ESTIMATED AVG GLUCOSE: 111 MG/DL (ref 68–131)
GLUCOSE SERPL-MCNC: 93 MG/DL (ref 70–110)
HBA1C MFR BLD: 5.5 % (ref 4–5.6)
HCT VFR BLD AUTO: 36.9 % (ref 37–48.5)
HDLC SERPL-MCNC: 49 MG/DL (ref 40–75)
HDLC SERPL: 28.8 % (ref 20–50)
HGB BLD-MCNC: 11.6 G/DL (ref 12–16)
LDLC SERPL CALC-MCNC: 110.8 MG/DL (ref 63–159)
MCH RBC QN AUTO: 24.1 PG (ref 27–31)
MCHC RBC AUTO-ENTMCNC: 31.4 G/DL (ref 32–36)
MCV RBC AUTO: 77 FL (ref 82–98)
NONHDLC SERPL-MCNC: 121 MG/DL
PLATELET # BLD AUTO: 332 K/UL (ref 150–450)
PMV BLD AUTO: 11.1 FL (ref 9.2–12.9)
POTASSIUM SERPL-SCNC: 3.9 MMOL/L (ref 3.5–5.1)
PROT SERPL-MCNC: 7.3 G/DL (ref 6–8.4)
RBC # BLD AUTO: 4.81 M/UL (ref 4–5.4)
SODIUM SERPL-SCNC: 140 MMOL/L (ref 136–145)
TRIGL SERPL-MCNC: 51 MG/DL (ref 30–150)
TSH SERPL DL<=0.005 MIU/L-ACNC: 0.94 UIU/ML (ref 0.4–4)
WBC # BLD AUTO: 9.87 K/UL (ref 3.9–12.7)

## 2023-08-02 PROCEDURE — 80061 LIPID PANEL: CPT | Performed by: FAMILY MEDICINE

## 2023-08-02 PROCEDURE — 73562 X-RAY EXAM OF KNEE 3: CPT | Mod: 26,RT,, | Performed by: RADIOLOGY

## 2023-08-02 PROCEDURE — 99999 PR PBB SHADOW E&M-EST. PATIENT-LVL V: ICD-10-PCS | Mod: PBBFAC,,, | Performed by: FAMILY MEDICINE

## 2023-08-02 PROCEDURE — 36415 COLL VENOUS BLD VENIPUNCTURE: CPT | Mod: PO | Performed by: FAMILY MEDICINE

## 2023-08-02 PROCEDURE — 99395 PR PREVENTIVE VISIT,EST,18-39: ICD-10-PCS | Mod: S$GLB,,, | Performed by: FAMILY MEDICINE

## 2023-08-02 PROCEDURE — 99395 PREV VISIT EST AGE 18-39: CPT | Mod: S$GLB,,, | Performed by: FAMILY MEDICINE

## 2023-08-02 PROCEDURE — 84443 ASSAY THYROID STIM HORMONE: CPT | Performed by: FAMILY MEDICINE

## 2023-08-02 PROCEDURE — 80053 COMPREHEN METABOLIC PANEL: CPT | Performed by: FAMILY MEDICINE

## 2023-08-02 PROCEDURE — 85027 COMPLETE CBC AUTOMATED: CPT | Performed by: FAMILY MEDICINE

## 2023-08-02 PROCEDURE — 73560 XR KNEE ORTHO RIGHT: ICD-10-PCS | Mod: 26,LT,, | Performed by: RADIOLOGY

## 2023-08-02 PROCEDURE — 73560 X-RAY EXAM OF KNEE 1 OR 2: CPT | Mod: 26,LT,, | Performed by: RADIOLOGY

## 2023-08-02 PROCEDURE — 82728 ASSAY OF FERRITIN: CPT | Performed by: FAMILY MEDICINE

## 2023-08-02 PROCEDURE — 83036 HEMOGLOBIN GLYCOSYLATED A1C: CPT | Performed by: FAMILY MEDICINE

## 2023-08-02 PROCEDURE — 99999 PR PBB SHADOW E&M-EST. PATIENT-LVL V: CPT | Mod: PBBFAC,,, | Performed by: FAMILY MEDICINE

## 2023-08-02 PROCEDURE — 73560 X-RAY EXAM OF KNEE 1 OR 2: CPT | Mod: TC,PO,LT

## 2023-08-02 PROCEDURE — 73562 XR KNEE ORTHO RIGHT: ICD-10-PCS | Mod: 26,RT,, | Performed by: RADIOLOGY

## 2023-08-02 RX ORDER — MELOXICAM 15 MG/1
15 TABLET ORAL DAILY
Qty: 30 TABLET | Refills: 1 | Status: SHIPPED | OUTPATIENT
Start: 2023-08-02 | End: 2024-03-27

## 2023-08-02 RX ORDER — TIRZEPATIDE 5 MG/.5ML
5 INJECTION, SOLUTION SUBCUTANEOUS
Qty: 4 PEN | Refills: 1 | Status: SHIPPED | OUTPATIENT
Start: 2023-08-02 | End: 2024-03-27

## 2023-08-02 NOTE — TELEPHONE ENCOUNTER
I received your message which was reviewed along with the the medication list and allergies that we have below.  Please review it for accuracy to make sure that we have the most recent records on your history.     Based on this, the following orders were placed AND/OR medicines were sent in.     Orders Placed This Encounter   Procedures    KNEE BRACE FOR HOME USE     Order Specific Question:   What type of Knee Brace?     Answer:   choice     Order Specific Question:   Height:     Answer:   5ft4in     Order Specific Question:   Weight:     Answer:   304lb       Medications written and sent at this time include:       Your pharmacy(ies) of choice at this time on record include the list below and any medications would have been sent to the one at the top.    [facility]. [address] [address]  Phone: [phone] Fax: [phone]      Thank you for choosing us as your healthcare provider!  Dr. Dante Negro    ALLERGY LIST  Review of patient's allergies indicates:   Allergen Reactions    Metformin Diarrhea    Sulfa (sulfonamide antibiotics) Anaphylaxis and Swelling     Swelling (eyes)^, Swelling (throat)^  Swelling (eyes)^, Swelling (throat)^      Diclofenac      Gastritis     Latex, natural rubber      Contact dermatitis    Other reaction(s): Other (See Comments)  Contact dermatitis    Shellfish containing products      anaphylaxis       MEDICATION LIST  Current Outpatient Medications on File Prior to Visit   Medication Sig Dispense Refill    albuterol (PROVENTIL) 2.5 mg /3 mL (0.083 %) nebulizer solution Take 3 mLs (2.5 mg total) by nebulization every 4 to 6 hours as needed for Wheezing. 60 each 3    albuterol (PROVENTIL/VENTOLIN HFA) 90 mcg/actuation inhaler USE 1 TO 2 INHALATIONS EVERY 4 HOURS AS NEEDED FOR WHEEZING 8.5 g 10    azelastine (ASTELIN) 137 mcg (0.1 %) nasal spray SMARTSIG:Both Nares       cholecalciferol, vitamin D3, (VITAMIN D3) 25 mcg (1,000 unit) capsule Take 2 capsules (2,000 Units total) by mouth once daily. 90 capsule 4    doxycycline (VIBRA-TABS) 100 MG tablet Take 1 tablet (100 mg total) by mouth 2 (two) times daily. Take for 5 days prn flares 30 tablet 2    EPINEPHrine (EPIPEN) 0.3 mg/0.3 mL AtIn Inject 0.3 mLs (0.3 mg total) into the muscle once. for 1 dose 2 each 11    EScitalopram oxalate (LEXAPRO) 20 MG tablet Take 20 mg by mouth.      fluocinonide (LIDEX) 0.05 % external solution Apply topically 2 (two) times daily. Use on scalp 60 mL 5    fluticasone furoate-vilanteroL (BREO) 200-25 mcg/dose DsDv diskus inhaler Inhale 1 puff into the lungs once daily. Controller 1 each 4    gabapentin (NEURONTIN) 300 MG capsule Take 1 capsule (300 mg total) by mouth 3 (three) times daily. 90 capsule 11    iron-vit c-b12-folic acid (IRON 100 PLUS) Tab Take 1 tablet by mouth once daily. 90 tablet 1    ketoconazole (NIZORAL) 2 % shampoo Apply topically once a week. Lather in for 5-10 min before rinsing 360 mL 3    Lactobacillus rhamnosus GG (CULTURELLE) 10 billion cell capsule Take 1 capsule by mouth once daily.      lactulose (CHRONULAC) 10 gram/15 mL solution SMARTSI Tablespoon By Mouth Daily      levocetirizine (XYZAL) 5 MG tablet Take 1 tablet (5 mg total) by mouth every evening. 30 tablet 4    linaCLOtide (LINZESS) 72 mcg Cap capsule Take 1 capsule (72 mcg total) by mouth once daily. 30 capsule 1    lisinopriL (PRINIVIL,ZESTRIL) 2.5 MG tablet Take 1 tablet (2.5 mg total) by mouth once daily. 90 tablet 3    loratadine (CLARITIN) 10 mg tablet Take 1 tablet (10 mg total) by mouth once daily. 90 tablet 4    loteprednol (LOTEMAX) 0.5 % ophthalmic suspension Place 1 drop into both eyes daily as needed (to control allergy symptoms. Not for chronic daily use.). 5 mL 1    meloxicam (MOBIC) 15 MG tablet Take 1 tablet (15 mg total) by mouth once daily. 30 tablet 1    montelukast (SINGULAIR) 10 mg tablet Take  "1 tablet (10 mg total) by mouth once daily. 30 tablet 3    mupirocin (BACTROBAN) 2 % ointment Apply topically 3 (three) times daily.      NIFEdipine (PROCARDIA-XL) 30 MG (OSM) 24 hr tablet Take 30 mg by mouth once daily.      NOVOFINE PLUS 32 gauge x 1/6" Ndle use as directed      nystatin (MYCOSTATIN) powder Apply topically 2 (two) times daily. 60 g 1    omalizumab (XOLAIR) 150 mg injection Inject 300 mg into the skin every 14 (fourteen) days. 2 each 11    ondansetron (ZOFRAN-ODT) 8 MG TbDL DISSOLVE ONE TABLET UNDER TONGUE EVERY 8 HOURS AS NEEDED FOR NAUSEA      pantoprazole (PROTONIX) 40 MG tablet Take 1 tablet (40 mg total) by mouth once daily. 90 tablet 4    spironolactone (ALDACTONE) 100 MG tablet Take 1 tablet (100 mg total) by mouth once daily. 90 tablet 3    tirzepatide (MOUNJARO) 5 mg/0.5 mL PnIj Inject 5 mg into the skin every 7 days. 4 pen 1    tirzepatide 10 mg/0.5 mL PnIj Inject 10 mg into the skin every 7 days. 4 pen 12    tiZANidine (ZANAFLEX) 4 MG tablet Take 1 tablet (4 mg total) by mouth every 12 (twelve) hours as needed (muscle spasms/ pain). 40 tablet 0    tretinoin (RETIN-A) 0.05 % cream Apply topically every evening. Use on face 45 g 5    [DISCONTINUED] naproxen (NAPROSYN) 500 MG tablet Take 1 tablet (500 mg total) by mouth 2 (two) times daily. 60 tablet 1     Current Facility-Administered Medications on File Prior to Visit   Medication Dose Route Frequency Provider Last Rate Last Admin    cyanocobalamin injection 1,000 mcg  1,000 mcg Intramuscular Q30 Days Dante Negro MD   1,000 mcg at 05/26/23 1433    omalizumab injection 150 mg  150 mg Subcutaneous Q14 Days Rossy Kramer MD   150 mg at 05/10/23 0921    omalizumab injection 150 mg  150 mg Subcutaneous Q14 Days Rossy Kramer MD   150 mg at 05/10/23 0920    omalizumab injection 300 mg  300 mg Subcutaneous Q28 Days Rossy Kramer MD   300 mg at 04/10/23 0942       HEALTH MAINTENANCE THAT IS OVERDUE OR NEEDS TO BE UPDATED ON OUR CHART IS LISTED " BELOW.  IF YOU HAVE HAD IT DONE ELSEWHERE, PLEASE SEND US DATES AND RECORDS IF YOU HAVE THEM TO MAKE YOUR CHART ACCURATE.  IF YOU HAVE NOT HAD THESE DONE AND ARE READY FOR US TO SCHEDULE THEM, PLEASE SEND US A MESSAGE.  Health Maintenance Due   Topic Date Due    Foot Exam  Never done    COVID-19 Vaccine (4 - Pfizer series) 02/24/2022    Hemoglobin A1c  08/03/2023    Lipid Panel  08/30/2023       DISCLAIMER: This note was compiled by using a speech recognition dictation system and therefore please be aware that typographical / speech recognition errors can and do occur.  Please contact me if you see any errors specifically.    Dante Negro MD  We Offer Telehealth & Same Day Appointments!   Book your Telehealth appointment with me through my nurse or   Clinic appointments on [employer]!  [phone]     Check out my Facebook Page and Follow Me at: CLICK HERE    Check out my website at [employer] by clicking on: CLICK HERE    To Schedule appointments online, go to [employer]: CLICK HERE     Location: [email or web address]    [address] [place] [address]    FAX: [phone]

## 2023-08-02 NOTE — PROGRESS NOTES
This note is specifically for wellness visit performed today.   WELLNESS EXAM      Patient ID: Lucía Coreas is a 37 y.o. female with  has a past medical history of Allergy, Amenorrhea, Asthma, Diabetes, GERD (gastroesophageal reflux disease), Infertility associated with anovulation, Iron deficiency anemia, Knee pain, Metabolic syndrome, PCO (polycystic ovaries), Recurrent boils, and Status post repeat low transverse  section (2020).   Chief Complaint:  Encounter for wellness exam    Well Adult Physical: Patient here for a comprehensive physical exam.The patient reports Chronic problems.    The patient's last visit with me was on 2/3/2023.    2023:  Patient reports that she has changed insurance.  She is now working at [employer] in the kitchen.  Patient reports that she has been doing much more standing lately at her new job.  Patient having some right knee pain today.  She denies any injury or trauma.  She has been wearing compression stockings.  She has tried naproxen.  She has not been to physical therapy yet.    Patient reports that she has been off of MOUNJARO for a few weeks.    2023: Patient saw Pulmonary and Dermatology today.  Patient has a active ball that is being treated.  Patient has an appointment with Psychiatry soon.  Patient reports she continues to gain weight on MOUNJARO.  She does report increase constipation on this medication.  She is been using stool softener with no relief.  BMI Readings from Last 10 Encounters:   23 52.30 kg/m²   23 52.11 kg/m²   23 52.54 kg/m²   23 53.21 kg/m²   23 53.36 kg/m²   23 53.36 kg/m²   23 53.36 kg/m²   23 53.36 kg/m²   02/10/23 53.81 kg/m²   23 53.94 kg/m²     Diabetes Management Status    Statin: Not taking  ACE/ARB: Taking    Screening or Prevention Patient's value Goal Complete/Controlled?   HgA1C Testing and Control   Lab Results   Component Value Date     HGBA1C 5.3 02/03/2023      Annually/Less than 8% Yes   Lipid profile : 08/30/2022 Annually Yes   LDL control Lab Results   Component Value Date    LDLCALC 126.0 08/30/2022    Annually/Less than 100 mg/dl  No   Nephropathy screening Lab Results   Component Value Date    LABMICR 180.0 02/03/2023     Lab Results   Component Value Date    PROTEINUA 2+ (A) 02/03/2023     Lab Results   Component Value Date    UTPCR 0.20 02/03/2023      Annually Yes   Blood pressure BP Readings from Last 1 Encounters:   08/02/23 130/70    Less than 140/90 Yes   Dilated retinal exam : 03/13/2023 Annually Yes   Foot exam   Most Recent Foot Exam Date: Not Found Annually No       Reviewed Care team:  Ozempic PA good 6/20/2022-6/20/2023  [facility], [facility], Sheri Alexis, NP  MFM Krisit, Marifer, CNM   Pulm Fauzia  Allergy [facility]  Infectious disease Dr. Dorys Mercedes  Gen surg Nick Jackson MD    August 2022:  Patient reports chronic low back pain that is not improving.  Patient tried anti-inflammatory, steroids, muscle relaxers and has tried physical therapy.  No improvement.  She has been to physical therapy for her lower back.  X-Ray Foot Complete Bilateral  Narrative: EXAMINATION:  ANKLE COMPLETE BILATERALXR ANKLE COMPLETE 3 VIEW BILATERAL; XR FOOT COMPLETE 3 VIEW BILATERAL    CLINICAL HISTORY:  Pain in right foot    TECHNIQUE:  AP, oblique/mortise view, and lateral radiographs of the left and right ankle and foot were acquired.    COMPARISON:  Left foot-10/06/2022 and right foot-11/11/2021    FINDINGS:  The ankle mortise width is symmetric.  No left or right talar dome osteochondral lesion appreciated.  There is bilateral Achilles insertion calcaneal enthesophyte formation and plantar calcaneal spur formation.  There is right great toe hallux valgus and bunion formation at the great toe metatarsal head.  No radiographically evident acute, displaced fracture, dislocation, or osseous destructive  process.  There is scattered degenerative changes of the interphalangeal joints of both feet.  No soft tissue foreign body appreciated radiographically.  Impression: As above    Electronically signed by: Sabas Hutchinson MD  Date:    05/23/2023  Time:    11:26  X-Ray Ankle Complete Bilateral  Narrative: EXAMINATION:  ANKLE COMPLETE BILATERALXR ANKLE COMPLETE 3 VIEW BILATERAL; XR FOOT COMPLETE 3 VIEW BILATERAL    CLINICAL HISTORY:  Pain in right foot    TECHNIQUE:  AP, oblique/mortise view, and lateral radiographs of the left and right ankle and foot were acquired.    COMPARISON:  Left foot-10/06/2022 and right foot-11/11/2021    FINDINGS:  The ankle mortise width is symmetric.  No left or right talar dome osteochondral lesion appreciated.  There is bilateral Achilles insertion calcaneal enthesophyte formation and plantar calcaneal spur formation.  There is right great toe hallux valgus and bunion formation at the great toe metatarsal head.  No radiographically evident acute, displaced fracture, dislocation, or osseous destructive process.  There is scattered degenerative changes of the interphalangeal joints of both feet.  No soft tissue foreign body appreciated radiographically.  Impression: As above    Electronically signed by: Sabas Hutchinson MD  Date:    05/23/2023  Time:    11:26        CHRONIC. STABLE. BP Reviewed.  Compliant with BP medications. No SE reported.   (-) CP, SOB, palpitations, dizziness, lightheadedness, HA, arm numbness, tingling or weakness, syncope.  Creatinine   Date Value Ref Range Status   02/10/2023 0.8 0.5 - 1.4 mg/dL Final     CHRONIC. STABLE. Lab analysis reviewed.   (-) CP, SOB, abdominal pain, N/V/D, constipation, jaundice, skin changes.  (-) Myalgias  Lab Results   Component Value Date    CHOL 197 08/30/2022    CHOL 182 01/20/2022    CHOL 179 10/21/2021     Lab Results   Component Value Date    HDL 56 08/30/2022    HDL 57 01/20/2022    HDL 58 10/21/2021     Lab Results   Component Value  Date    LDLCALC 126.0 08/30/2022    LDLCALC 111.8 01/20/2022    LDLCALC 104.0 10/21/2021     Lab Results   Component Value Date    TRIG 75 08/30/2022    TRIG 66 01/20/2022    TRIG 85 10/21/2021     Lab Results   Component Value Date    CHOLHDL 28.4 08/30/2022    CHOLHDL 31.3 01/20/2022    CHOLHDL 32.4 10/21/2021     Lab Results   Component Value Date    TOTALCHOLEST 3.5 08/30/2022    TOTALCHOLEST 3.2 01/20/2022    TOTALCHOLEST 3.1 10/21/2021     Lab Results   Component Value Date    ALT 9 (L) 02/10/2023    AST 9 (L) 02/10/2023    ALKPHOS 62 02/10/2023    BILITOT 0.2 02/10/2023     ======================================================  BMI Readings from Last 10 Encounters:   08/02/23 52.30 kg/m²   05/30/23 52.11 kg/m²   05/22/23 52.54 kg/m²   05/22/23 53.21 kg/m²   03/31/23 53.36 kg/m²   03/17/23 53.36 kg/m²   03/09/23 53.36 kg/m²   02/28/23 53.36 kg/m²   02/10/23 53.81 kg/m²   02/03/23 53.94 kg/m²       Do you take any herbs or supplements that were not prescribed by a doctor? no   Are you taking calcium supplements? no   Lab Results   Component Value Date    WBC 9.56 05/25/2023    HGB 12.3 05/25/2023    HCT 38.2 05/25/2023    MCV 75 (L) 05/25/2023     05/25/2023       Lab Results   Component Value Date    IRON 44 02/10/2023    TIBC 349 02/10/2023    FERRITIN 83 05/25/2023     Lab Results   Component Value Date    WHDJCGBU43 >2000 (H) 08/30/2022     Lab Results   Component Value Date    FOLATE 10.1 06/10/2022        History:   LMP: Patient's last menstrual period was 06/02/2023 (approximate).   MMG:   ovarian cancer in sister.  No family historyof breast cancer  Discussed with OBGYN  PAP: 9/27/2022 follow-up with OBGYN    Colon cancer screening:   Not indicated  no family history of colon cancer    Health Maintenance Topics with due status: Not Due       Topic Last Completion Date    TETANUS VACCINE 08/26/2021    Cervical Cancer Screening 09/21/2022    Influenza Vaccine 12/28/2022    Diabetes Urine  Screening 2023    Eye Exam 2023      ==============================================  History reviewed.   Health Maintenance Due   Topic Date Due    Foot Exam  Never done    COVID-19 Vaccine (4 - Pfizer series) 2022    Hemoglobin A1c  2023    Lipid Panel  2023       Past Medical History:  Past Medical History:   Diagnosis Date    Allergy     Amenorrhea     Asthma     Diabetes     GERD (gastroesophageal reflux disease)     Infertility associated with anovulation     Iron deficiency anemia     Knee pain     Metabolic syndrome     PCO (polycystic ovaries)     Recurrent boils     Status post repeat low transverse  section 2020    Formatting of this note might be different from the original. With BTL 21     Past Surgical History:   Procedure Laterality Date    BTL      CARPAL TUNNEL RELEASE Left 2019    CARPAL TUNNEL RELEASE Right 2020    Procedure: RELEASE, CARPAL TUNNEL;  Surgeon: Adeel Laughlin MD;  Location: [facility];  Service: Orthopedics;  Laterality: Right;     SECTION      X2    EXPLORATORY LAPAROTOMY      1 week postop with WOUND VAK, reopened uterus    WOUND VAK      3 months post-op Section, 2 weeks in hospital     Review of patient's allergies indicates:   Allergen Reactions    Metformin Diarrhea    Sulfa (sulfonamide antibiotics) Anaphylaxis and Swelling     Swelling (eyes)^, Swelling (throat)^  Swelling (eyes)^, Swelling (throat)^      Diclofenac      Gastritis     Latex, natural rubber      Contact dermatitis    Other reaction(s): Other (See Comments)  Contact dermatitis    Shellfish containing products      anaphylaxis     Current Outpatient Medications on File Prior to Visit   Medication Sig Dispense Refill    albuterol (PROVENTIL) 2.5 mg /3 mL (0.083 %) nebulizer solution Take 3 mLs (2.5 mg total) by nebulization every 4 to 6 hours as needed for Wheezing. 60 each 3    albuterol (PROVENTIL/VENTOLIN HFA) 90 mcg/actuation inhaler USE 1  TO 2 INHALATIONS EVERY 4 HOURS AS NEEDED FOR WHEEZING 8.5 g 10    azelastine (ASTELIN) 137 mcg (0.1 %) nasal spray SMARTSIG:Both Nares      cholecalciferol, vitamin D3, (VITAMIN D3) 25 mcg (1,000 unit) capsule Take 2 capsules (2,000 Units total) by mouth once daily. 90 capsule 4    doxycycline (VIBRA-TABS) 100 MG tablet Take 1 tablet (100 mg total) by mouth 2 (two) times daily. Take for 5 days prn flares 30 tablet 2    EScitalopram oxalate (LEXAPRO) 20 MG tablet Take 20 mg by mouth.      fluocinonide (LIDEX) 0.05 % external solution Apply topically 2 (two) times daily. Use on scalp 60 mL 5    fluticasone furoate-vilanteroL (BREO) 200-25 mcg/dose DsDv diskus inhaler Inhale 1 puff into the lungs once daily. Controller 1 each 4    gabapentin (NEURONTIN) 300 MG capsule Take 1 capsule (300 mg total) by mouth 3 (three) times daily. 90 capsule 11    iron-vit c-b12-folic acid (IRON 100 PLUS) Tab Take 1 tablet by mouth once daily. 90 tablet 1    ketoconazole (NIZORAL) 2 % shampoo Apply topically once a week. Lather in for 5-10 min before rinsing 360 mL 3    Lactobacillus rhamnosus GG (CULTURELLE) 10 billion cell capsule Take 1 capsule by mouth once daily.      lactulose (CHRONULAC) 10 gram/15 mL solution SMARTSI Tablespoon By Mouth Daily      levocetirizine (XYZAL) 5 MG tablet Take 1 tablet (5 mg total) by mouth every evening. 30 tablet 4    linaCLOtide (LINZESS) 72 mcg Cap capsule Take 1 capsule (72 mcg total) by mouth once daily. 30 capsule 1    lisinopriL (PRINIVIL,ZESTRIL) 2.5 MG tablet Take 1 tablet (2.5 mg total) by mouth once daily. 90 tablet 3    loratadine (CLARITIN) 10 mg tablet Take 1 tablet (10 mg total) by mouth once daily. 90 tablet 4    loteprednol (LOTEMAX) 0.5 % ophthalmic suspension Place 1 drop into both eyes daily as needed (to control allergy symptoms. Not for chronic daily use.). 5 mL 1    montelukast (SINGULAIR) 10 mg tablet Take 1 tablet (10 mg total) by mouth once daily. 30 tablet 3    mupirocin  "(BACTROBAN) 2 % ointment Apply topically 3 (three) times daily.      NIFEdipine (PROCARDIA-XL) 30 MG (OSM) 24 hr tablet Take 30 mg by mouth once daily.      NOVOFINE PLUS 32 gauge x 1/6" Ndle use as directed      nystatin (MYCOSTATIN) powder Apply topically 2 (two) times daily. 60 g 1    omalizumab (XOLAIR) 150 mg injection Inject 300 mg into the skin every 14 (fourteen) days. 2 each 11    ondansetron (ZOFRAN-ODT) 8 MG TbDL DISSOLVE ONE TABLET UNDER TONGUE EVERY 8 HOURS AS NEEDED FOR NAUSEA      pantoprazole (PROTONIX) 40 MG tablet Take 1 tablet (40 mg total) by mouth once daily. 90 tablet 4    spironolactone (ALDACTONE) 100 MG tablet Take 1 tablet (100 mg total) by mouth once daily. 90 tablet 3    tirzepatide 10 mg/0.5 mL PnIj Inject 10 mg into the skin every 7 days. 4 pen 12    tiZANidine (ZANAFLEX) 4 MG tablet Take 1 tablet (4 mg total) by mouth every 12 (twelve) hours as needed (muscle spasms/ pain). 40 tablet 0    tretinoin (RETIN-A) 0.05 % cream Apply topically every evening. Use on face 45 g 5    [DISCONTINUED] naproxen (NAPROSYN) 500 MG tablet Take 1 tablet (500 mg total) by mouth 2 (two) times daily. 60 tablet 1    EPINEPHrine (EPIPEN) 0.3 mg/0.3 mL AtIn Inject 0.3 mLs (0.3 mg total) into the muscle once. for 1 dose 2 each 11     Current Facility-Administered Medications on File Prior to Visit   Medication Dose Route Frequency Provider Last Rate Last Admin    cyanocobalamin injection 1,000 mcg  1,000 mcg Intramuscular Q30 Days Dante Negro MD   1,000 mcg at 05/26/23 1433    omalizumab injection 150 mg  150 mg Subcutaneous Q14 Days Rossy Kramer MD   150 mg at 05/10/23 0921    omalizumab injection 150 mg  150 mg Subcutaneous Q14 Days Rossy Kramer MD   150 mg at 05/10/23 0920    omalizumab injection 300 mg  300 mg Subcutaneous Q28 Days Rossy Kramer MD   300 mg at 04/10/23 0942     Social History     Socioeconomic History    Marital status:    Tobacco Use    Smoking status: Never    Smokeless tobacco: " Never   Substance and Sexual Activity    Alcohol use: Never    Drug use: Never    Sexual activity: Yes     Partners: Male     Birth control/protection: See Surgical Hx     Family History   Problem Relation Age of Onset    Diabetes Father     Heart disease Father 69    Hypertension Father     Prostate cancer Father     Diabetes Mother     Lupus Mother     AMY disease Brother     Ovarian cancer Sister     Breast cancer Neg Hx        Review of Systems   Constitutional:  Positive for activity change, appetite change, fatigue and unexpected weight change. Negative for chills and fever.   HENT:  Negative for ear pain and sore throat.    Eyes:  Negative for redness and visual disturbance.   Respiratory:  Negative for cough and shortness of breath.    Cardiovascular:  Negative for chest pain and palpitations.   Gastrointestinal:  Negative for nausea and vomiting.   Genitourinary:  Negative for difficulty urinating and hematuria.   Musculoskeletal:  Positive for arthralgias and back pain. Negative for myalgias.   Skin:  Negative for rash and wound.   Neurological:  Negative for weakness and headaches.   Hematological:  Bruises/bleeds easily.   Psychiatric/Behavioral:  Negative for sleep disturbance. The patient is nervous/anxious.       Objective:    Nursing note and vitals reviewed.  Vitals:    08/02/23 0850   BP: 130/70   Pulse: 95     Body mass index is 52.3 kg/m².   Physical Exam  Vitals and nursing note reviewed.   Constitutional:       General: She is not in acute distress.     Appearance: She is well-developed. She is obese. She is not ill-appearing, toxic-appearing or diaphoretic.   HENT:      Head: Normocephalic and atraumatic.      Right Ear: Hearing and external ear normal.      Left Ear: Hearing and external ear normal.      Nose: Nose normal. No rhinorrhea.   Eyes:      General: Lids are normal.      Extraocular Movements: Extraocular movements intact.      Conjunctiva/sclera: Conjunctivae normal.      Pupils:  Pupils are equal, round, and reactive to light.   Cardiovascular:      Rate and Rhythm: Normal rate.      Pulses: Normal pulses.   Pulmonary:      Effort: Pulmonary effort is normal. No respiratory distress.      Breath sounds: Normal breath sounds.   Abdominal:      General: Bowel sounds are normal.      Palpations: Abdomen is soft.   Musculoskeletal:         General: Tenderness and deformity present. Normal range of motion.      Cervical back: Normal range of motion and neck supple.   Skin:     General: Skin is warm and dry.      Capillary Refill: Capillary refill takes less than 2 seconds.      Coloration: Skin is not pale.      Findings: Lesion present.   Neurological:      General: No focal deficit present.      Mental Status: She is alert and oriented to person, place, and time. Mental status is at baseline. She is not disoriented.      Cranial Nerves: No cranial nerve deficit.      Motor: No weakness.      Gait: Gait normal.   Psychiatric:         Attention and Perception: She is attentive.         Mood and Affect: Mood is not anxious or depressed.         Speech: Speech is not rapid and pressured or slurred.         Behavior: Behavior normal. Behavior is not agitated, aggressive or hyperactive. Behavior is cooperative.         Thought Content: Thought content normal. Thought content is not paranoid or delusional. Thought content does not include homicidal or suicidal ideation. Thought content does not include homicidal or suicidal plan.         Cognition and Memory: Memory is not impaired.         Judgment: Judgment normal.       Lab Visit on 05/25/2023   Component Date Value Ref Range Status    WBC 05/25/2023 9.56  3.90 - 12.70 K/uL Final    RBC 05/25/2023 5.08  4.00 - 5.40 M/uL Final    Hemoglobin 05/25/2023 12.3  12.0 - 16.0 g/dL Final    Hematocrit 05/25/2023 38.2  37.0 - 48.5 % Final    MCV 05/25/2023 75 (L)  82 - 98 fL Final    MCH 05/25/2023 24.2 (L)  27.0 - 31.0 pg Final    MCHC 05/25/2023 32.2  32.0  - 36.0 g/dL Final    RDW 05/25/2023 17.0 (H)  11.5 - 14.5 % Final    Platelets 05/25/2023 325  150 - 450 K/uL Final    MPV 05/25/2023 10.5  9.2 - 12.9 fL Final    Immature Granulocytes 05/25/2023 0.3  0.0 - 0.5 % Final    Gran # (ANC) 05/25/2023 7.2  1.8 - 7.7 K/uL Final    Immature Grans (Abs) 05/25/2023 0.03  0.00 - 0.04 K/uL Final    Comment: Mild elevation in immature granulocytes is non specific and   can be seen in a variety of conditions including stress response,   acute inflammation, trauma and pregnancy. Correlation with other   laboratory and clinical findings is essential.      Lymph # 05/25/2023 2.0  1.0 - 4.8 K/uL Final    Mono # 05/25/2023 0.4  0.3 - 1.0 K/uL Final    Eos # 05/25/2023 0.0  0.0 - 0.5 K/uL Final    Baso # 05/25/2023 0.02  0.00 - 0.20 K/uL Final    nRBC 05/25/2023 0  0 /100 WBC Final    Gran % 05/25/2023 75.2 (H)  38.0 - 73.0 % Final    Lymph % 05/25/2023 20.8  18.0 - 48.0 % Final    Mono % 05/25/2023 3.7 (L)  4.0 - 15.0 % Final    Eosinophil % 05/25/2023 0.1  0.0 - 8.0 % Final    Basophil % 05/25/2023 0.2  0.0 - 1.9 % Final    Differential Method 05/25/2023 Automated   Final    Ferritin 05/25/2023 83  20.0 - 300.0 ng/mL Final      Assessment / Plan:      1.  ANNUAL WELLNESS EXAM -patient here for annual wellness exam.  Labs ordered.  Health maintenance was reviewed and ordered.  Medications were reviewed and reconciled.   Anticipatory guidance: Don't smoke.  Healthy diet and regular exercise recommended. Vaccine recommendations discussed.  See orders.  Reviewed Anticipatory guidance, risk factor reduction interventions and counseling, Complete history , physical was completed today.  Complete and thorough medication reconciliation was performed.  Discussed risks and benefits of medications.  Advised patient on orders and health maintenance.  We discussed old records and old labs if available.  Will request any records not available through epic.  Continue current medications listed on  your summary sheet.    Knee pain:  Start meloxicam.  Stop naproxen.  Check x-ray.  If no improvement recommend physical therapy.  Follow-up with orthopedics.    Diabetes hypertension:  Patient has been off of MOUNJARO for a few weeks.  Start back at 5 milligrams weekly and titrate up.     Increase MOUNJARO to 10 milligrams weekly.  If no improvement in way consider bariatric surgery consult.  Blood pressure is well controlled on current regimen.  Hypertension Medications               lisinopriL (PRINIVIL,ZESTRIL) 2.5 MG tablet Take 1 tablet (2.5 mg total) by mouth once daily.    NIFEdipine (PROCARDIA-XL) 30 MG (OSM) 24 hr tablet Take 30 mg by mouth once daily.    spironolactone (ALDACTONE) 100 MG tablet Take 1 tablet (100 mg total) by mouth once daily.                   Previous plan:  Increase Ozempic to 2 milligrams weekly.  If no improvement with weight consider MOUNJARO. We will plan to monitor hemoglobin A1c at designated intervals 3 to 6 months.  I recommend ongoing Education for diabetic diet and exercise protocol.  We will continue to monitor for side effects.    Please be advised of symptoms to monitor for and to notify me immediately if persistent or worsening.  Follow up with Ophthalmology/Optometry and Podiatry at least annually.  Counseled on importance of hypertension disease course, I recommend ongoing Education for DASH-diet and exercise.  Counseled on medication regimen importance of treating high blood pressure.  Please be advised of risk of untreated blood pressure as discussed.  Please advised of ER precautions were given for symptoms of hypertensive urgency and emergency.    B12 deficiency:  Check level give B12 injection once monthly.  Okay to increase to every two weeks if needed.    Anxiety:  Continue Lexapro.  Previous history:  Consider starting SNRI with low weight gain side effect profile such as Pristiq or Wellbutrin  Previous history:  Patient is back on Lexapro and is gaining weight.   Stop Lexapro and start Pristiq.Please be advised of condition course.  SNRI/SSRI is first-line treatment for this condition.  Please be advised of the risk of discontinuing this medication without tapering/contacting MD.  Patient has been advised of side effects, and all questions were answered.  Patient voiced understanding.  Patient will follow up routinely and notify us if having any side effects or worsening or persistent symptoms.  ER precautions were given. Antidepressant/Antianxiety Medication Initiation:  Patient informed of risks, benefits, and potential side effects of medication and accepts informed consent.  Common side effects include nausea, fatigue, headache, insomnia, etc see medication insert for complete side effect profile.  Most importantly be advised of the possibility of new or worsening suicidal thoughts/depression/anxiety etcetera.  Please be advised to stop the medication immediately and seek urgent treatment if this occurs.  Therefore please do not to abruptly discontinue medication without physician guidance except in cases of sudden onset or worsening of SI.   Constipation:  Start Linzess.  Please be advised constipation condition course.  I recommend increase daily water intake to an acceptable level.  Increase fiber.  Recommend stool softener and laxative if needed.  Goal of 1 bowel movement daily.  Follow-up to clinic or notify me if no improvement or symptoms are persistent or worsening.  ER precautions were given.  Recommend daily exercise as tolerated, adequate water intake (six 8-oz glasses of water daily), and high fiber diet. OTC fiber supplements are recommended if diet does not reach daily fiber goal (20-30 grams daily), such as Metamucil, Citrucel, or FiberCon (take as directed, separate from other oral medications by >2 hours).  - CONTINUE OTC stool softener such as Colace as directed to avoid hard stools and straining with bowel movements PRN  -If still no improvement with  these measures, call/follow-up    Chronic low back pain:  Likely secondary to her weight and breast hypertrophy.  If no improvement I recommend MRI.  Referral to bariatric/Plastic surgery is also an option for consideration for breast reduction and or weight loss surgery.    All questions were answered. Patient had no further concerns. Advised of Wellness plan. Follow up in 1 year for ANNUAL WELLNESS EXAM    Orders Placed This Encounter   Procedures    X-ray Knee Ortho Right     Standing Status:   Future     Number of Occurrences:   1     Standing Expiration Date:   8/1/2024    CBC Without Differential     Standing Status:   Future     Number of Occurrences:   1     Standing Expiration Date:   9/30/2024    Comprehensive Metabolic Panel     Standing Status:   Future     Number of Occurrences:   1     Standing Expiration Date:   9/30/2024    TSH     Standing Status:   Future     Number of Occurrences:   1     Standing Expiration Date:   9/30/2024    Hemoglobin A1C     Standing Status:   Future     Number of Occurrences:   1     Standing Expiration Date:   9/30/2024    Lipid Panel     Standing Status:   Future     Number of Occurrences:   1     Standing Expiration Date:   9/30/2024     Medications Ordered This Encounter   Medications    meloxicam (MOBIC) 15 MG tablet     Sig: Take 1 tablet (15 mg total) by mouth once daily.     Dispense:  30 tablet     Refill:  1    tirzepatide (MOUNJARO) 5 mg/0.5 mL PnIj     Sig: Inject 5 mg into the skin every 7 days.     Dispense:  4 pen      Refill:  1          Future Appointments       Date Provider Specialty Appt Notes    8/2/2023  Radiology know pain    8/7/2023  Pulmonology Follow up in about 6 months (around 8/3/2023) for maura, feno next.    8/7/2023  Pulmonology Follow up in about 6 months (around 8/3/2023) for maura, feno next.    8/7/2023 Lolly Salgado PA-C Pulmonology Follow up in about 6 months (around 8/3/2023) for maura, feno next.    8/18/2023 Dante SURESH  MD [facility] Annual    8/28/2023  Lab hemonc     8/30/2023 Karlee Francis NP Hematology and Oncology 3 mon f/u, Labs    9/27/2023 Michael Hernandez MD Rheumatology +VANESSA--CHRONIC FATIGUE//bc    9/28/2023 Rossy Kramer MD Allergy 6 month follow up     10/20/2023 Zeke Clement MD Pain Medicine Pain            Adnte T MD [facility]

## 2023-08-02 NOTE — PROGRESS NOTES
1st check to see if patient has seen the results.  If not then  CALL patient with results and Document verification.  Schedule follow-up if needed.  [phone]    Abnormal x-ray of the knees bilaterally.  I recommend that you take the anti-inflammatory that was prescribed.  If no improvement trial of physical therapy.  Order has been placed.  Follow-up with orthopedics also.

## 2023-08-02 NOTE — PATIENT INSTRUCTIONS
Follow up in about 6 months (around 2/2/2024), or if symptoms worsen or fail to improve, for Med refills.     Dear patient,   As a result of recent federal legislation (The Federal Cures Act), you may receive lab or pathology results from your visit in your [facility] account before your physician is able to contact you. Your physician or their representative will relay the results to you with their recommendations at their soonest availability.     If no improvement in symptoms or symptoms worsen, please be advised to call MD, follow-up at clinic and/or go to ER if becomes severe.    Dante Negro M.D.        We Offer TELEHEALTH & Same Day Appointments!   Book your Telehealth appointment with me through my nurse or   Clinic appointments on [email or web address]!    [address] [place] [address]    Office: [phone]   FAX: [phone]    Check out my Facebook Page and Follow Me at: [email or web address]    Check out my website at [email or web address] by clicking on: [email or web address]    To Schedule appointments online, go to [email or web address]: [email or web address]

## 2023-08-03 ENCOUNTER — TELEPHONE (OUTPATIENT)
Dept: FAMILY MEDICINE | Facility: CLINIC | Age: 38
End: 2023-08-03
Payer: COMMERCIAL

## 2023-08-03 ENCOUNTER — OFFICE VISIT (OUTPATIENT)
Dept: HEMATOLOGY/ONCOLOGY | Facility: CLINIC | Age: 38
End: 2023-08-03
Payer: COMMERCIAL

## 2023-08-03 VITALS
RESPIRATION RATE: 18 BRPM | OXYGEN SATURATION: 99 % | DIASTOLIC BLOOD PRESSURE: 86 MMHG | HEIGHT: 64 IN | HEART RATE: 103 BPM | WEIGHT: 293 LBS | SYSTOLIC BLOOD PRESSURE: 122 MMHG | TEMPERATURE: 97 F | BODY MASS INDEX: 50.02 KG/M2

## 2023-08-03 DIAGNOSIS — E53.8 B12 DEFICIENCY: Primary | ICD-10-CM

## 2023-08-03 DIAGNOSIS — D50.0 IRON DEFICIENCY ANEMIA DUE TO CHRONIC BLOOD LOSS: Primary | ICD-10-CM

## 2023-08-03 DIAGNOSIS — E11.65 TYPE 2 DIABETES MELLITUS WITH HYPERGLYCEMIA, WITHOUT LONG-TERM CURRENT USE OF INSULIN: ICD-10-CM

## 2023-08-03 DIAGNOSIS — M32.19 OTHER SYSTEMIC LUPUS ERYTHEMATOSUS WITH OTHER ORGAN INVOLVEMENT: ICD-10-CM

## 2023-08-03 DIAGNOSIS — E53.8 B12 DEFICIENCY: ICD-10-CM

## 2023-08-03 LAB — FERRITIN SERPL-MCNC: 72 NG/ML (ref 20–300)

## 2023-08-03 PROCEDURE — 99999 PR PBB SHADOW E&M-EST. PATIENT-LVL V: CPT | Mod: PBBFAC,,,

## 2023-08-03 PROCEDURE — 99214 PR OFFICE/OUTPT VISIT, EST, LEVL IV, 30-39 MIN: ICD-10-PCS | Mod: S$GLB,,,

## 2023-08-03 PROCEDURE — 99999 PR PBB SHADOW E&M-EST. PATIENT-LVL V: ICD-10-PCS | Mod: PBBFAC,,,

## 2023-08-03 PROCEDURE — 99214 OFFICE O/P EST MOD 30 MIN: CPT | Mod: S$GLB,,,

## 2023-08-03 RX ORDER — CYANOCOBALAMIN 1000 UG/ML
1000 INJECTION, SOLUTION INTRAMUSCULAR; SUBCUTANEOUS
Status: SHIPPED | OUTPATIENT
Start: 2023-08-04

## 2023-08-03 RX ORDER — CYANOCOBALAMIN 1000 UG/ML
1000 INJECTION, SOLUTION INTRAMUSCULAR; SUBCUTANEOUS
Status: COMPLETED | OUTPATIENT
Start: 2023-08-10 | End: 2023-08-10

## 2023-08-03 RX ORDER — CYANOCOBALAMIN 1000 UG/ML
1000 INJECTION, SOLUTION INTRAMUSCULAR; SUBCUTANEOUS
OUTPATIENT
Start: 2023-08-04

## 2023-08-03 NOTE — PROGRESS NOTES
Make follow-up lab appointment per recommendation below.  Check to see if patient has seen the results through my chart.  If not then,  #CALL THE PATIENT# to discuss results/see if they have questions and document verification of contact. Make F/U appt if needed. [phone]    #My interpretation that was sent to them through [employer]:  Lucía, I have reviewed your recent blood work.     Your complete blood count is abnormal.  Follow-up with Hematology.  Your metabolic panel which shows your glucose, kidney function, electrolytes, and liver function is normal.   Thyroid study is normal.   Your cholesterol is normal.    Your hemoglobin A1c is stable.  This test is gold standard screening test for diabetes.  It is a measures 3 months of your average blood sugar.  =========================  Also please address any outstanding health maintenance that may be due: Foot Exam Never done  COVID-19 Vaccine(4 - Pfizer series) due on 02/24/2022

## 2023-08-03 NOTE — TELEPHONE ENCOUNTER
----- Message from Bambi Degroot sent at 8/3/2023  4:14 PM CDT -----  Contact: Lucía Castrejon is calling to speak to the nurse regarding getting schedule for her b12 injection. Please give her a call at [phone]    Thanks  LJ

## 2023-08-03 NOTE — PROGRESS NOTES
PATIENT: Lucía Coreas  MRN: [id number]  DATE: 8/3/2023      Diagnosis:   1. Iron deficiency anemia due to chronic blood loss    2. B12 deficiency    3. Other systemic lupus erythematosus with other organ involvement    4. Type 2 diabetes mellitus with hyperglycemia, without long-term current use of insulin        Chief Complaint: No chief complaint on file.    Subjective:   HPI: Ms. Coreas is a 37 y.o. female who returns for follow up of iron deficiency anemia.     The patient received 4 doses of IV Venofer the last 1 was on 2023. Had a good response with improved Hgb and MCV.      Periods are lasting 5-6 days with heavy menstrual flow at least 3-4 of those days.     She has been off B12 injections for about 3-4 months, had been getting them at her PCP clinic.      Labs drawn on 2023 show Hgb has dropped to 11.6 g/dL, MCV now up to 77. Ferritin was added to labs and remains stable at 72    Endorses fatigue. She has ortho appointment for chronic knee pain tomorrow.   Denies HA, CP, cough, SOB, abd pain, diarrhea, constipation, hematochezia, melena, N/V, hematuria, swelling, rashes, fevers, chills.     Past Medical History:   Past Medical History:   Diagnosis Date    Allergy     Amenorrhea     Asthma     Diabetes     GERD (gastroesophageal reflux disease)     Infertility associated with anovulation     Iron deficiency anemia     Knee pain     Metabolic syndrome     PCO (polycystic ovaries)     Recurrent boils     Status post repeat low transverse  section 2020    Formatting of this note might be different from the original. With BTL 21       Past Surgical HIstory:   Past Surgical History:   Procedure Laterality Date    BTL      CARPAL TUNNEL RELEASE Left 2019    CARPAL TUNNEL RELEASE Right 2020    Procedure: RELEASE, CARPAL TUNNEL;  Surgeon: Adeel Laughlin MD;  Location: [facility];  Service: Orthopedics;  Laterality: Right;     SECTION      X2    EXPLORATORY  LAPAROTOMY      1 week postop with WOUND VAK, reopened uterus    WOUND VAK      3 months post-op Section, 2 weeks in hospital       Family History:   Family History   Problem Relation Age of Onset    Diabetes Father     Heart disease Father 69    Hypertension Father     Prostate cancer Father     Diabetes Mother     Lupus Mother     AMY disease Brother     Ovarian cancer Sister     Breast cancer Neg Hx        Social History:  reports that she has never smoked. She has never used smokeless tobacco. She reports that she does not drink alcohol and does not use drugs.    Allergies:  Review of patient's allergies indicates:   Allergen Reactions    Metformin Diarrhea    Sulfa (sulfonamide antibiotics) Anaphylaxis and Swelling     Swelling (eyes)^, Swelling (throat)^  Swelling (eyes)^, Swelling (throat)^      Diclofenac      Gastritis     Latex, natural rubber      Contact dermatitis    Other reaction(s): Other (See Comments)  Contact dermatitis    Shellfish containing products      anaphylaxis       Medications:  Current Outpatient Medications   Medication Sig Dispense Refill    albuterol (PROVENTIL) 2.5 mg /3 mL (0.083 %) nebulizer solution Take 3 mLs (2.5 mg total) by nebulization every 4 to 6 hours as needed for Wheezing. 60 each 3    albuterol (PROVENTIL/VENTOLIN HFA) 90 mcg/actuation inhaler USE 1 TO 2 INHALATIONS EVERY 4 HOURS AS NEEDED FOR WHEEZING 8.5 g 10    azelastine (ASTELIN) 137 mcg (0.1 %) nasal spray SMARTSIG:Both Nares      cholecalciferol, vitamin D3, (VITAMIN D3) 25 mcg (1,000 unit) capsule Take 2 capsules (2,000 Units total) by mouth once daily. 90 capsule 4    doxycycline (VIBRA-TABS) 100 MG tablet Take 1 tablet (100 mg total) by mouth 2 (two) times daily. Take for 5 days prn flares 30 tablet 2    EPINEPHrine (EPIPEN) 0.3 mg/0.3 mL AtIn Inject 0.3 mLs (0.3 mg total) into the muscle once. for 1 dose 2 each 11    EScitalopram oxalate (LEXAPRO) 20 MG tablet Take 20 mg by mouth.      fluocinonide (LIDEX)  "0.05 % external solution Apply topically 2 (two) times daily. Use on scalp 60 mL 5    fluticasone furoate-vilanteroL (BREO) 200-25 mcg/dose DsDv diskus inhaler Inhale 1 puff into the lungs once daily. Controller 1 each 4    gabapentin (NEURONTIN) 300 MG capsule Take 1 capsule (300 mg total) by mouth 3 (three) times daily. 90 capsule 11    iron-vit c-b12-folic acid (IRON 100 PLUS) Tab Take 1 tablet by mouth once daily. 90 tablet 1    ketoconazole (NIZORAL) 2 % shampoo Apply topically once a week. Lather in for 5-10 min before rinsing 360 mL 3    Lactobacillus rhamnosus GG (CULTURELLE) 10 billion cell capsule Take 1 capsule by mouth once daily.      lactulose (CHRONULAC) 10 gram/15 mL solution SMARTSI Tablespoon By Mouth Daily      levocetirizine (XYZAL) 5 MG tablet Take 1 tablet (5 mg total) by mouth every evening. 30 tablet 4    linaCLOtide (LINZESS) 72 mcg Cap capsule Take 1 capsule (72 mcg total) by mouth once daily. 30 capsule 1    lisinopriL (PRINIVIL,ZESTRIL) 2.5 MG tablet Take 1 tablet (2.5 mg total) by mouth once daily. 90 tablet 3    loratadine (CLARITIN) 10 mg tablet Take 1 tablet (10 mg total) by mouth once daily. 90 tablet 4    loteprednol (LOTEMAX) 0.5 % ophthalmic suspension Place 1 drop into both eyes daily as needed (to control allergy symptoms. Not for chronic daily use.). 5 mL 1    meloxicam (MOBIC) 15 MG tablet Take 1 tablet (15 mg total) by mouth once daily. 30 tablet 1    montelukast (SINGULAIR) 10 mg tablet Take 1 tablet (10 mg total) by mouth once daily. 30 tablet 3    mupirocin (BACTROBAN) 2 % ointment Apply topically 3 (three) times daily.      NIFEdipine (PROCARDIA-XL) 30 MG (OSM) 24 hr tablet Take 30 mg by mouth once daily.      NOVOFINE PLUS 32 gauge x 1/6" Ndle use as directed      nystatin (MYCOSTATIN) powder Apply topically 2 (two) times daily. 60 g 1    omalizumab (XOLAIR) 150 mg injection Inject 300 mg into the skin every 14 (fourteen) days. 2 each 11    ondansetron (ZOFRAN-ODT) 8 " MG TbDL DISSOLVE ONE TABLET UNDER TONGUE EVERY 8 HOURS AS NEEDED FOR NAUSEA      pantoprazole (PROTONIX) 40 MG tablet Take 1 tablet (40 mg total) by mouth once daily. 90 tablet 4    spironolactone (ALDACTONE) 100 MG tablet Take 1 tablet (100 mg total) by mouth once daily. 90 tablet 3    tirzepatide (MOUNJARO) 5 mg/0.5 mL PnIj Inject 5 mg into the skin every 7 days. 4 pen 1    tirzepatide 10 mg/0.5 mL PnIj Inject 10 mg into the skin every 7 days. 4 pen 12    tiZANidine (ZANAFLEX) 4 MG tablet Take 1 tablet (4 mg total) by mouth every 12 (twelve) hours as needed (muscle spasms/ pain). 40 tablet 0    tretinoin (RETIN-A) 0.05 % cream Apply topically every evening. Use on face 45 g 5     Current Facility-Administered Medications   Medication Dose Route Frequency Provider Last Rate Last Admin    cyanocobalamin injection 1,000 mcg  1,000 mcg Intramuscular Q30 Days Dante Negro MD   1,000 mcg at 05/26/23 1433    omalizumab injection 150 mg  150 mg Subcutaneous Q14 Days Rossy Kramer MD   150 mg at 05/10/23 0921    omalizumab injection 150 mg  150 mg Subcutaneous Q14 Days Rossy Kramer MD   150 mg at 05/10/23 0920    omalizumab injection 300 mg  300 mg Subcutaneous Q28 Days Rossy Kramer MD   300 mg at 04/10/23 0942       Review of Systems   Constitutional:  Positive for fatigue. Negative for fever.   HENT:  Negative for nosebleeds and trouble swallowing.    Respiratory:  Negative for shortness of breath.    Cardiovascular:  Negative for chest pain and palpitations.   Gastrointestinal:  Negative for abdominal pain, blood in stool, constipation, diarrhea and nausea.   Genitourinary:  Negative for hematuria.   Musculoskeletal:  Positive for arthralgias.   Skin:  Negative for rash.   Neurological:  Negative for headaches.   Psychiatric/Behavioral:  The patient is not nervous/anxious.        ECOG Performance Status:   ECOG SCORE             Objective:      Vitals: There were no vitals filed for this visit.  BMI: There is no  height or weight on file to calculate BMI.    Physical Exam  Vitals reviewed.   Constitutional:       General: She is not in acute distress.     Appearance: She is obese. She is not diaphoretic.   HENT:      Head: Normocephalic and atraumatic.   Eyes:      General: No scleral icterus.  Cardiovascular:      Rate and Rhythm: Normal rate and regular rhythm.      Heart sounds: Normal heart sounds. No murmur heard.  Pulmonary:      Effort: Pulmonary effort is normal. No respiratory distress.      Breath sounds: No wheezing.   Musculoskeletal:      Right lower leg: No edema.      Left lower leg: No edema.   Skin:     General: Skin is warm.      Findings: No rash.   Neurological:      Mental Status: She is alert and oriented to person, place, and time.   Psychiatric:         Mood and Affect: Mood normal.         Behavior: Behavior normal.         Laboratory Data:  Lab Results   Component Value Date    WBC 9.87 08/02/2023    HGB 11.6 (L) 08/02/2023    HCT 36.9 (L) 08/02/2023    MCV 77 (L) 08/02/2023     08/02/2023          Imaging:   Assessment:       1. Iron deficiency anemia due to chronic blood loss    2. B12 deficiency    3. Other systemic lupus erythematosus with other organ involvement    4. Type 2 diabetes mellitus with hyperglycemia, without long-term current use of insulin           Plan:   Iron deficiency anemia:  -She responded well to her infusions.   -Now taking MVI with iron daily  -She will increase her PO iron supplement to 325 mg daily with food, vitamin C  -Will follow up in 3 months with repeat CBC, Iron/TIBC, Ferritin prior to appointment      B12 deficiency.    -Patient has not gotten B12 injections in at least 4 months per her recollection   -Have asked that she restart monthly injections  -Will check B12 level prior to follow up in 3 months        Personal history of lupus:  -according to the patient her antibody came back positive.  She did not require any treatment.    -She is currently  followed by Dr. Hernandez.     DM type 2 without long term use of insulin.  -On omalizumab    Patient queried and all questions were answered.   Assessment/Plan reviewed and approved by Dr. Molina   36 minutes were spent in coordination of patient's care, record review and counseling.  Route Chart for Scheduling    Med Onc Chart Routing      Follow up with physician    Follow up with JACKIE 3 months. with CBC, Iron/TIBC, Ferritin, B12 level prior to visit. Please get patient set up for B12 injections in the [facility].   Infusion scheduling note    Injection scheduling note    Labs    Imaging    Pharmacy appointment    Other referrals                Therapy Plan Information  Flushes  sodium chloride 0.9% 250 mL flush bag  Intravenous, 1 time a week  sodium chloride 0.9% flush 10 mL  10 mL, Intravenous, 1 time a week  heparin, porcine (PF) 100 unit/mL injection flush 500 Units  500 Units, Intravenous, 1 time a week  alteplase injection 2 mg  2 mg, Intra-Catheter, 1 time a week  PRN Medications  EPINEPHrine (EPIPEN) 0.3 mg/0.3 mL pen injection 0.3 mg  0.3 mg, Intramuscular, PRN  diphenhydrAMINE injection 50 mg  50 mg, Intravenous, PRN  methylPREDNISolone sodium succinate injection 125 mg  125 mg, Intravenous, PRN  sodium chloride 0.9% bolus 1,000 mL 1,000 mL  1,000 mL, Intravenous, PRN

## 2023-08-04 ENCOUNTER — OFFICE VISIT (OUTPATIENT)
Dept: ORTHOPEDICS | Facility: CLINIC | Age: 38
End: 2023-08-04
Payer: COMMERCIAL

## 2023-08-04 ENCOUNTER — DOCUMENTATION ONLY (OUTPATIENT)
Dept: HEMATOLOGY/ONCOLOGY | Facility: CLINIC | Age: 38
End: 2023-08-04
Payer: COMMERCIAL

## 2023-08-04 VITALS — WEIGHT: 293 LBS | BODY MASS INDEX: 50.02 KG/M2 | HEIGHT: 64 IN

## 2023-08-04 DIAGNOSIS — M25.561 PAIN AND SWELLING OF RIGHT KNEE: ICD-10-CM

## 2023-08-04 DIAGNOSIS — M25.461 PAIN AND SWELLING OF RIGHT KNEE: ICD-10-CM

## 2023-08-04 DIAGNOSIS — M17.0 BILATERAL PRIMARY OSTEOARTHRITIS OF KNEE: Primary | ICD-10-CM

## 2023-08-04 PROCEDURE — 99215 OFFICE O/P EST HI 40 MIN: CPT | Mod: PBBFAC,PN | Performed by: NURSE PRACTITIONER

## 2023-08-04 PROCEDURE — 96372 PR INJECTION,THERAP/PROPH/DIAG2ST, IM OR SUBCUT: ICD-10-PCS | Mod: S$GLB,,, | Performed by: NURSE PRACTITIONER

## 2023-08-04 PROCEDURE — 99999 PR PBB SHADOW E&M-EST. PATIENT-LVL V: CPT | Mod: PBBFAC,,, | Performed by: NURSE PRACTITIONER

## 2023-08-04 PROCEDURE — 99214 PR OFFICE/OUTPT VISIT, EST, LEVL IV, 30-39 MIN: ICD-10-PCS | Mod: 25,S$GLB,, | Performed by: NURSE PRACTITIONER

## 2023-08-04 PROCEDURE — 99999 PR PBB SHADOW E&M-EST. PATIENT-LVL V: ICD-10-PCS | Mod: PBBFAC,,, | Performed by: NURSE PRACTITIONER

## 2023-08-04 PROCEDURE — 96372 THER/PROPH/DIAG INJ SC/IM: CPT | Mod: S$GLB,,, | Performed by: NURSE PRACTITIONER

## 2023-08-04 PROCEDURE — 99214 OFFICE O/P EST MOD 30 MIN: CPT | Mod: 25,S$GLB,, | Performed by: NURSE PRACTITIONER

## 2023-08-04 RX ORDER — PREDNISONE 20 MG/1
TABLET ORAL
Qty: 10 TABLET | Refills: 0 | Status: SHIPPED | OUTPATIENT
Start: 2023-08-04 | End: 2023-08-11

## 2023-08-04 RX ORDER — IBUPROFEN 800 MG/1
800 TABLET ORAL 3 TIMES DAILY PRN
Qty: 90 TABLET | Refills: 2 | Status: SHIPPED | OUTPATIENT
Start: 2023-08-04 | End: 2023-11-02

## 2023-08-04 RX ORDER — METHOCARBAMOL 750 MG/1
750 TABLET, FILM COATED ORAL 4 TIMES DAILY PRN
Qty: 40 TABLET | Refills: 2 | Status: SHIPPED | OUTPATIENT
Start: 2023-08-04 | End: 2023-09-03

## 2023-08-04 RX ORDER — KETOROLAC TROMETHAMINE 30 MG/ML
30 INJECTION, SOLUTION INTRAMUSCULAR; INTRAVENOUS ONCE
Status: COMPLETED | OUTPATIENT
Start: 2023-08-04 | End: 2023-08-04

## 2023-08-04 RX ADMIN — KETOROLAC TROMETHAMINE 30 MG: 30 INJECTION, SOLUTION INTRAMUSCULAR; INTRAVENOUS at 10:08

## 2023-08-04 NOTE — PROGRESS NOTES
Clinic nurse offered to schedule appts for pt to receive vitamin b 12 injections at [facility], since no available [place] clinic.  Pt declined appts at this time, and stated she would prefer to receive the Vitamin B 12 injections at her PCP's office in [place], and will contact PCP to set up appts.  Instructed pt to let us know if she decides she would like to receive at our clinic instead, and appts can be then scheduled.  Pt verbalized understanding.

## 2023-08-04 NOTE — PROGRESS NOTES
Chief Complaint   Patient presents with    Right Knee - Pain       HPI:    This is a 37 y.o. who presents today complaining of right knee pain for 3 months after no specific injury or trauma. Pain is aching and dull. No numbness or tingling. No associated signs or symptoms.      Past Medical History:   Diagnosis Date    Allergy     Amenorrhea     Asthma     Diabetes     GERD (gastroesophageal reflux disease)     Infertility associated with anovulation     Iron deficiency anemia     Knee pain     Metabolic syndrome     PCO (polycystic ovaries)     Recurrent boils     Status post repeat low transverse  section 2020    Formatting of this note might be different from the original. With BTL 21      Past Surgical History:   Procedure Laterality Date    BTL      CARPAL TUNNEL RELEASE Left 2019    CARPAL TUNNEL RELEASE Right 2020    Procedure: RELEASE, CARPAL TUNNEL;  Surgeon: Adeel Laughlin MD;  Location: [facility];  Service: Orthopedics;  Laterality: Right;     SECTION      X2    EXPLORATORY LAPAROTOMY      1 week postop with WOUND VAK, reopened uterus    WOUND VAK      3 months post-op Section, 2 weeks in hospital      Current Outpatient Medications on File Prior to Visit   Medication Sig Dispense Refill    albuterol (PROVENTIL) 2.5 mg /3 mL (0.083 %) nebulizer solution Take 3 mLs (2.5 mg total) by nebulization every 4 to 6 hours as needed for Wheezing. 60 each 3    albuterol (PROVENTIL/VENTOLIN HFA) 90 mcg/actuation inhaler USE 1 TO 2 INHALATIONS EVERY 4 HOURS AS NEEDED FOR WHEEZING 8.5 g 10    azelastine (ASTELIN) 137 mcg (0.1 %) nasal spray SMARTSIG:Both Nares      cholecalciferol, vitamin D3, (VITAMIN D3) 25 mcg (1,000 unit) capsule Take 2 capsules (2,000 Units total) by mouth once daily. 90 capsule 4    doxycycline (VIBRA-TABS) 100 MG tablet Take 1 tablet (100 mg total) by mouth 2 (two) times daily. Take for 5 days prn flares 30 tablet 2    EPINEPHrine (EPIPEN) 0.3 mg/0.3 mL  "AtIn Inject 0.3 mLs (0.3 mg total) into the muscle once. for 1 dose 2 each 11    EScitalopram oxalate (LEXAPRO) 20 MG tablet Take 20 mg by mouth.      fluocinonide (LIDEX) 0.05 % external solution Apply topically 2 (two) times daily. Use on scalp 60 mL 5    fluticasone furoate-vilanteroL (BREO) 200-25 mcg/dose DsDv diskus inhaler Inhale 1 puff into the lungs once daily. Controller 1 each 4    gabapentin (NEURONTIN) 300 MG capsule Take 1 capsule (300 mg total) by mouth 3 (three) times daily. 90 capsule 11    iron-vit c-b12-folic acid (IRON 100 PLUS) Tab Take 1 tablet by mouth once daily. 90 tablet 1    ketoconazole (NIZORAL) 2 % shampoo Apply topically once a week. Lather in for 5-10 min before rinsing 360 mL 3    Lactobacillus rhamnosus GG (CULTURELLE) 10 billion cell capsule Take 1 capsule by mouth once daily.      lactulose (CHRONULAC) 10 gram/15 mL solution SMARTSI Tablespoon By Mouth Daily      levocetirizine (XYZAL) 5 MG tablet Take 1 tablet (5 mg total) by mouth every evening. 30 tablet 4    linaCLOtide (LINZESS) 72 mcg Cap capsule Take 1 capsule (72 mcg total) by mouth once daily. 30 capsule 1    lisinopriL (PRINIVIL,ZESTRIL) 2.5 MG tablet Take 1 tablet (2.5 mg total) by mouth once daily. 90 tablet 3    loratadine (CLARITIN) 10 mg tablet Take 1 tablet (10 mg total) by mouth once daily. 90 tablet 4    loteprednol (LOTEMAX) 0.5 % ophthalmic suspension Place 1 drop into both eyes daily as needed (to control allergy symptoms. Not for chronic daily use.). 5 mL 1    meloxicam (MOBIC) 15 MG tablet Take 1 tablet (15 mg total) by mouth once daily. 30 tablet 1    montelukast (SINGULAIR) 10 mg tablet Take 1 tablet (10 mg total) by mouth once daily. 30 tablet 3    mupirocin (BACTROBAN) 2 % ointment Apply topically 3 (three) times daily.      NIFEdipine (PROCARDIA-XL) 30 MG (OSM) 24 hr tablet Take 30 mg by mouth once daily.      NOVOFINE PLUS 32 gauge x " Ndle use as directed      nystatin (MYCOSTATIN) powder Apply " topically 2 (two) times daily. 60 g 1    omalizumab (XOLAIR) 150 mg injection Inject 300 mg into the skin every 14 (fourteen) days. 2 each 11    ondansetron (ZOFRAN-ODT) 8 MG TbDL DISSOLVE ONE TABLET UNDER TONGUE EVERY 8 HOURS AS NEEDED FOR NAUSEA      pantoprazole (PROTONIX) 40 MG tablet Take 1 tablet (40 mg total) by mouth once daily. 90 tablet 4    spironolactone (ALDACTONE) 100 MG tablet Take 1 tablet (100 mg total) by mouth once daily. 90 tablet 3    tirzepatide (MOUNJARO) 5 mg/0.5 mL PnIj Inject 5 mg into the skin every 7 days. 4 pen 1    tirzepatide 10 mg/0.5 mL PnIj Inject 10 mg into the skin every 7 days. 4 pen 12    tiZANidine (ZANAFLEX) 4 MG tablet Take 1 tablet (4 mg total) by mouth every 12 (twelve) hours as needed (muscle spasms/ pain). 40 tablet 0    tretinoin (RETIN-A) 0.05 % cream Apply topically every evening. Use on face 45 g 5     Current Facility-Administered Medications on File Prior to Visit   Medication Dose Route Frequency Provider Last Rate Last Admin    cyanocobalamin injection 1,000 mcg  1,000 mcg Intramuscular Q30 Days Dante Negro MD   1,000 mcg at 05/26/23 1433    cyanocobalamin injection 1,000 mcg  1,000 mcg Intramuscular Q30 Days Karlee Francis NP        [START ON 8/10/2023] cyanocobalamin injection 1,000 mcg  1,000 mcg Intramuscular 1 time in Clinic/HOD Dante Negro MD        omalizumab injection 150 mg  150 mg Subcutaneous Q14 Days Rossy Kramer MD   150 mg at 05/10/23 0921    omalizumab injection 150 mg  150 mg Subcutaneous Q14 Days Rossy Kramer MD   150 mg at 05/10/23 0920    omalizumab injection 300 mg  300 mg Subcutaneous Q28 Days Rossy Kramer MD   300 mg at 04/10/23 0942      Review of patient's allergies indicates:   Allergen Reactions    Metformin Diarrhea    Sulfa (sulfonamide antibiotics) Anaphylaxis and Swelling     Swelling (eyes)^, Swelling (throat)^  Swelling (eyes)^, Swelling (throat)^      Diclofenac      Gastritis     Latex, natural rubber      Contact  dermatitis    Other reaction(s): Other (See Comments)  Contact dermatitis    Shellfish containing products      anaphylaxis      Family History not pertinent   Social History     Socioeconomic History    Marital status:    Tobacco Use    Smoking status: Never    Smokeless tobacco: Never   Substance and Sexual Activity    Alcohol use: Never    Drug use: Never    Sexual activity: Yes     Partners: Male     Birth control/protection: See Surgical Hx         Review of Systems:   Constitutional:  Denies fever or chills    Eyes:  Denies change in visual acuity    HENT:  Denies nasal congestion or sore throat    Respiratory:  Denies cough or shortness of breath    Cardiovascular:  Denies chest pain or edema    GI:  Denies abdominal pain, nausea, vomiting, bloody stools or diarrhea    :  Denies dysuria    Integument:  Denies rash    Neurologic:  Denies headache, focal weakness or sensory changes    Endocrine:  Denies polyuria or polydipsia    Lymphatic:  Denies swollen glands    Psychiatric:  Denies depression or anxiety       Physical Exam:    Constitutional:  Well developed, well nourished, no acute distress, non-toxic appearance    Integument:  Well hydrated  Neurologic:  Alert & oriented x 3  Psychiatric:  Speech and behavior appropriate      Bilateral Knee Exam    Right Knee Exam   Tenderness   The patient is experiencing tenderness in the medial joint line.    Range of Motion   Flexion: abnormal     Muscle Strength   The patient has normal bilateral knee strength.    Tests   Rose:  Medial - positive   Lachman:  Anterior - negative      Varus: negative  Valgus: negative  Patellar Apprehension: negative      Other   Erythema: absent  Sensation: normal  Pulse: present  Swelling: mild    Left Knee exam   Knee exam performed same as contralateral side and is normal        X-rays were performed today, personally reviewed by me and findings discussed with the patient.  3 views of the bilateral knee show  tricompartmental degenerative changes           Bilateral primary osteoarthritis of knee  -     ketorolac injection 30 mg  -     ibuprofen (ADVIL,MOTRIN) 800 MG tablet; Take 1 tablet (800 mg total) by mouth 3 (three) times daily as needed for Pain.  Dispense: 90 tablet; Refill: 2  -     methocarbamoL (ROBAXIN) 750 MG Tab; Take 1 tablet (750 mg total) by mouth 4 (four) times daily as needed.  Dispense: 40 tablet; Refill: 2  -     predniSONE (DELTASONE) 20 MG tablet; Take 2 tablets (40 mg total) by mouth once daily for 3 days, THEN 1 tablet (20 mg total) once daily for 4 days.  Dispense: 10 tablet; Refill: 0    Pain and swelling of right knee        We discussed that with her age, not ideal to inject her knee with steroids. Will try brace to help with support, instructed her to take ibuprofen, methocarbamol and prednisone orally. Will give toradol IM today as she has to go to work at [employer] [[email or web address]] today.   If she continues to have pain after steroids complete, we can get her to return for steroid injection and then order her to get gel injections.     Instructed her to let me know if she continues to have pain.   Rtc as needed.

## 2023-08-07 ENCOUNTER — OFFICE VISIT (OUTPATIENT)
Dept: PRIMARY CARE CLINIC | Facility: CLINIC | Age: 38
End: 2023-08-07
Payer: COMMERCIAL

## 2023-08-07 DIAGNOSIS — B96.89 ACUTE BACTERIAL SINUSITIS: Primary | ICD-10-CM

## 2023-08-07 DIAGNOSIS — J01.90 ACUTE BACTERIAL SINUSITIS: Primary | ICD-10-CM

## 2023-08-07 PROCEDURE — 99213 PR OFFICE/OUTPT VISIT, EST, LEVL III, 20-29 MIN: ICD-10-PCS | Mod: 95,,, | Performed by: NURSE PRACTITIONER

## 2023-08-07 PROCEDURE — 99213 OFFICE O/P EST LOW 20 MIN: CPT | Mod: 95,,, | Performed by: NURSE PRACTITIONER

## 2023-08-07 RX ORDER — AMOXICILLIN AND CLAVULANATE POTASSIUM 875; 125 MG/1; MG/1
1 TABLET, FILM COATED ORAL EVERY 12 HOURS
Qty: 10 TABLET | Refills: 0 | Status: SHIPPED | OUTPATIENT
Start: 2023-08-07 | End: 2023-08-12

## 2023-08-07 RX ORDER — GUAIFENESIN 600 MG/1
600 TABLET, EXTENDED RELEASE ORAL 2 TIMES DAILY
Qty: 20 TABLET | Refills: 0
Start: 2023-08-07 | End: 2023-08-17

## 2023-08-07 RX ORDER — FLUTICASONE PROPIONATE 50 MCG
1 SPRAY, SUSPENSION (ML) NASAL DAILY
Qty: 18.2 ML | Refills: 0 | Status: SHIPPED | OUTPATIENT
Start: 2023-08-07 | End: 2023-10-02 | Stop reason: SDUPTHER

## 2023-08-07 NOTE — PROGRESS NOTES
Primary Care Telemedicine Note  The patient location is:  Patient Home   The chief complaint leading to consultation is: sinus pressure/pain, congestion   Total time spent with patient: 10 minutes     Visit type: Virtual visit with synchronous audio and video  Each patient to whom he or she provides medical services by telemedicine is:  (1) informed of the relationship between the physician and patient and the respective role of any other health care provider with respect to management of the patient; and (2) notified that he or she may decline to receive medical services by telemedicine and may withdraw from such care at any time.      Assessment/Plan:    Problem List Items Addressed This Visit    None  Visit Diagnoses       Acute bacterial sinusitis    -  Primary    Relevant Medications    guaiFENesin (MUCINEX) 600 mg 12 hr tablet    fluticasone propionate (FLONASE) 50 mcg/actuation nasal spray    amoxicillin-clavulanate 875-125mg (AUGMENTIN) 875-125 mg per tablet       -Start ABX as prescribed  -Start Flonase and Mucinex  -Supportive care- rest, increase hydration with water, OTC Tylenol/Ibuprofen for pain/fever.  -Follow up with PCP if no improvement in symptoms   -ER precautions for severe or worsening of symptoms      Follow up if symptoms worsen or fail to improve.    Hope Degroot, GRACIELA    _____________________________________________________________________________________________________________________________________________________    CC: sinus pressure/pain, congestion     HPI:    Patient is an established patient who presents today via virtual visit. Sinus Problem   This is a new problem. The current episode started in the past 7 days. The problem is gradually worsening. There has been no fever. She is experiencing sinus pain. Associated symptoms include congestion, watery eyes, and sinus pressure/pain. Pertinent negatives include no chills, coughing, diaphoresis, ear pain, headaches, hoarse voice,  neck pain, shortness of breath, sneezing, sore throat or swollen glands. Past treatments include xyzal. The treatment provided no relief.       Past Medical History:  Past Medical History:   Diagnosis Date    Allergy     Amenorrhea     Asthma     Diabetes     GERD (gastroesophageal reflux disease)     Infertility associated with anovulation     Iron deficiency anemia     Knee pain     Metabolic syndrome     PCO (polycystic ovaries)     Recurrent boils     Status post repeat low transverse  section 2020    Formatting of this note might be different from the original. With BTL 21     Past Surgical History:   Procedure Laterality Date    BTL      CARPAL TUNNEL RELEASE Left 2019    CARPAL TUNNEL RELEASE Right 2020    Procedure: RELEASE, CARPAL TUNNEL;  Surgeon: Adeel Laughlin MD;  Location: [facility];  Service: Orthopedics;  Laterality: Right;     SECTION      X2    EXPLORATORY LAPAROTOMY      1 week postop with WOUND VAK, reopened uterus    WOUND VAK      3 months post-op Section, 2 weeks in hospital     Review of patient's allergies indicates:   Allergen Reactions    Metformin Diarrhea    Sulfa (sulfonamide antibiotics) Anaphylaxis and Swelling     Swelling (eyes)^, Swelling (throat)^  Swelling (eyes)^, Swelling (throat)^      Diclofenac      Gastritis     Latex, natural rubber      Contact dermatitis    Other reaction(s): Other (See Comments)  Contact dermatitis    Shellfish containing products      anaphylaxis     Social History     Tobacco Use    Smoking status: Never    Smokeless tobacco: Never   Substance Use Topics    Alcohol use: Never    Drug use: Never     Family History   Problem Relation Age of Onset    Diabetes Father     Heart disease Father 69    Hypertension Father     Prostate cancer Father     Diabetes Mother     Lupus Mother     AMY disease Brother     Ovarian cancer Sister     Breast cancer Neg Hx      Current Outpatient Medications on File Prior to Visit    Medication Sig Dispense Refill    albuterol (PROVENTIL) 2.5 mg /3 mL (0.083 %) nebulizer solution Take 3 mLs (2.5 mg total) by nebulization every 4 to 6 hours as needed for Wheezing. 60 each 3    albuterol (PROVENTIL/VENTOLIN HFA) 90 mcg/actuation inhaler USE 1 TO 2 INHALATIONS EVERY 4 HOURS AS NEEDED FOR WHEEZING 8.5 g 10    azelastine (ASTELIN) 137 mcg (0.1 %) nasal spray SMARTSIG:Both Nares      cholecalciferol, vitamin D3, (VITAMIN D3) 25 mcg (1,000 unit) capsule Take 2 capsules (2,000 Units total) by mouth once daily. 90 capsule 4    doxycycline (VIBRA-TABS) 100 MG tablet Take 1 tablet (100 mg total) by mouth 2 (two) times daily. Take for 5 days prn flares 30 tablet 2    EPINEPHrine (EPIPEN) 0.3 mg/0.3 mL AtIn Inject 0.3 mLs (0.3 mg total) into the muscle once. for 1 dose 2 each 11    EScitalopram oxalate (LEXAPRO) 20 MG tablet Take 20 mg by mouth.      fluocinonide (LIDEX) 0.05 % external solution Apply topically 2 (two) times daily. Use on scalp 60 mL 5    fluticasone furoate-vilanteroL (BREO) 200-25 mcg/dose DsDv diskus inhaler Inhale 1 puff into the lungs once daily. Controller 1 each 4    gabapentin (NEURONTIN) 300 MG capsule Take 1 capsule (300 mg total) by mouth 3 (three) times daily. 90 capsule 11    ibuprofen (ADVIL,MOTRIN) 800 MG tablet Take 1 tablet (800 mg total) by mouth 3 (three) times daily as needed for Pain. 90 tablet 2    iron-vit c-b12-folic acid (IRON 100 PLUS) Tab Take 1 tablet by mouth once daily. 90 tablet 1    ketoconazole (NIZORAL) 2 % shampoo Apply topically once a week. Lather in for 5-10 min before rinsing 360 mL 3    Lactobacillus rhamnosus GG (CULTURELLE) 10 billion cell capsule Take 1 capsule by mouth once daily.      lactulose (CHRONULAC) 10 gram/15 mL solution SMARTSI Tablespoon By Mouth Daily      levocetirizine (XYZAL) 5 MG tablet Take 1 tablet (5 mg total) by mouth every evening. 30 tablet 4    linaCLOtide (LINZESS) 72 mcg Cap capsule Take 1 capsule (72 mcg total) by  "mouth once daily. 30 capsule 1    lisinopriL (PRINIVIL,ZESTRIL) 2.5 MG tablet Take 1 tablet (2.5 mg total) by mouth once daily. 90 tablet 3    loratadine (CLARITIN) 10 mg tablet Take 1 tablet (10 mg total) by mouth once daily. 90 tablet 4    loteprednol (LOTEMAX) 0.5 % ophthalmic suspension Place 1 drop into both eyes daily as needed (to control allergy symptoms. Not for chronic daily use.). 5 mL 1    meloxicam (MOBIC) 15 MG tablet Take 1 tablet (15 mg total) by mouth once daily. 30 tablet 1    methocarbamoL (ROBAXIN) 750 MG Tab Take 1 tablet (750 mg total) by mouth 4 (four) times daily as needed. 40 tablet 2    montelukast (SINGULAIR) 10 mg tablet Take 1 tablet (10 mg total) by mouth once daily. 30 tablet 3    mupirocin (BACTROBAN) 2 % ointment Apply topically 3 (three) times daily.      NIFEdipine (PROCARDIA-XL) 30 MG (OSM) 24 hr tablet Take 30 mg by mouth once daily.      NOVOFINE PLUS 32 gauge x 1/6" Ndle use as directed      nystatin (MYCOSTATIN) powder Apply topically 2 (two) times daily. 60 g 1    omalizumab (XOLAIR) 150 mg injection Inject 300 mg into the skin every 14 (fourteen) days. 2 each 11    ondansetron (ZOFRAN-ODT) 8 MG TbDL DISSOLVE ONE TABLET UNDER TONGUE EVERY 8 HOURS AS NEEDED FOR NAUSEA      pantoprazole (PROTONIX) 40 MG tablet Take 1 tablet (40 mg total) by mouth once daily. 90 tablet 4    predniSONE (DELTASONE) 20 MG tablet Take 2 tablets (40 mg total) by mouth once daily for 3 days, THEN 1 tablet (20 mg total) once daily for 4 days. 10 tablet 0    spironolactone (ALDACTONE) 100 MG tablet Take 1 tablet (100 mg total) by mouth once daily. 90 tablet 3    tirzepatide (MOUNJARO) 5 mg/0.5 mL PnIj Inject 5 mg into the skin every 7 days. 4 pen 1    tirzepatide 10 mg/0.5 mL PnIj Inject 10 mg into the skin every 7 days. 4 pen 12    tiZANidine (ZANAFLEX) 4 MG tablet Take 1 tablet (4 mg total) by mouth every 12 (twelve) hours as needed (muscle spasms/ pain). 40 tablet 0    tretinoin (RETIN-A) 0.05 % " cream Apply topically every evening. Use on face 45 g 5     Current Facility-Administered Medications on File Prior to Visit   Medication Dose Route Frequency Provider Last Rate Last Admin    cyanocobalamin injection 1,000 mcg  1,000 mcg Intramuscular Q30 Days Dante Negro MD   1,000 mcg at 05/26/23 1433    cyanocobalamin injection 1,000 mcg  1,000 mcg Intramuscular Q30 Days Karlee Francis NP        [START ON 8/10/2023] cyanocobalamin injection 1,000 mcg  1,000 mcg Intramuscular 1 time in Clinic/HOD Dante Negro MD        omalizumab injection 150 mg  150 mg Subcutaneous Q14 Days Rossy Kramer MD   150 mg at 05/10/23 0921    omalizumab injection 150 mg  150 mg Subcutaneous Q14 Days Rossy Kramer MD   150 mg at 05/10/23 0920    omalizumab injection 300 mg  300 mg Subcutaneous Q28 Days Rossy Kramer MD   300 mg at 04/10/23 0942       Review of Systems   HENT:  Positive for congestion and sinus pain. Negative for hearing loss.    Eyes:  Negative for discharge.        + watery eyes    Respiratory:  Negative for wheezing.    Cardiovascular:  Negative for chest pain and palpitations.   Gastrointestinal:  Negative for blood in stool, constipation, diarrhea and vomiting.   Genitourinary:  Negative for dysuria and hematuria.   Musculoskeletal:  Negative for neck pain.   Neurological:  Negative for weakness and headaches.   Endo/Heme/Allergies:  Negative for polydipsia.         Physical Exam   @[email or web address]@  @thptenteredglucose@    Physical Exam  Constitutional:       Appearance: She is well-developed.   HENT:      Head: Normocephalic and atraumatic.   Pulmonary:      Effort: Pulmonary effort is normal.   Musculoskeletal:      Cervical back: Normal range of motion and neck supple.   Neurological:      Mental Status: She is alert and oriented to person, place, and time.   Psychiatric:         Behavior: Behavior normal.         Thought Content: Thought content normal.         Judgment: Judgment normal.          The patient's Health Maintenance was reviewed and the following appears to be due at this time:  Health Maintenance Due   Topic Date Due    Foot Exam  Never done    COVID-19 Vaccine (4 - Pfizer series) 02/24/2022

## 2023-08-10 ENCOUNTER — CLINICAL SUPPORT (OUTPATIENT)
Dept: FAMILY MEDICINE | Facility: CLINIC | Age: 38
End: 2023-08-10
Payer: COMMERCIAL

## 2023-08-10 DIAGNOSIS — E53.8 B12 DEFICIENCY: Primary | ICD-10-CM

## 2023-08-10 PROCEDURE — 96372 PR INJECTION,THERAP/PROPH/DIAG2ST, IM OR SUBCUT: ICD-10-PCS | Mod: S$GLB,,, | Performed by: FAMILY MEDICINE

## 2023-08-10 PROCEDURE — 96372 THER/PROPH/DIAG INJ SC/IM: CPT | Mod: S$GLB,,, | Performed by: FAMILY MEDICINE

## 2023-08-10 PROCEDURE — 99999 PR PBB SHADOW E&M-EST. PATIENT-LVL III: ICD-10-PCS | Mod: PBBFAC,,,

## 2023-08-10 PROCEDURE — 99999 PR PBB SHADOW E&M-EST. PATIENT-LVL III: CPT | Mod: PBBFAC,,,

## 2023-08-10 RX ADMIN — CYANOCOBALAMIN 1000 MCG: 1000 INJECTION, SOLUTION INTRAMUSCULAR; SUBCUTANEOUS at 09:08

## 2023-08-11 RX ORDER — PREDNISONE 20 MG/1
20 TABLET ORAL 2 TIMES DAILY
Qty: 10 TABLET | Refills: 0 | Status: SHIPPED | OUTPATIENT
Start: 2023-08-11 | End: 2024-03-27

## 2023-08-18 ENCOUNTER — CLINICAL SUPPORT (OUTPATIENT)
Dept: REHABILITATION | Facility: HOSPITAL | Age: 38
End: 2023-08-18
Payer: COMMERCIAL

## 2023-08-18 DIAGNOSIS — M25.561 ACUTE PAIN OF RIGHT KNEE: ICD-10-CM

## 2023-08-18 DIAGNOSIS — M25.661 DECREASED RANGE OF MOTION OF RIGHT KNEE: ICD-10-CM

## 2023-08-18 DIAGNOSIS — R29.898 DECREASED STRENGTH INVOLVING KNEE JOINT: ICD-10-CM

## 2023-08-18 PROCEDURE — 97110 THERAPEUTIC EXERCISES: CPT | Mod: PO

## 2023-08-18 PROCEDURE — 97162 PT EVAL MOD COMPLEX 30 MIN: CPT | Mod: PO

## 2023-08-18 NOTE — PLAN OF CARE
"  [facility]  Physical Therapy Initial Evaluation    Name: Lucía Coreas  Clinic Number: [id number]    Therapy Diagnosis:   Encounter Diagnoses   Name Primary?    Acute pain of right knee     Decreased range of motion of right knee     Decreased strength involving knee joint        Physician: Dante Negro MD    Physician Orders: PT Eval and Treat   Medical Diagnosis from Referral: M25.561 (ICD-10-CM) - Acute pain of right knee  Evaluation Date: 2023  Authorization Period Expiration: 2023  Plan of Care Expiration: 10/31/2023  Visit # / Visits authorized:     Time In: 10:07 am  Time Out: 11:00 am  Total Billable Time: 53 minutes    Precautions: Standard and Lupus    Subjective   Date of onset: 1 month  History of current condition - Lucía reports: Pt noticed increased R knee pain after switching jobs with increased walking. Locates pain to lateral thigh and medial and lateral knee. Pain increases when sitting and transitioning to standing. Describes pain as a "tooth ache", dull pain. Increased pain with bending and end range extension. Notices some popping occasionally. Use of knee brace for support over the last 2 weeks.      Medical History:   Past Medical History:   Diagnosis Date    Allergy     Amenorrhea     Asthma     Diabetes     GERD (gastroesophageal reflux disease)     Infertility associated with anovulation     Iron deficiency anemia     Knee pain     Metabolic syndrome     PCO (polycystic ovaries)     Recurrent boils     Status post repeat low transverse  section 2020    Formatting of this note might be different from the original. With BTL 21       Surgical History:   Lucía Coreas  has a past surgical history that includes Carpal tunnel release (Left, 2019);  section; Carpal tunnel release (Right, 2020); BTL; Exploratory laparotomy; and WOUND VAK.    Medications:   Lucía has a current medication list which includes " the following prescription(s): albuterol, albuterol, azelastine, cholecalciferol (vitamin d3), doxycycline, epinephrine, escitalopram oxalate, fluocinonide, fluticasone furoate-vilanterol, fluticasone propionate, gabapentin, ibuprofen, iron 100 plus, ketoconazole, lactobacillus rhamnosus gg, lactulose, levocetirizine, linaclotide, lisinopril, loratadine, loteprednol, meloxicam, methocarbamol, montelukast, mupirocin, nifedipine, novofine plus, nystatin, xolair, ondansetron, pantoprazole, prednisone, spironolactone, mounjaro, tirzepatide, tizanidine, and tretinoin, and the following Facility-Administered Medications: cyanocobalamin, cyanocobalamin, omalizumab, omalizumab, and omalizumab.    Allergies:   Review of patient's allergies indicates:   Allergen Reactions    Metformin Diarrhea    Sulfa (sulfonamide antibiotics) Anaphylaxis and Swelling     Swelling (eyes)^, Swelling (throat)^  Swelling (eyes)^, Swelling (throat)^      Diclofenac      Gastritis     Latex, natural rubber      Contact dermatitis    Other reaction(s): Other (See Comments)  Contact dermatitis    Shellfish containing products      anaphylaxis        Imaging, bone scan films: Tricompartmental osteoarthritis bilaterally, most prominent within the medial and patellofemoral compartments.  No acute fracture or dislocation.       Prior Therapy: yes, recently for back pain. 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  Prior Level of Function: independent with back pain  Current Level of Function: pain wakes her up at night, pain with knee flexion and extension, pain worst at night    Pain:  Current 4/10, worst 10/10, best 3/10   Location: right knee  Description: Aching and Dull  Aggravating Factors: Sitting, Night Time, Extension, Flexing, and Getting out of bed/chair  Easing Factors: rest and muscle relaxer- takes edge off but not  eliminate    Pts goals: relieve pain, improve functional tasks    Objective       Observation: knee rest position in valgus position     Posture Alignment: slouched posture;forward head; knee rests in ~30 deg of flexion and/or in valgus    Sensation: intact to light touch  DTR: NT    GAIT DEVIATIONS: Lucía displays with decreased WB on RLE, donning knee brace for support, foot flat at contact    Range of Motion:   Knee Left active   Flexion 121   Extension 0     Knee Right active   Flexion 100   Extension 5       Lower Extremity Strength   Right LE  Left LE    Knee extension: 4-/5* Knee extension: 5/5   Knee flexion: 4-/5* Knee flexion: 5/5   Hip flexion: 3+/5* Hip flexion: 4/5   Hip extension:  NT Hip extension: NT   Hip abduction: 3+/5 Hip abduction: 3+/5   Ankle dorsiflexion: 5/5 Ankle dorsiflexion: 5/5   Ankle plantarflexion: 5/5 Ankle plantarflexion: 5/5       Special Tests:   Right Left   Valgus Stress Test Negative Negative   Varus Stress test positive Negative   Lachman's test Negative Negative   Posterior Drawer Negative Negative   Anterior Drawer Negative Negative   Rose's Test Positive Negative   Claire's compression test Negative Negative   Patellar Grind Test Negative Negative       Joint Mobility: normal mobility     Patellar sup./inf:   Patellar med/lat:    Palpation: medial knee compartment    Flexibility: tightness to gastroc and hamstring    Edema:     Girth Measurement Joint line 5 cm below 10 cm above   Left 59 cm 46 cm 63.5 cm   Right 53 cm 46.5 cm 65 cm       CMS Impairment/Limitation/Restriction for FOTO Knee Survey    Therapist reviewed FOTO scores for Lucía Coreas on 8/18/2023.   FOTO documents entered into EPIC - see Media section.    Limitation Score: %  Category:     Current :   Goal:   Discharge:          TREATMENT   Treatment Time In: 10:40 am  Treatment Time Out: 11:00 am  Total Treatment time separate from Evaluation: 20 minutes    Lucía received therapeutic exercises to develop  strength, ROM, and flexibility for 20 minutes including:    Long sitting with back supported:  Quad sets with towel behind knee, slight hip ER 2 x 10 with 3 sec hold  Gastroc stretch with strap 2 x 30 sec on R  SAQ with slight ER, 2 x 10  Quad set +SLR with slight ER x 5 (reported pain to back, VC for TA)  Heel slides x 15 with strap    Kinesiotaping to R knee for increased support with 3 single I strips, one on each side of patellar and one across patellar tendon    Home Exercises and Patient Education Provided    Education provided:   - HEP, prognosis    Written Home Exercises Provided: yes.  Exercises were reviewed and Lucía was able to demonstrate them prior to the end of the session.  Lucía demonstrated good  understanding of the education provided.     See EMR under Patient Instructions for exercises provided 8/18/2023.    Assessment   Lucía is a 37 y.o. female referred to outpatient Physical Therapy with a medical diagnosis of acute pain of right knee. Pt presents with right knee pain located to medial and lateral knee near joint line and lateral thigh. She displays limited knee flexion and extension due to pain, mild swelling to R knee, reports pain in sitting and transitions, decreased strength limited by pain, and pain with work activities including standing and walking. She demonstrates positive varus test and Rose testing indicating medial compartment pathology. No significant clicking or popping. Occasional locking of her knee. X-ray displays tricompartmental osteoarthritis. Likely a flair up of pain due to change in career with significant change in standing and walking. She would benefit from strengthening and endurance training to improve functional activity tolerance for work.    Pt prognosis is Fair.   Pt will benefit from skilled outpatient Physical Therapy to address the deficits stated above and in the chart below, provide pt/family education, and to maximize pt's level of independence.      Plan of care discussed with patient: Yes  Pt's spiritual, cultural and educational needs considered and patient is agreeable to the plan of care and goals as stated below:     Anticipated Barriers for therapy: history of R knee pain    Medical Necessity is demonstrated by the following  History  Co-morbidities and personal factors that may impact the plan of care Co-morbidities:   Asthma, lupus, history of multiple , history of R knee pain, back pain, DM, anemia    Personal Factors:   lifestyle  Activity level, change in careers,has young children at home she is lifting     mod   Examination  Body Structures and Functions, activity limitations and participation restrictions that may impact the plan of care Body Regions:   lower extremities  trunk    Body Systems:    ROM  strength  gait  transfers  transitions    Participation Restrictions:   Pain with transitions, difficulty sleeping    Activity limitations:   Learning and applying knowledge  no deficits    General Tasks and Commands  no deficits    Communication  no deficits    Mobility  lifting and carrying objects  walking  driving (bike, car, motorcycle)  Stairs, transitions, balance,     Self care  washing oneself (bathing, drying, washing hands)  toileting  dressing  looking after one's health    Domestic Life  shopping  cooking  doing house work (cleaning house, washing dishes, laundry)  assisting others    Interactions/Relationships  no deficits    Life Areas  Pain with work tasks    Community and Social Life  community life  recreation and leisure         Complexity: mod   Clinical Presentation evolving clinical presentation with changing clinical characteristics mod   Decision Making/ Complexity Score: mod       GOALS: Short Term Goals:  4 weeks  1.Report decreased in pain at worse less than  <   / =  7 /10  to increase tolerance for functional mobility.  2. Pt to improve R knee flexion range of motion to 110 deg actively to allow for improved  functional mobility to allow for improvement in IADLs.   3. Pt to improve R knee extension range of motion to 0 deg actively to allow for improved functional mobility to allow for improvement in IADLs.   4. Increased RLE MMT 1/2 grade to increase tolerance for ADL and work activities.    Long Term Goals: 8 weeks  1.Report decreased in pain at worse less than  <   / =  3 /10  to increase tolerance for functional mobility.  2.Pt to improve R knee flexion range of motion to 120 deg actively to allow for improved functional mobility to allow for improvement in IADLs.   3.Increased RLE MMT 1 grade to increase tolerance for ADL and work activities.  4. Pt will ambulate full day at work without pain worse than 3/10.  5. Pt to be Independent with HEP to improve ROM and independence with ADL's    Plan   Plan of care Certification: 8/18/2023 to 10/31/2023.    Outpatient Physical Therapy 2 times weekly for 8 weeks to include the following interventions: Electrical Stimulation NMES, Gait Training, Manual Therapy, Moist Heat/ Ice, Neuromuscular Re-ed, Orthotic Management and Training, Patient Education, Therapeutic Activities, and Therapeutic Exercise, dry needling.       Ebonie Trejo, PT

## 2023-08-24 ENCOUNTER — CLINICAL SUPPORT (OUTPATIENT)
Dept: REHABILITATION | Facility: HOSPITAL | Age: 38
End: 2023-08-24
Payer: COMMERCIAL

## 2023-08-24 DIAGNOSIS — M25.661 DECREASED RANGE OF MOTION OF RIGHT KNEE: ICD-10-CM

## 2023-08-24 DIAGNOSIS — M25.561 ACUTE PAIN OF RIGHT KNEE: Primary | ICD-10-CM

## 2023-08-24 DIAGNOSIS — R29.898 DECREASED STRENGTH INVOLVING KNEE JOINT: ICD-10-CM

## 2023-08-24 PROCEDURE — 97140 MANUAL THERAPY 1/> REGIONS: CPT | Mod: PO,CQ

## 2023-08-24 PROCEDURE — 97110 THERAPEUTIC EXERCISES: CPT | Mod: PO,CQ

## 2023-08-24 NOTE — PROGRESS NOTES
"Physical Therapy Daily Treatment Note     Name: Lucía Steele [facility]  Clinic Number: [id number]    Therapy Diagnosis:   Encounter Diagnoses   Name Primary?    Acute pain of right knee Yes    Decreased range of motion of right knee     Decreased strength involving knee joint      Physician: Dante Negro MD    Visit Date: 8/24/2023    Physician Orders: PT Eval and Treat   Medical Diagnosis from Referral: M25.561 (ICD-10-CM) - Acute pain of right knee  Evaluation Date: 8/18/2023  Authorization Period Expiration: 12/31/2023  Plan of Care Expiration: 10/31/2023  Visit # / Visits authorized: 1/ 1 1/20    Time In: 10:00 am  Time Out: 11:00  Total Billable Time: 55 minutes    Precautions: Standard and Lupus    Subjective     Pt reports: a R knee pain of 5-6 upon arrival for first follow up visit. She has been compliant with HEP since initial evaluation.    She was compliant with home exercise program.  Response to previous treatment: First follow up  Functional change: None yet    Pain: 5-6/10  Location: right knee      Objective   Objective measures noted on reassessments unless otherwise noted       TREATMENT     Lucía received therapeutic exercises to develop strength, endurance, ROM, flexibility, posture and core stabilization for 40 minutes including:    Stationary Bike level 1 for 5 mins  Heel slides x20 " hold  Hamstring stretch 3x20"  Gastroc stretch w/towel 3x30"  Quad sets x20 5" hold  SAQ 2x10  SLR 2x10  Supine hip add (Ball squeezes) x20 3" hol  Supine clamshells RTB x20    Lucía received the following manual therapy techniques: Soft tissue Mobilization were applied to the: R knee for 15 minutes, including:    STM w/roller stick to R distal quad  KT tape for patella stability support      Lucía received cold pack for 5 minutes to R knee.      Home Exercises Provided and Patient Education Provided     Education provided:   - HEP review    Written Home Exercises Provided: Patient instructed to cont prior " HEP.  Exercises were reviewed and Lucía was able to demonstrate them prior to the end of the session.  Lucía demonstrated good  understanding of the education provided.     See EMR under Patient Instructions for exercises provided prior visit.    Assessment   Lucía returned for her first follow up visit reporting a R knee pain level of 5-6/10 upon arrival for treatment. She has been compliant with HEP since initial evaluation. Treatment began with STM w/roller stick to R distal quad. KT tape was also applied to R knee for added to patella stability as pt reported relief with technique when performed on initial eval.  HEP review performed with pt demonstrated a good understanding of exercises requiring only min VC to ensure proper form.  Treatment was tolerated fair with min pain with most exercises, but pt did note decreased pain levels (3/10) post treatment. Will monitor and attempt to progress per pt's tolerance.     Lucía Is progressing well towards her goals.   Pt prognosis is Fair.     Pt will continue to benefit from skilled outpatient physical therapy to address the deficits listed in the problem list box on initial evaluation, provide pt/family education and to maximize pt's level of independence in the home and community environment.     Pt's spiritual, cultural and educational needs considered and pt agreeable to plan of care and goals.     Anticipated barriers to physical therapy: history of R knee pain    Goals:    Short Term Goals:  4 weeks  1.Report decreased in pain at worse less than  <   / =  7 /10  to increase tolerance for functional mobility.  2. Pt to improve R knee flexion range of motion to 110 deg actively to allow for improved functional mobility to allow for improvement in IADLs.   3. Pt to improve R knee extension range of motion to 0 deg actively to allow for improved functional mobility to allow for improvement in IADLs.   4. Increased RLE MMT 1/2 grade to increase tolerance for ADL and  work activities.     Long Term Goals: 8 weeks  1.Report decreased in pain at worse less than  <   / =  3 /10  to increase tolerance for functional mobility.  2.Pt to improve R knee flexion range of motion to 120 deg actively to allow for improved functional mobility to allow for improvement in IADLs.   3.Increased RLE MMT 1 grade to increase tolerance for ADL and work activities.  4. Pt will ambulate full day at work without pain worse than 3/10.  5. Pt to be Independent with HEP to improve ROM and independence with ADL's    Plan     Plan of care Certification: 8/18/2023 to 10/31/2023.     Outpatient Physical Therapy 2 times weekly for 8 weeks to include the following interventions: Electrical Stimulation NMES, Gait Training, Manual Therapy, Moist Heat/ Ice, Neuromuscular Re-ed, Orthotic Management and Training, Patient Education, Therapeutic Activities, and Therapeutic Exercise, dry needling.    Ciaran Perez, PTA

## 2023-08-28 ENCOUNTER — LAB VISIT (OUTPATIENT)
Dept: LAB | Facility: HOSPITAL | Age: 38
End: 2023-08-28
Attending: INTERNAL MEDICINE
Payer: COMMERCIAL

## 2023-08-28 DIAGNOSIS — D50.0 IRON DEFICIENCY ANEMIA DUE TO CHRONIC BLOOD LOSS: ICD-10-CM

## 2023-08-28 LAB
BASOPHILS # BLD AUTO: 0.03 K/UL (ref 0–0.2)
BASOPHILS NFR BLD: 0.3 % (ref 0–1.9)
DIFFERENTIAL METHOD: ABNORMAL
EOSINOPHIL # BLD AUTO: 0.1 K/UL (ref 0–0.5)
EOSINOPHIL NFR BLD: 1.6 % (ref 0–8)
ERYTHROCYTE [DISTWIDTH] IN BLOOD BY AUTOMATED COUNT: 14.1 % (ref 11.5–14.5)
FERRITIN SERPL-MCNC: 66 NG/ML (ref 20–300)
HCT VFR BLD AUTO: 41.5 % (ref 37–48.5)
HGB BLD-MCNC: 13.6 G/DL (ref 12–16)
IMM GRANULOCYTES # BLD AUTO: 0.02 K/UL (ref 0–0.04)
IMM GRANULOCYTES NFR BLD AUTO: 0.2 % (ref 0–0.5)
IRON SERPL-MCNC: 77 UG/DL (ref 30–160)
LYMPHOCYTES # BLD AUTO: 3.9 K/UL (ref 1–4.8)
LYMPHOCYTES NFR BLD: 45.3 % (ref 18–48)
MCH RBC QN AUTO: 24.9 PG (ref 27–31)
MCHC RBC AUTO-ENTMCNC: 32.8 G/DL (ref 32–36)
MCV RBC AUTO: 76 FL (ref 82–98)
MONOCYTES # BLD AUTO: 0.6 K/UL (ref 0.3–1)
MONOCYTES NFR BLD: 7.1 % (ref 4–15)
NEUTROPHILS # BLD AUTO: 3.9 K/UL (ref 1.8–7.7)
NEUTROPHILS NFR BLD: 45.7 % (ref 38–73)
NRBC BLD-RTO: 0 /100 WBC
PLATELET # BLD AUTO: 297 K/UL (ref 150–450)
PMV BLD AUTO: 10.1 FL (ref 9.2–12.9)
RBC # BLD AUTO: 5.47 M/UL (ref 4–5.4)
SATURATED IRON: 27 % (ref 20–50)
TOTAL IRON BINDING CAPACITY: 289 UG/DL (ref 250–450)
TRANSFERRIN SERPL-MCNC: 195 MG/DL (ref 200–375)
WBC # BLD AUTO: 8.58 K/UL (ref 3.9–12.7)

## 2023-08-28 PROCEDURE — 82728 ASSAY OF FERRITIN: CPT | Performed by: INTERNAL MEDICINE

## 2023-08-28 PROCEDURE — 83540 ASSAY OF IRON: CPT | Performed by: INTERNAL MEDICINE

## 2023-08-28 PROCEDURE — 85025 COMPLETE CBC W/AUTO DIFF WBC: CPT | Mod: PO | Performed by: INTERNAL MEDICINE

## 2023-08-28 PROCEDURE — 36415 COLL VENOUS BLD VENIPUNCTURE: CPT | Mod: PO | Performed by: INTERNAL MEDICINE

## 2023-08-28 PROCEDURE — 84466 ASSAY OF TRANSFERRIN: CPT | Performed by: INTERNAL MEDICINE

## 2023-08-30 ENCOUNTER — PATIENT MESSAGE (OUTPATIENT)
Dept: FAMILY MEDICINE | Facility: CLINIC | Age: 38
End: 2023-08-30
Payer: COMMERCIAL

## 2023-08-31 ENCOUNTER — TELEPHONE (OUTPATIENT)
Dept: FAMILY MEDICINE | Facility: CLINIC | Age: 38
End: 2023-08-31
Payer: COMMERCIAL

## 2023-08-31 NOTE — TELEPHONE ENCOUNTER
----- Message from Mary Daniels sent at 8/31/2023  1:10 PM CDT -----  Contact: Yanci García [phone]  Yanci García [facility] called requesting a PA from Antelmo Negro on rx Mounjaro 05 mg please fax to [phone]     Excisional Biopsy Additional Text (Leave Blank If You Do Not Want): The margin was drawn around the clinically apparent lesion. An elliptical shape was then drawn on the skin incorporating the lesion and margins.  Incisions were then made along these lines to the appropriate tissue plane and the lesion was extirpated.

## 2023-09-04 DIAGNOSIS — O99.512 ASTHMA AFFECTING PREGNANCY IN SECOND TRIMESTER: ICD-10-CM

## 2023-09-04 DIAGNOSIS — L21.9 SEBORRHEIC DERMATITIS: ICD-10-CM

## 2023-09-04 DIAGNOSIS — L73.2 HIDRADENITIS SUPPURATIVA: ICD-10-CM

## 2023-09-04 DIAGNOSIS — K59.00 CONSTIPATION, UNSPECIFIED CONSTIPATION TYPE: ICD-10-CM

## 2023-09-04 DIAGNOSIS — I15.2 HYPERTENSION ASSOCIATED WITH DIABETES: ICD-10-CM

## 2023-09-04 DIAGNOSIS — L70.0 ACNE VULGARIS: ICD-10-CM

## 2023-09-04 DIAGNOSIS — E11.59 HYPERTENSION ASSOCIATED WITH DIABETES: ICD-10-CM

## 2023-09-04 DIAGNOSIS — J45.909 ASTHMA AFFECTING PREGNANCY IN SECOND TRIMESTER: ICD-10-CM

## 2023-09-04 NOTE — TELEPHONE ENCOUNTER
No care due was identified.  Health [facility] Embedded Care Due Messages. Reference number: [id number].   9/04/2023 4:26:36 AM CDT

## 2023-09-05 RX ORDER — ALBUTEROL SULFATE 90 UG/1
1-2 AEROSOL, METERED RESPIRATORY (INHALATION) EVERY 4 HOURS PRN
Qty: 8.5 G | Refills: 10 | Status: SHIPPED | OUTPATIENT
Start: 2023-09-05

## 2023-09-05 RX ORDER — EPINEPHRINE 0.3 MG/.3ML
1 INJECTION SUBCUTANEOUS ONCE
Qty: 2 EACH | Refills: 11 | Status: SHIPPED | OUTPATIENT
Start: 2023-09-05 | End: 2023-09-05

## 2023-09-05 RX ORDER — ALBUTEROL SULFATE 0.83 MG/ML
2.5 SOLUTION RESPIRATORY (INHALATION)
Qty: 60 EACH | Refills: 3 | Status: SHIPPED | OUTPATIENT
Start: 2023-09-05

## 2023-09-05 RX ORDER — PANTOPRAZOLE SODIUM 40 MG/1
40 TABLET, DELAYED RELEASE ORAL DAILY
Qty: 90 TABLET | Refills: 3 | Status: SHIPPED | OUTPATIENT
Start: 2023-09-05

## 2023-09-05 RX ORDER — KETOCONAZOLE 20 MG/ML
SHAMPOO, SUSPENSION TOPICAL WEEKLY
Qty: 360 ML | Refills: 3 | Status: SHIPPED | OUTPATIENT
Start: 2023-09-05 | End: 2023-10-26 | Stop reason: SDUPTHER

## 2023-09-05 RX ORDER — SPIRONOLACTONE 100 MG/1
100 TABLET, FILM COATED ORAL DAILY
Qty: 90 TABLET | Refills: 3 | Status: SHIPPED | OUTPATIENT
Start: 2023-09-05

## 2023-09-05 RX ORDER — LEVOCETIRIZINE DIHYDROCHLORIDE 5 MG/1
5 TABLET, FILM COATED ORAL NIGHTLY
Qty: 90 TABLET | Refills: 3 | Status: SHIPPED | OUTPATIENT
Start: 2023-09-05 | End: 2023-10-02 | Stop reason: SDUPTHER

## 2023-09-05 NOTE — TELEPHONE ENCOUNTER
Refill Routing Note   Medication(s) are not appropriate for processing by [facility] for the following reason(s):      Medication outside of protocol: linzess  Drug-disease interaction: protonix & lupus    [facility] action(s):  Defer  Route  Approve Care Due:  None identified        Pharmacist review requested: Yes   Extended chart review required: Yes     Appointments  past 12m or future 3m with PCP    Date Provider   Last Visit   8/2/2023 Dante Negro MD   Next Visit   10/12/2023 Dante Negro MD   ED visits in past 90 days: 0        Note composed:4:52 PM 09/05/2023

## 2023-09-05 NOTE — TELEPHONE ENCOUNTER
Refill Routing Note   Medication(s) are not appropriate for processing by [facility] for the following reason(s):      Medication outside of protocol: Linzess  Drug-disease interaction: Xyzal  Drug-drug interaction: Protonix    [facility] action(s):  Defer  Route  Approve Care Due:  None identified     Medication Therapy Plan: Duplicate Therapy: loratadine, levocetirizine; Drug-Disease: pantoprazole and Other forms of systemic lupus erythematosus      Pharmacist review requested: Yes     Appointments  past 12m or future 3m with PCP    Date Provider   Last Visit   8/2/2023 Dante Negro MD   Next Visit   10/12/2023 Dante Negro MD   ED visits in past 90 days: 0        Note composed:9:07 AM 09/05/2023

## 2023-09-06 RX ORDER — TRETINOIN 0.5 MG/G
CREAM TOPICAL NIGHTLY
Qty: 45 G | Refills: 4 | Status: SHIPPED | OUTPATIENT
Start: 2023-09-06

## 2023-09-08 ENCOUNTER — TELEPHONE (OUTPATIENT)
Dept: FAMILY MEDICINE | Facility: CLINIC | Age: 38
End: 2023-09-08
Payer: COMMERCIAL

## 2023-09-08 DIAGNOSIS — E53.8 B12 DEFICIENCY: Primary | ICD-10-CM

## 2023-09-08 RX ORDER — CYANOCOBALAMIN 1000 UG/ML
1000 INJECTION, SOLUTION INTRAMUSCULAR; SUBCUTANEOUS
Status: ACTIVE | OUTPATIENT
Start: 2023-09-14 | End: 2024-03-12

## 2023-09-18 ENCOUNTER — TELEPHONE (OUTPATIENT)
Dept: FAMILY MEDICINE | Facility: CLINIC | Age: 38
End: 2023-09-18
Payer: COMMERCIAL

## 2023-09-18 NOTE — TELEPHONE ENCOUNTER
----- Message from Chinyere Hutson sent at 9/18/2023 10:25 AM CDT -----  Regarding: Nurse Visit  Contact: Juanpablo  .Type:  Sooner Apoointment Request    Caller is requesting a sooner appointment.  Caller declined first available appointment listed below.  Caller will not accept being placed on the waitlist and is requesting a message be sent to doctor.  Name of Caller: Juanpablo  When is the first available appointment?  Symptoms:   Would the patient rather a call back or a response via [facility]? call  Best Call Back Number: [phone]    Additional Information: juanpablo missed her nurse visit for B-12 inj and need to be rescheduled please,

## 2023-09-19 ENCOUNTER — TELEPHONE (OUTPATIENT)
Dept: FAMILY MEDICINE | Facility: CLINIC | Age: 38
End: 2023-09-19
Payer: COMMERCIAL

## 2023-09-19 DIAGNOSIS — E53.8 B12 DEFICIENCY: Primary | ICD-10-CM

## 2023-09-19 RX ORDER — CYANOCOBALAMIN 1000 UG/ML
1000 INJECTION, SOLUTION INTRAMUSCULAR; SUBCUTANEOUS
Status: COMPLETED | OUTPATIENT
Start: 2023-09-21 | End: 2023-10-24

## 2023-09-21 ENCOUNTER — CLINICAL SUPPORT (OUTPATIENT)
Dept: FAMILY MEDICINE | Facility: CLINIC | Age: 38
End: 2023-09-21
Payer: COMMERCIAL

## 2023-09-21 DIAGNOSIS — E53.8 B12 DEFICIENCY: Primary | ICD-10-CM

## 2023-09-21 PROCEDURE — 96372 PR INJECTION,THERAP/PROPH/DIAG2ST, IM OR SUBCUT: ICD-10-PCS | Mod: S$GLB,,, | Performed by: FAMILY MEDICINE

## 2023-09-21 PROCEDURE — 99999 PR PBB SHADOW E&M-EST. PATIENT-LVL III: ICD-10-PCS | Mod: PBBFAC,,,

## 2023-09-21 PROCEDURE — 99999 PR PBB SHADOW E&M-EST. PATIENT-LVL III: CPT | Mod: PBBFAC,,,

## 2023-09-21 PROCEDURE — 96372 THER/PROPH/DIAG INJ SC/IM: CPT | Mod: S$GLB,,, | Performed by: FAMILY MEDICINE

## 2023-09-21 RX ADMIN — CYANOCOBALAMIN 1000 MCG: 1000 INJECTION, SOLUTION INTRAMUSCULAR; SUBCUTANEOUS at 09:09

## 2023-09-25 DIAGNOSIS — E11.65 TYPE 2 DIABETES MELLITUS WITH HYPERGLYCEMIA, WITHOUT LONG-TERM CURRENT USE OF INSULIN: ICD-10-CM

## 2023-09-25 NOTE — TELEPHONE ENCOUNTER
No care due was identified.  [facility] Embedded Care Due Messages. Reference number: [id number].   9/25/2023 4:32:17 AM CDT

## 2023-09-26 ENCOUNTER — TELEPHONE (OUTPATIENT)
Dept: FAMILY MEDICINE | Facility: CLINIC | Age: 38
End: 2023-09-26

## 2023-09-26 ENCOUNTER — OFFICE VISIT (OUTPATIENT)
Dept: FAMILY MEDICINE | Facility: CLINIC | Age: 38
End: 2023-09-26
Payer: COMMERCIAL

## 2023-09-26 VITALS — BODY MASS INDEX: 50.02 KG/M2 | WEIGHT: 293 LBS | HEIGHT: 64 IN

## 2023-09-26 DIAGNOSIS — R41.89 BRAIN FOG: ICD-10-CM

## 2023-09-26 DIAGNOSIS — G47.00 INSOMNIA, UNSPECIFIED TYPE: Primary | ICD-10-CM

## 2023-09-26 DIAGNOSIS — Z79.899 ENCOUNTER FOR LONG-TERM (CURRENT) USE OF MEDICATIONS: ICD-10-CM

## 2023-09-26 DIAGNOSIS — F41.9 ANXIETY: ICD-10-CM

## 2023-09-26 PROCEDURE — 99214 PR OFFICE/OUTPT VISIT, EST, LEVL IV, 30-39 MIN: ICD-10-PCS | Mod: 95,,, | Performed by: FAMILY MEDICINE

## 2023-09-26 PROCEDURE — 99214 OFFICE O/P EST MOD 30 MIN: CPT | Mod: 95,,, | Performed by: FAMILY MEDICINE

## 2023-09-26 RX ORDER — ESCITALOPRAM OXALATE 10 MG/1
10 TABLET ORAL DAILY
Qty: 30 TABLET | Refills: 11 | Status: SHIPPED | OUTPATIENT
Start: 2023-09-26 | End: 2024-09-25

## 2023-09-26 RX ORDER — HYDROXYZINE HYDROCHLORIDE 25 MG/1
25 TABLET, FILM COATED ORAL 3 TIMES DAILY PRN
Qty: 30 TABLET | Refills: 0 | Status: SHIPPED | OUTPATIENT
Start: 2023-09-26 | End: 2024-03-27 | Stop reason: SDUPTHER

## 2023-09-26 RX ORDER — METHOCARBAMOL 750 MG/1
750 TABLET, FILM COATED ORAL 4 TIMES DAILY PRN
COMMUNITY
Start: 2023-09-05

## 2023-09-26 NOTE — TELEPHONE ENCOUNTER
There is a drug interaction  between the lexapro and hydroxyzine, pharmacists would like to make sure is okay to fill both medication for pt to take at the same time ? Please advise

## 2023-09-26 NOTE — ASSESSMENT & PLAN NOTE
Restart Lexapro at lower dosage to avoid weight gain as side effect.  Patient did well on this medication for her nail biting/anxiety.  Start hydroxyzine as needed for anxiety/panic attacks and insomnia.  Please be advised of condition course.  SNRI/SSRI is first-line treatment for this condition.  Please be advised of the risk of discontinuing this medication without tapering/contacting MD.  Patient has been advised of side effects, and all questions were answered.  Patient voiced understanding.  Patient will follow up routinely and notify us if having any side effects or worsening or persistent symptoms.  ER precautions were given. Antidepressant/Antianxiety Medication Initiation:  Patient informed of risks, benefits, and potential side effects of medication and accepts informed consent.  Common side effects include nausea, fatigue, headache, insomnia, etc see medication insert for complete side effect profile.  Most importantly be advised of the possibility of new or worsening suicidal thoughts/depression/anxiety etcetera.  Please be advised to stop the medication immediately and seek urgent treatment if this occurs.  Therefore please do not to abruptly discontinue medication without physician guidance except in cases of sudden onset or worsening of SI.

## 2023-09-26 NOTE — ASSESSMENT & PLAN NOTE
Continue B12 injections.  Monitoring labs.  Possibly due to her lack of sleep.  Treating sleep see problem.

## 2023-09-26 NOTE — TELEPHONE ENCOUNTER
----- Message from Joanne Fenton sent at 9/26/2023  9:17 AM CDT -----  Contact: [facility]  Type:  Pharmacy Calling to Clarify an RX    Name of Caller:Johana  Pharmacy Name:[facility]   Prescription Name:lexapro and hydroxyzine   What do they need to clarify?:medication   Best Call Back Number:[phone]  Additional Information: Drug interaction between the lexapro and hydroxyzine. Pharmacy is needing to know if it is ok to prescribe both medications.        Thanks KB

## 2023-09-26 NOTE — PATIENT INSTRUCTIONS
Follow up in about 3 months (around 12/26/2023), or if symptoms worsen or fail to improve, for Med refills.     Dear patient,   As a result of recent federal legislation (The Federal Cures Act), you may receive lab or pathology results from your visit in your [facility] account before your physician is able to contact you. Your physician or their representative will relay the results to you with their recommendations at their soonest availability.     If no improvement in symptoms or symptoms worsen, please be advised to call MD, follow-up at clinic and/or go to ER if becomes severe.    Dante Negro M.D.        We Offer TELEHEALTH & Same Day Appointments!   Book your Telehealth appointment with me through my nurse or   Clinic appointments on [facility]!    [address] [place] [address]    Office: [phone]   FAX: [phone]    Check out my Facebook Page and Follow Me at: [email or web address]    Check out my website at [employer] by clicking on: [email or web address]    To Schedule appointments online, go to [facility]: [email or web address]

## 2023-09-26 NOTE — TELEPHONE ENCOUNTER
"Spoke to pharmacist Jessica , inform her "Yes please tell the pharmacy patient has been on these medications before.  Override." Per dr mckeon . Understanding was verbalized   "

## 2023-09-26 NOTE — PROGRESS NOTES
Primary Care Telemedicine Note  The patient location is:  Patient's Home - [place]  The chief complaint leading to consultation is:   Chief Complaint   Patient presents with    sleepness nights    Anxiety    nail biting       Total time:  see MDM below. The total time spent on the encounter, which includes face to face time and non-face to face time preparing to see the patient (eg, review of previous medical records, tests), Obtaining and/or reviewing separately obtained history, documenting clinical information in the electronic or other health record, independently interpreting results (not separately reported)/communicating results to the patient/family/caregiver, and/or care coordination (not separately reported).    Visit type: Virtual visit with synchronous audio and video  Each patient to whom he or she provides medical services by telemedicine is:  (1) informed of the relationship between the physician and patient and the respective role of any other health care provider with respect to management of the patient; and (2) notified that he or she may decline to receive medical services by telemedicine and may withdraw from such care at any time.  =================================================================+  PLAN:      Problem List Items Addressed This Visit       Encounter for long-term (current) use of medications (Chronic)     Complete history and limited telemedicine physical was completed today.  Complete and thorough medication reconciliation was performed.  Discussed risks and benefits of medications.  Advised patient on orders and health maintenance.  We discussed old records and old labs if available.  Will request any records not available through epic.  Continue current medications listed on your summary sheet.           Relevant Medications    hydrOXYzine HCL (ATARAX) 25 MG tablet    Anxiety (Chronic)     Restart Lexapro at lower dosage to avoid weight gain as side effect.  Patient did well  on this medication for her nail biting/anxiety.  Start hydroxyzine as needed for anxiety/panic attacks and insomnia.  Please be advised of condition course.  SNRI/SSRI is first-line treatment for this condition.  Please be advised of the risk of discontinuing this medication without tapering/contacting MD.  Patient has been advised of side effects, and all questions were answered.  Patient voiced understanding.  Patient will follow up routinely and notify us if having any side effects or worsening or persistent symptoms.  ER precautions were given. Antidepressant/Antianxiety Medication Initiation:  Patient informed of risks, benefits, and potential side effects of medication and accepts informed consent.  Common side effects include nausea, fatigue, headache, insomnia, etc see medication insert for complete side effect profile.  Most importantly be advised of the possibility of new or worsening suicidal thoughts/depression/anxiety etcetera.  Please be advised to stop the medication immediately and seek urgent treatment if this occurs.  Therefore please do not to abruptly discontinue medication without physician guidance except in cases of sudden onset or worsening of SI.            Relevant Medications    hydrOXYzine HCL (ATARAX) 25 MG tablet    EScitalopram oxalate (LEXAPRO) 10 MG tablet    Insomnia - Primary (Chronic)     Start hydroxyzine as needed for insomnia.Discussed insomnia condition course.  Advised of first-line medications for this condition.  Also discussed sleep hygiene.  Information was given below.  Good sleep habits (sometimes referred to as sleep hygiene) can help you get a good nights sleep.    Some habits that can improve your sleep health:  -Be consistent. Go to bed at the same time each night and get up at the same time each morning, including on the weekends  -Make sure your bedroom is quiet, dark, relaxing, and at a comfortable temperature  -Remove electronic devices, such as TVs,  computers, and smart phones, from the bedroom  -Avoid large meals, caffeine, and alcohol before bedtime  -Get some exercise. Being physically active during the day can help you fall asleep more easily at night.           Relevant Medications    hydrOXYzine HCL (ATARAX) 25 MG tablet    Brain fog (Chronic)     Continue B12 injections.  Monitoring labs.  Possibly due to her lack of sleep.  Treating sleep see problem.          Future Appointments       Date Provider Specialty Appt Notes    9/29/2023 Letha Cunningham, MIN[facility] Psychiatry  Arrive at: Telehealth Nail biting and anxiety sleeplessness    10/2/2023 Rossy Kramer MD Allergy 6 month follow up     10/12/2023 Dante Negro MD Family Medicine Annual    10/20/2023  Family Medicine b12    10/20/2023 Zeke Clement MD Pain Medicine Pain    11/1/2023  Lab hemonc    11/6/2023 Vikram Saini NP Hematology and Oncology 3 mon f/u, Labs    3/14/2024 Michael Hernandez MD Rheumatology +VANESSA--CHRONIC FATIGUE//bc           Medication Management for assessment above:   Medication List with Changes/Refills   New Medications    ESCITALOPRAM OXALATE (LEXAPRO) 10 MG TABLET    Take 1 tablet (10 mg total) by mouth once daily.    HYDROXYZINE HCL (ATARAX) 25 MG TABLET    Take 1 tablet (25 mg total) by mouth 3 (three) times daily as needed for Anxiety.   Current Medications    ALBUTEROL (PROVENTIL) 2.5 MG /3 ML (0.083 %) NEBULIZER SOLUTION    Take 3 mLs (2.5 mg total) by nebulization every 4 to 6 hours as needed for Wheezing.    ALBUTEROL (PROVENTIL/VENTOLIN HFA) 90 MCG/ACTUATION INHALER    Inhale 1-2 puffs into the lungs every 4 (four) hours as needed for Wheezing. Rescue    AZELASTINE (ASTELIN) 137 MCG (0.1 %) NASAL SPRAY    SMARTSIG:Both Nares    CHOLECALCIFEROL, VITAMIN D3, (VITAMIN D3) 25 MCG (1,000 UNIT) CAPSULE    Take 2 capsules (2,000 Units total) by mouth once daily.    DOXYCYCLINE (VIBRA-TABS) 100 MG TABLET    Take 1 tablet (100 mg total) by mouth 2 (two) times  daily. Take for 5 days prn flares    EPINEPHRINE (EPIPEN) 0.3 MG/0.3 ML ATIN    Inject 0.3 mLs (0.3 mg total) into the muscle once. for 1 dose    FLUOCINONIDE (LIDEX) 0.05 % EXTERNAL SOLUTION    Apply topically 2 (two) times daily. Use on scalp    FLUTICASONE FUROATE-VILANTEROL (BREO) 200-25 MCG/DOSE DSDV DISKUS INHALER    Inhale 1 puff into the lungs once daily. Controller    FLUTICASONE PROPIONATE (FLONASE) 50 MCG/ACTUATION NASAL SPRAY    1 spray (50 mcg total) by Each Nostril route once daily.    GABAPENTIN (NEURONTIN) 300 MG CAPSULE    Take 1 capsule (300 mg total) by mouth 3 (three) times daily.    IBUPROFEN (ADVIL,MOTRIN) 800 MG TABLET    Take 1 tablet (800 mg total) by mouth 3 (three) times daily as needed for Pain.    IRON-VIT C-B12-FOLIC ACID (IRON 100 PLUS) TAB    Take 1 tablet by mouth once daily.    KETOCONAZOLE (NIZORAL) 2 % SHAMPOO    Apply topically once a week. Lather in for 5-10 min before rinsing    LACTOBACILLUS RHAMNOSUS GG (CULTURELLE) 10 BILLION CELL CAPSULE    Take 1 capsule by mouth once daily.    LACTULOSE (CHRONULAC) 10 GRAM/15 ML SOLUTION    SMARTSI Tablespoon By Mouth Daily    LEVOCETIRIZINE (XYZAL) 5 MG TABLET    Take 1 tablet (5 mg total) by mouth every evening.    LINACLOTIDE (LINZESS) 72 MCG CAP CAPSULE    Take 1 capsule (72 mcg total) by mouth once daily.    LISINOPRIL (PRINIVIL,ZESTRIL) 2.5 MG TABLET    Take 1 tablet (2.5 mg total) by mouth once daily.    LOTEPREDNOL (LOTEMAX) 0.5 % OPHTHALMIC SUSPENSION    Place 1 drop into both eyes daily as needed (to control allergy symptoms. Not for chronic daily use.).    MELOXICAM (MOBIC) 15 MG TABLET    Take 1 tablet (15 mg total) by mouth once daily.    METHOCARBAMOL (ROBAXIN) 750 MG TAB    Take 750 mg by mouth 4 (four) times daily as needed.    MONTELUKAST (SINGULAIR) 10 MG TABLET    Take 1 tablet (10 mg total) by mouth once daily.    MUPIROCIN (BACTROBAN) 2 % OINTMENT    Apply topically 3 (three) times daily.    NIFEDIPINE  "(PROCARDIA-XL) 30 MG (OSM) 24 HR TABLET    Take 30 mg by mouth once daily.    NOVOFINE PLUS 32 GAUGE X 1/6" NDLE    use as directed    NYSTATIN (MYCOSTATIN) POWDER    Apply topically 2 (two) times daily.    OMALIZUMAB (XOLAIR) 150 MG INJECTION    Inject 300 mg into the skin every 14 (fourteen) days.    ONDANSETRON (ZOFRAN-ODT) 8 MG TBDL    DISSOLVE ONE TABLET UNDER TONGUE EVERY 8 HOURS AS NEEDED FOR NAUSEA    PANTOPRAZOLE (PROTONIX) 40 MG TABLET    Take 1 tablet (40 mg total) by mouth once daily.    PREDNISONE (DELTASONE) 20 MG TABLET    Take 1 tablet (20 mg total) by mouth 2 (two) times daily.    SPIRONOLACTONE (ALDACTONE) 100 MG TABLET    Take 1 tablet (100 mg total) by mouth once daily.    TIRZEPATIDE (MOUNJARO) 5 MG/0.5 ML PNIJ    Inject 5 mg into the skin every 7 days.    TIRZEPATIDE 10 MG/0.5 ML PNIJ    Inject 10 mg into the skin every 7 days.    TIZANIDINE (ZANAFLEX) 4 MG TABLET    Take 1 tablet (4 mg total) by mouth every 12 (twelve) hours as needed (muscle spasms/ pain).    TRETINOIN (RETIN-A) 0.05 % CREAM    Apply topically every evening. Use on face   Discontinued Medications    ESCITALOPRAM OXALATE (LEXAPRO) 20 MG TABLET    Take 20 mg by mouth.       Dante Negro M.D.  ==========================================================================  Subjective:   Patient ID: Lucía Coreas is a 38 y.o. female.  has a past medical history of Allergy, Amenorrhea, Asthma, Diabetes, GERD (gastroesophageal reflux disease), Infertility associated with anovulation, Iron deficiency anemia, Knee pain, Metabolic syndrome, PCO (polycystic ovaries), Recurrent boils, and Status post repeat low transverse  section (2020).   Chief Complaint: sleepness nights, Anxiety, and nail biting       Problem List Items Addressed This Visit       Encounter for long-term (current) use of medications (Chronic)    Overview     2023:  Reviewed labs.  2019 CHRONIC long-term drug therapy for managed " conditions. See medication list. Reports compliance.  No side effects reported.  Routine lab work is being monitored.  Patient does not need refills today. 09/20/2019Reviewed labs. Patient is compliant with meds. She needs refills. Gaining weight on Metformin. DECEMBER 2019:  CHRONIC. Stable. Compliant with medications for managed conditions. See medication list. No SE reported. Routine lab analysis is being monitored. Refills were addressed. APRIL 2020:  CHRONIC. Stable. Compliant with medications for managed conditions. See medication list. No SE reported. Routine lab analysis is being monitored. Refills were addressed.  AUGUST 2020:  REVIEWED LABS.  CHRONIC. Stable. Compliant with medications for managed conditions. See medication list. No SE reported. Routine lab analysis is being monitored. Refills were addressed.October 2020:  CHRONIC. Stable. Compliant with medications for managed conditions. See medication list. No SE reported. Routine lab analysis is being monitored. Refills were addressed.November 2020:CHRONIC. Stable. Compliant with medications for managed conditions. See medication list. No SE reported.   Routine lab analysis is being monitored. Refills were addressed.  April 2021:  Reviewed labs.  August 2021:  Reviewed labs.  Patient additional labs set up through [facility] [place].  January 2022:  Reviewed labs.  February 2022:  Reviewed labs.  June 2022:  Reviewed labs.  Lab Results   Component Value Date    WBC 8.58 08/28/2023    HGB 13.6 08/28/2023    HCT 41.5 08/28/2023    MCV 76 (L) 08/28/2023     08/28/2023       Chemistry        Component Value Date/Time     08/02/2023 0923    K 3.9 08/02/2023 0923     08/02/2023 0923    CO2 23 08/02/2023 0923    BUN 14 08/02/2023 0923    CREATININE 0.7 08/02/2023 0923    GLU 93 08/02/2023 0923        Component Value Date/Time    CALCIUM 9.4 08/02/2023 0923    ALKPHOS 62 08/02/2023 0923    AST 10 08/02/2023 0923    ALT 10  08/02/2023 0923    BILITOT 0.4 08/02/2023 0923    ESTGFRAFRICA >60.0 06/10/2022 1032    EGFRNONAA >60.0 06/10/2022 1032          Lab Results   Component Value Date    TSH 0.939 08/02/2023    U3OMTVX 9.4 07/16/2019    T3FREE 2.9 07/16/2019            Current Assessment & Plan     Complete history and limited telemedicine physical was completed today.  Complete and thorough medication reconciliation was performed.  Discussed risks and benefits of medications.  Advised patient on orders and health maintenance.  We discussed old records and old labs if available.  Will request any records not available through epic.  Continue current medications listed on your summary sheet.           Anxiety (Chronic)    Overview      September 2023: Patient reports that she has been more anxious off of the hydroxyzine and Lexapro.  She reports having insomnia also.  Patient has been having her nail biting returned.  She has lost weight however has started on MOUNJARO.  DECEMBER 2020:  Currently uncontrolled.  Patient was taken off of her medication during pregnancy last month.  Patient reports that she recently had miscarriage and her stress is very high right now.  She wants to restart her medication.  Patient was also on hydroxyzine as needed for panic attacks and insomnia.  October 2020:  Patient reports doing well on Lexapro 20 mg daily.  Patient takes hydroxyzine very rarely.  AUGUST 2020:  CHRONIC.  INTERMITTENT CONTROL.  PATIENT REPORTS THAT SHE IS NOT HAVING ANY IMPROVEMENT ON LEXAPRO 10 MG.  DENIES ANY SI HI OR HALLUCINATIONS.  SHE IS STILL TAKING HYDROXYZINE SEVERAL TIMES A DAY FOR AND PANIC ATTACKS.  PATIENT DOES STILL HAVE NAIL BITING TENDENCIES.  New problem.  Subacute.  Has been getting worse over the last few weeks during COVID-19 outbreak.  Patient reports that she is biting her nails and unable to sleep at night.  Patient has not been on any treatment or medications for this recently.  Denies any SI HI or  hallucinations.  MAY 2020:  Patient reports hydroxyzine helps with anxiety but is still biting her nails.  Patient is ready to start daily medication for anxiety.  Patient reports that she did start back at work which she thought would help but it is not helping.  Denies SI HI or hallucinations.         Current Assessment & Plan     Restart Lexapro at lower dosage to avoid weight gain as side effect.  Patient did well on this medication for her nail biting/anxiety.  Start hydroxyzine as needed for anxiety/panic attacks and insomnia.  Please be advised of condition course.  SNRI/SSRI is first-line treatment for this condition.  Please be advised of the risk of discontinuing this medication without tapering/contacting MD.  Patient has been advised of side effects, and all questions were answered.  Patient voiced understanding.  Patient will follow up routinely and notify us if having any side effects or worsening or persistent symptoms.  ER precautions were given. Antidepressant/Antianxiety Medication Initiation:  Patient informed of risks, benefits, and potential side effects of medication and accepts informed consent.  Common side effects include nausea, fatigue, headache, insomnia, etc see medication insert for complete side effect profile.  Most importantly be advised of the possibility of new or worsening suicidal thoughts/depression/anxiety etcetera.  Please be advised to stop the medication immediately and seek urgent treatment if this occurs.  Therefore please do not to abruptly discontinue medication without physician guidance except in cases of sudden onset or worsening of SI.            Insomnia - Primary (Chronic)    Overview     September 2023: Chronic.  Recurrent.  Patient was doing well on hydroxyzine.  Reports anxiety has returned.  Denies SI HI or hallucinations.    Previous history:  New problem.  Subacute.  Patient reports that she cannot fall asleep.  Denies any recent job change, unhealthy  habits.         Current Assessment & Plan     Start hydroxyzine as needed for insomnia.Discussed insomnia condition course.  Advised of first-line medications for this condition.  Also discussed sleep hygiene.  Information was given below.  Good sleep habits (sometimes referred to as sleep hygiene) can help you get a good nights sleep.    Some habits that can improve your sleep health:  -Be consistent. Go to bed at the same time each night and get up at the same time each morning, including on the weekends  -Make sure your bedroom is quiet, dark, relaxing, and at a comfortable temperature  -Remove electronic devices, such as TVs, computers, and smart phones, from the bedroom  -Avoid large meals, caffeine, and alcohol before bedtime  -Get some exercise. Being physically active during the day can help you fall asleep more easily at night.           Brain fog (Chronic)    Overview     Chronic.  September 2023: Patient reports continued brain fog.  Patient with B12 deficiency on B12 injections.  Reports some improvement.  Previous history:  Associated with fatigue.  See problem.    Lab Results   Component Value Date    UIBC 192 01/22/2009    IRON 77 08/28/2023    TRANSFERRIN 195 (L) 08/28/2023    TIBC 289 08/28/2023    FESATURATED 27 08/28/2023      Lab Results   Component Value Date    LJVPXBIR96 >2000 (H) 08/30/2022     Lab Results   Component Value Date    FOLATE 10.1 06/10/2022     Lab Results   Component Value Date    WBC 8.58 08/28/2023    HGB 13.6 08/28/2023    HCT 41.5 08/28/2023    MCV 76 (L) 08/28/2023     08/28/2023                Current Assessment & Plan     Continue B12 injections.  Monitoring labs.  Possibly due to her lack of sleep.  Treating sleep see problem.             Review of patient's allergies indicates:   Allergen Reactions    Metformin Diarrhea    Sulfa (sulfonamide antibiotics) Anaphylaxis and Swelling     Swelling (eyes)^, Swelling (throat)^  Swelling (eyes)^, Swelling (throat)^       Diclofenac      Gastritis     Latex, natural rubber      Contact dermatitis    Other reaction(s): Other (See Comments)  Contact dermatitis    Shellfish containing products      anaphylaxis     Current Outpatient Medications   Medication Instructions    albuterol (PROVENTIL) 2.5 mg, Nebulization, Every 4-6 hours PRN    albuterol (PROVENTIL/VENTOLIN HFA) 90 mcg/actuation inhaler 1-2 puffs, Inhalation, Every 4 hours PRN, Rescue    azelastine (ASTELIN) 137 mcg (0.1 %) nasal spray SMARTSIG:Both Nares    cholecalciferol (vitamin D3) (VITAMIN D3) 2,000 Units, Oral, Daily    doxycycline (VIBRA-TABS) 100 mg, Oral, 2 times daily, Take for 5 days prn flares    EPINEPHrine (EPIPEN) 0.3 mg, Intramuscular, Once    EScitalopram oxalate (LEXAPRO) 10 mg, Oral, Daily    fluocinonide (LIDEX) 0.05 % external solution Topical (Top), 2 times daily, Use on scalp    fluticasone furoate-vilanteroL (BREO) 200-25 mcg/dose DsDv diskus inhaler 1 puff, Inhalation, Daily, Controller    fluticasone propionate (FLONASE) 50 mcg, Each Nostril, Daily    gabapentin (NEURONTIN) 300 mg, Oral, 3 times daily    hydrOXYzine HCL (ATARAX) 25 mg, Oral, 3 times daily PRN    ibuprofen (ADVIL,MOTRIN) 800 mg, Oral, 3 times daily PRN    iron-vit c-b12-folic acid (IRON 100 PLUS) Tab 1 tablet, Oral, Daily    ketoconazole (NIZORAL) 2 % shampoo Topical (Top), Weekly, Lather in for 5-10 min before rinsing    Lactobacillus rhamnosus GG (CULTURELLE) 10 billion cell capsule 1 capsule, Oral, Daily    lactulose (CHRONULAC) 10 gram/15 mL solution SMARTSI Tablespoon By Mouth Daily    levocetirizine (XYZAL) 5 mg, Oral, Nightly    linaCLOtide (LINZESS) 72 mcg, Oral, Daily    lisinopriL (PRINIVIL,ZESTRIL) 2.5 mg, Oral, Daily    loteprednol (LOTEMAX) 0.5 % ophthalmic suspension 1 drop, Both Eyes, Daily PRN    meloxicam (MOBIC) 15 mg, Oral, Daily    methocarbamoL (ROBAXIN) 750 mg, Oral, 4 times daily PRN    montelukast (SINGULAIR) 10 mg, Oral, Daily    MOUNJARO 5 mg,  "Subcutaneous, Every 7 days    mupirocin (BACTROBAN) 2 % ointment Topical (Top), 3 times daily    NIFEdipine (PROCARDIA-XL) 30 mg, Oral, Daily    NOVOFINE PLUS 32 gauge x 1/6" Ndle use as directed    nystatin (MYCOSTATIN) powder Topical (Top), 2 times daily    ondansetron (ZOFRAN-ODT) 8 MG TbDL DISSOLVE ONE TABLET UNDER TONGUE EVERY 8 HOURS AS NEEDED FOR NAUSEA    pantoprazole (PROTONIX) 40 mg, Oral, Daily    predniSONE (DELTASONE) 20 mg, Oral, 2 times daily    spironolactone (ALDACTONE) 100 mg, Oral, Daily    tirzepatide 10 mg, Subcutaneous, Every 7 days    tiZANidine (ZANAFLEX) 4 mg, Oral, Every 12 hours PRN    tretinoin (RETIN-A) 0.05 % cream Topical (Top), Nightly, Use on face    XOLAIR 300 mg, Subcutaneous, Every 14 days      I have reviewed the PMH, social history, FamilyHx, surgical history, allergies and medications documented / confirmed by the patient at the time of this visit.  Review of Systems   Constitutional:  Positive for activity change. Negative for unexpected weight change.   HENT:  Negative for hearing loss, rhinorrhea and trouble swallowing.    Eyes:  Negative for discharge and visual disturbance.   Respiratory:  Negative for chest tightness and wheezing.    Cardiovascular:  Negative for chest pain and palpitations.   Gastrointestinal:  Negative for blood in stool, constipation, diarrhea and vomiting.   Endocrine: Negative for polydipsia and polyuria.   Genitourinary:  Negative for difficulty urinating, hematuria and menstrual problem.   Musculoskeletal:  Negative for arthralgias, joint swelling and neck pain.   Neurological:  Negative for weakness and headaches.   Psychiatric/Behavioral:  Negative for confusion and dysphoric mood.      Objective:   Ht 5' 4" (1.626 m)   Wt (!) 136.5 kg (301 lb) Comment: pt report  LMP 09/11/2023 (Approximate)   BMI 51.67 kg/m²   Physical Exam  Constitutional:       General: She is not in acute distress.     Appearance: She is well-developed. She is not " ill-appearing, toxic-appearing or diaphoretic.   HENT:      Head: Normocephalic and atraumatic.      Right Ear: Hearing and external ear normal.      Left Ear: Hearing and external ear normal.   Eyes:      General: Lids are normal.      Conjunctiva/sclera: Conjunctivae normal.   Pulmonary:      Effort: Pulmonary effort is normal. No respiratory distress.   Musculoskeletal:         General: Normal range of motion.      Cervical back: Normal range of motion.   Skin:     Coloration: Skin is not pale.   Neurological:      Mental Status: She is alert. She is not disoriented.   Psychiatric:         Attention and Perception: She is attentive.         Mood and Affect: Mood is not anxious or depressed.         Speech: Speech is not rapid and pressured or slurred.         Behavior: Behavior normal. Behavior is not agitated, aggressive or hyperactive. Behavior is cooperative.         Thought Content: Thought content normal. Thought content is not paranoid or delusional. Thought content does not include homicidal or suicidal ideation. Thought content does not include homicidal or suicidal plan.         Cognition and Memory: Memory is not impaired.         Judgment: Judgment normal.         Assessment:     1. Insomnia, unspecified type    2. Anxiety    3. Encounter for long-term (current) use of medications    4. Brain fog      MDM:   Moderate medical complexity. Moderate risk.   Total time: 30 minutes.  This includes total time spent on the encounter, which includes face to face time and non-face to face time preparing to see the patient (eg, review of previous medical records, tests), Obtaining and/or reviewing separately obtained history, documenting clinical information in the electronic or other health record, independently interpreting results (not separately reported)/communicating results to the patient/family/caregiver, and/or care coordination (not separately reported).    I have Reviewed and summarized old records.  I  have performed thorough medication reconciliation today and discussed risk and benefits of medications.  I have reviewed labs and discussed with patient.  All questions were answered.  I am requesting old records and will review them once they are available.    I have signed for the following orders AND/OR meds.  No orders of the defined types were placed in this encounter.    Medications Ordered This Encounter   Medications    EScitalopram oxalate (LEXAPRO) 10 MG tablet     Sig: Take 1 tablet (10 mg total) by mouth once daily.     Dispense:  30 tablet     Refill:  11    hydrOXYzine HCL (ATARAX) 25 MG tablet     Sig: Take 1 tablet (25 mg total) by mouth 3 (three) times daily as needed for Anxiety.     Dispense:  30 tablet     Refill:  0        Follow up in about 3 months (around 12/26/2023), or if symptoms worsen or fail to improve, for Med refills.  Future Appointments       Date Provider Specialty Appt Notes    9/29/2023 Letha Cunningham, [facility] Psychiatry  Arrive at: Telehealth Nail biting and anxiety sleeplessness    10/2/2023 Rossy Kramer MD Allergy 6 month follow up     10/12/2023 Dante Negro MD Family Medicine Annual    10/20/2023  Family Medicine b12    10/20/2023 Zeke Clement MD Pain Medicine Pain    11/1/2023  Lab hemonc    11/6/2023 Vikram Saini NP Hematology and Oncology 3 mon f/u, Labs    3/14/2024 Michael Hernandez MD Rheumatology +VANESSA--CHRONIC FATIGUE//bc          If no improvement in symptoms or symptoms worsen, advised to call/follow-up at clinic or go to ER. Patient voiced understanding and all questions/concerns were addressed.   DISCLAIMER: This note was compiled by using a speech recognition dictation system and therefore please be aware that typographical / speech recognition errors can and do occur.  Please contact me if you see any errors specifically.    Dante Negro M.D.       Office: [phone] [address] [place] [address]  FAX: [phone]

## 2023-09-28 ENCOUNTER — TELEPHONE (OUTPATIENT)
Dept: PSYCHIATRY | Facility: CLINIC | Age: 38
End: 2023-09-28
Payer: COMMERCIAL

## 2023-09-28 NOTE — TELEPHONE ENCOUNTER
----- Message from Luh Holloway sent at 9/28/2023  3:48 PM CDT -----  Contact: Randall [phone]  Pt is calling to cancel appt for tomorrow 09/29/23 at 10:00.

## 2023-10-02 ENCOUNTER — OFFICE VISIT (OUTPATIENT)
Dept: ALLERGY | Facility: CLINIC | Age: 38
End: 2023-10-02
Payer: COMMERCIAL

## 2023-10-02 VITALS
DIASTOLIC BLOOD PRESSURE: 84 MMHG | HEART RATE: 84 BPM | WEIGHT: 293 LBS | SYSTOLIC BLOOD PRESSURE: 133 MMHG | BODY MASS INDEX: 52.34 KG/M2 | TEMPERATURE: 98 F

## 2023-10-02 DIAGNOSIS — J30.89 ALLERGIC RHINITIS DUE TO AMERICAN HOUSE DUST MITE: ICD-10-CM

## 2023-10-02 DIAGNOSIS — Z91.040 LATEX ALLERGY: ICD-10-CM

## 2023-10-02 DIAGNOSIS — J30.89 ALLERGIC RHINITIS DUE TO INSECT: ICD-10-CM

## 2023-10-02 DIAGNOSIS — J45.40 MODERATE PERSISTENT ASTHMA WITHOUT COMPLICATION: Primary | ICD-10-CM

## 2023-10-02 DIAGNOSIS — R76.8 ELEVATED IGE LEVEL: ICD-10-CM

## 2023-10-02 DIAGNOSIS — J30.1 SEASONAL ALLERGIC RHINITIS DUE TO POLLEN: ICD-10-CM

## 2023-10-02 DIAGNOSIS — K21.9 GASTROESOPHAGEAL REFLUX DISEASE, UNSPECIFIED WHETHER ESOPHAGITIS PRESENT: ICD-10-CM

## 2023-10-02 DIAGNOSIS — Z91.013 SHELLFISH ALLERGY: ICD-10-CM

## 2023-10-02 PROCEDURE — 99999 PR PBB SHADOW E&M-EST. PATIENT-LVL V: CPT | Mod: PBBFAC,,, | Performed by: ALLERGY & IMMUNOLOGY

## 2023-10-02 PROCEDURE — 99214 PR OFFICE/OUTPT VISIT, EST, LEVL IV, 30-39 MIN: ICD-10-PCS | Mod: S$GLB,,, | Performed by: ALLERGY & IMMUNOLOGY

## 2023-10-02 PROCEDURE — 99999 PR PBB SHADOW E&M-EST. PATIENT-LVL V: ICD-10-PCS | Mod: PBBFAC,,, | Performed by: ALLERGY & IMMUNOLOGY

## 2023-10-02 PROCEDURE — 99214 OFFICE O/P EST MOD 30 MIN: CPT | Mod: S$GLB,,, | Performed by: ALLERGY & IMMUNOLOGY

## 2023-10-02 RX ORDER — FLUTICASONE PROPIONATE 50 MCG
1 SPRAY, SUSPENSION (ML) NASAL DAILY
Qty: 18.2 ML | Refills: 0 | Status: SHIPPED | OUTPATIENT
Start: 2023-10-02

## 2023-10-02 RX ORDER — MONTELUKAST SODIUM 10 MG/1
10 TABLET ORAL DAILY
Qty: 30 TABLET | Refills: 3 | Status: SHIPPED | OUTPATIENT
Start: 2023-10-02

## 2023-10-02 RX ORDER — LEVOCETIRIZINE DIHYDROCHLORIDE 5 MG/1
5 TABLET, FILM COATED ORAL NIGHTLY
Qty: 90 TABLET | Refills: 3 | Status: SHIPPED | OUTPATIENT
Start: 2023-10-02

## 2023-10-02 RX ORDER — FLUTICASONE FUROATE AND VILANTEROL 200; 25 UG/1; UG/1
1 POWDER RESPIRATORY (INHALATION) DAILY
Qty: 1 EACH | Refills: 4 | Status: SHIPPED | OUTPATIENT
Start: 2023-10-02

## 2023-10-02 RX ORDER — OMALIZUMAB 202.5 MG/1.4ML
300 INJECTION, SOLUTION SUBCUTANEOUS
Qty: 2 EACH | Refills: 11 | Status: SHIPPED | OUTPATIENT
Start: 2023-10-02 | End: 2024-03-27

## 2023-10-02 RX ORDER — AZELASTINE 1 MG/ML
2 SPRAY, METERED NASAL 2 TIMES DAILY
Qty: 20 ML | Refills: 5 | Status: SHIPPED | OUTPATIENT
Start: 2023-10-02

## 2023-10-02 NOTE — PROGRESS NOTES
"    Subjective:              Patient ID: Lucía Coreas is a 38 y.o. female.        Chief Complaint:  Follow-up    HPI: 10/2/2023: 38 year old female with a history of Allergic Rhinitis (pollen, cockroach, and dust mites, food allergy (shellfish), Asthma, and latex allergy here for follow up.    Allergic Rhinitis- previously on SCIT-stopped during pregnancy. She did not wish to restart post partum.  She stopped Xolair (300 mg every 14 days)for the last four months due to change of job. She did well on Xolair and would like to restart.  Astelin, Xyzal, Flonase and Singulair    Food Allergy-avoiding shellfish  Avoiding shellfish  No accidental ingestions      Asthma  No Xolair for four months due to change of job. She would like to restart.  No Er or urgent care  No oral steroids for asthma  She reports cough and wheezing.  Breo        Latex Allergy  Avoiding Latex                      HPI 10/19/2021: 35 year old female with a history of allergic rhinitis on allergy immunotherapy complaining of "cold sores in her nose" and fluid in her ear.  She reports that the sores in her nose burn and occur intermittently. She reports that they feel like "fever blisters". She is placing Bactroban and saline, but nothing is alleviating her symptoms. She has also tried nate vera gel and saline gel, but it is not helping.  She reports fluid in her ear. She is taking levocetirizine and Singulair, but it does not help.  She is not doing nasal sprays because of nasal dryness.  Epistaxis intermittently.    Allergy immunotherapy- DM, T, Cr- 1-1 dilution,  improved symptoms.    Still avoiding shellfish, no accidental ingestions  Epipen    HPI 3/12/2021:    35 year old female on SCIT 1:1 inhlants 0.5ml  She is pregnant and recently started on hypertensive medications.  She reports feel that her asthma is worse.  She has not had asthma symptoms, since she was a child.  She is currently using her albuterol inhaler 3 times a day and sees a " "pulmonologist on Monday.  She is not taking controller asthma medications.    HPI 4/26/2021:  35 year old pregnant female with a history of asthma.  She reports that her breathing is "horrible".  She is taking Advair daily- bid.  Albuterol inhaler two to three times a day.  She reports coughing, but denies wheezing.  One month ago- oral steroids.  Shortness of breath is exacerbated by exertion.      HPI 6/14/2021:  35 year old female 27 weeks pregnant  Headache and erythema of the eyes.  Eye physician does not feel that is is allergies.  She reports pressure behind her eyes and forehead.  She reports nasal congestion.  She reports an ill contact last week.  She denies feeling feverish or having a fever.      HPI 7/29/2021:  35 year old female -33 weeks pregnant with a history of dust mite, tree pollen and cockroach allergy previously on SCIT. It was stopped due to pregnancy. She also has asthma.  She reports a significant nasal congestion.  She is taking Zyrtec and hydroxyzine.  She gets out of the shower, she has to take a breathing treatment or taker her inhaler.  Pulmonary physician suspected that symptoms were related to allergies.  She is taking Advair, Albuterol- nebulized and inhaler, and flonase.  She has not taken Astelin or Singulair.  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        HPI 11/11/2021: 12 weeks post pardom  She is here for follow up.  Asthma-  Last used albuterol on yesterday.  Exertion exacerbates symptoms  Spirometry at [facility]- seeing a pulmonologist.  Singulair and Advair  Steroids-once over the last 12 months  NO shortness of breath at rest. NO wheezing, Occasional cough      Latex caused a rash on her hands. Rash is immediate with removal of gloves.  She was using gloves to clean. Hands itched and she reports hives on her hives.      She is taking Xyzal.  She reports nasal congestion.  She was on SCIT prior to pregnancy and held during pregnancy.  She " "does not wish to resume SCIT at this time.  She does have itchy and watery eyes.        HPI 2/16/2022: 36 year old with asthma, allergic rhinitis, and food allergies.  6 days - ate french fries cooked with seafood/shellfish- felt throat closure, treated with benadryl  She did not use her Epipen.  Shellfish allergy  No symptoms with fish  Asthma- "so so. Not as bad a sit use to be."  Not required oral steroids.  Breo has helped improve symptoms  Last used albuterol- several months ago  Seeing Pulmonary at [facility], but would like to change to [facility]    Avoiding Flonase due to sores in her nose.    Previously on SCIT for dust mite and pollen, but not interested in restarting  She reports nasal congestion at night that is worse when her heater is on.  Singulair and Xyzal    Recurrent boils      HPI 7/20/2022: 36 year old female with a history of asthma, allergic rhinitis and food allergies      Allergic Rhinitis- dust mites, cockroach and pollen  Previously on SCIT, was stopped due to pregnancy and not restarted post partum  Xyzal and Singulair  Runny nose, nasal congestion  Flonase prn  Astelin prn    Asthma  Pulmonary referral was placed, but she was not seen.  Coughing -   Wheezing at night  Last used albuterol- two months  NO oral steroid      3 weeks ago Urgent care for nasal congestion- cough syrup and Zpack- cough has persisted. Nasal congestion has improved.      Food allergies- shellfish allergies  Avoiding shellfish  Not required Epipen      HPI 1/19/2023: 37 year old female with a history of asthma, allergic rhinitis and food allergies.    Allergic Rhinitis- dust mites, cockroach, and pollen  SCIT previously  Currently on Xolair   She feels Xolair has controlled nasal and eye symptoms.    Asthma- "good"  NO oral steroids, since seen here previously  NO night time cough  Flare -four days ago- albuterol-unsure of trigger-resolved with albuterol- was short of breath only  NO wheezing or cough  On " Xolair    Food Allergies- shellfish  Avoiding, no reactions  Epipen          Past Medical History:   Diagnosis Date    Allergy     Amenorrhea     Asthma     Diabetes     GERD (gastroesophageal reflux disease)     Infertility associated with anovulation     Iron deficiency anemia     Knee pain     Metabolic syndrome     PCO (polycystic ovaries)     Recurrent boils     Status post repeat low transverse  section 2020    Formatting of this note might be different from the original. With BTL 21     Family History   Problem Relation Age of Onset    Diabetes Father     Heart disease Father 69    Hypertension Father     Prostate cancer Father     Diabetes Mother     Lupus Mother     AMY disease Brother     Ovarian cancer Sister     Breast cancer Neg Hx      Current Outpatient Medications on File Prior to Visit   Medication Sig Dispense Refill    albuterol (PROVENTIL) 2.5 mg /3 mL (0.083 %) nebulizer solution Take 3 mLs (2.5 mg total) by nebulization every 4 to 6 hours as needed for Wheezing. 60 each 3    albuterol (PROVENTIL/VENTOLIN HFA) 90 mcg/actuation inhaler Inhale 1-2 puffs into the lungs every 4 (four) hours as needed for Wheezing. Rescue 8.5 g 10    cholecalciferol, vitamin D3, (VITAMIN D3) 25 mcg (1,000 unit) capsule Take 2 capsules (2,000 Units total) by mouth once daily. 90 capsule 4    EScitalopram oxalate (LEXAPRO) 10 MG tablet Take 1 tablet (10 mg total) by mouth once daily. 30 tablet 11    fluocinonide (LIDEX) 0.05 % external solution Apply topically 2 (two) times daily. Use on scalp 60 mL 5    gabapentin (NEURONTIN) 300 MG capsule Take 1 capsule (300 mg total) by mouth 3 (three) times daily. 270 capsule 0    hydrOXYzine HCL (ATARAX) 25 MG tablet Take 1 tablet (25 mg total) by mouth 3 (three) times daily as needed for Anxiety. 30 tablet 0    ibuprofen (ADVIL,MOTRIN) 800 MG tablet Take 1 tablet (800 mg total) by mouth 3 (three) times daily as needed for Pain. 90 tablet 2    iron-vit  c-b12-folic acid (IRON 100 PLUS) Tab Take 1 tablet by mouth once daily. 90 tablet 1    ketoconazole (NIZORAL) 2 % shampoo Apply topically once a week. Lather in for 5-10 min before rinsing 360 mL 3    Lactobacillus rhamnosus GG (CULTURELLE) 10 billion cell capsule Take 1 capsule by mouth once daily.      linaCLOtide (LINZESS) 72 mcg Cap capsule Take 1 capsule (72 mcg total) by mouth once daily. 30 capsule 1    lisinopriL (PRINIVIL,ZESTRIL) 2.5 MG tablet Take 1 tablet (2.5 mg total) by mouth once daily. 90 tablet 3    loteprednol (LOTEMAX) 0.5 % ophthalmic suspension Place 1 drop into both eyes daily as needed (to control allergy symptoms. Not for chronic daily use.). 5 mL 1    meloxicam (MOBIC) 15 MG tablet Take 1 tablet (15 mg total) by mouth once daily. 30 tablet 1    methocarbamoL (ROBAXIN) 750 MG Tab Take 750 mg by mouth 4 (four) times daily as needed.      mupirocin (BACTROBAN) 2 % ointment Apply topically 3 (three) times daily.      nystatin (MYCOSTATIN) powder Apply topically 2 (two) times daily. 60 g 1    ondansetron (ZOFRAN-ODT) 8 MG TbDL DISSOLVE ONE TABLET UNDER TONGUE EVERY 8 HOURS AS NEEDED FOR NAUSEA      pantoprazole (PROTONIX) 40 MG tablet Take 1 tablet (40 mg total) by mouth once daily. 90 tablet 3    spironolactone (ALDACTONE) 100 MG tablet Take 1 tablet (100 mg total) by mouth once daily. 90 tablet 3    tirzepatide (MOUNJARO) 5 mg/0.5 mL PnIj Inject 5 mg into the skin every 7 days. 4 pen 1    tirzepatide 10 mg/0.5 mL PnIj Inject 10 mg into the skin every 7 days. 4 pen 12    tiZANidine (ZANAFLEX) 4 MG tablet Take 1 tablet (4 mg total) by mouth every 12 (twelve) hours as needed (muscle spasms/ pain). 40 tablet 0    tretinoin (RETIN-A) 0.05 % cream Apply topically every evening. Use on face 45 g 4    [DISCONTINUED] azelastine (ASTELIN) 137 mcg (0.1 %) nasal spray SMARTSIG:Both Nares      [DISCONTINUED] fluticasone furoate-vilanteroL (BREO) 200-25 mcg/dose DsDv diskus inhaler Inhale 1 puff into the  "lungs once daily. Controller 1 each 4    [DISCONTINUED] fluticasone propionate (FLONASE) 50 mcg/actuation nasal spray 1 spray (50 mcg total) by Each Nostril route once daily. 18.2 mL 0    [DISCONTINUED] levocetirizine (XYZAL) 5 MG tablet Take 1 tablet (5 mg total) by mouth every evening. 90 tablet 3    [DISCONTINUED] montelukast (SINGULAIR) 10 mg tablet Take 1 tablet (10 mg total) by mouth once daily. 30 tablet 3    doxycycline (VIBRA-TABS) 100 MG tablet Take 1 tablet (100 mg total) by mouth 2 (two) times daily. Take for 5 days prn flares (Patient not taking: Reported on 10/2/2023) 30 tablet 2    EPINEPHrine (EPIPEN) 0.3 mg/0.3 mL AtIn Inject 0.3 mLs (0.3 mg total) into the muscle once. for 1 dose 2 each 11    lactulose (CHRONULAC) 10 gram/15 mL solution SMARTSI Tablespoon By Mouth Daily      NIFEdipine (PROCARDIA-XL) 30 MG (OSM) 24 hr tablet Take 30 mg by mouth once daily.      NOVOFINE PLUS 32 gauge x 1/6" Ndle use as directed      predniSONE (DELTASONE) 20 MG tablet Take 1 tablet (20 mg total) by mouth 2 (two) times daily. (Patient not taking: Reported on 10/2/2023) 10 tablet 0    [DISCONTINUED] omalizumab (XOLAIR) 150 mg injection Inject 300 mg into the skin every 14 (fourteen) days. (Patient not taking: Reported on 10/2/2023) 2 each 11     Current Facility-Administered Medications on File Prior to Visit   Medication Dose Route Frequency Provider Last Rate Last Admin    cyanocobalamin injection 1,000 mcg  1,000 mcg Intramuscular Q30 Days Karlee Francis NP        cyanocobalamin injection 1,000 mcg  1,000 mcg Intramuscular Q30 Days Dante Negro MD        cyanocobalamin injection 1,000 mcg  1,000 mcg Intramuscular Q30 Days Dante Negro MD   1,000 mcg at 23 0918    omalizumab injection 150 mg  150 mg Subcutaneous Q14 Days Rossy Kramer MD   150 mg at 05/10/23 0921    omalizumab injection 150 mg  150 mg Subcutaneous Q14 Days Rossy Kramer MD   150 mg at 05/10/23 0920    omalizumab injection 300 " mg  300 mg Subcutaneous Q28 Days Rossy Kramer MD   300 mg at 04/10/23 0942     Review of patient's allergies indicates:   Allergen Reactions    Metformin Diarrhea    Sulfa (sulfonamide antibiotics) Anaphylaxis and Swelling     Swelling (eyes)^, Swelling (throat)^  Swelling (eyes)^, Swelling (throat)^      Diclofenac      Gastritis     Latex, natural rubber      Contact dermatitis    Other reaction(s): Other (See Comments)  Contact dermatitis    Shellfish containing products      anaphylaxis       Environmental History:  No pets. Not a smoker.        ROS:  Negative aside from those items mentioned in the HPI.    Objective:     Physical Exam  Vitals reviewed.   Constitutional:       General: She is not in acute distress.     Appearance: Normal appearance. She is obese. She is not ill-appearing, toxic-appearing or diaphoretic.   HENT:      Head: Normocephalic and atraumatic.      Right Ear: Tympanic membrane, ear canal and external ear normal. There is no impacted cerumen.      Left Ear: Tympanic membrane, ear canal and external ear normal. There is no impacted cerumen.      Nose: Nose normal. No congestion or rhinorrhea.      Mouth/Throat:      Mouth: Mucous membranes are moist.      Pharynx: No oropharyngeal exudate or posterior oropharyngeal erythema.   Eyes:      General: No scleral icterus.        Right eye: No discharge.         Left eye: No discharge.   Cardiovascular:      Rate and Rhythm: Normal rate and regular rhythm.      Heart sounds: Normal heart sounds. No murmur heard.     No friction rub. No gallop.   Pulmonary:      Effort: Pulmonary effort is normal. No respiratory distress.      Breath sounds: Normal breath sounds. No stridor. No wheezing, rhonchi or rales.   Chest:      Chest wall: No tenderness.   Abdominal:      General: There is no distension.      Palpations: Abdomen is soft. There is no mass.      Tenderness: There is no abdominal tenderness. There is no guarding or rebound.      Hernia: No  hernia is present.   Musculoskeletal:         General: No swelling, tenderness, deformity or signs of injury.      Cervical back: Normal range of motion and neck supple. No tenderness.   Lymphadenopathy:      Cervical: No cervical adenopathy.   Skin:     General: Skin is warm.      Coloration: Skin is not jaundiced or pale.      Findings: No bruising, erythema, lesion or rash.   Neurological:      General: No focal deficit present.      Mental Status: She is alert and oriented to person, place, and time.      Motor: No weakness.      Gait: Gait normal.   Psychiatric:         Mood and Affect: Mood normal.         Behavior: Behavior normal.         Thought Content: Thought content normal.         Judgment: Judgment normal.           Assessment:            Moderate persistent asthma without complication  -     omalizumab (XOLAIR) 150 mg injection; Inject 300 mg into the skin every 14 (fourteen) days.  Dispense: 2 each; Refill: 11  -     fluticasone furoate-vilanteroL (BREO) 200-25 mcg/dose DsDv diskus inhaler; Inhale 1 puff into the lungs once daily. Controller  Dispense: 1 each; Refill: 4    Shellfish allergy    Latex allergy    Allergic rhinitis due to [place] house dust mite    Seasonal allergic rhinitis due to pollen  -     montelukast (SINGULAIR) 10 mg tablet; Take 1 tablet (10 mg total) by mouth once daily.  Dispense: 30 tablet; Refill: 3  -     levocetirizine (XYZAL) 5 MG tablet; Take 1 tablet (5 mg total) by mouth every evening.  Dispense: 90 tablet; Refill: 3  -     azelastine (ASTELIN) 137 mcg (0.1 %) nasal spray; 2 sprays (274 mcg total) by Nasal route 2 (two) times daily.  Dispense: 20 mL; Refill: 5  -     fluticasone propionate (FLONASE) 50 mcg/actuation nasal spray; 1 spray (50 mcg total) by Each Nostril route once daily.  Dispense: 18.2 mL; Refill: 0    Allergic rhinitis due to insect  -     montelukast (SINGULAIR) 10 mg tablet; Take 1 tablet (10 mg total) by mouth once daily.  Dispense: 30 tablet;  Refill: 3  -     levocetirizine (XYZAL) 5 MG tablet; Take 1 tablet (5 mg total) by mouth every evening.  Dispense: 90 tablet; Refill: 3  -     azelastine (ASTELIN) 137 mcg (0.1 %) nasal spray; 2 sprays (274 mcg total) by Nasal route 2 (two) times daily.  Dispense: 20 mL; Refill: 5  -     fluticasone propionate (FLONASE) 50 mcg/actuation nasal spray; 1 spray (50 mcg total) by Each Nostril route once daily.  Dispense: 18.2 mL; Refill: 0    Elevated IgE level  -     omalizumab (XOLAIR) 150 mg injection; Inject 300 mg into the skin every 14 (fourteen) days.  Dispense: 2 each; Refill: 11    Gastroesophageal reflux disease, unspecified whether esophagitis present          Recommend spirometry at least once a year, if symptomatic every 4-6 months. Will do spirometry at follow up.  Continue Xolair- reordered today (stopped due to loss of insurance and change of job)  Continue Breo and other medications  Continue Singulair and Xyzal  Avoid shellfish and shellfish products.  Epipen 2 pack  Avoid latex     RTC 4 -6 months or sooner, if needed.        MD,FACSANDHYAI                      Problems Address                                                 Amount and/or Complexity                                                                      Risk       3           [] 2 or more self-limited or minor problems                      [] Limited                                                                        [] Low                  [] 1 stable chronic illness                                                  Any combination of the two                                               OTC drugs                  []Acute, uncomplicated illness or injury                            Review of prior external notes from unique source           Minor surgery with no risk factors                                                                                                               [] 1 []2  []3+                                                                                                               Review of results from each unique test                                                                                                               [] 1 []2  [] 3+                                                                                                              Order of each unique test                                                                                                               [] 1 []2  [] 3+                                                                                                              Or                                                                                                             [] Assessment requiring an independent historian      4            [] One or more chronic illness with exacerbation,              [] Moderate                                                                      [x] Moderate                 Progression, or side effects of treatment                            -test documents or independent historians                        Prescription drug management                [x]  2 or more stable chronic illnesses                                    [] Independent interpretation of tests                              Minor surgery with identifiable risk                [] 1 undiagnosed new problem with uncertain prognosis    [] Discussion or management of test results                    elective major surgery                [] 1 acute illness with                systemic symptoms                                                                                                                                                              [] 1 acute complicated injury                                                                                                                                          Elective major surgery                                                                                                                                                                                                                                                                                                                                                                                                   5            [] 1 or more chronic illnesses with severe exacerbation,     [] Extensive(two from below)                                         [] High                                                                                                               [] Independent interpretation of results                         Drug therapy requiring intensive                                                                                                               []Discussion of management or test interpretation           monitoring                                                                                                                                                                                                       Decision to de-escalate care                 [] 1 acute or chronic illness or injury that poses a threat                                                                                               Decision regarding hospitalization

## 2023-10-02 NOTE — PATIENT INSTRUCTIONS
-The following causes or exacerbate GERD/ heartburn/acid reflux:    Caffeine, chocolate, peppermints, alcohol, spicy foods, greasy foods, large meals, acidic foods (like tomatoes, lemon), and being sedentary after eating.      -If you are not diabetic or have health issues that prohibit a long fast, I recommend no food three to four hours before you lay down at night.     Books- The Acid Reflux Cookbook  Dropping Acid: The Reflux Diet

## 2023-10-12 ENCOUNTER — OFFICE VISIT (OUTPATIENT)
Dept: FAMILY MEDICINE | Facility: CLINIC | Age: 38
End: 2023-10-12
Payer: COMMERCIAL

## 2023-10-12 VITALS
HEART RATE: 85 BPM | WEIGHT: 293 LBS | SYSTOLIC BLOOD PRESSURE: 126 MMHG | DIASTOLIC BLOOD PRESSURE: 87 MMHG | HEIGHT: 64 IN | BODY MASS INDEX: 50.02 KG/M2 | OXYGEN SATURATION: 100 %

## 2023-10-12 DIAGNOSIS — E11.65 TYPE 2 DIABETES MELLITUS WITH HYPERGLYCEMIA, WITHOUT LONG-TERM CURRENT USE OF INSULIN: Primary | ICD-10-CM

## 2023-10-12 DIAGNOSIS — Z79.899 ENCOUNTER FOR LONG-TERM (CURRENT) USE OF MEDICATIONS: ICD-10-CM

## 2023-10-12 DIAGNOSIS — E66.2 CLASS 3 OBESITY WITH ALVEOLAR HYPOVENTILATION, SERIOUS COMORBIDITY, AND BODY MASS INDEX (BMI) OF 50.0 TO 59.9 IN ADULT: Chronic | ICD-10-CM

## 2023-10-12 DIAGNOSIS — L98.9 SKIN LESION: ICD-10-CM

## 2023-10-12 PROCEDURE — 99999 PR PBB SHADOW E&M-EST. PATIENT-LVL V: ICD-10-PCS | Mod: PBBFAC,,, | Performed by: FAMILY MEDICINE

## 2023-10-12 PROCEDURE — 99999 PR PBB SHADOW E&M-EST. PATIENT-LVL V: CPT | Mod: PBBFAC,,, | Performed by: FAMILY MEDICINE

## 2023-10-12 PROCEDURE — 99214 PR OFFICE/OUTPT VISIT, EST, LEVL IV, 30-39 MIN: ICD-10-PCS | Mod: S$GLB,,, | Performed by: FAMILY MEDICINE

## 2023-10-12 PROCEDURE — 99214 OFFICE O/P EST MOD 30 MIN: CPT | Mod: S$GLB,,, | Performed by: FAMILY MEDICINE

## 2023-10-12 RX ORDER — TIRZEPATIDE 12.5 MG/.5ML
12.5 INJECTION, SOLUTION SUBCUTANEOUS
Qty: 4 PEN | Refills: 5 | Status: SHIPPED | OUTPATIENT
Start: 2023-10-12 | End: 2024-03-27

## 2023-10-12 RX ORDER — PHENTERMINE HYDROCHLORIDE 37.5 MG/1
37.5 TABLET ORAL
Qty: 30 TABLET | Refills: 5 | Status: SHIPPED | OUTPATIENT
Start: 2023-10-12 | End: 2024-04-09

## 2023-10-12 NOTE — ASSESSMENT & PLAN NOTE
Titrating up on MOUNJARO.  Patient will let me know if medication is in stock elsewhere.  Start back on phentermine.  Target weight loss 30 pounds over the next few weeks.  Referral to bariatric surgery pending.    Discussed lifestyle modification with diet and exercise.

## 2023-10-12 NOTE — ASSESSMENT & PLAN NOTE
Likely scalp psoriasis versus other.  Patient will try dandruff shampoo over-the-counter 1st and follow-up with Dermatology.  Referral placed.

## 2023-10-12 NOTE — PATIENT INSTRUCTIONS
Follow up in about 6 months (around 4/12/2024), or if symptoms worsen or fail to improve, for Med refills.     Dear patient,   As a result of recent federal legislation (The Federal Cures Act), you may receive lab or pathology results from your visit in your [facility] account before your physician is able to contact you. Your physician or their representative will relay the results to you with their recommendations at their soonest availability.     If no improvement in symptoms or symptoms worsen, please be advised to call MD, follow-up at clinic and/or go to ER if becomes severe.    Dante Negro M.D.        We Offer TELEHEALTH & Same Day Appointments!   Book your Telehealth appointment with me through my nurse or   Clinic appointments on [employer]!    [address] [place] [address]    Office: [phone]   FAX: [phone]    Check out my Facebook Page [email or web address] Follow Me at: [email or web address]    Check out my website at [email or web address] by clicking on: [email or web address]    [email or web address] Schedule appointments [email or web address] go to [employer]: [email or web address]

## 2023-10-12 NOTE — PROGRESS NOTES
PLAN:      Problem List Items Addressed This Visit       Class 3 obesity with alveolar hypoventilation, serious comorbidity, and body mass index (BMI) of 50.0 to 59.9 in adult (Chronic)     Titrating up on MOUNJARO.  Patient will let me know if medication is in stock elsewhere.  Start back on phentermine.  Target weight loss 30 pounds over the next few weeks.  Referral to bariatric surgery pending.    Discussed lifestyle modification with diet and exercise.         Relevant Medications    phentermine (ADIPEX-P) 37.5 mg tablet    Other Relevant Orders    Ambulatory referral/consult to Bariatric Surgery    Encounter for long-term (current) use of medications (Chronic)     Complete history and physical was completed today.  Complete and thorough medication reconciliation was performed.  Discussed risks and benefits of medications.  Advised patient on orders and health maintenance.  We discussed old records and old labs if available.  Will request any records not available through epic.  Continue current medications listed on your summary sheet.           Type 2 diabetes mellitus with hyperglycemia, without long-term current use of insulin - Primary (Chronic)     Titrate up MOUNJARO dosage.We will plan to monitor hemoglobin A1c at designated intervals 3 to 6 months.  I recommend ongoing Education for diabetic diet and exercise protocol.  We will continue to monitor for side effects.    Please be advised of symptoms to monitor for and to notify me immediately if persistent or worsening.  Follow up with Ophthalmology/Optometry and Podiatry at least annually.  GLP 1 risk and benefits and common side effects of medication discussed with the patient at length.  All questions were answered.  Discussed with patient at length about potential pharmacologic options.  Patient desires consideration for MOUNJARO .  The risks, benefits and, side effects of this GLP 1 medication including nausea , constipation, diarrhea and pain  injection site, etcetera.  She is aware she should not get pregnant while taking this medication and it is not approved while breastfeeding.  Patient reports no personal or family history of pancreatitis, MEN or medullary thyroid cancer.  There is potential for gallbladder issues while taking this medication if not already removed.  Patient should be advised to caution with taking this medication if having history of thyroid cancer or biliary disease/gallstones.  Patient should be aware of risk of diabetic retinopathy if blood sugars lowered too quickly.  Patient is aware that weight loss can and will most likely occur quickly.  Discussed GLP 1 mechanism of action.         Relevant Medications    tirzepatide (MOUNJARO) 12.5 mg/0.5 mL PnIj    Skin lesion     Likely scalp psoriasis versus other.  Patient will try dandruff shampoo over-the-counter 1st and follow-up with Dermatology.  Referral placed.         Relevant Orders    Ambulatory referral/consult to Dermatology     Future Appointments       Date Provider Specialty Appt Notes    10/17/2023  Surgery  Arrive at: Telehealth weightloss    10/20/2023  Family Medicine b12    10/20/2023 Zeke Clement MD Pain Medicine Pain    11/1/2023  Lab hemonc    11/6/2023 Vikram Saini NP Hematology and Oncology 3 mon f/u, Labs    1/3/2024 Dante Negro MD Family Medicine 3 mo fu    2/5/2024 Rossy Kramer MD Allergy 4 month follow up    3/14/2024 Michael Hernandez MD Rheumatology +VANESSA--CHRONIC FATIGUE//bc           Medication Management for assessment above:   Medication List with Changes/Refills   New Medications    PHENTERMINE (ADIPEX-P) 37.5 MG TABLET    Take 1 tablet (37.5 mg total) by mouth before breakfast.    TIRZEPATIDE (MOUNJARO) 12.5 MG/0.5 ML PNIJ    Inject 12.5 mg into the skin every 7 days.   Current Medications    ALBUTEROL (PROVENTIL) 2.5 MG /3 ML (0.083 %) NEBULIZER SOLUTION    Take 3 mLs (2.5 mg total) by nebulization every 4 to 6 hours as needed for  Wheezing.    ALBUTEROL (PROVENTIL/VENTOLIN HFA) 90 MCG/ACTUATION INHALER    Inhale 1-2 puffs into the lungs every 4 (four) hours as needed for Wheezing. Rescue    AZELASTINE (ASTELIN) 137 MCG (0.1 %) NASAL SPRAY    2 sprays (274 mcg total) by Nasal route 2 (two) times daily.    CHOLECALCIFEROL, VITAMIN D3, (VITAMIN D3) 25 MCG (1,000 UNIT) CAPSULE    Take 2 capsules (2,000 Units total) by mouth once daily.    DOXYCYCLINE (VIBRA-TABS) 100 MG TABLET    Take 1 tablet (100 mg total) by mouth 2 (two) times daily. Take for 5 days prn flares    EPINEPHRINE (EPIPEN) 0.3 MG/0.3 ML ATIN    Inject 0.3 mLs (0.3 mg total) into the muscle once. for 1 dose    ESCITALOPRAM OXALATE (LEXAPRO) 10 MG TABLET    Take 1 tablet (10 mg total) by mouth once daily.    FLUOCINONIDE (LIDEX) 0.05 % EXTERNAL SOLUTION    Apply topically 2 (two) times daily. Use on scalp    FLUTICASONE FUROATE-VILANTEROL (BREO) 200-25 MCG/DOSE DSDV DISKUS INHALER    Inhale 1 puff into the lungs once daily. Controller    FLUTICASONE PROPIONATE (FLONASE) 50 MCG/ACTUATION NASAL SPRAY    1 spray (50 mcg total) by Each Nostril route once daily.    GABAPENTIN (NEURONTIN) 300 MG CAPSULE    Take 1 capsule (300 mg total) by mouth 3 (three) times daily.    HYDROXYZINE HCL (ATARAX) 25 MG TABLET    Take 1 tablet (25 mg total) by mouth 3 (three) times daily as needed for Anxiety.    IBUPROFEN (ADVIL,MOTRIN) 800 MG TABLET    Take 1 tablet (800 mg total) by mouth 3 (three) times daily as needed for Pain.    IRON-VIT C-B12-FOLIC ACID (IRON 100 PLUS) TAB    Take 1 tablet by mouth once daily.    KETOCONAZOLE (NIZORAL) 2 % SHAMPOO    Apply topically once a week. Lather in for 5-10 min before rinsing    LACTOBACILLUS RHAMNOSUS GG (CULTURELLE) 10 BILLION CELL CAPSULE    Take 1 capsule by mouth once daily.    LACTULOSE (CHRONULAC) 10 GRAM/15 ML SOLUTION    SMARTSI Tablespoon By Mouth Daily    LEVOCETIRIZINE (XYZAL) 5 MG TABLET    Take 1 tablet (5 mg total) by mouth every evening.     "LINACLOTIDE (LINZESS) 72 MCG CAP CAPSULE    Take 1 capsule (72 mcg total) by mouth once daily.    LISINOPRIL (PRINIVIL,ZESTRIL) 2.5 MG TABLET    Take 1 tablet (2.5 mg total) by mouth once daily.    LOTEPREDNOL (LOTEMAX) 0.5 % OPHTHALMIC SUSPENSION    Place 1 drop into both eyes daily as needed (to control allergy symptoms. Not for chronic daily use.).    MELOXICAM (MOBIC) 15 MG TABLET    Take 1 tablet (15 mg total) by mouth once daily.    METHOCARBAMOL (ROBAXIN) 750 MG TAB    Take 750 mg by mouth 4 (four) times daily as needed.    MONTELUKAST (SINGULAIR) 10 MG TABLET    Take 1 tablet (10 mg total) by mouth once daily.    MUPIROCIN (BACTROBAN) 2 % OINTMENT    Apply topically 3 (three) times daily.    NIFEDIPINE (PROCARDIA-XL) 30 MG (OSM) 24 HR TABLET    Take 30 mg by mouth once daily.    NOVOFINE PLUS 32 GAUGE X 1/6" NDLE    use as directed    NYSTATIN (MYCOSTATIN) POWDER    Apply topically 2 (two) times daily.    OMALIZUMAB (XOLAIR) 150 MG INJECTION    Inject 300 mg into the skin every 14 (fourteen) days.    ONDANSETRON (ZOFRAN-ODT) 8 MG TBDL    DISSOLVE ONE TABLET UNDER TONGUE EVERY 8 HOURS AS NEEDED FOR NAUSEA    PANTOPRAZOLE (PROTONIX) 40 MG TABLET    Take 1 tablet (40 mg total) by mouth once daily.    PREDNISONE (DELTASONE) 20 MG TABLET    Take 1 tablet (20 mg total) by mouth 2 (two) times daily.    SPIRONOLACTONE (ALDACTONE) 100 MG TABLET    Take 1 tablet (100 mg total) by mouth once daily.    TIRZEPATIDE (MOUNJARO) 5 MG/0.5 ML PNIJ    Inject 5 mg into the skin every 7 days.    TIRZEPATIDE 10 MG/0.5 ML PNIJ    Inject 10 mg into the skin every 7 days.    TIZANIDINE (ZANAFLEX) 4 MG TABLET    Take 1 tablet (4 mg total) by mouth every 12 (twelve) hours as needed (muscle spasms/ pain).    TRETINOIN (RETIN-A) 0.05 % CREAM    Apply topically every evening. Use on face       Dante T. Marky, M.D.  ==========================================================================  Subjective:   Patient ID: Lucía Steele Joss is " a 38 y.o. female.  has a past medical history of Allergy, Amenorrhea, Asthma, Diabetes, GERD (gastroesophageal reflux disease), Infertility associated with anovulation, Iron deficiency anemia, Knee pain, Metabolic syndrome, PCO (polycystic ovaries), Recurrent boils, and Status post repeat low transverse  section (2020).   Chief Complaint: Follow-up      Problem List Items Addressed This Visit       Class 3 obesity with alveolar hypoventilation, serious comorbidity, and body mass index (BMI) of 50.0 to 59.9 in adult (Chronic)    Overview     2023: Patient is having difficulty getting the proper dosage of MOUNJARO for her diabetes.  Weight loss has stalled.  Patient did well on phentermine in the past.  Patient is interested in bariatric surgery if no improvement.  She has tried diet and exercise with no improvement.    Chronic.  Previous HPI:  Uncontrolled.  Was improving on Ozempic.  BMI currently 53.  2020:  PATIENT'S BMI IS UP TO 54.  PATIENT HAS RECENTLY HAD A MISCARRIAGE AND WAS TAKEN OFF OF HER MEDICATIONS.  PATIENT IS WANTING TO RESTART MEDICATIONS FOR HER CHRONIC CONDITIONS TODAY.  SHE HAS SEEN HER OBGYN AND WAS STARTED ON ORAL CONTRACEPTIVE PILLS.  2022:  Chronic.  Worsening.  Patient is plateauing with Ozempic 1 milligram.  BMI Readings from Last 10 Encounters:   10/12/23 51.72 kg/m²   10/02/23 52.34 kg/m²   23 51.67 kg/m²   23 52.70 kg/m²   23 52.87 kg/m²   23 52.30 kg/m²   23 52.11 kg/m²   23 52.54 kg/m²   23 53.21 kg/m²   23 53.36 kg/m²              Current Assessment & Plan     Titrating up on MOUNJARO.  Patient will let me know if medication is in stock elsewhere.  Start back on phentermine.  Target weight loss 30 pounds over the next few weeks.  Referral to bariatric surgery pending.    Discussed lifestyle modification with diet and exercise.         Encounter for long-term (current) use of medications (Chronic)     Overview     October 2023: Reviewed labs.  September 2023:  Reviewed labs.  07/12/2019 CHRONIC long-term drug therapy for managed conditions. See medication list. Reports compliance.  No side effects reported.  Routine lab work is being monitored.  Patient does not need refills today. 09/20/2019Reviewed labs. Patient is compliant with meds. She needs refills. Gaining weight on Metformin. DECEMBER 2019:  CHRONIC. Stable. Compliant with medications for managed conditions. See medication list. No SE reported. Routine lab analysis is being monitored. Refills were addressed. APRIL 2020:  CHRONIC. Stable. Compliant with medications for managed conditions. See medication list. No SE reported. Routine lab analysis is being monitored. Refills were addressed.  AUGUST 2020:  REVIEWED LABS.  CHRONIC. Stable. Compliant with medications for managed conditions. See medication list. No SE reported. Routine lab analysis is being monitored. Refills were addressed.October 2020:  CHRONIC. Stable. Compliant with medications for managed conditions. See medication list. No SE reported. Routine lab analysis is being monitored. Refills were addressed.November 2020:CHRONIC. Stable. Compliant with medications for managed conditions. See medication list. No SE reported.   Routine lab analysis is being monitored. Refills were addressed.  April 2021:  Reviewed labs.  August 2021:  Reviewed labs.  Patient additional labs set up through [facility] [place].  January 2022:  Reviewed labs.  February 2022:  Reviewed labs.  June 2022:  Reviewed labs.  Lab Results   Component Value Date    WBC 8.58 08/28/2023    HGB 13.6 08/28/2023    HCT 41.5 08/28/2023    MCV 76 (L) 08/28/2023     08/28/2023       Chemistry        Component Value Date/Time     08/02/2023 0923    K 3.9 08/02/2023 0923     08/02/2023 0923    CO2 23 08/02/2023 0923    BUN 14 08/02/2023 0923    CREATININE 0.7 08/02/2023 0923    GLU 93 08/02/2023 0923         Component Value Date/Time    CALCIUM 9.4 08/02/2023 0923    ALKPHOS 62 08/02/2023 0923    AST 10 08/02/2023 0923    ALT 10 08/02/2023 0923    BILITOT 0.4 08/02/2023 0923    ESTGFRAFRICA >60.0 06/10/2022 1032    EGFRNONAA >60.0 06/10/2022 1032          Lab Results   Component Value Date    TSH 0.939 08/02/2023    A7JCEBQ 9.4 07/16/2019    T3FREE 2.9 07/16/2019            Current Assessment & Plan     Complete history and physical was completed today.  Complete and thorough medication reconciliation was performed.  Discussed risks and benefits of medications.  Advised patient on orders and health maintenance.  We discussed old records and old labs if available.  Will request any records not available through epic.  Continue current medications listed on your summary sheet.           Type 2 diabetes mellitus with hyperglycemia, without long-term current use of insulin - Primary (Chronic)    Overview     October 2023:  Diabetes Medications               tirzepatide (MOUNJARO) 5 mg/0.5 mL PnIj Inject 5 mg into the skin every 7 days.    tirzepatide 10 mg/0.5 mL PnIj Inject 10 mg into the skin every 7 days.    tirzepatide (MOUNJARO) 12.5 mg/0.5 mL PnIj Inject 12.5 mg into the skin every 7 days.       INITIAL HPI:  No results found in epic however review her blood work from previous provider shows last A1c was 5.8.  Patient previously on metformin.  Currently having fatigue and decreased libido.09/20/2019Patient due for hemoglobin A1c next month.  Will place order for patient.She is having GI symptoms with metformin. She reports BG is elevated at home. She continues to gain weight. DECEMBER 2019:  Patient reports to having diarrhea with metformin.  Reviewed Diabetes Management StatusStatin: Not takingACE/ARB: Not takingAPRIL 2020:  Patient has started Victoza and is off of metformin.  Patient reports that she is getting slightly nauseated on the increased dose of 1.2 mg.  Symptoms resolved after taking Pepto-Bismol  after few hours of injection.Reviewed Diabetes Management StatusStatin: Not takingACE/ARB: Not takingAUGUST 2020:  PATIENT REPORTS THAT SHE IS TOLERATING VICTOZA 1.2 MG.  PATIENT CONTINUES TO HAVE ISSUES WITH HER WEIGHT.  REVIEWED Diabetes Management StatusStatin: Not takingACE/ARB: Not takingOCTOBER 2020:  Patient is having side effects with Victoza.  Reviewed Diabetes Management Status.Statin: Not takingACE/ARB: Not takingNOVEMBER 2020:  Patient reports that she was doing well on OZEMPIC.  A1c is been well controlled.  Patient was recently diagnosed with pregnancy.  Will monitor for worsening of diabetes.  Diabetes Management Status Statin: Not taking ACE/ARB: Not takingDECEMBER 2020:  Patient recently had miscarriage.  Patient wanting to get back on her diabetic medication.Diabetes Management StatusStatin: Not takingACE/ARB: Not taking January 2022:Diabetes Management StatusFebruary 2022:  Reviewed Diabetes Management Status.  She is taking Ozempic 0.5 milligrams weekly.  She denies any side effects.  She does not thing is working well.  Denies history of thyroid cancer, pancreatitis, MEN syndromeStatin: Not takingACE/ARB: Not taking  June 2022:  Patient reports her weight is starting to rebound.  She is on Ozempic 1 milligram weekly.  Reports that her blood sugar is starting to increase it with her appetite.  Denies history of pancreatitis, thyroid cancer, MEN syndrome.Diabetes Management StatusStatin: Not takingACE/ARB: Not taking  Diabetes Management Status    Statin: Not taking  ACE/ARB: Taking    Screening or Prevention Patient's value Goal Complete/Controlled?   HgA1C Testing and Control   Lab Results   Component Value Date    HGBA1C 5.5 08/02/2023      Annually/Less than 8% Yes   Lipid profile : 08/02/2023 Annually Yes   LDL control Lab Results   Component Value Date    LDLCALC 110.8 08/02/2023    Annually/Less than 100 mg/dl  No   Nephropathy screening Lab Results   Component Value Date    LABMICR 180.0  02/03/2023     Lab Results   Component Value Date    PROTEINUA 2+ (A) 02/03/2023     Lab Results   Component Value Date    UTPCR 0.20 02/03/2023      Annually Yes   Blood pressure BP Readings from Last 1 Encounters:   10/12/23 126/87    Less than 140/90 Yes   Dilated retinal exam : 03/13/2023 Annually Yes   Foot exam   Most Recent Foot Exam Date: Not Found Annually No            Current Assessment & Plan     Titrate up MOUNJARO dosage.We will plan to monitor hemoglobin A1c at designated intervals 3 to 6 months.  I recommend ongoing Education for diabetic diet and exercise protocol.  We will continue to monitor for side effects.    Please be advised of symptoms to monitor for and to notify me immediately if persistent or worsening.  Follow up with Ophthalmology/Optometry and Podiatry at least annually.  GLP 1 risk and benefits and common side effects of medication discussed with the patient at length.  All questions were answered.  Discussed with patient at length about potential pharmacologic options.  Patient desires consideration for MOUNJARO .  The risks, benefits and, side effects of this GLP 1 medication including nausea , constipation, diarrhea and pain injection site, etcetera.  She is aware she should not get pregnant while taking this medication and it is not approved while breastfeeding.  Patient reports no personal or family history of pancreatitis, MEN or medullary thyroid cancer.  There is potential for gallbladder issues while taking this medication if not already removed.  Patient should be advised to caution with taking this medication if having history of thyroid cancer or biliary disease/gallstones.  Patient should be aware of risk of diabetic retinopathy if blood sugars lowered too quickly.  Patient is aware that weight loss can and will most likely occur quickly.  Discussed GLP 1 mechanism of action.         Skin lesion    Overview     Chronic.  Recurrent.  Patient currently having scalp issues.   She has other skin issues and needs to find a dermatologist closer to [place].         Current Assessment & Plan     Likely scalp psoriasis versus other.  Patient will try dandruff shampoo over-the-counter 1st and follow-up with Dermatology.  Referral placed.             Review of patient's allergies indicates:   Allergen Reactions    Metformin Diarrhea    Sulfa (sulfonamide antibiotics) Anaphylaxis and Swelling     Swelling (eyes)^, Swelling (throat)^  Swelling (eyes)^, Swelling (throat)^      Diclofenac      Gastritis     Latex, natural rubber      Contact dermatitis    Other reaction(s): Other (See Comments)  Contact dermatitis    Shellfish containing products      anaphylaxis     Current Outpatient Medications   Medication Instructions    albuterol (PROVENTIL) 2.5 mg, Nebulization, Every 4-6 hours PRN    albuterol (PROVENTIL/VENTOLIN HFA) 90 mcg/actuation inhaler 1-2 puffs, Inhalation, Every 4 hours PRN, Rescue    azelastine (ASTELIN) 274 mcg, Nasal, 2 times daily    cholecalciferol (vitamin D3) (VITAMIN D3) 2,000 Units, Oral, Daily    doxycycline (VIBRA-TABS) 100 mg, Oral, 2 times daily, Take for 5 days prn flares    EPINEPHrine (EPIPEN) 0.3 mg, Intramuscular, Once    EScitalopram oxalate (LEXAPRO) 10 mg, Oral, Daily    fluocinonide (LIDEX) 0.05 % external solution Topical (Top), 2 times daily, Use on scalp    fluticasone furoate-vilanteroL (BREO) 200-25 mcg/dose DsDv diskus inhaler 1 puff, Inhalation, Daily, Controller    fluticasone propionate (FLONASE) 50 mcg, Each Nostril, Daily    gabapentin (NEURONTIN) 300 mg, Oral, 3 times daily    hydrOXYzine HCL (ATARAX) 25 mg, Oral, 3 times daily PRN    ibuprofen (ADVIL,MOTRIN) 800 mg, Oral, 3 times daily PRN    iron-vit c-b12-folic acid (IRON 100 PLUS) Tab 1 tablet, Oral, Daily    ketoconazole (NIZORAL) 2 % shampoo Topical (Top), Weekly, Lather in for 5-10 min before rinsing    Lactobacillus rhamnosus GG (CULTURELLE) 10 billion cell capsule 1 capsule, Oral, Daily     "lactulose (CHRONULAC) 10 gram/15 mL solution SMARTSI Tablespoon By Mouth Daily    levocetirizine (XYZAL) 5 mg, Oral, Nightly    linaCLOtide (LINZESS) 72 mcg, Oral, Daily    lisinopriL (PRINIVIL,ZESTRIL) 2.5 mg, Oral, Daily    loteprednol (LOTEMAX) 0.5 % ophthalmic suspension 1 drop, Both Eyes, Daily PRN    meloxicam (MOBIC) 15 mg, Oral, Daily    methocarbamoL (ROBAXIN) 750 mg, Oral, 4 times daily PRN    montelukast (SINGULAIR) 10 mg, Oral, Daily    MOUNJARO 5 mg, Subcutaneous, Every 7 days    MOUNJARO 12.5 mg, Subcutaneous, Every 7 days    mupirocin (BACTROBAN) 2 % ointment Topical (Top), 3 times daily    NIFEdipine (PROCARDIA-XL) 30 mg, Oral, Daily    NOVOFINE PLUS 32 gauge x 1/6" Ndle use as directed    nystatin (MYCOSTATIN) powder Topical (Top), 2 times daily    ondansetron (ZOFRAN-ODT) 8 MG TbDL DISSOLVE ONE TABLET UNDER TONGUE EVERY 8 HOURS AS NEEDED FOR NAUSEA    pantoprazole (PROTONIX) 40 mg, Oral, Daily    phentermine (ADIPEX-P) 37.5 mg, Oral, Before breakfast    predniSONE (DELTASONE) 20 mg, Oral, 2 times daily    spironolactone (ALDACTONE) 100 mg, Oral, Daily    tirzepatide 10 mg, Subcutaneous, Every 7 days    tiZANidine (ZANAFLEX) 4 mg, Oral, Every 12 hours PRN    tretinoin (RETIN-A) 0.05 % cream Topical (Top), Nightly, Use on face    XOLAIR 300 mg, Subcutaneous, Every 14 days      I have reviewed the PMH, social history, FamilyHx, surgical history, allergies and medications documented / confirmed by the patient at the time of this visit.  Review of Systems   Constitutional:  Negative for activity change and unexpected weight change.   HENT:  Negative for hearing loss, rhinorrhea and trouble swallowing.    Eyes:  Negative for discharge and visual disturbance.   Respiratory:  Negative for chest tightness and wheezing.    Cardiovascular:  Negative for chest pain and palpitations.   Gastrointestinal:  Positive for constipation. Negative for blood in stool, diarrhea and vomiting.   Endocrine: Negative for " "polydipsia and polyuria.   Genitourinary:  Negative for difficulty urinating, dysuria, hematuria and menstrual problem.   Musculoskeletal:  Negative for arthralgias, joint swelling and neck pain.   Neurological:  Negative for weakness and headaches.   Psychiatric/Behavioral:  Negative for confusion and dysphoric mood.      Objective:   /87   Pulse 85   Ht 5' 4" (1.626 m)   Wt (!) 136.7 kg (301 lb 4.8 oz)   LMP 10/06/2023 (Exact Date)   SpO2 100%   BMI 51.72 kg/m²   Physical Exam  Vitals and nursing note reviewed.   Constitutional:       General: She is not in acute distress.     Appearance: She is well-developed. She is obese. She is not ill-appearing, toxic-appearing or diaphoretic.   HENT:      Head: Normocephalic and atraumatic.      Right Ear: Hearing and external ear normal.      Left Ear: Hearing and external ear normal.      Nose: Nose normal. No rhinorrhea.   Eyes:      General: Lids are normal.      Extraocular Movements: Extraocular movements intact.      Conjunctiva/sclera: Conjunctivae normal.      Pupils: Pupils are equal, round, and reactive to light.   Cardiovascular:      Rate and Rhythm: Normal rate and regular rhythm.      Pulses: Normal pulses.           Posterior tibial pulses are 2+ on the right side and 2+ on the left side.      Heart sounds: Normal heart sounds. No murmur heard.  Pulmonary:      Effort: Pulmonary effort is normal. No respiratory distress.      Breath sounds: Normal breath sounds. No wheezing.   Abdominal:      General: Bowel sounds are normal.      Palpations: Abdomen is soft.   Musculoskeletal:         General: Normal range of motion.      Cervical back: Normal range of motion and neck supple.      Right foot: Normal range of motion. No deformity.      Left foot: Normal range of motion. No deformity.   Feet:      Right foot:      Protective Sensation: 10 sites tested.  10 sites sensed.      Skin integrity: No ulcer, blister, skin breakdown, erythema, warmth, " callus or dry skin.      Left foot:      Protective Sensation: 10 sites tested.  10 sites sensed.      Skin integrity: No ulcer, blister, skin breakdown, erythema, warmth, callus or dry skin.   Skin:     General: Skin is warm and dry.      Capillary Refill: Capillary refill takes less than 2 seconds.      Coloration: Skin is not pale.      Findings: Lesion and rash present.   Neurological:      General: No focal deficit present.      Mental Status: She is alert and oriented to person, place, and time. Mental status is at baseline. She is not disoriented.      Cranial Nerves: No cranial nerve deficit.      Motor: No weakness.      Gait: Gait normal.   Psychiatric:         Attention and Perception: She is attentive.         Mood and Affect: Mood normal. Mood is not anxious or depressed.         Speech: Speech is not rapid and pressured or slurred.         Behavior: Behavior normal. Behavior is not agitated, aggressive or hyperactive. Behavior is cooperative.         Thought Content: Thought content normal. Thought content is not paranoid or delusional. Thought content does not include homicidal or suicidal ideation. Thought content does not include homicidal or suicidal plan.         Cognition and Memory: Memory is not impaired.         Judgment: Judgment normal.           Assessment:     1. Type 2 diabetes mellitus with hyperglycemia, without long-term current use of insulin    2. Encounter for long-term (current) use of medications    3. Class 3 obesity with alveolar hypoventilation, serious comorbidity, and body mass index (BMI) of 50.0 to 59.9 in adult    4. Skin lesion      MDM:   Moderate medical complexity.  Moderate risk.  Total time: 34 minutes.  This includes total time spent on the encounter, which includes face to face time and non-face to face time preparing to see the patient (eg, review of previous medical records, tests), Obtaining and/or reviewing separately obtained history, documenting clinical  information in the electronic or other health record, independently interpreting results (not separately reported)/communicating results to the patient/family/caregiver, and/or care coordination (not separately reported).    I have Reviewed and summarized old records.  I have performed thorough medication reconciliation today and discussed risk and benefits of medications.  I have reviewed labs and discussed with patient.  All questions were answered.      I have signed for the following orders AND/OR meds.  Orders Placed This Encounter   Procedures    Ambulatory referral/consult to Bariatric Surgery     Standing Status:   Future     Standing Expiration Date:   11/12/2024     Referral Priority:   Routine     Referral Type:   Consultation     Referral Reason:   Specialty Services Required     Requested Specialty:   Bariatrics     Number of Visits Requested:   1    Ambulatory referral/consult to Dermatology     Standing Status:   Future     Standing Expiration Date:   11/12/2024     Referral Priority:   Routine     Referral Type:   Consultation     Referral Reason:   Specialty Services Required     Referred to Provider:   Meera Mckeon MD     Requested Specialty:   Dermatology     Number of Visits Requested:   1     Medications Ordered This Encounter   Medications    phentermine (ADIPEX-P) 37.5 mg tablet     Sig: Take 1 tablet (37.5 mg total) by mouth before breakfast.     Dispense:  30 tablet     Refill:  5    tirzepatide (MOUNJARO) 12.5 mg/0.5 mL PnIj     Sig: Inject 12.5 mg into the skin every 7 days.     Dispense:  4 pen      Refill:  5        Follow up in about 6 months (around 4/12/2024), or if symptoms worsen or fail to improve, for Med refills.  Future Appointments       Date Provider Specialty Appt Notes    10/17/2023  Surgery  Arrive at: Telehealth weightloss    10/20/2023  Family Medicine b12    10/20/2023 Zeke Clement MD Pain Medicine Pain    11/1/2023  Lab hemonc    11/6/2023 Vikram Saini NP  Hematology and Oncology 3 mon f/u, Labs    1/3/2024 Dante Negro MD Family Medicine 3 mo fu    2/5/2024 Rossy Kramer MD Allergy 4 month follow up    3/14/2024 Michael Hernandez MD Rheumatology +VANESSA--CHRONIC FATIGUE//bc          If no improvement in symptoms or symptoms worsen, advised to call/follow-up at clinic or go to ER. Patient voiced understanding and all questions/concerns were addressed.   DISCLAIMER: This note was compiled by using a speech recognition dictation system and therefore please be aware that typographical / speech recognition errors can and do occur.  Please contact me if you see any errors specifically.    Dante Negro M.D.       Office: [phone] [address] [place] [address]  FAX: [phone]

## 2023-10-12 NOTE — ASSESSMENT & PLAN NOTE
Titrate up MOUNJARO dosage.We will plan to monitor hemoglobin A1c at designated intervals 3 to 6 months.  I recommend ongoing Education for diabetic diet and exercise protocol.  We will continue to monitor for side effects.    Please be advised of symptoms to monitor for and to notify me immediately if persistent or worsening.  Follow up with Ophthalmology/Optometry and Podiatry at least annually.  GLP 1 risk and benefits and common side effects of medication discussed with the patient at length.  All questions were answered.  Discussed with patient at length about potential pharmacologic options.  Patient desires consideration for MOUNJARO .  The risks, benefits and, side effects of this GLP 1 medication including nausea , constipation, diarrhea and pain injection site, etcetera.  She is aware she should not get pregnant while taking this medication and it is not approved while breastfeeding.  Patient reports no personal or family history of pancreatitis, MEN or medullary thyroid cancer.  There is potential for gallbladder issues while taking this medication if not already removed.  Patient should be advised to caution with taking this medication if having history of thyroid cancer or biliary disease/gallstones.  Patient should be aware of risk of diabetic retinopathy if blood sugars lowered too quickly.  Patient is aware that weight loss can and will most likely occur quickly.  Discussed GLP 1 mechanism of action.

## 2023-10-19 ENCOUNTER — TELEPHONE (OUTPATIENT)
Dept: FAMILY MEDICINE | Facility: CLINIC | Age: 38
End: 2023-10-19
Payer: COMMERCIAL

## 2023-10-19 NOTE — TELEPHONE ENCOUNTER
----- Message from Africa Honeycutt sent at 10/19/2023 10:54 AM CDT -----  Contact: Lucía venkatesh [phone]  1MEDICALADVICE     Patient is calling for Medical Advice regarding:    How long has patient had these symptoms:    Pharmacy name and phone#:    Would like response via [employer]: call back    Comments: Pt is requesting a call back from the nurse to reschedule her nurse visit

## 2023-10-24 ENCOUNTER — CLINICAL SUPPORT (OUTPATIENT)
Dept: FAMILY MEDICINE | Facility: CLINIC | Age: 38
End: 2023-10-24
Payer: COMMERCIAL

## 2023-10-24 DIAGNOSIS — E53.8 B12 DEFICIENCY: Primary | ICD-10-CM

## 2023-10-24 PROCEDURE — 99999 PR PBB SHADOW E&M-EST. PATIENT-LVL III: ICD-10-PCS | Mod: PBBFAC,,,

## 2023-10-24 PROCEDURE — 96372 THER/PROPH/DIAG INJ SC/IM: CPT | Mod: S$GLB,,, | Performed by: FAMILY MEDICINE

## 2023-10-24 PROCEDURE — 99999 PR PBB SHADOW E&M-EST. PATIENT-LVL III: CPT | Mod: PBBFAC,,,

## 2023-10-24 PROCEDURE — 96372 PR INJECTION,THERAP/PROPH/DIAG2ST, IM OR SUBCUT: ICD-10-PCS | Mod: S$GLB,,, | Performed by: FAMILY MEDICINE

## 2023-10-24 RX ADMIN — CYANOCOBALAMIN 1000 MCG: 1000 INJECTION, SOLUTION INTRAMUSCULAR; SUBCUTANEOUS at 11:10

## 2023-11-01 ENCOUNTER — LAB VISIT (OUTPATIENT)
Dept: LAB | Facility: HOSPITAL | Age: 38
End: 2023-11-01
Attending: NURSE PRACTITIONER
Payer: COMMERCIAL

## 2023-11-01 DIAGNOSIS — D50.0 IRON DEFICIENCY ANEMIA DUE TO CHRONIC BLOOD LOSS: ICD-10-CM

## 2023-11-01 DIAGNOSIS — E53.8 B12 DEFICIENCY: ICD-10-CM

## 2023-11-01 LAB
BASOPHILS # BLD AUTO: 0.05 K/UL (ref 0–0.2)
BASOPHILS NFR BLD: 0.6 % (ref 0–1.9)
DIFFERENTIAL METHOD: ABNORMAL
EOSINOPHIL # BLD AUTO: 0.1 K/UL (ref 0–0.5)
EOSINOPHIL NFR BLD: 1.1 % (ref 0–8)
ERYTHROCYTE [DISTWIDTH] IN BLOOD BY AUTOMATED COUNT: 14 % (ref 11.5–14.5)
FERRITIN SERPL-MCNC: 58 NG/ML (ref 20–300)
HCT VFR BLD AUTO: 40 % (ref 37–48.5)
HGB BLD-MCNC: 12.7 G/DL (ref 12–16)
IMM GRANULOCYTES # BLD AUTO: 0.01 K/UL (ref 0–0.04)
IMM GRANULOCYTES NFR BLD AUTO: 0.1 % (ref 0–0.5)
IRON SERPL-MCNC: 88 UG/DL (ref 30–160)
LYMPHOCYTES # BLD AUTO: 3.8 K/UL (ref 1–4.8)
LYMPHOCYTES NFR BLD: 47.8 % (ref 18–48)
MCH RBC QN AUTO: 24.1 PG (ref 27–31)
MCHC RBC AUTO-ENTMCNC: 31.8 G/DL (ref 32–36)
MCV RBC AUTO: 76 FL (ref 82–98)
MONOCYTES # BLD AUTO: 0.5 K/UL (ref 0.3–1)
MONOCYTES NFR BLD: 6.1 % (ref 4–15)
NEUTROPHILS # BLD AUTO: 3.5 K/UL (ref 1.8–7.7)
NEUTROPHILS NFR BLD: 44.3 % (ref 38–73)
NRBC BLD-RTO: 0 /100 WBC
PLATELET # BLD AUTO: 275 K/UL (ref 150–450)
PMV BLD AUTO: 10 FL (ref 9.2–12.9)
RBC # BLD AUTO: 5.28 M/UL (ref 4–5.4)
SATURATED IRON: 29 % (ref 20–50)
TOTAL IRON BINDING CAPACITY: 308 UG/DL (ref 250–450)
TRANSFERRIN SERPL-MCNC: 208 MG/DL (ref 200–375)
VIT B12 SERPL-MCNC: 1049 PG/ML (ref 210–950)
WBC # BLD AUTO: 7.91 K/UL (ref 3.9–12.7)

## 2023-11-01 PROCEDURE — 84466 ASSAY OF TRANSFERRIN: CPT

## 2023-11-01 PROCEDURE — 82728 ASSAY OF FERRITIN: CPT

## 2023-11-01 PROCEDURE — 82607 VITAMIN B-12: CPT

## 2023-11-01 PROCEDURE — 85025 COMPLETE CBC W/AUTO DIFF WBC: CPT | Mod: PN

## 2023-11-01 PROCEDURE — 36415 COLL VENOUS BLD VENIPUNCTURE: CPT | Mod: PN

## 2023-11-13 ENCOUNTER — OFFICE VISIT (OUTPATIENT)
Dept: HEMATOLOGY/ONCOLOGY | Facility: CLINIC | Age: 38
End: 2023-11-13
Payer: COMMERCIAL

## 2023-11-13 VITALS
TEMPERATURE: 98 F | RESPIRATION RATE: 16 BRPM | SYSTOLIC BLOOD PRESSURE: 130 MMHG | BODY MASS INDEX: 50.02 KG/M2 | OXYGEN SATURATION: 99 % | HEIGHT: 64 IN | DIASTOLIC BLOOD PRESSURE: 84 MMHG | HEART RATE: 90 BPM | WEIGHT: 293 LBS

## 2023-11-13 DIAGNOSIS — E66.01 MORBID OBESITY WITH BMI OF 50.0-59.9, ADULT: ICD-10-CM

## 2023-11-13 DIAGNOSIS — D50.0 IRON DEFICIENCY ANEMIA DUE TO CHRONIC BLOOD LOSS: Primary | ICD-10-CM

## 2023-11-13 PROCEDURE — 99999 PR PBB SHADOW E&M-EST. PATIENT-LVL V: CPT | Mod: PBBFAC,,, | Performed by: NURSE PRACTITIONER

## 2023-11-13 PROCEDURE — 99999 PR PBB SHADOW E&M-EST. PATIENT-LVL V: ICD-10-PCS | Mod: PBBFAC,,, | Performed by: NURSE PRACTITIONER

## 2023-11-13 PROCEDURE — 99214 PR OFFICE/OUTPT VISIT, EST, LEVL IV, 30-39 MIN: ICD-10-PCS | Mod: S$GLB,,, | Performed by: NURSE PRACTITIONER

## 2023-11-13 PROCEDURE — 99214 OFFICE O/P EST MOD 30 MIN: CPT | Mod: S$GLB,,, | Performed by: NURSE PRACTITIONER

## 2023-11-13 NOTE — PROGRESS NOTES
PATIENT: Lucía Coreas  MRN: [id number]  DATE: 11/13/2023      Diagnosis:   1. Iron deficiency anemia due to chronic blood loss        Chief Complaint: Iron deficiency anemia due to chronic blood loss (3 mo follow up)    Subjective:   HPI: Ms. Coreas is a 38 y.o. female who returns for follow up of iron deficiency anemia.   PMHx:  Asthma, Diabetes, GERD, Vitamin B12 insufficiency, Metabolic syndrome, Morbid obesity, PCOS, HTN, Depression.     The patient received 4 doses of IV Venofer the last was on March 31, 2023. Had a good response with improved Hgb and MCV.      Periods are lasting 5-6 days with heavy menstrual flow at least 3-4 of those days.     She has been off B12 injections for about 3-4 months, had been getting them at her PCP clinic.      Labs drawn on 8/2/2023 show Hgb has dropped to 11.6 g/dL, MCV now up to 77. Ferritin was added to labs and remains stable at 72    Today:  11/13/23  Ms. Coreas presents to the clinic for interval evaluation for BURT.  She reports that she just completed her menstrual cycle & they have been heavy flow for most of the cycle.  To note:  Her periods were irregular until the birth of her children (6, 2).  She has not been back to her GYN to assist with control of her menorrhagia.  She has been compliant with Iron 100 Plus.  She remains fatigue.   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  She takes care of her elderly mother & 2 small children.  Her sleep pattern is irregular.  Her PCP, Dr. Negro manages her Morbid obesity, T2DM, Asthma, Depression, Allergies, HTN.   Denies HA, CP, cough, SOB, abd pain, diarrhea, constipation, hematochezia, melena, N/V, hematuria, swelling, rashes, fevers, chills.     Past Medical History:   Past Medical History:   Diagnosis Date    Allergy     Amenorrhea     Asthma     Diabetes     GERD (gastroesophageal reflux disease)     Infertility associated with anovulation     Iron deficiency anemia     Knee pain      Metabolic syndrome     PCO (polycystic ovaries)     Recurrent boils     Status post repeat low transverse  section 2020    Formatting of this note might be different from the original. With BTL 21       Past Surgical HIstory:   Past Surgical History:   Procedure Laterality Date    BTL      CARPAL TUNNEL RELEASE Left 2019    CARPAL TUNNEL RELEASE Right 2020    Procedure: RELEASE, CARPAL TUNNEL;  Surgeon: Adeel Laughlin MD;  Location: [facility];  Service: Orthopedics;  Laterality: Right;     SECTION      X2    EXPLORATORY LAPAROTOMY      1 week postop with WOUND VAK, reopened uterus    WOUND VAK      3 months post-op Section, 2 weeks in hospital       Family History:   Family History   Problem Relation Age of Onset    Diabetes Father     Heart disease Father 69    Hypertension Father     Prostate cancer Father     Diabetes Mother     Lupus Mother     AMY disease Brother     Ovarian cancer Sister     Breast cancer Neg Hx        Social History:  reports that she has never smoked. She has never used smokeless tobacco. She reports that she does not drink alcohol and does not use drugs.    Allergies:  Review of patient's allergies indicates:   Allergen Reactions    Metformin Diarrhea    Sulfa (sulfonamide antibiotics) Anaphylaxis and Swelling     Swelling (eyes)^, Swelling (throat)^  Swelling (eyes)^, Swelling (throat)^      Diclofenac      Gastritis     Latex, natural rubber      Contact dermatitis    Other reaction(s): Other (See Comments)  Contact dermatitis    Shellfish containing products      anaphylaxis       Medications:  Current Outpatient Medications   Medication Sig Dispense Refill    albuterol (PROVENTIL) 2.5 mg /3 mL (0.083 %) nebulizer solution Take 3 mLs (2.5 mg total) by nebulization every 4 to 6 hours as needed for Wheezing. 60 each 3    albuterol (PROVENTIL/VENTOLIN HFA) 90 mcg/actuation inhaler Inhale 1-2 puffs into the lungs every 4 (four) hours as needed for  Wheezing. Rescue 8.5 g 10    azelastine (ASTELIN) 137 mcg (0.1 %) nasal spray 2 sprays (274 mcg total) by Nasal route 2 (two) times daily. 20 mL 5    cholecalciferol, vitamin D3, (VITAMIN D3) 25 mcg (1,000 unit) capsule Take 2 capsules (2,000 Units total) by mouth once daily. 90 capsule 4    clindamycin (CLEOCIN T) 1 % Swab Apply topically 2 (two) times daily. 60 each 2    EScitalopram oxalate (LEXAPRO) 10 MG tablet Take 1 tablet (10 mg total) by mouth once daily. 30 tablet 11    fluocinolone (DERMA-SMOOTHE) 0.01 % external oil Apply twice daily only as needed to affected areas of scalp/ears until improved, then can stop and restart as needed 118 mL 2    fluocinonide (LIDEX) 0.05 % external solution Apply topically 2 (two) times daily. Use on scalp 60 mL 5    fluticasone furoate-vilanteroL (BREO) 200-25 mcg/dose DsDv diskus inhaler Inhale 1 puff into the lungs once daily. Controller 1 each 4    fluticasone propionate (FLONASE) 50 mcg/actuation nasal spray 1 spray (50 mcg total) by Each Nostril route once daily. 18.2 mL 0    gabapentin (NEURONTIN) 300 MG capsule Take 1 capsule (300 mg total) by mouth 3 (three) times daily. 270 capsule 0    hydrOXYzine HCL (ATARAX) 25 MG tablet Take 1 tablet (25 mg total) by mouth 3 (three) times daily as needed for Anxiety. 30 tablet 0    iron-vit c-b12-folic acid (IRON 100 PLUS) Tab Take 1 tablet by mouth once daily. 90 tablet 1    ketoconazole (NIZORAL) 2 % shampoo Apply topically once a week. Lather in for 5-10 min before rinsing 360 mL 3    Lactobacillus rhamnosus GG (CULTURELLE) 10 billion cell capsule Take 1 capsule by mouth once daily.      levocetirizine (XYZAL) 5 MG tablet Take 1 tablet (5 mg total) by mouth every evening. 90 tablet 3    linaCLOtide (LINZESS) 72 mcg Cap capsule Take 1 capsule (72 mcg total) by mouth once daily. 30 capsule 1    loteprednol (LOTEMAX) 0.5 % ophthalmic suspension Place 1 drop into both eyes daily as needed (to control allergy symptoms. Not for  "chronic daily use.). 5 mL 1    montelukast (SINGULAIR) 10 mg tablet Take 1 tablet (10 mg total) by mouth once daily. 30 tablet 3    NOVOFINE PLUS 32 gauge x 1/6" Ndle use as directed      ondansetron (ZOFRAN-ODT) 8 MG TbDL DISSOLVE ONE TABLET UNDER TONGUE EVERY 8 HOURS AS NEEDED FOR NAUSEA      pantoprazole (PROTONIX) 40 MG tablet Take 1 tablet (40 mg total) by mouth once daily. 90 tablet 3    phentermine (ADIPEX-P) 37.5 mg tablet Take 1 tablet (37.5 mg total) by mouth before breakfast. 30 tablet 5    spironolactone (ALDACTONE) 100 MG tablet Take 1 tablet (100 mg total) by mouth once daily. 90 tablet 3    tirzepatide 10 mg/0.5 mL PnIj Inject 10 mg into the skin every 7 days. 4 pen 12    tiZANidine (ZANAFLEX) 4 MG tablet Take 1 tablet (4 mg total) by mouth every 12 (twelve) hours as needed (muscle spasms/ pain). 40 tablet 0    tretinoin (RETIN-A) 0.05 % cream Apply topically every evening. Use on face 45 g 4    doxycycline (VIBRA-TABS) 100 MG tablet Take 1 tablet (100 mg total) by mouth 2 (two) times daily. Take for 5 days prn flares (Patient not taking: Reported on 2023) 30 tablet 2    EPINEPHrine (EPIPEN) 0.3 mg/0.3 mL AtIn Inject 0.3 mLs (0.3 mg total) into the muscle once. for 1 dose 2 each 11    lactulose (CHRONULAC) 10 gram/15 mL solution SMARTSI Tablespoon By Mouth Daily      lisinopriL (PRINIVIL,ZESTRIL) 2.5 MG tablet Take 1 tablet (2.5 mg total) by mouth once daily. (Patient not taking: Reported on 2023) 90 tablet 3    meloxicam (MOBIC) 15 MG tablet Take 1 tablet (15 mg total) by mouth once daily. (Patient not taking: Reported on 2023) 30 tablet 1    methocarbamoL (ROBAXIN) 750 MG Tab Take 750 mg by mouth 4 (four) times daily as needed.      mupirocin (BACTROBAN) 2 % ointment Apply topically 3 (three) times daily.      NIFEdipine (PROCARDIA-XL) 30 MG (OSM) 24 hr tablet Take 30 mg by mouth once daily.      nystatin (MYCOSTATIN) powder Apply topically 2 (two) times daily. (Patient not " taking: Reported on 11/13/2023) 60 g 1    omalizumab (XOLAIR) 150 mg injection Inject 300 mg into the skin every 14 (fourteen) days. (Patient not taking: Reported on 11/13/2023) 2 each 11    predniSONE (DELTASONE) 20 MG tablet Take 1 tablet (20 mg total) by mouth 2 (two) times daily. (Patient not taking: Reported on 11/13/2023) 10 tablet 0    tirzepatide (MOUNJARO) 12.5 mg/0.5 mL PnIj Inject 12.5 mg into the skin every 7 days. (Patient not taking: Reported on 11/13/2023) 4 pen 5    tirzepatide (MOUNJARO) 5 mg/0.5 mL PnIj Inject 5 mg into the skin every 7 days. (Patient not taking: Reported on 11/13/2023) 4 pen 1     Current Facility-Administered Medications   Medication Dose Route Frequency Provider Last Rate Last Admin    cyanocobalamin injection 1,000 mcg  1,000 mcg Intramuscular Q30 Days Karlee Francis NP        cyanocobalamin injection 1,000 mcg  1,000 mcg Intramuscular Q30 Days Dante Negro MD        omalizumab injection 150 mg  150 mg Subcutaneous Q14 Days Rossy Kramer MD   150 mg at 05/10/23 0921    omalizumab injection 150 mg  150 mg Subcutaneous Q14 Days Rossy Kramer MD   150 mg at 05/10/23 0920    omalizumab injection 300 mg  300 mg Subcutaneous Q28 Days Rossy Kramer MD   300 mg at 04/10/23 0942       Review of Systems   Constitutional:  Positive for fatigue. Negative for appetite change, fever and unexpected weight change.   HENT:  Negative for mouth sores, nosebleeds and trouble swallowing.    Eyes:  Negative for visual disturbance.   Respiratory:  Negative for shortness of breath.    Cardiovascular:  Negative for chest pain and palpitations.   Gastrointestinal:  Negative for abdominal pain, blood in stool, constipation, diarrhea and nausea.   Genitourinary:  Negative for frequency and hematuria.   Musculoskeletal:  Positive for arthralgias. Negative for back pain.   Skin:  Negative for rash.   Neurological:  Negative for headaches.   Hematological:  Negative for adenopathy.  "  Psychiatric/Behavioral:  The patient is not nervous/anxious.        Objective:      Vitals: Weight:  Loss of 11 1/2 pounds in 3 months  Vitals:    11/13/23 1528   BP: 130/84   BP Location: Left arm   Patient Position: Sitting   BP Method: Large (Manual)   Pulse: 90   Resp: 16   Temp: 97.6 °F (36.4 °C)   TempSrc: Temporal   SpO2: 99%   Weight: 134.4 kg (296 lb 4.8 oz)   Height: 5' 4" (1.626 m)     BMI: Body mass index is 50.86 kg/m².    Physical Exam  Vitals reviewed.   Constitutional:       General: She is not in acute distress.     Appearance: She is obese. She is not diaphoretic.   HENT:      Head: Normocephalic and atraumatic.   Eyes:      General: No scleral icterus.  Cardiovascular:      Rate and Rhythm: Normal rate and regular rhythm.      Heart sounds: Normal heart sounds. No murmur heard.  Pulmonary:      Effort: Pulmonary effort is normal. No respiratory distress.      Breath sounds: No wheezing.   Musculoskeletal:      Right lower leg: No edema.      Left lower leg: No edema.   Skin:     General: Skin is warm.      Findings: No rash.   Neurological:      Mental Status: She is alert and oriented to person, place, and time.   Psychiatric:         Mood and Affect: Mood normal.         Behavior: Behavior normal.         Laboratory Data:  Lab Results   Component Value Date    WBC 7.91 11/01/2023    HGB 12.7 11/01/2023    HCT 40.0 11/01/2023    MCV 76 (L) 11/01/2023     11/01/2023      Lab Results   Component Value Date    UIBC 192 01/22/2009    IRON 88 11/01/2023    TRANSFERRIN 208 11/01/2023    TIBC 308 11/01/2023    FESATURATED 29 11/01/2023      Lab Results   Component Value Date    FERRITIN 58 11/01/2023         Imaging:   Assessment:       1. Iron deficiency anemia due to chronic blood loss    2. Menorrhagia  3. B12 insufficiency       Plan:   Iron deficiency anemia:  -She responded well to her infusions.   -Now taking MVI with iron daily  -She will increase her PO iron supplement to 325 mg daily " with food, vitamin C  -Continue oral iron supplementation, Iron 100 Plus; stable; need management of menorrhagia  -Follow with PCP     B12 deficiency.    -Patient has not gotten B12 injections in at least 4 months per her recollection   -follow with PCP    Menorrhagia  -Follow with GYN for measures to control flow.    Personal history of lupus:  -according to the patient her antibody came back positive.  She did not require any treatment.    -She is currently followed by Dr. Hernandez, Rheumatology in [place]    Allergies/Immunocompromised  -on Omalizumab (Xolair); followed by Dr. Rossy Kramer MD in [place]     DM type 2 without long term use of insulin.  -On Mounjaro    Patient queried and all questions were answered.   Assessment/Plan reviewed and approved by Dr. Molina    38 minutes were spent in coordination of patient's care, record review and counseling.  Route Chart for Scheduling    Med Onc Chart Routing      Follow up with physician    Follow up with JACKIE No follow up needed.   Infusion scheduling note    Injection scheduling note    Labs    Imaging    Pharmacy appointment    Other referrals                    Therapy Plan Information  VENOFER (IRON SUCROSE) QW  Flushes  sodium chloride 0.9% 250 mL flush bag  Intravenous, 1 time a week  sodium chloride 0.9% flush 10 mL  10 mL, Intravenous, 1 time a week  heparin, porcine (PF) 100 unit/mL injection flush 500 Units  500 Units, Intravenous, 1 time a week  alteplase injection 2 mg  2 mg, Intra-Catheter, 1 time a week  PRN Medications  EPINEPHrine (EPIPEN) 0.3 mg/0.3 mL pen injection 0.3 mg  0.3 mg, Intramuscular, PRN  diphenhydrAMINE injection 50 mg  50 mg, Intravenous, PRN  methylPREDNISolone sodium succinate injection 125 mg  125 mg, Intravenous, PRN  sodium chloride 0.9% bolus 1,000 mL 1,000 mL  1,000 mL, Intravenous, PRN    VITAMIN B12 (CYANOCOBALAMIN) IM  cyanocobalamin injection 1,000 mcg  1,000 mcg, Intramuscular, Every visit

## 2024-03-27 ENCOUNTER — OFFICE VISIT (OUTPATIENT)
Dept: FAMILY MEDICINE | Facility: CLINIC | Age: 39
End: 2024-03-27
Payer: COMMERCIAL

## 2024-03-27 VITALS — WEIGHT: 293 LBS | HEIGHT: 64 IN | BODY MASS INDEX: 50.02 KG/M2

## 2024-03-27 DIAGNOSIS — I15.2 HYPERTENSION ASSOCIATED WITH DIABETES: ICD-10-CM

## 2024-03-27 DIAGNOSIS — E11.65 TYPE 2 DIABETES MELLITUS WITH HYPERGLYCEMIA, WITHOUT LONG-TERM CURRENT USE OF INSULIN: Primary | ICD-10-CM

## 2024-03-27 DIAGNOSIS — F41.9 ANXIETY: ICD-10-CM

## 2024-03-27 DIAGNOSIS — E11.59 HYPERTENSION ASSOCIATED WITH DIABETES: ICD-10-CM

## 2024-03-27 DIAGNOSIS — Z79.899 ENCOUNTER FOR LONG-TERM (CURRENT) USE OF MEDICATIONS: ICD-10-CM

## 2024-03-27 DIAGNOSIS — G47.00 INSOMNIA, UNSPECIFIED TYPE: ICD-10-CM

## 2024-03-27 DIAGNOSIS — R53.83 FATIGUE, UNSPECIFIED TYPE: ICD-10-CM

## 2024-03-27 DIAGNOSIS — E66.2 CLASS 3 OBESITY WITH ALVEOLAR HYPOVENTILATION, SERIOUS COMORBIDITY, AND BODY MASS INDEX (BMI) OF 50.0 TO 59.9 IN ADULT: ICD-10-CM

## 2024-03-27 DIAGNOSIS — L02.92 BOIL: ICD-10-CM

## 2024-03-27 PROCEDURE — 99214 OFFICE O/P EST MOD 30 MIN: CPT | Mod: 95,,, | Performed by: FAMILY MEDICINE

## 2024-03-27 RX ORDER — HYDROXYZINE HYDROCHLORIDE 25 MG/1
25 TABLET, FILM COATED ORAL 3 TIMES DAILY PRN
Qty: 30 TABLET | Refills: 12 | Status: SHIPPED | OUTPATIENT
Start: 2024-03-27

## 2024-03-27 RX ORDER — MUPIROCIN 20 MG/G
OINTMENT TOPICAL 3 TIMES DAILY
Qty: 22 G | Refills: 0 | Status: SHIPPED | OUTPATIENT
Start: 2024-03-27

## 2024-03-27 RX ORDER — DOXYCYCLINE 100 MG/1
100 CAPSULE ORAL EVERY 12 HOURS
Qty: 20 CAPSULE | Refills: 0 | Status: SHIPPED | OUTPATIENT
Start: 2024-03-27

## 2024-03-27 NOTE — PATIENT INSTRUCTIONS
Follow up in about 6 months (around 9/27/2024), or if symptoms worsen or fail to improve, for LAB RESULTS, Med refills.     Dear patient,   As a result of recent federal legislation (The Federal Cures Act), you may receive lab or pathology results from your visit in your [facility] account before your physician is able to contact you. Your physician or their representative will relay the results to you with their recommendations at their soonest availability.     If no improvement in symptoms or symptoms worsen, please be advised to call MD, follow-up at clinic and/or go to ER if becomes severe.    Dante Negro M.D.        We Offer TELEHEALTH & Same Day Appointments!   Book your Telehealth appointment with me through my nurse or   Clinic appointments on [employer]!    [address] [place] [address]    Office: [phone]   FAX: [phone]    Check out my Facebook Page and Follow Me at: [email or web address]    Check out my website at [employer] by clicking on: [email or web address]    To Schedule appointments online, go to [facility]: [email or web address]

## 2024-03-27 NOTE — PROGRESS NOTES
PLAN:    Assessment & Plan  1. Type 2 diabetes with hyperglycemia.  She was doing well on Mounjaro; however, the insurance is not covering the medication. She is going to call the insurance since it has changed to find out alternatives such as Wegovy, Mounjaro, or Ozempic. The patient has been on Ozempic in the past, but had side effects of hair loss. She would like to avoid that medication. She is currently taking phentermine to help with her weight. Her weight is currently up to 305 pounds. She is taking the medication intermittently and not having much success with weight loss. Her blood pressure is also a comorbidity, but well controlled on current medications. She reports that she is getting increased fatigue and she has been iron deficient with B12 deficiency in the past. I will check those levels and restart B12 and iron supplement if needed. Lab orders have been placed. She will update these soon.    We will plan to monitor hemoglobin A1c at designated intervals 3 to 6 months.  I recommend ongoing Education for diabetic diet and exercise protocol.  We will continue to monitor for side effects.    Please be advised of symptoms to monitor for and to notify me immediately if persistent or worsening.  Follow up with Ophthalmology/Optometry and Podiatry at least annually.    GLP 1 risk and benefits and common side effects of medication discussed with the patient at length.  All questions were answered.  Discussed with patient at length about potential pharmacologic options.  Patient desires consideration for GLP 1 .  The risks, benefits and, side effects of this GLP 1 medication including nausea , constipation, diarrhea and pain injection site, etcetera.  She is aware she should not get pregnant while taking this medication and it is not approved while breastfeeding.  Patient reports no personal or family history of pancreatitis, MEN or medullary thyroid cancer.  There is potential for gallbladder issues while  taking this medication if not already removed.  Patient should be advised to caution with taking this medication if having history of thyroid cancer or biliary disease/gallstones.  Patient should be aware of risk of diabetic retinopathy if blood sugars lowered too quickly.  Patient is aware that weight loss can and will most likely occur quickly.  Discussed GLP 1 mechanism of action.    2. Insomnia and anxiety.  She does have insomnia and anxiety mainly at night. She has been taking hydroxyzine, but is currently out of this medication. It was helping tremendously with her sleep. We will continue those medications.  Please be advised of condition course.  SNRI/SSRI is first-line treatment for this condition.  Please be advised of the risk of discontinuing this medication without tapering/contacting MD.  Patient has been advised of side effects, and all questions were answered.  Patient voiced understanding.  Patient will follow up routinely and notify us if having any side effects or worsening or persistent symptoms.  ER precautions were given. Antidepressant/Antianxiety Medication Initiation:  Patient informed of risks, benefits, and potential side effects of medication and accepts informed consent.  Common side effects include nausea, fatigue, headache, insomnia, etc see medication insert for complete side effect profile.  Most importantly be advised of the possibility of new or worsening suicidal thoughts/depression/anxiety etcetera.  Please be advised to stop the medication immediately and seek urgent treatment if this occurs.  Therefore please do not to abruptly discontinue medication without physician guidance except in cases of sudden onset or worsening of SI.   Discussed insomnia condition course.  Advised of first-line medications for this condition.  Also discussed sleep hygiene.  Information was given below.  Good sleep habits (sometimes referred to as sleep hygiene) can help you get a good nights  sleep.    Some habits that can improve your sleep health:  -Be consistent. Go to bed at the same time each night and get up at the same time each morning, including on the weekends  -Make sure your bedroom is quiet, dark, relaxing, and at a comfortable temperature  -Remove electronic devices, such as TVs, computers, and smart phones, from the bedroom  -Avoid large meals, caffeine, and alcohol before bedtime  -Get some exercise. Being physically active during the day can help you fall asleep more easily at night.      3. Boil.  She does have a boil, it sounds like, on her nose. It is difficult to examine due to telemedicine visit. I will start doxycycline and Bactroban. She has done well with these medications in the past. If no improvement, I recommend that she follow up with us closely in the office or dermatology.  Discussed condition course and signs and symptoms to expect.  Patient advised take anti-inflammatories and or Tylenol for pain or fever.  ER precautions.  Call MD or follow-up to clinic if not improving or worsening symptoms.      Problem List Items Addressed This Visit       Fatigue (Chronic)    Relevant Orders    Iron and TIBC    Ferritin    Vitamin B12    Folate    CBC Without Differential    Comprehensive Metabolic Panel    TSH    Hemoglobin A1C    Lipid Panel    Hypertension associated with diabetes (Chronic)    Relevant Orders    Iron and TIBC    Ferritin    Vitamin B12    Folate    CBC Without Differential    Comprehensive Metabolic Panel    TSH    Hemoglobin A1C    Lipid Panel    Anxiety (Chronic)    Relevant Medications    hydrOXYzine HCL (ATARAX) 25 MG tablet    Other Relevant Orders    Iron and TIBC    Ferritin    Vitamin B12    Folate    CBC Without Differential    Comprehensive Metabolic Panel    TSH    Hemoglobin A1C    Lipid Panel    Insomnia (Chronic)    Relevant Medications    hydrOXYzine HCL (ATARAX) 25 MG tablet    Other Relevant Orders    Iron and TIBC    Ferritin    Vitamin B12     Folate    CBC Without Differential    Comprehensive Metabolic Panel    TSH    Hemoglobin A1C    Lipid Panel    Class 3 obesity with alveolar hypoventilation, serious comorbidity, and body mass index (BMI) of 50.0 to 59.9 in adult (Chronic)    Relevant Orders    Iron and TIBC    Ferritin    Vitamin B12    Folate    CBC Without Differential    Comprehensive Metabolic Panel    TSH    Hemoglobin A1C    Lipid Panel    Encounter for long-term (current) use of medications (Chronic)    Relevant Medications    hydrOXYzine HCL (ATARAX) 25 MG tablet    Other Relevant Orders    Iron and TIBC    Ferritin    Vitamin B12    Folate    CBC Without Differential    Comprehensive Metabolic Panel    TSH    Hemoglobin A1C    Lipid Panel    Type 2 diabetes mellitus with hyperglycemia, without long-term current use of insulin - Primary (Chronic)    Relevant Orders    Iron and TIBC    Ferritin    Vitamin B12    Folate    CBC Without Differential    Comprehensive Metabolic Panel    TSH    Hemoglobin A1C    Lipid Panel    Boil    Relevant Medications    doxycycline (VIBRAMYCIN) 100 MG Cap    mupirocin (BACTROBAN) 2 % ointment     Future Appointments       Date Provider Specialty Appt Notes    4/12/2024 Zeke Clement MD Pain Medicine Pain    6/3/2024 Rossy Kramer MD Allergy 4 month follow up    6/27/2024 Dante Negro MD Family Medicine Annual check up    8/15/2024 Michael Hernandez MD Rheumatology +VANESSA--CHRONIC FATIGUE//bc           Medication Management for assessment above:   Medication List with Changes/Refills   New Medications    DOXYCYCLINE (VIBRAMYCIN) 100 MG CAP    Take 1 capsule (100 mg total) by mouth every 12 (twelve) hours.   Current Medications    ALBUTEROL (PROVENTIL) 2.5 MG /3 ML (0.083 %) NEBULIZER SOLUTION    Take 3 mLs (2.5 mg total) by nebulization every 4 to 6 hours as needed for Wheezing.    ALBUTEROL (PROVENTIL/VENTOLIN HFA) 90 MCG/ACTUATION INHALER    Inhale 1-2 puffs into the lungs every 4 (four) hours as  needed for Wheezing. Rescue    AZELASTINE (ASTELIN) 137 MCG (0.1 %) NASAL SPRAY    2 sprays (274 mcg total) by Nasal route 2 (two) times daily.    CHOLECALCIFEROL, VITAMIN D3, (VITAMIN D3) 25 MCG (1,000 UNIT) CAPSULE    Take 2 capsules (2,000 Units total) by mouth once daily.    CLINDAMYCIN (CLEOCIN T) 1 % SWAB    Apply topically 2 (two) times daily.    EPINEPHRINE (EPIPEN) 0.3 MG/0.3 ML ATIN    Inject 0.3 mLs (0.3 mg total) into the muscle once. for 1 dose    ESCITALOPRAM OXALATE (LEXAPRO) 10 MG TABLET    Take 1 tablet (10 mg total) by mouth once daily.    FLUOCINOLONE (DERMA-SMOOTHE) 0.01 % EXTERNAL OIL    Apply twice daily only as needed to affected areas of scalp/ears until improved, then can stop and restart as needed    FLUOCINONIDE (LIDEX) 0.05 % EXTERNAL SOLUTION    Apply topically 2 (two) times daily. Use on scalp    FLUTICASONE FUROATE-VILANTEROL (BREO) 200-25 MCG/DOSE DSDV DISKUS INHALER    Inhale 1 puff into the lungs once daily. Controller    FLUTICASONE PROPIONATE (FLONASE) 50 MCG/ACTUATION NASAL SPRAY    1 spray (50 mcg total) by Each Nostril route once daily.    GABAPENTIN (NEURONTIN) 300 MG CAPSULE    Take 1 capsule (300 mg total) by mouth 3 (three) times daily.    IRON-VIT C-B12-FOLIC ACID (IRON 100 PLUS) TAB    Take 1 tablet by mouth once daily.    KETOCONAZOLE (NIZORAL) 2 % SHAMPOO    Apply topically once a week. Lather in for 5-10 min before rinsing    LACTOBACILLUS RHAMNOSUS GG (CULTURELLE) 10 BILLION CELL CAPSULE    Take 1 capsule by mouth once daily.    LACTULOSE (CHRONULAC) 10 GRAM/15 ML SOLUTION    SMARTSI Tablespoon By Mouth Daily    LEVOCETIRIZINE (XYZAL) 5 MG TABLET    Take 1 tablet (5 mg total) by mouth every evening.    LINACLOTIDE (LINZESS) 72 MCG CAP CAPSULE    Take 1 capsule (72 mcg total) by mouth once daily.    LOTEPREDNOL (LOTEMAX) 0.5 % OPHTHALMIC SUSPENSION    Place 1 drop into both eyes daily as needed (to control allergy symptoms. Not for chronic daily use.).     "METHOCARBAMOL (ROBAXIN) 750 MG TAB    Take 750 mg by mouth 4 (four) times daily as needed.    MONTELUKAST (SINGULAIR) 10 MG TABLET    Take 1 tablet (10 mg total) by mouth once daily.    NIFEDIPINE (PROCARDIA-XL) 30 MG (OSM) 24 HR TABLET    Take 30 mg by mouth once daily.    NOVOFINE PLUS 32 GAUGE X 1/6" NDLE    use as directed    PANTOPRAZOLE (PROTONIX) 40 MG TABLET    Take 1 tablet (40 mg total) by mouth once daily.    PHENTERMINE (ADIPEX-P) 37.5 MG TABLET    Take 1 tablet (37.5 mg total) by mouth before breakfast.    SPIRONOLACTONE (ALDACTONE) 100 MG TABLET    Take 1 tablet (100 mg total) by mouth once daily.    TIZANIDINE (ZANAFLEX) 4 MG TABLET    Take 1 tablet (4 mg total) by mouth every 12 (twelve) hours as needed (muscle spasms/ pain).    TRETINOIN (RETIN-A) 0.05 % CREAM    Apply topically every evening. Use on face   Changed and/or Refilled Medications    Modified Medication Previous Medication    HYDROXYZINE HCL (ATARAX) 25 MG TABLET hydrOXYzine HCL (ATARAX) 25 MG tablet       Take 1 tablet (25 mg total) by mouth 3 (three) times daily as needed for Anxiety.    Take 1 tablet (25 mg total) by mouth 3 (three) times daily as needed for Anxiety.    MUPIROCIN (BACTROBAN) 2 % OINTMENT mupirocin (BACTROBAN) 2 % ointment       Apply topically 3 (three) times daily.    Apply topically 3 (three) times daily.   Discontinued Medications    DOXYCYCLINE (VIBRA-TABS) 100 MG TABLET    Take 1 tablet (100 mg total) by mouth 2 (two) times daily. Take for 5 days prn flares    ISOTRETINOIN (ACCUTANE) 40 MG CAPSULE    Take 1 capsule (40 mg total) by mouth once daily.    LISINOPRIL (PRINIVIL,ZESTRIL) 2.5 MG TABLET    Take 1 tablet (2.5 mg total) by mouth once daily.    MELOXICAM (MOBIC) 15 MG TABLET    Take 1 tablet (15 mg total) by mouth once daily.    NYSTATIN (MYCOSTATIN) POWDER    Apply topically 2 (two) times daily.    OMALIZUMAB (XOLAIR) 150 MG INJECTION    Inject 300 mg into the skin every 14 (fourteen) days.    ONDANSETRON " (ZOFRAN-ODT) 8 MG TBDL    DISSOLVE ONE TABLET UNDER TONGUE EVERY 8 HOURS AS NEEDED FOR NAUSEA    PREDNISONE (DELTASONE) 20 MG TABLET    Take 1 tablet (20 mg total) by mouth 2 (two) times daily.    TIRZEPATIDE (MOUNJARO) 12.5 MG/0.5 ML PNIJ    Inject 12.5 mg into the skin every 7 days.    TIRZEPATIDE (MOUNJARO) 5 MG/0.5 ML PNIJ    Inject 5 mg into the skin every 7 days.    TIRZEPATIDE 10 MG/0.5 ML PNIJ    Inject 10 mg into the skin every 7 days.       Dante Negro M.D.  ==========================================================================  Subjective:   Patient ID: Lucía Coreas is a 38 y.o. female.  has a past medical history of Allergy, Amenorrhea, Asthma, Diabetes, GERD (gastroesophageal reflux disease), Infertility associated with anovulation, Iron deficiency anemia, Knee pain, Metabolic syndrome, PCO (polycystic ovaries), Recurrent boils, and Status post repeat low transverse  section (2020).   Chief Complaint: Medication Refill      History of Present Illness  The patient is a pleasant 38-year-old female following up for medication issues and chronic conditions.    She requests to change her Mounjaro to something else as her insurance is not paying for it. She has been on Ozempic in the past, but she had side effects of hair loss.    She has been taking phentermine for the last 2 to 3 weeks, but she has been getting short winded while taking it. She has been feeling tired too, so she wonders if her iron is low. She does not take it every day. She is not sure if her shortness of breath is related to phentermine or her asthma.    BMI Readings from Last 10 Encounters:   24 52.35 kg/m²   23 51.15 kg/m²   23 50.86 kg/m²   10/26/23 50.46 kg/m²   10/12/23 51.72 kg/m²   10/02/23 52.34 kg/m²   23 51.67 kg/m²   23 52.70 kg/m²   23 52.87 kg/m²   23 52.30 kg/m²         Her blood pressure has been good.  Hypertension Medications               NIFEdipine  (PROCARDIA-XL) 30 MG (OSM) 24 hr tablet Take 30 mg by mouth once daily.    spironolactone (ALDACTONE) 100 MG tablet Take 1 tablet (100 mg total) by mouth once daily.              She has been having sleep issues. She has been taking hydroxyzine, which worked well for her, but she does not have any refills on it.    She has a blister in the middle part of her nose. She put an over-the-counter medication from [employer]. It is scabbed up, but the scab falls off, but it is still open. It has been a while since she had a fever blister.    Problem List Items Addressed This Visit       Fatigue (Chronic)    Overview     Chronic.  No improvement.  Patient concerned about her weight and energy level.  She has restarted OZEMPIC and is starting to lose weight again.  She is currently on OZEMPIC 0.5 milligram weekly.    June 2022:  Patient reports brain fog and fatigue that is worse since her last pregnancy and history of COVID.    Lab Results   Component Value Date    WBC 9.53 02/16/2022    HGB 11.7 (L) 02/16/2022    HCT 40.4 02/16/2022    MCV 76 (L) 02/16/2022     02/16/2022       Lab Results   Component Value Date    IRON 33 01/20/2022    TIBC 339 01/20/2022    FERRITIN 36 01/22/2009     Lab Results   Component Value Date    XLGGJRRP42 510 05/23/2022     Lab Results   Component Value Date    FOLATE 10.0 01/20/2022     Lab Results   Component Value Date    TSH 1.223 01/20/2022    L2EZYJK 9.4 07/16/2019              Hypertension associated with diabetes (Chronic)    Overview     CHRONIC.  June 2022:  Blood pressure well controlled on current regimen.  February 2022:  Blood pressure is uncontrolled.  Only on spironolactone 50 milligrams daily at this time.  August 2021:  Patient reports good control with blood pressure at home on nifedipine 30 milligrams.  April 2021. Blood pressure well controlled today.  Patient reports compliance with medications as prescribed.  March 2021:  Blood pressures been well controlled at home  on labetalol however her shortness of breath and breathing with asthma has gotten worse.. BP Reviewed.  Compliant with BP medications. No SE reported.   (-) CP, palpitations, dizziness, lightheadedness, arm numbness, tingling or weakness, syncope.  Creatinine   Date Value Ref Range Status   02/17/2021 0.6 0.5 - 1.4 mg/dL Final          Anxiety (Chronic)    Overview      September 2023: Patient reports that she has been more anxious off of the hydroxyzine and Lexapro.  She reports having insomnia also.  Patient has been having her nail biting returned.  She has lost weight however has started on MOUNJARO.  DECEMBER 2020:  Currently uncontrolled.  Patient was taken off of her medication during pregnancy last month.  Patient reports that she recently had miscarriage and her stress is very high right now.  She wants to restart her medication.  Patient was also on hydroxyzine as needed for panic attacks and insomnia.  October 2020:  Patient reports doing well on Lexapro 20 mg daily.  Patient takes hydroxyzine very rarely.  AUGUST 2020:  CHRONIC.  INTERMITTENT CONTROL.  PATIENT REPORTS THAT SHE IS NOT HAVING ANY IMPROVEMENT ON LEXAPRO 10 MG.  DENIES ANY SI HI OR HALLUCINATIONS.  SHE IS STILL TAKING HYDROXYZINE SEVERAL TIMES A DAY FOR AND PANIC ATTACKS.  PATIENT DOES STILL HAVE NAIL BITING TENDENCIES.  New problem.  Subacute.  Has been getting worse over the last few weeks during COVID-19 outbreak.  Patient reports that she is biting her nails and unable to sleep at night.  Patient has not been on any treatment or medications for this recently.  Denies any SI HI or hallucinations.  MAY 2020:  Patient reports hydroxyzine helps with anxiety but is still biting her nails.  Patient is ready to start daily medication for anxiety.  Patient reports that she did start back at work which she thought would help but it is not helping.  Denies SI HI or hallucinations.         Insomnia (Chronic)    Overview     September 2023: Chronic.   Recurrent.  Patient was doing well on hydroxyzine.  Reports anxiety has returned.  Denies SI HI or hallucinations.    Previous history:  New problem.  Subacute.  Patient reports that she cannot fall asleep.  Denies any recent job change, unhealthy habits.         Class 3 obesity with alveolar hypoventilation, serious comorbidity, and body mass index (BMI) of 50.0 to 59.9 in adult (Chronic)    Overview     October 2023: Patient is having difficulty getting the proper dosage of MOUNJARO for her diabetes.  Weight loss has stalled.  Patient did well on phentermine in the past.  Patient is interested in bariatric surgery if no improvement.  She has tried diet and exercise with no improvement.    Chronic.  Previous HPI:  Uncontrolled.  Was improving on Ozempic.  BMI currently 53.  DECEMBER 2020:  PATIENT'S BMI IS UP TO 54.  PATIENT HAS RECENTLY HAD A MISCARRIAGE AND WAS TAKEN OFF OF HER MEDICATIONS.  PATIENT IS WANTING TO RESTART MEDICATIONS FOR HER CHRONIC CONDITIONS TODAY.  SHE HAS SEEN HER OBGYN AND WAS STARTED ON ORAL CONTRACEPTIVE PILLS.  June 2022:  Chronic.  Worsening.  Patient is plateauing with Ozempic 1 milligram.  BMI Readings from Last 10 Encounters:   10/12/23 51.72 kg/m²   10/02/23 52.34 kg/m²   09/26/23 51.67 kg/m²   08/04/23 52.70 kg/m²   08/03/23 52.87 kg/m²   08/02/23 52.30 kg/m²   05/30/23 52.11 kg/m²   05/22/23 52.54 kg/m²   05/22/23 53.21 kg/m²   03/31/23 53.36 kg/m²              Encounter for long-term (current) use of medications (Chronic)    Overview     October 2023: Reviewed labs.  September 2023:  Reviewed labs.  07/12/2019 CHRONIC long-term drug therapy for managed conditions. See medication list. Reports compliance.  No side effects reported.  Routine lab work is being monitored.  Patient does not need refills today. 09/20/2019Reviewed labs. Patient is compliant with meds. She needs refills. Gaining weight on Metformin. DECEMBER 2019:  CHRONIC. Stable. Compliant with medications for managed  conditions. See medication list. No SE reported. Routine lab analysis is being monitored. Refills were addressed. APRIL 2020:  CHRONIC. Stable. Compliant with medications for managed conditions. See medication list. No SE reported. Routine lab analysis is being monitored. Refills were addressed.  AUGUST 2020:  REVIEWED LABS.  CHRONIC. Stable. Compliant with medications for managed conditions. See medication list. No SE reported. Routine lab analysis is being monitored. Refills were addressed.October 2020:  CHRONIC. Stable. Compliant with medications for managed conditions. See medication list. No SE reported. Routine lab analysis is being monitored. Refills were addressed.November 2020:CHRONIC. Stable. Compliant with medications for managed conditions. See medication list. No SE reported.   Routine lab analysis is being monitored. Refills were addressed.  April 2021:  Reviewed labs.  August 2021:  Reviewed labs.  Patient additional labs set up through [facility] [place].  January 2022:  Reviewed labs.  February 2022:  Reviewed labs.  June 2022:  Reviewed labs.  Lab Results   Component Value Date    WBC 8.58 08/28/2023    HGB 13.6 08/28/2023    HCT 41.5 08/28/2023    MCV 76 (L) 08/28/2023     08/28/2023       Chemistry        Component Value Date/Time     08/02/2023 0923    K 3.9 08/02/2023 0923     08/02/2023 0923    CO2 23 08/02/2023 0923    BUN 14 08/02/2023 0923    CREATININE 0.7 08/02/2023 0923    GLU 93 08/02/2023 0923        Component Value Date/Time    CALCIUM 9.4 08/02/2023 0923    ALKPHOS 62 08/02/2023 0923    AST 10 08/02/2023 0923    ALT 10 08/02/2023 0923    BILITOT 0.4 08/02/2023 0923    ESTGFRAFRICA >60.0 06/10/2022 1032    EGFRNONAA >60.0 06/10/2022 1032          Lab Results   Component Value Date    TSH 0.939 08/02/2023    R1LOAJZ 9.4 07/16/2019    T3FREE 2.9 07/16/2019            Type 2 diabetes mellitus with hyperglycemia, without long-term current use of  insulin - Primary (Chronic)    Overview     October 2023:  Diabetes Medications               tirzepatide (MOUNJARO) 5 mg/0.5 mL PnIj Inject 5 mg into the skin every 7 days.    tirzepatide 10 mg/0.5 mL PnIj Inject 10 mg into the skin every 7 days.    tirzepatide (MOUNJARO) 12.5 mg/0.5 mL PnIj Inject 12.5 mg into the skin every 7 days.       INITIAL HPI:  No results found in epic however review her blood work from previous provider shows last A1c was 5.8.  Patient previously on metformin.  Currently having fatigue and decreased libido.09/20/2019Patient due for hemoglobin A1c next month.  Will place order for patient.She is having GI symptoms with metformin. She reports BG is elevated at home. She continues to gain weight. DECEMBER 2019:  Patient reports to having diarrhea with metformin.  Reviewed Diabetes Management StatusStatin: Not takingACE/ARB: Not takingAPRIL 2020:  Patient has started Victoza and is off of metformin.  Patient reports that she is getting slightly nauseated on the increased dose of 1.2 mg.  Symptoms resolved after taking Pepto-Bismol after few hours of injection.Reviewed Diabetes Management StatusStatin: Not takingACE/ARB: Not takingAUGUST 2020:  PATIENT REPORTS THAT SHE IS TOLERATING VICTOZA 1.2 MG.  PATIENT CONTINUES TO HAVE ISSUES WITH HER WEIGHT.  REVIEWED Diabetes Management StatusStatin: Not takingACE/ARB: Not takingOCTOBER 2020:  Patient is having side effects with Victoza.  Reviewed Diabetes Management Status.Statin: Not takingACE/ARB: Not takingNOVEMBER 2020:  Patient reports that she was doing well on OZEMPIC.  A1c is been well controlled.  Patient was recently diagnosed with pregnancy.  Will monitor for worsening of diabetes.  Diabetes Management Status Statin: Not taking ACE/ARB: Not takingDECEMBER 2020:  Patient recently had miscarriage.  Patient wanting to get back on her diabetic medication.Diabetes Management StatusStatin: Not takingACE/ARB: Not taking January 2022:Diabetes  Management StatusFebruary 2022:  Reviewed Diabetes Management Status.  She is taking Ozempic 0.5 milligrams weekly.  She denies any side effects.  She does not thing is working well.  Denies history of thyroid cancer, pancreatitis, MEN syndromeStatin: Not takingACE/ARB: Not taking  June 2022:  Patient reports her weight is starting to rebound.  She is on Ozempic 1 milligram weekly.  Reports that her blood sugar is starting to increase it with her appetite.  Denies history of pancreatitis, thyroid cancer, MEN syndrome.Diabetes Management StatusStatin: Not takingACE/ARB: Not taking  Diabetes Management Status    Statin: Not taking  ACE/ARB: Taking    Screening or Prevention Patient's value Goal Complete/Controlled?   HgA1C Testing and Control   Lab Results   Component Value Date    HGBA1C 5.5 08/02/2023      Annually/Less than 8% Yes   Lipid profile : 08/02/2023 Annually Yes   LDL control Lab Results   Component Value Date    LDLCALC 110.8 08/02/2023    Annually/Less than 100 mg/dl  No   Nephropathy screening Lab Results   Component Value Date    LABMICR 180.0 02/03/2023     Lab Results   Component Value Date    PROTEINUA 2+ (A) 02/03/2023     Lab Results   Component Value Date    UTPCR 0.20 02/03/2023      Annually Yes   Blood pressure BP Readings from Last 1 Encounters:   10/12/23 126/87    Less than 140/90 Yes   Dilated retinal exam : 03/13/2023 Annually Yes   Foot exam   Most Recent Foot Exam Date: Not Found Annually No            Boil    Overview     Recurrent.  Patient is having boils despite bleach baths.  Multiple area is in the groin.  None them are draining or open.  Patient reports pain is mild-to-moderate.             Review of patient's allergies indicates:   Allergen Reactions    Metformin Diarrhea    Sulfa (sulfonamide antibiotics) Anaphylaxis and Swelling     Swelling (eyes)^, Swelling (throat)^  Swelling (eyes)^, Swelling (throat)^      Diclofenac      Gastritis     Latex, natural rubber      Contact  dermatitis    Other reaction(s): Other (See Comments)  Contact dermatitis    Shellfish containing products      anaphylaxis     Current Outpatient Medications   Medication Instructions    albuterol (PROVENTIL) 2.5 mg, Nebulization, Every 4-6 hours PRN    albuterol (PROVENTIL/VENTOLIN HFA) 90 mcg/actuation inhaler 1-2 puffs, Inhalation, Every 4 hours PRN, Rescue    azelastine (ASTELIN) 274 mcg, Nasal, 2 times daily    cholecalciferol (vitamin D3) (VITAMIN D3) 2,000 Units, Oral, Daily    clindamycin (CLEOCIN T) 1 % Swab Topical (Top), 2 times daily    doxycycline (VIBRAMYCIN) 100 mg, Oral, Every 12 hours    EPINEPHrine (EPIPEN) 0.3 mg, Intramuscular, Once    EScitalopram oxalate (LEXAPRO) 10 mg, Oral, Daily    fluocinolone (DERMA-SMOOTHE) 0.01 % external oil Apply twice daily only as needed to affected areas of scalp/ears until improved, then can stop and restart as needed    fluocinonide (LIDEX) 0.05 % external solution Topical (Top), 2 times daily, Use on scalp    fluticasone furoate-vilanteroL (BREO) 200-25 mcg/dose DsDv diskus inhaler 1 puff, Inhalation, Daily, Controller    fluticasone propionate (FLONASE) 50 mcg, Each Nostril, Daily    gabapentin (NEURONTIN) 300 mg, Oral, 3 times daily    hydrOXYzine HCL (ATARAX) 25 mg, Oral, 3 times daily PRN    iron-vit c-b12-folic acid (IRON 100 PLUS) Tab 1 tablet, Oral, Daily    ketoconazole (NIZORAL) 2 % shampoo Topical (Top), Weekly, Lather in for 5-10 min before rinsing    Lactobacillus rhamnosus GG (CULTURELLE) 10 billion cell capsule 1 capsule, Oral, Daily    lactulose (CHRONULAC) 10 gram/15 mL solution SMARTSI Tablespoon By Mouth Daily    levocetirizine (XYZAL) 5 mg, Oral, Nightly    linaCLOtide (LINZESS) 72 mcg, Oral, Daily    loteprednol (LOTEMAX) 0.5 % ophthalmic suspension 1 drop, Both Eyes, Daily PRN    methocarbamoL (ROBAXIN) 750 mg, Oral, 4 times daily PRN    montelukast (SINGULAIR) 10 mg, Oral, Daily    mupirocin (BACTROBAN) 2 % ointment Topical (Top), 3  "times daily    NIFEdipine (PROCARDIA-XL) 30 mg, Oral, Daily    NOVOFINE PLUS 32 gauge x 1/6" Ndle use as directed    pantoprazole (PROTONIX) 40 mg, Oral, Daily    phentermine (ADIPEX-P) 37.5 mg, Oral, Before breakfast    spironolactone (ALDACTONE) 100 mg, Oral, Daily    tiZANidine (ZANAFLEX) 4 mg, Oral, Every 12 hours PRN    tretinoin (RETIN-A) 0.05 % cream Topical (Top), Nightly, Use on face      I have reviewed the PMH, social history, FamilyHx, surgical history, allergies and medications documented / confirmed by the patient at the time of this visit.  Review of Systems   Constitutional:  Positive for activity change and fatigue. Negative for unexpected weight change.   HENT:  Negative for hearing loss, rhinorrhea and trouble swallowing.    Eyes:  Negative for discharge and visual disturbance.   Respiratory:  Negative for chest tightness and wheezing.    Cardiovascular:  Negative for chest pain and palpitations.   Gastrointestinal:  Negative for blood in stool, constipation, diarrhea and vomiting.   Endocrine: Negative for polydipsia and polyuria.   Genitourinary:  Negative for difficulty urinating, dysuria, hematuria and menstrual problem.   Musculoskeletal:  Negative for arthralgias, joint swelling and neck pain.   Skin:  Positive for rash.   Neurological:  Negative for weakness and headaches.   Psychiatric/Behavioral:  Positive for sleep disturbance. Negative for confusion and dysphoric mood.      Objective:   Ht 5' 4" (1.626 m)   Wt (!) 138.3 kg (305 lb)   BMI 52.35 kg/m²   Physical Exam  Constitutional:       General: She is not in acute distress.     Appearance: She is well-developed. She is obese. She is not ill-appearing, toxic-appearing or diaphoretic.   HENT:      Head: Normocephalic and atraumatic.      Right Ear: Hearing and external ear normal.      Left Ear: Hearing and external ear normal.   Eyes:      General: Lids are normal.      Conjunctiva/sclera: Conjunctivae normal.   Pulmonary:      " Effort: Pulmonary effort is normal. No respiratory distress.   Musculoskeletal:         General: Normal range of motion.      Cervical back: Normal range of motion.   Skin:     Coloration: Skin is not pale.   Neurological:      Mental Status: She is alert. She is not disoriented.   Psychiatric:         Attention and Perception: She is attentive.         Mood and Affect: Mood is not anxious or depressed.         Speech: Speech is not rapid and pressured or slurred.         Behavior: Behavior normal. Behavior is not agitated, aggressive or hyperactive. Behavior is cooperative.         Thought Content: Thought content normal. Thought content is not paranoid or delusional. Thought content does not include homicidal or suicidal ideation. Thought content does not include homicidal or suicidal plan.         Cognition and Memory: Memory is not impaired.         Judgment: Judgment normal.       Physical Exam  Skin lesion on the nose difficult to examine due to telemedicine    Results      Assessment:     1. Type 2 diabetes mellitus with hyperglycemia, without long-term current use of insulin    2. Class 3 obesity with alveolar hypoventilation, serious comorbidity, and body mass index (BMI) of 50.0 to 59.9 in adult    3. Encounter for long-term (current) use of medications    4. Hypertension associated with diabetes    5. Insomnia, unspecified type    6. Anxiety    7. Fatigue, unspecified type    8. Boil      MDM:   Moderate medical complexity.  Moderate risk.  Total time: 21 minutes.  This includes total time spent on the encounter, which includes face to face time and non-face to face time preparing to see the patient (eg, review of previous medical records, tests), Obtaining and/or reviewing separately obtained history, documenting clinical information in the electronic or other health record, independently interpreting results (not separately reported)/communicating results to the patient/family/caregiver, and/or care  coordination (not separately reported).    I have Reviewed and summarized old records.  I have performed thorough medication reconciliation today and discussed risk and benefits of medications.  I have reviewed labs and discussed with patient.  All questions were answered.  I am requesting old records and will review them once they are available.  Dermatology    I have signed for the following orders AND/OR meds.  Orders Placed This Encounter   Procedures    Iron and TIBC     Standing Status:   Future     Standing Expiration Date:   5/26/2025    Ferritin     Standing Status:   Future     Standing Expiration Date:   5/26/2025    Vitamin B12     Standing Status:   Future     Standing Expiration Date:   5/26/2025    Folate     Standing Status:   Future     Standing Expiration Date:   5/26/2025    CBC Without Differential     Standing Status:   Future     Standing Expiration Date:   5/26/2025    Comprehensive Metabolic Panel     Standing Status:   Future     Standing Expiration Date:   5/26/2025    TSH     Standing Status:   Future     Standing Expiration Date:   5/26/2025    Hemoglobin A1C     Standing Status:   Future     Standing Expiration Date:   5/26/2025    Lipid Panel     Standing Status:   Future     Standing Expiration Date:   5/26/2025     Medications Ordered This Encounter   Medications    doxycycline (VIBRAMYCIN) 100 MG Cap     Sig: Take 1 capsule (100 mg total) by mouth every 12 (twelve) hours.     Dispense:  20 capsule     Refill:  0    hydrOXYzine HCL (ATARAX) 25 MG tablet     Sig: Take 1 tablet (25 mg total) by mouth 3 (three) times daily as needed for Anxiety.     Dispense:  30 tablet     Refill:  12    mupirocin (BACTROBAN) 2 % ointment     Sig: Apply topically 3 (three) times daily.     Dispense:  22 g     Refill:  0        Follow up in about 6 months (around 9/27/2024), or if symptoms worsen or fail to improve, for LAB RESULTS, Med refills.  Future Appointments       Date Provider Specialty Appt  Notes    4/12/2024 Zeke Clement MD Pain Medicine Pain    6/3/2024 Rossy Kramer MD Allergy 4 month follow up    6/27/2024 Dante Negro MD Family Medicine Annual check up    8/15/2024 Michael Hernandez MD Rheumatology +VANESSA--CHRONIC FATIGUE//bc          If no improvement in symptoms or symptoms worsen, advised to call/follow-up at clinic or go to ER. Patient voiced understanding and all questions/concerns were addressed.   DISCLAIMER: This note was compiled by using a speech recognition dictation system and therefore please be aware that typographical / speech recognition errors can and do occur.  Please contact me if you see any errors specifically.  Consent was obtained for [facility] recording system prior to the visit.    Dante Negro M.D.       Office: [phone]   [address] [place] [address]  FAX: [phone]

## 2024-03-28 ENCOUNTER — PATIENT MESSAGE (OUTPATIENT)
Dept: FAMILY MEDICINE | Facility: CLINIC | Age: 39
End: 2024-03-28
Payer: COMMERCIAL

## 2024-04-03 ENCOUNTER — PATIENT MESSAGE (OUTPATIENT)
Dept: PULMONOLOGY | Facility: CLINIC | Age: 39
End: 2024-04-03
Payer: COMMERCIAL

## 2024-04-04 ENCOUNTER — LAB VISIT (OUTPATIENT)
Dept: LAB | Facility: HOSPITAL | Age: 39
End: 2024-04-04
Attending: FAMILY MEDICINE
Payer: COMMERCIAL

## 2024-04-04 DIAGNOSIS — E66.2 CLASS 3 OBESITY WITH ALVEOLAR HYPOVENTILATION, SERIOUS COMORBIDITY, AND BODY MASS INDEX (BMI) OF 50.0 TO 59.9 IN ADULT: ICD-10-CM

## 2024-04-04 DIAGNOSIS — Z79.899 ENCOUNTER FOR LONG-TERM (CURRENT) USE OF MEDICATIONS: ICD-10-CM

## 2024-04-04 DIAGNOSIS — I15.2 HYPERTENSION ASSOCIATED WITH DIABETES: ICD-10-CM

## 2024-04-04 DIAGNOSIS — G47.00 INSOMNIA, UNSPECIFIED TYPE: ICD-10-CM

## 2024-04-04 DIAGNOSIS — F41.9 ANXIETY: ICD-10-CM

## 2024-04-04 DIAGNOSIS — R53.83 FATIGUE, UNSPECIFIED TYPE: ICD-10-CM

## 2024-04-04 DIAGNOSIS — E11.65 TYPE 2 DIABETES MELLITUS WITH HYPERGLYCEMIA, WITHOUT LONG-TERM CURRENT USE OF INSULIN: ICD-10-CM

## 2024-04-04 DIAGNOSIS — E11.59 HYPERTENSION ASSOCIATED WITH DIABETES: ICD-10-CM

## 2024-04-04 LAB
ALBUMIN SERPL BCP-MCNC: 3.7 G/DL (ref 3.5–5.2)
ALP SERPL-CCNC: 64 U/L (ref 55–135)
ALT SERPL W/O P-5'-P-CCNC: 14 U/L (ref 10–44)
ANION GAP SERPL CALC-SCNC: 6 MMOL/L (ref 8–16)
AST SERPL-CCNC: 17 U/L (ref 10–40)
BILIRUB SERPL-MCNC: 0.4 MG/DL (ref 0.1–1)
BUN SERPL-MCNC: 7 MG/DL (ref 6–20)
CALCIUM SERPL-MCNC: 9.5 MG/DL (ref 8.7–10.5)
CHLORIDE SERPL-SCNC: 108 MMOL/L (ref 95–110)
CHOLEST SERPL-MCNC: 158 MG/DL (ref 120–199)
CHOLEST/HDLC SERPL: 3 {RATIO} (ref 2–5)
CO2 SERPL-SCNC: 25 MMOL/L (ref 23–29)
CREAT SERPL-MCNC: 0.7 MG/DL (ref 0.5–1.4)
ERYTHROCYTE [DISTWIDTH] IN BLOOD BY AUTOMATED COUNT: 14.4 % (ref 11.5–14.5)
EST. GFR  (NO RACE VARIABLE): >60 ML/MIN/1.73 M^2
ESTIMATED AVG GLUCOSE: 111 MG/DL (ref 68–131)
FERRITIN SERPL-MCNC: 26 NG/ML (ref 20–300)
FOLATE SERPL-MCNC: 3.4 NG/ML (ref 4–24)
GLUCOSE SERPL-MCNC: 96 MG/DL (ref 70–110)
HBA1C MFR BLD: 5.5 % (ref 4–5.6)
HCT VFR BLD AUTO: 41.1 % (ref 37–48.5)
HDLC SERPL-MCNC: 52 MG/DL (ref 40–75)
HDLC SERPL: 32.9 % (ref 20–50)
HGB BLD-MCNC: 12.8 G/DL (ref 12–16)
IRON SERPL-MCNC: 81 UG/DL (ref 30–160)
LDLC SERPL CALC-MCNC: 92.4 MG/DL (ref 63–159)
MCH RBC QN AUTO: 23.9 PG (ref 27–31)
MCHC RBC AUTO-ENTMCNC: 31.1 G/DL (ref 32–36)
MCV RBC AUTO: 77 FL (ref 82–98)
NONHDLC SERPL-MCNC: 106 MG/DL
PLATELET # BLD AUTO: 322 K/UL (ref 150–450)
PMV BLD AUTO: 10.7 FL (ref 9.2–12.9)
POTASSIUM SERPL-SCNC: 4.2 MMOL/L (ref 3.5–5.1)
PROT SERPL-MCNC: 7 G/DL (ref 6–8.4)
RBC # BLD AUTO: 5.35 M/UL (ref 4–5.4)
SATURATED IRON: 24 % (ref 20–50)
SODIUM SERPL-SCNC: 139 MMOL/L (ref 136–145)
TOTAL IRON BINDING CAPACITY: 340 UG/DL (ref 250–450)
TRANSFERRIN SERPL-MCNC: 230 MG/DL (ref 200–375)
TRIGL SERPL-MCNC: 68 MG/DL (ref 30–150)
TSH SERPL DL<=0.005 MIU/L-ACNC: 1.11 UIU/ML (ref 0.4–4)
VIT B12 SERPL-MCNC: 563 PG/ML (ref 210–950)
WBC # BLD AUTO: 7.97 K/UL (ref 3.9–12.7)

## 2024-04-04 PROCEDURE — 83540 ASSAY OF IRON: CPT | Performed by: FAMILY MEDICINE

## 2024-04-04 PROCEDURE — 84443 ASSAY THYROID STIM HORMONE: CPT | Performed by: FAMILY MEDICINE

## 2024-04-04 PROCEDURE — 82746 ASSAY OF FOLIC ACID SERUM: CPT | Performed by: FAMILY MEDICINE

## 2024-04-04 PROCEDURE — 82607 VITAMIN B-12: CPT | Performed by: FAMILY MEDICINE

## 2024-04-04 PROCEDURE — 80061 LIPID PANEL: CPT | Performed by: FAMILY MEDICINE

## 2024-04-04 PROCEDURE — 82728 ASSAY OF FERRITIN: CPT | Performed by: FAMILY MEDICINE

## 2024-04-04 PROCEDURE — 80053 COMPREHEN METABOLIC PANEL: CPT | Performed by: FAMILY MEDICINE

## 2024-04-04 PROCEDURE — 36415 COLL VENOUS BLD VENIPUNCTURE: CPT | Mod: PO | Performed by: FAMILY MEDICINE

## 2024-04-04 PROCEDURE — 85027 COMPLETE CBC AUTOMATED: CPT | Performed by: FAMILY MEDICINE

## 2024-04-04 PROCEDURE — 83036 HEMOGLOBIN GLYCOSYLATED A1C: CPT | Performed by: FAMILY MEDICINE

## 2024-04-06 NOTE — PROGRESS NOTES
Make follow-up lab appointment per recommendation below.  Check to see if patient has seen the results through my chart.  If not then,  #CALL THE PATIENT# to discuss results/see if they have questions and document verification of contact. Make F/U appt if needed. [phone]    #My interpretation that was sent to them through [email or web address]:  Lucía, I have reviewed your recent blood work.     Folate level is low.  Start folate supplement with L methyl folate.  B12 level is lower than previous.  Restart B12 supplement methyl cobalamin.  Your complete blood count is stable.  Iron levels are stable.  Your metabolic panel which shows your glucose, kidney function, electrolytes, and liver function is stable.   Thyroid study is normal.   Your cholesterol is improved.    Your hemoglobin A1c is stable.  This test is gold standard screening test for diabetes.  It is a measures 3 months of your average blood sugar.    =========================  Also please address any outstanding health maintenance that may be due: Diabetes Urine Screening due on 02/03/2024  Eye Exam due on 03/13/2024

## 2024-04-10 DIAGNOSIS — E11.9 TYPE 2 DIABETES MELLITUS WITHOUT COMPLICATION: ICD-10-CM

## 2024-04-18 ENCOUNTER — PATIENT MESSAGE (OUTPATIENT)
Dept: FAMILY MEDICINE | Facility: CLINIC | Age: 39
End: 2024-04-18

## 2024-04-18 ENCOUNTER — OFFICE VISIT (OUTPATIENT)
Dept: FAMILY MEDICINE | Facility: CLINIC | Age: 39
End: 2024-04-18
Payer: COMMERCIAL

## 2024-04-18 VITALS
HEIGHT: 64 IN | SYSTOLIC BLOOD PRESSURE: 126 MMHG | WEIGHT: 293 LBS | TEMPERATURE: 99 F | HEART RATE: 79 BPM | OXYGEN SATURATION: 99 % | BODY MASS INDEX: 50.02 KG/M2 | RESPIRATION RATE: 18 BRPM | DIASTOLIC BLOOD PRESSURE: 86 MMHG

## 2024-04-18 DIAGNOSIS — R05.9 COUGH, UNSPECIFIED TYPE: ICD-10-CM

## 2024-04-18 DIAGNOSIS — Z20.828 EXPOSURE TO INFLUENZA: Primary | ICD-10-CM

## 2024-04-18 DIAGNOSIS — G56.01 CARPAL TUNNEL SYNDROME OF RIGHT WRIST: ICD-10-CM

## 2024-04-18 LAB
CTP QC/QA: YES
CTP QC/QA: YES
POC MOLECULAR INFLUENZA A AGN: NEGATIVE
POC MOLECULAR INFLUENZA B AGN: NEGATIVE
SARS-COV-2 RDRP RESP QL NAA+PROBE: NEGATIVE

## 2024-04-18 PROCEDURE — 1159F MED LIST DOCD IN RCRD: CPT | Mod: CPTII,S$GLB,, | Performed by: NURSE PRACTITIONER

## 2024-04-18 PROCEDURE — 87635 SARS-COV-2 COVID-19 AMP PRB: CPT | Mod: QW,S$GLB,, | Performed by: NURSE PRACTITIONER

## 2024-04-18 PROCEDURE — 3008F BODY MASS INDEX DOCD: CPT | Mod: CPTII,S$GLB,, | Performed by: NURSE PRACTITIONER

## 2024-04-18 PROCEDURE — 3079F DIAST BP 80-89 MM HG: CPT | Mod: CPTII,S$GLB,, | Performed by: NURSE PRACTITIONER

## 2024-04-18 PROCEDURE — 1160F RVW MEDS BY RX/DR IN RCRD: CPT | Mod: CPTII,S$GLB,, | Performed by: NURSE PRACTITIONER

## 2024-04-18 PROCEDURE — 87502 INFLUENZA DNA AMP PROBE: CPT | Mod: QW,S$GLB,, | Performed by: NURSE PRACTITIONER

## 2024-04-18 PROCEDURE — 3074F SYST BP LT 130 MM HG: CPT | Mod: CPTII,S$GLB,, | Performed by: NURSE PRACTITIONER

## 2024-04-18 PROCEDURE — 99214 OFFICE O/P EST MOD 30 MIN: CPT | Mod: S$GLB,,, | Performed by: NURSE PRACTITIONER

## 2024-04-18 PROCEDURE — 3044F HG A1C LEVEL LT 7.0%: CPT | Mod: CPTII,S$GLB,, | Performed by: NURSE PRACTITIONER

## 2024-04-18 PROCEDURE — 99999 PR PBB SHADOW E&M-EST. PATIENT-LVL V: CPT | Mod: PBBFAC,,, | Performed by: NURSE PRACTITIONER

## 2024-04-18 PROCEDURE — 3072F LOW RISK FOR RETINOPATHY: CPT | Mod: CPTII,S$GLB,, | Performed by: NURSE PRACTITIONER

## 2024-04-18 RX ORDER — METHYLPREDNISOLONE 4 MG/1
TABLET ORAL
Qty: 21 EACH | Refills: 0 | Status: SHIPPED | OUTPATIENT
Start: 2024-04-18 | End: 2024-05-09

## 2024-04-18 RX ORDER — BALOXAVIR MARBOXIL 80 MG/1
80 TABLET, FILM COATED ORAL ONCE
Qty: 1 TABLET | Refills: 0 | Status: SHIPPED | OUTPATIENT
Start: 2024-04-18 | End: 2024-04-18

## 2024-04-18 RX ORDER — AMOXICILLIN AND CLAVULANATE POTASSIUM 875; 125 MG/1; MG/1
1 TABLET, FILM COATED ORAL EVERY 12 HOURS
Qty: 20 TABLET | Refills: 0 | Status: SHIPPED | OUTPATIENT
Start: 2024-04-18 | End: 2024-04-18

## 2024-04-18 RX ORDER — PROMETHAZINE HYDROCHLORIDE AND DEXTROMETHORPHAN HYDROBROMIDE 6.25; 15 MG/5ML; MG/5ML
5 SYRUP ORAL EVERY 6 HOURS PRN
Qty: 118 ML | Refills: 0 | Status: SHIPPED | OUTPATIENT
Start: 2024-04-18 | End: 2024-04-28

## 2024-04-18 NOTE — TELEPHONE ENCOUNTER
I sent in medication during encounter and cancelled antibiotic. No need for ABX. She can proceed with Xofluza and steroids. Supportive care- rest, increase hydration with water, OTC Tylenol/Ibuprofen for pain/fever.

## 2024-04-18 NOTE — TELEPHONE ENCOUNTER
Please also let her know that I have heard this medication can be expensive. There may be a coupon she can use online. If she cannot get it please let us know and we can try switching to Tamiflu.

## 2024-04-18 NOTE — PROGRESS NOTES
Assessment/Plan:  Problem List Items Addressed This Visit          Neuro    Carpal tunnel syndrome of right wrist    Relevant Medications    methylPREDNISolone (MEDROL DOSEPACK) 4 mg tablet    Other Relevant Orders    Ambulatory referral/consult to Orthopedics    EMG W/ ULTRASOUND AND NERVE CONDUCTION TEST 1 Extremity    - start steroids as prescribed  - will obtain EMG for suspected carpal tunnel syndrome of right wrist  - discussed carpal tunnel wrist brace support   - follow up with ortho for further evaluation   - Discussed condition course and signs and symptoms to expect.  Patient advised take anti-inflammatories and/or Tylenol for pain.  ER precautions.  Call MD or follow-up to clinic if not improving or worsening symptoms.       Pulmonary    Cough    Relevant Medications    promethazine-dextromethorphan (PROMETHAZINE-DM) 6.25-15 mg/5 mL Syrp    Other Relevant Orders    POCT Influenza A/B Molecular (Completed)    POCT COVID-19 Rapid Screening (Completed)    - negative covid and flu  - promethazine DM PRN for cough. Caution drowsiness.      Other Visit Diagnoses       Exposure to influenza    -  Primary    Relevant Medications    baloxavir marboxiL (XOFLUZA) 80 mg tablet       methylPREDNISolone (MEDROL DOSEPACK) 4 mg tablet    - Start steroids and Xofluza (patient request) as prescribed   - Risk of corticosteroids reviewed (elevated BP/glucose, insomnia, psychosis, bone loss, etc) and patient expressed understanding.   - recommend OTC flonase and Mucinex   - Supportive care- rest, increase hydration with water, OTC Tylenol/Ibuprofen for pain/fever.     Follow up if symptoms worsen or fail to improve.  ER precautions for severe or worsening symptoms.     Jeana Pina NP  _____________________________________________________________________________________________________________________________________________________    CC: cough; numbness     HPI: Patient is a 38-year-old female who presents in clinic  today as an established patient here for cough. This is a new problem. The current episode started x4 days ago. The problem is gradually worsening. There has been no fever. She is experiencing no pain. Associated symptoms include congestion, postnasal drip, sore throat, cough- productive, headache, and sinus pressure. Pertinent negatives include no chills, diaphoresis, ear pain, hoarse voice, neck pain, shortness of breath, sneezing, or swollen glands. Past treatments include dahlia seltzer cold and flu. The treatment provided no relief. She reports that her son tested positive for the flu. She is requesting Xofluza.     She also reports having numbness and tingling to right hand. She has history of carpal tunnel syndrome bilat which required surgery approx 4 years ago. Symptoms similar to previous. Numbness/tingling is constant. She has tried OTC anti-inflammatories with no relief. No recent injuries, falls, or known trauma. She reports her  feels a little weaker. She is right handed. No known repetitive activity triggers.     Past Medical History:  Past Medical History:   Diagnosis Date    Allergy     Amenorrhea     Asthma     Diabetes     GERD (gastroesophageal reflux disease)     Infertility associated with anovulation     Iron deficiency anemia     Knee pain     Metabolic syndrome     PCO (polycystic ovaries)     Recurrent boils     Status post repeat low transverse  section 2020    Formatting of this note might be different from the original. With BTL 21     Past Surgical History:   Procedure Laterality Date    BTL      CARPAL TUNNEL RELEASE Left 2019    CARPAL TUNNEL RELEASE Right 2020    Procedure: RELEASE, CARPAL TUNNEL;  Surgeon: Adeel Laughlin MD;  Location: [facility];  Service: Orthopedics;  Laterality: Right;     SECTION      X2    EXPLORATORY LAPAROTOMY      1 week postop with WOUND VAK, reopened uterus    WOUND VAK      3 months post-op Section, 2 weeks in  hospital     Review of patient's allergies indicates:   Allergen Reactions    Metformin Diarrhea    Sulfa (sulfonamide antibiotics) Anaphylaxis and Swelling     Swelling (eyes)^, Swelling (throat)^  Swelling (eyes)^, Swelling (throat)^      Diclofenac      Gastritis     Latex, natural rubber      Contact dermatitis    Other reaction(s): Other (See Comments)  Contact dermatitis    Shellfish containing products      anaphylaxis     Social History     Tobacco Use    Smoking status: Never    Smokeless tobacco: Never   Substance Use Topics    Alcohol use: Never    Drug use: Never     Family History   Problem Relation Name Age of Onset    Diabetes Father Ruddy     Heart disease Father Ruddy 69    Hypertension Father Ruddy     Prostate cancer Father Ruddy     Diabetes Mother Cato     Lupus Mother Cato     AMY disease Brother      Ovarian cancer Sister      Breast cancer Neg Hx       Current Outpatient Medications on File Prior to Visit   Medication Sig Dispense Refill    albuterol (PROVENTIL) 2.5 mg /3 mL (0.083 %) nebulizer solution Take 3 mLs (2.5 mg total) by nebulization every 4 to 6 hours as needed for Wheezing. 60 each 3    albuterol (PROVENTIL/VENTOLIN HFA) 90 mcg/actuation inhaler Inhale 1-2 puffs into the lungs every 4 (four) hours as needed for Wheezing. Rescue 8.5 g 10    azelastine (ASTELIN) 137 mcg (0.1 %) nasal spray 2 sprays (274 mcg total) by Nasal route 2 (two) times daily. 20 mL 5    cholecalciferol, vitamin D3, (VITAMIN D3) 25 mcg (1,000 unit) capsule Take 2 capsules (2,000 Units total) by mouth once daily. 90 capsule 4    clindamycin (CLEOCIN T) 1 % Swab Apply topically 2 (two) times daily. 60 each 2    EScitalopram oxalate (LEXAPRO) 10 MG tablet Take 1 tablet (10 mg total) by mouth once daily. 30 tablet 11    fluocinolone (DERMA-SMOOTHE) 0.01 % external oil Apply twice daily only as needed to affected areas of scalp/ears until improved, then can stop and restart as needed 118 mL 2    fluocinonide  "(LIDEX) 0.05 % external solution Apply topically 2 (two) times daily. Use on scalp 60 mL 5    fluticasone furoate-vilanteroL (BREO) 200-25 mcg/dose DsDv diskus inhaler Inhale 1 puff into the lungs once daily. Controller 1 each 4    fluticasone propionate (FLONASE) 50 mcg/actuation nasal spray 1 spray (50 mcg total) by Each Nostril route once daily. 18.2 mL 0    gabapentin (NEURONTIN) 300 MG capsule Take 1 capsule (300 mg total) by mouth 3 (three) times daily. 270 capsule 0    hydrOXYzine HCL (ATARAX) 25 MG tablet Take 1 tablet (25 mg total) by mouth 3 (three) times daily as needed for Anxiety. 30 tablet 12    iron-vit c-b12-folic acid (IRON 100 PLUS) Tab Take 1 tablet by mouth once daily. 90 tablet 1    ketoconazole (NIZORAL) 2 % shampoo Apply topically once a week. Lather in for 5-10 min before rinsing 360 mL 3    Lactobacillus rhamnosus GG (CULTURELLE) 10 billion cell capsule Take 1 capsule by mouth once daily.      lactulose (CHRONULAC) 10 gram/15 mL solution SMARTSI Tablespoon By Mouth Daily      levocetirizine (XYZAL) 5 MG tablet Take 1 tablet (5 mg total) by mouth every evening. 90 tablet 3    linaCLOtide (LINZESS) 72 mcg Cap capsule Take 1 capsule (72 mcg total) by mouth once daily. 30 capsule 1    loteprednol (LOTEMAX) 0.5 % ophthalmic suspension Place 1 drop into both eyes daily as needed (to control allergy symptoms. Not for chronic daily use.). 5 mL 1    methocarbamoL (ROBAXIN) 750 MG Tab Take 750 mg by mouth 4 (four) times daily as needed.      montelukast (SINGULAIR) 10 mg tablet Take 1 tablet (10 mg total) by mouth once daily. 30 tablet 3    NIFEdipine (PROCARDIA-XL) 30 MG (OSM) 24 hr tablet Take 30 mg by mouth once daily.      NOVOFINE PLUS 32 gauge x 1/6" Ndle use as directed      pantoprazole (PROTONIX) 40 MG tablet Take 1 tablet (40 mg total) by mouth once daily. 90 tablet 3    spironolactone (ALDACTONE) 100 MG tablet Take 1 tablet (100 mg total) by mouth once daily. 90 tablet 3    tiZANidine " (ZANAFLEX) 4 MG tablet Take 1 tablet (4 mg total) by mouth every 12 (twelve) hours as needed (muscle spasms/ pain). 40 tablet 0    tretinoin (RETIN-A) 0.05 % cream Apply topically every evening. Use on face 45 g 4    doxycycline (VIBRAMYCIN) 100 MG Cap Take 1 capsule (100 mg total) by mouth every 12 (twelve) hours. (Patient not taking: Reported on 4/18/2024) 20 capsule 0    EPINEPHrine (EPIPEN) 0.3 mg/0.3 mL AtIn Inject 0.3 mLs (0.3 mg total) into the muscle once. for 1 dose 2 each 11    mupirocin (BACTROBAN) 2 % ointment Apply topically 3 (three) times daily. (Patient not taking: Reported on 4/18/2024) 22 g 0     Current Facility-Administered Medications on File Prior to Visit   Medication Dose Route Frequency Provider Last Rate Last Admin    cyanocobalamin injection 1,000 mcg  1,000 mcg Intramuscular Q30 Days Karlee Francis, NP        omalizumab injection 150 mg  150 mg Subcutaneous Q14 Days Rossy Kramer MD   150 mg at 05/10/23 0921    omalizumab injection 150 mg  150 mg Subcutaneous Q14 Days Rossy Kramer MD   150 mg at 05/10/23 0920    omalizumab injection 300 mg  300 mg Subcutaneous Q28 Days Rossy Kramer MD   300 mg at 04/10/23 0942     Review of Systems   Constitutional:  Negative for appetite change, chills, fatigue and fever.   HENT:  Positive for congestion, postnasal drip, rhinorrhea, sinus pressure and sore throat.    Eyes:  Negative for visual disturbance.   Respiratory:  Positive for cough. Negative for shortness of breath.    Cardiovascular:  Negative for chest pain, palpitations and leg swelling.   Gastrointestinal:  Negative for abdominal pain, diarrhea and vomiting.   Genitourinary:  Negative for difficulty urinating, dysuria and hematuria.   Musculoskeletal:  Positive for arthralgias. Negative for myalgias.   Skin:  Negative for rash and wound.   Neurological:  Positive for numbness. Negative for dizziness and headaches.   Psychiatric/Behavioral:  Negative for behavioral problems. The patient is not  "nervous/anxious.      Vitals:    04/18/24 0838   BP: 126/86   Pulse: 79   Resp: 18   Temp: 98.8 °F (37.1 °C)   TempSrc: Oral   SpO2: 99%   Weight: 134.3 kg (296 lb 1.6 oz)   Height: 5' 4" (1.626 m)     Wt Readings from Last 3 Encounters:   04/18/24 134.3 kg (296 lb 1.6 oz)   03/27/24 (!) 138.3 kg (305 lb)   11/29/23 135.2 kg (298 lb)     Physical Exam  Vitals reviewed.   Constitutional:       General: She is not in acute distress.     Appearance: Normal appearance. She is not ill-appearing.   HENT:      Head: Normocephalic and atraumatic.      Right Ear: Tympanic membrane, ear canal and external ear normal.      Left Ear: Tympanic membrane, ear canal and external ear normal.      Nose: Congestion and rhinorrhea present.      Mouth/Throat:      Mouth: Mucous membranes are moist.      Pharynx: Posterior oropharyngeal erythema (mild) present.   Eyes:      Extraocular Movements: Extraocular movements intact.      Conjunctiva/sclera: Conjunctivae normal.   Cardiovascular:      Rate and Rhythm: Normal rate.      Pulses:           Radial pulses are 2+ on the right side and 2+ on the left side.      Heart sounds: Normal heart sounds.   Pulmonary:      Effort: Pulmonary effort is normal. No respiratory distress.      Breath sounds: Normal breath sounds.   Abdominal:      General: Abdomen is flat. There is no distension.   Musculoskeletal:         General: Normal range of motion.      Right wrist: No swelling, deformity or tenderness. Normal range of motion. Normal pulse.      Left wrist: No swelling, deformity or tenderness. Normal range of motion. Normal pulse.      Cervical back: Normal range of motion and neck supple.   Skin:     General: Skin is warm and dry.      Capillary Refill: Capillary refill takes less than 2 seconds.      Coloration: Skin is not pale.      Findings: No rash.   Neurological:      General: No focal deficit present.      Mental Status: She is alert and oriented to person, place, and time. Mental " status is at baseline.   Psychiatric:         Mood and Affect: Mood normal.         Speech: Speech normal. Speech is not rapid and pressured, delayed or slurred.         Behavior: Behavior normal. Behavior is not agitated, slowed, aggressive, withdrawn, hyperactive or combative. Behavior is cooperative.         Thought Content: Thought content normal.         Judgment: Judgment normal.       Health Maintenance   Topic Date Due    Eye Exam  03/13/2024    Hemoglobin A1c  10/04/2024    Foot Exam  10/12/2024    Lipid Panel  04/04/2025    TETANUS VACCINE  08/26/2031    Hepatitis C Screening  Completed

## 2024-04-19 RX ORDER — OSELTAMIVIR PHOSPHATE 75 MG/1
75 CAPSULE ORAL 2 TIMES DAILY
Qty: 10 CAPSULE | Refills: 0 | Status: SHIPPED | OUTPATIENT
Start: 2024-04-19 | End: 2024-04-24

## 2024-04-19 NOTE — TELEPHONE ENCOUNTER
Pt did not  the medication due to cost. She is now requesting the ABX to be sent to [facility].   Son positive for  flu.

## 2024-04-23 ENCOUNTER — OFFICE VISIT (OUTPATIENT)
Dept: ORTHOPEDICS | Facility: CLINIC | Age: 39
End: 2024-04-23
Payer: COMMERCIAL

## 2024-04-23 DIAGNOSIS — G56.01 CARPAL TUNNEL SYNDROME OF RIGHT WRIST: Primary | ICD-10-CM

## 2024-04-23 PROCEDURE — 1160F RVW MEDS BY RX/DR IN RCRD: CPT | Mod: CPTII,S$GLB,, | Performed by: PHYSICIAN ASSISTANT

## 2024-04-23 PROCEDURE — 99213 OFFICE O/P EST LOW 20 MIN: CPT | Mod: S$GLB,,, | Performed by: PHYSICIAN ASSISTANT

## 2024-04-23 PROCEDURE — 1159F MED LIST DOCD IN RCRD: CPT | Mod: CPTII,S$GLB,, | Performed by: PHYSICIAN ASSISTANT

## 2024-04-23 PROCEDURE — 99999 PR PBB SHADOW E&M-EST. PATIENT-LVL V: CPT | Mod: PBBFAC,,, | Performed by: PHYSICIAN ASSISTANT

## 2024-04-23 PROCEDURE — 3044F HG A1C LEVEL LT 7.0%: CPT | Mod: CPTII,S$GLB,, | Performed by: PHYSICIAN ASSISTANT

## 2024-04-23 PROCEDURE — 3072F LOW RISK FOR RETINOPATHY: CPT | Mod: CPTII,S$GLB,, | Performed by: PHYSICIAN ASSISTANT

## 2024-04-23 NOTE — PROGRESS NOTES
2024    Chief Complaint:  Chief Complaint   Patient presents with    Right Hand - Numbness, Pain       HPI:  Lucía Coreas is a 38 y.o. female, who presents to clinic today for evaluation of her right hand numbness, tingling, and pain.  States symptoms have been present for years.  States he has a history of release in the left side.  States he was EMG nerve conduction study performed years ago which showed she would bilateral carpal tunnel syndrome.  States she has numbness of the right hand at baseline.  States she has weakness of the right hand as well.  States he is nocturnal paresthesias that wake her at night.  Denies any acute injuries since last visit.  Denies any other complaints at this time.    PMHX:  Past Medical History:   Diagnosis Date    Allergy     Amenorrhea     Asthma     Diabetes     GERD (gastroesophageal reflux disease)     Infertility associated with anovulation     Iron deficiency anemia     Knee pain     Metabolic syndrome     PCO (polycystic ovaries)     Recurrent boils     Status post repeat low transverse  section 2020    Formatting of this note might be different from the original. With BTL 21       PSHX:  Past Surgical History:   Procedure Laterality Date    BTL      CARPAL TUNNEL RELEASE Left 2019    CARPAL TUNNEL RELEASE Right 2020    Procedure: RELEASE, CARPAL TUNNEL;  Surgeon: Adeel Laughlin MD;  Location: [facility];  Service: Orthopedics;  Laterality: Right;     SECTION      X2    EXPLORATORY LAPAROTOMY      1 week postop with WOUND VAK, reopened uterus    WOUND VAK      3 months post-op Section, 2 weeks in hospital       FMHX:  Family History   Problem Relation Name Age of Onset    Diabetes Father Ruddy     Heart disease Father Ruddy 69    Hypertension Father Ruddy     Prostate cancer Father Ruddy     Diabetes Mother Ludivina     Lupus Mother [place]     AMY disease Brother      Ovarian cancer Sister      Breast cancer Neg Hx          SOCHX:  Social History     Tobacco Use    Smoking status: Never    Smokeless tobacco: Never   Substance Use Topics    Alcohol use: Never       ALLERGIES:  Metformin; Sulfa (sulfonamide antibiotics); Diclofenac; Latex, natural rubber; and Shellfish containing products    CURRENT MEDICATIONS:  Current Outpatient Medications on File Prior to Visit   Medication Sig Dispense Refill    albuterol (PROVENTIL) 2.5 mg /3 mL (0.083 %) nebulizer solution Take 3 mLs (2.5 mg total) by nebulization every 4 to 6 hours as needed for Wheezing. 60 each 3    albuterol (PROVENTIL/VENTOLIN HFA) 90 mcg/actuation inhaler Inhale 1-2 puffs into the lungs every 4 (four) hours as needed for Wheezing. Rescue 8.5 g 10    azelastine (ASTELIN) 137 mcg (0.1 %) nasal spray 2 sprays (274 mcg total) by Nasal route 2 (two) times daily. 20 mL 5    cholecalciferol, vitamin D3, (VITAMIN D3) 25 mcg (1,000 unit) capsule Take 2 capsules (2,000 Units total) by mouth once daily. 90 capsule 4    clindamycin (CLEOCIN T) 1 % Swab Apply topically 2 (two) times daily. 60 each 2    doxycycline (VIBRAMYCIN) 100 MG Cap Take 1 capsule (100 mg total) by mouth every 12 (twelve) hours. 20 capsule 0    EScitalopram oxalate (LEXAPRO) 10 MG tablet Take 1 tablet (10 mg total) by mouth once daily. 30 tablet 11    fluocinolone (DERMA-SMOOTHE) 0.01 % external oil Apply twice daily only as needed to affected areas of scalp/ears until improved, then can stop and restart as needed 118 mL 2    fluocinonide (LIDEX) 0.05 % external solution Apply topically 2 (two) times daily. Use on scalp 60 mL 5    fluticasone furoate-vilanteroL (BREO) 200-25 mcg/dose DsDv diskus inhaler Inhale 1 puff into the lungs once daily. Controller 1 each 4    fluticasone propionate (FLONASE) 50 mcg/actuation nasal spray 1 spray (50 mcg total) by Each Nostril route once daily. 18.2 mL 0    gabapentin (NEURONTIN) 300 MG capsule Take 1 capsule (300 mg total) by mouth 3 (three) times daily. 270 capsule 0  "   hydrOXYzine HCL (ATARAX) 25 MG tablet Take 1 tablet (25 mg total) by mouth 3 (three) times daily as needed for Anxiety. 30 tablet 12    iron-vit c-b12-folic acid (IRON 100 PLUS) Tab Take 1 tablet by mouth once daily. 90 tablet 1    ketoconazole (NIZORAL) 2 % shampoo Apply topically once a week. Lather in for 5-10 min before rinsing 360 mL 3    Lactobacillus rhamnosus GG (CULTURELLE) 10 billion cell capsule Take 1 capsule by mouth once daily.      lactulose (CHRONULAC) 10 gram/15 mL solution SMARTSI Tablespoon By Mouth Daily      levocetirizine (XYZAL) 5 MG tablet Take 1 tablet (5 mg total) by mouth every evening. 90 tablet 3    linaCLOtide (LINZESS) 72 mcg Cap capsule Take 1 capsule (72 mcg total) by mouth once daily. 30 capsule 1    loteprednol (LOTEMAX) 0.5 % ophthalmic suspension Place 1 drop into both eyes daily as needed (to control allergy symptoms. Not for chronic daily use.). 5 mL 1    methocarbamoL (ROBAXIN) 750 MG Tab Take 750 mg by mouth 4 (four) times daily as needed.      methylPREDNISolone (MEDROL DOSEPACK) 4 mg tablet use as directed 21 each 0    montelukast (SINGULAIR) 10 mg tablet Take 1 tablet (10 mg total) by mouth once daily. 30 tablet 3    mupirocin (BACTROBAN) 2 % ointment Apply topically 3 (three) times daily. 22 g 0    NIFEdipine (PROCARDIA-XL) 30 MG (OSM) 24 hr tablet Take 30 mg by mouth once daily.      NOVOFINE PLUS 32 gauge x 1/6" Ndle use as directed      oseltamivir (TAMIFLU) 75 MG capsule Take 1 capsule (75 mg total) by mouth 2 (two) times daily. for 5 days 10 capsule 0    pantoprazole (PROTONIX) 40 MG tablet Take 1 tablet (40 mg total) by mouth once daily. 90 tablet 3    promethazine-dextromethorphan (PROMETHAZINE-DM) 6.25-15 mg/5 mL Syrp Take 5 mLs by mouth every 6 (six) hours as needed (cough). 118 mL 0    spironolactone (ALDACTONE) 100 MG tablet Take 1 tablet (100 mg total) by mouth once daily. 90 tablet 3    tiZANidine (ZANAFLEX) 4 MG tablet Take 1 tablet (4 mg total) by " mouth every 12 (twelve) hours as needed (muscle spasms/ pain). 40 tablet 0    tretinoin (RETIN-A) 0.05 % cream Apply topically every evening. Use on face 45 g 4    EPINEPHrine (EPIPEN) 0.3 mg/0.3 mL AtIn Inject 0.3 mLs (0.3 mg total) into the muscle once. for 1 dose 2 each 11     Current Facility-Administered Medications on File Prior to Visit   Medication Dose Route Frequency Provider Last Rate Last Admin    cyanocobalamin injection 1,000 mcg  1,000 mcg Intramuscular Q30 Days Karlee Francis, NP        omalizumab injection 150 mg  150 mg Subcutaneous Q14 Days Rossy Kramer MD   150 mg at 05/10/23 0921    omalizumab injection 150 mg  150 mg Subcutaneous Q14 Days Rossy Kramer MD   150 mg at 05/10/23 0920    omalizumab injection 300 mg  300 mg Subcutaneous Q28 Days Rossy Kramer MD   300 mg at 04/10/23 0942       REVIEW OF SYSTEMS:  Review of Systems   Constitutional: Negative.    HENT: Negative.     Eyes: Negative.    Respiratory: Negative.     Cardiovascular: Negative.    Gastrointestinal: Negative.    Genitourinary: Negative.    Musculoskeletal: Negative.    Skin: Negative.    Neurological:  Positive for tingling and weakness.   Endo/Heme/Allergies: Negative.    Psychiatric/Behavioral: Negative.       GENERAL PHYSICAL EXAM:   [facility] 04/11/2024    GEN: well developed, well nourished, no acute distress   HENT: Normocephalic, atraumatic   EYES: No discharge, conjunctiva normal   NECK: Supple, non-tender   PULM: No wheezing, no respiratory distress   CV: RRR   ABD: Soft, non-tender    ORTHO EXAM:   Examination of the right hand/wrist reveals no edema, erythema, ecchymosis or skin breakdown able make composite fist and fully extend all fingers.  Full intact range of motion of the right wrist.  4/5 /intrinsic strength.  4/5 thenar strength.  4/5 hypothenar strength.  Positive carpal Tinel's test.  Positive Durkan's test.  Positive cubital Tinel's test.  Reduced sensation in the median and ulnar nerve distributions.  Normal  sensation in the radial nerve distribution.  Capillary refill less than 2 seconds.    RADIOLOGY:   None.    ASSESSMENT:   Right carpal tunnel syndrome    PLAN:  1. I discussed with Lucía Coreas the carpal tunnel syndrome pathology and treatment options in detail during today's visit after treatment options were discussed, we decided the best course of action this time is to perform an EMG nerve conduction study of the right upper extremity to evaluate the severity of her right carpal tunnel syndrome.  She verbally agreed with the treatment plan    2. She is scheduled have an EMG nerve conduction study perform next week and Mahogany Sharp.    3. I would like to have her follow up in clinic after the EMG to discuss the results with Dr. Hoffman as there is concern she may need surgical intervention.  She was instructed to contact clinic for any problems or concerns in the interim.

## 2024-05-01 ENCOUNTER — TELEPHONE (OUTPATIENT)
Dept: PHYSICAL MEDICINE AND REHAB | Facility: CLINIC | Age: 39
End: 2024-05-01
Payer: COMMERCIAL

## 2024-05-01 ENCOUNTER — TELEPHONE (OUTPATIENT)
Dept: PAIN MEDICINE | Facility: CLINIC | Age: 39
End: 2024-05-01
Payer: COMMERCIAL

## 2024-05-01 NOTE — TELEPHONE ENCOUNTER
----- Message from Kindra Burgess sent at 5/1/2024  1:53 PM CDT -----  Contact: Patient, [phone]  Calling to reschedule the EMG that is scheduled for tomorrow. Please call her. Thanks.

## 2024-05-02 DIAGNOSIS — E55.9 VITAMIN D DEFICIENCY: Chronic | ICD-10-CM

## 2024-05-02 RX ORDER — IRON,CARB/VIT C/VIT B12/FOLIC 100-250-1
1 TABLET ORAL DAILY
Qty: 90 TABLET | Refills: 1 | Status: SHIPPED | OUTPATIENT
Start: 2024-05-02

## 2024-05-02 NOTE — TELEPHONE ENCOUNTER
Refill Routing Note   Medication(s) are not appropriate for processing by [facility] for the following reason(s):        Outside of protocol    [facility] action(s):  Route               Appointments  past 12m or future 3m with PCP    Date Provider   Last Visit   3/27/2024 Dante Negro MD   Next Visit   6/24/2024 Dante Negro MD   ED visits in past 90 days: 0        Note composed:2:33 PM 05/02/2024

## 2024-05-18 ENCOUNTER — PATIENT MESSAGE (OUTPATIENT)
Dept: FAMILY MEDICINE | Facility: CLINIC | Age: 39
End: 2024-05-18
Payer: COMMERCIAL

## 2024-05-20 ENCOUNTER — OFFICE VISIT (OUTPATIENT)
Dept: PHYSICAL MEDICINE AND REHAB | Facility: CLINIC | Age: 39
End: 2024-05-20
Payer: COMMERCIAL

## 2024-05-20 VITALS — BODY MASS INDEX: 50.02 KG/M2 | HEIGHT: 64 IN | WEIGHT: 293 LBS | RESPIRATION RATE: 13 BRPM

## 2024-05-20 DIAGNOSIS — G56.03 BILATERAL CARPAL TUNNEL SYNDROME: Primary | ICD-10-CM

## 2024-05-20 DIAGNOSIS — M79.18 CERVICAL MYOFASCIAL PAIN SYNDROME: ICD-10-CM

## 2024-05-20 PROCEDURE — 3072F LOW RISK FOR RETINOPATHY: CPT | Mod: CPTII,S$GLB,, | Performed by: PHYSICAL MEDICINE & REHABILITATION

## 2024-05-20 PROCEDURE — 3008F BODY MASS INDEX DOCD: CPT | Mod: CPTII,S$GLB,, | Performed by: PHYSICAL MEDICINE & REHABILITATION

## 2024-05-20 PROCEDURE — 1159F MED LIST DOCD IN RCRD: CPT | Mod: CPTII,S$GLB,, | Performed by: PHYSICAL MEDICINE & REHABILITATION

## 2024-05-20 PROCEDURE — 99999 PR PBB SHADOW E&M-EST. PATIENT-LVL III: CPT | Mod: PBBFAC,,, | Performed by: PHYSICAL MEDICINE & REHABILITATION

## 2024-05-20 PROCEDURE — 99202 OFFICE O/P NEW SF 15 MIN: CPT | Mod: 25,S$GLB,, | Performed by: PHYSICAL MEDICINE & REHABILITATION

## 2024-05-20 PROCEDURE — 3044F HG A1C LEVEL LT 7.0%: CPT | Mod: CPTII,S$GLB,, | Performed by: PHYSICAL MEDICINE & REHABILITATION

## 2024-05-20 PROCEDURE — 95912 NRV CNDJ TEST 11-12 STUDIES: CPT | Mod: S$GLB,,, | Performed by: PHYSICAL MEDICINE & REHABILITATION

## 2024-05-20 PROCEDURE — 1160F RVW MEDS BY RX/DR IN RCRD: CPT | Mod: CPTII,S$GLB,, | Performed by: PHYSICAL MEDICINE & REHABILITATION

## 2024-05-20 NOTE — PROGRESS NOTES
[facility]   [address]  [place] [address]  Phone: [phone]        Full Name: Lucía Coreas YOB: 1985  Patient ID: [id number]      Visit Date: 5/20/2024 08:44  Age: 38 Years 8 Months Old  Examining Physician: Diane Thornton M.D.  Referring Physician: GRACIELA Chase  Reason for Referral: upper ext    Chief Complaint   Patient presents with    Hand Pain       HPI: This is a 38 y.o.  female being seen in clinic today for evaluation of chronic hand numbness/tingling in her hands-right worse than left. She was last seen in 2020-dx with CTS and underwent left CTR.   She has pain radiating up her arm at times and she has difficulty with gripping items with cramping/locking of her fingers.  She has left shoulder achy pain and limited ROM at times. Rest, stretching, and position change provides some relief.  She was supposed to have right CTR but it was delayed.    History obtained from patient    Past family, medical, social, and surgical history reviewed in chart    Review of Systems:     General- denies lethargy, weight change, fever, chills  Head/neck- denies swallowing difficulties  ENT- denies hearing changes  Cardiovascular-denies chest pain  Pulmonary- denies shortness of breath  GI- denies constipation or bowel incontinence  - denies bladder incontinence  Skin- denies wounds or rashes  Musculoskeletal- +weakness, +pain  Neurologic- +numbness and tingling  Psychiatric- denies depressive or psychotic features, +anxiety  Lymphatic-denies swelling  Endocrine- denies hypoglycemic symptoms/DM history  All other pertinent systems negative     Physical Examination:  General: Well developed, well nourished female, NAD  HEENT:NCAT EOMI bilaterally   Pulmonary:Normal respirations    Spinal Examination: CERVICAL  Active ROM is within normal limits.  Inspection: No deformity of spinal alignment.  Palpation: No vertebral tenderness to percussion.   Tight and ttp at bilateral  trapezius    Musculoskeletal Tests:  Phalen:neg on left  Elbow compression (ulnar): +on right  Tinels at wrist: + on right    Bilateral Upper and Lower Extremities:  Pulses are 2+ at radial bilaterally.  Shoulder/Elbow/Wrist/Hand ROM wnl  Hip/Knee/Ankle ROM   Bilateral Extremities show normal capillary refill.  No signs of cyanosis, rubor, edema, skin changes, or dysvascular changes of appendages.  Nails appear intact.    Neurological Exam:  Cranial Nerves:  II-XII grossly intact    Manual Muscle Testing: (Motor 5=normal)  5/5 strength bilateral upper extremities    No focal atrophy is noted of either upper extremity.    Bilateral Reflexes: 1+bic tric br  Fournier's response is absent bilaterally.    Sensation: tested to light touch  - intact in arms except dec at right 1st-3rd digits    Gait: Narrow base and good arm swing.      Entire procedure explained to patient prior to proceeding.  Verbal consent obtained     SNC      Nerve / Sites Rec. Site Onset Lat Peak Lat Amp Segments Distance Velocity     ms ms µV  mm m/s   R Median - Digit II (Antidromic)      Wrist Dig II 4.0 4.7 15.5 Wrist - Dig  35   L Median - Digit II (Antidromic)      Wrist Dig II 2.8 3.3 19.3 Wrist - Dig  51   R Ulnar - Digit V (Antidromic)      Wrist Dig V 2.4 3.1 24.9 Wrist - Dig V 140 57   L Ulnar - Digit V (Antidromic)      Wrist Dig V 2.4 3.1 23.9 Wrist - Dig V 140 57   R Radial - Anatomical snuff box (Forearm)      Forearm Wrist 1.5 2.1 17.4 Forearm - Wrist 100 69   L Radial - Anatomical snuff box (Forearm)      Forearm Wrist 1.6 2.0 22.8 Forearm - Wrist 100 62       CSI      Nerve / Sites Rec. Site Peak Lat NP Amp Segments Peak Diff     ms µV  ms   L Median - CSI      Median Thumb 2.7 11.9 Median - Radial 0.5      Radial Thumb 2.2 19.5 Median - Ulnar 0.4      Median Ring 3.4 16.8 Median palm - Ulnar palm 0.3      Ulnar Ring 3.0 10.1        Median palm Wrist 2.0 35.6        Ulnar palm Wrist 1.7 16.0        CSI    CSI 1.2        MNC      Nerve / Sites Muscle Latency Amplitude Duration Rel Amp Segments Distance Lat Diff Velocity     ms mV ms %  mm ms m/s   R Median - APB      Wrist APB 5.4 6.2 7.7 100 Wrist - APB 80        Elbow APB 9.3 5.8 7.9 93.1 Elbow - Wrist 210 3.9 54   L Median - APB      Wrist APB 2.8 9.1 5.8 100 Wrist - APB 80        Elbow APB 7.2 7.8 5.8 85.2 Elbow - Wrist 220 4.4 50   R Ulnar - ADM      Wrist ADM 2.6 7.7 5.4 100 Wrist -         B. Elbow ADM 6.4 7.5 5.5 98.5 B. Elbow - Wrist 240 3.9 62      A. Elbow ADM 8.3 7.7 5.6 103 A. Elbow - B. Elbow 110 1.9 59   L Ulnar - ADM      Wrist ADM 2.9 7.2 5.7 100 Wrist -         B. Elbow ADM 6.5 7.0 5.8 96.8 B. Elbow - Wrist 235 3.6 65      A. Elbow ADM 8.2 7.8 6.1 111 A. Elbow - B. Elbow 110 1.7 64           INTERPRETATION  -Bilateral median motor nerve conduction study showed prolonged latency on the right, normal amplitude, and conduction velocity  -Bilateral median sensory nerve conduction study showed prolonged peak latency on the right and normal amplitude  -Bilateral ulnar motor nerve conduction study showed normal latency, amplitude, and conduction velocity  -Bilateral ulnar sensory nerve conduction study showed normal peak latency and amplitude  -Bilateral radial sensory nerve conduction study showed normal peak latency and amplitude  -Left combined sensory index was significant     IMPRESSION  1. ABNORMAL study  2. There is electrodiagnostic evidence of a VERY MILD demyelinating median neuropathy (Carpal tunnel syndrome) across the LEFT wrist and a MODERATE demyelinating CTS across the RIGHT wrist   3. There is clinical evidence of cervical myofascial pain    PLAN  1. Follow up with referring provider: Jeana Pina  2. Handouts on CTS provided. Handouts on neck/shoulder exercises and stretches provided. Pt has appt with orthopedics on Wednesday   3. This study is good for one year. If symptoms worsen or do not improve, please re-consult.    Diane  JOSH Thornton.  Physical Medicine and Rehab

## 2024-05-22 ENCOUNTER — OFFICE VISIT (OUTPATIENT)
Dept: ORTHOPEDICS | Facility: CLINIC | Age: 39
End: 2024-05-22
Payer: COMMERCIAL

## 2024-05-22 VITALS — HEIGHT: 64 IN | BODY MASS INDEX: 50.02 KG/M2 | WEIGHT: 293 LBS

## 2024-05-22 DIAGNOSIS — G56.01 CARPAL TUNNEL SYNDROME OF RIGHT WRIST: Primary | ICD-10-CM

## 2024-05-22 PROCEDURE — 1159F MED LIST DOCD IN RCRD: CPT | Mod: CPTII,S$GLB,, | Performed by: ORTHOPAEDIC SURGERY

## 2024-05-22 PROCEDURE — 3008F BODY MASS INDEX DOCD: CPT | Mod: CPTII,S$GLB,, | Performed by: ORTHOPAEDIC SURGERY

## 2024-05-22 PROCEDURE — 99999 PR PBB SHADOW E&M-EST. PATIENT-LVL IV: CPT | Mod: PBBFAC,,, | Performed by: ORTHOPAEDIC SURGERY

## 2024-05-22 PROCEDURE — 3044F HG A1C LEVEL LT 7.0%: CPT | Mod: CPTII,S$GLB,, | Performed by: ORTHOPAEDIC SURGERY

## 2024-05-22 PROCEDURE — 99213 OFFICE O/P EST LOW 20 MIN: CPT | Mod: S$GLB,,, | Performed by: ORTHOPAEDIC SURGERY

## 2024-05-22 PROCEDURE — 3072F LOW RISK FOR RETINOPATHY: CPT | Mod: CPTII,S$GLB,, | Performed by: ORTHOPAEDIC SURGERY

## 2024-05-22 NOTE — PROGRESS NOTES
2024    Chief Complaint:  Chief Complaint   Patient presents with    Right Hand - Pain       HPI:  Lucía Coreas is a 38 y.o. female, who presents to clinic today has a history of bilateral carpal tunnel syndrome.  She underwent a left carpal tunnel release and did well.  He was complaining of numbness and tingling to the right hand.  She has had a nerve conduction study.  He was tried some splinting.  He was here today to discuss further treatment.    PMHX:  Past Medical History:   Diagnosis Date    Allergy     Amenorrhea     Asthma     Diabetes     GERD (gastroesophageal reflux disease)     Infertility associated with anovulation     Iron deficiency anemia     Knee pain     Metabolic syndrome     PCO (polycystic ovaries)     Recurrent boils     Status post repeat low transverse  section 2020    Formatting of this note might be different from the original. With BTL 21       PSHX:  Past Surgical History:   Procedure Laterality Date    BTL      CARPAL TUNNEL RELEASE Left 2019    CARPAL TUNNEL RELEASE Right 2020    Procedure: RELEASE, CARPAL TUNNEL;  Surgeon: Adeel Laughlin MD;  Location: [facility];  Service: Orthopedics;  Laterality: Right;     SECTION      X2    EXPLORATORY LAPAROTOMY      1 week postop with WOUND VAK, reopened uterus    WOUND VAK      3 months post-op Section, 2 weeks in hospital       FMHX:  Family History   Problem Relation Name Age of Onset    Diabetes Father Ruddy     Heart disease Father Ruddy 69    Hypertension Father Ruddy     Prostate cancer Father Ruddy     Diabetes Mother Ludivina     Lupus Mother [place]     AMY disease Brother      Ovarian cancer Sister      Breast cancer Neg Hx         SOCHX:  Social History     Tobacco Use    Smoking status: Never    Smokeless tobacco: Never   Substance Use Topics    Alcohol use: Never       ALLERGIES:  Metformin; Sulfa (sulfonamide antibiotics); Diclofenac; Latex, natural rubber; and Shellfish  containing products    CURRENT MEDICATIONS:  Current Outpatient Medications on File Prior to Visit   Medication Sig Dispense Refill    albuterol (PROVENTIL) 2.5 mg /3 mL (0.083 %) nebulizer solution Take 3 mLs (2.5 mg total) by nebulization every 4 to 6 hours as needed for Wheezing. 60 each 3    albuterol (PROVENTIL/VENTOLIN HFA) 90 mcg/actuation inhaler Inhale 1-2 puffs into the lungs every 4 (four) hours as needed for Wheezing. Rescue 8.5 g 10    azelastine (ASTELIN) 137 mcg (0.1 %) nasal spray 2 sprays (274 mcg total) by Nasal route 2 (two) times daily. 20 mL 5    cholecalciferol, vitamin D3, (VITAMIN D3) 25 mcg (1,000 unit) capsule Take 2 capsules (2,000 Units total) by mouth once daily. 90 capsule 4    clindamycin (CLEOCIN T) 1 % Swab Apply topically 2 (two) times daily. 60 each 2    doxycycline (VIBRAMYCIN) 100 MG Cap Take 1 capsule (100 mg total) by mouth every 12 (twelve) hours. 20 capsule 0    EPINEPHrine (EPIPEN) 0.3 mg/0.3 mL AtIn Inject 0.3 mLs (0.3 mg total) into the muscle once. for 1 dose 2 each 11    EScitalopram oxalate (LEXAPRO) 10 MG tablet Take 1 tablet (10 mg total) by mouth once daily. 30 tablet 11    fluocinolone (DERMA-SMOOTHE) 0.01 % external oil Apply twice daily only as needed to affected areas of scalp/ears until improved, then can stop and restart as needed 118 mL 2    fluocinonide (LIDEX) 0.05 % external solution Apply topically 2 (two) times daily. Use on scalp 60 mL 5    fluticasone furoate-vilanteroL (BREO) 200-25 mcg/dose DsDv diskus inhaler Inhale 1 puff into the lungs once daily. Controller 1 each 4    fluticasone propionate (FLONASE) 50 mcg/actuation nasal spray 1 spray (50 mcg total) by Each Nostril route once daily. 18.2 mL 0    gabapentin (NEURONTIN) 300 MG capsule Take 1 capsule (300 mg total) by mouth 3 (three) times daily. 270 capsule 0    hydrOXYzine HCL (ATARAX) 25 MG tablet Take 1 tablet (25 mg total) by mouth 3 (three) times daily as needed for Anxiety. 30 tablet 12     "iron-vit c-b12-folic acid (IRON 100 PLUS) Tab Take 1 tablet by mouth once daily. 90 tablet 1    ketoconazole (NIZORAL) 2 % shampoo Apply topically once a week. Lather in for 5-10 min before rinsing 360 mL 3    Lactobacillus rhamnosus GG (CULTURELLE) 10 billion cell capsule Take 1 capsule by mouth once daily.      lactulose (CHRONULAC) 10 gram/15 mL solution SMARTSI Tablespoon By Mouth Daily      levocetirizine (XYZAL) 5 MG tablet Take 1 tablet (5 mg total) by mouth every evening. 90 tablet 3    linaCLOtide (LINZESS) 72 mcg Cap capsule Take 1 capsule (72 mcg total) by mouth once daily. 30 capsule 1    methocarbamoL (ROBAXIN) 750 MG Tab Take 750 mg by mouth 4 (four) times daily as needed.      montelukast (SINGULAIR) 10 mg tablet Take 1 tablet (10 mg total) by mouth once daily. 30 tablet 3    mupirocin (BACTROBAN) 2 % ointment Apply topically 3 (three) times daily. 22 g 0    NIFEdipine (PROCARDIA-XL) 30 MG (OSM) 24 hr tablet Take 30 mg by mouth once daily.      NOVOFINE PLUS 32 gauge x 1/6" Ndle use as directed      pantoprazole (PROTONIX) 40 MG tablet Take 1 tablet (40 mg total) by mouth once daily. 90 tablet 3    spironolactone (ALDACTONE) 100 MG tablet Take 1 tablet (100 mg total) by mouth once daily. 90 tablet 3    tiZANidine (ZANAFLEX) 4 MG tablet Take 1 tablet (4 mg total) by mouth every 12 (twelve) hours as needed (muscle spasms/ pain). 40 tablet 0    tretinoin (RETIN-A) 0.05 % cream Apply topically every evening. Use on face 45 g 4     Current Facility-Administered Medications on File Prior to Visit   Medication Dose Route Frequency Provider Last Rate Last Admin    cyanocobalamin injection 1,000 mcg  1,000 mcg Intramuscular Q30 Days Karlee Francis NP        omalizumab injection 150 mg  150 mg Subcutaneous Q14 Days Rossy Kramer MD   150 mg at 05/10/23 0921    omalizumab injection 150 mg  150 mg Subcutaneous Q14 Days Rossy Kramer MD   150 mg at 05/10/23 0920    omalizumab injection 300 mg  300 mg Subcutaneous " "Q28 Days Rossy Kramer MD   300 mg at 04/10/23 0942       REVIEW OF SYSTEMS:  ROS    GENERAL PHYSICAL EXAM:   Ht 5' 4" (1.626 m)   Wt 134.3 kg (296 lb 1.2 oz)   BMI 50.82 kg/m²    GEN: well developed, well nourished, no acute distress   HENT: Normocephalic, atraumatic   EYES: No discharge, conjunctiva normal   NECK: Supple, non-tender   PULM: No wheezing, no respiratory distress   CV: RRR   ABD: Soft, non-tender    ORTHO EXAM:   Examination of the right hand and wrist reveals that there is no edema.  There was no major skin change.  Palpation produces no specific tenderness.  She does have paresthesias and decreased sensation median distribution with intact ulnar and radial sensation.  Has a mildly positive Tinel's and a positive Durkan's test.  She does have a 2+ radial pulse    RADIOLOGY:   EMG nerve conduction study has been reviewed.  It was consistent with moderate right carpal tunnel syndrome    ASSESSMENT:   Right carpal tunnel syndrome    PLAN:  1. I have discussed treatment going forward.  I have discussed steroid injections versus surgical release.  After discussion of the risks and benefits informed consent has been obtained to proceed with right carpal tunnel release     2.  Will proceed with right carpal tunnel release under general anesthesia     3.  She will follow up with me 2 weeks postoperatively     "

## 2024-05-22 NOTE — PATIENT INSTRUCTIONS
Surgery Instructions:     Your surgery is scheduled on 06/25/24 at the surgery center: [address] [facility] [address], 1st floor, second entrance.    The pre-op department will be in contact with you prior to your procedure to review medications and instructions.       Nothing to eat or drink after midnight prior to day of surgery.    The surgery center will contact you the day prior to surgery to advise you of your arrival time for surgery.     Your post op appointment is scheduled on 07/08/24 @ 2:20pm.

## 2024-06-10 ENCOUNTER — PATIENT MESSAGE (OUTPATIENT)
Dept: ORTHOPEDICS | Facility: CLINIC | Age: 39
End: 2024-06-10
Payer: COMMERCIAL

## 2024-06-10 DIAGNOSIS — G56.01 CARPAL TUNNEL SYNDROME OF RIGHT WRIST: Primary | ICD-10-CM

## 2024-06-10 RX ORDER — CEFAZOLIN SODIUM 2 G/50ML
2 SOLUTION INTRAVENOUS
OUTPATIENT
Start: 2024-06-10

## 2024-06-20 ENCOUNTER — PATIENT MESSAGE (OUTPATIENT)
Dept: ORTHOPEDICS | Facility: CLINIC | Age: 39
End: 2024-06-20
Payer: COMMERCIAL

## 2024-06-20 ENCOUNTER — TELEPHONE (OUTPATIENT)
Dept: SURGERY | Facility: HOSPITAL | Age: 39
End: 2024-06-20
Payer: COMMERCIAL

## 2024-06-20 NOTE — TELEPHONE ENCOUNTER
"Pt is scheduled for right CTR on Tues., 6/25/2024. Pt has a BMI of 51. According to our anesthesia guidelines the pt's BMI has to be less than 50 for a "General" anesthesia case. Thank you.   "

## 2024-06-24 ENCOUNTER — PATIENT MESSAGE (OUTPATIENT)
Dept: ORTHOPEDICS | Facility: CLINIC | Age: 39
End: 2024-06-24
Payer: COMMERCIAL

## 2024-06-24 DIAGNOSIS — G56.01 CARPAL TUNNEL SYNDROME OF RIGHT WRIST: Primary | ICD-10-CM

## 2024-06-24 RX ORDER — CEFAZOLIN SODIUM 2 G/50ML
2 SOLUTION INTRAVENOUS
Status: CANCELLED | OUTPATIENT
Start: 2024-06-24

## 2024-06-25 ENCOUNTER — HOSPITAL ENCOUNTER (OUTPATIENT)
Facility: HOSPITAL | Age: 39
Discharge: HOME | End: 2024-06-25
Attending: ORTHOPAEDIC SURGERY | Admitting: ORTHOPAEDIC SURGERY
Payer: COMMERCIAL

## 2024-06-25 VITALS
OXYGEN SATURATION: 100 % | BODY MASS INDEX: 50.02 KG/M2 | SYSTOLIC BLOOD PRESSURE: 141 MMHG | HEIGHT: 64 IN | TEMPERATURE: 98 F | DIASTOLIC BLOOD PRESSURE: 82 MMHG | WEIGHT: 293 LBS | HEART RATE: 80 BPM | RESPIRATION RATE: 20 BRPM

## 2024-06-25 DIAGNOSIS — G56.01 CARPAL TUNNEL SYNDROME OF RIGHT WRIST: ICD-10-CM

## 2024-06-25 PROCEDURE — 36000707: Mod: PO | Performed by: ORTHOPAEDIC SURGERY

## 2024-06-25 PROCEDURE — 25000003 PHARM REV CODE 250: Mod: PO | Performed by: ORTHOPAEDIC SURGERY

## 2024-06-25 PROCEDURE — 71000015 HC POSTOP RECOV 1ST HR: Mod: PO | Performed by: ORTHOPAEDIC SURGERY

## 2024-06-25 PROCEDURE — 63600175 PHARM REV CODE 636 W HCPCS: Mod: PO | Performed by: PHYSICIAN ASSISTANT

## 2024-06-25 PROCEDURE — 36000706: Mod: PO | Performed by: ORTHOPAEDIC SURGERY

## 2024-06-25 PROCEDURE — 64721 CARPAL TUNNEL SURGERY: CPT | Mod: RT,,, | Performed by: ORTHOPAEDIC SURGERY

## 2024-06-25 PROCEDURE — 63600175 PHARM REV CODE 636 W HCPCS: Mod: PO | Performed by: ORTHOPAEDIC SURGERY

## 2024-06-25 RX ORDER — SODIUM CHLORIDE, SODIUM LACTATE, POTASSIUM CHLORIDE, CALCIUM CHLORIDE 600; 310; 30; 20 MG/100ML; MG/100ML; MG/100ML; MG/100ML
INJECTION, SOLUTION INTRAVENOUS CONTINUOUS
Status: DISCONTINUED | OUTPATIENT
Start: 2024-06-25 | End: 2024-06-25 | Stop reason: HOSPADM

## 2024-06-25 RX ORDER — BUPIVACAINE HYDROCHLORIDE 2.5 MG/ML
INJECTION, SOLUTION EPIDURAL; INFILTRATION; INTRACAUDAL
Status: DISCONTINUED | OUTPATIENT
Start: 2024-06-25 | End: 2024-06-25 | Stop reason: HOSPADM

## 2024-06-25 RX ORDER — MUPIROCIN 20 MG/G
OINTMENT TOPICAL 2 TIMES DAILY
Status: DISCONTINUED | OUTPATIENT
Start: 2024-06-25 | End: 2024-06-25 | Stop reason: HOSPADM

## 2024-06-25 RX ORDER — CEFAZOLIN SODIUM 2 G/50ML
2 SOLUTION INTRAVENOUS
Status: COMPLETED | OUTPATIENT
Start: 2024-06-25 | End: 2024-06-25

## 2024-06-25 RX ORDER — LIDOCAINE HYDROCHLORIDE 10 MG/ML
INJECTION, SOLUTION EPIDURAL; INFILTRATION; INTRACAUDAL; PERINEURAL
Status: DISCONTINUED | OUTPATIENT
Start: 2024-06-25 | End: 2024-06-25 | Stop reason: HOSPADM

## 2024-06-25 RX ORDER — OXYCODONE HYDROCHLORIDE 5 MG/1
5 TABLET ORAL EVERY 4 HOURS PRN
Qty: 5 TABLET | Refills: 0 | Status: SHIPPED | OUTPATIENT
Start: 2024-06-25

## 2024-06-25 RX ADMIN — SODIUM CHLORIDE, POTASSIUM CHLORIDE, SODIUM LACTATE AND CALCIUM CHLORIDE: 600; 310; 30; 20 INJECTION, SOLUTION INTRAVENOUS at 09:06

## 2024-06-25 NOTE — H&P
2024    Chief Complaint:  No chief complaint on file.      HPI:  Lucía Coreas is a 38 y.o. female, who presents to the surgery center today she has a history of bilateral carpal tunnel syndrome.  She underwent a left carpal tunnel release and did well.  She was here today to undergo a right carpal tunnel release.  She has no new complaints.    PMHX:  Past Medical History:   Diagnosis Date    Allergy     Amenorrhea     Asthma     Diabetes     GERD (gastroesophageal reflux disease)     Infertility associated with anovulation     Iron deficiency anemia     Knee pain     Metabolic syndrome     PCO (polycystic ovaries)     Recurrent boils     Status post repeat low transverse  section 2020    Formatting of this note might be different from the original. With BTL 21       PSHX:  Past Surgical History:   Procedure Laterality Date    BTL      CARPAL TUNNEL RELEASE Left 2019    CARPAL TUNNEL RELEASE Right 2020    Procedure: RELEASE, CARPAL TUNNEL;  Surgeon: Adeel Laughlin MD;  Location: [facility];  Service: Orthopedics;  Laterality: Right;     SECTION      X2    EXPLORATORY LAPAROTOMY      1 week postop with WOUND VAK, reopened uterus    WOUND VAK      3 months post-op Section, 2 weeks in hospital       FMHX:  Family History   Problem Relation Name Age of Onset    Diabetes Father Ruddy     Heart disease Father Ruddy 69    Hypertension Father Ruddy     Prostate cancer Father Ruddy     Diabetes Mother Ludivina     Lupus Mother Ludivina     AMY disease Brother      Ovarian cancer Sister      Breast cancer Neg Hx         SOCHX:  Social History     Tobacco Use    Smoking status: Never    Smokeless tobacco: Never   Substance Use Topics    Alcohol use: Never       ALLERGIES:  Metformin; Sulfa (sulfonamide antibiotics); Diclofenac; Latex, natural rubber; and Shellfish containing products    CURRENT MEDICATIONS:  No current facility-administered medications on file prior to encounter.      Current Outpatient Medications on File Prior to Encounter   Medication Sig Dispense Refill    cholecalciferol, vitamin D3, (VITAMIN D3) 25 mcg (1,000 unit) capsule Take 2 capsules (2,000 Units total) by mouth once daily. 90 capsule 4    EScitalopram oxalate (LEXAPRO) 10 MG tablet Take 1 tablet (10 mg total) by mouth once daily. 30 tablet 11    fluticasone furoate-vilanteroL (BREO) 200-25 mcg/dose DsDv diskus inhaler Inhale 1 puff into the lungs once daily. Controller 1 each 4    gabapentin (NEURONTIN) 300 MG capsule Take 1 capsule (300 mg total) by mouth 3 (three) times daily. 270 capsule 0    hydrOXYzine HCL (ATARAX) 25 MG tablet Take 1 tablet (25 mg total) by mouth 3 (three) times daily as needed for Anxiety. 30 tablet 12    iron-vit c-b12-folic acid (IRON 100 PLUS) Tab Take 1 tablet by mouth once daily. 90 tablet 1    Lactobacillus rhamnosus GG (CULTURELLE) 10 billion cell capsule Take 1 capsule by mouth once daily.      levocetirizine (XYZAL) 5 MG tablet Take 1 tablet (5 mg total) by mouth every evening. 90 tablet 3    linaCLOtide (LINZESS) 72 mcg Cap capsule Take 1 capsule (72 mcg total) by mouth once daily. 30 capsule 1    pantoprazole (PROTONIX) 40 MG tablet Take 1 tablet (40 mg total) by mouth once daily. 90 tablet 3    spironolactone (ALDACTONE) 100 MG tablet Take 1 tablet (100 mg total) by mouth once daily. 90 tablet 3    albuterol (PROVENTIL) 2.5 mg /3 mL (0.083 %) nebulizer solution Take 3 mLs (2.5 mg total) by nebulization every 4 to 6 hours as needed for Wheezing. 60 each 3    albuterol (PROVENTIL/VENTOLIN HFA) 90 mcg/actuation inhaler Inhale 1-2 puffs into the lungs every 4 (four) hours as needed for Wheezing. Rescue 8.5 g 10    azelastine (ASTELIN) 137 mcg (0.1 %) nasal spray 2 sprays (274 mcg total) by Nasal route 2 (two) times daily. 20 mL 5    clindamycin (CLEOCIN T) 1 % Swab Apply topically 2 (two) times daily. 60 each 2    doxycycline (VIBRAMYCIN) 100 MG Cap Take 1 capsule (100 mg total) by  "mouth every 12 (twelve) hours. 20 capsule 0    EPINEPHrine (EPIPEN) 0.3 mg/0.3 mL AtIn Inject 0.3 mLs (0.3 mg total) into the muscle once. for 1 dose 2 each 11    fluocinolone (DERMA-SMOOTHE) 0.01 % external oil Apply twice daily only as needed to affected areas of scalp/ears until improved, then can stop and restart as needed 118 mL 2    fluocinonide (LIDEX) 0.05 % external solution Apply topically 2 (two) times daily. Use on scalp 60 mL 5    fluticasone propionate (FLONASE) 50 mcg/actuation nasal spray 1 spray (50 mcg total) by Each Nostril route once daily. 18.2 mL 0    ketoconazole (NIZORAL) 2 % shampoo Apply topically once a week. Lather in for 5-10 min before rinsing 360 mL 3    lactulose (CHRONULAC) 10 gram/15 mL solution SMARTSI Tablespoon By Mouth Daily      methocarbamoL (ROBAXIN) 750 MG Tab Take 750 mg by mouth 4 (four) times daily as needed.      montelukast (SINGULAIR) 10 mg tablet Take 1 tablet (10 mg total) by mouth once daily. 30 tablet 3    mupirocin (BACTROBAN) 2 % ointment Apply topically 3 (three) times daily. 22 g 0    NIFEdipine (PROCARDIA-XL) 30 MG (OSM) 24 hr tablet Take 30 mg by mouth once daily.      NOVOFINE PLUS 32 gauge x 1/6" Ndle use as directed      tiZANidine (ZANAFLEX) 4 MG tablet Take 1 tablet (4 mg total) by mouth every 12 (twelve) hours as needed (muscle spasms/ pain). 40 tablet 0    tretinoin (RETIN-A) 0.05 % cream Apply topically every evening. Use on face 45 g 4       REVIEW OF SYSTEMS:  ROS    GENERAL PHYSICAL EXAM:   Ht 5' 4" (1.626 m)   Wt 133.4 kg (294 lb)   LMP 2024   BMI 50.46 kg/m²    GEN: well developed, well nourished, no acute distress   HENT: Normocephalic, atraumatic   EYES: No discharge, conjunctiva normal   NECK: Supple, non-tender   PULM: No wheezing, no respiratory distress   CV: RRR   ABD: Soft, non-tender    ORTHO EXAM:   Examination of the right hand and wrist reveals that there is no edema. There was no major skin change. Palpation produces no " specific tenderness. She does have paresthesias and decreased sensation median distribution with intact ulnar and radial sensation. Has a mildly positive Tinel's and a positive Durkan's test. She does have a 2+ radial pulse     RADIOLOGY:   None    ASSESSMENT:   Right carpal tunnel syndrome    PLAN:  1. Have reviewed the risks and benefits of right carpal tunnel release.  After discussion of the risks and benefits informed consent has been confirmed     2.  Will proceed with right carpal tunnel release under local anesthesia     3.  She will follow up with me in 2 weeks

## 2024-06-25 NOTE — DISCHARGE INSTRUCTIONS
Procedure: right carpal tunnel release    1. Please keep the dressing clean, dry, and in place. Do not take it off and do not get it wet.    2. Please keep the right arm and hand elevated for the 1st 24-48 hours to prevent swelling    3. Flexion and extension of the exposed fingers is encouraged, but do not attempt to push off or lift more than 1-2 pounds with right arm or hand    4. Please limit weightbearing to the right hand to 1-2 pounds. Light activity such as brushing your teeth, using a fork, or lifting a small drink is allowed starting today.    5. Pain medication has been prescribed. Please take them as necessary    6. If there are any questions or concerns please call Dr. Hoffman's office at [phone]    7.  Follow up with Dr. Hoffman in 2 weeks

## 2024-06-25 NOTE — PLAN OF CARE
Vital signs stable. Discharge instructions reviewed with patient and spouse. Instructions written on card for pt. Meds at [facility] pharmacy. Questions answered. Verbalized understanding.

## 2024-06-25 NOTE — OP NOTE
Lucía Coreas  1985    DATE OF SURGERY: 6/25/2024     PRE-OPERATIVE DIAGNOSIS: right Carpal Tunnel Syndrome    POST-OPERATIVE DIAGNOSIS: right Carpal Tunnel Syndrome     ANESTHESIA TYPE: Local    BLOOD LOSS:  Less than 10 cc    TOURNIQUET TIME:  10 minutes    SURGEON: Dr. Hoffman    ASSISTANT:  Rossana Gamble    PROCEDURE: right Carpal Tunnel Release    IMPLANTS: None    SPECIMENS: None    INDICATION:     Ms. Coreas presented to my clinic with a history of right carpal tunnel symptoms. Conservative treatments were initially tried. The patient did have relief with attempts at conservative treatment however has had a return of symptoms. An EMG and nerve conduction study has been performed. That study has shown compression of the median nerve at the wrist consistent with carpal tunnel syndrome. A discussion of the risks and benefits of carpal tunnel release has been performed, and informed consent for the procedure has been obtained.    PROCEDURE IN DETAIL:     Ms. Coreas was transported to the operating room and was placed supine on the operating room table. All appropriate points were padded. The right hand and arm was prepped and draped in the normal sterile fashion. Time out was called. The correct patient, correct operative site, correct procedure, antibiotic administration which consisted of 2 g of Ancef, and allergies to medications which are to Metformin; Sulfa (sulfonamide antibiotics); Diclofenac; Latex, natural rubber; and Shellfish containing products  were reviewed. Time in was then called.     Attention was turned to right hand where a 3 cm incision was made in the palm of the hand. This was carried through the skin and subcutaneous tissues were dissected bluntly. The superficial palmar fascia was identified and was split in line with its fibers. The transverse carpal ligament was then identified and was incised for a distance of approximately 1 cm sharply.The median nerve was visualized and a  carpal tunnel sled was placed below the transverse carpal ligament but above the median nerve. Blunt dissection proximally over the transverse carpal ligament was performed and elevator was placed just above the transverse carpal ligament proximally. The ligament was identified. There were noted to be no penetrating nerve branches and at that point the carpal tunnel knife was used to incise the proximal transverse carpal ligament. Attention was then turned to the distal portion of the transverse carpal ligament. The carpal tunnel sled was again placed underneath the transverse carpal ligament but above the median nerve distally. Blunt dissection above the transverse carpal ligament distally was performed and an elevator was placed. The distal portion of the transverse carpal ligament was visualized and there were noted to be no penetrating branches of the nerve. At that point, tenotomy scissors were used to transect the distal portion of the transverse carpal ligament under direct visualization. The median nerve was then visualized. There were noted to be no specific lesions of the nerve and all of its branches were noted to be intact. The wound was then irrigated copiously. The tourniquet was let down and meticulous hemostasis was obtained with bipolar cautery. The wound was closed with 4-0 nylon suture superficially. The wound was then dressed with Xeroform, 4 x 4's, cast padding and a 3 inch Ace wrap was placed.      The patient was stable in the operating room and was transported to the recovery room in stable condition. All lap, needle, sponge, and equipment counts were correct at the end of the case.    POST-OPERATIVE PLAN:     The patient will keep a soft dressing in place for two weeks at which time she will followup with me. The dressing will be taken down, and the sutures will be removed at that time. She is not to get the dressing wet or to take it off. She will have a 2 pound weight limit of the left  upper extremity for a total of 4 weeks.

## 2024-06-25 NOTE — DISCHARGE SUMMARY
[facility] - Surgery  Discharge Note  Short Stay    Procedure(s) (LRB):  Right carpal tunnel release (Right)      OUTCOME: Patient tolerated treatment/procedure well without complication and is now ready for discharge.    DISPOSITION: Home or Self Care    FINAL DIAGNOSIS:  Carpal tunnel syndrome of right wrist    FOLLOWUP: In clinic    DISCHARGE INSTRUCTIONS:    Discharge Procedure Orders   Diet general     Activity as tolerated     Keep surgical extremity elevated     Lifting restrictions   Order Comments: Please limit lifting with the right arm and hand to 1-2 lb     Leave dressing on - Keep it clean, dry, and intact until clinic visit     Call MD for:  temperature >100.4     Call MD for:  persistent nausea and vomiting     Call MD for:  severe uncontrolled pain     Call MD for:  difficulty breathing, headache or visual disturbances     Call MD for:  redness, tenderness, or signs of infection (pain, swelling, redness, odor or green/yellow discharge around incision site)     Call MD for:  hives     Call MD for:  persistent dizziness or light-headedness     Call MD for:  extreme fatigue        TIME SPENT ON DISCHARGE: 15 minutes

## 2024-06-28 ENCOUNTER — TELEPHONE (OUTPATIENT)
Dept: ORTHOPEDICS | Facility: CLINIC | Age: 39
End: 2024-06-28
Payer: COMMERCIAL

## 2024-06-28 ENCOUNTER — OFFICE VISIT (OUTPATIENT)
Dept: ALLERGY | Facility: CLINIC | Age: 39
End: 2024-06-28
Payer: COMMERCIAL

## 2024-06-28 VITALS — SYSTOLIC BLOOD PRESSURE: 121 MMHG | TEMPERATURE: 98 F | HEART RATE: 81 BPM | DIASTOLIC BLOOD PRESSURE: 87 MMHG

## 2024-06-28 DIAGNOSIS — Z91.040 LATEX ALLERGY: ICD-10-CM

## 2024-06-28 DIAGNOSIS — J45.40 MODERATE PERSISTENT ASTHMA WITHOUT COMPLICATION: ICD-10-CM

## 2024-06-28 DIAGNOSIS — J30.89 ALLERGIC RHINITIS DUE TO INSECT: ICD-10-CM

## 2024-06-28 DIAGNOSIS — J30.1 SEASONAL ALLERGIC RHINITIS DUE TO POLLEN: Primary | ICD-10-CM

## 2024-06-28 DIAGNOSIS — Z91.013 SHELLFISH ALLERGY: ICD-10-CM

## 2024-06-28 DIAGNOSIS — J30.89 ALLERGIC RHINITIS DUE TO AMERICAN HOUSE DUST MITE: ICD-10-CM

## 2024-06-28 PROCEDURE — 99999 PR PBB SHADOW E&M-EST. PATIENT-LVL V: CPT | Mod: PBBFAC,,, | Performed by: ALLERGY & IMMUNOLOGY

## 2024-06-28 RX ORDER — FLUTICASONE FUROATE AND VILANTEROL 200; 25 UG/1; UG/1
1 POWDER RESPIRATORY (INHALATION) DAILY
Qty: 1 EACH | Refills: 4 | Status: SHIPPED | OUTPATIENT
Start: 2024-06-28

## 2024-06-28 RX ORDER — IPRATROPIUM BROMIDE AND ALBUTEROL SULFATE 2.5; .5 MG/3ML; MG/3ML
3 SOLUTION RESPIRATORY (INHALATION)
Status: COMPLETED | OUTPATIENT
Start: 2024-06-28 | End: 2024-06-28

## 2024-06-28 RX ADMIN — IPRATROPIUM BROMIDE AND ALBUTEROL SULFATE 3 ML: 2.5; .5 SOLUTION RESPIRATORY (INHALATION) at 11:06

## 2024-06-28 NOTE — TELEPHONE ENCOUNTER
----- Message from Robbin Mcqueen sent at 6/28/2024 10:40 AM CDT -----  Patient would like a callback regarding her post op rash and itching.     [phone]

## 2024-06-28 NOTE — TELEPHONE ENCOUNTER
Returned call to pt, she stated she has hives she thinks from the tape she used to cover her dressing to take a shower. Informed per  she can come to clinic today and have dressing re-wrapped to make sure she isnt having a reaction to the dressing. Pt verbalized understanding and stated she will come today.

## 2024-06-28 NOTE — PROGRESS NOTES
Subjective:              Patient ID: Lucía Coreas is a 38 y.o. female.        Chief Complaint:  Follow-up    HPI: 10/2/2023: 38 year old female with a history of Allergic Rhinitis (pollen, cockroach, and dust mites, food allergy (shellfish), Asthma, and latex allergy here for follow up.    Allergic Rhinitis- previously on SCIT-stopped during pregnancy. She did not wish to restart post partum.  She stopped Xolair (300 mg every 14 days)for the last four months due to change of job. She switched insurances and did not restart xolair.    Astelin, Xyzal, Flonase and Singulair    Food Allergy-avoiding shellfish  Avoiding shellfish  No accidental ingestions      Asthma  No Xolair for four months due to change of job.   No Er or urgent care  No oral steroids for asthma  She reports cough and wheezing.  Breo    Nasal congestion at night    Latex Allergy  Avoiding Latex                      Past Medical History:   Diagnosis Date    Allergy     Amenorrhea     Asthma     Diabetes     GERD (gastroesophageal reflux disease)     Infertility associated with anovulation     Iron deficiency anemia     Knee pain     Metabolic syndrome     PCO (polycystic ovaries)     Recurrent boils     Status post repeat low transverse  section 2020    Formatting of this note might be different from the original. With BTL 21     Family History   Problem Relation Name Age of Onset    Diabetes Father Ruddy     Heart disease Father Ruddy 69    Hypertension Father Ruddy     Prostate cancer Father Ruddy     Diabetes Mother Ludivina     Lupus Mother [place]     AMY disease Brother      Ovarian cancer Sister      Breast cancer Neg Hx       Current Outpatient Medications on File Prior to Visit   Medication Sig Dispense Refill    albuterol (PROVENTIL) 2.5 mg /3 mL (0.083 %) nebulizer solution Take 3 mLs (2.5 mg total) by nebulization every 4 to 6 hours as needed for Wheezing. 60 each 3    albuterol (PROVENTIL/VENTOLIN HFA) 90  mcg/actuation inhaler Inhale 1-2 puffs into the lungs every 4 (four) hours as needed for Wheezing. Rescue 8.5 g 10    azelastine (ASTELIN) 137 mcg (0.1 %) nasal spray 2 sprays (274 mcg total) by Nasal route 2 (two) times daily. 20 mL 5    cholecalciferol, vitamin D3, (VITAMIN D3) 25 mcg (1,000 unit) capsule Take 2 capsules (2,000 Units total) by mouth once daily. 90 capsule 4    clindamycin (CLEOCIN T) 1 % Swab Apply topically 2 (two) times daily. 60 each 2    doxycycline (VIBRAMYCIN) 100 MG Cap Take 1 capsule (100 mg total) by mouth every 12 (twelve) hours. 20 capsule 0    EScitalopram oxalate (LEXAPRO) 10 MG tablet Take 1 tablet (10 mg total) by mouth once daily. 30 tablet 11    fluocinolone (DERMA-SMOOTHE) 0.01 % external oil Apply twice daily only as needed to affected areas of scalp/ears until improved, then can stop and restart as needed 118 mL 2    fluocinonide (LIDEX) 0.05 % external solution Apply topically 2 (two) times daily. Use on scalp 60 mL 5    fluticasone propionate (FLONASE) 50 mcg/actuation nasal spray 1 spray (50 mcg total) by Each Nostril route once daily. 18.2 mL 0    gabapentin (NEURONTIN) 300 MG capsule Take 1 capsule (300 mg total) by mouth 3 (three) times daily. 270 capsule 0    hydrOXYzine HCL (ATARAX) 25 MG tablet Take 1 tablet (25 mg total) by mouth 3 (three) times daily as needed for Anxiety. 30 tablet 12    iron-vit c-b12-folic acid (IRON 100 PLUS) Tab Take 1 tablet by mouth once daily. 90 tablet 1    ketoconazole (NIZORAL) 2 % shampoo Apply topically once a week. Lather in for 5-10 min before rinsing 360 mL 3    Lactobacillus rhamnosus GG (CULTURELLE) 10 billion cell capsule Take 1 capsule by mouth once daily.      lactulose (CHRONULAC) 10 gram/15 mL solution SMARTSI Tablespoon By Mouth Daily      levocetirizine (XYZAL) 5 MG tablet Take 1 tablet (5 mg total) by mouth every evening. 90 tablet 3    linaCLOtide (LINZESS) 72 mcg Cap capsule Take 1 capsule (72 mcg total) by mouth once  "daily. 30 capsule 1    methocarbamoL (ROBAXIN) 750 MG Tab Take 750 mg by mouth 4 (four) times daily as needed.      montelukast (SINGULAIR) 10 mg tablet Take 1 tablet (10 mg total) by mouth once daily. 30 tablet 3    mupirocin (BACTROBAN) 2 % ointment Apply topically 3 (three) times daily. 22 g 0    NIFEdipine (PROCARDIA-XL) 30 MG (OSM) 24 hr tablet Take 30 mg by mouth once daily.      NOVOFINE PLUS 32 gauge x 1/6" Ndle use as directed      oxyCODONE (ROXICODONE) 5 MG immediate release tablet Take 1 tablet (5 mg total) by mouth every 4 (four) hours as needed for Pain. 5 tablet 0    pantoprazole (PROTONIX) 40 MG tablet Take 1 tablet (40 mg total) by mouth once daily. 90 tablet 3    spironolactone (ALDACTONE) 100 MG tablet Take 1 tablet (100 mg total) by mouth once daily. 90 tablet 3    tiZANidine (ZANAFLEX) 4 MG tablet Take 1 tablet (4 mg total) by mouth every 12 (twelve) hours as needed (muscle spasms/ pain). 40 tablet 0    tretinoin (RETIN-A) 0.05 % cream Apply topically every evening. Use on face 45 g 4    [DISCONTINUED] fluticasone furoate-vilanteroL (BREO) 200-25 mcg/dose DsDv diskus inhaler Inhale 1 puff into the lungs once daily. Controller 1 each 4    EPINEPHrine (EPIPEN) 0.3 mg/0.3 mL AtIn Inject 0.3 mLs (0.3 mg total) into the muscle once. for 1 dose 2 each 11     Current Facility-Administered Medications on File Prior to Visit   Medication Dose Route Frequency Provider Last Rate Last Admin    cyanocobalamin injection 1,000 mcg  1,000 mcg Intramuscular Q30 Days Karlee Francis NP        [DISCONTINUED] omalizumab injection 150 mg  150 mg Subcutaneous Q14 Days Rossy Kramer MD   150 mg at 05/10/23 0921    [DISCONTINUED] omalizumab injection 150 mg  150 mg Subcutaneous Q14 Days Rossy Kramer MD   150 mg at 05/10/23 0920    [DISCONTINUED] omalizumab injection 300 mg  300 mg Subcutaneous Q28 Days Rossy Kramer MD   300 mg at 04/10/23 0970     Review of patient's allergies indicates:   Allergen Reactions    Metformin " Diarrhea    Sulfa (sulfonamide antibiotics) Anaphylaxis and Swelling     Swelling (eyes)^, Swelling (throat)^  Swelling (eyes)^, Swelling (throat)^      Adhesive      rash    Diclofenac      Gastritis     Latex, natural rubber      Contact dermatitis    Other reaction(s): Other (See Comments)  Contact dermatitis    Shellfish containing products      anaphylaxis       Environmental History:  No pets. Not a smoker.        ROS:  Negative aside from those items mentioned in the HPI.    Objective:     Physical Exam  Vitals reviewed.   Constitutional:       General: She is not in acute distress.     Appearance: Normal appearance. She is obese. She is not ill-appearing, toxic-appearing or diaphoretic.   HENT:      Head: Normocephalic and atraumatic.      Right Ear: Tympanic membrane, ear canal and external ear normal. There is no impacted cerumen.      Left Ear: Tympanic membrane, ear canal and external ear normal. There is no impacted cerumen.      Nose: Nose normal. No congestion or rhinorrhea.      Mouth/Throat:      Mouth: Mucous membranes are moist.      Pharynx: No oropharyngeal exudate or posterior oropharyngeal erythema.   Eyes:      General: No scleral icterus.        Right eye: No discharge.         Left eye: No discharge.   Cardiovascular:      Rate and Rhythm: Normal rate and regular rhythm.      Heart sounds: Normal heart sounds. No murmur heard.     No friction rub. No gallop.   Pulmonary:      Effort: Pulmonary effort is normal. No respiratory distress.      Breath sounds: Normal breath sounds. No stridor. No wheezing, rhonchi or rales.   Chest:      Chest wall: No tenderness.   Abdominal:      Palpations: Abdomen is soft.   Musculoskeletal:         General: No swelling, tenderness, deformity or signs of injury.      Cervical back: Normal range of motion and neck supple. No rigidity or tenderness.   Lymphadenopathy:      Cervical: No cervical adenopathy.   Skin:     General: Skin is warm.      Coloration:  Skin is not jaundiced or pale.      Findings: Rash present. No bruising, erythema or lesion.      Comments: Right arm- s/p surgery- erythema, raised lesions- started after tape   Neurological:      General: No focal deficit present.      Mental Status: She is alert and oriented to person, place, and time.      Motor: No weakness.      Gait: Gait normal.   Psychiatric:         Mood and Affect: Mood normal.         Behavior: Behavior normal.         Thought Content: Thought content normal.         Judgment: Judgment normal.     Spirometry:  FEV1   75%  FVC   68%  FEV1/%  Mild restriction  Duoneb given  Post  FEV1  85%- 14% improvement  FVC    76% - 12 % improvement  FEV1/FVC  111%  Significant improvement in restriction    Assessment:            Seasonal allergic rhinitis due to pollen    Allergic rhinitis due to insect    Allergic rhinitis due to [employer] house dust mite    Shellfish allergy    Latex allergy    Moderate persistent asthma without complication  -     fluticasone furoate-vilanteroL (BREO) 200-25 mcg/dose DsDv diskus inhaler; Inhale 1 puff into the lungs once daily. Controller  Dispense: 1 each; Refill: 4    Other orders  -     albuterol-ipratropium 2.5 mg-0.5 mg/3 mL nebulizer solution 3 mL            Due to significant improvement in spirometry post Duoneb, should consider restarting Xolair.  Not taking Breo consistently. Discussed the importance of taking daily.    Continue Breo and other medications  Continue Singulair and Xyzal  Avoid shellfish and shellfish products.  Epipen 2 pack  Avoid latex     Visit today included increased complexity associated with the care of the episodic problem Moderate persistent asthma and food allergies, as well as allergic rhinitis addressed and managing the longitudinal care of the patient due to the serious and/or complex managed problem(s) Moderate persistent asthma and food allergies, as well as allergic rhinitis.       RTC 4 months or sooner, if  needed.        YANI JOSHI                  I spent a total of 51 minutes on the day of the visit.This includes face to face time and non-face to face time preparing to see the patient (eg, review of tests), obtaining and/or reviewing separately obtained history, documenting clinical information in the electronic or other health record, independently interpreting results and communicating results to the patient/family/caregiver, or care coordinator.

## 2024-06-30 ENCOUNTER — PATIENT MESSAGE (OUTPATIENT)
Dept: ORTHOPEDICS | Facility: CLINIC | Age: 39
End: 2024-06-30
Payer: COMMERCIAL

## 2024-07-01 ENCOUNTER — PATIENT MESSAGE (OUTPATIENT)
Dept: ALLERGY | Facility: CLINIC | Age: 39
End: 2024-07-01
Payer: COMMERCIAL

## 2024-07-01 RX ORDER — TRIAMCINOLONE ACETONIDE 5 MG/G
CREAM TOPICAL 2 TIMES DAILY
Qty: 60 G | Refills: 2 | Status: SHIPPED | OUTPATIENT
Start: 2024-07-01

## 2024-07-08 ENCOUNTER — PATIENT MESSAGE (OUTPATIENT)
Dept: ORTHOPEDICS | Facility: CLINIC | Age: 39
End: 2024-07-08

## 2024-07-08 ENCOUNTER — OFFICE VISIT (OUTPATIENT)
Dept: ORTHOPEDICS | Facility: CLINIC | Age: 39
End: 2024-07-08
Payer: COMMERCIAL

## 2024-07-08 DIAGNOSIS — G56.01 CARPAL TUNNEL SYNDROME OF RIGHT WRIST: Primary | ICD-10-CM

## 2024-07-08 PROCEDURE — 99024 POSTOP FOLLOW-UP VISIT: CPT | Mod: S$GLB,,, | Performed by: ORTHOPAEDIC SURGERY

## 2024-07-08 PROCEDURE — 1159F MED LIST DOCD IN RCRD: CPT | Mod: CPTII,S$GLB,, | Performed by: ORTHOPAEDIC SURGERY

## 2024-07-08 PROCEDURE — 3044F HG A1C LEVEL LT 7.0%: CPT | Mod: CPTII,S$GLB,, | Performed by: ORTHOPAEDIC SURGERY

## 2024-07-08 PROCEDURE — 99999 PR PBB SHADOW E&M-EST. PATIENT-LVL IV: CPT | Mod: PBBFAC,,, | Performed by: ORTHOPAEDIC SURGERY

## 2024-07-08 PROCEDURE — 3072F LOW RISK FOR RETINOPATHY: CPT | Mod: CPTII,S$GLB,, | Performed by: ORTHOPAEDIC SURGERY

## 2024-07-08 NOTE — LETTER
July 8, 2024      [facility]  [address]  [place] [address]  Phone: [phone]       Patient: Lucía Coreas   YOB: 1985  Date of Visit: 07/08/2024    To Whom It May Concern:    Bhupinder Coreas  was at [facility] on 07/08/2024. The patient may return to work on 07/09/2024 with restrictions of 3-5lbs lifting with the right hand.. If you have any questions or concerns, or if I can be of further assistance, please do not hesitate to contact me.    Sincerely,    Santosh Hoffman MD

## 2024-07-08 NOTE — PROGRESS NOTES
Lucía Coreas is a 38 y.o. female who is approximately 2 weeks status post right carpal tunnel release. She is doing very well. She states that the majority of carpal tunnel symptoms are improved or resolved.    Physical exam: Examination of the right hand reveals that the incision is healing well. There is no significant edema,  erythema or drainage. Sensation is grossly intact median radial and ulnar distributions. Motor function of the thenar musculature is intact. Capillary refill less than 2 seconds in all of the digits. The Patient is able to make a full composite fist and fully extend the fingers without significant pain.    Assessment: Status post right carpal tunnel release    Plan:    1. Sutures were removed today and Steri-Strips are placed    2. Can begin hand washing in running water tomorrow    3. Continue a 2-3 pound weight limit to the arm and hand    4. Follow up with me in 2 weeks for repeat evaluation

## 2024-07-11 ENCOUNTER — PATIENT MESSAGE (OUTPATIENT)
Dept: ORTHOPEDICS | Facility: CLINIC | Age: 39
End: 2024-07-11
Payer: COMMERCIAL

## 2024-07-11 DIAGNOSIS — M25.562 LEFT KNEE PAIN, UNSPECIFIED CHRONICITY: Primary | ICD-10-CM

## 2024-07-15 ENCOUNTER — PATIENT MESSAGE (OUTPATIENT)
Dept: ALLERGY | Facility: CLINIC | Age: 39
End: 2024-07-15
Payer: COMMERCIAL

## 2024-07-15 ENCOUNTER — PATIENT MESSAGE (OUTPATIENT)
Dept: ADMINISTRATIVE | Facility: HOSPITAL | Age: 39
End: 2024-07-15
Payer: COMMERCIAL

## 2024-07-17 ENCOUNTER — PATIENT OUTREACH (OUTPATIENT)
Dept: ADMINISTRATIVE | Facility: HOSPITAL | Age: 39
End: 2024-07-17
Payer: COMMERCIAL

## 2024-07-17 DIAGNOSIS — E11.65 TYPE 2 DIABETES MELLITUS WITH HYPERGLYCEMIA, WITHOUT LONG-TERM CURRENT USE OF INSULIN: Primary | Chronic | ICD-10-CM

## 2024-07-17 NOTE — PROGRESS NOTES
Replying to Campaign Questionnaire for Overdue HM: DM EYE EXAM, URINE SCREENING     Microalbumin scheduled.  Message sent to patient asking who is her ophthalmologist

## 2024-07-24 ENCOUNTER — OFFICE VISIT (OUTPATIENT)
Dept: ORTHOPEDICS | Facility: CLINIC | Age: 39
End: 2024-07-24
Payer: COMMERCIAL

## 2024-07-24 DIAGNOSIS — G56.01 CARPAL TUNNEL SYNDROME OF RIGHT WRIST: Primary | ICD-10-CM

## 2024-07-24 PROCEDURE — 3072F LOW RISK FOR RETINOPATHY: CPT | Mod: CPTII,S$GLB,, | Performed by: ORTHOPAEDIC SURGERY

## 2024-07-24 PROCEDURE — 99999 PR PBB SHADOW E&M-EST. PATIENT-LVL IV: CPT | Mod: PBBFAC,,, | Performed by: ORTHOPAEDIC SURGERY

## 2024-07-24 PROCEDURE — 1159F MED LIST DOCD IN RCRD: CPT | Mod: CPTII,S$GLB,, | Performed by: ORTHOPAEDIC SURGERY

## 2024-07-24 PROCEDURE — 99024 POSTOP FOLLOW-UP VISIT: CPT | Mod: S$GLB,,, | Performed by: ORTHOPAEDIC SURGERY

## 2024-07-24 PROCEDURE — 3044F HG A1C LEVEL LT 7.0%: CPT | Mod: CPTII,S$GLB,, | Performed by: ORTHOPAEDIC SURGERY

## 2024-07-24 NOTE — LETTER
July 24, 2024      [facility]  [address] [place] [address]  Phone: [phone]       Patient: Lucía Coreas   YOB: 1985  Date of Visit: 07/24/2024    To Whom It May Concern:    Bhupinder Coreas  was at [facility] on 07/24/2024. The patient may return to work/school on 7/25/2024 with no restrictions. If you have any questions or concerns, or if I can be of further assistance, please do not hesitate to contact me.    Sincerely,    Santosh Hoffman MD

## 2024-07-24 NOTE — PROGRESS NOTES
Ms Coreas returns to clinic today.  She was status post right carpal tunnel release.  She was overall doing well.  She has been having some shocking or burning type sensation.  She still has some soreness over the scar     Physical exam: Examination of the right hand reveals that the wound is healing well.  Has a small amount of scar tissue noted.  Palpation over the incision site does produce mild tenderness.  She does report intact sensation in the median, radial, and ulnar distributions.  He was able make a full composite fist and fully extend the fingers     Assessment: Status post right carpal tunnel release     Plan:     1.  She can begin gentle scar massage with vitamin-E or cocoa butter     2. She can increase her weight-bearing however she tolerates    3.  She will follow up as needed.

## 2024-07-24 NOTE — LETTER
July 24, 2024      [facility]  [address] [place] [address]  Phone: [phone]       Patient: Lucía Coreas   YOB: 1985  Date of Visit: 07/24/2024    To Whom It May Concern:    Bhupinder Coreas  was at [facility] on 07/24/2024. The patient may return to work/school on 7/24/2024 with no restrictions. If you have any questions or concerns, or if I can be of further assistance, please do not hesitate to contact me.    Sincerely,    Santosh Hoffman MD

## 2024-08-08 ENCOUNTER — PATIENT MESSAGE (OUTPATIENT)
Dept: ORTHOPEDICS | Facility: CLINIC | Age: 39
End: 2024-08-08
Payer: COMMERCIAL

## 2024-08-08 DIAGNOSIS — Z98.890 STATUS POST CARPAL TUNNEL RELEASE: Primary | ICD-10-CM

## 2024-08-30 ENCOUNTER — TELEPHONE (OUTPATIENT)
Dept: ORTHOPEDICS | Facility: CLINIC | Age: 39
End: 2024-08-30
Payer: COMMERCIAL

## 2024-08-30 ENCOUNTER — PATIENT MESSAGE (OUTPATIENT)
Dept: ORTHOPEDICS | Facility: CLINIC | Age: 39
End: 2024-08-30
Payer: COMMERCIAL

## 2024-08-30 DIAGNOSIS — M54.41 CHRONIC LOW BACK PAIN WITH BILATERAL SCIATICA, UNSPECIFIED BACK PAIN LATERALITY: ICD-10-CM

## 2024-08-30 DIAGNOSIS — M47.819 SPONDYLOARTHRITIS: ICD-10-CM

## 2024-08-30 DIAGNOSIS — M54.42 CHRONIC LOW BACK PAIN WITH BILATERAL SCIATICA, UNSPECIFIED BACK PAIN LATERALITY: ICD-10-CM

## 2024-08-30 DIAGNOSIS — F41.9 ANXIETY: ICD-10-CM

## 2024-08-30 DIAGNOSIS — O99.512 ASTHMA AFFECTING PREGNANCY IN SECOND TRIMESTER: ICD-10-CM

## 2024-08-30 DIAGNOSIS — J45.909 ASTHMA AFFECTING PREGNANCY IN SECOND TRIMESTER: ICD-10-CM

## 2024-08-30 DIAGNOSIS — L73.2 HIDRADENITIS SUPPURATIVA: ICD-10-CM

## 2024-08-30 DIAGNOSIS — E11.59 HYPERTENSION ASSOCIATED WITH DIABETES: ICD-10-CM

## 2024-08-30 DIAGNOSIS — I15.2 HYPERTENSION ASSOCIATED WITH DIABETES: ICD-10-CM

## 2024-08-30 DIAGNOSIS — G89.29 CHRONIC LOW BACK PAIN WITH BILATERAL SCIATICA, UNSPECIFIED BACK PAIN LATERALITY: ICD-10-CM

## 2024-08-30 RX ORDER — NAPROXEN 500 MG/1
500 TABLET ORAL 2 TIMES DAILY
Qty: 20 TABLET | Refills: 0 | Status: SHIPPED | OUTPATIENT
Start: 2024-08-30 | End: 2024-09-09

## 2024-08-30 RX ORDER — METHOCARBAMOL 750 MG/1
750 TABLET, FILM COATED ORAL 4 TIMES DAILY PRN
Qty: 60 TABLET | Refills: 1 | Status: SHIPPED | OUTPATIENT
Start: 2024-08-30 | End: 2024-08-30

## 2024-08-30 RX ORDER — ESCITALOPRAM OXALATE 10 MG/1
10 TABLET ORAL DAILY
Qty: 90 TABLET | Refills: 1 | Status: SHIPPED | OUTPATIENT
Start: 2024-08-30

## 2024-08-30 RX ORDER — PANTOPRAZOLE SODIUM 40 MG/1
40 TABLET, DELAYED RELEASE ORAL DAILY
Qty: 90 TABLET | Refills: 1 | Status: SHIPPED | OUTPATIENT
Start: 2024-08-30

## 2024-08-30 RX ORDER — METHOCARBAMOL 750 MG/1
750 TABLET, FILM COATED ORAL 4 TIMES DAILY PRN
Qty: 60 TABLET | Refills: 1 | Status: SHIPPED | OUTPATIENT
Start: 2024-08-30

## 2024-08-30 RX ORDER — ALBUTEROL SULFATE 90 UG/1
1-2 INHALANT RESPIRATORY (INHALATION) EVERY 4 HOURS PRN
Qty: 8.5 G | Refills: 10 | Status: SHIPPED | OUTPATIENT
Start: 2024-08-30

## 2024-08-30 RX ORDER — GABAPENTIN 300 MG/1
300 CAPSULE ORAL 3 TIMES DAILY
Qty: 270 CAPSULE | Refills: 0 | Status: SHIPPED | OUTPATIENT
Start: 2024-08-30 | End: 2025-08-30

## 2024-08-30 RX ORDER — SPIRONOLACTONE 100 MG/1
100 TABLET, FILM COATED ORAL DAILY
Qty: 90 TABLET | Refills: 1 | Status: SHIPPED | OUTPATIENT
Start: 2024-08-30

## 2024-08-30 RX ORDER — GABAPENTIN 300 MG/1
300 CAPSULE ORAL 3 TIMES DAILY
Qty: 270 CAPSULE | Refills: 0 | Status: CANCELLED | OUTPATIENT
Start: 2024-08-30

## 2024-08-30 NOTE — TELEPHONE ENCOUNTER
No care due was identified.  [facility] Embedded Care Due Messages. Reference number: [id number].   8/30/2024 3:30:52 AM CDT

## 2024-08-30 NOTE — TELEPHONE ENCOUNTER
Refill Routing Note   Medication(s) are not appropriate for processing by [facility] for the following reason(s):        Outside of protocol: Gabapentin  Due for refill >6 months ago: Aldactone last dispensed 9/5/23 for 90 day supply per Epic data     [facility] action(s):  Defer  Route  Approve           Pharmacist review requested: Yes   Extended chart review required: Yes     Appointments  past 12m or future 3m with PCP    Date Provider   Last Visit   3/27/2024 Dante Negro MD   Next Visit   8/30/2024 Dante Negro MD   ED visits in past 90 days: 0        Note composed:1:53 PM 08/30/2024

## 2024-08-30 NOTE — TELEPHONE ENCOUNTER
Refill Routing Note   Medication(s) are not appropriate for processing by [facility] for the following reason(s):        Outside of protocol  Due for refill >6 months ago  Drug-disease interaction: pantoprazole and Other forms of systemic lupus erythematosus     [facility] action(s):  Defer  Route  Approve      Medication Therapy Plan:  Drug-Disease: pantoprazole and Other forms of systemic lupus erythematosus;  Per adherence reporting, last pickup of spironolactone 9.5.2023    Pharmacist review requested: Yes     Appointments  past 12m or future 3m with PCP    Date Provider   Last Visit   3/27/2024 Dante Negro MD   Next Visit   10/3/2024 Dante Negro MD   ED visits in past 90 days: 0        Note composed:12:46 PM 08/30/2024

## 2024-09-09 DIAGNOSIS — Z79.899 ENCOUNTER FOR LONG-TERM (CURRENT) USE OF MEDICATIONS: ICD-10-CM

## 2024-09-09 DIAGNOSIS — F41.9 ANXIETY: ICD-10-CM

## 2024-09-09 DIAGNOSIS — G47.00 INSOMNIA, UNSPECIFIED TYPE: ICD-10-CM

## 2024-09-09 RX ORDER — HYDROXYZINE HYDROCHLORIDE 25 MG/1
25 TABLET, FILM COATED ORAL 3 TIMES DAILY PRN
Qty: 30 TABLET | Refills: 12 | Status: SHIPPED | OUTPATIENT
Start: 2024-09-09

## 2024-09-09 NOTE — TELEPHONE ENCOUNTER
Refill Routing Note   Medication(s) are not appropriate for processing by [facility] for the following reason(s):        Outside of protocol    [facility] action(s):  Route             Appointments  past 12m or future 3m with PCP    Date Provider   Last Visit   3/27/2024 Dante Negro MD   Next Visit   10/3/2024 Dante Negro MD   ED visits in past 90 days: 0        Note composed:11:40 AM 09/09/2024

## 2024-09-11 ENCOUNTER — PATIENT MESSAGE (OUTPATIENT)
Dept: FAMILY MEDICINE | Facility: CLINIC | Age: 39
End: 2024-09-11
Payer: COMMERCIAL

## 2024-09-30 ENCOUNTER — TELEPHONE (OUTPATIENT)
Dept: FAMILY MEDICINE | Facility: CLINIC | Age: 39
End: 2024-09-30
Payer: COMMERCIAL

## 2024-09-30 NOTE — TELEPHONE ENCOUNTER
----- Message from Kindra sent at 9/30/2024  4:18 PM CDT -----  Contact: Patient, [phone]  Patient is returning a phone call.    Who left a message for the patient: ?    Does patient know what this is regarding:  Lab orders    Would you like a call back, or a response through your [facility] portal?:   Portal    Comments: Missed your call, please send a message in the portal. Thanks.

## 2024-10-02 ENCOUNTER — LAB VISIT (OUTPATIENT)
Dept: LAB | Facility: HOSPITAL | Age: 39
End: 2024-10-02
Attending: FAMILY MEDICINE
Payer: COMMERCIAL

## 2024-10-02 DIAGNOSIS — Z13.6 ENCOUNTER FOR LIPID SCREENING FOR CARDIOVASCULAR DISEASE: ICD-10-CM

## 2024-10-02 DIAGNOSIS — Z13.220 ENCOUNTER FOR LIPID SCREENING FOR CARDIOVASCULAR DISEASE: ICD-10-CM

## 2024-10-02 DIAGNOSIS — E11.9 TYPE 2 DIABETES MELLITUS WITHOUT COMPLICATION: ICD-10-CM

## 2024-10-02 DIAGNOSIS — Z00.00 WELL ADULT EXAM: ICD-10-CM

## 2024-10-02 DIAGNOSIS — Z79.899 ENCOUNTER FOR LONG-TERM (CURRENT) USE OF MEDICATIONS: ICD-10-CM

## 2024-10-02 LAB
ALBUMIN SERPL BCP-MCNC: 3.6 G/DL (ref 3.5–5.2)
ALBUMIN/CREAT UR: NORMAL UG/MG (ref 0–30)
ALP SERPL-CCNC: 61 U/L (ref 55–135)
ALT SERPL W/O P-5'-P-CCNC: 13 U/L (ref 10–44)
ANION GAP SERPL CALC-SCNC: 8 MMOL/L (ref 8–16)
AST SERPL-CCNC: 15 U/L (ref 10–40)
BILIRUB SERPL-MCNC: 0.3 MG/DL (ref 0.1–1)
BUN SERPL-MCNC: 7 MG/DL (ref 6–20)
CALCIUM SERPL-MCNC: 8.9 MG/DL (ref 8.7–10.5)
CHLORIDE SERPL-SCNC: 109 MMOL/L (ref 95–110)
CHOLEST SERPL-MCNC: 168 MG/DL (ref 120–199)
CHOLEST/HDLC SERPL: 2.9 {RATIO} (ref 2–5)
CO2 SERPL-SCNC: 24 MMOL/L (ref 23–29)
CREAT SERPL-MCNC: 0.7 MG/DL (ref 0.5–1.4)
CREAT UR-MCNC: 115 MG/DL (ref 15–325)
ERYTHROCYTE [DISTWIDTH] IN BLOOD BY AUTOMATED COUNT: 15.1 % (ref 11.5–14.5)
EST. GFR  (NO RACE VARIABLE): >60 ML/MIN/1.73 M^2
ESTIMATED AVG GLUCOSE: 114 MG/DL (ref 68–131)
GLUCOSE SERPL-MCNC: 98 MG/DL (ref 70–110)
HBA1C MFR BLD: 5.6 % (ref 4–5.6)
HCT VFR BLD AUTO: 37.3 % (ref 37–48.5)
HDLC SERPL-MCNC: 57 MG/DL (ref 40–75)
HDLC SERPL: 33.9 % (ref 20–50)
HGB BLD-MCNC: 11.7 G/DL (ref 12–16)
LDLC SERPL CALC-MCNC: 98.2 MG/DL (ref 63–159)
MCH RBC QN AUTO: 24.2 PG (ref 27–31)
MCHC RBC AUTO-ENTMCNC: 31.4 G/DL (ref 32–36)
MCV RBC AUTO: 77 FL (ref 82–98)
MICROALBUMIN UR DL<=1MG/L-MCNC: <5 UG/ML
NONHDLC SERPL-MCNC: 111 MG/DL
PLATELET # BLD AUTO: 295 K/UL (ref 150–450)
PMV BLD AUTO: 10.6 FL (ref 9.2–12.9)
POTASSIUM SERPL-SCNC: 4 MMOL/L (ref 3.5–5.1)
PROT SERPL-MCNC: 7.2 G/DL (ref 6–8.4)
RBC # BLD AUTO: 4.84 M/UL (ref 4–5.4)
SODIUM SERPL-SCNC: 141 MMOL/L (ref 136–145)
TRIGL SERPL-MCNC: 64 MG/DL (ref 30–150)
TSH SERPL DL<=0.005 MIU/L-ACNC: 1.71 UIU/ML (ref 0.4–4)
WBC # BLD AUTO: 7.57 K/UL (ref 3.9–12.7)

## 2024-10-02 PROCEDURE — 83036 HEMOGLOBIN GLYCOSYLATED A1C: CPT | Performed by: FAMILY MEDICINE

## 2024-10-02 PROCEDURE — 80053 COMPREHEN METABOLIC PANEL: CPT | Performed by: FAMILY MEDICINE

## 2024-10-02 PROCEDURE — 80061 LIPID PANEL: CPT | Performed by: FAMILY MEDICINE

## 2024-10-02 PROCEDURE — 82570 ASSAY OF URINE CREATININE: CPT | Performed by: FAMILY MEDICINE

## 2024-10-02 PROCEDURE — 85027 COMPLETE CBC AUTOMATED: CPT | Performed by: FAMILY MEDICINE

## 2024-10-02 PROCEDURE — 84443 ASSAY THYROID STIM HORMONE: CPT | Performed by: FAMILY MEDICINE

## 2024-10-03 ENCOUNTER — OFFICE VISIT (OUTPATIENT)
Dept: FAMILY MEDICINE | Facility: CLINIC | Age: 39
End: 2024-10-03
Payer: COMMERCIAL

## 2024-10-03 VITALS
WEIGHT: 293 LBS | OXYGEN SATURATION: 100 % | DIASTOLIC BLOOD PRESSURE: 80 MMHG | SYSTOLIC BLOOD PRESSURE: 120 MMHG | BODY MASS INDEX: 53.33 KG/M2 | HEART RATE: 84 BPM

## 2024-10-03 DIAGNOSIS — Z13.220 ENCOUNTER FOR LIPID SCREENING FOR CARDIOVASCULAR DISEASE: ICD-10-CM

## 2024-10-03 DIAGNOSIS — E11.65 TYPE 2 DIABETES MELLITUS WITH HYPERGLYCEMIA, WITHOUT LONG-TERM CURRENT USE OF INSULIN: Chronic | ICD-10-CM

## 2024-10-03 DIAGNOSIS — Z13.6 ENCOUNTER FOR LIPID SCREENING FOR CARDIOVASCULAR DISEASE: ICD-10-CM

## 2024-10-03 DIAGNOSIS — Z79.899 ENCOUNTER FOR LONG-TERM (CURRENT) USE OF MEDICATIONS: ICD-10-CM

## 2024-10-03 DIAGNOSIS — Z23 NEED FOR VACCINATION: ICD-10-CM

## 2024-10-03 DIAGNOSIS — E66.813 CLASS 3 OBESITY WITH ALVEOLAR HYPOVENTILATION, SERIOUS COMORBIDITY, AND BODY MASS INDEX (BMI) OF 50.0 TO 59.9 IN ADULT: ICD-10-CM

## 2024-10-03 DIAGNOSIS — M32.19 OTHER SYSTEMIC LUPUS ERYTHEMATOSUS WITH OTHER ORGAN INVOLVEMENT: ICD-10-CM

## 2024-10-03 DIAGNOSIS — Z00.00 WELL ADULT EXAM: Primary | ICD-10-CM

## 2024-10-03 DIAGNOSIS — E66.2 CLASS 3 OBESITY WITH ALVEOLAR HYPOVENTILATION, SERIOUS COMORBIDITY, AND BODY MASS INDEX (BMI) OF 50.0 TO 59.9 IN ADULT: ICD-10-CM

## 2024-10-03 PROCEDURE — 99999 PR PBB SHADOW E&M-EST. PATIENT-LVL V: CPT | Mod: PBBFAC,,, | Performed by: FAMILY MEDICINE

## 2024-10-03 RX ORDER — PHENTERMINE HYDROCHLORIDE 37.5 MG/1
37.5 TABLET ORAL
Qty: 30 TABLET | Refills: 2 | Status: SHIPPED | OUTPATIENT
Start: 2024-10-03 | End: 2025-04-01

## 2024-10-03 NOTE — PATIENT INSTRUCTIONS
Humberto Castrejon,     If you are due for any health screening(s) below please notify me so we can arrange them to be ordered and scheduled. Most healthy patients at your age complete them, but you are free to accept or refuse.     If you can't do it, I'll definitely understand. If you can, I'd certainly appreciate it!    Tests to Keep You Healthy    Eye Exam: DUE  Cervical Cancer Screening: Met on 9/21/2022  Last Blood Pressure <= 139/89 (4/18/2024): Yes  Last HbA1c < 8 (10/02/2024): Yes      Your diabetic retinal eye exam is due     Diabetes is the #1 cause of blindness in the [place] - early detection before signs or symptoms develop can prevent debilitating blindness.     Our records indicate that you may be overdue for your annual diabetic eye exam. Eye screening can help identify patients at risk for developing vision loss which is common in diabetes. This simple screening is an important step to keeping you healthy and preventing complications from diabetes.     This recommended diabetic eye exam should take place once per year and can prevent and treat diabetes complications in the eye before developing symptoms. This can be done with a special camera is used to take photographs of the back of your eye without having to dilate them, or you can see an eye doctor for a full dilated exam.     If you recently had your yearly diabetic eye exam performed outside of [facility], please let your Health care team know so that they can update your health record.

## 2024-10-05 NOTE — ASSESSMENT & PLAN NOTE
GLP 1 medication as cost prohibitive.  Restart phentermine at patient request.  She has tolerated this in the past.  She is aware of the risk and benefits of medication use.  The patient was checked in the [facility] Board of Pharmacy's  Prescription Monitoring Program. There appears to be no incongruities with the patient's verbalized history.

## 2024-10-05 NOTE — PROGRESS NOTES
PLAN:      Assessment & Plan  1. Anemia.  Her blood counts have dipped below 12 again but remain steady. She is advised to continue taking her vitamins.  Anemia precautions.    2. Prediabetes.  Her blood sugar levels have increased from 5.5 to 5.6, indicating borderline glycemic control. She is advised to reduce carbohydrate intake and increase physical activity.  We will plan to monitor hemoglobin A1c at designated intervals 3 to 6 months.  I recommend ongoing Education for diabetic diet and exercise protocol.  We will continue to monitor for side effects.    Please be advised of symptoms to monitor for and to notify me immediately if persistent or worsening.  Follow up with Ophthalmology/Optometry and Podiatry at least annually.      3. Systemic Lupus Erythematosus.  She reports getting it checked every 6 months by a rheumatologist in [place]. It has been a while since her last visit. She is advised to follow up with her rheumatologist.    4. Medication Management.  She requested medication refills, which are managed through [facility] by the nursing staff.    5. Health Maintenance.  An influenza vaccine was recommended. She is advised to get her eye exam updated and have the results sent over.    Follow-up  Return in 6 months for labs.    Problem List Items Addressed This Visit       Class 3 obesity with alveolar hypoventilation, serious comorbidity, and body mass index (BMI) of 50.0 to 59.9 in adult (Chronic)     GLP 1 medication as cost prohibitive.  Restart phentermine at patient request.  She has tolerated this in the past.  She is aware of the risk and benefits of medication use.  The patient was checked in the [facility] Board of Pharmacy's  Prescription Monitoring Program. There appears to be no incongruities with the patient's verbalized history.            Relevant Medications    phentermine (ADIPEX-P) 37.5 mg tablet    Encounter for long-term (current) use of medications (Chronic)     Complete history  and physical was completed today.  Complete and thorough medication reconciliation was performed.  Discussed risks and benefits of medications.  Advised patient on orders and health maintenance.  We discussed old records and old labs if available.  Will request any records not available through epic.  Continue current medications listed on your summary sheet.           Relevant Orders    CBC Without Differential    Comprehensive Metabolic Panel    TSH    Hemoglobin A1C    Lipid Panel    Type 2 diabetes mellitus with hyperglycemia, without long-term current use of insulin (Chronic)     We will plan to monitor hemoglobin A1c at designated intervals 3 to 6 months.  I recommend ongoing Education for diabetic diet and exercise protocol.  We will continue to monitor for side effects.    Please be advised of symptoms to monitor for and to notify me immediately if persistent or worsening.  Follow up with Ophthalmology/Optometry and Podiatry at least annually.           Relevant Orders    Ambulatory referral/consult to Podiatry    Diabetic Eye Screening Photo    Other forms of systemic lupus erythematosus     Other Visit Diagnoses       Well adult exam    -  Primary    Relevant Orders    CBC Without Differential    Comprehensive Metabolic Panel    TSH    Hemoglobin A1C    Lipid Panel    Need for vaccination        Relevant Medications    influenza (Flulaval, Fluzone, Fluarix) 45 mcg/0.5 mL IM vaccine (> or = 6 mo) 0.5 mL (Completed)    Encounter for lipid screening for cardiovascular disease        Relevant Orders    Lipid Panel          Future Appointments       Date Provider Specialty Appt Notes    10/14/2024 MILLA Villeda, RUTH Optometry Diabetes eye exam and pressure    10/21/2024 Zane Jordan DPM Podiatry Diabetes check and bruise toenail    11/1/2024 Rossy Kramer MD Allergy 4mnth follow up    3/19/2025 Michael Hernandez MD Rheumatology Lupus    4/3/2025  Lab labs           Medication Management for assessment  above:   Medication List with Changes/Refills   New Medications    PHENTERMINE (ADIPEX-P) 37.5 MG TABLET    Take 1 tablet (37.5 mg total) by mouth before breakfast.   Current Medications    ALBUTEROL (PROVENTIL) 2.5 MG /3 ML (0.083 %) NEBULIZER SOLUTION    Take 3 mLs (2.5 mg total) by nebulization every 4 to 6 hours as needed for Wheezing.    ALBUTEROL (PROVENTIL/VENTOLIN HFA) 90 MCG/ACTUATION INHALER    Inhale 1-2 puffs into the lungs every 4 (four) hours as needed for Wheezing. Rescue    AZELASTINE (ASTELIN) 137 MCG (0.1 %) NASAL SPRAY    2 sprays (274 mcg total) by Nasal route 2 (two) times daily.    CHOLECALCIFEROL, VITAMIN D3, (VITAMIN D3) 25 MCG (1,000 UNIT) CAPSULE    Take 2 capsules (2,000 Units total) by mouth once daily.    CLINDAMYCIN (CLEOCIN T) 1 % SWAB    Apply topically 2 (two) times daily.    DOXYCYCLINE (VIBRAMYCIN) 100 MG CAP    Take 1 capsule (100 mg total) by mouth every 12 (twelve) hours.    EPINEPHRINE (EPIPEN) 0.3 MG/0.3 ML ATIN    Inject 0.3 mLs (0.3 mg total) into the muscle once. for 1 dose    ESCITALOPRAM OXALATE (LEXAPRO) 10 MG TABLET    Take 1 tablet (10 mg total) by mouth once daily.    FLUOCINOLONE (DERMA-SMOOTHE) 0.01 % EXTERNAL OIL    Apply twice daily only as needed to affected areas of scalp/ears until improved, then can stop and restart as needed    FLUOCINONIDE (LIDEX) 0.05 % EXTERNAL SOLUTION    Apply topically 2 (two) times daily. Use on scalp    FLUTICASONE FUROATE-VILANTEROL (BREO) 200-25 MCG/DOSE DSDV DISKUS INHALER    Inhale 1 puff into the lungs once daily. Controller    FLUTICASONE PROPIONATE (FLONASE) 50 MCG/ACTUATION NASAL SPRAY    1 spray (50 mcg total) by Each Nostril route once daily.    GABAPENTIN (NEURONTIN) 300 MG CAPSULE    Take 1 capsule (300 mg total) by mouth 3 (three) times daily.    HYDROXYZINE HCL (ATARAX) 25 MG TABLET    Take 1 tablet (25 mg total) by mouth 3 (three) times daily as needed for Anxiety.    IRON-VIT C-B12-FOLIC ACID (IRON 100 PLUS) TAB     "Take 1 tablet by mouth once daily.    KETOCONAZOLE (NIZORAL) 2 % SHAMPOO    Apply topically once a week. Lather in for 5-10 min before rinsing    LACTOBACILLUS RHAMNOSUS GG (CULTURELLE) 10 BILLION CELL CAPSULE    Take 1 capsule by mouth once daily.    LACTULOSE (CHRONULAC) 10 GRAM/15 ML SOLUTION    SMARTSI Tablespoon By Mouth Daily    LEVOCETIRIZINE (XYZAL) 5 MG TABLET    Take 1 tablet (5 mg total) by mouth every evening.    LINACLOTIDE (LINZESS) 72 MCG CAP CAPSULE    Take 1 capsule (72 mcg total) by mouth once daily.    METHOCARBAMOL (ROBAXIN) 750 MG TAB    Take 1 tablet (750 mg total) by mouth 4 (four) times daily as needed (muscle spasms).    MONTELUKAST (SINGULAIR) 10 MG TABLET    Take 1 tablet (10 mg total) by mouth once daily.    MUPIROCIN (BACTROBAN) 2 % OINTMENT    Apply topically 3 (three) times daily.    PANTOPRAZOLE (PROTONIX) 40 MG TABLET    Take 1 tablet (40 mg total) by mouth once daily.    SPIRONOLACTONE (ALDACTONE) 100 MG TABLET    Take 1 tablet (100 mg total) by mouth once daily.    TIZANIDINE (ZANAFLEX) 4 MG TABLET    Take 1 tablet (4 mg total) by mouth every 12 (twelve) hours as needed (muscle spasms/ pain).    TRETINOIN (RETIN-A) 0.05 % CREAM    Apply topically every evening. Use on face    TRIAMCINOLONE ACETONIDE 0.5% (KENALOG) 0.5 % CREA    Apply topically 2 (two) times daily.   Discontinued Medications    NIFEDIPINE (PROCARDIA-XL) 30 MG (OSM) 24 HR TABLET    Take 30 mg by mouth once daily.    NOVOFINE PLUS 32 GAUGE X 1/6" NDLE    use as directed    OXYCODONE (ROXICODONE) 5 MG IMMEDIATE RELEASE TABLET    Take 1 tablet (5 mg total) by mouth every 4 (four) hours as needed for Pain.       Dante Negro M.D.  ==========================================================================  Subjective:   Patient ID: Lucía Coreas is a 39 y.o. female.  has a past medical history of Allergy, Amenorrhea, Asthma, Diabetes, GERD (gastroesophageal reflux disease), Infertility associated with " anovulation, Iron deficiency anemia, Knee pain, Metabolic syndrome, PCO (polycystic ovaries), Recurrent boils, and Status post repeat low transverse  section (2020).   Chief Complaint: Follow-up and chronic condtions      History of Present Illness  The patient is a 39-year-old female who presents for chronic medical conditions, medication refills.    She underwent blood work yesterday and is currently taking vitamins. She has been diagnosed with lupus and is under the care of a rheumatologist in , whom she visits every six months. However, it has been some time since her last visit. She also regularly sees an ophthalmologist for eye examinations.    Problem List Items Addressed This Visit       Class 3 obesity with alveolar hypoventilation, serious comorbidity, and body mass index (BMI) of 50.0 to 59.9 in adult (Chronic)    Overview     2024:  Patient having difficulty with rebound weight gain after being off of GLP 1.    2023: Patient is having difficulty getting the proper dosage of MOUNJARO for her diabetes.  Weight loss has stalled.  Patient did well on phentermine in the past.  Patient is interested in bariatric surgery if no improvement.  She has tried diet and exercise with no improvement.    Chronic.  Previous HPI:  Uncontrolled.  Was improving on Ozempic.  BMI currently 53.  2020:  PATIENT'S BMI IS UP TO 54.  PATIENT HAS RECENTLY HAD A MISCARRIAGE AND WAS TAKEN OFF OF HER MEDICATIONS.  PATIENT IS WANTING TO RESTART MEDICATIONS FOR HER CHRONIC CONDITIONS TODAY.  SHE HAS SEEN HER OBGYN AND WAS STARTED ON ORAL CONTRACEPTIVE PILLS.  2022:  Chronic.  Worsening.  Patient is plateauing with Ozempic 1 milligram.  BMI Readings from Last 10 Encounters:   10/03/24 53.33 kg/m²   24 50.46 kg/m²   24 50.63 kg/m²   24 50.82 kg/m²   24 50.83 kg/m²   24 52.35 kg/m²   23 51.15 kg/m²   23 50.86 kg/m²   10/26/23 50.46 kg/m²   10/12/23  51.72 kg/m²              Current Assessment & Plan     GLP 1 medication as cost prohibitive.  Restart phentermine at patient request.  She has tolerated this in the past.  She is aware of the risk and benefits of medication use.  The patient was checked in the [facility] Board of Pharmacy's  Prescription Monitoring Program. There appears to be no incongruities with the patient's verbalized history.            Encounter for long-term (current) use of medications (Chronic)    Overview     October 2024:  Reviewed labs.  October 2023: Reviewed labs.  September 2023:  Reviewed labs.  07/12/2019 CHRONIC long-term drug therapy for managed conditions. See medication list. Reports compliance.  No side effects reported.  Routine lab work is being monitored.  Patient does not need refills today. 09/20/2019Reviewed labs. Patient is compliant with meds. She needs refills. Gaining weight on Metformin. DECEMBER 2019:  CHRONIC. Stable. Compliant with medications for managed conditions. See medication list. No SE reported. Routine lab analysis is being monitored. Refills were addressed. APRIL 2020:  CHRONIC. Stable. Compliant with medications for managed conditions. See medication list. No SE reported. Routine lab analysis is being monitored. Refills were addressed.  AUGUST 2020:  REVIEWED LABS.  CHRONIC. Stable. Compliant with medications for managed conditions. See medication list. No SE reported. Routine lab analysis is being monitored. Refills were addressed.October 2020:  CHRONIC. Stable. Compliant with medications for managed conditions. See medication list. No SE reported. Routine lab analysis is being monitored. Refills were addressed.November 2020:CHRONIC. Stable. Compliant with medications for managed conditions. See medication list. No SE reported.   Routine lab analysis is being monitored. Refills were addressed.  April 2021:  Reviewed labs.  August 2021:  Reviewed labs.  Patient additional labs set up through  [facility] [place].  January 2022:  Reviewed labs.  February 2022:  Reviewed labs.  June 2022:  Reviewed labs.  Lab Results   Component Value Date    WBC 7.57 10/02/2024    HGB 11.7 (L) 10/02/2024    HCT 37.3 10/02/2024    MCV 77 (L) 10/02/2024     10/02/2024         Chemistry        Component Value Date/Time     10/02/2024 0750    K 4.0 10/02/2024 0750     10/02/2024 0750    CO2 24 10/02/2024 0750    BUN 7 10/02/2024 0750    CREATININE 0.7 10/02/2024 0750    GLU 98 10/02/2024 0750        Component Value Date/Time    CALCIUM 8.9 10/02/2024 0750    ALKPHOS 61 10/02/2024 0750    AST 15 10/02/2024 0750    ALT 13 10/02/2024 0750    BILITOT 0.3 10/02/2024 0750    ESTGFRAFRICA >60.0 06/10/2022 1032    EGFRNONAA >60.0 06/10/2022 1032          Lab Results   Component Value Date    TSH 1.710 10/02/2024    R0UQNAJ 9.4 07/16/2019    T3FREE 2.9 07/16/2019              Current Assessment & Plan     Complete history and physical was completed today.  Complete and thorough medication reconciliation was performed.  Discussed risks and benefits of medications.  Advised patient on orders and health maintenance.  We discussed old records and old labs if available.  Will request any records not available through epic.  Continue current medications listed on your summary sheet.           Type 2 diabetes mellitus with hyperglycemia, without long-term current use of insulin (Chronic)    Overview     October 2024:    October 2023:  Diabetes Medications               tirzepatide (MOUNJARO) 5 mg/0.5 mL PnIj Inject 5 mg into the skin every 7 days.    tirzepatide 10 mg/0.5 mL PnIj Inject 10 mg into the skin every 7 days.    tirzepatide (MOUNJARO) 12.5 mg/0.5 mL PnIj Inject 12.5 mg into the skin every 7 days.          INITIAL HPI:  No results found in epic however review her blood work from previous provider shows last A1c was 5.8.  Patient previously on metformin.  Currently having fatigue and decreased  libido.09/20/2019Patient due for hemoglobin A1c next month.  Will place order for patient.She is having GI symptoms with metformin. She reports BG is elevated at home. She continues to gain weight. DECEMBER 2019:  Patient reports to having diarrhea with metformin.  Reviewed Diabetes Management StatusStatin: Not takingACE/ARB: Not takingAPRIL 2020:  Patient has started Victoza and is off of metformin.  Patient reports that she is getting slightly nauseated on the increased dose of 1.2 mg.  Symptoms resolved after taking Pepto-Bismol after few hours of injection.Reviewed Diabetes Management StatusStatin: Not takingACE/ARB: Not takingAUGUST 2020:  PATIENT REPORTS THAT SHE IS TOLERATING VICTOZA 1.2 MG.  PATIENT CONTINUES TO HAVE ISSUES WITH HER WEIGHT.  REVIEWED Diabetes Management StatusStatin: Not takingACE/ARB: Not takingOCTOBER 2020:  Patient is having side effects with Victoza.  Reviewed Diabetes Management Status.Statin: Not takingACE/ARB: Not takingNOVEMBER 2020:  Patient reports that she was doing well on OZEMPIC.  A1c is been well controlled.  Patient was recently diagnosed with pregnancy.  Will monitor for worsening of diabetes.  Diabetes Management Status Statin: Not taking ACE/ARB: Not takingDECEMBER 2020:  Patient recently had miscarriage.  Patient wanting to get back on her diabetic medication.Diabetes Management StatusStatin: Not takingACE/ARB: Not taking January 2022:Diabetes Management StatusFebruary 2022:  Reviewed Diabetes Management Status.  She is taking Ozempic 0.5 milligrams weekly.  She denies any side effects.  She does not thing is working well.  Denies history of thyroid cancer, pancreatitis, MEN syndromeStatin: Not takingACE/ARB: Not taking  June 2022:  Patient reports her weight is starting to rebound.  She is on Ozempic 1 milligram weekly.  Reports that her blood sugar is starting to increase it with her appetite.  Denies history of pancreatitis, thyroid cancer, MEN syndrome.Diabetes  Management StatusStatin: Not takingACE/ARB: Not taking  Diabetes Management Status    Statin: Not taking  ACE/ARB: Not taking    Screening or Prevention Patient's value Goal Complete/Controlled?   HgA1C Testing and Control   Lab Results   Component Value Date    HGBA1C 5.6 10/02/2024      Annually/Less than 8% Yes   Lipid profile : 10/02/2024 Annually Yes   LDL control Lab Results   Component Value Date    LDLCALC 98.2 10/02/2024    Annually/Less than 100 mg/dl  Yes   Nephropathy screening Lab Results   Component Value Date    LABMICR <5.0 10/02/2024     Lab Results   Component Value Date    PROTEINUA 2+ (A) 02/03/2023     Lab Results   Component Value Date    UTPCR 0.20 02/03/2023      Annually Yes   Blood pressure BP Readings from Last 1 Encounters:   10/03/24 120/80    Less than 140/90 Yes   Dilated retinal exam : 03/13/2023 Annually No   Foot exam   : 10/12/2023 Annually Yes              Current Assessment & Plan     We will plan to monitor hemoglobin A1c at designated intervals 3 to 6 months.  I recommend ongoing Education for diabetic diet and exercise protocol.  We will continue to monitor for side effects.    Please be advised of symptoms to monitor for and to notify me immediately if persistent or worsening.  Follow up with Ophthalmology/Optometry and Podiatry at least annually.           Other forms of systemic lupus erythematosus     Other Visit Diagnoses       Well adult exam    -  Primary    Need for vaccination        Encounter for lipid screening for cardiovascular disease                 Review of patient's allergies indicates:   Allergen Reactions    Metformin Diarrhea    Sulfa (sulfonamide antibiotics) Anaphylaxis and Swelling     Swelling (eyes)^, Swelling (throat)^  Swelling (eyes)^, Swelling (throat)^      Adhesive      rash    Diclofenac      Gastritis     Latex, natural rubber      Contact dermatitis    Other reaction(s): Other (See Comments)  Contact dermatitis    Shellfish containing  products      anaphylaxis     Current Outpatient Medications   Medication Instructions    albuterol (PROVENTIL) 2.5 mg, Nebulization, Every 4-6 hours PRN    albuterol (PROVENTIL/VENTOLIN HFA) 90 mcg/actuation inhaler 1-2 puffs, Inhalation, Every 4 hours PRN, Rescue    azelastine (ASTELIN) 274 mcg, Nasal, 2 times daily    cholecalciferol (vitamin D3) (VITAMIN D3) 2,000 Units, Oral, Daily    clindamycin (CLEOCIN T) 1 % Swab Topical (Top), 2 times daily    doxycycline (VIBRAMYCIN) 100 mg, Oral, Every 12 hours    EPINEPHrine (EPIPEN) 0.3 mg, Intramuscular, Once    EScitalopram oxalate (LEXAPRO) 10 mg, Oral, Daily    fluocinolone (DERMA-SMOOTHE) 0.01 % external oil Apply twice daily only as needed to affected areas of scalp/ears until improved, then can stop and restart as needed    fluocinonide (LIDEX) 0.05 % external solution Topical (Top), 2 times daily, Use on scalp    fluticasone furoate-vilanteroL (BREO) 200-25 mcg/dose DsDv diskus inhaler 1 puff, Inhalation, Daily, Controller    fluticasone propionate (FLONASE) 50 mcg, Each Nostril, Daily    gabapentin (NEURONTIN) 300 mg, Oral, 3 times daily    hydrOXYzine HCL (ATARAX) 25 mg, Oral, 3 times daily PRN    iron-vit c-b12-folic acid (IRON 100 PLUS) Tab 1 tablet, Oral, Daily    ketoconazole (NIZORAL) 2 % shampoo Topical (Top), Weekly, Lather in for 5-10 min before rinsing    Lactobacillus rhamnosus GG (CULTURELLE) 10 billion cell capsule 1 capsule, Daily    lactulose (CHRONULAC) 10 gram/15 mL solution SMARTSI Tablespoon By Mouth Daily    levocetirizine (XYZAL) 5 mg, Oral, Nightly    linaCLOtide (LINZESS) 72 mcg, Oral, Daily    methocarbamoL (ROBAXIN) 750 mg, Oral, 4 times daily PRN    montelukast (SINGULAIR) 10 mg, Oral, Daily    mupirocin (BACTROBAN) 2 % ointment Topical (Top), 3 times daily    pantoprazole (PROTONIX) 40 mg, Oral, Daily    phentermine (ADIPEX-P) 37.5 mg, Oral, Before breakfast    spironolactone (ALDACTONE) 100 mg, Oral, Daily    tiZANidine  (ZANAFLEX) 4 mg, Oral, Every 12 hours PRN    tretinoin (RETIN-A) 0.05 % cream Topical (Top), Nightly, Use on face    triamcinolone acetonide 0.5% (KENALOG) 0.5 % Crea Topical (Top), 2 times daily      I have reviewed the PMH, social history, FamilyHx, surgical history, allergies and medications documented / confirmed by the patient at the time of this visit.  Review of Systems   Constitutional:  Negative for activity change and unexpected weight change.   HENT:  Negative for hearing loss, rhinorrhea and trouble swallowing.    Eyes:  Negative for discharge and visual disturbance.   Respiratory:  Negative for chest tightness and wheezing.    Cardiovascular:  Negative for chest pain and palpitations.   Gastrointestinal:  Positive for constipation. Negative for blood in stool, diarrhea and vomiting.   Endocrine: Negative for polydipsia and polyuria.   Genitourinary:  Negative for difficulty urinating, dysuria, hematuria and menstrual problem.   Musculoskeletal:  Positive for arthralgias. Negative for joint swelling and neck pain.   Neurological:  Negative for weakness and headaches.   Psychiatric/Behavioral:  Negative for confusion and dysphoric mood.      Objective:   /80   Pulse 84   Wt (!) 140.9 kg (310 lb 11.2 oz)   LMP 09/08/2024 (Exact Date)   SpO2 100%   BMI 53.33 kg/m²   Physical Exam  Vitals and nursing note reviewed.   Constitutional:       General: She is not in acute distress.     Appearance: She is well-developed. She is obese. She is not ill-appearing, toxic-appearing or diaphoretic.   HENT:      Head: Normocephalic and atraumatic.      Right Ear: Hearing and external ear normal.      Left Ear: Hearing and external ear normal.      Nose: Nose normal. No rhinorrhea.   Eyes:      General: Lids are normal.      Extraocular Movements: Extraocular movements intact.      Conjunctiva/sclera: Conjunctivae normal.      Pupils: Pupils are equal, round, and reactive to light.   Cardiovascular:      Rate  and Rhythm: Normal rate.      Pulses: Normal pulses.   Pulmonary:      Effort: Pulmonary effort is normal. No respiratory distress.      Breath sounds: Normal breath sounds.   Abdominal:      General: Bowel sounds are normal.      Palpations: Abdomen is soft.   Musculoskeletal:         General: Normal range of motion.      Cervical back: Normal range of motion and neck supple.   Skin:     General: Skin is warm and dry.      Capillary Refill: Capillary refill takes less than 2 seconds.      Coloration: Skin is not pale.   Neurological:      General: No focal deficit present.      Mental Status: She is alert and oriented to person, place, and time. She is not disoriented.      Cranial Nerves: No cranial nerve deficit.      Motor: No weakness.      Gait: Gait normal.   Psychiatric:         Attention and Perception: She is attentive.         Mood and Affect: Mood normal. Mood is not anxious or depressed.         Speech: Speech is not rapid and pressured or slurred.         Behavior: Behavior normal. Behavior is not agitated, aggressive or hyperactive. Behavior is cooperative.         Thought Content: Thought content normal. Thought content is not paranoid or delusional. Thought content does not include homicidal or suicidal ideation. Thought content does not include homicidal or suicidal plan.         Cognition and Memory: Memory is not impaired.         Judgment: Judgment normal.       Physical Exam      Results  Laboratory Studies  Blood counts are stable. Kidney function is normal. Liver function is normal. Cholesterol levels are good. Blood sugar is borderline, ranging from 5.5-5.6. Thyroid function is normal.    Assessment:     1. Well adult exam    2. Need for vaccination    3. Other systemic lupus erythematosus with other organ involvement    4. Encounter for long-term (current) use of medications    5. Encounter for lipid screening for cardiovascular disease    6. Type 2 diabetes mellitus with hyperglycemia,  without long-term current use of insulin    7. Class 3 obesity with alveolar hypoventilation, serious comorbidity, and body mass index (BMI) of 50.0 to 59.9 in adult      MDM:   Moderate medical complexity.  Moderate risk.  Total time: 21 minutes.  This includes total time spent on the encounter, which includes face to face time and non-face to face time preparing to see the patient (eg, review of previous medical records, tests), Obtaining and/or reviewing separately obtained history, documenting clinical information in the electronic or other health record, independently interpreting results (not separately reported)/communicating results to the patient/family/caregiver, and/or care coordination (not separately reported).    I have Reviewed and summarized old records.  I have performed thorough medication reconciliation today and discussed risk and benefits of medications.  I have reviewed labs and discussed with patient.  All questions were answered.  I am requesting old records and will review them once they are available.  Visit today included increased complexity associated with the care of the episodic problem see above assessment addressed and managing the longitudinal care of the patient due to the serious and/or complex managed problem(s) see above.  I have signed for the following orders AND/OR meds.  Orders Placed This Encounter   Procedures    CBC Without Differential     Standing Status:   Future     Standing Expiration Date:   12/2/2025    Comprehensive Metabolic Panel     Standing Status:   Future     Standing Expiration Date:   12/2/2025    TSH     Standing Status:   Future     Standing Expiration Date:   12/2/2025    Hemoglobin A1C     Standing Status:   Future     Standing Expiration Date:   12/2/2025    Lipid Panel     Standing Status:   Future     Standing Expiration Date:   12/2/2025    Ambulatory referral/consult to Podiatry     Standing Status:   Future     Standing Expiration Date:    11/3/2025     Referral Priority:   Routine     Referral Type:   Consultation     Referral Reason:   Specialty Services Required     Requested Specialty:   Podiatry     Number of Visits Requested:   1    Diabetic Eye Screening Photo     Standing Status:   Future     Standing Expiration Date:   10/3/2025     Order Specific Question:   Release to patient     Answer:   Immediate     Medications Ordered This Encounter   Medications    influenza (Flulaval, Fluzone, Fluarix) 45 mcg/0.5 mL IM vaccine (> or = 6 mo) 0.5 mL    phentermine (ADIPEX-P) 37.5 mg tablet     Sig: Take 1 tablet (37.5 mg total) by mouth before breakfast.     Dispense:  30 tablet     Refill:  2        Follow up in about 6 months (around 4/3/2025), or if symptoms worsen or fail to improve, for Med refills, LAB RESULTS.  Future Appointments       Date Provider Specialty Appt Notes    10/14/2024 MILLA Villeda, OD Optometry Diabetes eye exam and pressure    10/21/2024 Zane Jordan DPM Podiatry Diabetes check and bruise toenail    11/1/2024 Rossy Kramer MD Allergy 4mnth follow up    3/19/2025 Michael Hernandez MD Rheumatology Lupus    4/3/2025  Lab labs          If no improvement in symptoms or symptoms worsen, advised to call/follow-up at clinic or go to ER. Patient voiced understanding and all questions/concerns were addressed.   DISCLAIMER: This note was compiled by using a speech recognition dictation system and therefore please be aware that typographical / speech recognition errors can and do occur.  Please contact me if you see any errors specifically.  Consent was obtained for MICHI recording system prior to the visit.    Dante Negro M.D.       Office: [phone]   [address] [place] [address]  FAX: [phone]

## 2024-11-01 ENCOUNTER — OFFICE VISIT (OUTPATIENT)
Dept: ALLERGY | Facility: CLINIC | Age: 39
End: 2024-11-01
Payer: COMMERCIAL

## 2024-11-01 VITALS
WEIGHT: 293 LBS | BODY MASS INDEX: 50.02 KG/M2 | HEART RATE: 80 BPM | TEMPERATURE: 99 F | SYSTOLIC BLOOD PRESSURE: 125 MMHG | RESPIRATION RATE: 18 BRPM | DIASTOLIC BLOOD PRESSURE: 83 MMHG | HEIGHT: 64 IN

## 2024-11-01 DIAGNOSIS — Z91.040 LATEX ALLERGY: ICD-10-CM

## 2024-11-01 DIAGNOSIS — J30.89 ALLERGIC RHINITIS DUE TO INSECT: ICD-10-CM

## 2024-11-01 DIAGNOSIS — J30.1 SEASONAL ALLERGIC RHINITIS DUE TO POLLEN: Primary | ICD-10-CM

## 2024-11-01 DIAGNOSIS — Z91.013 SHELLFISH ALLERGY: ICD-10-CM

## 2024-11-01 DIAGNOSIS — J30.89 ALLERGIC RHINITIS DUE TO AMERICAN HOUSE DUST MITE: ICD-10-CM

## 2024-11-01 DIAGNOSIS — J45.40 MODERATE PERSISTENT ASTHMA WITHOUT COMPLICATION: ICD-10-CM

## 2024-11-01 PROCEDURE — 99999 PR PBB SHADOW E&M-EST. PATIENT-LVL V: CPT | Mod: PBBFAC,,, | Performed by: ALLERGY & IMMUNOLOGY

## 2024-11-25 ENCOUNTER — PATIENT MESSAGE (OUTPATIENT)
Dept: OPHTHALMOLOGY | Facility: CLINIC | Age: 39
End: 2024-11-25
Payer: COMMERCIAL

## 2024-12-11 ENCOUNTER — PATIENT MESSAGE (OUTPATIENT)
Dept: ALLERGY | Facility: CLINIC | Age: 39
End: 2024-12-11
Payer: COMMERCIAL

## 2024-12-18 ENCOUNTER — PATIENT OUTREACH (OUTPATIENT)
Dept: ADMINISTRATIVE | Facility: HOSPITAL | Age: 39
End: 2024-12-18
Payer: COMMERCIAL

## 2024-12-18 NOTE — PROGRESS NOTES
DM eye report received. No dm retinopathy marked on sheet. Called to request information. Requested provider be added to EPIC.

## 2024-12-30 DIAGNOSIS — J30.89 ALLERGIC RHINITIS DUE TO INSECT: ICD-10-CM

## 2024-12-30 DIAGNOSIS — J30.1 SEASONAL ALLERGIC RHINITIS DUE TO POLLEN: ICD-10-CM

## 2024-12-30 RX ORDER — LEVOCETIRIZINE DIHYDROCHLORIDE 5 MG/1
5 TABLET, FILM COATED ORAL NIGHTLY
Qty: 90 TABLET | Refills: 3 | Status: SHIPPED | OUTPATIENT
Start: 2024-12-30

## 2025-01-03 DIAGNOSIS — E11.65 TYPE 2 DIABETES MELLITUS WITH HYPERGLYCEMIA, WITHOUT LONG-TERM CURRENT USE OF INSULIN: ICD-10-CM

## 2025-01-03 RX ORDER — TIRZEPATIDE 5 MG/.5ML
INJECTION, SOLUTION SUBCUTANEOUS
Qty: 2 ML | Refills: 1 | OUTPATIENT
Start: 2025-01-03

## 2025-01-03 NOTE — TELEPHONE ENCOUNTER
No care due was identified.  [facility] Embedded Care Due Messages. Reference number: [id number].   1/03/2025 8:19:43 AM CST

## 2025-01-04 NOTE — TELEPHONE ENCOUNTER
Refill Decision Note   Lucía Coreas  is requesting a refill authorization.  Brief Assessment and Rationale for Refill:  Quick Discontinue     Medication Therapy Plan:  The original prescription was discontinued on 3/27/2024 by Angela Dyer LPN for the following reason: Patient no longer taking.      Comments:     Note composed:9:29 PM 01/03/2025

## 2025-03-14 ENCOUNTER — PATIENT MESSAGE (OUTPATIENT)
Dept: FAMILY MEDICINE | Facility: CLINIC | Age: 40
End: 2025-03-14
Payer: COMMERCIAL

## 2025-03-18 DIAGNOSIS — E11.59 HYPERTENSION ASSOCIATED WITH DIABETES: ICD-10-CM

## 2025-03-18 DIAGNOSIS — I15.2 HYPERTENSION ASSOCIATED WITH DIABETES: ICD-10-CM

## 2025-03-18 DIAGNOSIS — F41.9 ANXIETY: ICD-10-CM

## 2025-03-18 DIAGNOSIS — M54.41 CHRONIC LOW BACK PAIN WITH BILATERAL SCIATICA, UNSPECIFIED BACK PAIN LATERALITY: ICD-10-CM

## 2025-03-18 DIAGNOSIS — J30.1 SEASONAL ALLERGIC RHINITIS DUE TO POLLEN: ICD-10-CM

## 2025-03-18 DIAGNOSIS — J30.89 ALLERGIC RHINITIS DUE TO INSECT: ICD-10-CM

## 2025-03-18 DIAGNOSIS — M54.42 CHRONIC LOW BACK PAIN WITH BILATERAL SCIATICA, UNSPECIFIED BACK PAIN LATERALITY: ICD-10-CM

## 2025-03-18 DIAGNOSIS — M47.819 SPONDYLOARTHRITIS: ICD-10-CM

## 2025-03-18 DIAGNOSIS — G89.29 CHRONIC LOW BACK PAIN WITH BILATERAL SCIATICA, UNSPECIFIED BACK PAIN LATERALITY: ICD-10-CM

## 2025-03-18 DIAGNOSIS — E55.9 VITAMIN D DEFICIENCY: Chronic | ICD-10-CM

## 2025-03-18 DIAGNOSIS — L73.2 HIDRADENITIS SUPPURATIVA: ICD-10-CM

## 2025-03-18 RX ORDER — EPINEPHRINE 0.3 MG/.3ML
1 INJECTION SUBCUTANEOUS ONCE
Qty: 2 EACH | Refills: 11 | Status: SHIPPED | OUTPATIENT
Start: 2025-03-18 | End: 2025-03-18

## 2025-03-18 RX ORDER — ESCITALOPRAM OXALATE 10 MG/1
10 TABLET ORAL DAILY
Qty: 90 TABLET | Refills: 1 | Status: SHIPPED | OUTPATIENT
Start: 2025-03-18

## 2025-03-18 RX ORDER — PANTOPRAZOLE SODIUM 40 MG/1
40 TABLET, DELAYED RELEASE ORAL DAILY
Qty: 90 TABLET | Refills: 1 | Status: SHIPPED | OUTPATIENT
Start: 2025-03-18

## 2025-03-18 RX ORDER — FLUTICASONE PROPIONATE 50 MCG
1 SPRAY, SUSPENSION (ML) NASAL DAILY
Qty: 18.2 ML | Refills: 0 | Status: SHIPPED | OUTPATIENT
Start: 2025-03-18

## 2025-03-18 RX ORDER — METHOCARBAMOL 750 MG/1
750 TABLET, FILM COATED ORAL 4 TIMES DAILY PRN
Qty: 60 TABLET | Refills: 1 | Status: SHIPPED | OUTPATIENT
Start: 2025-03-18

## 2025-03-18 RX ORDER — GABAPENTIN 300 MG/1
300 CAPSULE ORAL 3 TIMES DAILY
Qty: 270 CAPSULE | Refills: 0 | Status: SHIPPED | OUTPATIENT
Start: 2025-03-18 | End: 2026-03-18

## 2025-03-18 RX ORDER — SPIRONOLACTONE 100 MG/1
100 TABLET, FILM COATED ORAL DAILY
Qty: 90 TABLET | Refills: 1 | Status: SHIPPED | OUTPATIENT
Start: 2025-03-18

## 2025-03-18 RX ORDER — IRON,CARB/VIT C/VIT B12/FOLIC 100-250-1
1 TABLET ORAL DAILY
Qty: 90 TABLET | Refills: 1 | Status: SHIPPED | OUTPATIENT
Start: 2025-03-18

## 2025-03-18 RX ORDER — AZELASTINE 1 MG/ML
2 SPRAY, METERED NASAL 2 TIMES DAILY
Qty: 20 ML | Refills: 5 | Status: SHIPPED | OUTPATIENT
Start: 2025-03-18

## 2025-03-18 NOTE — TELEPHONE ENCOUNTER
Refill Routing Note   Medication(s) are not appropriate for processing by [facility] for the following reason(s):        Outside of protocol  Drug-disease interaction    [facility] action(s):  Route  Defer        Medication Therapy Plan: Drug-Disease: pantoprazole and Other forms of systemic lupus erythematosus    Pharmacist review requested: Yes     Appointments  past 12m or future 3m with PCP    Date Provider   Last Visit   10/3/2024 Dante Negro MD   Next Visit   3/25/2025 Dante Negro MD   ED visits in past 90 days: 0        Note composed:3:51 PM 03/18/2025

## 2025-03-18 NOTE — PROGRESS NOTES
1.Does the patient have a friend or family member for the appointment? Yes, son.    2.If so, when asked privately, did the patient give explicit permission for the healthcare team to discuss private medical information in front of that friend/family member? Yes.     See Flowsheet for immunotherapy administration. Patient waited in clinic 30 min for observation. Pt had epi pen on hand.

## 2025-03-18 NOTE — TELEPHONE ENCOUNTER
No care due was identified.  [facility] Embedded Care Due Messages. Reference number: [id number].   3/18/2025 8:19:05 AM CDT

## 2025-03-19 ENCOUNTER — LAB VISIT (OUTPATIENT)
Dept: LAB | Facility: HOSPITAL | Age: 40
End: 2025-03-19
Attending: INTERNAL MEDICINE
Payer: COMMERCIAL

## 2025-03-19 ENCOUNTER — TELEPHONE (OUTPATIENT)
Dept: RHEUMATOLOGY | Facility: CLINIC | Age: 40
End: 2025-03-19

## 2025-03-19 ENCOUNTER — OFFICE VISIT (OUTPATIENT)
Dept: RHEUMATOLOGY | Facility: CLINIC | Age: 40
End: 2025-03-19
Payer: COMMERCIAL

## 2025-03-19 VITALS
HEIGHT: 64 IN | HEART RATE: 86 BPM | BODY MASS INDEX: 50.02 KG/M2 | SYSTOLIC BLOOD PRESSURE: 142 MMHG | WEIGHT: 293 LBS | DIASTOLIC BLOOD PRESSURE: 89 MMHG

## 2025-03-19 DIAGNOSIS — L40.59 POLYARTICULAR PSORIATIC ARTHRITIS: ICD-10-CM

## 2025-03-19 DIAGNOSIS — Z79.899 IMMUNOSUPPRESSION DUE TO DRUG THERAPY: ICD-10-CM

## 2025-03-19 DIAGNOSIS — Z82.69 FAMILY HISTORY OF SYSTEMIC LUPUS ERYTHEMATOSUS (SLE) IN MOTHER: ICD-10-CM

## 2025-03-19 DIAGNOSIS — G89.29 CHRONIC LOW BACK PAIN WITH BILATERAL SCIATICA, UNSPECIFIED BACK PAIN LATERALITY: ICD-10-CM

## 2025-03-19 DIAGNOSIS — Z51.81 MEDICATION MONITORING ENCOUNTER: ICD-10-CM

## 2025-03-19 DIAGNOSIS — L40.9 SCALP PSORIASIS: ICD-10-CM

## 2025-03-19 DIAGNOSIS — R76.8 FALSE POSITIVE ANA: ICD-10-CM

## 2025-03-19 DIAGNOSIS — M54.41 CHRONIC LOW BACK PAIN WITH BILATERAL SCIATICA, UNSPECIFIED BACK PAIN LATERALITY: ICD-10-CM

## 2025-03-19 DIAGNOSIS — E88.810 METABOLIC SYNDROME: ICD-10-CM

## 2025-03-19 DIAGNOSIS — D84.821 IMMUNOSUPPRESSION DUE TO DRUG THERAPY: ICD-10-CM

## 2025-03-19 DIAGNOSIS — M54.42 CHRONIC LOW BACK PAIN WITH BILATERAL SCIATICA, UNSPECIFIED BACK PAIN LATERALITY: ICD-10-CM

## 2025-03-19 DIAGNOSIS — L40.59 POLYARTICULAR PSORIATIC ARTHRITIS: Primary | ICD-10-CM

## 2025-03-19 DIAGNOSIS — D50.9 IRON DEFICIENCY ANEMIA, UNSPECIFIED IRON DEFICIENCY ANEMIA TYPE: ICD-10-CM

## 2025-03-19 DIAGNOSIS — R52 BREAKTHROUGH PAIN: ICD-10-CM

## 2025-03-19 DIAGNOSIS — G93.32 CHRONIC FATIGUE SYNDROME WITH FIBROMYALGIA: ICD-10-CM

## 2025-03-19 DIAGNOSIS — M79.7 CHRONIC FATIGUE SYNDROME WITH FIBROMYALGIA: ICD-10-CM

## 2025-03-19 DIAGNOSIS — L73.2 HIDRADENITIS SUPPURATIVA: Chronic | ICD-10-CM

## 2025-03-19 PROBLEM — M32.8 OTHER FORMS OF SYSTEMIC LUPUS ERYTHEMATOSUS: Status: RESOLVED | Noted: 2023-02-10 | Resolved: 2025-03-19

## 2025-03-19 LAB
ALBUMIN SERPL BCP-MCNC: 3.8 G/DL (ref 3.5–5.2)
ALP SERPL-CCNC: 69 U/L (ref 40–150)
ALT SERPL W/O P-5'-P-CCNC: 15 U/L (ref 10–44)
ANION GAP SERPL CALC-SCNC: 8 MMOL/L (ref 8–16)
AST SERPL-CCNC: 16 U/L (ref 10–40)
BASOPHILS # BLD AUTO: 0.04 K/UL (ref 0–0.2)
BASOPHILS NFR BLD: 0.5 % (ref 0–1.9)
BILIRUB SERPL-MCNC: 0.3 MG/DL (ref 0.1–1)
BUN SERPL-MCNC: 13 MG/DL (ref 6–20)
CALCIUM SERPL-MCNC: 9.3 MG/DL (ref 8.7–10.5)
CHLORIDE SERPL-SCNC: 108 MMOL/L (ref 95–110)
CO2 SERPL-SCNC: 23 MMOL/L (ref 23–29)
CREAT SERPL-MCNC: 0.7 MG/DL (ref 0.5–1.4)
CREAT UR-MCNC: 152 MG/DL (ref 15–325)
CRP SERPL-MCNC: 3.8 MG/L (ref 0–8.2)
DIFFERENTIAL METHOD BLD: ABNORMAL
EOSINOPHIL # BLD AUTO: 0.1 K/UL (ref 0–0.5)
EOSINOPHIL NFR BLD: 1.2 % (ref 0–8)
ERYTHROCYTE [DISTWIDTH] IN BLOOD BY AUTOMATED COUNT: 14.4 % (ref 11.5–14.5)
EST. GFR  (NO RACE VARIABLE): >60 ML/MIN/1.73 M^2
GLUCOSE SERPL-MCNC: 99 MG/DL (ref 70–110)
HBV CORE AB SERPL QL IA: NORMAL
HBV SURFACE AG SERPL QL IA: NORMAL
HCT VFR BLD AUTO: 39.6 % (ref 37–48.5)
HGB BLD-MCNC: 12.2 G/DL (ref 12–16)
IMM GRANULOCYTES # BLD AUTO: 0.01 K/UL (ref 0–0.04)
IMM GRANULOCYTES NFR BLD AUTO: 0.1 % (ref 0–0.5)
LYMPHOCYTES # BLD AUTO: 3.3 K/UL (ref 1–4.8)
LYMPHOCYTES NFR BLD: 44 % (ref 18–48)
MCH RBC QN AUTO: 23.4 PG (ref 27–31)
MCHC RBC AUTO-ENTMCNC: 30.8 G/DL (ref 32–36)
MCV RBC AUTO: 76 FL (ref 82–98)
MONOCYTES # BLD AUTO: 0.4 K/UL (ref 0.3–1)
MONOCYTES NFR BLD: 5.2 % (ref 4–15)
NEUTROPHILS # BLD AUTO: 3.7 K/UL (ref 1.8–7.7)
NEUTROPHILS NFR BLD: 49 % (ref 38–73)
NRBC BLD-RTO: 0 /100 WBC
PLATELET # BLD AUTO: 336 K/UL (ref 150–450)
PMV BLD AUTO: 10.1 FL (ref 9.2–12.9)
POTASSIUM SERPL-SCNC: 3.8 MMOL/L (ref 3.5–5.1)
PROT SERPL-MCNC: 7.8 G/DL (ref 6–8.4)
PROT UR-MCNC: 10 MG/DL (ref 0–15)
PROT/CREAT UR: 0.07 MG/G{CREAT} (ref 0–0.2)
RBC # BLD AUTO: 5.21 M/UL (ref 4–5.4)
SODIUM SERPL-SCNC: 139 MMOL/L (ref 136–145)
WBC # BLD AUTO: 7.48 K/UL (ref 3.9–12.7)

## 2025-03-19 PROCEDURE — 85025 COMPLETE CBC W/AUTO DIFF WBC: CPT | Performed by: INTERNAL MEDICINE

## 2025-03-19 PROCEDURE — 36415 COLL VENOUS BLD VENIPUNCTURE: CPT | Performed by: INTERNAL MEDICINE

## 2025-03-19 PROCEDURE — 87340 HEPATITIS B SURFACE AG IA: CPT | Performed by: INTERNAL MEDICINE

## 2025-03-19 PROCEDURE — 82570 ASSAY OF URINE CREATININE: CPT | Performed by: INTERNAL MEDICINE

## 2025-03-19 PROCEDURE — 86140 C-REACTIVE PROTEIN: CPT | Performed by: INTERNAL MEDICINE

## 2025-03-19 PROCEDURE — 80053 COMPREHEN METABOLIC PANEL: CPT | Performed by: INTERNAL MEDICINE

## 2025-03-19 PROCEDURE — 86480 TB TEST CELL IMMUN MEASURE: CPT | Performed by: INTERNAL MEDICINE

## 2025-03-19 PROCEDURE — 86704 HEP B CORE ANTIBODY TOTAL: CPT | Performed by: INTERNAL MEDICINE

## 2025-03-19 PROCEDURE — 99999 PR PBB SHADOW E&M-EST. PATIENT-LVL V: CPT | Mod: PBBFAC,,, | Performed by: INTERNAL MEDICINE

## 2025-03-19 RX ORDER — FOLIC ACID 1 MG/1
1 TABLET ORAL DAILY
Qty: 30 TABLET | Refills: 4 | Status: SHIPPED | OUTPATIENT
Start: 2025-03-19 | End: 2026-03-19

## 2025-03-19 RX ORDER — METHOTREXATE 2.5 MG/1
12.5 TABLET ORAL
Qty: 20 TABLET | Refills: 3 | Status: SHIPPED | OUTPATIENT
Start: 2025-03-19 | End: 2025-04-18

## 2025-03-19 NOTE — PROGRESS NOTES
RHEUMATOLOGY OUTPATIENT CLINIC NOTE    3/19/2025    Attending Rheumatologist: Michael Hernandez  Primary Care Provider/Physician Requesting Consultation: Dante Negro MD   Chief Complaint/Reason For Consultation:  Lupus      Subjective:     Lucía Coreas is a 39 y.o. Black or  female with VANESSA, psoriasiform rash, FMS    Chronic widespread pain and fatigue.    Review of Systems   Constitutional:  Positive for malaise/fatigue. Negative for fever.   HENT:  Negative for nosebleeds.    Eyes:  Negative for photophobia and pain.   Respiratory:  Negative for shortness of breath.    Cardiovascular:  Negative for chest pain.   Gastrointestinal:  Negative for blood in stool and melena.   Genitourinary:  Negative for dysuria, hematuria and urgency.   Musculoskeletal:  Positive for back pain, joint pain, myalgias and neck pain.   Skin:  Positive for rash.        Denies RP   Neurological:  Negative for focal weakness.       Chronic comorbid conditions affecting medical decision making today:  Past Medical History:   Diagnosis Date    Allergy     Amenorrhea     Asthma     Diabetes     GERD (gastroesophageal reflux disease)     Infertility associated with anovulation     Iron deficiency anemia     Knee pain     Metabolic syndrome     PCO (polycystic ovaries)     Recurrent boils     Status post repeat low transverse  section 2020    Formatting of this note might be different from the original. With BTL 21     Past Surgical History:   Procedure Laterality Date    BTL      CARPAL TUNNEL RELEASE Left 2019    CARPAL TUNNEL RELEASE Right 2020    Procedure: RELEASE, CARPAL TUNNEL;  Surgeon: Adeel Laughlin MD;  Location: [facility] OR;  Service: Orthopedics;  Laterality: Right;    CARPAL TUNNEL RELEASE Right 2024    Procedure: Right carpal tunnel release;  Surgeon: Santosh Hoffman MD;  Location: [facility] OR;  Service: Orthopedics;  Laterality: Right;     SECTION      X2     EXPLORATORY LAPAROTOMY      1 week postop with WOUND VAK, reopened uterus    TUBAL LIGATION  8/24/21    WOUND VAK      3 months post-op Section, 2 weeks in hospital     Family History   Problem Relation Name Age of Onset    Diabetes Father Ruddy     Heart disease Father Ruddy 69    Hypertension Father Ruddy     Prostate cancer Father Ruddy     Cancer Father Ruddy     Diabetes Mother [place]     Lupus Mother [place]     Arthritis Mother Ludivina     Asthma Mother [place]     Kidney disease Mother [place]     AMY disease Brother      Ovarian cancer Sister      Breast cancer Neg Hx       Tobacco Use History[1]  Current Medications[2]     Objective:     Vitals:    03/19/25 0740   BP: (!) 142/89   Pulse: 86     Physical Exam   Pulmonary/Chest: Effort normal. No respiratory distress.   Musculoskeletal:         General: Tenderness (allodynia present) present. No swelling. Normal range of motion.   Neurological: She displays no weakness.   Skin: Rash noted.       Reviewed available old and all outside pertinent medical records available.    All lab results personally reviewed and interpreted by me.       ASSESSMENT / PLAN     1. Polyarticular psoriatic arthritis  DAPSA: high disease activity.    Patient amenable for therapeutic trial of pharmacotherapy w/ DMARD and potentially biologic.  C-Reactive Protein    methotrexate 2.5 MG Tab    folic acid (FOLVITE) 1 MG tablet      2. Breakthrough pain  OTC Tylenol up to 3gm per day, NSAID <14d in 30 day period, robaxin PRN      3. Scalp psoriasis  DDx of inflamed seborrheic dermatitis.  Dermatology PRN  methotrexate 2.5 MG Tab      4. False positive memo  AVISE test.  Protein/Creatinine Ratio, Urine      5. Chronic fatigue syndrome with fibromyalgia        6. Medication monitoring encounter  Comprehensive Metabolic Panel      7. Immunosuppression due to drug therapy  CBC Auto Differential    Hepatitis B Surface Antigen    Hepatitis B Core Antibody, Total    Quantiferon Gold TB      8.  Hidradenitis suppurativa        9. Metabolic syndrome  Weight loss encouraged.      10. Iron deficiency anemia, unspecified iron deficiency anemia type        11. Family history of systemic lupus erythematosus (SLE) in mother        12. Chronic low back pain with bilateral sciatica, unspecified back pain laterality  PT and IPM PRN              Visit today included increased complexity associated with the care of the episodic problem Polyarticular psoriatic arthritis addressed and managing the longitudinal care of the patient due to the serious and/or complex managed problem(s) Immunosuppression due to drug therapy , Medication monitoring encounter.    45 minutes of total time spent on the encounter, which includes face to face time and non-face to face time preparing to see the patient (eg, review of tests), Obtaining and/or reviewing separately obtained history, Documenting clinical information in the electronic or other health record, Independently interpreting results (not separately reported) and communicating results to the patient/family/caregiver, or Care coordination (not separately reported).     Michael Hernandez M.D.           [1]   Social History  Tobacco Use   Smoking Status Never    Passive exposure: Never   Smokeless Tobacco Never   [2]   Current Outpatient Medications:     albuterol (PROVENTIL) 2.5 mg /3 mL (0.083 %) nebulizer solution, Take 3 mLs (2.5 mg total) by nebulization every 4 to 6 hours as needed for Wheezing., Disp: 60 each, Rfl: 3    albuterol (PROVENTIL/VENTOLIN HFA) 90 mcg/actuation inhaler, Inhale 1-2 puffs into the lungs every 4 (four) hours as needed for Wheezing. Rescue, Disp: 8.5 g, Rfl: 10    azelastine (ASTELIN) 137 mcg (0.1 %) nasal spray, 2 sprays (274 mcg total) by Nasal route 2 (two) times daily., Disp: 20 mL, Rfl: 5    cholecalciferol, vitamin D3, (VITAMIN D3) 25 mcg (1,000 unit) capsule, Take 2 capsules (2,000 Units total) by mouth once daily., Disp: 90 capsule, Rfl: 4     doxycycline (VIBRAMYCIN) 100 MG Cap, Take 1 capsule (100 mg total) by mouth every 12 (twelve) hours., Disp: 20 capsule, Rfl: 0    EScitalopram oxalate (LEXAPRO) 10 MG tablet, Take 1 tablet (10 mg total) by mouth once daily., Disp: 90 tablet, Rfl: 1    fluocinolone (DERMA-SMOOTHE) 0.01 % external oil, Apply twice daily only as needed to affected areas of scalp/ears until improved, then can stop and restart as needed, Disp: 118 mL, Rfl: 2    fluocinonide (LIDEX) 0.05 % external solution, Apply topically 2 (two) times daily. Use on scalp, Disp: 60 mL, Rfl: 5    fluticasone furoate-vilanteroL (BREO) 200-25 mcg/dose DsDv diskus inhaler, Inhale 1 puff into the lungs once daily. Controller, Disp: 1 each, Rfl: 4    fluticasone propionate (FLONASE) 50 mcg/actuation nasal spray, 1 spray (50 mcg total) by Each Nostril route once daily., Disp: 18.2 mL, Rfl: 0    gabapentin (NEURONTIN) 300 MG capsule, Take 1 capsule (300 mg total) by mouth 3 (three) times daily., Disp: 270 capsule, Rfl: 0    hydrOXYzine HCL (ATARAX) 25 MG tablet, Take 1 tablet (25 mg total) by mouth 3 (three) times daily as needed for Anxiety., Disp: 30 tablet, Rfl: 12    iron-vit c-b12-folic acid (IRON 100 PLUS) Tab, Take 1 tablet by mouth once daily., Disp: 90 tablet, Rfl: 1    ketoconazole (NIZORAL) 2 % shampoo, Apply topically once a week. Lather in for 5-10 min before rinsing, Disp: 360 mL, Rfl: 3    Lactobacillus rhamnosus GG (CULTURELLE) 10 billion cell capsule, Take 1 capsule by mouth once daily., Disp: , Rfl:     levocetirizine (XYZAL) 5 MG tablet, Take 1 tablet (5 mg total) by mouth every evening., Disp: 90 tablet, Rfl: 3    linaCLOtide (LINZESS) 72 mcg Cap capsule, Take 1 capsule (72 mcg total) by mouth once daily., Disp: 30 capsule, Rfl: 1    methocarbamoL (ROBAXIN) 750 MG Tab, Take 1 tablet (750 mg total) by mouth 4 (four) times daily as needed (muscle spasms)., Disp: 60 tablet, Rfl: 1    montelukast (SINGULAIR) 10 mg tablet, Take 1 tablet (10 mg  total) by mouth once daily., Disp: 30 tablet, Rfl: 3    mupirocin (BACTROBAN) 2 % ointment, Apply topically 3 (three) times daily., Disp: 22 g, Rfl: 0    omalizumab 300 mg/2 mL AtIn, Inject 2 mLs (300 mg total) into the skin every 14 (fourteen) days., Disp: 4 mL, Rfl: 11    pantoprazole (PROTONIX) 40 MG tablet, Take 1 tablet (40 mg total) by mouth once daily., Disp: 90 tablet, Rfl: 1    phentermine (ADIPEX-P) 37.5 mg tablet, Take 1 tablet (37.5 mg total) by mouth before breakfast., Disp: 30 tablet, Rfl: 2    spironolactone (ALDACTONE) 100 MG tablet, Take 1 tablet (100 mg total) by mouth once daily., Disp: 90 tablet, Rfl: 1    tiZANidine (ZANAFLEX) 4 MG tablet, Take 1 tablet (4 mg total) by mouth every 12 (twelve) hours as needed (muscle spasms/ pain)., Disp: 40 tablet, Rfl: 0    tretinoin (RETIN-A) 0.05 % cream, Apply topically every evening. Use on face, Disp: 45 g, Rfl: 4    triamcinolone acetonide 0.5% (KENALOG) 0.5 % Crea, Apply topically 2 (two) times daily., Disp: 60 g, Rfl: 2    clindamycin (CLEOCIN T) 1 % Swab, Apply topically 2 (two) times daily., Disp: 60 each, Rfl: 2    EPINEPHrine (EPIPEN) 0.3 mg/0.3 mL AtIn, Inject 0.3 mLs (0.3 mg total) into the muscle once. for 1 dose, Disp: 2 each, Rfl: 11    lactulose (CHRONULAC) 10 gram/15 mL solution, , Disp: , Rfl:     Current Facility-Administered Medications:     cyanocobalamin injection 1,000 mcg, 1,000 mcg, Intramuscular, Q30 Days, Karlee Francis NP

## 2025-03-19 NOTE — TELEPHONE ENCOUNTER
----- Message from Carolina sent at 3/19/2025 10:24 AM CDT -----  Contact: [facility] pharmacy  Type:  Pharmacy Calling to Clarify an RXName of Caller:[facility] pharmacy Pharmacy Name:[facility] pharmacy Prescription Name:methotrexate 2.5 MG TabWhat do they need :drug interactionBest Call Back Number:[phone] Information: n/a

## 2025-03-20 LAB
GAMMA INTERFERON BACKGROUND BLD IA-ACNC: 0.07 IU/ML
M TB IFN-G CD4+ BCKGRND COR BLD-ACNC: -0 IU/ML
M TB IFN-G CD4+ BCKGRND COR BLD-ACNC: 0.01 IU/ML
MITOGEN IGNF BCKGRD COR BLD-ACNC: 9.93 IU/ML
TB GOLD PLUS: NEGATIVE

## 2025-03-21 ENCOUNTER — TELEPHONE (OUTPATIENT)
Dept: RHEUMATOLOGY | Facility: CLINIC | Age: 40
End: 2025-03-21
Payer: COMMERCIAL

## 2025-03-21 NOTE — TELEPHONE ENCOUNTER
Contacted patient to verify that she discontinued meloxicam so that the pharmacy may fill her MTX. Left message to return call

## 2025-03-21 NOTE — TELEPHONE ENCOUNTER
----- Message from Pharmacist Lurdes sent at 3/21/2025 12:47 PM CDT -----  Regarding: RE: Please advise  Contact: [facility] pharmacy  Meloxicam was discontinued from her med list almost a year ago. If she has not filled in the past 6 months/if you call and confirm the patient is no longer taking it, it is ok to proceed w/ methotrexate fill.  ----- Message -----  From: Blayne Alvarez MA  Sent: 3/21/2025  12:25 PM CDT  To: Lurdes Collier, PharmD  Subject: Please advise                                    Interaction between meloxicam and methotrexate should they fill the prescription still?  ----- Message -----  From: Lurdes Collier, Raul  Sent: 3/21/2025   8:01 AM CDT  To: Blayne Alvarez MA    I need to know what exactly the interaction is they're talking about. What med-med or med-diagnosis interaction they are talking about. Please call back to find out as much about that as you can then message me what you find out.  ----- Message -----  From: Blayne Alvarez MA  Sent: 3/20/2025   1:13 PM CDT  To: Lurdes Collier, PharmMARK    They said something about a drug interaction do I just call and see or is this something we send to you  ----- Message -----  From: Carolina Honeycutt  Sent: 3/20/2025  12:50 PM CDT  To: David Rodriguez Staff    Type:  Pharmacy Calling to Clarify an RXName of Caller:[facility] pharmacyPharmacy Name:[facility] pharmacyPrescription Name:methotrexate 2.5 MG TabWhat do they need ?:drug interactionBest Call Back Number:[phone] Information: n/a

## 2025-03-21 NOTE — TELEPHONE ENCOUNTER
----- Message from Rosaline sent at 3/21/2025  2:01 PM CDT -----  Contact: Lucía  Type:  Patient Returning CallWho Called:Beni Montemayor Message for Patient:Blayne Alvarez MADoes the patient know what this is regarding?:Meloxicam medicine, she states she never got the prescription.Would the patient rather a call back or a response via [facility]? Call [facility] Call Back Number:[phone]!

## 2025-03-25 ENCOUNTER — OFFICE VISIT (OUTPATIENT)
Dept: FAMILY MEDICINE | Facility: CLINIC | Age: 40
End: 2025-03-25
Payer: COMMERCIAL

## 2025-03-25 VITALS — DIASTOLIC BLOOD PRESSURE: 84 MMHG | SYSTOLIC BLOOD PRESSURE: 138 MMHG

## 2025-03-25 DIAGNOSIS — Z13.220 ENCOUNTER FOR LIPID SCREENING FOR CARDIOVASCULAR DISEASE: ICD-10-CM

## 2025-03-25 DIAGNOSIS — E55.9 VITAMIN D DEFICIENCY: ICD-10-CM

## 2025-03-25 DIAGNOSIS — E11.65 TYPE 2 DIABETES MELLITUS WITH HYPERGLYCEMIA, WITHOUT LONG-TERM CURRENT USE OF INSULIN: Primary | ICD-10-CM

## 2025-03-25 DIAGNOSIS — E11.59 HYPERTENSION ASSOCIATED WITH DIABETES: ICD-10-CM

## 2025-03-25 DIAGNOSIS — E66.2 CLASS 3 OBESITY WITH ALVEOLAR HYPOVENTILATION, SERIOUS COMORBIDITY, AND BODY MASS INDEX (BMI) OF 50.0 TO 59.9 IN ADULT: ICD-10-CM

## 2025-03-25 DIAGNOSIS — E66.813 CLASS 3 OBESITY WITH ALVEOLAR HYPOVENTILATION, SERIOUS COMORBIDITY, AND BODY MASS INDEX (BMI) OF 50.0 TO 59.9 IN ADULT: ICD-10-CM

## 2025-03-25 DIAGNOSIS — I15.2 HYPERTENSION ASSOCIATED WITH DIABETES: ICD-10-CM

## 2025-03-25 DIAGNOSIS — Z13.6 ENCOUNTER FOR LIPID SCREENING FOR CARDIOVASCULAR DISEASE: ICD-10-CM

## 2025-03-25 PROCEDURE — 3075F SYST BP GE 130 - 139MM HG: CPT | Mod: CPTII,95,, | Performed by: FAMILY MEDICINE

## 2025-03-25 PROCEDURE — G2211 COMPLEX E/M VISIT ADD ON: HCPCS | Mod: 95,,, | Performed by: FAMILY MEDICINE

## 2025-03-25 PROCEDURE — 3079F DIAST BP 80-89 MM HG: CPT | Mod: CPTII,95,, | Performed by: FAMILY MEDICINE

## 2025-03-25 PROCEDURE — 1160F RVW MEDS BY RX/DR IN RCRD: CPT | Mod: CPTII,95,, | Performed by: FAMILY MEDICINE

## 2025-03-25 PROCEDURE — 1159F MED LIST DOCD IN RCRD: CPT | Mod: CPTII,95,, | Performed by: FAMILY MEDICINE

## 2025-03-25 PROCEDURE — 98006 SYNCH AUDIO-VIDEO EST MOD 30: CPT | Mod: 95,,, | Performed by: FAMILY MEDICINE

## 2025-03-25 RX ORDER — TIRZEPATIDE 5 MG/.5ML
5 INJECTION, SOLUTION SUBCUTANEOUS
Qty: 2 ML | Refills: 1 | Status: SHIPPED | OUTPATIENT
Start: 2025-03-25 | End: 2025-05-24

## 2025-03-25 NOTE — PATIENT INSTRUCTIONS
Follow up in about 6 months (around 9/25/2025), or if symptoms worsen or fail to improve, for Med refills, LAB RESULTS.     Dear patient,   As a result of recent federal legislation (The Federal Cures Act), you may receive lab or pathology results from your visit in your [facility] account before your physician is able to contact you. Your physician or their representative will relay the results to you with their recommendations at their soonest availability.     If no improvement in symptoms or symptoms worsen, please be advised to call MD, follow-up at clinic and/or go to ER if becomes severe.    Dante Negro M.D.        We Offer TELEHEALTH & Same Day Appointments!   Book your Telehealth appointment with me through my nurse or   Clinic appointments on [facility]!    [address] [place] [address]    Office: [phone]   FAX: [phone]    Check out my Facebook Page and Follow Me at: [email or web address]    Check out my website at [employer] by clicking on: [email or web address]    To Schedule appointments online, go to [facility]: [email or web address]

## 2025-03-25 NOTE — PROGRESS NOTES
Primary Care Telemedicine Note  The patient location is:  Patient's Home - [place]  The chief complaint leading to consultation is:   Chief Complaint   Patient presents with    Hypertension    Diabetes      Total time:  see MDM below. The total time spent on the encounter, which includes face to face time and non-face to face time preparing to see the patient (eg, review of previous medical records, tests), Obtaining and/or reviewing separately obtained history, documenting clinical information in the electronic or other health record, independently interpreting results (not separately reported)/communicating results to the patient/family/caregiver, and/or care coordination (not separately reported).    Visit type: Virtual visit with synchronous audio and video  Each patient to whom he or she provides medical services by telemedicine is:  (1) informed of the relationship between the physician and patient and the respective role of any other health care provider with respect to management of the patient; and (2) notified that he or she may decline to receive medical services by telemedicine and may withdraw from such care at any time.  =================================================================    PLAN:      Assessment & Plan  1. Type 2 Diabetes Mellitus.  She has been off Mounjaro for a while due to insurance issues and has experienced weight gain despite walking and exercising. Her blood glucose levels have been satisfactory, but they are expected to improve further with the reintroduction of Mounjaro. She will restart Mounjaro at a dose of 5 mg for a few weeks, then increase to 7.5 mg or 10 mg based on her response. She is advised to monitor her blood pressure once or twice daily for a few days and provide an update. Fasting labs, including cholesterol, A1c, and thyroid, are scheduled for 04/03/2025, coinciding with her podiatry appointment with Dr. Dawkins.    2. Hypertension.  Her blood pressure was  slightly elevated during a recent doctor's visit. She is currently on spironolactone 100 mg. She is advised to start checking her blood pressure once or twice a day for a few days and send an update. The target is to keep her blood pressure less than 140/90. If it remains uncontrolled, the dose of spironolactone may be increased, or an ACE inhibitor like lisinopril or losartan may be added, especially considering her diabetes.    3. Scalp condition.  She has been prescribed methotrexate by her rheumatologist for lupus management and expresses concern about potential interactions with Mounjaro. She has not yet initiated this medication. She has consulted a dermatologist for her scalp condition, but the prescribed treatment has proven ineffective. She is reassured that there should be no adverse interaction between Mounjaro and methotrexate. She is advised to inform her dermatologist about the ineffectiveness of the current treatment, which may necessitate a biopsy or culture of the scalp.    Problem List Items Addressed This Visit       Class 3 obesity with alveolar hypoventilation, serious comorbidity, and body mass index (BMI) of 50.0 to 59.9 in adult (Chronic)    Restart MOUNJARO.  Patient has different insurance.  Discussed lifestyle modification with diet and exercise.         Relevant Orders    CBC Without Differential    Comprehensive Metabolic Panel    TSH    Hemoglobin A1C    Lipid Panel    Vitamin D deficiency (Chronic)    Check vitamin-D level.  Restart vitamin-D supplementation.         Relevant Orders    CBC Without Differential    Comprehensive Metabolic Panel    TSH    Hemoglobin A1C    Lipid Panel    Vitamin D    Type 2 diabetes mellitus with hyperglycemia, without long-term current use of insulin - Primary (Chronic)    Restart MOUNJARO.We will plan to monitor hemoglobin A1c at designated intervals 3 to 6 months.  I recommend ongoing Education for diabetic diet and exercise protocol.  We will continue  to monitor for side effects.    Please be advised of symptoms to monitor for and to notify me immediately if persistent or worsening.  Follow up with Ophthalmology/Optometry and Podiatry at least annually.  GLP 1 risk and benefits and common side effects of medication discussed with the patient at length.  All questions were answered.  Discussed with patient at length about potential pharmacologic options.  Patient desires consideration for MOUNJARO .  The risks, benefits and, side effects of this GLP 1 medication including nausea , constipation, diarrhea and pain injection site, etcetera.  She is aware she should not get pregnant while taking this medication and it is not approved while breastfeeding.  Patient reports no personal or family history of pancreatitis, MEN or medullary thyroid cancer.  There is potential for gallbladder issues while taking this medication if not already removed.  Patient should be advised to caution with taking this medication if having history of thyroid cancer or biliary disease/gallstones.  Patient should be aware of risk of diabetic retinopathy if blood sugars lowered too quickly.  Patient is aware that weight loss can and will most likely occur quickly.  Discussed GLP 1 mechanism of action.         Relevant Medications    tirzepatide (MOUNJARO) 5 mg/0.5 mL PnIj    Other Relevant Orders    CBC Without Differential    Comprehensive Metabolic Panel    TSH    Hemoglobin A1C    Lipid Panel    Hypertension associated with diabetes (Chronic)    Patient will update me on home blood pressure readings.  Consider starting ACE inhibitor or ARB if still elevated.  Counseled on importance of hypertension disease course, I recommend ongoing Education for DASH-diet and exercise.  Counseled on medication regimen importance of treating high blood pressure.  Please be advised of risk of untreated blood pressure as discussed.  Please advised of ER precautions were given for symptoms of hypertensive  urgency and emergency.           Relevant Orders    CBC Without Differential    Comprehensive Metabolic Panel    TSH    Hemoglobin A1C    Lipid Panel     Other Visit Diagnoses         Encounter for lipid screening for cardiovascular disease        Relevant Orders    Lipid Panel          Future Appointments       Date Provider Specialty Appt Notes    4/3/2025 Enoch Rose DPM Podiatry Diabetes  check  Big toe nail on left foot is purple.    4/3/2025  Lab labs    5/5/2025 Rossy Kramer MD Allergy Check up    5/5/2025 Lolly Salgado PA-C Pulmonology Asthma    5/14/2025 MILLA Villeda, OD Optometry Eye pressure/ glaucoma    5/26/2025 MILLA Villeda, OD Optometry Check for glaucoma, eye pressure    6/13/2025 Dante Negro MD Family Medicine Check-up    10/2/2025  Lab Dr. Hernandez    10/9/2025 Michael Hernandez MD Rheumatology Lupus           Medication Management for assessment above:   Medication List with Changes/Refills   New Medications    TIRZEPATIDE (MOUNJARO) 5 MG/0.5 ML PNIJ    Inject 5 mg into the skin every 7 days.   Current Medications    ALBUTEROL (PROVENTIL) 2.5 MG /3 ML (0.083 %) NEBULIZER SOLUTION    Take 3 mLs (2.5 mg total) by nebulization every 4 to 6 hours as needed for Wheezing.    ALBUTEROL (PROVENTIL/VENTOLIN HFA) 90 MCG/ACTUATION INHALER    Inhale 1-2 puffs into the lungs every 4 (four) hours as needed for Wheezing. Rescue    AZELASTINE (ASTELIN) 137 MCG (0.1 %) NASAL SPRAY    2 sprays (274 mcg total) by Nasal route 2 (two) times daily.    CHOLECALCIFEROL, VITAMIN D3, (VITAMIN D3) 25 MCG (1,000 UNIT) CAPSULE    Take 2 capsules (2,000 Units total) by mouth once daily.    CLINDAMYCIN (CLEOCIN T) 1 % SWAB    Apply topically 2 (two) times daily.    DOXYCYCLINE (VIBRAMYCIN) 100 MG CAP    Take 1 capsule (100 mg total) by mouth every 12 (twelve) hours.    EPINEPHRINE (EPIPEN) 0.3 MG/0.3 ML ATIN    Inject 0.3 mLs (0.3 mg total) into the muscle once. for 1 dose    ESCITALOPRAM OXALATE  (LEXAPRO) 10 MG TABLET    Take 1 tablet (10 mg total) by mouth once daily.    FLUOCINOLONE (DERMA-SMOOTHE) 0.01 % EXTERNAL OIL    Apply twice daily only as needed to affected areas of scalp/ears until improved, then can stop and restart as needed    FLUOCINONIDE (LIDEX) 0.05 % EXTERNAL SOLUTION    Apply topically 2 (two) times daily. Use on scalp    FLUTICASONE FUROATE-VILANTEROL (BREO) 200-25 MCG/DOSE DSDV DISKUS INHALER    Inhale 1 puff into the lungs once daily. Controller    FLUTICASONE PROPIONATE (FLONASE) 50 MCG/ACTUATION NASAL SPRAY    1 spray (50 mcg total) by Each Nostril route once daily.    FOLIC ACID (FOLVITE) 1 MG TABLET    Take 1 tablet (1 mg total) by mouth once daily.    GABAPENTIN (NEURONTIN) 300 MG CAPSULE    Take 1 capsule (300 mg total) by mouth 3 (three) times daily.    HYDROXYZINE HCL (ATARAX) 25 MG TABLET    Take 1 tablet (25 mg total) by mouth 3 (three) times daily as needed for Anxiety.    IRON-VIT C-B12-FOLIC ACID (IRON 100 PLUS) TAB    Take 1 tablet by mouth once daily.    KETOCONAZOLE (NIZORAL) 2 % SHAMPOO    Apply topically once a week. Lather in for 5-10 min before rinsing    LACTOBACILLUS RHAMNOSUS GG (CULTURELLE) 10 BILLION CELL CAPSULE    Take 1 capsule by mouth once daily.    LACTULOSE (CHRONULAC) 10 GRAM/15 ML SOLUTION        LEVOCETIRIZINE (XYZAL) 5 MG TABLET    Take 1 tablet (5 mg total) by mouth every evening.    LINACLOTIDE (LINZESS) 72 MCG CAP CAPSULE    Take 1 capsule (72 mcg total) by mouth once daily.    METHOCARBAMOL (ROBAXIN) 750 MG TAB    Take 1 tablet (750 mg total) by mouth 4 (four) times daily as needed (muscle spasms).    METHOTREXATE 2.5 MG TAB    Take 5 tablets (12.5 mg total) by mouth every 7 days.    MONTELUKAST (SINGULAIR) 10 MG TABLET    Take 1 tablet (10 mg total) by mouth once daily.    MUPIROCIN (BACTROBAN) 2 % OINTMENT    Apply topically 3 (three) times daily.    OMALIZUMAB 300 MG/2 ML ATIN    Inject 2 mLs (300 mg total) into the skin every 14 (fourteen)  days.    PANTOPRAZOLE (PROTONIX) 40 MG TABLET    Take 1 tablet (40 mg total) by mouth once daily.    PHENTERMINE (ADIPEX-P) 37.5 MG TABLET    Take 1 tablet (37.5 mg total) by mouth before breakfast.    SPIRONOLACTONE (ALDACTONE) 100 MG TABLET    Take 1 tablet (100 mg total) by mouth once daily.    TIZANIDINE (ZANAFLEX) 4 MG TABLET    Take 1 tablet (4 mg total) by mouth every 12 (twelve) hours as needed (muscle spasms/ pain).    TRETINOIN (RETIN-A) 0.05 % CREAM    Apply topically every evening. Use on face    TRIAMCINOLONE ACETONIDE 0.5% (KENALOG) 0.5 % CREA    Apply topically 2 (two) times daily.       Dante Negro M.D.  ==========================================================================  Subjective:   Patient ID: Lucía Coreas is a 39 y.o. female.  has a past medical history of Allergy, Amenorrhea, Asthma, Diabetes, GERD (gastroesophageal reflux disease), Infertility associated with anovulation, Iron deficiency anemia, Knee pain, Metabolic syndrome, PCO (polycystic ovaries), Recurrent boils, and Status post repeat low transverse  section (2020).   Chief Complaint: Hypertension and Diabetes      History of Present Illness  The patient is a pleasant 39-year-old female being seen by telemedicine for discussion on medication. She has questions or concerns about Mounjaro. She does have a history of type 2 diabetes.    She has been off Mounjaro for an extended period due to insurance issues, which required her to pay a $ 500 deductible. Consequently, she transitioned to a traditional insurance plan. She had previously escalated her Mounjaro dosage to 10, but experienced side effects at this level, necessitating a reduction in dose. Despite engaging in physical activities such as walking and exercising, she reports weight gain.    She is currently on methotrexate, prescribed by her rheumatologist for lupus management, and expresses concern about potential interactions with Mounjaro. She has  not yet initiated this medication. She has consulted a dermatologist for her scalp condition, but the prescribed treatment has proven ineffective.    She does not monitor her blood pressure at home, even though she possesses the necessary equipment. Her blood pressure was slightly elevated during her last doctor's visit. She continues to take spironolactone.    She is planning to schedule an eye checkup and a podiatrist appointment.    MEDICATIONS  Current: Spironolactone, methotrexate    Problem List Items Addressed This Visit       Class 3 obesity with alveolar hypoventilation, serious comorbidity, and body mass index (BMI) of 50.0 to 59.9 in adult (Chronic)    Overview   March 2025:  Patient has been off of MOUNJARO.  Starting to gain weight.  October 2024:  Patient having difficulty with rebound weight gain after being off of GLP 1.October 2023: Patient is having difficulty getting the proper dosage of MOUNJARO for her diabetes.  Weight loss has stalled.  Patient did well on phentermine in the past.  Patient is interested in bariatric surgery if no improvement.  She has tried diet and exercise with no improvement.Chronic.  Previous HPI:  Uncontrolled.  Was improving on Ozempic.  BMI currently 53.  DECEMBER 2020:  PATIENT'S BMI IS UP TO 54.  PATIENT HAS RECENTLY HAD A MISCARRIAGE AND WAS TAKEN OFF OF HER MEDICATIONS.  PATIENT IS WANTING TO RESTART MEDICATIONS FOR HER CHRONIC CONDITIONS TODAY.  SHE HAS SEEN HER OBGYN AND WAS STARTED ON ORAL CONTRACEPTIVE PILLS.  June 2022:  Chronic.  Worsening.  Patient is plateauing with Ozempic 1 milligram.  BMI Readings from Last 10 Encounters:   03/19/25 55.70 kg/m²   11/01/24 53.32 kg/m²   10/03/24 53.33 kg/m²   06/24/24 50.46 kg/m²   05/22/24 50.63 kg/m²   05/20/24 50.82 kg/m²   04/18/24 50.83 kg/m²   03/27/24 52.35 kg/m²   11/29/23 51.15 kg/m²   11/13/23 50.86 kg/m²              Current Assessment & Plan   Restart MOUNJARO.  Patient has different insurance.  Discussed  lifestyle modification with diet and exercise.         Vitamin D deficiency (Chronic)    Overview   07/12/2019 Vit d deficiency.  Not currently taking vitamin d supplement. No SE reported. Fatigue is notimproved. 08/15/2019 Chronic.  Uncontrolled.  Vitamin-D level is still low.  Continue supplementation.  Recheck in 3 months.09/20/2019Patient still taking vitamin-D.  Needs refill.  No side effects reported.DECEMBER 2019:  Patient doing well on vitamin-D.  Needs refill.  Checking level.November 2020:  Patient recently pregnant.Chronic. Vit d deficiency. Takes vitamin d supplement. No SE reported. Fatigue is slightly improved. December 2020:  Restart vitamin-D 67586 units weekly.February 2021:  Patient has recently had a positive pregnancy test.  She followed up with OBGYN.  Changing vitamin-D to daily supplement versus 75341 units weekly.  June 2022:   Latest Reference Range & Units 05/23/22 09:18   Vit D, 1,25-Dihydroxy 20 - 79 pg/mL 132 (H)   (H): Data is abnormally high  Lab Results   Component Value Date    [id number] 20 (L) 08/30/2022    [id number] 21 (L) 06/10/2022    [id number] 19 (L) 01/20/2022               Current Assessment & Plan   Check vitamin-D level.  Restart vitamin-D supplementation.         Type 2 diabetes mellitus with hyperglycemia, without long-term current use of insulin - Primary (Chronic)    Overview   March 2025:   Diabetes Medications              tirzepatide (MOUNJARO) 5 mg/0.5 mL PnIj Inject 5 mg into the skin every 7 days.          October 2024:  Reviewed labs. October 2023: INITIAL HPI:  No results found in epic however review her blood work from previous provider shows last A1c was 5.8.  Patient previously on metformin.  Currently having fatigue and decreased libido.09/20/2019Patient due for hemoglobin A1c next month.  Will place order for patient.She is having GI symptoms with metformin. She reports BG is elevated at home. She continues to gain weight. DECEMBER 2019:  Patient  reports to having diarrhea with metformin.  Reviewed Diabetes Management StatusStatin: Not takingACE/ARB: Not takingAPRIL 2020:  Patient has started Victoza and is off of metformin.  Patient reports that she is getting slightly nauseated on the increased dose of 1.2 mg.  Symptoms resolved after taking Pepto-Bismol after few hours of injection.Reviewed Diabetes Management StatusStatin: Not takingACE/ARB: Not takingAUGUST 2020:  PATIENT REPORTS THAT SHE IS TOLERATING VICTOZA 1.2 MG.  PATIENT CONTINUES TO HAVE ISSUES WITH HER WEIGHT.  REVIEWED Diabetes Management StatusStatin: Not takingACE/ARB: Not takingOCTOBER 2020:  Patient is having side effects with Victoza.  Reviewed Diabetes Management Status.Statin: Not takingACE/ARB: Not takingNOVEMBER 2020:  Patient reports that she was doing well on OZEMPIC.  A1c is been well controlled.  Patient was recently diagnosed with pregnancy.  Will monitor for worsening of diabetes.  Diabetes Management Status Statin: Not taking ACE/ARB: Not takingDECEMBER 2020:  Patient recently had miscarriage.  Patient wanting to get back on her diabetic medication.Diabetes Management StatusStatin: Not takingACE/ARB: Not taking January 2022:Diabetes Management StatusFebruary 2022:  Reviewed Diabetes Management Status.  She is taking Ozempic 0.5 milligrams weekly.  She denies any side effects.  She does not thing is working well.  Denies history of thyroid cancer, pancreatitis, MEN syndromeStatin: Not takingACE/ARB: Not taking. June 2022:  Patient reports her weight is starting to rebound.  She is on Ozempic 1 milligram weekly.  Reports that her blood sugar is starting to increase it with her appetite.  Denies history of pancreatitis, thyroid cancer, MEN syndrome.Diabetes Management StatusStatin: Not takingACE/ARB: Not taking  Diabetes Management Status Statin: Not taking ACE/ARB: Not taking  Screening or Prevention Patient's value Goal Complete/Controlled?   HgA1C Testing and Control   Lab  Results   Component Value Date    HGBA1C 5.6 10/02/2024      Annually/Less than 8% Yes   Lipid profile : 10/02/2024 Annually Yes   LDL control Lab Results   Component Value Date    LDLCALC 98.2 10/02/2024    Annually/Less than 100 mg/dl  Yes   Nephropathy screening Lab Results   Component Value Date    LABMICR <5.0 10/02/2024     Lab Results   Component Value Date    PROTEINUA 2+ (A) 02/03/2023     Lab Results   Component Value Date    UTPCR 0.07 03/19/2025      Annually Yes   Blood pressure BP Readings from Last 1 Encounters:   03/25/25 138/84    Less than 140/90 Yes   Dilated retinal exam : 03/13/2023 Annually No   Foot exam   : 10/12/2023 Annually No              Current Assessment & Plan   Restart MOUNJARO.We will plan to monitor hemoglobin A1c at designated intervals 3 to 6 months.  I recommend ongoing Education for diabetic diet and exercise protocol.  We will continue to monitor for side effects.    Please be advised of symptoms to monitor for and to notify me immediately if persistent or worsening.  Follow up with Ophthalmology/Optometry and Podiatry at least annually.  GLP 1 risk and benefits and common side effects of medication discussed with the patient at length.  All questions were answered.  Discussed with patient at length about potential pharmacologic options.  Patient desires consideration for MOUNJARO .  The risks, benefits and, side effects of this GLP 1 medication including nausea , constipation, diarrhea and pain injection site, etcetera.  She is aware she should not get pregnant while taking this medication and it is not approved while breastfeeding.  Patient reports no personal or family history of pancreatitis, MEN or medullary thyroid cancer.  There is potential for gallbladder issues while taking this medication if not already removed.  Patient should be advised to caution with taking this medication if having history of thyroid cancer or biliary disease/gallstones.  Patient should be  aware of risk of diabetic retinopathy if blood sugars lowered too quickly.  Patient is aware that weight loss can and will most likely occur quickly.  Discussed GLP 1 mechanism of action.         Hypertension associated with diabetes (Chronic)    Overview   March 2025:   Hypertension Medications              spironolactone (ALDACTONE) 100 MG tablet Take 1 tablet (100 mg total) by mouth once daily.          CHRONIC.  June 2022:  Blood pressure well controlled on current regimen.  February 2022:  Blood pressure is uncontrolled.  Only on spironolactone 50 milligrams daily at this time.  August 2021:  Patient reports good control with blood pressure at home on nifedipine 30 milligrams.  April 2021. Blood pressure well controlled today.  Patient reports compliance with medications as prescribed.  March 2021:  Blood pressures been well controlled at home on labetalol however her shortness of breath and breathing with asthma has gotten worse.. BP Reviewed.  Compliant with BP medications. No SE reported.   (-) CP, palpitations, dizziness, lightheadedness, arm numbness, tingling or weakness, syncope.  Creatinine   Date Value Ref Range Status   03/19/2025 0.7 0.5 - 1.4 mg/dL Final            Current Assessment & Plan   Patient will update me on home blood pressure readings.  Consider starting ACE inhibitor or ARB if still elevated.  Counseled on importance of hypertension disease course, I recommend ongoing Education for DASH-diet and exercise.  Counseled on medication regimen importance of treating high blood pressure.  Please be advised of risk of untreated blood pressure as discussed.  Please advised of ER precautions were given for symptoms of hypertensive urgency and emergency.            Other Visit Diagnoses         Encounter for lipid screening for cardiovascular disease                 Review of patient's allergies indicates:   Allergen Reactions    Metformin Diarrhea    Sulfa (sulfonamide antibiotics) Anaphylaxis  and Swelling     Swelling (eyes)^, Swelling (throat)^  Swelling (eyes)^, Swelling (throat)^      Adhesive      rash    Diclofenac      Gastritis     Latex, natural rubber      Contact dermatitis    Other reaction(s): Other (See Comments)  Contact dermatitis    Shellfish containing products      anaphylaxis     Current Outpatient Medications   Medication Instructions    albuterol (PROVENTIL) 2.5 mg, Nebulization, Every 4-6 hours PRN    albuterol (PROVENTIL/VENTOLIN HFA) 90 mcg/actuation inhaler 1-2 puffs, Inhalation, Every 4 hours PRN, Rescue    azelastine (ASTELIN) 274 mcg, Nasal, 2 times daily    cholecalciferol (vitamin D3) (VITAMIN D3) 2,000 Units, Oral, Daily    clindamycin (CLEOCIN T) 1 % Swab Topical (Top), 2 times daily    doxycycline (VIBRAMYCIN) 100 mg, Oral, Every 12 hours    EPINEPHrine (EPIPEN) 0.3 mg, Intramuscular, Once    EScitalopram oxalate (LEXAPRO) 10 mg, Oral, Daily    fluocinolone (DERMA-SMOOTHE) 0.01 % external oil Apply twice daily only as needed to affected areas of scalp/ears until improved, then can stop and restart as needed    fluocinonide (LIDEX) 0.05 % external solution Topical (Top), 2 times daily, Use on scalp    fluticasone furoate-vilanteroL (BREO) 200-25 mcg/dose DsDv diskus inhaler 1 puff, Inhalation, Daily, Controller    fluticasone propionate (FLONASE) 50 mcg, Each Nostril, Daily    folic acid (FOLVITE) 1 mg, Oral, Daily    gabapentin (NEURONTIN) 300 mg, Oral, 3 times daily    hydrOXYzine HCL (ATARAX) 25 mg, Oral, 3 times daily PRN    iron-vit c-b12-folic acid (IRON 100 PLUS) Tab 1 tablet, Oral, Daily    ketoconazole (NIZORAL) 2 % shampoo Topical (Top), Weekly, Lather in for 5-10 min before rinsing    Lactobacillus rhamnosus GG (CULTURELLE) 10 billion cell capsule 1 capsule, Daily    lactulose (CHRONULAC) 10 gram/15 mL solution     levocetirizine (XYZAL) 5 mg, Oral, Nightly    linaCLOtide (LINZESS) 72 mcg, Oral, Daily    methocarbamoL (ROBAXIN) 750 mg, Oral, 4 times daily PRN     methotrexate 12.5 mg, Oral, Every 7 days    montelukast (SINGULAIR) 10 mg, Oral, Daily    MOUNJARO 5 mg, Subcutaneous, Every 7 days    mupirocin (BACTROBAN) 2 % ointment Topical (Top), 3 times daily    omalizumab 300 mg, Subcutaneous, Every 14 days    pantoprazole (PROTONIX) 40 mg, Oral, Daily    phentermine (ADIPEX-P) 37.5 mg, Oral, Before breakfast    spironolactone (ALDACTONE) 100 mg, Oral, Daily    tiZANidine (ZANAFLEX) 4 mg, Oral, Every 12 hours PRN    tretinoin (RETIN-A) 0.05 % cream Topical (Top), Nightly, Use on face    triamcinolone acetonide 0.5% (KENALOG) 0.5 % Crea Topical (Top), 2 times daily      I have reviewed the PMH, social history, FamilyHx, surgical history, allergies and medications documented / confirmed by the patient at the time of this visit.  Review of Systems   Constitutional:  Negative for activity change and unexpected weight change.   HENT:  Negative for hearing loss, rhinorrhea and trouble swallowing.    Eyes:  Negative for discharge and visual disturbance.   Respiratory:  Negative for chest tightness and wheezing.    Cardiovascular:  Negative for chest pain and palpitations.   Gastrointestinal:  Positive for constipation. Negative for blood in stool, diarrhea and vomiting.   Endocrine: Negative for polydipsia and polyuria.   Genitourinary:  Negative for difficulty urinating, dysuria, hematuria and menstrual problem.   Musculoskeletal:  Negative for arthralgias, joint swelling and neck pain.   Neurological:  Negative for weakness and headaches.   Psychiatric/Behavioral:  Negative for confusion and dysphoric mood.      Objective:   /84   Physical Exam  Constitutional:       General: She is not in acute distress.     Appearance: She is well-developed. She is obese. She is not ill-appearing, toxic-appearing or diaphoretic.   HENT:      Head: Normocephalic and atraumatic.      Right Ear: Hearing and external ear normal.      Left Ear: Hearing and external ear normal.   Eyes:       General: Lids are normal.      Conjunctiva/sclera: Conjunctivae normal.   Pulmonary:      Effort: Pulmonary effort is normal. No respiratory distress.   Musculoskeletal:         General: Normal range of motion.      Cervical back: Normal range of motion.   Skin:     Coloration: Skin is not pale.   Neurological:      Mental Status: She is alert. She is not disoriented.   Psychiatric:         Attention and Perception: She is attentive.         Mood and Affect: Mood is not anxious or depressed.         Speech: Speech is not rapid and pressured or slurred.         Behavior: Behavior normal. Behavior is not agitated, aggressive or hyperactive. Behavior is cooperative.         Thought Content: Thought content normal. Thought content is not paranoid or delusional. Thought content does not include homicidal or suicidal ideation. Thought content does not include homicidal or suicidal plan.         Cognition and Memory: Memory is not impaired.         Judgment: Judgment normal.       Physical Exam      Results  Laboratory Studies  Blood counts were good. Kidneys, liver were good.    Assessment:     1. Type 2 diabetes mellitus with hyperglycemia, without long-term current use of insulin    2. Hypertension associated with diabetes    3. Class 3 obesity with alveolar hypoventilation, serious comorbidity, and body mass index (BMI) of 50.0 to 59.9 in adult    4. Vitamin D deficiency    5. Encounter for lipid screening for cardiovascular disease      MDM:   Moderate medical complexity.  Moderate risk.  Total time: 21 minutes.  This includes total time spent on the encounter, which includes face to face time and non-face to face time preparing to see the patient (eg, review of previous medical records, tests), Obtaining and/or reviewing separately obtained history, documenting clinical information in the electronic or other health record, independently interpreting results (not separately reported)/communicating results to the  patient/family/caregiver, and/or care coordination (not separately reported).    I have Reviewed and summarized old records.  I have performed thorough medication reconciliation today and discussed risk and benefits of medications.  I have reviewed labs and discussed with patient.  All questions were answered.  I am requesting old records and will review them once they are available.  Visit today included increased complexity associated with the care of the episodic problem see above assessment addressed and managing the longitudinal care of the patient due to the serious and/or complex managed problem(s) see above.    I have signed for the following orders AND/OR meds.  Orders Placed This Encounter   Procedures    CBC Without Differential     Standing Status:   Future     Expected Date:   3/25/2025     Expiration Date:   5/24/2026    Comprehensive Metabolic Panel     Standing Status:   Future     Expected Date:   3/25/2025     Expiration Date:   5/24/2026    TSH     Standing Status:   Future     Expected Date:   3/25/2025     Expiration Date:   5/24/2026    Hemoglobin A1C     Standing Status:   Future     Expected Date:   3/25/2025     Expiration Date:   5/24/2026    Lipid Panel     Standing Status:   Future     Expected Date:   3/25/2025     Expiration Date:   5/24/2026    Vitamin D     Standing Status:   Future     Expected Date:   3/25/2025     Expiration Date:   3/25/2026     Send normal result to authorizing provider's In Basket if patient is active on MyChart::   Yes     Medications Ordered This Encounter   Medications    tirzepatide (MOUNJARO) 5 mg/0.5 mL PnIj     Sig: Inject 5 mg into the skin every 7 days.     Dispense:  2 mL     Refill:  1        Follow up in about 6 months (around 9/25/2025), or if symptoms worsen or fail to improve, for Med refills, LAB RESULTS.  Future Appointments       Date Provider Specialty Appt Notes    4/3/2025 Enoch Rose DPM Podiatry Diabetes  check  Big toe nail on left  foot is purple.    4/3/2025  Lab labs    5/5/2025 Rossy Kramer MD Allergy Check up    5/5/2025 Lolly Salgado PA-C Pulmonology Asthma    5/14/2025 MILLA Villeda, OD Optometry Eye pressure/ glaucoma    5/26/2025 MILLA Villeda, OD Optometry Check for glaucoma, eye pressure    6/13/2025 Dante Negro MD Family Medicine Check-up    10/2/2025  Lab Dr. Hernandez    10/9/2025 Michael Hernandez MD Rheumatology Lupus          If no improvement in symptoms or symptoms worsen, advised to call/follow-up at clinic or go to ER. Patient voiced understanding and all questions/concerns were addressed.   DISCLAIMER: This note was compiled by using a speech recognition dictation system and therefore please be aware that typographical / speech recognition errors can and do occur.  Please contact me if you see any errors specifically.  Consent was obtained for [facility] recording system prior to the visit.    This note was generated with the assistance of ambient listening technology. Verbal consent was obtained by the patient and accompanying visitor(s) for the recording of patient appointment to facilitate this note. I attest to having reviewed and edited the generated note for accuracy, though some syntax or spelling errors may persist. Please contact the author of this note for any clarification.    Dante Negro M.D.       Office: [phone] [id number] [place] [address]  FAX: [phone]

## 2025-03-25 NOTE — ASSESSMENT & PLAN NOTE
Restart MOUNJARO.We will plan to monitor hemoglobin A1c at designated intervals 3 to 6 months.  I recommend ongoing Education for diabetic diet and exercise protocol.  We will continue to monitor for side effects.    Please be advised of symptoms to monitor for and to notify me immediately if persistent or worsening.  Follow up with Ophthalmology/Optometry and Podiatry at least annually.  GLP 1 risk and benefits and common side effects of medication discussed with the patient at length.  All questions were answered.  Discussed with patient at length about potential pharmacologic options.  Patient desires consideration for MOUNJARO .  The risks, benefits and, side effects of this GLP 1 medication including nausea , constipation, diarrhea and pain injection site, etcetera.  She is aware she should not get pregnant while taking this medication and it is not approved while breastfeeding.  Patient reports no personal or family history of pancreatitis, MEN or medullary thyroid cancer.  There is potential for gallbladder issues while taking this medication if not already removed.  Patient should be advised to caution with taking this medication if having history of thyroid cancer or biliary disease/gallstones.  Patient should be aware of risk of diabetic retinopathy if blood sugars lowered too quickly.  Patient is aware that weight loss can and will most likely occur quickly.  Discussed GLP 1 mechanism of action.

## 2025-03-25 NOTE — ASSESSMENT & PLAN NOTE
Restart MOUNJARO.  Patient has different insurance.  Discussed lifestyle modification with diet and exercise.

## 2025-03-25 NOTE — ASSESSMENT & PLAN NOTE
Patient will update me on home blood pressure readings.  Consider starting ACE inhibitor or ARB if still elevated.  Counseled on importance of hypertension disease course, I recommend ongoing Education for DASH-diet and exercise.  Counseled on medication regimen importance of treating high blood pressure.  Please be advised of risk of untreated blood pressure as discussed.  Please advised of ER precautions were given for symptoms of hypertensive urgency and emergency.

## 2025-03-30 DIAGNOSIS — K59.00 CONSTIPATION, UNSPECIFIED CONSTIPATION TYPE: ICD-10-CM

## 2025-03-30 NOTE — TELEPHONE ENCOUNTER
Care Due:                  Date            Visit Type   Department     Provider  --------------------------------------------------------------------------------                                ESTABLISHED                              PATIENT -    [facility] FAMILY  Last Visit: 03-      VIRTUAL      MEDICINE       Dante FUNES                              FOLLOWUP/OF  [facility]  Next Visit: 06-      FICE VISIT   MEDICINE       Dante Negro                                                            Last  Test          Frequency    Reason                     Performed    Due Date  --------------------------------------------------------------------------------    HBA1C.......  6 months...  tirzepatide..............  10-   03-    [employer] Embedded Care Due Messages. Reference number: [id number].   3/30/2025 5:25:46 AM CDT

## 2025-03-31 NOTE — TELEPHONE ENCOUNTER
Refill Routing Note   Medication(s) are not appropriate for processing by [facility] for the following reason(s):        Outside of protocol    [facility] action(s):  Route      Medication Therapy Plan: FLOS      Appointments  past 12m or future 3m with PCP    Date Provider   Last Visit   3/25/2025 Dante Negro MD   Next Visit   6/13/2025 Dante Negro MD   ED visits in past 90 days: 0        Note composed:9:04 AM 03/31/2025

## 2025-04-03 ENCOUNTER — LAB VISIT (OUTPATIENT)
Dept: LAB | Facility: HOSPITAL | Age: 40
End: 2025-04-03
Attending: INTERNAL MEDICINE
Payer: COMMERCIAL

## 2025-04-03 DIAGNOSIS — R76.8 FALSE POSITIVE ANA: ICD-10-CM

## 2025-04-03 DIAGNOSIS — Z00.00 WELL ADULT EXAM: ICD-10-CM

## 2025-04-03 DIAGNOSIS — Z79.899 ENCOUNTER FOR LONG-TERM (CURRENT) USE OF MEDICATIONS: ICD-10-CM

## 2025-04-03 DIAGNOSIS — E55.9 VITAMIN D DEFICIENCY: ICD-10-CM

## 2025-04-03 DIAGNOSIS — Z13.220 ENCOUNTER FOR LIPID SCREENING FOR CARDIOVASCULAR DISEASE: ICD-10-CM

## 2025-04-03 DIAGNOSIS — Z13.6 ENCOUNTER FOR LIPID SCREENING FOR CARDIOVASCULAR DISEASE: ICD-10-CM

## 2025-04-03 LAB
25(OH)D3+25(OH)D2 SERPL-MCNC: 14 NG/ML (ref 30–96)
ALBUMIN SERPL BCP-MCNC: 3.6 G/DL (ref 3.5–5.2)
ALP SERPL-CCNC: 68 UNIT/L (ref 40–150)
ALT SERPL W/O P-5'-P-CCNC: 18 UNIT/L (ref 10–44)
ANION GAP (OHS): 7 MMOL/L (ref 8–16)
AST SERPL-CCNC: 15 UNIT/L (ref 11–45)
BILIRUB SERPL-MCNC: 0.3 MG/DL (ref 0.1–1)
BUN SERPL-MCNC: 10 MG/DL (ref 6–20)
CALCIUM SERPL-MCNC: 9 MG/DL (ref 8.7–10.5)
CHLORIDE SERPL-SCNC: 106 MMOL/L (ref 95–110)
CHOLEST SERPL-MCNC: 174 MG/DL (ref 120–199)
CHOLEST/HDLC SERPL: 3.5 {RATIO} (ref 2–5)
CO2 SERPL-SCNC: 26 MMOL/L (ref 23–29)
CREAT SERPL-MCNC: 0.7 MG/DL (ref 0.5–1.4)
CREAT UR-MCNC: 159 MG/DL (ref 15–325)
EAG (OHS): 105 MG/DL (ref 68–131)
ERYTHROCYTE [DISTWIDTH] IN BLOOD BY AUTOMATED COUNT: 14.6 % (ref 11.5–14.5)
GFR SERPLBLD CREATININE-BSD FMLA CKD-EPI: >60 ML/MIN/1.73/M2
GLUCOSE SERPL-MCNC: 105 MG/DL (ref 70–110)
HBA1C MFR BLD: 5.3 % (ref 4–5.6)
HCT VFR BLD AUTO: 38.1 % (ref 37–48.5)
HDLC SERPL-MCNC: 50 MG/DL (ref 40–75)
HDLC SERPL: 28.7 % (ref 20–50)
HGB BLD-MCNC: 11.7 GM/DL (ref 12–16)
LDLC SERPL CALC-MCNC: 110.4 MG/DL (ref 63–159)
MCH RBC QN AUTO: 22.9 PG (ref 27–31)
MCHC RBC AUTO-ENTMCNC: 30.7 G/DL (ref 32–36)
MCV RBC AUTO: 75 FL (ref 82–98)
NONHDLC SERPL-MCNC: 124 MG/DL
PLATELET # BLD AUTO: 296 K/UL (ref 150–450)
PMV BLD AUTO: 10.9 FL (ref 9.2–12.9)
POTASSIUM SERPL-SCNC: 4.2 MMOL/L (ref 3.5–5.1)
PROT SERPL-MCNC: 7.5 GM/DL (ref 6–8.4)
PROT UR-MCNC: 14 MG/DL
PROT/CREAT UR: 0.09 MG/G{CREAT}
RBC # BLD AUTO: 5.11 M/UL (ref 4–5.4)
SODIUM SERPL-SCNC: 139 MMOL/L (ref 136–145)
TRIGL SERPL-MCNC: 68 MG/DL (ref 30–150)
TSH SERPL-ACNC: 1.64 UIU/ML (ref 0.4–4)
WBC # BLD AUTO: 6.66 K/UL (ref 3.9–12.7)

## 2025-04-03 PROCEDURE — 84156 ASSAY OF PROTEIN URINE: CPT

## 2025-04-03 PROCEDURE — 36415 COLL VENOUS BLD VENIPUNCTURE: CPT | Mod: PO

## 2025-04-03 PROCEDURE — 82465 ASSAY BLD/SERUM CHOLESTEROL: CPT

## 2025-04-03 PROCEDURE — 84443 ASSAY THYROID STIM HORMONE: CPT

## 2025-04-03 PROCEDURE — 83036 HEMOGLOBIN GLYCOSYLATED A1C: CPT

## 2025-04-03 PROCEDURE — 82306 VITAMIN D 25 HYDROXY: CPT

## 2025-04-03 PROCEDURE — 82310 ASSAY OF CALCIUM: CPT

## 2025-04-03 PROCEDURE — 85027 COMPLETE CBC AUTOMATED: CPT

## 2025-04-04 ENCOUNTER — RESULTS FOLLOW-UP (OUTPATIENT)
Dept: FAMILY MEDICINE | Facility: CLINIC | Age: 40
End: 2025-04-04

## 2025-04-09 ENCOUNTER — PATIENT MESSAGE (OUTPATIENT)
Dept: FAMILY MEDICINE | Facility: CLINIC | Age: 40
End: 2025-04-09
Payer: COMMERCIAL

## 2025-04-10 ENCOUNTER — OFFICE VISIT (OUTPATIENT)
Dept: FAMILY MEDICINE | Facility: CLINIC | Age: 40
End: 2025-04-10
Payer: COMMERCIAL

## 2025-04-10 VITALS
DIASTOLIC BLOOD PRESSURE: 83 MMHG | SYSTOLIC BLOOD PRESSURE: 118 MMHG | WEIGHT: 293 LBS | BODY MASS INDEX: 50.02 KG/M2 | OXYGEN SATURATION: 98 % | HEART RATE: 85 BPM | HEIGHT: 64 IN

## 2025-04-10 DIAGNOSIS — R73.9 HYPERGLYCEMIA: Primary | ICD-10-CM

## 2025-04-10 DIAGNOSIS — I15.2 HYPERTENSION ASSOCIATED WITH DIABETES: Chronic | ICD-10-CM

## 2025-04-10 DIAGNOSIS — E11.65 TYPE 2 DIABETES MELLITUS WITH HYPERGLYCEMIA, WITHOUT LONG-TERM CURRENT USE OF INSULIN: Chronic | ICD-10-CM

## 2025-04-10 DIAGNOSIS — H53.8 BLURRY VISION, BILATERAL: ICD-10-CM

## 2025-04-10 DIAGNOSIS — E11.59 HYPERTENSION ASSOCIATED WITH DIABETES: Chronic | ICD-10-CM

## 2025-04-10 LAB — GLUCOSE SERPL-MCNC: 98 MG/DL (ref 70–110)

## 2025-04-10 PROCEDURE — 82962 GLUCOSE BLOOD TEST: CPT | Mod: S$GLB,,, | Performed by: PHYSICIAN ASSISTANT

## 2025-04-10 PROCEDURE — 99999 PR PBB SHADOW E&M-EST. PATIENT-LVL V: CPT | Mod: PBBFAC,,, | Performed by: PHYSICIAN ASSISTANT

## 2025-04-10 RX ORDER — LANCETS
1 EACH MISCELLANEOUS DAILY PRN
Qty: 100 EACH | Refills: 11 | Status: SHIPPED | OUTPATIENT
Start: 2025-04-10

## 2025-04-10 RX ORDER — INSULIN PUMP SYRINGE, 3 ML
EACH MISCELLANEOUS
Qty: 1 EACH | Refills: 0 | Status: SHIPPED | OUTPATIENT
Start: 2025-04-10

## 2025-04-10 NOTE — ASSESSMENT & PLAN NOTE
Restarting MOUNJARO. We will plan to monitor hemoglobin A1c at designated intervals 3 to 6 months. I recommend ongoing Education for diabetic diet and exercise protocol. We will continue to monitor for side effects. Please be advised of symptoms to monitor for and to notify me immediately if persistent or worsening. Follow up with Ophthalmology/Optometry and Podiatry at least annually.
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
## 2025-05-01 ENCOUNTER — TELEPHONE (OUTPATIENT)
Dept: RHEUMATOLOGY | Facility: CLINIC | Age: 40
End: 2025-05-01
Payer: COMMERCIAL

## 2025-05-01 DIAGNOSIS — R76.8 THYROID ANTIBODY POSITIVE: Primary | ICD-10-CM

## 2025-05-01 NOTE — TELEPHONE ENCOUNTER
----- Message from Michael Hernandez MD sent at 4/30/2025  3:30 PM CDT -----  Regarding: [facility] results, Endocrinology.  AVISE test results:Low likelihood for SLE.RF IgM and CarP IgG slightly above reference value: Non-diagnostic for RA.  Monitor closely for inflammatory joint disease.High titer Thyroid biomarkers and chronic fatigue: referred to Endocrinology.    Message sent to patient through [facility] .

## 2025-05-01 NOTE — TELEPHONE ENCOUNTER
Referral placed for Endocrinology . Advise  lab test scanned into media . Patient would like to see Ms Richard.

## 2025-05-29 DIAGNOSIS — G89.29 CHRONIC LOW BACK PAIN WITH BILATERAL SCIATICA, UNSPECIFIED BACK PAIN LATERALITY: ICD-10-CM

## 2025-05-29 DIAGNOSIS — M54.42 CHRONIC LOW BACK PAIN WITH BILATERAL SCIATICA, UNSPECIFIED BACK PAIN LATERALITY: ICD-10-CM

## 2025-05-29 DIAGNOSIS — M47.819 SPONDYLOARTHRITIS: ICD-10-CM

## 2025-05-29 DIAGNOSIS — M54.41 CHRONIC LOW BACK PAIN WITH BILATERAL SCIATICA, UNSPECIFIED BACK PAIN LATERALITY: ICD-10-CM

## 2025-05-30 DIAGNOSIS — E11.65 TYPE 2 DIABETES MELLITUS WITH HYPERGLYCEMIA, WITHOUT LONG-TERM CURRENT USE OF INSULIN: ICD-10-CM

## 2025-05-30 DIAGNOSIS — J30.1 SEASONAL ALLERGIC RHINITIS DUE TO POLLEN: ICD-10-CM

## 2025-05-30 DIAGNOSIS — J30.89 ALLERGIC RHINITIS DUE TO INSECT: ICD-10-CM

## 2025-05-30 RX ORDER — LEVOCETIRIZINE DIHYDROCHLORIDE 5 MG/1
5 TABLET, FILM COATED ORAL NIGHTLY
Qty: 90 TABLET | Refills: 3 | Status: SHIPPED | OUTPATIENT
Start: 2025-05-30

## 2025-05-30 RX ORDER — ALBUTEROL SULFATE 0.83 MG/ML
2.5 SOLUTION RESPIRATORY (INHALATION)
Qty: 60 EACH | Refills: 3 | Status: SHIPPED | OUTPATIENT
Start: 2025-05-30

## 2025-05-30 RX ORDER — GABAPENTIN 300 MG/1
300 CAPSULE ORAL 3 TIMES DAILY
Qty: 270 CAPSULE | Refills: 0 | Status: SHIPPED | OUTPATIENT
Start: 2025-05-30 | End: 2026-05-30

## 2025-05-30 RX ORDER — TIRZEPATIDE 5 MG/.5ML
INJECTION, SOLUTION SUBCUTANEOUS
Qty: 6 ML | Refills: 1 | Status: SHIPPED | OUTPATIENT
Start: 2025-05-30

## 2025-05-30 NOTE — TELEPHONE ENCOUNTER
No care due was identified.  [facility] Embedded Care Due Messages. Reference number: [id number].   5/29/2025 9:52:12 PM CDT

## 2025-05-30 NOTE — TELEPHONE ENCOUNTER
No care due was identified.  [facility] Embedded Care Due Messages. Reference number: [id number].   5/30/2025 8:54:32 AM CDT

## 2025-05-30 NOTE — TELEPHONE ENCOUNTER
Refill Routing Note   Medication(s) are not appropriate for processing by [facility] for the following reason(s):        New or recently adjusted medication  No active prescription written by provider    [facility] action(s):  Defer               Appointments  past 12m or future 3m with PCP    Date Provider   Last Visit   3/25/2025 Dante Negro MD   Next Visit   6/13/2025 Dante Negro MD   ED visits in past 90 days: 0        Note composed:2:36 PM 05/30/2025

## 2025-05-30 NOTE — TELEPHONE ENCOUNTER
Refill Routing Note   Medication(s) are not appropriate for processing by [facility] for the following reason(s):        Outside of protocol    [facility] action(s):  Route             Appointments  past 12m or future 3m with PCP    Date Provider   Last Visit   3/25/2025 Dante Negro MD   Next Visit   6/13/2025 Dante Negro MD   ED visits in past 90 days: 0        Note composed:7:16 AM 05/30/2025

## 2025-06-13 ENCOUNTER — OFFICE VISIT (OUTPATIENT)
Dept: FAMILY MEDICINE | Facility: CLINIC | Age: 40
End: 2025-06-13
Payer: COMMERCIAL

## 2025-06-13 VITALS
HEART RATE: 90 BPM | HEIGHT: 64 IN | BODY MASS INDEX: 50.02 KG/M2 | SYSTOLIC BLOOD PRESSURE: 126 MMHG | DIASTOLIC BLOOD PRESSURE: 84 MMHG | WEIGHT: 293 LBS | OXYGEN SATURATION: 98 %

## 2025-06-13 DIAGNOSIS — Z79.899 ENCOUNTER FOR LONG-TERM (CURRENT) USE OF MEDICATIONS: ICD-10-CM

## 2025-06-13 DIAGNOSIS — D50.9 IRON DEFICIENCY ANEMIA, UNSPECIFIED IRON DEFICIENCY ANEMIA TYPE: ICD-10-CM

## 2025-06-13 DIAGNOSIS — E66.01 CLASS 3 SEVERE OBESITY WITH SERIOUS COMORBIDITY AND BODY MASS INDEX (BMI) OF 50.0 TO 59.9 IN ADULT, UNSPECIFIED OBESITY TYPE: ICD-10-CM

## 2025-06-13 DIAGNOSIS — I15.2 HYPERTENSION ASSOCIATED WITH DIABETES: ICD-10-CM

## 2025-06-13 DIAGNOSIS — E11.59 HYPERTENSION ASSOCIATED WITH DIABETES: ICD-10-CM

## 2025-06-13 DIAGNOSIS — E11.65 TYPE 2 DIABETES MELLITUS WITH HYPERGLYCEMIA, WITHOUT LONG-TERM CURRENT USE OF INSULIN: Primary | ICD-10-CM

## 2025-06-13 DIAGNOSIS — E53.8 B12 DEFICIENCY: ICD-10-CM

## 2025-06-13 DIAGNOSIS — N92.1 MENORRHAGIA WITH IRREGULAR CYCLE: ICD-10-CM

## 2025-06-13 DIAGNOSIS — E66.813 CLASS 3 SEVERE OBESITY WITH SERIOUS COMORBIDITY AND BODY MASS INDEX (BMI) OF 50.0 TO 59.9 IN ADULT, UNSPECIFIED OBESITY TYPE: ICD-10-CM

## 2025-06-13 DIAGNOSIS — E55.9 VITAMIN D DEFICIENCY: ICD-10-CM

## 2025-06-13 PROBLEM — R05.9 COUGH: Status: RESOLVED | Noted: 2022-08-05 | Resolved: 2025-06-13

## 2025-06-13 PROCEDURE — 99999 PR PBB SHADOW E&M-EST. PATIENT-LVL V: CPT | Mod: PBBFAC,,, | Performed by: FAMILY MEDICINE

## 2025-06-13 RX ORDER — LANOLIN ALCOHOL/MO/W.PET/CERES
1000 CREAM (GRAM) TOPICAL DAILY
Qty: 90 TABLET | Refills: 3 | Status: SHIPPED | OUTPATIENT
Start: 2025-06-13 | End: 2026-06-13

## 2025-06-13 RX ORDER — ERGOCALCIFEROL 1.25 MG/1
50000 CAPSULE ORAL
Qty: 4 CAPSULE | Refills: 3 | Status: SHIPPED | OUTPATIENT
Start: 2025-06-13

## 2025-06-13 NOTE — PROGRESS NOTES
PLAN:      Assessment & Plan  1. BMI 55  - Current medication dosage increased from 5 mg to 7.5 mg.  - Gradual increase to 10 mg and eventually to 15 mg as tolerated.  - Aim to enhance weight loss while minimizing side effects.  - Medication sent to pharmacy.    2. Vitamin D deficiency.  - Vitamin D levels consistently low.  - Prescription for weekly vitamin D supplementation provided.  - Levels to be reassessed during next blood work.    3. Thyroid disorder.  - Patient to follow up with an endocrinologist for further evaluation.  - Continued monitoring of thyroid condition.  Please be advised of hypothyroidism disease course.  We will plan to monitor thyroid labs at routine intervals.  Please be advised of risks and benefits of medication use, see medication insert for complete details.  ER precautions.    Common complaints from hypothyroidism include weight gain, fatigue, cold intolerance, constipation, dry and flaky shin, coarse or loss of hair, and trouble with memory.     Complaints with hyperthyroidism are weight loss or decrease in appetite, weakness and fatigue, visual changes, fast heart rate, decreased heat tolerance, thinning scalp hair, change in bowel habits, and palpations.    4. Heavy menstrual bleeding.  - Persistent heavy menstrual cycles.  - Advised to consult with OB/GYN for further management.  - No resolution reported from previous consultations.    Problem List Items Addressed This Visit       Class 3 severe obesity with serious comorbidity and body mass index (BMI) of 50.0 to 59.9 in adult (Chronic)    Titrate up dosage of MOUNJARO to 7.5.  Will continue titration to 15 milligrams for maximum weight loss.  Discussed lifestyle modification with diet and exercise.  Monitoring BMI.         Encounter for long-term (current) use of medications (Chronic)    Complete history and physical was completed today.  Complete and thorough medication reconciliation was performed.  Discussed risks and benefits  of medications.  Advised patient on orders and health maintenance.  We discussed old records and old labs if available.  Will request any records not available through epic.  Continue current medications listed on your summary sheet.           Vitamin D deficiency (Chronic)    Start prescription vitamin-D.  Continue vitamin-D supplementation.  Monitor level.         Relevant Medications    ergocalciferol (ERGOCALCIFEROL) 50,000 unit Cap    Other Relevant Orders    Vitamin D    Type 2 diabetes mellitus with hyperglycemia, without long-term current use of insulin - Primary (Chronic)    Increase MOUNJARO. We will plan to monitor hemoglobin A1c at designated intervals 3 to 6 months. I recommend ongoing Education for diabetic diet and exercise protocol. We will continue to monitor for side effects. Please be advised of symptoms to monitor for and to notify me immediately if persistent or worsening. Follow up with Ophthalmology/Optometry and Podiatry at least annually.         Relevant Medications    tirzepatide 7.5 mg/0.5 mL PnIj    Hypertension associated with diabetes (Chronic)    Counseled on importance of hypertension disease course, I recommend ongoing Education for DASH-diet and exercise.  Counseled on medication regimen importance of treating high blood pressure.  Please be advised of risk of untreated blood pressure as discussed.  Please advised of ER precautions were given for symptoms of hypertensive urgency and emergency.           Relevant Medications    tirzepatide 7.5 mg/0.5 mL PnIj    B12 deficiency (Chronic)    Continue B12 orally.  Patient is no longer doing the injections.  Monitor B12 level.         Relevant Medications    cyanocobalamin (VITAMIN B-12) 1000 MCG tablet    Other Relevant Orders    Vitamin B12    Iron deficiency anemia    Update labs.  Follow-up with OBGYN concerning menorrhagia.         Relevant Orders    Iron and TIBC    Ferritin     Other Visit Diagnoses         Menorrhagia with  irregular cycle              Future Appointments       Date Provider Specialty Appt Notes    7/11/2025 Rossy Kramer MD Allergy Check up    7/21/2025 Lolly Salgado PA-C Pulmonology Asthma    7/23/2025 MILLA Villeda, OD Optometry Check for glaucoma, eye pressure    8/4/2025 Laura Crowley MD Obstetrics and Gynecology Annual    8/6/2025 Zane Jordan, JOEM Podiatry Diabetic check    8/26/2025 FELIPA Ramos PA-C Endocrinology Labs results and treatment    9/15/2025  Lab labs    10/2/2025  Lab Dr. Hernandez    10/9/2025 Michael Hernandez MD Rheumatology Lupus           Medication Management for assessment above:   Medication List with Changes/Refills   New Medications    CYANOCOBALAMIN (VITAMIN B-12) 1000 MCG TABLET    Take 1 tablet (1,000 mcg total) by mouth once daily.    ERGOCALCIFEROL (ERGOCALCIFEROL) 50,000 UNIT CAP    Take 1 capsule (50,000 Units total) by mouth every 7 days.    TIRZEPATIDE 7.5 MG/0.5 ML PNIJ    Inject 7.5 mg into the skin every 7 days.   Current Medications    ALBUTEROL (PROVENTIL) 2.5 MG /3 ML (0.083 %) NEBULIZER SOLUTION    Take 3 mLs (2.5 mg total) by nebulization every 4 to 6 hours as needed for Wheezing.    ALBUTEROL (PROVENTIL/VENTOLIN HFA) 90 MCG/ACTUATION INHALER    Inhale 1-2 puffs into the lungs every 4 (four) hours as needed for Wheezing. Rescue    AZELASTINE (ASTELIN) 137 MCG (0.1 %) NASAL SPRAY    2 sprays (274 mcg total) by Nasal route 2 (two) times daily.    BLOOD SUGAR DIAGNOSTIC (BLOOD GLUCOSE TEST) STRP    Use 1-2 x a day to check glucoses before meals.    BLOOD-GLUCOSE METER KIT    Use as instructed    CHOLECALCIFEROL, VITAMIN D3, (VITAMIN D3) 25 MCG (1,000 UNIT) CAPSULE    Take 2 capsules (2,000 Units total) by mouth once daily.    CLINDAMYCIN (CLEOCIN T) 1 % SWAB    Apply topically 2 (two) times daily.    DOXYCYCLINE (VIBRAMYCIN) 100 MG CAP    Take 1 capsule (100 mg total) by mouth every 12 (twelve) hours.    EPINEPHRINE (EPIPEN) 0.3 MG/0.3 ML ATIN     Inject 0.3 mLs (0.3 mg total) into the muscle once. for 1 dose    ESCITALOPRAM OXALATE (LEXAPRO) 10 MG TABLET    Take 1 tablet (10 mg total) by mouth once daily.    FLUOCINOLONE (DERMA-SMOOTHE) 0.01 % EXTERNAL OIL    Apply twice daily only as needed to affected areas of scalp/ears until improved, then can stop and restart as needed    FLUOCINONIDE (LIDEX) 0.05 % EXTERNAL SOLUTION    Apply topically 2 (two) times daily. Use on scalp    FLUTICASONE FUROATE-VILANTEROL (BREO) 200-25 MCG/DOSE DSDV DISKUS INHALER    Inhale 1 puff into the lungs once daily. Controller    FLUTICASONE PROPIONATE (FLONASE) 50 MCG/ACTUATION NASAL SPRAY    1 spray (50 mcg total) by Each Nostril route once daily.    FOLIC ACID (FOLVITE) 1 MG TABLET    Take 1 tablet (1 mg total) by mouth once daily.    GABAPENTIN (NEURONTIN) 300 MG CAPSULE    Take 1 capsule (300 mg total) by mouth 3 (three) times daily.    HYDROXYZINE HCL (ATARAX) 25 MG TABLET    Take 1 tablet (25 mg total) by mouth 3 (three) times daily as needed for Anxiety.    IRON-VIT C-B12-FOLIC ACID (IRON 100 PLUS) TAB    Take 1 tablet by mouth once daily.    KETOCONAZOLE (NIZORAL) 2 % SHAMPOO    Apply topically once a week. Lather in for 5-10 min before rinsing    LACTOBACILLUS RHAMNOSUS GG (CULTURELLE) 10 BILLION CELL CAPSULE    Take 1 capsule by mouth once daily.    LACTULOSE (CHRONULAC) 10 GRAM/15 ML SOLUTION        LANCETS MISC    1 Units by Misc.(Non-Drug; Combo Route) route daily as needed.    LEVOCETIRIZINE (XYZAL) 5 MG TABLET    Take 1 tablet (5 mg total) by mouth every evening.    LINACLOTIDE (LINZESS) 72 MCG CAP CAPSULE    Take 1 capsule (72 mcg total) by mouth once daily.    METHOCARBAMOL (ROBAXIN) 750 MG TAB    Take 1 tablet (750 mg total) by mouth 4 (four) times daily as needed (muscle spasms).    METHOTREXATE 2.5 MG TAB    Take 5 tablets (12.5 mg total) by mouth every 7 days.    MONTELUKAST (SINGULAIR) 10 MG TABLET    Take 1 tablet (10 mg total) by mouth once daily.     MUPIROCIN (BACTROBAN) 2 % OINTMENT    Apply topically 3 (three) times daily.    OMALIZUMAB 300 MG/2 ML ATIN    Inject 2 mLs (300 mg total) into the skin every 14 (fourteen) days.    PANTOPRAZOLE (PROTONIX) 40 MG TABLET    Take 1 tablet (40 mg total) by mouth once daily.    SPIRONOLACTONE (ALDACTONE) 100 MG TABLET    Take 1 tablet (100 mg total) by mouth once daily.    TIZANIDINE (ZANAFLEX) 4 MG TABLET    Take 1 tablet (4 mg total) by mouth every 12 (twelve) hours as needed (muscle spasms/ pain).    TRETINOIN (RETIN-A) 0.05 % CREAM    Apply topically every evening. Use on face    TRIAMCINOLONE ACETONIDE 0.5% (KENALOG) 0.5 % CREA    Apply topically 2 (two) times daily.   Discontinued Medications    MOUNJARO 5 MG/0.5 ML PNIJ    INJECT 5mg INTO THE SKIN EVERY 7 DAYS       Dante Negro M.D.  ==========================================================================  Subjective:   Patient ID: Lucía Coreas is a 39 y.o. female.  has a past medical history of Allergy, Amenorrhea, Asthma, Diabetes, GERD (gastroesophageal reflux disease), Infertility associated with anovulation, Iron deficiency anemia, Knee pain, Metabolic syndrome, PCO (polycystic ovaries), Recurrent boils, and Status post repeat low transverse  section (2020).   Chief Complaint: Follow-up      History of Present Illness  The patient is a 39-year-old female who presents for follow-up on chronic medical conditions and medication refills. Her labs have been reviewed.    She reports experiencing cold intolerance recently. She is currently taking over-the-counter vitamin D supplements, but the specific dosage is unknown. She has discontinued her B12 injections, which were previously beneficial, and has transitioned to oral B12 supplementation. Additionally, she is taking iron supplements.    She has experienced two episodes of palpitations, each of short duration.    She is currently on a 5 mg dose of an unspecified medication, which she  reports tolerating well without any adverse effects. The plan is to increase the dose to 7.5 mg.    She has been under the care of a rheumatologist for lupus but has been informed that her condition is not lupus-related. She has been advised to consult with an endocrinologist for thyroid issues.    She continues to experience heavy menstrual cycles, a condition that has persisted since the birth of her children. She is under the care of an OB/GYN for this issue.    GYNECOLOGICAL HISTORY:  - Frequency and Flow: Heavy    FAMILY HISTORY  She does not have a family history of breast cancer.    Problem List Items Addressed This Visit       Class 3 severe obesity with serious comorbidity and body mass index (BMI) of 50.0 to 59.9 in adult (Chronic)    Overview   June2025:  No improvement with weight loss on MOUNJARO.    March 2025:  Patient has been off of MOUNJARO.  Starting to gain weight.  October 2024:  Patient having difficulty with rebound weight gain after being off of GLP 1.October 2023: Patient is having difficulty getting the proper dosage of MOUNJARO for her diabetes.  Weight loss has stalled.  Patient did well on phentermine in the past.  Patient is interested in bariatric surgery if no improvement.  She has tried diet and exercise with no improvement.Chronic.  Previous HPI:  Uncontrolled.  Was improving on Ozempic.  BMI currently 53.  DECEMBER 2020:  PATIENT'S BMI IS UP TO 54.  PATIENT HAS RECENTLY HAD A MISCARRIAGE AND WAS TAKEN OFF OF HER MEDICATIONS.  PATIENT IS WANTING TO RESTART MEDICATIONS FOR HER CHRONIC CONDITIONS TODAY.  SHE HAS SEEN HER OBGYN AND WAS STARTED ON ORAL CONTRACEPTIVE PILLS.  June 2022:  Chronic.  Worsening.  Patient is plateauing with Ozempic 1 milligram.  BMI Readings from Last 10 Encounters:   06/13/25 55.27 kg/m²   04/10/25 55.61 kg/m²   03/19/25 55.70 kg/m²   11/01/24 53.32 kg/m²   10/03/24 53.33 kg/m²   06/24/24 50.46 kg/m²   05/22/24 50.63 kg/m²   05/20/24 50.82 kg/m²   04/18/24  50.83 kg/m²   03/27/24 52.35 kg/m²              Current Assessment & Plan   Titrate up dosage of MOUNJARO to 7.5.  Will continue titration to 15 milligrams for maximum weight loss.  Discussed lifestyle modification with diet and exercise.  Monitoring BMI.         Encounter for long-term (current) use of medications (Chronic)    Overview   June2025:  Reviewed labs.  October 2024:  Reviewed labs.  October 2023: Reviewed labs.  September 2023:  Reviewed labs.  07/12/2019 CHRONIC long-term drug therapy for managed conditions. See medication list. Reports compliance.  No side effects reported.  Routine lab work is being monitored.  Patient does not need refills today. 09/20/2019Reviewed labs. Patient is compliant with meds. She needs refills. Gaining weight on Metformin. DECEMBER 2019:  CHRONIC. Stable. Compliant with medications for managed conditions. See medication list. No SE reported. Routine lab analysis is being monitored. Refills were addressed. APRIL 2020:  CHRONIC. Stable. Compliant with medications for managed conditions. See medication list. No SE reported. Routine lab analysis is being monitored. Refills were addressed.  AUGUST 2020:  REVIEWED LABS.  CHRONIC. Stable. Compliant with medications for managed conditions. See medication list. No SE reported. Routine lab analysis is being monitored. Refills were addressed.October 2020:  CHRONIC. Stable. Compliant with medications for managed conditions. See medication list. No SE reported. Routine lab analysis is being monitored. Refills were addressed.November 2020:CHRONIC. Stable. Compliant with medications for managed conditions. See medication list. No SE reported.   Routine lab analysis is being monitored. Refills were addressed.  April 2021:  Reviewed labs.  August 2021:  Reviewed labs.  Patient additional labs set up through [facility] [place].  January 2022:  Reviewed labs.  February 2022:  Reviewed labs.  June 2022:  Reviewed labs.  Lab  Results   Component Value Date    WBC 6.66 04/03/2025    HGB 11.7 (L) 04/03/2025    HCT 38.1 04/03/2025    MCV 75 (L) 04/03/2025     04/03/2025         Chemistry        Component Value Date/Time     04/03/2025 0713     03/19/2025 0913    K 4.2 04/03/2025 0713    K 3.8 03/19/2025 0913     04/03/2025 0713     03/19/2025 0913    CO2 26 04/03/2025 0713    CO2 23 03/19/2025 0913    BUN 10 04/03/2025 0713    CREATININE 0.7 04/03/2025 0713     04/03/2025 0713    GLU 99 03/19/2025 0913        Component Value Date/Time    CALCIUM 9.0 04/03/2025 0713    CALCIUM 9.3 03/19/2025 0913    ALKPHOS 68 04/03/2025 0713    ALKPHOS 69 03/19/2025 0913    AST 15 04/03/2025 0713    AST 16 03/19/2025 0913    ALT 18 04/03/2025 0713    ALT 15 03/19/2025 0913    BILITOT 0.3 04/03/2025 0713    BILITOT 0.3 03/19/2025 0913    ESTGFRAFRICA >60.0 06/10/2022 1032    EGFRNONAA >60.0 06/10/2022 1032          Lab Results   Component Value Date    TSH 1.639 04/03/2025    C3IQPNX 9.4 07/16/2019    T3FREE 2.9 07/16/2019              Current Assessment & Plan   Complete history and physical was completed today.  Complete and thorough medication reconciliation was performed.  Discussed risks and benefits of medications.  Advised patient on orders and health maintenance.  We discussed old records and old labs if available.  Will request any records not available through epic.  Continue current medications listed on your summary sheet.           Vitamin D deficiency (Chronic)    Overview   June2025:  Persistent vitamin-D deficiency.  Will start prescription. 07/12/2019 Vit d deficiency.  Not currently taking vitamin d supplement. No SE reported. Fatigue is notimproved. 08/15/2019 Chronic.  Uncontrolled.  Vitamin-D level is still low.  Continue supplementation.  Recheck in 3 months.09/20/2019Patient still taking vitamin-D.  Needs refill.  No side effects reported.DECEMBER 2019:  Patient doing well on vitamin-D.  Needs  refill.  Checking level.November 2020:  Patient recently pregnant.Chronic. Vit d deficiency. Takes vitamin d supplement. No SE reported. Fatigue is slightly improved. December 2020:  Restart vitamin-D 25054 units weekly.February 2021:  Patient has recently had a positive pregnancy test.  She followed up with OBGYN.  Changing vitamin-D to daily supplement versus 92011 units weekly.  June 2022:   Latest Reference Range & Units 05/23/22 09:18   Vit D, 1,25-Dihydroxy 20 - 79 pg/mL 132 (H)   (H): Data is abnormally high  Lab Results   Component Value Date    [id number] 14 (L) 04/03/2025    [id number] 20 (L) 08/30/2022    [id number] 21 (L) 06/10/2022               Current Assessment & Plan   Start prescription vitamin-D.  Continue vitamin-D supplementation.  Monitor level.         Type 2 diabetes mellitus with hyperglycemia, without long-term current use of insulin - Primary (Chronic)    Overview   June2025:  Tolerating MOUNJARO 5 milligrams weekly.  Weight has plateaued.  BMI Readings from Last 10 Encounters:   06/13/25 55.27 kg/m²   04/10/25 55.61 kg/m²   03/19/25 55.70 kg/m²   11/01/24 53.32 kg/m²   10/03/24 53.33 kg/m²   06/24/24 50.46 kg/m²   05/22/24 50.63 kg/m²   05/20/24 50.82 kg/m²   04/18/24 50.83 kg/m²   03/27/24 52.35 kg/m²       April 2025:   Diabetes Medications              tirzepatide (MOUNJARO) 5 mg/0.5 mL PnIj Inject 5 mg into the skin every 7 days.          October 2024:  Reviewed labs. October 2023: INITIAL HPI:  No results found in epic however review her blood work from previous provider shows last A1c was 5.8.  Patient previously on metformin.  Currently having fatigue and decreased libido.09/20/2019Patient due for hemoglobin A1c next month.  Will place order for patient.She is having GI symptoms with metformin. She reports BG is elevated at home. She continues to gain weight. DECEMBER 2019:  Patient reports to having diarrhea with metformin.  Reviewed Diabetes Management StatusStatin: Not  takingACE/ARB: Not takingAPRIL 2020:  Patient has started Victoza and is off of metformin.  Patient reports that she is getting slightly nauseated on the increased dose of 1.2 mg.  Symptoms resolved after taking Pepto-Bismol after few hours of injection.Reviewed Diabetes Management StatusStatin: Not takingACE/ARB: Not takingAUGUST 2020:  PATIENT REPORTS THAT SHE IS TOLERATING VICTOZA 1.2 MG.  PATIENT CONTINUES TO HAVE ISSUES WITH HER WEIGHT.  REVIEWED Diabetes Management StatusStatin: Not takingACE/ARB: Not takingOCTOBER 2020:  Patient is having side effects with Victoza.  Reviewed Diabetes Management Status.Statin: Not takingACE/ARB: Not takingNOVEMBER 2020:  Patient reports that she was doing well on OZEMPIC.  A1c is been well controlled.  Patient was recently diagnosed with pregnancy.  Will monitor for worsening of diabetes.  Diabetes Management Status Statin: Not taking ACE/ARB: Not takingDECEMBER 2020:  Patient recently had miscarriage.  Patient wanting to get back on her diabetic medication.Diabetes Management StatusStatin: Not takingACE/ARB: Not taking January 2022:Diabetes Management StatusFebruary 2022:  Reviewed Diabetes Management Status.  She is taking Ozempic 0.5 milligrams weekly.  She denies any side effects.  She does not thing is working well.  Denies history of thyroid cancer, pancreatitis, MEN syndromeStatin: Not takingACE/ARB: Not taking. June 2022:  Patient reports her weight is starting to rebound.  She is on Ozempic 1 milligram weekly.  Reports that her blood sugar is starting to increase it with her appetite.  Denies history of pancreatitis, thyroid cancer, MEN syndrome.Diabetes Management StatusStatin: Not takingACE/ARB: Not taking  Diabetes Management Status    Statin: Not taking  ACE/ARB: Not taking    Screening or Prevention Patient's value Goal Complete/Controlled?   HgA1C Testing and Control   Lab Results   Component Value Date    HGBA1C 5.3 04/03/2025      Annually/Less than 8% Yes    Lipid profile : 04/03/2025 Annually Yes   LDL control Lab Results   Component Value Date    LDLCALC 110.4 04/03/2025    Annually/Less than 100 mg/dl  No   Nephropathy screening Lab Results   Component Value Date    LABMICR <5.0 10/02/2024     Lab Results   Component Value Date    PROTEINUA 2+ (A) 02/03/2023     Lab Results   Component Value Date    UTPCR 0.09 04/03/2025      Annually Yes   Blood pressure BP Readings from Last 1 Encounters:   06/13/25 126/84    Less than 140/90 Yes   Dilated retinal exam : 03/13/2023 Annually No   Foot exam   : 10/12/2023 Annually No              Current Assessment & Plan   Increase MOUNJARO. We will plan to monitor hemoglobin A1c at designated intervals 3 to 6 months. I recommend ongoing Education for diabetic diet and exercise protocol. We will continue to monitor for side effects. Please be advised of symptoms to monitor for and to notify me immediately if persistent or worsening. Follow up with Ophthalmology/Optometry and Podiatry at least annually.         Hypertension associated with diabetes (Chronic)    Overview   June2025:  Hypertension Medications              spironolactone (ALDACTONE) 100 MG tablet Take 1 tablet (100 mg total) by mouth once daily.          April 2025:   Hypertension Medications              spironolactone (ALDACTONE) 100 MG tablet Take 1 tablet (100 mg total) by mouth once daily.          CHRONIC.  June 2022:  Blood pressure well controlled on current regimen.  February 2022:  Blood pressure is uncontrolled.  Only on spironolactone 50 milligrams daily at this time.  August 2021:  Patient reports good control with blood pressure at home on nifedipine 30 milligrams.  April 2021. Blood pressure well controlled today.  Patient reports compliance with medications as prescribed.  March 2021:  Blood pressures been well controlled at home on labetalol however her shortness of breath and breathing with asthma has gotten worse.. BP Reviewed.  Compliant with BP  medications. No SE reported.   (-) CP, palpitations, dizziness, lightheadedness, arm numbness, tingling or weakness, syncope.  Creatinine   Date Value Ref Range Status   04/03/2025 0.7 0.5 - 1.4 mg/dL Final            Current Assessment & Plan   Counseled on importance of hypertension disease course, I recommend ongoing Education for DASH-diet and exercise.  Counseled on medication regimen importance of treating high blood pressure.  Please be advised of risk of untreated blood pressure as discussed.  Please advised of ER precautions were given for symptoms of hypertensive urgency and emergency.           B12 deficiency (Chronic)    Overview   Chronic.  Not improved on oral B12 supplement.  Lab Results   Component Value Date    [id number] 563 04/04/2024              Current Assessment & Plan   Continue B12 orally.  Patient is no longer doing the injections.  Monitor B12 level.         Iron deficiency anemia    Overview   June2025:  Patient reports still having heavy cycles.  Reviewed recent CBC from [facility].      Lab Results   Component Value Date    WBC 6.66 04/03/2025    HGB 11.7 (L) 04/03/2025    HCT 38.1 04/03/2025    MCV 75 (L) 04/03/2025     04/03/2025     Lab Results   Component Value Date    IRON 81 04/04/2024    TIBC 340 04/04/2024    FERRITIN 26 04/04/2024     Lab Results   Component Value Date    [id number] 563 04/04/2024     Lab Results   Component Value Date    FOLATE 3.4 (L) 04/04/2024                Current Assessment & Plan   Update labs.  Follow-up with OBGYN concerning menorrhagia.          Other Visit Diagnoses         Menorrhagia with irregular cycle                 Review of patient's allergies indicates:   Allergen Reactions    Metformin Diarrhea    Sulfa (sulfonamide antibiotics) Anaphylaxis and Swelling     Swelling (eyes)^, Swelling (throat)^  Swelling (eyes)^, Swelling (throat)^      Adhesive      rash    Diclofenac      Gastritis     Latex, natural rubber      Contact  dermatitis    Other reaction(s): Other (See Comments)  Contact dermatitis    Shellfish containing products      anaphylaxis     Current Outpatient Medications   Medication Instructions    albuterol (PROVENTIL) 2.5 mg, Nebulization, Every 4-6 hours PRN    albuterol (PROVENTIL/VENTOLIN HFA) 90 mcg/actuation inhaler 1-2 puffs, Inhalation, Every 4 hours PRN, Rescue    azelastine (ASTELIN) 274 mcg, Nasal, 2 times daily    blood sugar diagnostic (BLOOD GLUCOSE TEST) Strp Use 1-2 x a day to check glucoses before meals.    blood-glucose meter kit Use as instructed    cholecalciferol (vitamin D3) (VITAMIN D3) 2,000 Units, Oral, Daily    clindamycin (CLEOCIN T) 1 % Swab Topical (Top), 2 times daily    cyanocobalamin (VITAMIN B-12) 1,000 mcg, Oral, Daily    doxycycline (VIBRAMYCIN) 100 mg, Oral, Every 12 hours    EPINEPHrine (EPIPEN) 0.3 mg, Intramuscular, Once    ergocalciferol (ERGOCALCIFEROL) 50,000 Units, Oral, Every 7 days    EScitalopram oxalate (LEXAPRO) 10 mg, Oral, Daily    fluocinolone (DERMA-SMOOTHE) 0.01 % external oil Apply twice daily only as needed to affected areas of scalp/ears until improved, then can stop and restart as needed    fluocinonide (LIDEX) 0.05 % external solution Topical (Top), 2 times daily, Use on scalp    fluticasone furoate-vilanteroL (BREO) 200-25 mcg/dose DsDv diskus inhaler 1 puff, Inhalation, Daily, Controller    fluticasone propionate (FLONASE) 50 mcg, Each Nostril, Daily    folic acid (FOLVITE) 1 mg, Oral, Daily    gabapentin (NEURONTIN) 300 mg, Oral, 3 times daily    hydrOXYzine HCL (ATARAX) 25 mg, Oral, 3 times daily PRN    iron-vit c-b12-folic acid (IRON 100 PLUS) Tab 1 tablet, Oral, Daily    ketoconazole (NIZORAL) 2 % shampoo Topical (Top), Weekly, Lather in for 5-10 min before rinsing    Lactobacillus rhamnosus GG (CULTURELLE) 10 billion cell capsule 1 capsule, Daily    lactulose (CHRONULAC) 10 gram/15 mL solution     lancets Misc 1 Units, Misc.(Non-Drug; Combo Route), Daily PRN     "levocetirizine (XYZAL) 5 mg, Oral, Nightly    linaCLOtide (LINZESS) 72 mcg, Oral, Daily    methocarbamoL (ROBAXIN) 750 mg, Oral, 4 times daily PRN    methotrexate 12.5 mg, Oral, Every 7 days    montelukast (SINGULAIR) 10 mg, Oral, Daily    mupirocin (BACTROBAN) 2 % ointment Topical (Top), 3 times daily    omalizumab 300 mg, Subcutaneous, Every 14 days    pantoprazole (PROTONIX) 40 mg, Oral, Daily    spironolactone (ALDACTONE) 100 mg, Oral, Daily    tirzepatide 7.5 mg, Subcutaneous, Every 7 days    tiZANidine (ZANAFLEX) 4 mg, Oral, Every 12 hours PRN    tretinoin (RETIN-A) 0.05 % cream Topical (Top), Nightly, Use on face    triamcinolone acetonide 0.5% (KENALOG) 0.5 % Crea Topical (Top), 2 times daily      I have reviewed the PMH, social history, FamilyHx, surgical history, allergies and medications documented / confirmed by the patient at the time of this visit.  Review of Systems   Constitutional:  Negative for activity change and unexpected weight change.   HENT:  Negative for hearing loss, rhinorrhea and trouble swallowing.    Eyes:  Negative for discharge and visual disturbance.   Respiratory:  Negative for chest tightness and wheezing.    Cardiovascular:  Negative for chest pain and palpitations.   Gastrointestinal:  Negative for blood in stool, constipation, diarrhea and vomiting.   Endocrine: Negative for polydipsia and polyuria.   Genitourinary:  Negative for difficulty urinating, dysuria, hematuria and menstrual problem.   Musculoskeletal:  Negative for arthralgias, joint swelling and neck pain.   Neurological:  Negative for weakness and headaches.   Psychiatric/Behavioral:  Negative for confusion and dysphoric mood.      Objective:   /84   Pulse 90   Ht 5' 4" (1.626 m)   Wt (!) 146.1 kg (322 lb)   SpO2 98%   BMI 55.27 kg/m²   Physical Exam  Vitals and nursing note reviewed.   Constitutional:       General: She is not in acute distress.     Appearance: She is well-developed. She is not " ill-appearing, toxic-appearing or diaphoretic.   HENT:      Head: Normocephalic and atraumatic.      Right Ear: Hearing and external ear normal.      Left Ear: Hearing and external ear normal.      Nose: Nose normal. No rhinorrhea.   Eyes:      General: Lids are normal.      Extraocular Movements: Extraocular movements intact.      Conjunctiva/sclera: Conjunctivae normal.      Pupils: Pupils are equal, round, and reactive to light.   Cardiovascular:      Rate and Rhythm: Normal rate.      Pulses: Normal pulses.   Pulmonary:      Effort: Pulmonary effort is normal. No respiratory distress.      Breath sounds: Normal breath sounds.   Musculoskeletal:         General: Normal range of motion.      Cervical back: Normal range of motion and neck supple.   Skin:     General: Skin is warm and dry.      Capillary Refill: Capillary refill takes less than 2 seconds.      Coloration: Skin is not pale.   Neurological:      General: No focal deficit present.      Mental Status: She is alert and oriented to person, place, and time. She is not disoriented.   Psychiatric:         Attention and Perception: She is attentive.         Mood and Affect: Mood normal. Mood is not anxious or depressed.         Speech: Speech is not rapid and pressured or slurred.         Behavior: Behavior normal. Behavior is not agitated, aggressive or hyperactive. Behavior is cooperative.         Thought Content: Thought content normal. Thought content is not paranoid or delusional. Thought content does not include homicidal or suicidal ideation. Thought content does not include homicidal or suicidal plan.         Cognition and Memory: Memory is not impaired.         Judgment: Judgment normal.       Physical Exam  Respiratory: Clear to auscultation, no wheezing, rales or rhonchi    Results  Labs   - Vitamin D level: 04/2025, Low    Assessment:     1. Type 2 diabetes mellitus with hyperglycemia, without long-term current use of insulin    2. Hypertension  associated with diabetes    3. Encounter for long-term (current) use of medications    4. Vitamin D deficiency    5. B12 deficiency    6. Iron deficiency anemia, unspecified iron deficiency anemia type    7. Class 3 severe obesity with serious comorbidity and body mass index (BMI) of 50.0 to 59.9 in adult, unspecified obesity type    8. Menorrhagia with irregular cycle      MDM:   Moderate medical complexity.  Moderate risk.  Total time: 32 minutes.  This includes total time spent on the encounter, which includes face to face time and non-face to face time preparing to see the patient (eg, review of previous medical records, tests), Obtaining and/or reviewing separately obtained history, documenting clinical information in the electronic or other health record, independently interpreting results (not separately reported)/communicating results to the patient/family/caregiver, and/or care coordination (not separately reported).    I have Reviewed and summarized old records.  I have performed thorough medication reconciliation today and discussed risk and benefits of medications.  I have reviewed labs and discussed with patient.  All questions were answered.  I am requesting old records and will review them once they are available.  Visit today included increased complexity associated with the care of the episodic problem see above assessment addressed and managing the longitudinal care of the patient due to the serious and/or complex managed problem(s) see above.    I have signed for the following orders AND/OR meds.  Orders Placed This Encounter   Procedures    Vitamin D     Standing Status:   Future     Expected Date:   6/13/2025     Expiration Date:   6/13/2026     Send normal result to authorizing provider's In Basket if patient is active on MyChart::   Yes    Vitamin B12     Standing Status:   Future     Expected Date:   6/13/2025     Expiration Date:   9/11/2026     Send normal result to authorizing provider's  In Basket if patient is active on MyChart::   Yes    Iron and TIBC     Standing Status:   Future     Expected Date:   6/13/2025     Expiration Date:   9/11/2026     Send normal result to authorizing provider's In Basket if patient is active on MyChart::   Yes    Ferritin     Standing Status:   Future     Expected Date:   6/13/2025     Expiration Date:   9/11/2026     Send normal result to authorizing provider's In Basket if patient is active on MyChart::   Yes     Medications Ordered This Encounter   Medications    cyanocobalamin (VITAMIN B-12) 1000 MCG tablet     Sig: Take 1 tablet (1,000 mcg total) by mouth once daily.     Dispense:  90 tablet     Refill:  3    ergocalciferol (ERGOCALCIFEROL) 50,000 unit Cap     Sig: Take 1 capsule (50,000 Units total) by mouth every 7 days.     Dispense:  4 capsule     Refill:  3    tirzepatide 7.5 mg/0.5 mL PnIj     Sig: Inject 7.5 mg into the skin every 7 days.     Dispense:  2 mL     Refill:  1        Follow up in about 6 months (around 12/13/2025), or if symptoms worsen or fail to improve, for Med refills, LAB RESULTS.  Future Appointments       Date Provider Specialty Appt Notes    7/11/2025 Rossy Kramer MD Allergy Check up    7/21/2025 Lolly Salgado PA-C Pulmonology Asthma    7/23/2025 MILLA Villeda, OD Optometry Check for glaucoma, eye pressure    8/4/2025 Laura Crowley MD Obstetrics and Gynecology Annual    8/6/2025 Zane Jordan DPM Podiatry Diabetic check    8/26/2025 FELIPA Ramos PA-C Endocrinology Labs results and treatment    9/15/2025  Lab labs    10/2/2025  Lab Dr. Hernandez    10/9/2025 Michael Hernandez MD Rheumatology Lupus          If no improvement in symptoms or symptoms worsen, advised to call/follow-up at clinic or go to ER. Patient voiced understanding and all questions/concerns were addressed.   DISCLAIMER: This note was compiled by using a speech recognition dictation system and therefore please be aware that typographical /  speech recognition errors can and do occur.  Please contact me if you see any errors specifically.  Consent was obtained for [facility] recording system prior to the visit.    This note was generated with the assistance of ambient listening technology. Verbal consent was obtained by the patient and accompanying visitor(s) for the recording of patient appointment to facilitate this note. I attest to having reviewed and edited the generated note for accuracy, though some syntax or spelling errors may persist. Please contact the author of this note for any clarification.    Dante Negro M.D.       Office: [phone] [id number] [place] [address]  FAX: [phone]

## 2025-06-13 NOTE — ASSESSMENT & PLAN NOTE
Increase MOUNJARO. We will plan to monitor hemoglobin A1c at designated intervals 3 to 6 months. I recommend ongoing Education for diabetic diet and exercise protocol. We will continue to monitor for side effects. Please be advised of symptoms to monitor for and to notify me immediately if persistent or worsening. Follow up with Ophthalmology/Optometry and Podiatry at least annually.

## 2025-06-13 NOTE — ASSESSMENT & PLAN NOTE
Titrate up dosage of MOUNJARO to 7.5.  Will continue titration to 15 milligrams for maximum weight loss.  Discussed lifestyle modification with diet and exercise.  Monitoring BMI.

## 2025-06-13 NOTE — PATIENT INSTRUCTIONS
Follow up in about 6 months (around 12/13/2025), or if symptoms worsen or fail to improve.     Dear patient,   As a result of recent federal legislation (The Federal Cures Act), you may receive lab or pathology results from your visit in your [facility] account before your physician is able to contact you. Your physician or their representative will relay the results to you with their recommendations at their soonest availability.     If no improvement in symptoms or symptoms worsen, please be advised to call MD, follow-up at clinic and/or go to ER if becomes severe.    Dante Negro M.D.        We Offer TELEHEALTH & Same Day Appointments!   Book your Telehealth appointment with me through my nurse or   Clinic appointments on [employer]!    [address] [place] [address]    Office: [phone]   FAX: [phone]    Check out my Facebook Page and Follow Me at: [email or web address]    Check out my website at [email or web address] by clicking on: [email or web address]    To Schedule appointments online, go to [email or web address]: [email or web address]

## 2025-08-06 ENCOUNTER — PATIENT MESSAGE (OUTPATIENT)
Dept: ALLERGY | Facility: CLINIC | Age: 40
End: 2025-08-06
Payer: COMMERCIAL

## 2025-08-13 ENCOUNTER — PATIENT MESSAGE (OUTPATIENT)
Dept: FAMILY MEDICINE | Facility: CLINIC | Age: 40
End: 2025-08-13

## 2025-08-13 ENCOUNTER — E-VISIT (OUTPATIENT)
Dept: FAMILY MEDICINE | Facility: CLINIC | Age: 40
End: 2025-08-13
Payer: COMMERCIAL

## 2025-08-13 DIAGNOSIS — F41.9 ANXIETY: Primary | ICD-10-CM

## 2025-08-13 RX ORDER — ESCITALOPRAM OXALATE 20 MG/1
20 TABLET ORAL DAILY
Qty: 90 TABLET | Refills: 1 | Status: SHIPPED | OUTPATIENT
Start: 2025-08-13

## 2025-08-16 ENCOUNTER — OFFICE VISIT (OUTPATIENT)
Dept: ALLERGY | Facility: CLINIC | Age: 40
End: 2025-08-16
Payer: COMMERCIAL

## 2025-08-16 VITALS
SYSTOLIC BLOOD PRESSURE: 112 MMHG | BODY MASS INDEX: 53.77 KG/M2 | WEIGHT: 293 LBS | HEART RATE: 80 BPM | TEMPERATURE: 98 F | DIASTOLIC BLOOD PRESSURE: 82 MMHG

## 2025-08-16 DIAGNOSIS — J30.1 SEASONAL ALLERGIC RHINITIS DUE TO POLLEN: Primary | ICD-10-CM

## 2025-08-16 DIAGNOSIS — Z91.013 SHELLFISH ALLERGY: ICD-10-CM

## 2025-08-16 DIAGNOSIS — J30.89 ALLERGIC RHINITIS DUE TO AMERICAN HOUSE DUST MITE: ICD-10-CM

## 2025-08-16 DIAGNOSIS — K21.9 GASTROESOPHAGEAL REFLUX DISEASE, UNSPECIFIED WHETHER ESOPHAGITIS PRESENT: ICD-10-CM

## 2025-08-16 DIAGNOSIS — J45.40 MODERATE PERSISTENT ASTHMA WITHOUT COMPLICATION: ICD-10-CM

## 2025-08-16 DIAGNOSIS — Z91.040 LATEX ALLERGY: ICD-10-CM

## 2025-08-16 DIAGNOSIS — J30.89 ALLERGIC RHINITIS DUE TO INSECT: ICD-10-CM

## 2025-08-16 PROCEDURE — 1160F RVW MEDS BY RX/DR IN RCRD: CPT | Mod: CPTII,S$GLB,, | Performed by: ALLERGY & IMMUNOLOGY

## 2025-08-16 PROCEDURE — 3044F HG A1C LEVEL LT 7.0%: CPT | Mod: CPTII,S$GLB,, | Performed by: ALLERGY & IMMUNOLOGY

## 2025-08-16 PROCEDURE — 94375 RESPIRATORY FLOW VOLUME LOOP: CPT | Mod: S$GLB,,, | Performed by: ALLERGY & IMMUNOLOGY

## 2025-08-16 PROCEDURE — 3008F BODY MASS INDEX DOCD: CPT | Mod: CPTII,S$GLB,, | Performed by: ALLERGY & IMMUNOLOGY

## 2025-08-16 PROCEDURE — 3079F DIAST BP 80-89 MM HG: CPT | Mod: CPTII,S$GLB,, | Performed by: ALLERGY & IMMUNOLOGY

## 2025-08-16 PROCEDURE — 99215 OFFICE O/P EST HI 40 MIN: CPT | Mod: 25,S$GLB,, | Performed by: ALLERGY & IMMUNOLOGY

## 2025-08-16 PROCEDURE — 99999 PR PBB SHADOW E&M-EST. PATIENT-LVL V: CPT | Mod: PBBFAC,,, | Performed by: ALLERGY & IMMUNOLOGY

## 2025-08-16 PROCEDURE — 3074F SYST BP LT 130 MM HG: CPT | Mod: CPTII,S$GLB,, | Performed by: ALLERGY & IMMUNOLOGY

## 2025-08-16 PROCEDURE — 1159F MED LIST DOCD IN RCRD: CPT | Mod: CPTII,S$GLB,, | Performed by: ALLERGY & IMMUNOLOGY

## 2025-08-16 RX ORDER — BUDESONIDE AND FORMOTEROL FUMARATE DIHYDRATE 160; 4.5 UG/1; UG/1
2 AEROSOL RESPIRATORY (INHALATION) EVERY 12 HOURS
Qty: 30 G | Refills: 5 | Status: SHIPPED | OUTPATIENT
Start: 2025-08-16 | End: 2026-08-16

## 2025-08-25 ENCOUNTER — TELEPHONE (OUTPATIENT)
Dept: ENDOCRINOLOGY | Facility: CLINIC | Age: 40
End: 2025-08-25
Payer: COMMERCIAL

## 2025-09-05 DIAGNOSIS — M54.42 CHRONIC LOW BACK PAIN WITH BILATERAL SCIATICA, UNSPECIFIED BACK PAIN LATERALITY: ICD-10-CM

## 2025-09-05 DIAGNOSIS — F41.9 ANXIETY: ICD-10-CM

## 2025-09-05 DIAGNOSIS — I15.2 HYPERTENSION ASSOCIATED WITH DIABETES: ICD-10-CM

## 2025-09-05 DIAGNOSIS — G47.00 INSOMNIA, UNSPECIFIED TYPE: ICD-10-CM

## 2025-09-05 DIAGNOSIS — G89.29 CHRONIC LOW BACK PAIN WITH BILATERAL SCIATICA, UNSPECIFIED BACK PAIN LATERALITY: ICD-10-CM

## 2025-09-05 DIAGNOSIS — M47.819 SPONDYLOARTHRITIS: ICD-10-CM

## 2025-09-05 DIAGNOSIS — M54.41 CHRONIC LOW BACK PAIN WITH BILATERAL SCIATICA, UNSPECIFIED BACK PAIN LATERALITY: ICD-10-CM

## 2025-09-05 DIAGNOSIS — Z79.899 ENCOUNTER FOR LONG-TERM (CURRENT) USE OF MEDICATIONS: ICD-10-CM

## 2025-09-05 DIAGNOSIS — E11.59 HYPERTENSION ASSOCIATED WITH DIABETES: ICD-10-CM

## 2025-09-05 DIAGNOSIS — E11.65 TYPE 2 DIABETES MELLITUS WITH HYPERGLYCEMIA, WITHOUT LONG-TERM CURRENT USE OF INSULIN: ICD-10-CM

## 2025-09-05 RX ORDER — PANTOPRAZOLE SODIUM 40 MG/1
40 TABLET, DELAYED RELEASE ORAL DAILY
Qty: 90 TABLET | Refills: 3 | Status: SHIPPED | OUTPATIENT
Start: 2025-09-05

## 2025-09-05 RX ORDER — GABAPENTIN 300 MG/1
300 CAPSULE ORAL 3 TIMES DAILY
Qty: 270 CAPSULE | Refills: 0 | Status: SHIPPED | OUTPATIENT
Start: 2025-09-05 | End: 2026-09-05

## 2025-09-05 RX ORDER — TIRZEPATIDE 7.5 MG/.5ML
INJECTION, SOLUTION SUBCUTANEOUS
Qty: 6 ML | Refills: 0 | Status: SHIPPED | OUTPATIENT
Start: 2025-09-05

## 2025-09-05 RX ORDER — HYDROXYZINE HYDROCHLORIDE 25 MG/1
25 TABLET, FILM COATED ORAL 3 TIMES DAILY PRN
Qty: 30 TABLET | Refills: 12 | Status: SHIPPED | OUTPATIENT
Start: 2025-09-05

## (undated) DEVICE — GLOVE SENSICARE PI ALOE 8

## (undated) DEVICE — SOL 9P NACL IRR PIC IL

## (undated) DEVICE — GLOVE BIOGEL SZ 8 1/2

## (undated) DEVICE — BANDAGE ELASTIC 3X5 VELCRO ST

## (undated) DEVICE — COVER LIGHT HANDLE 80/CA

## (undated) DEVICE — HANDLE SURG LIGHT NONRIGID

## (undated) DEVICE — NDL HYPODERMIC BLUNT 18G 1.5IN

## (undated) DEVICE — SEE MEDLINE ITEM 157131

## (undated) DEVICE — DRAPE HAND STERILE

## (undated) DEVICE — DRAPE PLASTIC U 60X72

## (undated) DEVICE — NDL SAFETY 25G X 1.5 ECLIPSE

## (undated) DEVICE — FORCEP STRAIGHT DISP

## (undated) DEVICE — BLADE SURG #15 CARBON STEEL

## (undated) DEVICE — CORD BIPOLAR 12 FOOT

## (undated) DEVICE — SEE MEDLINE ITEM 157173

## (undated) DEVICE — DRAPE U SPLIT SHEET 54X76IN

## (undated) DEVICE — SYR 10CC LUER LOCK

## (undated) DEVICE — SUT ETHILON 4-0 PS2 18 BLK

## (undated) DEVICE — UNDERGLOVES BIOGEL PI SIZE 7.5

## (undated) DEVICE — SUT 4-0 ETHILON 18 PS-2

## (undated) DEVICE — GLOVE PROTEXIS LTX MICRO  7.5

## (undated) DEVICE — KIT SAHARA DRAPE DRAW/LIFT

## (undated) DEVICE — DRAPE THREE-QTR REINF 53X77IN

## (undated) DEVICE — SUPPORT ULNA NERVE PROTECTOR

## (undated) DEVICE — APPLICATOR CHLORAPREP ORN 26ML

## (undated) DEVICE — BANDAGE ESMARK LATEX FREE 4INX

## (undated) DEVICE — SCRUB HIBICLENS 4% CHG 4OZ

## (undated) DEVICE — BAND RUBBER STERILE 1/4X3.5IN

## (undated) DEVICE — POSITIONER HEAD DONUT 9IN FOAM

## (undated) DEVICE — PAD CAST SPECIALIST STRL 4

## (undated) DEVICE — SEE MEDLINE ITEM 157027

## (undated) DEVICE — PAD CAST SPECIALIST STRL 3

## (undated) DEVICE — GOWN SURG 2XL DISP TIE BACK

## (undated) DEVICE — BOWL STERILE LARGE 32OZ

## (undated) DEVICE — NDL 27G X 1 1/4

## (undated) DEVICE — SPONGE COTTON TRAY 4X4IN

## (undated) DEVICE — ALCOHOL 70% ISOP RUBBING 4OZ

## (undated) DEVICE — TOURNIQUET SB QC DP 18X4IN

## (undated) DEVICE — GLOVE PROTEXIS LTX MICRO 8

## (undated) DEVICE — SEE MEDLINE ITEM 152522

## (undated) DEVICE — DRESSING XEROFORM FOIL PK 1X8

## (undated) DEVICE — DRAPE STERI-DRAPE 1000 17X11IN

## (undated) DEVICE — SEE MEDLINE ITEM 157117

## (undated) DEVICE — UNDERGLOVES BIOGEL PI SIZE 8.5

## (undated) DEVICE — COVER CAMERA OPERATING ROOM

## (undated) DEVICE — STRAP OR TABLE 5IN X 72IN

## (undated) DEVICE — BANDAGE ELAS SOFTWRAP ST 3X5YD

## (undated) DEVICE — DRAPE HALF SURGICAL 40X58IN

## (undated) DEVICE — SEE MEDLINE ITEM 152622

## (undated) DEVICE — Device

## (undated) DEVICE — APPLICATOR CHLORAPREP CLR 10.5

## (undated) DEVICE — GAUZE SPONGE 4X4 12PLY

## (undated) DEVICE — PAD ABD 8X10 STERILE

## (undated) DEVICE — DRESSING XEROFORM NONADH 1X8IN